# Patient Record
Sex: MALE | Race: WHITE | NOT HISPANIC OR LATINO | URBAN - METROPOLITAN AREA
[De-identification: names, ages, dates, MRNs, and addresses within clinical notes are randomized per-mention and may not be internally consistent; named-entity substitution may affect disease eponyms.]

---

## 2024-10-27 ENCOUNTER — INPATIENT (INPATIENT)
Facility: HOSPITAL | Age: 74
LOS: 11 days | Discharge: NURSING, REHAB OR LONG-TERM CARE | DRG: 235 | End: 2024-11-08
Attending: THORACIC SURGERY (CARDIOTHORACIC VASCULAR SURGERY) | Admitting: THORACIC SURGERY (CARDIOTHORACIC VASCULAR SURGERY)
Payer: MEDICARE

## 2024-10-27 VITALS
SYSTOLIC BLOOD PRESSURE: 106 MMHG | RESPIRATION RATE: 18 BRPM | HEART RATE: 72 BPM | DIASTOLIC BLOOD PRESSURE: 51 MMHG | TEMPERATURE: 98 F | OXYGEN SATURATION: 95 %

## 2024-10-27 DIAGNOSIS — Z95.828 PRESENCE OF OTHER VASCULAR IMPLANTS AND GRAFTS: Chronic | ICD-10-CM

## 2024-10-27 LAB
A1C WITH ESTIMATED AVERAGE GLUCOSE RESULT: 5.8 % — HIGH (ref 4–5.6)
ALBUMIN SERPL ELPH-MCNC: 4.2 G/DL — SIGNIFICANT CHANGE UP (ref 3.3–5)
ALP SERPL-CCNC: 48 U/L — SIGNIFICANT CHANGE UP (ref 40–120)
ALT FLD-CCNC: 34 U/L — SIGNIFICANT CHANGE UP (ref 10–45)
ANION GAP SERPL CALC-SCNC: 11 MMOL/L — SIGNIFICANT CHANGE UP (ref 5–17)
APTT BLD: 51.5 SEC — HIGH (ref 24.5–35.6)
APTT BLD: 59.3 SEC — HIGH (ref 24.5–35.6)
AST SERPL-CCNC: 53 U/L — HIGH (ref 10–40)
BASOPHILS # BLD AUTO: 0.02 K/UL — SIGNIFICANT CHANGE UP (ref 0–0.2)
BASOPHILS NFR BLD AUTO: 0.2 % — SIGNIFICANT CHANGE UP (ref 0–2)
BILIRUB SERPL-MCNC: 0.3 MG/DL — SIGNIFICANT CHANGE UP (ref 0.2–1.2)
BLD GP AB SCN SERPL QL: NEGATIVE — SIGNIFICANT CHANGE UP
BLD GP AB SCN SERPL QL: NEGATIVE — SIGNIFICANT CHANGE UP
BUN SERPL-MCNC: 24 MG/DL — HIGH (ref 7–23)
CALCIUM SERPL-MCNC: 9 MG/DL — SIGNIFICANT CHANGE UP (ref 8.4–10.5)
CHLORIDE SERPL-SCNC: 105 MMOL/L — SIGNIFICANT CHANGE UP (ref 96–108)
CHOLEST SERPL-MCNC: 123 MG/DL — SIGNIFICANT CHANGE UP
CK MB CFR SERPL CALC: 6.8 NG/ML — HIGH (ref 0–6.7)
CK SERPL-CCNC: 313 U/L — HIGH (ref 30–200)
CO2 SERPL-SCNC: 22 MMOL/L — SIGNIFICANT CHANGE UP (ref 22–31)
CREAT SERPL-MCNC: 1.5 MG/DL — HIGH (ref 0.5–1.3)
EGFR: 48 ML/MIN/1.73M2 — LOW
EOSINOPHIL # BLD AUTO: 0.19 K/UL — SIGNIFICANT CHANGE UP (ref 0–0.5)
EOSINOPHIL NFR BLD AUTO: 2.3 % — SIGNIFICANT CHANGE UP (ref 0–6)
ESTIMATED AVERAGE GLUCOSE: 120 MG/DL — HIGH (ref 68–114)
GLUCOSE SERPL-MCNC: 112 MG/DL — HIGH (ref 70–99)
HCT VFR BLD CALC: 39.3 % — SIGNIFICANT CHANGE UP (ref 39–50)
HDLC SERPL-MCNC: 46 MG/DL — SIGNIFICANT CHANGE UP
HGB BLD-MCNC: 13.3 G/DL — SIGNIFICANT CHANGE UP (ref 13–17)
IMM GRANULOCYTES NFR BLD AUTO: 0.2 % — SIGNIFICANT CHANGE UP (ref 0–0.9)
INR BLD: 1.14 — SIGNIFICANT CHANGE UP (ref 0.85–1.16)
LIPID PNL WITH DIRECT LDL SERPL: 56 MG/DL — SIGNIFICANT CHANGE UP
LYMPHOCYTES # BLD AUTO: 1.08 K/UL — SIGNIFICANT CHANGE UP (ref 1–3.3)
LYMPHOCYTES # BLD AUTO: 13 % — SIGNIFICANT CHANGE UP (ref 13–44)
MAGNESIUM SERPL-MCNC: 1.9 MG/DL — SIGNIFICANT CHANGE UP (ref 1.6–2.6)
MCHC RBC-ENTMCNC: 31.5 PG — SIGNIFICANT CHANGE UP (ref 27–34)
MCHC RBC-ENTMCNC: 33.8 GM/DL — SIGNIFICANT CHANGE UP (ref 32–36)
MCV RBC AUTO: 93.1 FL — SIGNIFICANT CHANGE UP (ref 80–100)
MONOCYTES # BLD AUTO: 0.92 K/UL — HIGH (ref 0–0.9)
MONOCYTES NFR BLD AUTO: 11.1 % — SIGNIFICANT CHANGE UP (ref 2–14)
NEUTROPHILS # BLD AUTO: 6.09 K/UL — SIGNIFICANT CHANGE UP (ref 1.8–7.4)
NEUTROPHILS NFR BLD AUTO: 73.2 % — SIGNIFICANT CHANGE UP (ref 43–77)
NON HDL CHOLESTEROL: 77 MG/DL — SIGNIFICANT CHANGE UP
NRBC # BLD: 0 /100 WBCS — SIGNIFICANT CHANGE UP (ref 0–0)
NT-PROBNP SERPL-SCNC: 1457 PG/ML — HIGH (ref 0–300)
PA ADP PRP-ACNC: 162 PRU — LOW (ref 182–335)
PHOSPHATE SERPL-MCNC: 2.9 MG/DL — SIGNIFICANT CHANGE UP (ref 2.5–4.5)
PLATELET # BLD AUTO: 201 K/UL — SIGNIFICANT CHANGE UP (ref 150–400)
POTASSIUM SERPL-MCNC: 4.2 MMOL/L — SIGNIFICANT CHANGE UP (ref 3.5–5.3)
POTASSIUM SERPL-SCNC: 4.2 MMOL/L — SIGNIFICANT CHANGE UP (ref 3.5–5.3)
PROT SERPL-MCNC: 7.8 G/DL — SIGNIFICANT CHANGE UP (ref 6–8.3)
PROTHROM AB SERPL-ACNC: 13.3 SEC — SIGNIFICANT CHANGE UP (ref 9.9–13.4)
RBC # BLD: 4.22 M/UL — SIGNIFICANT CHANGE UP (ref 4.2–5.8)
RBC # FLD: 12.9 % — SIGNIFICANT CHANGE UP (ref 10.3–14.5)
RH IG SCN BLD-IMP: POSITIVE — SIGNIFICANT CHANGE UP
RH IG SCN BLD-IMP: POSITIVE — SIGNIFICANT CHANGE UP
SODIUM SERPL-SCNC: 138 MMOL/L — SIGNIFICANT CHANGE UP (ref 135–145)
TRIGL SERPL-MCNC: 113 MG/DL — SIGNIFICANT CHANGE UP
TROPONIN T, HIGH SENSITIVITY RESULT: 304 NG/L — CRITICAL HIGH (ref 0–51)
TSH SERPL-MCNC: 1.73 UIU/ML — SIGNIFICANT CHANGE UP (ref 0.27–4.2)
WBC # BLD: 8.32 K/UL — SIGNIFICANT CHANGE UP (ref 3.8–10.5)
WBC # FLD AUTO: 8.32 K/UL — SIGNIFICANT CHANGE UP (ref 3.8–10.5)

## 2024-10-27 PROCEDURE — 71046 X-RAY EXAM CHEST 2 VIEWS: CPT | Mod: 26

## 2024-10-27 PROCEDURE — 93010 ELECTROCARDIOGRAM REPORT: CPT

## 2024-10-27 PROCEDURE — 93970 EXTREMITY STUDY: CPT | Mod: 26

## 2024-10-27 PROCEDURE — 93880 EXTRACRANIAL BILAT STUDY: CPT | Mod: 26

## 2024-10-27 RX ORDER — NITROGLYCERIN 0.6MG/HR
0.4 PATCH, TRANSDERMAL 24 HOURS TRANSDERMAL
Refills: 0 | Status: COMPLETED | OUTPATIENT
Start: 2024-10-27 | End: 2024-10-29

## 2024-10-27 RX ORDER — SODIUM CHLORIDE 9 MG/ML
3 INJECTION, SOLUTION INTRAMUSCULAR; INTRAVENOUS; SUBCUTANEOUS EVERY 8 HOURS
Refills: 0 | Status: DISCONTINUED | OUTPATIENT
Start: 2024-10-27 | End: 2024-10-31

## 2024-10-27 RX ORDER — HEPARIN SODIUM 10000 [USP'U]/ML
1300 INJECTION INTRAVENOUS; SUBCUTANEOUS
Qty: 25000 | Refills: 0 | Status: DISCONTINUED | OUTPATIENT
Start: 2024-10-27 | End: 2024-10-28

## 2024-10-27 RX ORDER — ISOSORBIDE MONONITRATE 60 MG/1
30 TABLET, EXTENDED RELEASE ORAL DAILY
Refills: 0 | Status: DISCONTINUED | OUTPATIENT
Start: 2024-10-27 | End: 2024-10-31

## 2024-10-27 RX ORDER — PANTOPRAZOLE SODIUM 40 MG/1
40 TABLET, DELAYED RELEASE ORAL
Refills: 0 | Status: DISCONTINUED | OUTPATIENT
Start: 2024-10-27 | End: 2024-10-31

## 2024-10-27 RX ORDER — ACETAMINOPHEN 500 MG
650 TABLET ORAL EVERY 6 HOURS
Refills: 0 | Status: DISCONTINUED | OUTPATIENT
Start: 2024-10-27 | End: 2024-10-31

## 2024-10-27 RX ORDER — MORPHINE SULFATE 30 MG/1
15 TABLET, EXTENDED RELEASE ORAL ONCE
Refills: 0 | Status: DISCONTINUED | OUTPATIENT
Start: 2024-10-27 | End: 2024-10-27

## 2024-10-27 RX ORDER — INFLUENZ VIR VAC TV P-SURF2003 15MCG/.5ML
0.5 SYRINGE (ML) INTRAMUSCULAR ONCE
Refills: 0 | Status: DISCONTINUED | OUTPATIENT
Start: 2024-10-27 | End: 2024-10-31

## 2024-10-27 RX ORDER — MORPHINE SULFATE 30 MG/1
2 TABLET, EXTENDED RELEASE ORAL EVERY 4 HOURS
Refills: 0 | Status: DISCONTINUED | OUTPATIENT
Start: 2024-10-27 | End: 2024-10-28

## 2024-10-27 RX ORDER — TAMSULOSIN HCL 0.4 MG
0.4 CAPSULE ORAL AT BEDTIME
Refills: 0 | Status: DISCONTINUED | OUTPATIENT
Start: 2024-10-27 | End: 2024-10-31

## 2024-10-27 RX ORDER — POLYETHYLENE GLYCOL 3350 17 G/17G
17 POWDER, FOR SOLUTION ORAL DAILY
Refills: 0 | Status: DISCONTINUED | OUTPATIENT
Start: 2024-10-27 | End: 2024-10-31

## 2024-10-27 RX ORDER — METOPROLOL TARTRATE 50 MG
12.5 TABLET ORAL EVERY 12 HOURS
Refills: 0 | Status: DISCONTINUED | OUTPATIENT
Start: 2024-10-27 | End: 2024-10-27

## 2024-10-27 RX ORDER — ASPIRIN/MAG CARB/ALUMINUM AMIN 325 MG
81 TABLET ORAL DAILY
Refills: 0 | Status: DISCONTINUED | OUTPATIENT
Start: 2024-10-27 | End: 2024-10-31

## 2024-10-27 RX ORDER — SENNA 187 MG
2 TABLET ORAL AT BEDTIME
Refills: 0 | Status: DISCONTINUED | OUTPATIENT
Start: 2024-10-27 | End: 2024-10-31

## 2024-10-27 RX ORDER — HEPARIN SODIUM 10000 [USP'U]/ML
1300 INJECTION INTRAVENOUS; SUBCUTANEOUS
Qty: 25000 | Refills: 0 | Status: DISCONTINUED | OUTPATIENT
Start: 2024-10-27 | End: 2024-10-27

## 2024-10-27 RX ORDER — METOPROLOL TARTRATE 50 MG
25 TABLET ORAL
Refills: 0 | Status: DISCONTINUED | OUTPATIENT
Start: 2024-10-27 | End: 2024-10-31

## 2024-10-27 RX ADMIN — Medication 650 MILLIGRAM(S): at 16:17

## 2024-10-27 RX ADMIN — SODIUM CHLORIDE 3 MILLILITER(S): 9 INJECTION, SOLUTION INTRAMUSCULAR; INTRAVENOUS; SUBCUTANEOUS at 21:36

## 2024-10-27 RX ADMIN — Medication 25 MILLIGRAM(S): at 21:32

## 2024-10-27 RX ADMIN — Medication 650 MILLIGRAM(S): at 16:00

## 2024-10-27 RX ADMIN — Medication 2 TABLET(S): at 21:32

## 2024-10-27 RX ADMIN — MORPHINE SULFATE 15 MILLIGRAM(S): 30 TABLET, EXTENDED RELEASE ORAL at 23:30

## 2024-10-27 RX ADMIN — MORPHINE SULFATE 2 MILLIGRAM(S): 30 TABLET, EXTENDED RELEASE ORAL at 22:18

## 2024-10-27 RX ADMIN — MORPHINE SULFATE 15 MILLIGRAM(S): 30 TABLET, EXTENDED RELEASE ORAL at 22:39

## 2024-10-27 RX ADMIN — HEPARIN SODIUM 13 UNIT(S)/HR: 10000 INJECTION INTRAVENOUS; SUBCUTANEOUS at 22:08

## 2024-10-27 RX ADMIN — Medication 0.4 MILLIGRAM(S): at 21:25

## 2024-10-27 RX ADMIN — MORPHINE SULFATE 2 MILLIGRAM(S): 30 TABLET, EXTENDED RELEASE ORAL at 21:06

## 2024-10-27 NOTE — PROVIDER CONTACT NOTE (OTHER) - REASON
pt on heparin gtt at 12.7units/hour from . Unable to obtain weight on pt because pt has been off the floor.

## 2024-10-27 NOTE — PATIENT PROFILE ADULT - NSFALLSECTIONLABEL_GEN_A_CORE
Patent needs his referral for the sleep study sent right to [facility], per his insurance company.  Please call patient to discuss. He said [employer] told him we do this electronically.        .

## 2024-10-27 NOTE — H&P ADULT - HISTORY OF PRESENT ILLNESS
This is a 75 y/o male with PMHx of   Transferred here from  for surgical evaluation.    Denies CP/SOB/N/V/D/dizziness/cough/fever/chills.   This is a 73 y/o male with PMHx of HTN, HLD, BPH, CAD (?s/p coronary stent per patient), severe PAD s/p fem-fem bypass per patient (?year) and an "abdominal stent for circulation problems".  Has not seen a doctor for his PAD in "a few years".  States he was having severe left leg pain and chest pain, prompting him to call an ambulance.  He was taken to [facility] for evaluation and cardiac cath revealed CAD.  Transferred here from  for surgical evaluation.    Denies CP/SOB/N/V/D/dizziness/cough/fever/chills.    Denies h/o CVA, surgery other than the bypass/stents.  Former smoker (quit 20 years ago) and former ETOH abuse (quit 20 years ago).  Denies drug use.  Poor support system, has no family or friends here.  Some family lives in [place].  He lives alone, on disability.

## 2024-10-27 NOTE — H&P ADULT - NSHPPHYSICALEXAM_GEN_ALL_CORE
GEN: NAD, looks comfortable  Psych: Mood appropriate  Neuro: A&Ox3.  No focal deficits.  Moving all extremities.   HEENT: No obvious abnormalities  CV: S1S2, regular, no murmurs appreciated.  No carotid bruits.  No JVD  Lungs: Clear B/L.  No wheezing, rales or rhonchi  ABD: Soft, non-tender, non-distended.  +Bowel sounds  EXT: Warm and well perfused.  No peripheral edema noted  Musculoskeletal: Moving all extremities with normal ROM, no joint swelling  PV: Pedal pulses palpable GEN: Seems to be in a lot of pain from his leg.   He's holding his leg and moaning a lot.    Psych: Mood appropriate  Neuro: A&Ox3.  No focal deficits.  Moving all extremities.   HEENT: No obvious abnormalities  CV: S1S2, regular, no murmurs appreciated.  No carotid bruits.  No JVD  Lungs: Clear B/L.  No wheezing, rales or rhonchi  ABD: Soft, non-tender, non-distended.  +Bowel sounds  EXT: Multiple ulcers on left leg shin and heel, with excoriations/scabs.  Legs are warm;   Pulse exam:  Musculoskeletal: Moving all extremities with normal ROM, no joint swelling GEN: Seems to be in a lot of pain from his leg.   He's holding his leg and moaning a lot.    Psych: Mood appropriate  Neuro: A&Ox3.  No focal deficits.  Moving all extremities.   HEENT: No obvious abnormalities  CV: S1S2, regular, no murmurs appreciated.  No carotid bruits.  No JVD  Lungs: Clear B/L.  No wheezing, rales or rhonchi  ABD: Soft, non-tender, non-distended.  +Bowel sounds  EXT: Multiple ulcers on left leg shin and heel, with excoriations/scabs.  Legs are warm;   Pulse exam: Right DP 2+ doppler, right PT 1+ doppler, left DP 2+ doppler, left PT NOT DOPPLERABLE  Musculoskeletal: Moving all extremities with normal ROM, no joint swelling

## 2024-10-27 NOTE — PATIENT PROFILE ADULT - FALL HARM RISK - PATIENT NEEDS ASSISTANCE
Normal rate, regular rhythm.  Heart sounds S1, S2.  No murmurs, rubs or gallops. Standing/Walking/Toileting

## 2024-10-27 NOTE — H&P ADULT - NSHPSOCIALHISTORY_GEN_ALL_CORE
Denies  Lives with   Occupation  Assist device: No Smoker x 20 years, quit 20 years ago.  Former ETOH use, quit 20 years ago  Lives alone.  No family or friends nearby.   Occupation - on disability.   Assist device: Uses a cane, but as of recent hospital admission can not walk due to his severe leg pain.

## 2024-10-27 NOTE — PROVIDER CONTACT NOTE (OTHER) - SITUATION
Pt arrived from OSH on Heparin gtt at 12.7cc/hour.  Unable to get weight on pt due to pt transported via bed for U/S carotids.  To be endorsed to night RN to draw PTT and get a weight on pt

## 2024-10-27 NOTE — PROVIDER CONTACT NOTE (OTHER) - ACTION/TREATMENT ORDERED:
To be endorsed to night RN to draw PTT as soon as pt comes back to room and obtain heparin gtt order.  Heparin gtt has been infusing since patient arrived to unit at approx 2pm.

## 2024-10-27 NOTE — H&P ADULT - ASSESSMENT
Plan:    Problem 1.  CAD.  Admit to Dr. Devine, for surgical evaluation.  Surgical plan pending further work-up.  F/U echo, carotid dopplers.  CXR done, no acute pathology, F/U official reading.  DASH diet.      Problem 2.  PAD. H/o stents/bypass.  Unclear exactly what surgery he's had, poor historian.  Heparin gtt; F/U ptt.  Continue w/ ASA.  Plavix on hold.      Problem 3.  ?Lung nodule.  PET scan ordered to better assess and r/o metastatic disease.  NO DEXTROSE in Heparin gtt.  Pulm consult tomorrow.      Problem 4.      Dispo:  9LA  FULL CODE       Plan:    Problem 1.  CAD.  Admit to Dr. Devine, for surgical evaluation.  Surgical plan pending further work-up.  F/U echo, carotid dopplers.  CXR done, no acute pathology, F/U official reading.  DASH diet.  Heparin gtt, statin, ASA, BB.  Norvasc (home medication) as tolerated.     Problem 2.  PAD. H/o stents/bypass.  Unclear exactly what surgery he's had, poor historian.  Heparin gtt; F/U ptt.  Continue w/ ASA.  Plavix on hold.      Problem 3.  ?Lung nodule.  PET scan ordered to better assess and r/o metastatic disease.  NO DEXTROSE in Heparin gtt.  Pulm consult tomorrow.      Problem 4. BPH.  Continue Flomax.     Dispo:  9LA  FULL CODE   This is a 73 y/o male with PMHx of HTN, HLD, BPH, CAD (?s/p coronary stent per patient), severe PAD s/p fem-fem bypass per patient (?year) and an "abdominal stent for circulation problems".  Has not seen a doctor for his PAD in "a few years".  States he was having severe left leg pain and chest pain, prompting him to call an ambulance.  He was taken to [facility] for evaluation and cardiac cath revealed CAD.  Transferred here from  for surgical evaluation.    Denies CP/SOB/N/V/D/dizziness/cough/fever/chills.    Denies h/o CVA, surgery other than the bypass/stents.  Former smoker (quit 20 years ago) and former ETOH abuse (quit 20 years ago).  Denies drug use.  Poor support system, has no family or friends here.  Some family lives in [place].  He lives alone, on disability.      Plan:    Problem 1.  CAD.  Admit to Dr. Devine, for surgical evaluation.  Surgical plan pending further work-up.  F/U echo, carotid dopplers.  CXR done, no acute pathology, F/U official reading.  DASH diet.  Heparin gtt, statin, ASA, BB.  Norvasc (home medication) as tolerated.     Problem 2.  PAD. H/o stents/bypass.  Unclear exactly what surgery he's had, poor historian.  Heparin gtt; F/U ptt.  Continue w/ ASA.  Plavix on hold.  May need in-house vascular consult for PAD/ulcers    Problem 3.  ?Lung nodule.  PET scan ordered to better assess and r/o metastatic disease.  NO DEXTROSE in Heparin gtt.  Pulm consult tomorrow.      Problem 4. BPH.  Continue Flomax.     Dispo:  9LA  FULL CODE

## 2024-10-27 NOTE — H&P ADULT - NSHPREVIEWOFSYSTEMS_GEN_ALL_CORE
Review of Systems  CONSTITUTIONAL:  Denies Fevers / chills, sweats, fatigue, weight loss, weight gain                                      NEURO:  Denies parethesias, seizures, syncope, confusion                                                                                EYES:  Denies Blurry vision, discharge, pain, loss of vision                                                                                    ENMT:  Denies Difficulty hearing, vertigo, dysphagia, epistaxis, recent dental work                                       CV:  Denies Chest pain, palpitations, HICKMAN, orthopnea                                                                                          RESPIRATORY:  Denies Wheezing, SOB, cough / sputum, hemoptysis                                                                GI:  Denies Nausea, vomiting, diarrhea, constipation, melena, difficulty swallowing                                               : Denies Hematuria, dysuria, urgency, incontinence                                                                                         MUSCULOSKELETAL:  Denies arthritis, joint swelling, muscle weakness                                                             SKIN/BREAST:  Denies rash, itching, hair loss, masses                                                                                            PSYCH:  Denies depression, anxiety, suicidal ideation                                                                                               HEME/LYMPH:  Denies bruises easily, enlarged lymph nodes, tender lymph nodes                                        ENDOCRINE:  Denies cold intolerance, heat intolerance, polydipsia

## 2024-10-27 NOTE — H&P ADULT - NSICDXPASTMEDICALHX_GEN_ALL_CORE_FT
PAST MEDICAL HISTORY:  CAD (coronary artery disease)      PAST MEDICAL HISTORY:  BPH (benign prostatic hyperplasia)     CAD (coronary artery disease)     HLD (hyperlipidemia)     HTN (hypertension)     PAD (peripheral artery disease)

## 2024-10-28 ENCOUNTER — RESULT REVIEW (OUTPATIENT)
Age: 74
End: 2024-10-28

## 2024-10-28 LAB
ANION GAP SERPL CALC-SCNC: 12 MMOL/L — SIGNIFICANT CHANGE UP (ref 5–17)
APTT BLD: 78.4 SEC — HIGH (ref 24.5–35.6)
APTT BLD: 78.6 SEC — HIGH (ref 24.5–35.6)
BUN SERPL-MCNC: 25 MG/DL — HIGH (ref 7–23)
CALCIUM SERPL-MCNC: 9 MG/DL — SIGNIFICANT CHANGE UP (ref 8.4–10.5)
CHLORIDE SERPL-SCNC: 107 MMOL/L — SIGNIFICANT CHANGE UP (ref 96–108)
CK MB CFR SERPL CALC: 6.6 NG/ML — SIGNIFICANT CHANGE UP (ref 0–6.7)
CK SERPL-CCNC: 236 U/L — HIGH (ref 30–200)
CO2 SERPL-SCNC: 18 MMOL/L — LOW (ref 22–31)
CREAT SERPL-MCNC: 1.47 MG/DL — HIGH (ref 0.5–1.3)
EGFR: 49 ML/MIN/1.73M2 — LOW
GLUCOSE BLDC GLUCOMTR-MCNC: 89 MG/DL — SIGNIFICANT CHANGE UP (ref 70–99)
GLUCOSE BLDC GLUCOMTR-MCNC: 90 MG/DL — SIGNIFICANT CHANGE UP (ref 70–99)
GLUCOSE SERPL-MCNC: 87 MG/DL — SIGNIFICANT CHANGE UP (ref 70–99)
HCT VFR BLD CALC: 37.9 % — LOW (ref 39–50)
HGB BLD-MCNC: 12.7 G/DL — LOW (ref 13–17)
INR BLD: 1.18 — HIGH (ref 0.85–1.16)
MAGNESIUM SERPL-MCNC: 2 MG/DL — SIGNIFICANT CHANGE UP (ref 1.6–2.6)
MCHC RBC-ENTMCNC: 31.1 PG — SIGNIFICANT CHANGE UP (ref 27–34)
MCHC RBC-ENTMCNC: 33.5 GM/DL — SIGNIFICANT CHANGE UP (ref 32–36)
MCV RBC AUTO: 92.9 FL — SIGNIFICANT CHANGE UP (ref 80–100)
NRBC # BLD: 0 /100 WBCS — SIGNIFICANT CHANGE UP (ref 0–0)
PHOSPHATE SERPL-MCNC: 2.9 MG/DL — SIGNIFICANT CHANGE UP (ref 2.5–4.5)
PLATELET # BLD AUTO: 188 K/UL — SIGNIFICANT CHANGE UP (ref 150–400)
POTASSIUM SERPL-MCNC: 4 MMOL/L — SIGNIFICANT CHANGE UP (ref 3.5–5.3)
POTASSIUM SERPL-SCNC: 4 MMOL/L — SIGNIFICANT CHANGE UP (ref 3.5–5.3)
PROTHROM AB SERPL-ACNC: 13.6 SEC — HIGH (ref 9.9–13.4)
RBC # BLD: 4.08 M/UL — LOW (ref 4.2–5.8)
RBC # FLD: 12.8 % — SIGNIFICANT CHANGE UP (ref 10.3–14.5)
SODIUM SERPL-SCNC: 137 MMOL/L — SIGNIFICANT CHANGE UP (ref 135–145)
TROPONIN T, HIGH SENSITIVITY RESULT: 198 NG/L — CRITICAL HIGH (ref 0–51)
WBC # BLD: 6.43 K/UL — SIGNIFICANT CHANGE UP (ref 3.8–10.5)
WBC # FLD AUTO: 6.43 K/UL — SIGNIFICANT CHANGE UP (ref 3.8–10.5)

## 2024-10-28 PROCEDURE — 99232 SBSQ HOSP IP/OBS MODERATE 35: CPT

## 2024-10-28 PROCEDURE — 99222 1ST HOSP IP/OBS MODERATE 55: CPT

## 2024-10-28 PROCEDURE — 93925 LOWER EXTREMITY STUDY: CPT | Mod: 26

## 2024-10-28 PROCEDURE — 93306 TTE W/DOPPLER COMPLETE: CPT | Mod: 26

## 2024-10-28 PROCEDURE — 93010 ELECTROCARDIOGRAM REPORT: CPT

## 2024-10-28 PROCEDURE — 78815 PET IMAGE W/CT SKULL-THIGH: CPT | Mod: 26,PI

## 2024-10-28 RX ORDER — OXYCODONE HYDROCHLORIDE 30 MG/1
5 TABLET ORAL EVERY 4 HOURS
Refills: 0 | Status: DISCONTINUED | OUTPATIENT
Start: 2024-10-28 | End: 2024-10-31

## 2024-10-28 RX ORDER — ACETAMINOPHEN 500 MG
1000 TABLET ORAL ONCE
Refills: 0 | Status: COMPLETED | OUTPATIENT
Start: 2024-10-28 | End: 2024-10-29

## 2024-10-28 RX ORDER — MORPHINE SULFATE 30 MG/1
15 TABLET, EXTENDED RELEASE ORAL DAILY
Refills: 0 | Status: DISCONTINUED | OUTPATIENT
Start: 2024-10-28 | End: 2024-10-31

## 2024-10-28 RX ORDER — HEPARIN SODIUM 10000 [USP'U]/ML
1300 INJECTION INTRAVENOUS; SUBCUTANEOUS
Qty: 25000 | Refills: 0 | Status: DISCONTINUED | OUTPATIENT
Start: 2024-10-28 | End: 2024-10-31

## 2024-10-28 RX ORDER — ACETAMINOPHEN 500 MG
1000 TABLET ORAL ONCE
Refills: 0 | Status: COMPLETED | OUTPATIENT
Start: 2024-10-28 | End: 2024-10-28

## 2024-10-28 RX ORDER — HYDROMORPHONE HCL/0.9% NACL/PF 6 MG/30 ML
0.2 PATIENT CONTROLLED ANALGESIA SYRINGE INTRAVENOUS ONCE
Refills: 0 | Status: DISCONTINUED | OUTPATIENT
Start: 2024-10-28 | End: 2024-10-28

## 2024-10-28 RX ADMIN — SODIUM CHLORIDE 3 MILLILITER(S): 9 INJECTION, SOLUTION INTRAMUSCULAR; INTRAVENOUS; SUBCUTANEOUS at 05:17

## 2024-10-28 RX ADMIN — Medication 25 MILLIGRAM(S): at 05:26

## 2024-10-28 RX ADMIN — Medication 81 MILLIGRAM(S): at 11:26

## 2024-10-28 RX ADMIN — SODIUM CHLORIDE 3 MILLILITER(S): 9 INJECTION, SOLUTION INTRAMUSCULAR; INTRAVENOUS; SUBCUTANEOUS at 13:12

## 2024-10-28 RX ADMIN — ISOSORBIDE MONONITRATE 30 MILLIGRAM(S): 60 TABLET, EXTENDED RELEASE ORAL at 11:26

## 2024-10-28 RX ADMIN — Medication 0.4 MILLIGRAM(S): at 11:26

## 2024-10-28 RX ADMIN — PANTOPRAZOLE SODIUM 40 MILLIGRAM(S): 40 TABLET, DELAYED RELEASE ORAL at 08:05

## 2024-10-28 RX ADMIN — OXYCODONE HYDROCHLORIDE 5 MILLIGRAM(S): 30 TABLET ORAL at 17:57

## 2024-10-28 RX ADMIN — MORPHINE SULFATE 15 MILLIGRAM(S): 30 TABLET, EXTENDED RELEASE ORAL at 22:00

## 2024-10-28 RX ADMIN — Medication 0.2 MILLIGRAM(S): at 14:00

## 2024-10-28 RX ADMIN — Medication 80 MILLIGRAM(S): at 21:30

## 2024-10-28 RX ADMIN — Medication 25 MILLIGRAM(S): at 17:55

## 2024-10-28 RX ADMIN — MORPHINE SULFATE 2 MILLIGRAM(S): 30 TABLET, EXTENDED RELEASE ORAL at 01:30

## 2024-10-28 RX ADMIN — MORPHINE SULFATE 2 MILLIGRAM(S): 30 TABLET, EXTENDED RELEASE ORAL at 01:45

## 2024-10-28 RX ADMIN — Medication 400 MILLIGRAM(S): at 05:25

## 2024-10-28 RX ADMIN — Medication 2 TABLET(S): at 21:04

## 2024-10-28 RX ADMIN — Medication 0.2 MILLIGRAM(S): at 17:57

## 2024-10-28 RX ADMIN — OXYCODONE HYDROCHLORIDE 5 MILLIGRAM(S): 30 TABLET ORAL at 09:16

## 2024-10-28 RX ADMIN — MORPHINE SULFATE 15 MILLIGRAM(S): 30 TABLET, EXTENDED RELEASE ORAL at 21:11

## 2024-10-28 RX ADMIN — Medication 0.4 MILLIGRAM(S): at 21:05

## 2024-10-28 RX ADMIN — SODIUM CHLORIDE 3 MILLILITER(S): 9 INJECTION, SOLUTION INTRAMUSCULAR; INTRAVENOUS; SUBCUTANEOUS at 21:30

## 2024-10-28 RX ADMIN — OXYCODONE HYDROCHLORIDE 5 MILLIGRAM(S): 30 TABLET ORAL at 17:55

## 2024-10-28 RX ADMIN — OXYCODONE HYDROCHLORIDE 5 MILLIGRAM(S): 30 TABLET ORAL at 11:24

## 2024-10-28 RX ADMIN — Medication 1000 MILLIGRAM(S): at 05:45

## 2024-10-28 NOTE — PROGRESS NOTE ADULT - SUBJECTIVE AND OBJECTIVE BOX
Patient discussed on morning rounds with Dr. Devine    Preop CABG, undergoing PST     SUBJECTIVE ASSESSMENT:  74y Male with complaints of ongoing L leg pain. Chest pain intermittent and improving from when at home. Denies fever, chills, sob, abd pain n/v/d.     Vital Signs Last 24 Hrs  T(C): 36.7 (28 Oct 2024 09:00), Max: 36.8 (27 Oct 2024 20:44)  T(F): 98 (28 Oct 2024 09:00), Max: 98.3 (27 Oct 2024 20:44)  HR: 74 (28 Oct 2024 09:00) (73 - 89)  BP: 118/57 (28 Oct 2024 12:00) (102/58 - 149/69)  BP(mean): 82 (28 Oct 2024 12:00) (74 - 99)  RR: 17 (28 Oct 2024 12:00) (12 - 22)  SpO2: 96% (28 Oct 2024 12:00) (95% - 99%)    Parameters below as of 28 Oct 2024 12:00  Patient On (Oxygen Delivery Method): room air      I&O's Detail    27 Oct 2024 07:01  -  28 Oct 2024 07:00  --------------------------------------------------------  IN:    Heparin: 130 mL    IV PiggyBack: 100 mL  Total IN: 230 mL    OUT:    Voided (mL): 350 mL  Total OUT: 350 mL    Total NET: -120 mL      28 Oct 2024 07:01  -  28 Oct 2024 14:36  --------------------------------------------------------  IN:    Heparin: 13 mL  Total IN: 13 mL    OUT:    Voided (mL): 175 mL  Total OUT: 175 mL    Total NET: -162 mL      CHEST TUBE:  No.  SUKUMAR DRAIN:  No.  EPICARDIAL WIRES: No.  TIE DOWNS: No.  GRULLON: No.    PHYSICAL EXAM:  *  Incision Sites:    LABS:                        12.7   6.43  )-----------( 188      ( 28 Oct 2024 05:30 )             37.9       COUMADIN:  Yes/No. REASON: .    PT/INR - ( 28 Oct 2024 08:22 )   PT: 13.6 sec;   INR: 1.18          PTT - ( 28 Oct 2024 08:22 )  PTT:78.6 sec    10-28    137  |  107  |  25[H]  ----------------------------<  87  4.0   |  18[L]  |  1.47[H]    Ca    9.0      28 Oct 2024 05:30  Phos  2.9     10-28  Mg     2.0     10-28    TPro  7.8  /  Alb  4.2  /  TBili  0.3  /  DBili  x   /  AST  53[H]  /  ALT  34  /  AlkPhos  48  10-27      Urinalysis Basic - ( 28 Oct 2024 05:30 )    Color: x / Appearance: x / SG: x / pH: x  Gluc: 87 mg/dL / Ketone: x  / Bili: x / Urobili: x   Blood: x / Protein: x / Nitrite: x   Leuk Esterase: x / RBC: x / WBC x   Sq Epi: x / Non Sq Epi: x / Bacteria: x        MEDICATIONS  (STANDING):  aspirin enteric coated 81 milliGRAM(s) Oral daily  heparin  Infusion 1300 Unit(s)/Hr (13 mL/Hr) IV Continuous <Continuous>  HYDROmorphone  Injectable 0.2 milliGRAM(s) IV Push once  influenza  Vaccine (HIGH DOSE) 0.5 milliLiter(s) IntraMuscular once  isosorbide   mononitrate ER Tablet (IMDUR) 30 milliGRAM(s) Oral daily  metoprolol tartrate 25 milliGRAM(s) Oral <User Schedule>  pantoprazole    Tablet 40 milliGRAM(s) Oral before breakfast  polyethylene glycol 3350 17 Gram(s) Oral daily  senna 2 Tablet(s) Oral at bedtime  sodium chloride 0.9% lock flush 3 milliLiter(s) IV Push every 8 hours  tamsulosin 0.4 milliGRAM(s) Oral at bedtime    MEDICATIONS  (PRN):  acetaminophen     Tablet .. 650 milliGRAM(s) Oral every 6 hours PRN Temp greater or equal to 38C (100.4F), Mild Pain (1 - 3)  nitroglycerin     SubLingual 0.4 milliGRAM(s) SubLingual every 5 minutes PRN Chest Pain  oxyCODONE    IR 5 milliGRAM(s) Oral every 4 hours PRN Moderate Pain (4 - 6)        RADIOLOGY & ADDITIONAL TESTS:     Patient discussed on morning rounds with Dr. Devine    Preop CABG, undergoing PST     SUBJECTIVE ASSESSMENT:  74y Male with complaints of ongoing L leg pain. Chest pain intermittent and improving from when at home. Denies fever, chills, sob, abd pain n/v/d.     Vital Signs Last 24 Hrs  T(C): 36.7 (28 Oct 2024 09:00), Max: 36.8 (27 Oct 2024 20:44)  T(F): 98 (28 Oct 2024 09:00), Max: 98.3 (27 Oct 2024 20:44)  HR: 74 (28 Oct 2024 09:00) (73 - 89)  BP: 118/57 (28 Oct 2024 12:00) (102/58 - 149/69)  BP(mean): 82 (28 Oct 2024 12:00) (74 - 99)  RR: 17 (28 Oct 2024 12:00) (12 - 22)  SpO2: 96% (28 Oct 2024 12:00) (95% - 99%)    Parameters below as of 28 Oct 2024 12:00  Patient On (Oxygen Delivery Method): room air      I&O's Detail    27 Oct 2024 07:01  -  28 Oct 2024 07:00  --------------------------------------------------------  IN:    Heparin: 130 mL    IV PiggyBack: 100 mL  Total IN: 230 mL    OUT:    Voided (mL): 350 mL  Total OUT: 350 mL    Total NET: -120 mL      28 Oct 2024 07:01  -  28 Oct 2024 14:36  --------------------------------------------------------  IN:    Heparin: 13 mL  Total IN: 13 mL    OUT:    Voided (mL): 175 mL  Total OUT: 175 mL    Total NET: -162 mL      CHEST TUBE:  No.  USKUMAR DRAIN:  No.  EPICARDIAL WIRES: No.  TIE DOWNS: No.  GRULLON: No.    PHYSICAL EXAM:  Appearance: No acute distress.  Neurologic: AAOx3, no AMS or focal deficits.  Responds appropriately to verbal and physical stimuli; exhibits purposeful movement in all extremities.  Cardiovascular: RRR, S1 S2. No m/r/g.  Respiratory: No acute respiratory distress. CTA b/l, no w/r/r.   Gastrointestinal:  Soft, non-tender, non-distended, + BS.	  Skin: No rashes. No ecchymoses. No cyanosis.  Extremities: +scabs to LLE. Exhibits normal range of motion, no clubbing, cyanosis or edema.  Vascular: +dopplerable b/l fem pulses (non palpable), dopplerable b/l LE DP/PT signals non palpable.   Incision Sites: welll healed abd incision, well healed femoral incisions     LABS:                        12.7   6.43  )-----------( 188      ( 28 Oct 2024 05:30 )             37.9     PT/INR - ( 28 Oct 2024 08:22 )   PT: 13.6 sec;   INR: 1.18          PTT - ( 28 Oct 2024 08:22 )  PTT:78.6 sec    10-28    137  |  107  |  25[H]  ----------------------------<  87  4.0   |  18[L]  |  1.47[H]    Ca    9.0      28 Oct 2024 05:30  Phos  2.9     10-28  Mg     2.0     10-28    TPro  7.8  /  Alb  4.2  /  TBili  0.3  /  DBili  x   /  AST  53[H]  /  ALT  34  /  AlkPhos  48  10-27      Urinalysis Basic - ( 28 Oct 2024 05:30 )    Color: x / Appearance: x / SG: x / pH: x  Gluc: 87 mg/dL / Ketone: x  / Bili: x / Urobili: x   Blood: x / Protein: x / Nitrite: x   Leuk Esterase: x / RBC: x / WBC x   Sq Epi: x / Non Sq Epi: x / Bacteria: x        MEDICATIONS  (STANDING):  aspirin enteric coated 81 milliGRAM(s) Oral daily  heparin  Infusion 1300 Unit(s)/Hr (13 mL/Hr) IV Continuous <Continuous>  HYDROmorphone  Injectable 0.2 milliGRAM(s) IV Push once  influenza  Vaccine (HIGH DOSE) 0.5 milliLiter(s) IntraMuscular once  isosorbide   mononitrate ER Tablet (IMDUR) 30 milliGRAM(s) Oral daily  metoprolol tartrate 25 milliGRAM(s) Oral <User Schedule>  pantoprazole    Tablet 40 milliGRAM(s) Oral before breakfast  polyethylene glycol 3350 17 Gram(s) Oral daily  senna 2 Tablet(s) Oral at bedtime  sodium chloride 0.9% lock flush 3 milliLiter(s) IV Push every 8 hours  tamsulosin 0.4 milliGRAM(s) Oral at bedtime    MEDICATIONS  (PRN):  acetaminophen     Tablet .. 650 milliGRAM(s) Oral every 6 hours PRN Temp greater or equal to 38C (100.4F), Mild Pain (1 - 3)  nitroglycerin     SubLingual 0.4 milliGRAM(s) SubLingual every 5 minutes PRN Chest Pain  oxyCODONE    IR 5 milliGRAM(s) Oral every 4 hours PRN Moderate Pain (4 - 6)        RADIOLOGY & ADDITIONAL TESTS:  < from: Xray Chest 2 Views PA/Lat (10.27.24 @ 20:44) >  Hypoinflation limits evaluation of the cardiomediastinal silhouette. The   lungs are clear. No pleural effusions. No pneumothorax. No acute   abnormalities of the soft tissues and osseous structures.    < end of copied text >

## 2024-10-28 NOTE — DIETITIAN INITIAL EVALUATION ADULT - ADD RECOMMEND
1. As medically feasible, recommend DASH/TLC diet with textures/consistencies per team/SLP   2. Encourage and monitor PO intake, honor preferences as able   3. Monitor wt trends, GI function, skin integrity  4. Monitor lytes, renal indices, blood glucose, LFTs    5. Pain and bowel regimen per team

## 2024-10-28 NOTE — DIETITIAN INITIAL EVALUATION ADULT - PERTINENT MEDS FT
MEDICATIONS  (STANDING):  aspirin enteric coated 81 milliGRAM(s) Oral daily  heparin  Infusion 1300 Unit(s)/Hr (13 mL/Hr) IV Continuous <Continuous>  influenza  Vaccine (HIGH DOSE) 0.5 milliLiter(s) IntraMuscular once  isosorbide   mononitrate ER Tablet (IMDUR) 30 milliGRAM(s) Oral daily  metoprolol tartrate 25 milliGRAM(s) Oral <User Schedule>  pantoprazole    Tablet 40 milliGRAM(s) Oral before breakfast  polyethylene glycol 3350 17 Gram(s) Oral daily  senna 2 Tablet(s) Oral at bedtime  sodium chloride 0.9% lock flush 3 milliLiter(s) IV Push every 8 hours  tamsulosin 0.4 milliGRAM(s) Oral at bedtime    MEDICATIONS  (PRN):  acetaminophen     Tablet .. 650 milliGRAM(s) Oral every 6 hours PRN Temp greater or equal to 38C (100.4F), Mild Pain (1 - 3)  nitroglycerin     SubLingual 0.4 milliGRAM(s) SubLingual every 5 minutes PRN Chest Pain  oxyCODONE    IR 5 milliGRAM(s) Oral every 4 hours PRN Moderate Pain (4 - 6)

## 2024-10-28 NOTE — PROGRESS NOTE ADULT - ASSESSMENT
73 y/o male, poor historian w/ PMHx of HTN, HLD, BPH, CAD (?s/p coronary stent per patient), severe PAD s/p fem-fem bypass per patient (?year) and an "abdominal stent for circulation problems".  Has not seen a doctor for his PAD in "a few years".  States he was having severe left leg pain and chest pain, prompting him to call an ambulance.  He was taken to [facility] for evaluation and cardiac cath revealed CAD. Transferred here from  for surgical evaluation. Given patient w/ pulmonary nodules noted on OSH CTA Chest, patient is pending PET scan today to rule out metastatic disease and pulmonology was consulted. Given h/o aortic surgery in the past w/ ongoing LLE pain and non palpable distal pulses, vascular consulted, recommending CTA A/P with runoff to assess for patent bypass/ type of intervention in the past. Patient completed TTE w/ EF 35% and hypokinesis. Continues on hep gtt for CAD.     A/P:  Neurovascular: No delirium. Pain well controlled with current regimen.  -Pain: tylenol prn, oxy 5mg prn  -LLE pain: vascular consulted, dopplerable signals, consider starting gabapentin     Cardiovascular: Hemodynamically stable. HR controlled.  -CAD, undergoing PST ahead of possible intervention:   C/w ASA daily, c/w Imdur 30mg daily, c/w Lopressor 25mg BID   Nitro prn for ongoing chest pain     -PAD w/ unknown past vascular hx:   Vascular consulted, appreciate recs  Pending CTA A/P w/ runoff   C/w ASA daily   -HLD: Lipitor 80mg nightly     Respiratory: 02 Sat = 98% on RA.  -Encourage C+DB and Use of IS 10x / hr while awake.  -CXR: no ptx, no pleural effusions   -Pulmonary Nodules:   Pulm consulted to rule out metastatic disease as this will .   Pending PET scan today   CTA Chest from OSH in chart    GI: Stable.  -PPX: pantoprazole   -PO Diet.  -Bowel Regimen: c/w Miralax, senna    Renal / :  -Monitor renal function: BUN 25, Cr 1.47  -Monitor I/O's.  -FELIPA: no reported hx of kidney disease, likely 2/2 contrast at OSH.   Continue to trend Cr/BUN  -BPH: c/w tamsulosin .4mg daily     Endocrine:    -A1c: 5.8%  -TSH: 1.730    Hematologic:  -CBC: RBC 4.08, Hgb 12.7, Plt 188  -Coagulation Panel: PTT 78.6, PT 13.6, INR 1.18  -Continues on Hep gtt for CAD: follow up 2PM PTT     ID:  -Tempature: afebrile   -CBC: WBC 6.43  -Observe for SIRS/Sepsis Syndrome.    Prophylaxis:  -DVT prophylaxis with therapeutic hep gtt   -SCD's    Disposition:  -Telemetry, undergoing PST ahead of possible intervention with . Vascular and Pulmonology following. Patient continues on hep gtt for CAD.

## 2024-10-28 NOTE — DIETITIAN INITIAL EVALUATION ADULT - NS FNS DIET ORDER
Diet, NPO after Midnight:      NPO Start Date: 27-Oct-2024,   NPO Start Time: 23:59 (10-27-24 @ 20:32)

## 2024-10-28 NOTE — CONSULT NOTE ADULT - ATTENDING COMMENTS
Patient seen and examined at bedside with the fellow on 10/28/2024  Agree with the plan outlined as above

## 2024-10-28 NOTE — DIETITIAN INITIAL EVALUATION ADULT - PERTINENT LABORATORY DATA
10-28    137  |  107  |  25[H]  ----------------------------<  87  4.0   |  18[L]  |  1.47[H]    Ca    9.0      28 Oct 2024 05:30  Phos  2.9     10-28  Mg     2.0     10-28    TPro  7.8  /  Alb  4.2  /  TBili  0.3  /  DBili  x   /  AST  53[H]  /  ALT  34  /  AlkPhos  48  10-27  POCT Blood Glucose.: 90 mg/dL (10-28-24 @ 07:02)  A1C with Estimated Average Glucose Result: 5.8 % (10-27-24 @ 14:07)

## 2024-10-28 NOTE — OCCUPATIONAL THERAPY INITIAL EVALUATION ADULT - ADDITIONAL COMMENTS
Prior to admission, pt able to perform all mobility/ADLs independently, however pt reporting that he was having increasing difficulty 2/2 LLE/chest pain, requiring extended duration to complete all tasks, and limiting activity tolerance. Pt reporting use of SC for ambulation, and no additional AD/AE.

## 2024-10-28 NOTE — DIETITIAN INITIAL EVALUATION ADULT - OTHER CALCULATIONS
pounds, %%  Pt within % IBW thus actual body weight used for all calculations. Needs adjusted for advanced age, nutrition optimization prior to OR, wound healing.

## 2024-10-28 NOTE — CONSULT NOTE ADULT - ASSESSMENT
75 y/o male with PMHx of HTN, HLD, BPH, CAD (?s/p coronary stent per patient), severe PAD s/p fem-fem bypass per patient (20 years ago) and an "abdominal stent for circulation problems".  He states he was having severe left leg pain and chest pain, prompting him to call an ambulance.  He was taken to [facility] for evaluation and cardiac cath revealed CAD.  Transferred here from  for surgical evaluation.    Pulm consulted for lung nodule and to rule out metastasis. Was informed by vascular provider that lung nodule was 2.8cm x 1.7cm located in the RUL by outside radiologist read.    #Pulmonary nodule    Recs:  -PET/CT pending  -Would like to see outside hospital ([place]) official CT chest images  -Based on PET/CT, may need interventional pulm involvement for invasive staging.      Patient discussed with pulmonary attending, Dr. Duarte  Please feel free to contact me if questions or concerns arise.  Thank you for your consultation.    German Daniel MD  Pulmonary Critical Care Fellow, PGY-6  [phone]

## 2024-10-28 NOTE — OCCUPATIONAL THERAPY INITIAL EVALUATION ADULT - MODALITIES TREATMENT COMMENTS
Pt limited by severe LLE pain throughout session, with c/o pain to touch and all mobility. Pt able to tolerate bed mobility, requiring CGAx1 2/2 impaired ability to weight shift. Pt performed UB dressing while sitting at EOB, requiring CGAx1 2/2 impaired dynamic sitting balance. Pt performed sit<->stand, and able to ambulate 1 L side step, requiring Min Ax2 (b/l hand held assist), demo impaired balance with retropulsion and impaired ability to weight shift 2/2 LLE pain and weakness. Pt returned to bed and left as found, +all lines, +call bell, +bed alarm, c/o severe LLE pain and R sided chest pain. JOURDAN Hussein made aware of pt status and outcome of session.

## 2024-10-28 NOTE — DIETITIAN INITIAL EVALUATION ADULT - NSICDXPASTMEDICALHX_GEN_ALL_CORE_FT
PAST MEDICAL HISTORY:  BPH (benign prostatic hyperplasia)     CAD (coronary artery disease)     HLD (hyperlipidemia)     HTN (hypertension)     PAD (peripheral artery disease)

## 2024-10-28 NOTE — DIETITIAN INITIAL EVALUATION ADULT - PERSON TAUGHT/METHOD
RD discussed importance of adequate macro/micronutrient intake./verbal instruction/patient instructed

## 2024-10-28 NOTE — CONSULT NOTE ADULT - SUBJECTIVE AND OBJECTIVE BOX
Vascular Attending:        HPI:  This is a 73 y/o male with PMHx of HTN, HLD, BPH, CAD (?s/p coronary stent per patient), severe PAD s/p fem-fem bypass per patient (?year) and an "abdominal stent for circulation problems".  Has not seen a doctor for his PAD in "a few years".  States he was having severe left leg pain and chest pain, prompting him to call an ambulance.  He was taken to [facility] for evaluation and cardiac cath revealed CAD.  Transferred here from  for surgical evaluation.    Denies CP/SOB/N/V/D/dizziness/cough/fever/chills.    Denies h/o CVA, surgery other than the bypass/stents.  Former smoker (quit 20 years ago) and former ETOH abuse (quit 20 years ago).  Denies drug use.  Poor support system, has no family or friends here.  Some family lives in [place].  He lives alone, on disability.   (27 Oct 2024 19:55)    Vascular surgery was consulted for evaluation of LLE for possible peripheral arterial disease. Pt. states he can only walk about a block or so before he feels pain at left leg and chest. Denies any history of IVDU. Says ulcers at left leg have been there for a long time. He denies any numbness or tingling at either extremity.    PAST MEDICAL & SURGICAL HISTORY:  CAD (coronary artery disease)      BPH (benign prostatic hyperplasia)      HTN (hypertension)      HLD (hyperlipidemia)      PAD (peripheral artery disease)      S/P femoral-femoral bypass surgery          REVIEW OF SYSTEMS  As per HPI    MEDICATIONS  (STANDING):  aspirin enteric coated 81 milliGRAM(s) Oral daily  heparin  Infusion 1300 Unit(s)/Hr (13 mL/Hr) IV Continuous <Continuous>  influenza  Vaccine (HIGH DOSE) 0.5 milliLiter(s) IntraMuscular once  isosorbide   mononitrate ER Tablet (IMDUR) 30 milliGRAM(s) Oral daily  metoprolol tartrate 25 milliGRAM(s) Oral <User Schedule>  pantoprazole    Tablet 40 milliGRAM(s) Oral before breakfast  polyethylene glycol 3350 17 Gram(s) Oral daily  senna 2 Tablet(s) Oral at bedtime  sodium chloride 0.9% lock flush 3 milliLiter(s) IV Push every 8 hours  tamsulosin 0.4 milliGRAM(s) Oral at bedtime    MEDICATIONS  (PRN):  acetaminophen     Tablet .. 650 milliGRAM(s) Oral every 6 hours PRN Temp greater or equal to 38C (100.4F), Mild Pain (1 - 3)  nitroglycerin     SubLingual 0.4 milliGRAM(s) SubLingual every 5 minutes PRN Chest Pain  oxyCODONE    IR 5 milliGRAM(s) Oral every 4 hours PRN Moderate Pain (4 - 6)      Allergies    Allergy Status Unknown    Intolerances        SOCIAL HISTORY:    FAMILY HISTORY:      Vital Signs Last 24 Hrs  T(C): 36.7 (28 Oct 2024 09:00), Max: 36.8 (27 Oct 2024 20:44)  T(F): 98 (28 Oct 2024 09:00), Max: 98.3 (27 Oct 2024 20:44)  HR: 74 (28 Oct 2024 09:00) (72 - 89)  BP: 118/57 (28 Oct 2024 12:00) (102/58 - 149/69)  BP(mean): 82 (28 Oct 2024 12:00) (74 - 99)  RR: 17 (28 Oct 2024 12:00) (12 - 22)  SpO2: 96% (28 Oct 2024 12:00) (95% - 99%)    Parameters below as of 28 Oct 2024 12:00  Patient On (Oxygen Delivery Method): room air        PHYSICAL EXAM:    General: NAD  Neck: Supple  Cardiovascular: Regular rate   Chest: Appropriate chest expansion. Sating well on room air  Abdomen: Non-tender, non-distended  Extremities: Left lower extremity ulcers indicative of PAD. Tender to palpation at ulcer sites and dorsal aspect of left foot  Vascular: DP biphasic signals bilaterally. No PT signals        LABS:                        12.7   6.43  )-----------( 188      ( 28 Oct 2024 05:30 )             37.9     10-28    137  |  107  |  25[H]  ----------------------------<  87  4.0   |  18[L]  |  1.47[H]    Ca    9.0      28 Oct 2024 05:30  Phos  2.9     10-28  Mg     2.0     10-28    TPro  7.8  /  Alb  4.2  /  TBili  0.3  /  DBili  x   /  AST  53[H]  /  ALT  34  /  AlkPhos  48  10-27    PT/INR - ( 28 Oct 2024 08:22 )   PT: 13.6 sec;   INR: 1.18          PTT - ( 28 Oct 2024 08:22 )  PTT:78.6 sec  Urinalysis Basic - ( 28 Oct 2024 05:30 )    Color: x / Appearance: x / SG: x / pH: x  Gluc: 87 mg/dL / Ketone: x  / Bili: x / Urobili: x   Blood: x / Protein: x / Nitrite: x   Leuk Esterase: x / RBC: x / WBC x   Sq Epi: x / Non Sq Epi: x / Bacteria: x        RADIOLOGY & ADDITIONAL STUDIES

## 2024-10-28 NOTE — DIETITIAN INITIAL EVALUATION ADULT - OTHER INFO
75 y/o male with PMHx of HTN, HLD, BPH, CAD (?s/p coronary stent per patient), severe PAD s/p fem-fem bypass per patient (?year) and an "abdominal stent for circulation problems".  Has not seen a doctor for his PAD in "a few years".  States he was having severe left leg pain and chest pain, prompting him to call an ambulance. He was taken to [facility] for evaluation and cardiac cath revealed CAD. Transferred here from  for surgical evaluation.    Patient seen for nutrition assessment, Polish  used ID# [id number]. Pt NPO at time of assessment pending PET scan. Pt endorses good appetite and intake PTA, typically consumes 3 meals per day. Pt does endorse some difficulty swallowing, states he often feels like food gets stuck in his throat. Eats mostly pureed foods at home. Recommend to consider SLP evaluation prior to initiation of diet- team informed. Confirms no known food allergies. Pt reports usual body weight of 60kg and denies weight changes PTA. Current dosing weight: 69kg, BMI 25.3. Observed with mild fat wasting to orbitals, however based on ASPEN guidelines, pt does not meet criteria for malnutrition at this time. Labs and medications reviewed. Labs: Electrolytes WNL, BUN/Cr 25/1.47<H>, GFR 49<L>, glucose  x 24 hours, HgbA1c 5.9% (10/27/24). Ordered for protonix, miralax, senna. Skin: Noted with L leg venous ulcer and +1 BL ankle edema. GI: No report of nausea, vomiting, diarrhea, constipation. See nutrition recommendations below.

## 2024-10-28 NOTE — OCCUPATIONAL THERAPY INITIAL EVALUATION ADULT - DIAGNOSIS, OT EVAL
Gissel Weiss is a 76 year old female presenting with TCM for diplopia, d/c 5/15/2020.    Medication verified and med list updated  Denies known Latex allergy or symptoms of Latex sensitivity  Patient would like communication of their results via:   Home Phone: [phone] (home)  Okay to leave a message containing results? Yes    Health Maintenance Due   Topic Date Due   • Medicare Wellness 65+  06/06/2020     Patient is due for topics as listed above but is not proceeding with MWV (Medicare Wellness Visit) at this time. Reminder to schedule MWV next follow up.       Pt p/w impaired strength, balance, LLE pain, and impaired functional activity tolerance, impacting ability to perform functional mobility/ADLs.

## 2024-10-28 NOTE — CONSULT NOTE ADULT - ASSESSMENT
This is a 73 y/o male with PMHx of HTN, HLD, BPH, CAD (?s/p coronary stent per patient), severe PAD s/p fem-fem bypass per patient (?year) and an "abdominal stent for circulation problems".  Has not seen a doctor for his PAD in "a few years".  States he was having severe left leg pain and chest pain, prompting him to call an ambulance.  He was taken to [facility] for evaluation and cardiac cath revealed CAD.  Transferred here from  for surgical evaluation.      Vascular surgery was consulted for evaluation of LLE for possible peripheral arterial disease. Pt. states he can only walk about a block or so before he feels pain at left leg and chest. Denies any history of IVDU. Says ulcers at left leg have been there for a long time. He denies any numbness or tingling at either extremity.    Plan & recommendations   - No acute vascular intervention needed at the moment   - Please obtain a CTA of abdomen and pelvis to assess for LE circulation and patency of fem-fem   - Vascular will continue to follow

## 2024-10-28 NOTE — CONSULT NOTE ADULT - SUBJECTIVE AND OBJECTIVE BOX
PULMONARY SERVICE INITIAL CONSULT NOTE    HPI:  75 y/o male with PMHx of HTN, HLD, BPH, CAD (?s/p coronary stent per patient), severe PAD s/p fem-fem bypass per patient (?year) and an "abdominal stent for circulation problems".  Has not seen a doctor for his PAD in "a few years".  States he was having severe left leg pain and chest pain, prompting him to call an ambulance.  He was taken to [facility] for evaluation and cardiac cath revealed CAD.  Transferred here from  for surgical evaluation.    Denies CP/SOB/N/V/D/dizziness/cough/fever/chills.    Denies h/o CVA, surgery other than the bypass/stents.  Former smoker (quit 20 years ago) and former ETOH abuse (quit 20 years ago).  Denies drug use.  Poor support system, has no family or friends here.  Some family lives in [place].  He lives alone, on disability.      REVIEW OF SYSTEMS:  A 12 point ROS was negative other than noted in HPI above.    PAST MEDICAL & SURGICAL HISTORY:  CAD (coronary artery disease)      BPH (benign prostatic hyperplasia)      HTN (hypertension)      HLD (hyperlipidemia)      PAD (peripheral artery disease)      S/P femoral-femoral bypass surgery          FAMILY HISTORY:      SOCIAL HISTORY:  Smoking Status: [ ] Current, [x ] Former, [ ] Never  Pack Years: quit 20 years ago    MEDICATIONS:  Pulmonary:    Antimicrobials:    Anticoagulants:  aspirin enteric coated 81 milliGRAM(s) Oral daily  heparin  Infusion 1300 Unit(s)/Hr IV Continuous <Continuous>    Onc:    GI/:  pantoprazole    Tablet 40 milliGRAM(s) Oral before breakfast  polyethylene glycol 3350 17 Gram(s) Oral daily  senna 2 Tablet(s) Oral at bedtime  tamsulosin 0.4 milliGRAM(s) Oral at bedtime    Endocrine:    Cardiac:  isosorbide   mononitrate ER Tablet (IMDUR) 30 milliGRAM(s) Oral daily  metoprolol tartrate 25 milliGRAM(s) Oral <User Schedule>  nitroglycerin     SubLingual 0.4 milliGRAM(s) SubLingual every 5 minutes PRN    Other Medications:  acetaminophen     Tablet .. 650 milliGRAM(s) Oral every 6 hours PRN  influenza  Vaccine (HIGH DOSE) 0.5 milliLiter(s) IntraMuscular once  oxyCODONE    IR 5 milliGRAM(s) Oral every 4 hours PRN  sodium chloride 0.9% lock flush 3 milliLiter(s) IV Push every 8 hours      Allergies    Allergy Status Unknown    Intolerances        Vital Signs Last 24 Hrs  T(C): 36.7 (28 Oct 2024 09:00), Max: 36.8 (27 Oct 2024 20:44)  T(F): 98 (28 Oct 2024 09:00), Max: 98.3 (27 Oct 2024 20:44)  HR: 74 (28 Oct 2024 09:00) (72 - 89)  BP: 118/57 (28 Oct 2024 12:00) (102/58 - 149/69)  BP(mean): 82 (28 Oct 2024 12:00) (74 - 99)  RR: 17 (28 Oct 2024 12:00) (12 - 22)  SpO2: 96% (28 Oct 2024 12:00) (95% - 99%)    Parameters below as of 28 Oct 2024 12:00  Patient On (Oxygen Delivery Method): room air        10-27 @ 07:01  -  10-28 @ 07:00  --------------------------------------------------------  IN: 230 mL / OUT: 350 mL / NET: -120 mL    10-28 @ 07:01  -  10-28 @ 13:18  --------------------------------------------------------  IN: 13 mL / OUT: 175 mL / NET: -162 mL          PHYSICAL EXAM:  GENERAL: Pleasant, alert and oriented, not in any acute distress, appears comfortable.  HEENT: Nonicteric sclerae, PERRLA, EOMI. Oropharynx clear. Moist mucous membranes.  CHEST: Chest wall is nontender.  HEART: Regular rate and rhythm without any appreciable m/r/g.  LUNGS: Breathing comfortably. Clear to auscultation bilaterally. No rhonchi, rales, or wheezing appreciated.  ABDOMEN: Soft, normoactive bowel sounds, nontender in all quadrants.  : No suprapubic tenderness to palpation. No CVA tenderness bilaterally.  SKIN: No rash, no excessive bruising, petechiae, or purpura.  NEUROLOGIC: Moves all extremities spontaneously.  EXTREMITIES: WWP; No BLE pitting edema, clubbing or cyanosis      LABS:      CBC Full  -  ( 28 Oct 2024 05:30 )  WBC Count : 6.43 K/uL  RBC Count : 4.08 M/uL  Hemoglobin : 12.7 g/dL  Hematocrit : 37.9 %  Platelet Count - Automated : 188 K/uL  Mean Cell Volume : 92.9 fl  Mean Cell Hemoglobin : 31.1 pg  Mean Cell Hemoglobin Concentration : 33.5 gm/dL  Auto Neutrophil # : x  Auto Lymphocyte # : x  Auto Monocyte # : x  Auto Eosinophil # : x  Auto Basophil # : x  Auto Neutrophil % : x  Auto Lymphocyte % : x  Auto Monocyte % : x  Auto Eosinophil % : x  Auto Basophil % : x    10-28    137  |  107  |  25[H]  ----------------------------<  87  4.0   |  18[L]  |  1.47[H]    Ca    9.0      28 Oct 2024 05:30  Phos  2.9     10-28  Mg     2.0     10-28    TPro  7.8  /  Alb  4.2  /  TBili  0.3  /  DBili  x   /  AST  53[H]  /  ALT  34  /  AlkPhos  48  10-27    PT/INR - ( 28 Oct 2024 08:22 )   PT: 13.6 sec;   INR: 1.18          PTT - ( 28 Oct 2024 08:22 )  PTT:78.6 sec      Urinalysis Basic - ( 28 Oct 2024 05:30 )    Color: x / Appearance: x / SG: x / pH: x  Gluc: 87 mg/dL / Ketone: x  / Bili: x / Urobili: x   Blood: x / Protein: x / Nitrite: x   Leuk Esterase: x / RBC: x / WBC x   Sq Epi: x / Non Sq Epi: x / Bacteria: x                RADIOLOGY & ADDITIONAL STUDIES: PULMONARY SERVICE INITIAL CONSULT NOTE    HPI:  75 y/o male with PMHx of HTN, HLD, BPH, CAD (?s/p coronary stent per patient), severe PAD s/p fem-fem bypass per patient (?year) and an "abdominal stent for circulation problems".  Has not seen a doctor for his PAD in "a few years".  States he was having severe left leg pain and chest pain, prompting him to call an ambulance.  He was taken to [facility] for evaluation and cardiac cath revealed CAD.  Transferred here from  for surgical evaluation.    Denies CP/SOB/N/V/D/dizziness/cough/fever/chills. Denies h/o CVA, surgery other than the bypass/stents.  Former smoker of 20 pack years and quit 20 years ago during the time of his fem-pop bypass and former ETOH abuse (quit 20 years ago).  Denies drug use.  Poor support system, has no family or friends here.  Some family lives in [place].  He lives alone, on disability. Pulm consulted for lung nodule and to rule out metastasis. Was informed by vascular provider that lung nodule was 2.8cm x 1.7cm located in the RUL by outside radiologist read.      REVIEW OF SYSTEMS:  A 12 point ROS was negative other than noted in HPI above.    PAST MEDICAL & SURGICAL HISTORY:  CAD (coronary artery disease)      BPH (benign prostatic hyperplasia)      HTN (hypertension)      HLD (hyperlipidemia)      PAD (peripheral artery disease)      S/P femoral-femoral bypass surgery          FAMILY HISTORY:      SOCIAL HISTORY:  Smoking Status: [ ] Current, [x ] Former, [ ] Never  Pack Years: 20 pack years; quit 20 years ago    MEDICATIONS:  Pulmonary:    Antimicrobials:    Anticoagulants:  aspirin enteric coated 81 milliGRAM(s) Oral daily  heparin  Infusion 1300 Unit(s)/Hr IV Continuous <Continuous>    Onc:    GI/:  pantoprazole    Tablet 40 milliGRAM(s) Oral before breakfast  polyethylene glycol 3350 17 Gram(s) Oral daily  senna 2 Tablet(s) Oral at bedtime  tamsulosin 0.4 milliGRAM(s) Oral at bedtime    Endocrine:    Cardiac:  isosorbide   mononitrate ER Tablet (IMDUR) 30 milliGRAM(s) Oral daily  metoprolol tartrate 25 milliGRAM(s) Oral <User Schedule>  nitroglycerin     SubLingual 0.4 milliGRAM(s) SubLingual every 5 minutes PRN    Other Medications:  acetaminophen     Tablet .. 650 milliGRAM(s) Oral every 6 hours PRN  influenza  Vaccine (HIGH DOSE) 0.5 milliLiter(s) IntraMuscular once  oxyCODONE    IR 5 milliGRAM(s) Oral every 4 hours PRN  sodium chloride 0.9% lock flush 3 milliLiter(s) IV Push every 8 hours      Allergies    Allergy Status Unknown    Intolerances        Vital Signs Last 24 Hrs  T(C): 36.7 (28 Oct 2024 09:00), Max: 36.8 (27 Oct 2024 20:44)  T(F): 98 (28 Oct 2024 09:00), Max: 98.3 (27 Oct 2024 20:44)  HR: 74 (28 Oct 2024 09:00) (72 - 89)  BP: 118/57 (28 Oct 2024 12:00) (102/58 - 149/69)  BP(mean): 82 (28 Oct 2024 12:00) (74 - 99)  RR: 17 (28 Oct 2024 12:00) (12 - 22)  SpO2: 96% (28 Oct 2024 12:00) (95% - 99%)    Parameters below as of 28 Oct 2024 12:00  Patient On (Oxygen Delivery Method): room air        10-27 @ 07:01  -  10-28 @ 07:00  --------------------------------------------------------  IN: 230 mL / OUT: 350 mL / NET: -120 mL    10-28 @ 07:01  -  10-28 @ 13:18  --------------------------------------------------------  IN: 13 mL / OUT: 175 mL / NET: -162 mL          PHYSICAL EXAM:  GENERAL: Pleasant, alert and oriented, not in any acute distress, appears comfortable.  HEENT: Nonicteric sclerae, PERRLA, EOMI. Oropharynx clear. Moist mucous membranes.  CHEST: Chest wall is nontender.  HEART: Regular rate and rhythm without any appreciable m/r/g.  LUNGS: Breathing comfortably. Clear to auscultation bilaterally. No rhonchi, rales, or wheezing appreciated.  ABDOMEN: Soft, normoactive bowel sounds, nontender in all quadrants.  : No suprapubic tenderness to palpation. No CVA tenderness bilaterally.  SKIN: No rash, no excessive bruising, petechiae, or purpura.  NEUROLOGIC: Moves all extremities spontaneously.  EXTREMITIES: WWP; No BLE pitting edema, clubbing or cyanosis      LABS:      CBC Full  -  ( 28 Oct 2024 05:30 )  WBC Count : 6.43 K/uL  RBC Count : 4.08 M/uL  Hemoglobin : 12.7 g/dL  Hematocrit : 37.9 %  Platelet Count - Automated : 188 K/uL  Mean Cell Volume : 92.9 fl  Mean Cell Hemoglobin : 31.1 pg  Mean Cell Hemoglobin Concentration : 33.5 gm/dL  Auto Neutrophil # : x  Auto Lymphocyte # : x  Auto Monocyte # : x  Auto Eosinophil # : x  Auto Basophil # : x  Auto Neutrophil % : x  Auto Lymphocyte % : x  Auto Monocyte % : x  Auto Eosinophil % : x  Auto Basophil % : x    10-28    137  |  107  |  25[H]  ----------------------------<  87  4.0   |  18[L]  |  1.47[H]    Ca    9.0      28 Oct 2024 05:30  Phos  2.9     10-28  Mg     2.0     10-28    TPro  7.8  /  Alb  4.2  /  TBili  0.3  /  DBili  x   /  AST  53[H]  /  ALT  34  /  AlkPhos  48  10-27    PT/INR - ( 28 Oct 2024 08:22 )   PT: 13.6 sec;   INR: 1.18          PTT - ( 28 Oct 2024 08:22 )  PTT:78.6 sec      Urinalysis Basic - ( 28 Oct 2024 05:30 )    Color: x / Appearance: x / SG: x / pH: x  Gluc: 87 mg/dL / Ketone: x  / Bili: x / Urobili: x   Blood: x / Protein: x / Nitrite: x   Leuk Esterase: x / RBC: x / WBC x   Sq Epi: x / Non Sq Epi: x / Bacteria: x                RADIOLOGY & ADDITIONAL STUDIES:

## 2024-10-29 LAB
ALBUMIN SERPL ELPH-MCNC: 3.8 G/DL — SIGNIFICANT CHANGE UP (ref 3.3–5)
ALP SERPL-CCNC: 46 U/L — SIGNIFICANT CHANGE UP (ref 40–120)
ALT FLD-CCNC: 41 U/L — SIGNIFICANT CHANGE UP (ref 10–45)
ANION GAP SERPL CALC-SCNC: 11 MMOL/L — SIGNIFICANT CHANGE UP (ref 5–17)
APTT BLD: 60 SEC — HIGH (ref 24.5–35.6)
APTT BLD: 74.9 SEC — HIGH (ref 24.5–35.6)
APTT BLD: 91.9 SEC — HIGH (ref 24.5–35.6)
AST SERPL-CCNC: 46 U/L — HIGH (ref 10–40)
BILIRUB SERPL-MCNC: 0.3 MG/DL — SIGNIFICANT CHANGE UP (ref 0.2–1.2)
BUN SERPL-MCNC: 32 MG/DL — HIGH (ref 7–23)
CALCIUM SERPL-MCNC: 9.2 MG/DL — SIGNIFICANT CHANGE UP (ref 8.4–10.5)
CHLORIDE SERPL-SCNC: 105 MMOL/L — SIGNIFICANT CHANGE UP (ref 96–108)
CO2 SERPL-SCNC: 22 MMOL/L — SIGNIFICANT CHANGE UP (ref 22–31)
CREAT SERPL-MCNC: 1.69 MG/DL — HIGH (ref 0.5–1.3)
EGFR: 42 ML/MIN/1.73M2 — LOW
GLUCOSE SERPL-MCNC: 93 MG/DL — SIGNIFICANT CHANGE UP (ref 70–99)
HCT VFR BLD CALC: 39.6 % — SIGNIFICANT CHANGE UP (ref 39–50)
HGB BLD-MCNC: 12.8 G/DL — LOW (ref 13–17)
INR BLD: 1.11 — SIGNIFICANT CHANGE UP (ref 0.85–1.16)
INR BLD: 1.14 — SIGNIFICANT CHANGE UP (ref 0.85–1.16)
MAGNESIUM SERPL-MCNC: 2 MG/DL — SIGNIFICANT CHANGE UP (ref 1.6–2.6)
MCHC RBC-ENTMCNC: 30 PG — SIGNIFICANT CHANGE UP (ref 27–34)
MCHC RBC-ENTMCNC: 32.3 GM/DL — SIGNIFICANT CHANGE UP (ref 32–36)
MCV RBC AUTO: 93 FL — SIGNIFICANT CHANGE UP (ref 80–100)
NRBC # BLD: 0 /100 WBCS — SIGNIFICANT CHANGE UP (ref 0–0)
PLATELET # BLD AUTO: 226 K/UL — SIGNIFICANT CHANGE UP (ref 150–400)
POTASSIUM SERPL-MCNC: 4.4 MMOL/L — SIGNIFICANT CHANGE UP (ref 3.5–5.3)
POTASSIUM SERPL-SCNC: 4.4 MMOL/L — SIGNIFICANT CHANGE UP (ref 3.5–5.3)
PROT SERPL-MCNC: 7.2 G/DL — SIGNIFICANT CHANGE UP (ref 6–8.3)
PROTHROM AB SERPL-ACNC: 13 SEC — SIGNIFICANT CHANGE UP (ref 9.9–13.4)
PROTHROM AB SERPL-ACNC: 13.1 SEC — SIGNIFICANT CHANGE UP (ref 9.9–13.4)
RBC # BLD: 4.26 M/UL — SIGNIFICANT CHANGE UP (ref 4.2–5.8)
RBC # FLD: 12.5 % — SIGNIFICANT CHANGE UP (ref 10.3–14.5)
SODIUM SERPL-SCNC: 138 MMOL/L — SIGNIFICANT CHANGE UP (ref 135–145)
WBC # BLD: 5.87 K/UL — SIGNIFICANT CHANGE UP (ref 3.8–10.5)
WBC # FLD AUTO: 5.87 K/UL — SIGNIFICANT CHANGE UP (ref 3.8–10.5)

## 2024-10-29 PROCEDURE — 71045 X-RAY EXAM CHEST 1 VIEW: CPT | Mod: 26

## 2024-10-29 PROCEDURE — 99233 SBSQ HOSP IP/OBS HIGH 50: CPT

## 2024-10-29 PROCEDURE — 75635 CT ANGIO ABDOMINAL ARTERIES: CPT | Mod: 26

## 2024-10-29 RX ORDER — NITROGLYCERIN 0.6MG/HR
0.4 PATCH, TRANSDERMAL 24 HOURS TRANSDERMAL
Refills: 0 | Status: COMPLETED | OUTPATIENT
Start: 2024-10-29 | End: 2024-10-30

## 2024-10-29 RX ADMIN — ISOSORBIDE MONONITRATE 30 MILLIGRAM(S): 60 TABLET, EXTENDED RELEASE ORAL at 11:53

## 2024-10-29 RX ADMIN — Medication 0.4 MILLIGRAM(S): at 10:54

## 2024-10-29 RX ADMIN — MORPHINE SULFATE 15 MILLIGRAM(S): 30 TABLET, EXTENDED RELEASE ORAL at 11:53

## 2024-10-29 RX ADMIN — SODIUM CHLORIDE 3 MILLILITER(S): 9 INJECTION, SOLUTION INTRAMUSCULAR; INTRAVENOUS; SUBCUTANEOUS at 13:44

## 2024-10-29 RX ADMIN — Medication 1000 MILLIGRAM(S): at 04:00

## 2024-10-29 RX ADMIN — POLYETHYLENE GLYCOL 3350 17 GRAM(S): 17 POWDER, FOR SOLUTION ORAL at 11:53

## 2024-10-29 RX ADMIN — Medication 400 MILLIGRAM(S): at 03:40

## 2024-10-29 RX ADMIN — OXYCODONE HYDROCHLORIDE 5 MILLIGRAM(S): 30 TABLET ORAL at 16:00

## 2024-10-29 RX ADMIN — Medication 80 MILLIGRAM(S): at 21:28

## 2024-10-29 RX ADMIN — Medication 2 TABLET(S): at 21:28

## 2024-10-29 RX ADMIN — Medication 25 MILLIGRAM(S): at 18:32

## 2024-10-29 RX ADMIN — SODIUM CHLORIDE 3 MILLILITER(S): 9 INJECTION, SOLUTION INTRAMUSCULAR; INTRAVENOUS; SUBCUTANEOUS at 06:23

## 2024-10-29 RX ADMIN — PANTOPRAZOLE SODIUM 40 MILLIGRAM(S): 40 TABLET, DELAYED RELEASE ORAL at 06:25

## 2024-10-29 RX ADMIN — Medication 25 MILLIGRAM(S): at 06:11

## 2024-10-29 RX ADMIN — Medication 50 MILLILITER(S): at 11:01

## 2024-10-29 RX ADMIN — Medication 0.4 MILLIGRAM(S): at 21:30

## 2024-10-29 RX ADMIN — OXYCODONE HYDROCHLORIDE 5 MILLIGRAM(S): 30 TABLET ORAL at 15:33

## 2024-10-29 RX ADMIN — Medication 81 MILLIGRAM(S): at 11:53

## 2024-10-29 RX ADMIN — SODIUM CHLORIDE 3 MILLILITER(S): 9 INJECTION, SOLUTION INTRAMUSCULAR; INTRAVENOUS; SUBCUTANEOUS at 22:36

## 2024-10-29 RX ADMIN — HEPARIN SODIUM 11 UNIT(S)/HR: 10000 INJECTION INTRAVENOUS; SUBCUTANEOUS at 10:04

## 2024-10-29 RX ADMIN — MORPHINE SULFATE 15 MILLIGRAM(S): 30 TABLET, EXTENDED RELEASE ORAL at 12:00

## 2024-10-29 RX ADMIN — Medication 0.4 MILLIGRAM(S): at 06:21

## 2024-10-29 NOTE — PROGRESS NOTE ADULT - SUBJECTIVE AND OBJECTIVE BOX
Patient discussed on morning rounds with Dr. Devine    SUBJECTIVE ASSESSMENT:  74y Male assessed at bedside. Endorses left leg and midsternal chest pain. Pain alleviated with SL nitro and oxycontin.         Vital Signs Last 24 Hrs  T(C): 36.7 (29 Oct 2024 17:22), Max: 36.8 (29 Oct 2024 05:01)  T(F): 98.1 (29 Oct 2024 17:22), Max: 98.3 (29 Oct 2024 05:01)  HR: 76 (29 Oct 2024 13:40) (67 - 85)  BP: 113/55 (29 Oct 2024 13:40) (108/54 - 177/74)  BP(mean): 79 (29 Oct 2024 13:40) (78 - 106)  RR: 18 (29 Oct 2024 13:40) (16 - 18)  SpO2: 93% (29 Oct 2024 13:40) (93% - 98%)    Parameters below as of 29 Oct 2024 13:40  Patient On (Oxygen Delivery Method): room air      I&O's Detail    28 Oct 2024 07:01  -  29 Oct 2024 07:00  --------------------------------------------------------  IN:    Heparin: 130 mL    Heparin: 143 mL  Total IN: 273 mL    OUT:    Voided (mL): 825 mL  Total OUT: 825 mL    Total NET: -552 mL      29 Oct 2024 07:01  -  29 Oct 2024 18:08  --------------------------------------------------------  IN:    Heparin: 125 mL    Lactated Ringers Bolus: 400 mL    Oral Fluid: 800 mL  Total IN: 1325 mL    OUT:    Voided (mL): 750 mL  Total OUT: 750 mL    Total NET: 575 mL        PHYSICAL EXAM:  Appearance: No acute distress.  Neurologic: AAOx3, no AMS or focal deficits.  Responds appropriately to verbal and physical stimuli; exhibits purposeful movement in all extremities.  HEENT:   MMM, PERRLA, EOMI	b/l  Neck: Supple  Cardiovascular: RRR, S1 S2. No m/r/g.  Respiratory: No acute respiratory distress. CTA b/l, no w/r/r.   Gastrointestinal:  Soft, non-tender, non-distended, + BS.	  Skin: No rashes. No ecchymoses. No cyanosis.  Extremities: LLE with scabbing. Exhibits normal range of motion, no clubbing, cyanosis or edema.  Vascular: Peripheral pulses present on doppler.       LABS:                        12.8   5.87  )-----------( 226      ( 29 Oct 2024 05:30 )             39.6       COUMADIN:  No    PT/INR - ( 29 Oct 2024 05:30 )   PT: 13.1 sec;   INR: 1.14          PTT - ( 29 Oct 2024 05:30 )  PTT:91.9 sec    10-29    138  |  105  |  32[H]  ----------------------------<  93  4.4   |  22  |  1.69[H]    Ca    9.2      29 Oct 2024 05:30  Phos  2.9     10-28  Mg     2.0     10-29    TPro  7.2  /  Alb  3.8  /  TBili  0.3  /  DBili  x   /  AST  46[H]  /  ALT  41  /  AlkPhos  46  10-29      Urinalysis Basic - ( 29 Oct 2024 05:30 )    Color: x / Appearance: x / SG: x / pH: x  Gluc: 93 mg/dL / Ketone: x  / Bili: x / Urobili: x   Blood: x / Protein: x / Nitrite: x   Leuk Esterase: x / RBC: x / WBC x   Sq Epi: x / Non Sq Epi: x / Bacteria: x        MEDICATIONS  (STANDING):  aspirin enteric coated 81 milliGRAM(s) Oral daily  atorvastatin 80 milliGRAM(s) Oral at bedtime  heparin  Infusion 1300 Unit(s)/Hr (11 mL/Hr) IV Continuous <Continuous>  influenza  Vaccine (HIGH DOSE) 0.5 milliLiter(s) IntraMuscular once  isosorbide   mononitrate ER Tablet (IMDUR) 30 milliGRAM(s) Oral daily  metoprolol tartrate 25 milliGRAM(s) Oral <User Schedule>  morphine ER Tablet 15 milliGRAM(s) Oral daily  pantoprazole    Tablet 40 milliGRAM(s) Oral before breakfast  polyethylene glycol 3350 17 Gram(s) Oral daily  senna 2 Tablet(s) Oral at bedtime  sodium chloride 0.9% lock flush 3 milliLiter(s) IV Push every 8 hours  tamsulosin 0.4 milliGRAM(s) Oral at bedtime    MEDICATIONS  (PRN):  acetaminophen     Tablet .. 650 milliGRAM(s) Oral every 6 hours PRN Temp greater or equal to 38C (100.4F), Mild Pain (1 - 3)  nitroglycerin     SubLingual 0.4 milliGRAM(s) SubLingual every 5 minutes PRN Chest Pain  oxyCODONE    IR 5 milliGRAM(s) Oral every 4 hours PRN Moderate Pain (4 - 6)        RADIOLOGY & ADDITIONAL TESTS:  l< from: CT Angio Abd Aorta w/run-off w/ IV Cont (10.29.24 @ 16:34) >    IMPRESSION:  1.  Extensive bilateral hemodynamically lower extremity significant   stenoses and occlusive disease as described above.  2.  Layering cholelithiasis.  3.  Large intravesicular bladder calcification  4.  Infrarenal abdominal aortic stenosis  5.  Patent femoral-femoral artery bypass graft    < end of copied text >  < from: TTE Echo Complete w/ Contrast w/ Doppler (10.28.24 @ 11:01) >  CONCLUSIONS:     1. Moderately reduced left ventricular systolic function.   2. Basal and mid anterolateral wall, basal and mid inferior wall, and   basal and mid inferolateral wall are akinetic.   3. Normal right ventricular size and systolic function.   4. Dilated left atrium.   5. Aortic sclerosis without significant stenosis.   6. No evidence of pulmonary hypertension.   7. No pericardial effusion.    < end of copied text >    < from: NM PET/CT Onc FDG Skull to Thigh, Inital (10.28.24 @ 17:07) >  IMPRESSION:    1.  FDG avid 2.7 cm right upper lobe nodules suspicious for malignancy.    2.  Mild FDG avid 1.2 cm left upper lobe nodule which may represent   infectious/inflammatory changes versus neoplasm.    3.  Mild FDG avid right hilar lymph node which is nonspecific. FDG avid   right lower lobe linear opacity likely inflammatory.    4.  No additional sites of pathologic FDG uptake to suggest biologic   tumor activity.    < end of copied text >

## 2024-10-29 NOTE — PROGRESS NOTE ADULT - SUBJECTIVE AND OBJECTIVE BOX
PULMONARY CONSULT SERVICE FOLLOW-UP NOTE    Bedside interview conducted in: Polish   ID: [id number]    INTERVAL HPI:  Reviewed chart and overnight events; patient seen and examined at bedside.  Patient complains of R chest pain and L LE pain similar to prior.  Denied fever, chills, weight loss or sick contacts.     MEDICATIONS:  Pulmonary:    Antimicrobials:    Anticoagulants:  aspirin enteric coated 81 milliGRAM(s) Oral daily  heparin  Infusion 1300 Unit(s)/Hr IV Continuous <Continuous>    Cardiac:  isosorbide   mononitrate ER Tablet (IMDUR) 30 milliGRAM(s) Oral daily  metoprolol tartrate 25 milliGRAM(s) Oral <User Schedule>  nitroglycerin     SubLingual 0.4 milliGRAM(s) SubLingual every 5 minutes PRN      Allergies    Allergy Status Unknown    Intolerances        Vital Signs Last 24 Hrs  T(C): 36.7 (29 Oct 2024 17:22), Max: 36.8 (29 Oct 2024 05:01)  T(F): 98.1 (29 Oct 2024 17:22), Max: 98.3 (29 Oct 2024 05:01)  HR: 76 (29 Oct 2024 13:40) (67 - 79)  BP: 113/55 (29 Oct 2024 13:40) (108/54 - 138/63)  BP(mean): 79 (29 Oct 2024 13:40) (78 - 91)  RR: 18 (29 Oct 2024 13:40) (16 - 18)  SpO2: 93% (29 Oct 2024 13:40) (93% - 97%)    Parameters below as of 29 Oct 2024 13:40  Patient On (Oxygen Delivery Method): room air        10-28 @ 07:01  -  10-29 @ 07:00  --------------------------------------------------------  IN: 273 mL / OUT: 825 mL / NET: -552 mL    10-29 @ 07:01  -  10-29 @ 18:50  --------------------------------------------------------  IN: 1325 mL / OUT: 750 mL / NET: 575 mL          PHYSICAL EXAM:  Constitutional: NAD  HEENT: NC/AT; PERRL, anicteric sclera; MMM  Neck: supple  Cardiovascular: +S1/S2, RRR  Respiratory: CTA B/L; no W/R/R  Gastrointestinal: soft, NT/ND  Extremities: WWP; no edema, clubbing or cyanosis  Vascular: 2+ radial pulses B/L  Neurological: awake and alert; CATES    LABS:      CBC Full  -  ( 29 Oct 2024 05:30 )  WBC Count : 5.87 K/uL  RBC Count : 4.26 M/uL  Hemoglobin : 12.8 g/dL  Hematocrit : 39.6 %  Platelet Count - Automated : 226 K/uL  Mean Cell Volume : 93.0 fl  Mean Cell Hemoglobin : 30.0 pg  Mean Cell Hemoglobin Concentration : 32.3 gm/dL  Auto Neutrophil # : x  Auto Lymphocyte # : x  Auto Monocyte # : x  Auto Eosinophil # : x  Auto Basophil # : x  Auto Neutrophil % : x  Auto Lymphocyte % : x  Auto Monocyte % : x  Auto Eosinophil % : x  Auto Basophil % : x    10-29    138  |  105  |  32[H]  ----------------------------<  93  4.4   |  22  |  1.69[H]    Ca    9.2      29 Oct 2024 05:30  Phos  2.9     10-28  Mg     2.0     10-29    TPro  7.2  /  Alb  3.8  /  TBili  0.3  /  DBili  x   /  AST  46[H]  /  ALT  41  /  AlkPhos  46  10-29    PT/INR - ( 29 Oct 2024 05:30 )   PT: 13.1 sec;   INR: 1.14          PTT - ( 29 Oct 2024 05:30 )  PTT:91.9 sec      Urinalysis Basic - ( 29 Oct 2024 05:30 )    Color: x / Appearance: x / SG: x / pH: x  Gluc: 93 mg/dL / Ketone: x  / Bili: x / Urobili: x   Blood: x / Protein: x / Nitrite: x   Leuk Esterase: x / RBC: x / WBC x   Sq Epi: x / Non Sq Epi: x / Bacteria: x                RADIOLOGY & ADDITIONAL STUDIES:  < from: NM PET/CT Onc FDG Skull to Thigh, Inital (10.28.24 @ 17:07) >  1.  FDG avid 2.7 cm right upper lobe nodules suspicious for malignancy.    2.  Mild FDG avid 1.2 cm left upper lobe nodule which may represent   infectious/inflammatory changes versus neoplasm.    3.  Mild FDG avid right hilar lymph node which is nonspecific. FDG avid   right lower lobe linear opacity likely inflammatory.    4.  No additional sites of pathologic FDG uptake to suggest biologic   tumor activity.    < end of copied text >

## 2024-10-29 NOTE — PHYSICAL THERAPY INITIAL EVALUATION ADULT - TRANSFER SAFETY CONCERNS NOTED: SIT/STAND, REHAB EVAL
Prefers to demo LLE NWB 2/2 pain with weight bearing. Fair eccentric control during descend/losing balance/decreased weight-shifting ability

## 2024-10-29 NOTE — PHYSICAL THERAPY INITIAL EVALUATION ADULT - NSPTDISCHREC_GEN_A_CORE
to be determined, pending hospital course and medical work up as patient is for possible procedure this admission. If patient is not for OR, patient would benefit from Subacute Rehab.

## 2024-10-29 NOTE — PHYSICAL THERAPY INITIAL EVALUATION ADULT - ADDITIONAL COMMENTS
Community ambulator who lives alone in elevator access apartment, no EHSAN. Independent with all ADLs prior and uses SC when ambulating. Of note, patient reporting that he was having increasing difficulty 2/2 LLE/chest pain, requiring extended duration to complete all tasks, and limiting activity tolerance.

## 2024-10-29 NOTE — PHYSICAL THERAPY INITIAL EVALUATION ADULT - PERTINENT HX OF CURRENT PROBLEM, REHAB EVAL
This is a 73 y/o male with PMHx of HTN, HLD, BPH, CAD (?s/p coronary stent per patient), severe PAD s/p fem-fem bypass per patient (?year) and an "abdominal stent for circulation problems".  Has not seen a doctor for his PAD in "a few years".  States he was having severe left leg pain and chest pain, prompting him to call an ambulance.  He was taken to [facility] for evaluation and cardiac cath revealed CAD.  Transferred here from  for surgical evaluation.

## 2024-10-29 NOTE — PHYSICAL THERAPY INITIAL EVALUATION ADULT - STANDING BALANCE: DYNAMIC, REHAB EVAL
What Type Of Note Output Would You Prefer (Optional)?: Bullet Format What Is The Reason For Today's Visit?: Full Body Skin Examination with No Concerns poor plus

## 2024-10-29 NOTE — PROGRESS NOTE ADULT - ASSESSMENT
This is a 73 y/o male with PMHx of HTN, HLD, BPH, CAD (?s/p coronary stent per patient), severe PAD s/p fem-fem bypass per patient (?year) and an "abdominal stent for circulation problems".  Has not seen a doctor for his PAD in "a few years".  States he was having severe left leg pain and chest pain, prompting him to call an ambulance.  He was taken to [facility] for evaluation and cardiac cath revealed CAD.  Transferred here from  for surgical evaluation.      Vascular surgery was consulted for evaluation of LLE for possible peripheral arterial disease. Pt. states he can only walk about a block or so before he feels pain at left leg and chest. Denies any history of IVDU. Says ulcers at left leg have been there for a long time. He denies any numbness or tingling at either extremity.    Plan & recommendations   - No acute vascular intervention needed at the moment   - Please obtain a CTA of abdomen and pelvis to assess for LE circulation and patency of fem-fem   - Vascular will continue to follow

## 2024-10-29 NOTE — PHYSICAL THERAPY INITIAL EVALUATION ADULT - LEVEL OF INDEPENDENCE: GAIT, REHAB EVAL
Attempted small ambulation trial at bedside; Patient with difficulty weight shifting onto LLE 2/2 pain. Unable to initiate steps this session./unable to perform

## 2024-10-29 NOTE — PROGRESS NOTE ADULT - ASSESSMENT
75 y/o male, poor historian w/ PMHx of HTN, HLD, BPH, CAD (?s/p coronary stent per patient), severe PAD s/p fem-fem bypass per patient (?year) and an "abdominal stent for circulation problems".  Has not seen a doctor for his PAD in "a few years".  States he was having severe left leg pain and chest pain, prompting him to call an ambulance.  He was taken to [facility] for evaluation and cardiac cath revealed CAD. Transferred here from  for surgical evaluation. Given patient w/ pulmonary nodules noted on OSH CTA Chest patient underwent a PET scan which is significant for uptake to nodules. Given h/o aortic surgery in the past w/ ongoing LLE pain and non palpable distal pulses, vascular consulted & patient underwent a CTA A/P with runoff.     A/P:  Neurovascular: No delirium. Pain well controlled with current regimen.  -Pain: Morphine ER 15mg daily. PRN: tylenol prn, oxy 5mg prn    Cardiovascular: Hemodynamically stable. HR controlled.  -CAD, undergoing PST ahead of possible intervention:   C/w ASA daily, c/w Imdur 30mg daily, c/w Lopressor 25mg BID   Nitro prn for ongoing chest pain     -PAD w/ unknown past vascular hx:   Vascular consulted, appreciate recs  - s/p CTA A/P w/ runoff   C/w ASA daily   -HLD: Lipitor 80mg nightly     Respiratory: 02 Sat = 98% on RA.  -Encourage C+DB and Use of IS 10x / hr while awake.  -CXR: no ptx, no pleural effusions   -Pulmonary Nodules: with uptake on PET scan - f/u with pulmonary     GI: Stable.  -PPX: pantoprazole   -PO Diet.  -Bowel Regimen: c/w Miralax, senna    Renal / :  -Monitor renal function: BUN 32, Cr 1.69  - 500cc LR ahead of contrast study today   -Monitor I/O's.  -FELIPA: no reported hx of kidney disease, likely 2/2 contrast at OSH.   Continue to trend Cr/BUN  -BPH: c/w tamsulosin .4mg daily     Endocrine:    -A1c: 5.8%  -TSH: 1.730    Hematologic: stable  - h&h 12.8 & 39.6  -Coagulation Panel: PTT 91.9, PT 13.1 INR 1.14  -Continues on Hep gtt for CAD: follow up 8pm aptt    ID:  -Tempature: afebrile   -CBC: WBC 5.87    Prophylaxis:  -DVT prophylaxis with therapeutic hep gtt   -SCD's    Disposition:  - surgery vs PCI plan pending

## 2024-10-29 NOTE — PROGRESS NOTE ADULT - SUBJECTIVE AND OBJECTIVE BOX
Subjective:   Pt. seen and examined at bedside this morning  BRYAN from a vascular perspective  Continues to endorse pain at LLE though he says its being controlled with medications  Has not yet gotten CTA of abdomen and pelvis    ROS:   Denies Headache, blurred vision, Chest Pain, SOB, Abdominal pain, nausea or vomiting     Social   aspirin enteric coated 81  heparin  Infusion 1300  isosorbide   mononitrate ER Tablet (IMDUR) 30  metoprolol tartrate 25      Allergies    Allergy Status Unknown    Intolerances        Vital Signs Last 24 Hrs  T(C): 36.8 (29 Oct 2024 05:01), Max: 36.8 (28 Oct 2024 14:00)  T(F): 98.3 (29 Oct 2024 05:01), Max: 98.3 (29 Oct 2024 05:01)  HR: 67 (29 Oct 2024 05:30) (67 - 87)  BP: 114/57 (29 Oct 2024 05:30) (102/58 - 177/74)  BP(mean): 79 (29 Oct 2024 05:30) (74 - 106)  RR: 18 (29 Oct 2024 05:30) (17 - 18)  SpO2: 95% (29 Oct 2024 05:30) (94% - 98%)    Parameters below as of 29 Oct 2024 05:30  Patient On (Oxygen Delivery Method): room air      I&O's Summary    28 Oct 2024 07:01  -  29 Oct 2024 07:00  --------------------------------------------------------  IN: 273 mL / OUT: 825 mL / NET: -552 mL        Physical Exam:  General: NAD  Neck: Supple  Cardiovascular: Regular rate   Chest: Appropriate chest expansion. Sating well on room air  Abdomen: Non-tender, non-distended  Extremities: Left lower extremity ulcers indicative of PAD. Tender to palpation at ulcer sites and dorsal aspect of left foot  Vascular: DP biphasic signals bilaterally. No PT signals    LABS:                        12.8   5.87  )-----------( 226      ( 29 Oct 2024 05:30 )             39.6     10-29    138  |  105  |  32[H]  ----------------------------<  93  4.4   |  22  |  1.69[H]    Ca    9.2      29 Oct 2024 05:30  Phos  2.9     10-28  Mg     2.0     10-29    TPro  7.2  /  Alb  3.8  /  TBili  0.3  /  DBili  x   /  AST  46[H]  /  ALT  41  /  AlkPhos  46  10-29    PT/INR - ( 29 Oct 2024 05:30 )   PT: 13.1 sec;   INR: 1.14          PTT - ( 29 Oct 2024 05:30 )  PTT:91.9 sec    Radiology and Additional Studies:     no

## 2024-10-30 PROBLEM — I10 ESSENTIAL (PRIMARY) HYPERTENSION: Chronic | Status: ACTIVE | Noted: 2024-10-27

## 2024-10-30 PROBLEM — E78.5 HYPERLIPIDEMIA, UNSPECIFIED: Chronic | Status: ACTIVE | Noted: 2024-10-27

## 2024-10-30 PROBLEM — I25.10 ATHEROSCLEROTIC HEART DISEASE OF NATIVE CORONARY ARTERY WITHOUT ANGINA PECTORIS: Chronic | Status: ACTIVE | Noted: 2024-10-27

## 2024-10-30 PROBLEM — I73.9 PERIPHERAL VASCULAR DISEASE, UNSPECIFIED: Chronic | Status: ACTIVE | Noted: 2024-10-27

## 2024-10-30 PROBLEM — N40.0 BENIGN PROSTATIC HYPERPLASIA WITHOUT LOWER URINARY TRACT SYMPTOMS: Chronic | Status: ACTIVE | Noted: 2024-10-27

## 2024-10-30 LAB
ANION GAP SERPL CALC-SCNC: 9 MMOL/L — SIGNIFICANT CHANGE UP (ref 5–17)
APPEARANCE UR: CLEAR — SIGNIFICANT CHANGE UP
APTT BLD: 55 SEC — HIGH (ref 24.5–35.6)
APTT BLD: 79.4 SEC — HIGH (ref 24.5–35.6)
APTT BLD: 97.6 SEC — HIGH (ref 24.5–35.6)
BACTERIA # UR AUTO: NEGATIVE /HPF — SIGNIFICANT CHANGE UP
BILIRUB UR-MCNC: NEGATIVE — SIGNIFICANT CHANGE UP
BLD GP AB SCN SERPL QL: NEGATIVE — SIGNIFICANT CHANGE UP
BUN SERPL-MCNC: 29 MG/DL — HIGH (ref 7–23)
CALCIUM SERPL-MCNC: 9.3 MG/DL — SIGNIFICANT CHANGE UP (ref 8.4–10.5)
CAST: 2 /LPF — SIGNIFICANT CHANGE UP (ref 0–4)
CHLORIDE SERPL-SCNC: 106 MMOL/L — SIGNIFICANT CHANGE UP (ref 96–108)
CO2 SERPL-SCNC: 23 MMOL/L — SIGNIFICANT CHANGE UP (ref 22–31)
COLOR SPEC: YELLOW — SIGNIFICANT CHANGE UP
CREAT SERPL-MCNC: 1.74 MG/DL — HIGH (ref 0.5–1.3)
DIFF PNL FLD: ABNORMAL
EGFR: 40 ML/MIN/1.73M2 — LOW
GLUCOSE SERPL-MCNC: 103 MG/DL — HIGH (ref 70–99)
GLUCOSE UR QL: NEGATIVE MG/DL — SIGNIFICANT CHANGE UP
HCT VFR BLD CALC: 40.5 % — SIGNIFICANT CHANGE UP (ref 39–50)
HGB BLD-MCNC: 13.3 G/DL — SIGNIFICANT CHANGE UP (ref 13–17)
INR BLD: 1.1 — SIGNIFICANT CHANGE UP (ref 0.85–1.16)
INR BLD: 1.11 — SIGNIFICANT CHANGE UP (ref 0.85–1.16)
KETONES UR-MCNC: NEGATIVE MG/DL — SIGNIFICANT CHANGE UP
LACTATE SERPL-SCNC: 1.5 MMOL/L — SIGNIFICANT CHANGE UP (ref 0.5–2)
LEUKOCYTE ESTERASE UR-ACNC: ABNORMAL
MAGNESIUM SERPL-MCNC: 2 MG/DL — SIGNIFICANT CHANGE UP (ref 1.6–2.6)
MCHC RBC-ENTMCNC: 29.8 PG — SIGNIFICANT CHANGE UP (ref 27–34)
MCHC RBC-ENTMCNC: 32.8 G/DL — SIGNIFICANT CHANGE UP (ref 32–36)
MCV RBC AUTO: 90.8 FL — SIGNIFICANT CHANGE UP (ref 80–100)
NITRITE UR-MCNC: NEGATIVE — SIGNIFICANT CHANGE UP
NRBC # BLD: 0 /100 WBCS — SIGNIFICANT CHANGE UP (ref 0–0)
PH UR: 5.5 — SIGNIFICANT CHANGE UP (ref 5–8)
PLATELET # BLD AUTO: 222 K/UL — SIGNIFICANT CHANGE UP (ref 150–400)
POTASSIUM SERPL-MCNC: 4.8 MMOL/L — SIGNIFICANT CHANGE UP (ref 3.5–5.3)
POTASSIUM SERPL-SCNC: 4.8 MMOL/L — SIGNIFICANT CHANGE UP (ref 3.5–5.3)
PROT UR-MCNC: 30 MG/DL
PROTHROM AB SERPL-ACNC: 12.6 SEC — SIGNIFICANT CHANGE UP (ref 9.9–13.4)
PROTHROM AB SERPL-ACNC: 13 SEC — SIGNIFICANT CHANGE UP (ref 9.9–13.4)
RBC # BLD: 4.46 M/UL — SIGNIFICANT CHANGE UP (ref 4.2–5.8)
RBC # FLD: 12.6 % — SIGNIFICANT CHANGE UP (ref 10.3–14.5)
RBC CASTS # UR COMP ASSIST: 85 /HPF — HIGH (ref 0–4)
RH IG SCN BLD-IMP: POSITIVE — SIGNIFICANT CHANGE UP
SODIUM SERPL-SCNC: 138 MMOL/L — SIGNIFICANT CHANGE UP (ref 135–145)
SP GR SPEC: 1.02 — SIGNIFICANT CHANGE UP (ref 1–1.03)
SQUAMOUS # UR AUTO: 3 /HPF — SIGNIFICANT CHANGE UP (ref 0–5)
TROPONIN T, HIGH SENSITIVITY RESULT: 84 NG/L — CRITICAL HIGH (ref 0–51)
UROBILINOGEN FLD QL: 0.2 MG/DL — SIGNIFICANT CHANGE UP (ref 0.2–1)
WBC # BLD: 7.35 K/UL — SIGNIFICANT CHANGE UP (ref 3.8–10.5)
WBC # FLD AUTO: 7.35 K/UL — SIGNIFICANT CHANGE UP (ref 3.8–10.5)
WBC UR QL: 20 /HPF — HIGH (ref 0–5)

## 2024-10-30 PROCEDURE — 99232 SBSQ HOSP IP/OBS MODERATE 35: CPT | Mod: GC

## 2024-10-30 PROCEDURE — 99233 SBSQ HOSP IP/OBS HIGH 50: CPT

## 2024-10-30 PROCEDURE — 93010 ELECTROCARDIOGRAM REPORT: CPT

## 2024-10-30 PROCEDURE — 99222 1ST HOSP IP/OBS MODERATE 55: CPT | Mod: 25

## 2024-10-30 PROCEDURE — 36620 INSERTION CATHETER ARTERY: CPT

## 2024-10-30 PROCEDURE — 76604 US EXAM CHEST: CPT | Mod: 26

## 2024-10-30 RX ORDER — CHLORHEXIDINE GLUCONATE 40 MG/ML
1 SOLUTION TOPICAL ONCE
Refills: 0 | Status: COMPLETED | OUTPATIENT
Start: 2024-10-30 | End: 2024-10-30

## 2024-10-30 RX ORDER — MORPHINE SULFATE 30 MG/1
2 TABLET, EXTENDED RELEASE ORAL ONCE
Refills: 0 | Status: DISCONTINUED | OUTPATIENT
Start: 2024-10-30 | End: 2024-10-31

## 2024-10-30 RX ORDER — CHLORHEXIDINE GLUCONATE 40 MG/ML
1 SOLUTION TOPICAL ONCE
Refills: 0 | Status: COMPLETED | OUTPATIENT
Start: 2024-10-31 | End: 2024-10-31

## 2024-10-30 RX ORDER — ACETAMINOPHEN 500 MG
1000 TABLET ORAL ONCE
Refills: 0 | Status: COMPLETED | OUTPATIENT
Start: 2024-10-30 | End: 2024-10-31

## 2024-10-30 RX ORDER — NITROGLYCERIN 0.6MG/HR
0.4 PATCH, TRANSDERMAL 24 HOURS TRANSDERMAL
Refills: 0 | Status: DISCONTINUED | OUTPATIENT
Start: 2024-10-30 | End: 2024-10-30

## 2024-10-30 RX ORDER — NITROGLYCERIN 0.6MG/HR
5 PATCH, TRANSDERMAL 24 HOURS TRANSDERMAL
Qty: 50 | Refills: 0 | Status: DISCONTINUED | OUTPATIENT
Start: 2024-10-30 | End: 2024-10-30

## 2024-10-30 RX ORDER — CHLORHEXIDINE GLUCONATE 40 MG/ML
12.5 SOLUTION TOPICAL ONCE
Refills: 0 | Status: COMPLETED | OUTPATIENT
Start: 2024-10-30 | End: 2024-10-31

## 2024-10-30 RX ORDER — SODIUM CHLORIDE 9 MG/ML
500 INJECTION, SOLUTION INTRAMUSCULAR; INTRAVENOUS; SUBCUTANEOUS
Refills: 0 | Status: COMPLETED | OUTPATIENT
Start: 2024-10-30 | End: 2024-10-30

## 2024-10-30 RX ORDER — ASCORBIC ACID 500 MG
2000 TABLET ORAL ONCE
Refills: 0 | Status: COMPLETED | OUTPATIENT
Start: 2024-10-30 | End: 2024-10-30

## 2024-10-30 RX ORDER — MUPIROCIN 20 MG/G
1 OINTMENT TOPICAL
Refills: 0 | Status: DISCONTINUED | OUTPATIENT
Start: 2024-10-30 | End: 2024-10-31

## 2024-10-30 RX ORDER — NITROGLYCERIN 0.6MG/HR
5 PATCH, TRANSDERMAL 24 HOURS TRANSDERMAL
Qty: 50 | Refills: 0 | Status: DISCONTINUED | OUTPATIENT
Start: 2024-10-30 | End: 2024-10-31

## 2024-10-30 RX ADMIN — Medication 0.4 MILLIGRAM(S): at 09:13

## 2024-10-30 RX ADMIN — HEPARIN SODIUM 10 UNIT(S)/HR: 10000 INJECTION INTRAVENOUS; SUBCUTANEOUS at 12:49

## 2024-10-30 RX ADMIN — OXYCODONE HYDROCHLORIDE 5 MILLIGRAM(S): 30 TABLET ORAL at 07:15

## 2024-10-30 RX ADMIN — OXYCODONE HYDROCHLORIDE 5 MILLIGRAM(S): 30 TABLET ORAL at 23:00

## 2024-10-30 RX ADMIN — Medication 650 MILLIGRAM(S): at 21:14

## 2024-10-30 RX ADMIN — MORPHINE SULFATE 15 MILLIGRAM(S): 30 TABLET, EXTENDED RELEASE ORAL at 12:16

## 2024-10-30 RX ADMIN — SODIUM CHLORIDE 3 MILLILITER(S): 9 INJECTION, SOLUTION INTRAMUSCULAR; INTRAVENOUS; SUBCUTANEOUS at 06:36

## 2024-10-30 RX ADMIN — OXYCODONE HYDROCHLORIDE 5 MILLIGRAM(S): 30 TABLET ORAL at 01:09

## 2024-10-30 RX ADMIN — Medication 0.4 MILLIGRAM(S): at 06:28

## 2024-10-30 RX ADMIN — Medication 2000 MILLIGRAM(S): at 22:23

## 2024-10-30 RX ADMIN — MORPHINE SULFATE 15 MILLIGRAM(S): 30 TABLET, EXTENDED RELEASE ORAL at 13:16

## 2024-10-30 RX ADMIN — Medication 80 MILLIGRAM(S): at 21:14

## 2024-10-30 RX ADMIN — Medication 0.4 MILLIGRAM(S): at 21:14

## 2024-10-30 RX ADMIN — Medication 1.5 MICROGRAM(S)/MIN: at 12:30

## 2024-10-30 RX ADMIN — Medication 25 MILLIGRAM(S): at 06:29

## 2024-10-30 RX ADMIN — ISOSORBIDE MONONITRATE 30 MILLIGRAM(S): 60 TABLET, EXTENDED RELEASE ORAL at 12:16

## 2024-10-30 RX ADMIN — Medication 2 TABLET(S): at 21:14

## 2024-10-30 RX ADMIN — SODIUM CHLORIDE 30 MILLILITER(S): 9 INJECTION, SOLUTION INTRAMUSCULAR; INTRAVENOUS; SUBCUTANEOUS at 09:56

## 2024-10-30 RX ADMIN — OXYCODONE HYDROCHLORIDE 5 MILLIGRAM(S): 30 TABLET ORAL at 19:47

## 2024-10-30 RX ADMIN — OXYCODONE HYDROCHLORIDE 5 MILLIGRAM(S): 30 TABLET ORAL at 02:00

## 2024-10-30 RX ADMIN — SODIUM CHLORIDE 3 MILLILITER(S): 9 INJECTION, SOLUTION INTRAMUSCULAR; INTRAVENOUS; SUBCUTANEOUS at 22:21

## 2024-10-30 RX ADMIN — CHLORHEXIDINE GLUCONATE 1 APPLICATION(S): 40 SOLUTION TOPICAL at 21:00

## 2024-10-30 RX ADMIN — PANTOPRAZOLE SODIUM 40 MILLIGRAM(S): 40 TABLET, DELAYED RELEASE ORAL at 06:28

## 2024-10-30 RX ADMIN — Medication 81 MILLIGRAM(S): at 12:16

## 2024-10-30 RX ADMIN — Medication 0.4 MILLIGRAM(S): at 08:56

## 2024-10-30 RX ADMIN — Medication 25 MILLIGRAM(S): at 17:09

## 2024-10-30 RX ADMIN — HEPARIN SODIUM 11 UNIT(S)/HR: 10000 INJECTION INTRAVENOUS; SUBCUTANEOUS at 10:02

## 2024-10-30 RX ADMIN — OXYCODONE HYDROCHLORIDE 5 MILLIGRAM(S): 30 TABLET ORAL at 18:47

## 2024-10-30 RX ADMIN — Medication 650 MILLIGRAM(S): at 22:00

## 2024-10-30 RX ADMIN — SODIUM CHLORIDE 3 MILLILITER(S): 9 INJECTION, SOLUTION INTRAMUSCULAR; INTRAVENOUS; SUBCUTANEOUS at 13:20

## 2024-10-30 RX ADMIN — OXYCODONE HYDROCHLORIDE 5 MILLIGRAM(S): 30 TABLET ORAL at 08:30

## 2024-10-30 NOTE — PROGRESS NOTE ADULT - SUBJECTIVE AND OBJECTIVE BOX
Patient discussed on morning rounds with Dr. Devine    Pre-op [facility]    SUBJECTIVE ASSESSMENT:  74y Male assessed at bedside this AM. C/o worsening chest pressure and associated SOB, alleviated by SL nitro and persistent left leg pain. Nitro gtt initiated and chest pain improved. Patient ICU care on 9La. Denies n/v/fever/chills.         Vital Signs Last 24 Hrs  T(C): 37.2 (30 Oct 2024 14:00), Max: 37.2 (30 Oct 2024 14:00)  T(F): 99 (30 Oct 2024 14:00), Max: 99 (30 Oct 2024 14:00)  HR: 76 (30 Oct 2024 16:00) (64 - 79)  BP: 124/58 (30 Oct 2024 13:00) (107/55 - 139/65)  BP(mean): 84 (30 Oct 2024 13:00) (78 - 93)  RR: 16 (30 Oct 2024 16:00) (16 - 18)  SpO2: 98% (30 Oct 2024 16:00) (94% - 100%)    Parameters below as of 30 Oct 2024 16:00  Patient On (Oxygen Delivery Method): nasal cannula w/ humidification  O2 Flow (L/min): 2    I&O's Detail    29 Oct 2024 07:01  -  30 Oct 2024 07:00  --------------------------------------------------------  IN:    Heparin: 268 mL    Lactated Ringers Bolus: 450 mL    Oral Fluid: 900 mL  Total IN: 1618 mL    OUT:    Voided (mL): 1575 mL  Total OUT: 1575 mL    Total NET: 43 mL      30 Oct 2024 07:01  -  30 Oct 2024 16:15  --------------------------------------------------------  IN:    Heparin: 51 mL    Nitroglycerin: 6 mL    Oral Fluid: 240 mL  Total IN: 297 mL    OUT:    Voided (mL): 600 mL  Total OUT: 600 mL    Total NET: -303 mL      PHYSICAL EXAM:  Appearance: No acute distress.  Neurologic: AAOx3, no AMS or focal deficits.  Responds appropriately to verbal and physical stimuli; exhibits purposeful movement in all extremities.  HEENT:   MMM, PERRLA, EOMI	b/l  Neck: Supple  Cardiovascular: RRR, S1 S2. No m/r/g.  Respiratory: No acute respiratory distress. CTA b/l, no w/r/r.   Gastrointestinal:  Soft, non-tender, non-distended, + BS.	  Skin: No rashes. No ecchymoses. No cyanosis.  Extremities: LLE with scabbing. Exhibits normal range of motion, no clubbing, cyanosis or edema.  Vascular: Peripheral pulses present on doppler.     LABS:                        13.3   7.35  )-----------( 222      ( 30 Oct 2024 07:01 )             40.5       COUMADIN:  No    PT/INR - ( 30 Oct 2024 12:11 )   PT: 12.6 sec;   INR: 1.10          PTT - ( 30 Oct 2024 12:11 )  PTT:97.6 sec    10-30    138  |  106  |  29[H]  ----------------------------<  103[H]  4.8   |  23  |  1.74[H]    Ca    9.3      30 Oct 2024 07:01  Mg     2.0     10-30    TPro  7.2  /  Alb  3.8  /  TBili  0.3  /  DBili  x   /  AST  46[H]  /  ALT  41  /  AlkPhos  46  10-29      Urinalysis Basic - ( 30 Oct 2024 07:01 )    Color: x / Appearance: x / SG: x / pH: x  Gluc: 103 mg/dL / Ketone: x  / Bili: x / Urobili: x   Blood: x / Protein: x / Nitrite: x   Leuk Esterase: x / RBC: x / WBC x   Sq Epi: x / Non Sq Epi: x / Bacteria: x        MEDICATIONS  (STANDING):  aspirin enteric coated 81 milliGRAM(s) Oral daily  atorvastatin 80 milliGRAM(s) Oral at bedtime  heparin  Infusion 1300 Unit(s)/Hr (10 mL/Hr) IV Continuous <Continuous>  influenza  Vaccine (HIGH DOSE) 0.5 milliLiter(s) IntraMuscular once  isosorbide   mononitrate ER Tablet (IMDUR) 30 milliGRAM(s) Oral daily  metoprolol tartrate 25 milliGRAM(s) Oral <User Schedule>  morphine ER Tablet 15 milliGRAM(s) Oral daily  nitroglycerin  Infusion 5 MICROgram(s)/Min (1.5 mL/Hr) IV Continuous <Continuous>  pantoprazole    Tablet 40 milliGRAM(s) Oral before breakfast  polyethylene glycol 3350 17 Gram(s) Oral daily  senna 2 Tablet(s) Oral at bedtime  sodium chloride 0.9% lock flush 3 milliLiter(s) IV Push every 8 hours  tamsulosin 0.4 milliGRAM(s) Oral at bedtime    MEDICATIONS  (PRN):  acetaminophen     Tablet .. 650 milliGRAM(s) Oral every 6 hours PRN Temp greater or equal to 38C (100.4F), Mild Pain (1 - 3)  nitroglycerin     SubLingual 0.4 milliGRAM(s) SubLingual every 5 minutes PRN Chest Pain  oxyCODONE    IR 5 milliGRAM(s) Oral every 4 hours PRN Moderate Pain (4 - 6)        RADIOLOGY & ADDITIONAL TESTS:  < from: US Duplex Carotid Arteries Complete, Bilateral (10.27.24 @ 19:56) >    IMPRESSION: No significant hemodynamic stenosis of either carotid artery.    < end of copied text >  < from: Xray Chest 2 Views PA/Lat (10.27.24 @ 20:44) >  IMPRESSION:  No acutepathology.    < end of copied text >  < from: TTE Echo Complete w/ Contrast w/ Doppler (10.28.24 @ 11:01) >  CONCLUSIONS:     1. Moderately reduced left ventricular systolic function.   2. Basal and mid anterolateral wall, basal and mid inferior wall, and   basal and mid inferolateral wall are akinetic.   3. Normal right ventricular size and systolic function.   4. Dilated left atrium.   5. Aortic sclerosis without significant stenosis.   6. No evidence of pulmonary hypertension.   7. No pericardial effusion.    < end of copied text >

## 2024-10-30 NOTE — PROGRESS NOTE ADULT - SUBJECTIVE AND OBJECTIVE BOX
Subjective:   Pt. seen and examined at bedside this morning  NAOE from a vascular perspective  Clinical exam showing no appreciable improvements  CTA shows extensive vascular disease consistent with symptoms and clinical picture    ROS:   Denies Headache, blurred vision, Chest Pain, SOB, Abdominal pain, nausea or vomiting     Social   aspirin enteric coated 81  heparin  Infusion 1300  isosorbide   mononitrate ER Tablet (IMDUR) 30  metoprolol tartrate 25      Allergies    Allergy Status Unknown    Intolerances        Vital Signs Last 24 Hrs  T(C): 36.7 (30 Oct 2024 09:00), Max: 36.7 (29 Oct 2024 17:22)  T(F): 98.1 (30 Oct 2024 09:00), Max: 98.1 (29 Oct 2024 17:22)  HR: 79 (30 Oct 2024 09:13) (64 - 79)  BP: 139/65 (30 Oct 2024 09:13) (107/55 - 139/65)  BP(mean): 93 (30 Oct 2024 09:13) (78 - 93)  RR: 16 (30 Oct 2024 09:13) (16 - 18)  SpO2: 96% (30 Oct 2024 09:13) (93% - 98%)    Parameters below as of 30 Oct 2024 09:13  Patient On (Oxygen Delivery Method): nasal cannula w/ humidification  O2 Flow (L/min): 6    I&O's Summary    29 Oct 2024 07:01  -  30 Oct 2024 07:00  --------------------------------------------------------  IN: 1618 mL / OUT: 1575 mL / NET: 43 mL    30 Oct 2024 07:01  -  30 Oct 2024 10:29  --------------------------------------------------------  IN: 11 mL / OUT: 0 mL / NET: 11 mL        Physical Exam:  General: NAD  Neck: Supple  Cardiovascular: Regular rate   Chest: Appropriate chest expansion. Sating well on room air  Abdomen: Non-tender, non-distended  Extremities: Left lower extremity ulcers indicative of PAD. Tender to palpation at ulcer sites and dorsal aspect of left foot  Vascular: DP biphasic signals bilaterally. No PT signals      LABS:                        13.3   7.35  )-----------( 222      ( 30 Oct 2024 07:01 )             40.5     10-30    138  |  106  |  29[H]  ----------------------------<  103[H]  4.8   |  23  |  1.74[H]    Ca    9.3      30 Oct 2024 07:01  Mg     2.0     10-30    TPro  7.2  /  Alb  3.8  /  TBili  0.3  /  DBili  x   /  AST  46[H]  /  ALT  41  /  AlkPhos  46  10-29    PT/INR - ( 30 Oct 2024 07:01 )   PT: 13.0 sec;   INR: 1.11          PTT - ( 30 Oct 2024 07:01 )  PTT:79.4 sec    Radiology and Additional Studies:    CTA of abdomen and pelvis with bilateral LE run-off    IMPRESSION:  1. Extensive bilateral hemodynamically lower extremity significant stenoses and occlusive disease as described above.  2. Layering cholelithiasis.  3. Large intravesicular bladder calcification  4. Infrarenal abdominal aortic stenosis  5. Patent femoral-femoral artery bypass graft

## 2024-10-30 NOTE — PROGRESS NOTE ADULT - ASSESSMENT
This is a 75 y/o male with PMHx of HTN, HLD, BPH, CAD (?s/p coronary stent per patient), severe PAD s/p fem-fem bypass per patient (?year) and an "abdominal stent for circulation problems".  Has not seen a doctor for his PAD in "a few years".  States he was having severe left leg pain and chest pain, prompting him to call an ambulance.  He was taken to [facility] for evaluation and cardiac cath revealed CAD.  Transferred here from  for surgical evaluation.      Vascular surgery was consulted for evaluation of LLE for possible peripheral arterial disease. Pt. states he can only walk about a block or so before he feels pain at left leg and chest. Denies any history of IVDU. Says ulcers at left leg have been there for a long time. He denies any numbness or tingling at either extremity.    Plan & recommendations   - Please pre-op for LLE angiogram tomorrow   - Please obtain pre-op labs inlcuding 2 type and screens   - Will need cardiology clearance   - Vascular will continue to follow

## 2024-10-30 NOTE — PROGRESS NOTE ADULT - SUBJECTIVE AND OBJECTIVE BOX
PULMONARY CONSULT SERVICE FOLLOW-UP NOTE    INTERVAL HPI:  Seen using Polish  over tablet.     Reviewed chart and overnight events; patient seen and examined at bedside.  Feels well, no new complaints.      MEDICATIONS:  Pulmonary:    Antimicrobials:    Anticoagulants:  aspirin enteric coated 81 milliGRAM(s) Oral daily  heparin  Infusion 1300 Unit(s)/Hr IV Continuous <Continuous>    Cardiac:  isosorbide   mononitrate ER Tablet (IMDUR) 30 milliGRAM(s) Oral daily  metoprolol tartrate 25 milliGRAM(s) Oral <User Schedule>  nitroglycerin     SubLingual 0.4 milliGRAM(s) SubLingual every 5 minutes PRN  nitroglycerin  Infusion 5 MICROgram(s)/Min IV Continuous <Continuous>      Allergies    No Known Allergies    Intolerances        Vital Signs Last 24 Hrs  T(C): 37 (30 Oct 2024 17:08), Max: 37.2 (30 Oct 2024 14:00)  T(F): 98.6 (30 Oct 2024 17:08), Max: 99 (30 Oct 2024 14:00)  HR: 72 (30 Oct 2024 19:00) (64 - 79)  BP: 124/58 (30 Oct 2024 13:00) (107/55 - 139/65)  BP(mean): 84 (30 Oct 2024 13:00) (78 - 93)  RR: 18 (30 Oct 2024 19:00) (16 - 18)  SpO2: 94% (30 Oct 2024 19:00) (94% - 100%)    Parameters below as of 30 Oct 2024 20:00  Patient On (Oxygen Delivery Method): room air        10-29 @ 07:01  -  10-30 @ 07:00  --------------------------------------------------------  IN: 1618 mL / OUT: 1575 mL / NET: 43 mL    10-30 @ 07:01  -  10-30 @ 19:27  --------------------------------------------------------  IN: 348 mL / OUT: 600 mL / NET: -252 mL          PHYSICAL EXAM:  Constitutional: NAD  HEENT: NC/AT; PERRL, anicteric sclera; MMM  Neck: supple  Cardiovascular: +S1/S2, RRR  Respiratory: CTA B/L; no W/R/R  Gastrointestinal: soft, NT/ND  Extremities: WWP; no edema, clubbing or cyanosis  Vascular: 2+ radial pulses B/L  Neurological: awake and alert; CATES    LABS:      CBC Full  -  ( 30 Oct 2024 07:01 )  WBC Count : 7.35 K/uL  RBC Count : 4.46 M/uL  Hemoglobin : 13.3 g/dL  Hematocrit : 40.5 %  Platelet Count - Automated : 222 K/uL  Mean Cell Volume : 90.8 fl  Mean Cell Hemoglobin : 29.8 pg  Mean Cell Hemoglobin Concentration : 32.8 g/dL  Auto Neutrophil # : x  Auto Lymphocyte # : x  Auto Monocyte # : x  Auto Eosinophil # : x  Auto Basophil # : x  Auto Neutrophil % : x  Auto Lymphocyte % : x  Auto Monocyte % : x  Auto Eosinophil % : x  Auto Basophil % : x    10-30    138  |  106  |  29[H]  ----------------------------<  103[H]  4.8   |  23  |  1.74[H]    Ca    9.3      30 Oct 2024 07:01  Mg     2.0     10-30    TPro  7.2  /  Alb  3.8  /  TBili  0.3  /  DBili  x   /  AST  46[H]  /  ALT  41  /  AlkPhos  46  10-29    PT/INR - ( 30 Oct 2024 12:11 )   PT: 12.6 sec;   INR: 1.10          PTT - ( 30 Oct 2024 17:47 )  PTT:55.0 sec      Urinalysis Basic - ( 30 Oct 2024 07:01 )    Color: x / Appearance: x / SG: x / pH: x  Gluc: 103 mg/dL / Ketone: x  / Bili: x / Urobili: x   Blood: x / Protein: x / Nitrite: x   Leuk Esterase: x / RBC: x / WBC x   Sq Epi: x / Non Sq Epi: x / Bacteria: x                RADIOLOGY & ADDITIONAL STUDIES:

## 2024-10-30 NOTE — PROGRESS NOTE ADULT - ASSESSMENT
73 y/o male with PMHx of HTN, HLD, BPH, CAD (?s/p coronary stent per patient), severe PAD s/p fem-fem bypass per patient (20 years ago) and an "abdominal stent for circulation problems".  He states he was having severe left leg pain and chest pain, prompting him to call an ambulance.  He was taken to [facility] for evaluation and cardiac cath revealed CAD.  Transferred here from  for surgical evaluation.    Pulm consulted for lung nodule and to rule out metastasis. Was informed by vascular provider that lung nodule was 2.8cm x 1.7cm located in the RUL by outside radiologist read.    #Pulmonary nodule    PET CT showed highly avid 2.7cm RUL nodule suspicious for malignancy, and a 1.2cm mildly FDG avid KULDEEP nodule which has a broad differential, more likely infectious/inflammatory but cannot rule out malignancy  There was no evidence of distant/extensive metastatic disease.  High suspicion for lung cancer in RUL, however would need further workup for staging/diagnosis which does not require inpatient stay.     Recs:  - ongoing discussions with patient regarding next steps, does not require inpatient workup  - will need pulmonary follow up for possible biopsy and staging, vs CT surg for wedge0  - there is no pulmonary contraindication for vascular surgery   - please arrange for close pulmonary outpatient follow up with Dr. Duarte following discharge    Thank you for this consult.  Patient seen, evaluated and disucssed with PCCM attending.    Pulmonary team will sign off.   Please call, page or place new consult with any new questions.     Tom Kimble MD  PCCM Fellow

## 2024-10-30 NOTE — CONSULT NOTE ADULT - ASSESSMENT
75 y/o male, poor historian w/ PMHx of HTN, HLD, BPH, CAD (?s/p coronary stent per patient), severe PAD s/p fem-fem bypass per patient (?year) and an "abdominal stent for circulation problems".  Has not seen a doctor for his PAD in "a few years".  States he was having severe left leg pain and chest pain, prompting him to call an ambulance.  He was taken to [facility] for evaluation and cardiac cath revealed CAD. Transferred here from  for surgical evaluation. Given patient w/ pulmonary nodules noted on OSH CTA Chest patient underwent a PET scan which is significant for uptake to nodules & thoracic surgery was consulted.     Plan:  Problem 1: Lung nodule   - PET CT showed highly avid 2.7cm RUL nodule suspicious for malignancy, and a 1.2cm mildly FDG avid KULDEEP nodule  - Plan for outpatient work up for possible biopsy and staging   - No contraindication for cardiac surgery      Problem 2: CAD  - OR tomorrow for MIDCAB  - Plan per primary team: continue hep/nitro gtt, asa, lopressor, statin, imdur       Problem 3: PAD  - Vascular following  - Plan for angiogram of LE this admission       Problem 4: BPH  - continue flomax  - care per primary team    I have reviewed clinical labs tests and reports, radiology tests and reports, as well as old patient medical records, and discussed with the referring physician.

## 2024-10-30 NOTE — PROGRESS NOTE ADULT - ATTENDING COMMENTS
Agree with the plan outlined as above.  Plan of care discussed with the patient with the [place]  services.  Pulmonary medicine will sign off.  Please reach out to us with questions.  Thank you for allowing us to participate in the care of your patient.
Patient is at moderate risk for periprocedural pulmonary complications  13.3% risk of in-hospital post-op pulmonary complications (composite including respiratory failure, respiratory infection, pleural effusion, atelectasis, pneumothorax, bronchospasm, aspiration pneumonitis)    -In regards to cancer workup, will need transbronchial biopsy and EBUS TBNA for cancer workup and management.  At this point patient is willing to undergo biopsy which will happen as an outpatient.  -From pulmonary standpoint, patient is on room air, not having any respiratory issues at present.  Can start DuoNebs as needed periprocedurally.  If patient is being intubated for the procedure, extubated while completely awake.  Cautious use of opiates.

## 2024-10-30 NOTE — PROVIDER CONTACT NOTE (CHANGE IN STATUS NOTIFICATION) - BACKGROUND
admitted to [facility] for leg and chest pain, transferred to [facility]. Cardiac cath revealed CAD

## 2024-10-30 NOTE — PROGRESS NOTE ADULT - ASSESSMENT
73 y/o male, poor historian w/ PMHx of HTN, HLD, BPH, CAD (?s/p coronary stent per patient), severe PAD s/p fem-fem bypass per patient (?year) and an "abdominal stent for circulation problems".  Has not seen a doctor for his PAD in "a few years".  States he was having severe left leg pain and chest pain, prompting him to call an ambulance.  He was taken to [facility] for evaluation and cardiac cath revealed CAD. Transferred here from  for surgical evaluation. Given patient w/ pulmonary nodules noted on OSH CTA Chest patient underwent a PET scan which is significant for uptake to nodules. Given h/o aortic surgery in the past w/ ongoing LLE pain and non palpable distal pulses, vascular consulted & patient underwent a CTA A/P with runoff which revealed  a patent graft but significant disease. Patient had worsening CP today & was started on a nitro gtt with relief, mary placed and ICU care on 9La. Thoracic surgery consulted for lung nodule - no contraindication to cardiac surgery. Vascular is recommending an angiogram to further evaluate his PAD which will be postponed until after his MIDCAB tomorrow.     A/P:  Neurovascular: No delirium. Pain well controlled with current regimen.  -Pain: Morphine ER 15mg daily. PRN: tylenol prn, oxy 5mg prn    Cardiovascular: Hemodynamically stable. HR controlled.  -CAD  - C/w ASA daily, c/w Imdur 30mg daily, c/w Lopressor 25mg BID   - Nitro gtt for chest pain; monitor BP continuously with mary  - continue heparin gtt   - no ECG changes, troponin continues to downtrend   -PAD w/ unknown past vascular hx:   Vascular consulted, appreciate recs  - s/p CTA A/P w/ runoff with disease - plan for angiogram after MIDCAB  C/w ASA daily   -HLD: Lipitor 80mg nightly     Respiratory: 02 Sat = 98% on RA.  -Encourage C+DB and Use of IS 10x / hr while awake.  -CXR: no ptx, no pleural effusions   -Pulmonary Nodules: with uptake on PET scan  - Thoracic surgery consulted - plan for outpatient f/u, no contraindication to cardiac surgery     GI: Stable.  -PPX: pantoprazole   -PO Diet.  -Bowel Regimen: c/w Miralax, senna    Renal / :  -Monitor renal function: BUN 29, Cr 1.74  -Monitor I/O's.  -FELIPA: no reported hx of kidney disease, likely 2/2 contrast at OSH.   Continue to trend Cr/BUN  -BPH: c/w tamsulosin .4mg daily     Endocrine:    -A1c: 5.8%  -TSH: 1.730    Hematologic: stable  - h&h 13.3 & 40.5  - aptt 97.6 - hep gtt decreased, f/u 6pm aptt    ID:  -Tempature: afebrile   -CBC: WBC 7.35    Prophylaxis:  -DVT prophylaxis with therapeutic hep gtt   -SCD's    Disposition:  OR tomorrow for MIDCAB

## 2024-10-30 NOTE — CONSULT NOTE ADULT - SUBJECTIVE AND OBJECTIVE BOX
Surgeon: Dr. Sussy Adkins    Requesting Physician: Dr. Heron Devine    HISTORY OF PRESENT ILLNESS:  73 y/o male, poor historian w/ PMHx of HTN, HLD, BPH, CAD (?s/p coronary stent per patient), severe PAD s/p fem-fem bypass per patient (?year) and an "abdominal stent for circulation problems".  Has not seen a doctor for his PAD in "a few years".  States he was having severe left leg pain and chest pain, prompting him to call an ambulance.  He was taken to [facility] for evaluation and cardiac cath revealed CAD. Transferred here from  for surgical evaluation. Given patient w/ pulmonary nodules noted on OSH CTA Chest patient underwent a PET scan which is significant for uptake to nodules & thoracic surgery was consulted.     PAST MEDICAL & SURGICAL HISTORY:  CAD (coronary artery disease)  BPH (benign prostatic hyperplasia)  HTN (hypertension)  HLD (hyperlipidemia)  PAD (peripheral artery disease)  S/P femoral-femoral bypass surgery          MEDICATIONS  (STANDING):  aspirin enteric coated 81 milliGRAM(s) Oral daily  atorvastatin 80 milliGRAM(s) Oral at bedtime  heparin  Infusion 1300 Unit(s)/Hr (10 mL/Hr) IV Continuous <Continuous>  influenza  Vaccine (HIGH DOSE) 0.5 milliLiter(s) IntraMuscular once  isosorbide   mononitrate ER Tablet (IMDUR) 30 milliGRAM(s) Oral daily  metoprolol tartrate 25 milliGRAM(s) Oral <User Schedule>  morphine ER Tablet 15 milliGRAM(s) Oral daily  nitroglycerin  Infusion 5 MICROgram(s)/Min (1.5 mL/Hr) IV Continuous <Continuous>  pantoprazole    Tablet 40 milliGRAM(s) Oral before breakfast  polyethylene glycol 3350 17 Gram(s) Oral daily  senna 2 Tablet(s) Oral at bedtime  sodium chloride 0.9% lock flush 3 milliLiter(s) IV Push every 8 hours  tamsulosin 0.4 milliGRAM(s) Oral at bedtime    MEDICATIONS  (PRN):  acetaminophen     Tablet .. 650 milliGRAM(s) Oral every 6 hours PRN Temp greater or equal to 38C (100.4F), Mild Pain (1 - 3)  nitroglycerin     SubLingual 0.4 milliGRAM(s) SubLingual every 5 minutes PRN Chest Pain  oxyCODONE    IR 5 milliGRAM(s) Oral every 4 hours PRN Moderate Pain (4 - 6)      Allergies: No known allergies     Social History   Smoker x 20 years, quit 20 years ago.  Former ETOH use, quit 20 years ago  Lives alone.  No family or friends nearby.   Occupation - on disability.   Assist device: Uses a cane, but as of recent hospital admission can not walk due to his severe leg pain.    FAMILY HISTORY:      Review of Systems:  CONSTITUTIONAL: Denies fevers / chills, sweats, fatigue, weight loss, weight gain                                       NEURO:  Denies parathesias, seizures, syncope, confusion                                                                                  EYES:  Denies blurry vision, discharge, pain, loss of vision                                                                                    ENMT:  Denies difficulty hearing, vertigo, dysphagia, epistaxis, recent dental work                                       CV:  + CP; + HICKMAN. Denies, palpitations, orthopnea                                                                                           RESPIRATORY: + SOB, Denies Wheezing, cough / sputum, hemoptysis                                                               GI:  Denies nausea, vomiting, diarrhea, constipation, melena                                                                          : Denies hematuria, dysuria, urgency, incontinence                                                                                          MUSCULOSKELETAL  + LLE pain 2/2 PAD. Denies arthritis, joint swelling, muscle weakness                                                             SKIN/BREAST:  + LLE scabbing. Denies rash, itching, hair loss, masses                                                                                              PSYCH:  Denies depression, anxiety, suicidal ideation                                                                                                HEME/LYMPH:  Denies bruises easily, enlarged lymph nodes, tender lymph nodes                                          ENDOCRINE:  Denies cold intolerance, heat intolerance, polydipsia                                                                      Vital Signs Last 24 Hrs  T(C): 37.2 (30 Oct 2024 14:00), Max: 37.2 (30 Oct 2024 14:00)  T(F): 99 (30 Oct 2024 14:00), Max: 99 (30 Oct 2024 14:00)  HR: 76 (30 Oct 2024 16:00) (64 - 79)  BP: 124/58 (30 Oct 2024 13:00) (107/55 - 139/65)  BP(mean): 84 (30 Oct 2024 13:00) (78 - 93)  RR: 16 (30 Oct 2024 16:00) (16 - 18)  SpO2: 98% (30 Oct 2024 16:00) (94% - 100%)    Parameters below as of 30 Oct 2024 16:00  Patient On (Oxygen Delivery Method): nasal cannula w/ humidification  O2 Flow (L/min): 2      Physical Exam:  CONSTITUTIONAL: + pain to LLE and chest   NEURO: AAOx3, no AMS or focal deficits.                       EYES: EOMI b/l, PEERLA b/l. Exhibits appropriate visual acuity.   ENMT: Hearing in tact.  MMM.  No palpable neck masses or hoarseness.   CV: RRR, S1/S2, no m/r/g.   RESPIRATORY:  No acute distress.  CTA b/l, no w/r/r.   GI: ND, NBS, non-TTP.  : No ramirez.    MUSKULOSKELETAL: No obvious malformations.  Non-TTP.  Exhibits normal ROM in all extremities.   SKIN / BREAST:  + LLE scabs.  Normal skin color and turgor.                                                           LABS:                        13.3   7.35  )-----------( 222      ( 30 Oct 2024 07:01 )             40.5     10-30    138  |  106  |  29[H]  ----------------------------<  103[H]  4.8   |  23  |  1.74[H]    Ca    9.3      30 Oct 2024 07:01  Mg     2.0     10-30    TPro  7.2  /  Alb  3.8  /  TBili  0.3  /  DBili  x   /  AST  46[H]  /  ALT  41  /  AlkPhos  46  10-29    PT/INR - ( 30 Oct 2024 12:11 )   PT: 12.6 sec;   INR: 1.10          PTT - ( 30 Oct 2024 12:11 )  PTT:97.6 sec  Urinalysis Basic - ( 30 Oct 2024 07:01 )    Color: x / Appearance: x / SG: x / pH: x  Gluc: 103 mg/dL / Ketone: x  / Bili: x / Urobili: x   Blood: x / Protein: x / Nitrite: x   Leuk Esterase: x / RBC: x / WBC x   Sq Epi: x / Non Sq Epi: x / Bacteria: x      CARDIAC MARKERS ( 28 Oct 2024 18:47 )  x     / x     / x     / x     / 6.6 ng/mL          RADIOLOGY & ADDITIONAL STUDIES:  < from: NM PET/CT Onc FDG Skull to Thigh, Inital (10.28.24 @ 17:07) >  IMPRESSION:    1.  FDG avid 2.7 cm right upper lobe nodules suspicious for malignancy.    2.  Mild FDG avid 1.2 cm left upper lobe nodule which may represent   infectious/inflammatory changes versus neoplasm.    3.  Mild FDG avid right hilar lymph node which is nonspecific. FDG avid   right lower lobe linear opacity likely inflammatory.    4.  No additional sites of pathologic FDG uptake to suggest biologic   tumor activity.    < end of copied text >

## 2024-10-31 ENCOUNTER — TRANSCRIPTION ENCOUNTER (OUTPATIENT)
Age: 74
End: 2024-10-31

## 2024-10-31 ENCOUNTER — APPOINTMENT (OUTPATIENT)
Dept: CARDIOTHORACIC SURGERY | Facility: HOSPITAL | Age: 74
End: 2024-10-31

## 2024-10-31 DIAGNOSIS — Z87.438 PERSONAL HISTORY OF OTHER DISEASES OF MALE GENITAL ORGANS: ICD-10-CM

## 2024-10-31 DIAGNOSIS — Z95.1 PRESENCE OF AORTOCORONARY BYPASS GRAFT: ICD-10-CM

## 2024-10-31 DIAGNOSIS — Z86.39 PERSONAL HISTORY OF OTHER ENDOCRINE, NUTRITIONAL AND METABOLIC DISEASE: ICD-10-CM

## 2024-10-31 DIAGNOSIS — Z86.79 PERSONAL HISTORY OF OTHER DISEASES OF THE CIRCULATORY SYSTEM: ICD-10-CM

## 2024-10-31 DIAGNOSIS — I50.22 CHRONIC SYSTOLIC (CONGESTIVE) HEART FAILURE: ICD-10-CM

## 2024-10-31 DIAGNOSIS — Z09 ENCOUNTER FOR FOLLOW-UP EXAMINATION AFTER COMPLETED TREATMENT FOR CONDITIONS OTHER THAN MALIGNANT NEOPLASM: ICD-10-CM

## 2024-10-31 DIAGNOSIS — I25.10 ATHEROSCLEROTIC HEART DISEASE OF NATIVE CORONARY ARTERY W/OUT ANGINA PECTORIS: ICD-10-CM

## 2024-10-31 DIAGNOSIS — Z87.891 PERSONAL HISTORY OF NICOTINE DEPENDENCE: ICD-10-CM

## 2024-10-31 DIAGNOSIS — Z87.898 PERSONAL HISTORY OF OTHER SPECIFIED CONDITIONS: ICD-10-CM

## 2024-10-31 PROBLEM — Z00.00 ENCOUNTER FOR PREVENTIVE HEALTH EXAMINATION: Status: ACTIVE | Noted: 2024-10-31

## 2024-10-31 LAB
ALBUMIN SERPL ELPH-MCNC: 3.5 G/DL — SIGNIFICANT CHANGE UP (ref 3.3–5)
ALBUMIN SERPL ELPH-MCNC: 3.8 G/DL — SIGNIFICANT CHANGE UP (ref 3.3–5)
ALBUMIN SERPL ELPH-MCNC: 3.8 G/DL — SIGNIFICANT CHANGE UP (ref 3.3–5)
ALP SERPL-CCNC: 46 U/L — SIGNIFICANT CHANGE UP (ref 40–120)
ALP SERPL-CCNC: 48 U/L — SIGNIFICANT CHANGE UP (ref 40–120)
ALP SERPL-CCNC: 49 U/L — SIGNIFICANT CHANGE UP (ref 40–120)
ALT FLD-CCNC: 30 U/L — SIGNIFICANT CHANGE UP (ref 10–45)
ALT FLD-CCNC: 33 U/L — SIGNIFICANT CHANGE UP (ref 10–45)
ALT FLD-CCNC: 37 U/L — SIGNIFICANT CHANGE UP (ref 10–45)
ANION GAP SERPL CALC-SCNC: 11 MMOL/L — SIGNIFICANT CHANGE UP (ref 5–17)
ANION GAP SERPL CALC-SCNC: 11 MMOL/L — SIGNIFICANT CHANGE UP (ref 5–17)
ANION GAP SERPL CALC-SCNC: 9 MMOL/L — SIGNIFICANT CHANGE UP (ref 5–17)
APTT BLD: 29.1 SEC — SIGNIFICANT CHANGE UP (ref 24.5–35.6)
APTT BLD: 33 SEC — SIGNIFICANT CHANGE UP (ref 24.5–35.6)
APTT BLD: 80.6 SEC — HIGH (ref 24.5–35.6)
APTT BLD: 83.5 SEC — HIGH (ref 24.5–35.6)
AST SERPL-CCNC: 32 U/L — SIGNIFICANT CHANGE UP (ref 10–40)
AST SERPL-CCNC: 36 U/L — SIGNIFICANT CHANGE UP (ref 10–40)
AST SERPL-CCNC: 37 U/L — SIGNIFICANT CHANGE UP (ref 10–40)
BASE EXCESS BLDA CALC-SCNC: -2.9 MMOL/L — LOW (ref -2–3)
BASE EXCESS BLDA CALC-SCNC: -4.5 MMOL/L — LOW (ref -2–3)
BASE EXCESS BLDA CALC-SCNC: -4.7 MMOL/L — LOW (ref -2–3)
BILIRUB SERPL-MCNC: 0.2 MG/DL — SIGNIFICANT CHANGE UP (ref 0.2–1.2)
BILIRUB SERPL-MCNC: 0.3 MG/DL — SIGNIFICANT CHANGE UP (ref 0.2–1.2)
BILIRUB SERPL-MCNC: 0.3 MG/DL — SIGNIFICANT CHANGE UP (ref 0.2–1.2)
BUN SERPL-MCNC: 20 MG/DL — SIGNIFICANT CHANGE UP (ref 7–23)
BUN SERPL-MCNC: 20 MG/DL — SIGNIFICANT CHANGE UP (ref 7–23)
BUN SERPL-MCNC: 25 MG/DL — HIGH (ref 7–23)
CA-I BLDA-SCNC: 1.22 MMOL/L — SIGNIFICANT CHANGE UP (ref 1.15–1.33)
CA-I BLDA-SCNC: 1.22 MMOL/L — SIGNIFICANT CHANGE UP (ref 1.15–1.33)
CA-I BLDA-SCNC: 1.25 MMOL/L — SIGNIFICANT CHANGE UP (ref 1.15–1.33)
CALCIUM SERPL-MCNC: 8.5 MG/DL — SIGNIFICANT CHANGE UP (ref 8.4–10.5)
CALCIUM SERPL-MCNC: 8.8 MG/DL — SIGNIFICANT CHANGE UP (ref 8.4–10.5)
CALCIUM SERPL-MCNC: 9.2 MG/DL — SIGNIFICANT CHANGE UP (ref 8.4–10.5)
CHLORIDE SERPL-SCNC: 104 MMOL/L — SIGNIFICANT CHANGE UP (ref 96–108)
CHLORIDE SERPL-SCNC: 105 MMOL/L — SIGNIFICANT CHANGE UP (ref 96–108)
CHLORIDE SERPL-SCNC: 106 MMOL/L — SIGNIFICANT CHANGE UP (ref 96–108)
CO2 BLDA-SCNC: 22 MMOL/L — SIGNIFICANT CHANGE UP (ref 19–24)
CO2 BLDA-SCNC: 22 MMOL/L — SIGNIFICANT CHANGE UP (ref 19–24)
CO2 BLDA-SCNC: 24 MMOL/L — SIGNIFICANT CHANGE UP (ref 19–24)
CO2 SERPL-SCNC: 19 MMOL/L — LOW (ref 22–31)
CO2 SERPL-SCNC: 20 MMOL/L — LOW (ref 22–31)
CO2 SERPL-SCNC: 21 MMOL/L — LOW (ref 22–31)
COHGB MFR BLDA: 0.7 % — SIGNIFICANT CHANGE UP
COHGB MFR BLDA: 0.8 % — SIGNIFICANT CHANGE UP
COHGB MFR BLDA: 1.1 % — SIGNIFICANT CHANGE UP
CREAT SERPL-MCNC: 1.34 MG/DL — HIGH (ref 0.5–1.3)
CREAT SERPL-MCNC: 1.39 MG/DL — HIGH (ref 0.5–1.3)
CREAT SERPL-MCNC: 1.47 MG/DL — HIGH (ref 0.5–1.3)
EGFR: 49 ML/MIN/1.73M2 — LOW
EGFR: 53 ML/MIN/1.73M2 — LOW
EGFR: 55 ML/MIN/1.73M2 — LOW
GAS PNL BLDA: SIGNIFICANT CHANGE UP
GLUCOSE BLDA-MCNC: 105 MG/DL — HIGH (ref 70–99)
GLUCOSE BLDA-MCNC: 133 MG/DL — HIGH (ref 70–99)
GLUCOSE BLDA-MCNC: 144 MG/DL — HIGH (ref 70–99)
GLUCOSE BLDC GLUCOMTR-MCNC: 108 MG/DL — HIGH (ref 70–99)
GLUCOSE SERPL-MCNC: 123 MG/DL — HIGH (ref 70–99)
GLUCOSE SERPL-MCNC: 147 MG/DL — HIGH (ref 70–99)
GLUCOSE SERPL-MCNC: 168 MG/DL — HIGH (ref 70–99)
HCO3 BLDA-SCNC: 20 MMOL/L — LOW (ref 21–28)
HCO3 BLDA-SCNC: 21 MMOL/L — SIGNIFICANT CHANGE UP (ref 21–28)
HCO3 BLDA-SCNC: 23 MMOL/L — SIGNIFICANT CHANGE UP (ref 21–28)
HCT VFR BLD CALC: 34.5 % — LOW (ref 39–50)
HCT VFR BLD CALC: 37.8 % — LOW (ref 39–50)
HCT VFR BLD CALC: 38.3 % — LOW (ref 39–50)
HGB BLD-MCNC: 11.5 G/DL — LOW (ref 13–17)
HGB BLD-MCNC: 12.6 G/DL — LOW (ref 13–17)
HGB BLD-MCNC: 13.2 G/DL — SIGNIFICANT CHANGE UP (ref 13–17)
HGB BLDA-MCNC: 11.8 G/DL — LOW (ref 12.6–17.4)
HGB BLDA-MCNC: 11.9 G/DL — LOW (ref 12.6–17.4)
HGB BLDA-MCNC: 13 G/DL — SIGNIFICANT CHANGE UP (ref 12.6–17.4)
INR BLD: 1.13 — SIGNIFICANT CHANGE UP (ref 0.85–1.16)
INR BLD: 1.16 — SIGNIFICANT CHANGE UP (ref 0.85–1.16)
INR BLD: 1.19 — HIGH (ref 0.85–1.16)
ISTAT ARTERIAL BE: -3 MMOL/L — LOW (ref -2–3)
ISTAT ARTERIAL GLUCOSE: 138 MG/DL — HIGH (ref 70–99)
ISTAT ARTERIAL HCO3: 22 MMOL/L — SIGNIFICANT CHANGE UP (ref 22–26)
ISTAT ARTERIAL HEMATOCRIT: 33 % — LOW (ref 39–50)
ISTAT ARTERIAL HEMOGLOBIN: 11.2 G/DL — LOW (ref 13–17)
ISTAT ARTERIAL IONIZED CALCIUM: 1.23 MMOL/L — SIGNIFICANT CHANGE UP (ref 1.12–1.3)
ISTAT ARTERIAL PCO2: 40 MMHG — SIGNIFICANT CHANGE UP (ref 35–45)
ISTAT ARTERIAL PH: 7.35 — SIGNIFICANT CHANGE UP (ref 7.35–7.45)
ISTAT ARTERIAL PO2: 96 MMHG — SIGNIFICANT CHANGE UP (ref 80–105)
ISTAT ARTERIAL POTASSIUM: 4.6 MMOL/L — SIGNIFICANT CHANGE UP (ref 3.5–5.3)
ISTAT ARTERIAL SO2: 97 % — SIGNIFICANT CHANGE UP (ref 95–98)
ISTAT ARTERIAL SODIUM: 135 MMOL/L — SIGNIFICANT CHANGE UP (ref 135–145)
ISTAT ARTERIAL TCO2: 24 MMOL/L — SIGNIFICANT CHANGE UP (ref 22–31)
MAGNESIUM SERPL-MCNC: 1.9 MG/DL — SIGNIFICANT CHANGE UP (ref 1.6–2.6)
MAGNESIUM SERPL-MCNC: 1.9 MG/DL — SIGNIFICANT CHANGE UP (ref 1.6–2.6)
MAGNESIUM SERPL-MCNC: 2 MG/DL — SIGNIFICANT CHANGE UP (ref 1.6–2.6)
MCHC RBC-ENTMCNC: 29.8 PG — SIGNIFICANT CHANGE UP (ref 27–34)
MCHC RBC-ENTMCNC: 29.9 PG — SIGNIFICANT CHANGE UP (ref 27–34)
MCHC RBC-ENTMCNC: 30.8 PG — SIGNIFICANT CHANGE UP (ref 27–34)
MCHC RBC-ENTMCNC: 33.3 G/DL — SIGNIFICANT CHANGE UP (ref 32–36)
MCHC RBC-ENTMCNC: 33.3 G/DL — SIGNIFICANT CHANGE UP (ref 32–36)
MCHC RBC-ENTMCNC: 34.5 G/DL — SIGNIFICANT CHANGE UP (ref 32–36)
MCV RBC AUTO: 89.4 FL — SIGNIFICANT CHANGE UP (ref 80–100)
MCV RBC AUTO: 89.5 FL — SIGNIFICANT CHANGE UP (ref 80–100)
MCV RBC AUTO: 89.8 FL — SIGNIFICANT CHANGE UP (ref 80–100)
METHGB MFR BLDA: 0.7 % — SIGNIFICANT CHANGE UP
METHGB MFR BLDA: 0.8 % — SIGNIFICANT CHANGE UP
METHGB MFR BLDA: 1.2 % — SIGNIFICANT CHANGE UP
NRBC # BLD: 0 /100 WBCS — SIGNIFICANT CHANGE UP (ref 0–0)
OXYHGB MFR BLDA: 96.7 % — HIGH (ref 90–95)
OXYHGB MFR BLDA: 97.5 % — HIGH (ref 90–95)
OXYHGB MFR BLDA: 97.9 % — HIGH (ref 90–95)
PCO2 BLDA: 37 MMHG — SIGNIFICANT CHANGE UP (ref 35–48)
PCO2 BLDA: 40 MMHG — SIGNIFICANT CHANGE UP (ref 35–48)
PCO2 BLDA: 41 MMHG — SIGNIFICANT CHANGE UP (ref 35–48)
PH BLDA: 7.33 — LOW (ref 7.35–7.45)
PH BLDA: 7.35 — SIGNIFICANT CHANGE UP (ref 7.35–7.45)
PH BLDA: 7.35 — SIGNIFICANT CHANGE UP (ref 7.35–7.45)
PHOSPHATE SERPL-MCNC: 2.4 MG/DL — LOW (ref 2.5–4.5)
PHOSPHATE SERPL-MCNC: 3.7 MG/DL — SIGNIFICANT CHANGE UP (ref 2.5–4.5)
PHOSPHATE SERPL-MCNC: 3.8 MG/DL — SIGNIFICANT CHANGE UP (ref 2.5–4.5)
PLATELET # BLD AUTO: 200 K/UL — SIGNIFICANT CHANGE UP (ref 150–400)
PLATELET # BLD AUTO: 223 K/UL — SIGNIFICANT CHANGE UP (ref 150–400)
PLATELET # BLD AUTO: 240 K/UL — SIGNIFICANT CHANGE UP (ref 150–400)
PO2 BLDA: 389 MMHG — HIGH (ref 83–108)
PO2 BLDA: 456 MMHG — HIGH (ref 83–108)
PO2 BLDA: 96 MMHG — SIGNIFICANT CHANGE UP (ref 83–108)
POTASSIUM BLDA-SCNC: 4.2 MMOL/L — SIGNIFICANT CHANGE UP (ref 3.5–5.1)
POTASSIUM BLDA-SCNC: 4.3 MMOL/L — SIGNIFICANT CHANGE UP (ref 3.5–5.1)
POTASSIUM BLDA-SCNC: 4.6 MMOL/L — SIGNIFICANT CHANGE UP (ref 3.5–5.1)
POTASSIUM SERPL-MCNC: 4.2 MMOL/L — SIGNIFICANT CHANGE UP (ref 3.5–5.3)
POTASSIUM SERPL-MCNC: 4.6 MMOL/L — SIGNIFICANT CHANGE UP (ref 3.5–5.3)
POTASSIUM SERPL-MCNC: 4.8 MMOL/L — SIGNIFICANT CHANGE UP (ref 3.5–5.3)
POTASSIUM SERPL-SCNC: 4.2 MMOL/L — SIGNIFICANT CHANGE UP (ref 3.5–5.3)
POTASSIUM SERPL-SCNC: 4.6 MMOL/L — SIGNIFICANT CHANGE UP (ref 3.5–5.3)
POTASSIUM SERPL-SCNC: 4.8 MMOL/L — SIGNIFICANT CHANGE UP (ref 3.5–5.3)
PROT SERPL-MCNC: 6.3 G/DL — SIGNIFICANT CHANGE UP (ref 6–8.3)
PROT SERPL-MCNC: 7.2 G/DL — SIGNIFICANT CHANGE UP (ref 6–8.3)
PROT SERPL-MCNC: 7.3 G/DL — SIGNIFICANT CHANGE UP (ref 6–8.3)
PROTHROM AB SERPL-ACNC: 13.2 SEC — SIGNIFICANT CHANGE UP (ref 9.9–13.4)
PROTHROM AB SERPL-ACNC: 13.3 SEC — SIGNIFICANT CHANGE UP (ref 9.9–13.4)
PROTHROM AB SERPL-ACNC: 13.9 SEC — HIGH (ref 9.9–13.4)
RBC # BLD: 3.84 M/UL — LOW (ref 4.2–5.8)
RBC # BLD: 4.23 M/UL — SIGNIFICANT CHANGE UP (ref 4.2–5.8)
RBC # BLD: 4.28 M/UL — SIGNIFICANT CHANGE UP (ref 4.2–5.8)
RBC # FLD: 12.5 % — SIGNIFICANT CHANGE UP (ref 10.3–14.5)
RBC # FLD: 12.5 % — SIGNIFICANT CHANGE UP (ref 10.3–14.5)
RBC # FLD: 12.7 % — SIGNIFICANT CHANGE UP (ref 10.3–14.5)
SAO2 % BLDA: 98.6 % — HIGH (ref 94–98)
SAO2 % BLDA: 99.4 % — HIGH (ref 94–98)
SAO2 % BLDA: 99.5 % — HIGH (ref 94–98)
SODIUM BLDA-SCNC: 132 MMOL/L — LOW (ref 136–145)
SODIUM BLDA-SCNC: 132 MMOL/L — LOW (ref 136–145)
SODIUM BLDA-SCNC: 133 MMOL/L — LOW (ref 136–145)
SODIUM SERPL-SCNC: 134 MMOL/L — LOW (ref 135–145)
SODIUM SERPL-SCNC: 136 MMOL/L — SIGNIFICANT CHANGE UP (ref 135–145)
SODIUM SERPL-SCNC: 136 MMOL/L — SIGNIFICANT CHANGE UP (ref 135–145)
TROPONIN T, HIGH SENSITIVITY RESULT: 69 NG/L — CRITICAL HIGH (ref 0–51)
WBC # BLD: 10.58 K/UL — HIGH (ref 3.8–10.5)
WBC # BLD: 13.06 K/UL — HIGH (ref 3.8–10.5)
WBC # BLD: 6.91 K/UL — SIGNIFICANT CHANGE UP (ref 3.8–10.5)
WBC # FLD AUTO: 10.58 K/UL — HIGH (ref 3.8–10.5)
WBC # FLD AUTO: 13.06 K/UL — HIGH (ref 3.8–10.5)
WBC # FLD AUTO: 6.91 K/UL — SIGNIFICANT CHANGE UP (ref 3.8–10.5)

## 2024-10-31 PROCEDURE — 71045 X-RAY EXAM CHEST 1 VIEW: CPT | Mod: 26

## 2024-10-31 PROCEDURE — 76998 US GUIDE INTRAOP: CPT | Mod: 26,59

## 2024-10-31 PROCEDURE — 93010 ELECTROCARDIOGRAM REPORT: CPT

## 2024-10-31 PROCEDURE — 33533 CABG ARTERIAL SINGLE: CPT

## 2024-10-31 DEVICE — SHUNT VESSEL TAPR TIP 1.75MM 12MM SHAFT BX/10: Type: IMPLANTABLE DEVICE | Status: FUNCTIONAL

## 2024-10-31 DEVICE — SHUNT CORONARY VESSEL 1.5 MM TAPER TIP 12MM SHAFT: Type: IMPLANTABLE DEVICE | Status: FUNCTIONAL

## 2024-10-31 DEVICE — CATH SILICONE THORACIC 28FR ANGLED: Type: IMPLANTABLE DEVICE | Status: FUNCTIONAL

## 2024-10-31 DEVICE — KIT CVC 3LUM SPECTRUM 9FR: Type: IMPLANTABLE DEVICE | Status: FUNCTIONAL

## 2024-10-31 RX ORDER — ASPIRIN/MAG CARB/ALUMINUM AMIN 325 MG
81 TABLET ORAL DAILY
Refills: 0 | Status: DISCONTINUED | OUTPATIENT
Start: 2024-10-31 | End: 2024-11-08

## 2024-10-31 RX ORDER — DEXMEDETOMIDINE HYDROCHLORIDE 400 UG/100ML
0.1 INJECTION, SOLUTION INTRAVENOUS
Qty: 400 | Refills: 0 | Status: DISCONTINUED | OUTPATIENT
Start: 2024-10-31 | End: 2024-11-01

## 2024-10-31 RX ORDER — FINASTERIDE 5 MG/1
5 TABLET, FILM COATED ORAL DAILY
Refills: 0 | Status: DISCONTINUED | OUTPATIENT
Start: 2024-10-31 | End: 2024-11-08

## 2024-10-31 RX ORDER — POLYETHYLENE GLYCOL 3350 17 G/17G
17 POWDER, FOR SOLUTION ORAL DAILY
Refills: 0 | Status: DISCONTINUED | OUTPATIENT
Start: 2024-11-01 | End: 2024-11-08

## 2024-10-31 RX ORDER — MUPIROCIN 20 MG/G
1 OINTMENT TOPICAL
Refills: 0 | Status: DISCONTINUED | OUTPATIENT
Start: 2024-10-31 | End: 2024-11-08

## 2024-10-31 RX ORDER — OXYCODONE HYDROCHLORIDE 30 MG/1
5 TABLET ORAL EVERY 6 HOURS
Refills: 0 | Status: DISCONTINUED | OUTPATIENT
Start: 2024-10-31 | End: 2024-11-06

## 2024-10-31 RX ORDER — LIDOCAINE HCL 60 MG/3 ML
5 SYRINGE (ML) INJECTION ONCE
Refills: 0 | Status: COMPLETED | OUTPATIENT
Start: 2024-10-31 | End: 2024-10-31

## 2024-10-31 RX ORDER — TAMSULOSIN HCL 0.4 MG
0.4 CAPSULE ORAL AT BEDTIME
Refills: 0 | Status: DISCONTINUED | OUTPATIENT
Start: 2024-10-31 | End: 2024-11-08

## 2024-10-31 RX ORDER — POLYETHYLENE GLYCOL 3350 17 G/17G
17 POWDER, FOR SOLUTION ORAL DAILY
Refills: 0 | Status: DISCONTINUED | OUTPATIENT
Start: 2024-10-31 | End: 2024-11-08

## 2024-10-31 RX ORDER — OXYCODONE HYDROCHLORIDE 30 MG/1
10 TABLET ORAL EVERY 6 HOURS
Refills: 0 | Status: DISCONTINUED | OUTPATIENT
Start: 2024-10-31 | End: 2024-11-07

## 2024-10-31 RX ORDER — CLOPIDOGREL 75 MG/1
75 TABLET ORAL DAILY
Refills: 0 | Status: DISCONTINUED | OUTPATIENT
Start: 2024-10-31 | End: 2024-11-08

## 2024-10-31 RX ORDER — HYDROMORPHONE HCL/0.9% NACL/PF 6 MG/30 ML
0.5 PATIENT CONTROLLED ANALGESIA SYRINGE INTRAVENOUS ONCE
Refills: 0 | Status: DISCONTINUED | OUTPATIENT
Start: 2024-10-31 | End: 2024-10-31

## 2024-10-31 RX ORDER — PANTOPRAZOLE SODIUM 40 MG/1
40 TABLET, DELAYED RELEASE ORAL DAILY
Refills: 0 | Status: DISCONTINUED | OUTPATIENT
Start: 2024-10-31 | End: 2024-11-08

## 2024-10-31 RX ORDER — SODIUM PHOSPHATE, MONOBASIC, MONOHYDRATE AND SODIUM PHOSPHATE, DIBASIC ANHYDROUS 276; 142 MG/ML; MG/ML
15 INJECTION, SOLUTION INTRAVENOUS ONCE
Refills: 0 | Status: COMPLETED | OUTPATIENT
Start: 2024-10-31 | End: 2024-10-31

## 2024-10-31 RX ORDER — SENNA 187 MG
2 TABLET ORAL AT BEDTIME
Refills: 0 | Status: DISCONTINUED | OUTPATIENT
Start: 2024-11-01 | End: 2024-11-08

## 2024-10-31 RX ORDER — CEFAZOLIN SODIUM 1 G
2000 VIAL (EA) INJECTION EVERY 8 HOURS
Refills: 0 | Status: COMPLETED | OUTPATIENT
Start: 2024-10-31 | End: 2024-11-02

## 2024-10-31 RX ORDER — SODIUM CHLORIDE 9 MG/ML
1000 INJECTION, SOLUTION INTRAMUSCULAR; INTRAVENOUS; SUBCUTANEOUS
Refills: 0 | Status: DISCONTINUED | OUTPATIENT
Start: 2024-10-31 | End: 2024-11-08

## 2024-10-31 RX ORDER — GABAPENTIN 300 MG/1
100 CAPSULE ORAL EVERY 8 HOURS
Refills: 0 | Status: COMPLETED | OUTPATIENT
Start: 2024-10-31 | End: 2024-11-05

## 2024-10-31 RX ORDER — MAGNESIUM SULFATE IN 0.9% NACL 2 G/50 ML
2 INTRAVENOUS SOLUTION, PIGGYBACK (ML) INTRAVENOUS ONCE
Refills: 0 | Status: COMPLETED | OUTPATIENT
Start: 2024-10-31 | End: 2024-10-31

## 2024-10-31 RX ORDER — MORPHINE SULFATE 30 MG/1
2 TABLET, EXTENDED RELEASE ORAL EVERY 4 HOURS
Refills: 0 | Status: DISCONTINUED | OUTPATIENT
Start: 2024-10-31 | End: 2024-11-03

## 2024-10-31 RX ORDER — NICARDIPINE HCL 30 MG
5 CAPSULE, EXTENDED RELEASE ORAL
Qty: 40 | Refills: 0 | Status: DISCONTINUED | OUTPATIENT
Start: 2024-10-31 | End: 2024-11-01

## 2024-10-31 RX ORDER — HEPARIN SODIUM 10000 [USP'U]/ML
5000 INJECTION INTRAVENOUS; SUBCUTANEOUS EVERY 8 HOURS
Refills: 0 | Status: DISCONTINUED | OUTPATIENT
Start: 2024-10-31 | End: 2024-11-08

## 2024-10-31 RX ORDER — ASCORBIC ACID 500 MG
500 TABLET ORAL
Refills: 0 | Status: COMPLETED | OUTPATIENT
Start: 2024-10-31 | End: 2024-11-05

## 2024-10-31 RX ORDER — FENTANYL CITRAT/DEXTROSE 5%/PF 1250MCG/50
12.5 PATIENT CONTROLLED ANALGESIA SYRINGE INTRAVENOUS ONCE
Refills: 0 | Status: DISCONTINUED | OUTPATIENT
Start: 2024-10-31 | End: 2024-10-31

## 2024-10-31 RX ORDER — ACETAMINOPHEN 500 MG
1000 TABLET ORAL EVERY 6 HOURS
Refills: 0 | Status: COMPLETED | OUTPATIENT
Start: 2024-10-31 | End: 2024-11-01

## 2024-10-31 RX ORDER — INSULIN REG, HUM S-S BUFF 100/ML
1 VIAL (ML) INJECTION
Qty: 100 | Refills: 0 | Status: DISCONTINUED | OUTPATIENT
Start: 2024-10-31 | End: 2024-11-01

## 2024-10-31 RX ORDER — CHLORHEXIDINE GLUCONATE 40 MG/ML
15 SOLUTION TOPICAL EVERY 12 HOURS
Refills: 0 | Status: DISCONTINUED | OUTPATIENT
Start: 2024-10-31 | End: 2024-10-31

## 2024-10-31 RX ORDER — CHLORHEXIDINE GLUCONATE 40 MG/ML
1 SOLUTION TOPICAL DAILY
Refills: 0 | Status: DISCONTINUED | OUTPATIENT
Start: 2024-10-31 | End: 2024-11-08

## 2024-10-31 RX ADMIN — Medication 12.5 MICROGRAM(S): at 20:05

## 2024-10-31 RX ADMIN — CHLORHEXIDINE GLUCONATE 1 APPLICATION(S): 40 SOLUTION TOPICAL at 05:20

## 2024-10-31 RX ADMIN — Medication 1000 MILLIGRAM(S): at 13:36

## 2024-10-31 RX ADMIN — MORPHINE SULFATE 2 MILLIGRAM(S): 30 TABLET, EXTENDED RELEASE ORAL at 20:39

## 2024-10-31 RX ADMIN — SODIUM PHOSPHATE, MONOBASIC, MONOHYDRATE AND SODIUM PHOSPHATE, DIBASIC ANHYDROUS 62.5 MILLIMOLE(S): 276; 142 INJECTION, SOLUTION INTRAVENOUS at 07:00

## 2024-10-31 RX ADMIN — MORPHINE SULFATE 15 MILLIGRAM(S): 30 TABLET, EXTENDED RELEASE ORAL at 12:23

## 2024-10-31 RX ADMIN — Medication 5 MILLILITER(S): at 22:13

## 2024-10-31 RX ADMIN — OXYCODONE HYDROCHLORIDE 5 MILLIGRAM(S): 30 TABLET ORAL at 00:00

## 2024-10-31 RX ADMIN — HEPARIN SODIUM 10 UNIT(S)/HR: 10000 INJECTION INTRAVENOUS; SUBCUTANEOUS at 09:30

## 2024-10-31 RX ADMIN — Medication 100 MILLIGRAM(S): at 22:29

## 2024-10-31 RX ADMIN — HEPARIN SODIUM 5000 UNIT(S): 10000 INJECTION INTRAVENOUS; SUBCUTANEOUS at 22:29

## 2024-10-31 RX ADMIN — OXYCODONE HYDROCHLORIDE 5 MILLIGRAM(S): 30 TABLET ORAL at 03:27

## 2024-10-31 RX ADMIN — CHLORHEXIDINE GLUCONATE 12.5 MILLILITER(S): 40 SOLUTION TOPICAL at 07:00

## 2024-10-31 RX ADMIN — Medication 25 GRAM(S): at 05:20

## 2024-10-31 RX ADMIN — Medication 0.5 MILLIGRAM(S): at 22:00

## 2024-10-31 RX ADMIN — OXYCODONE HYDROCHLORIDE 5 MILLIGRAM(S): 30 TABLET ORAL at 09:27

## 2024-10-31 RX ADMIN — DEXMEDETOMIDINE HYDROCHLORIDE 1.73 MICROGRAM(S)/KG/HR: 400 INJECTION, SOLUTION INTRAVENOUS at 23:11

## 2024-10-31 RX ADMIN — CHLORHEXIDINE GLUCONATE 1 APPLICATION(S): 40 SOLUTION TOPICAL at 00:00

## 2024-10-31 RX ADMIN — MORPHINE SULFATE 2 MILLIGRAM(S): 30 TABLET, EXTENDED RELEASE ORAL at 00:19

## 2024-10-31 RX ADMIN — MUPIROCIN 1 APPLICATION(S): 20 OINTMENT TOPICAL at 05:21

## 2024-10-31 RX ADMIN — ISOSORBIDE MONONITRATE 30 MILLIGRAM(S): 60 TABLET, EXTENDED RELEASE ORAL at 11:23

## 2024-10-31 RX ADMIN — MORPHINE SULFATE 2 MILLIGRAM(S): 30 TABLET, EXTENDED RELEASE ORAL at 21:09

## 2024-10-31 RX ADMIN — OXYCODONE HYDROCHLORIDE 5 MILLIGRAM(S): 30 TABLET ORAL at 04:27

## 2024-10-31 RX ADMIN — OXYCODONE HYDROCHLORIDE 5 MILLIGRAM(S): 30 TABLET ORAL at 10:20

## 2024-10-31 RX ADMIN — MORPHINE SULFATE 15 MILLIGRAM(S): 30 TABLET, EXTENDED RELEASE ORAL at 11:23

## 2024-10-31 RX ADMIN — Medication 25 MILLIGRAM(S): at 05:20

## 2024-10-31 RX ADMIN — PANTOPRAZOLE SODIUM 40 MILLIGRAM(S): 40 TABLET, DELAYED RELEASE ORAL at 07:01

## 2024-10-31 RX ADMIN — Medication 1.5 MICROGRAM(S)/MIN: at 08:29

## 2024-10-31 RX ADMIN — Medication 81 MILLIGRAM(S): at 11:23

## 2024-10-31 RX ADMIN — Medication 0.5 MILLIGRAM(S): at 21:45

## 2024-10-31 RX ADMIN — SODIUM CHLORIDE 3 MILLILITER(S): 9 INJECTION, SOLUTION INTRAMUSCULAR; INTRAVENOUS; SUBCUTANEOUS at 05:40

## 2024-10-31 RX ADMIN — Medication 12.5 MICROGRAM(S): at 19:50

## 2024-10-31 RX ADMIN — MORPHINE SULFATE 2 MILLIGRAM(S): 30 TABLET, EXTENDED RELEASE ORAL at 00:04

## 2024-10-31 NOTE — CHART NOTE - NSCHARTNOTEFT_GEN_A_CORE
Admitting Diagnosis:   Patient is a 74y old  Male who presents with a chief complaint of CAD (30 Oct 2024 19:26)      PAST MEDICAL & SURGICAL HISTORY:  CAD (coronary artery disease)      BPH (benign prostatic hyperplasia)      HTN (hypertension)      HLD (hyperlipidemia)      PAD (peripheral artery disease)      S/P femoral-femoral bypass surgery          Current Nutrition Order:   Diet, NPO after Midnight:      NPO Start Date: 30-Oct-2024,   NPO Start Time: 23:59  Except Medications     Special Instructions for Nursing:  Except Medications (10-30-24 @ 18:36)      PO Intake: Good (%) [   ]  Fair (50-75%) [ x ] Poor (<25%) [   ]    GI Issues: no report of nausea, vomiting, diarrhea, constipation; last BM 10/30 per flowsheets     Pain: pt endorses chest and leg pain (10/10 pain per flowsheets)    Skin Integrity:  no pressure injuries or edema documented  noted with L leg venous ulcers  Geo score 20         10-30-24 @ 07:01  -  10-31-24 @ 07:00  --------------------------------------------------------  IN: 1135 mL / OUT: 920 mL / NET: 215 mL    10-31-24 @ 07:01  -  10-31-24 @ 14:38  --------------------------------------------------------  IN: 175 mL / OUT: 275 mL / NET: -100 mL        Labs:   10-31    136  |  106  |  25[H]  ----------------------------<  123[H]  4.2   |  19[L]  |  1.47[H]    Ca    9.2      31 Oct 2024 00:38  Phos  2.4     10-31  Mg     1.9     10-31    TPro  7.3  /  Alb  3.8  /  TBili  0.3  /  DBili  x   /  AST  36  /  ALT  37  /  AlkPhos  49  10-31    CAPILLARY BLOOD GLUCOSE      POCT Blood Glucose.: 108 mg/dL (31 Oct 2024 13:46)      Medications:  MEDICATIONS  (STANDING):  aspirin enteric coated 81 milliGRAM(s) Oral daily  atorvastatin 80 milliGRAM(s) Oral at bedtime  heparin  Infusion 1300 Unit(s)/Hr (10 mL/Hr) IV Continuous <Continuous>  influenza  Vaccine (HIGH DOSE) 0.5 milliLiter(s) IntraMuscular once  isosorbide   mononitrate ER Tablet (IMDUR) 30 milliGRAM(s) Oral daily  metoprolol tartrate 25 milliGRAM(s) Oral <User Schedule>  morphine ER Tablet 15 milliGRAM(s) Oral daily  mupirocin 2% Ointment 1 Application(s) Both Nostrils two times a day  nitroglycerin  Infusion 5 MICROgram(s)/Min (1.5 mL/Hr) IV Continuous <Continuous>  pantoprazole    Tablet 40 milliGRAM(s) Oral before breakfast  polyethylene glycol 3350 17 Gram(s) Oral daily  senna 2 Tablet(s) Oral at bedtime  sodium chloride 0.9% lock flush 3 milliLiter(s) IV Push every 8 hours  tamsulosin 0.4 milliGRAM(s) Oral at bedtime    MEDICATIONS  (PRN):  acetaminophen     Tablet .. 650 milliGRAM(s) Oral every 6 hours PRN Temp greater or equal to 38C (100.4F), Mild Pain (1 - 3)  oxyCODONE    IR 5 milliGRAM(s) Oral every 4 hours PRN Moderate Pain (4 - 6)      Anthropometrics:  Dosing height: 65in  Dosing weight: 69kg/152.1 pounds   BMI 25.3   pounds, %%    Weight Change: no new weights since admit, recommend nursing to obtain weekly weights; RD to monitor trends as able.     Estimated energy needs:   Weight used for calculations	current weight  Estimated Energy Needs Weight (lbs)	152 lb  Estimated Energy Needs Weight (kg)	68.9 kg  Estimated Energy Needs From (chana/kg)	25  Estimated Energy Needs To (chana/kg)	30  Estimated Energy Needs Calculated From (chana/kg)	1722  Estimated Energy Needs Calculated To (chana/kg)	2067  Weight used for calculations	current weight  Estimated Protein Needs Weight (lbs)	152 lb  Estimated Protein Needs Weight (kg)	68.9 kg  Estimated Protein Needs From (g/kg)	1.25  Estimated Protein Needs To (g/kg)	1.5  Estimated Protein Needs Calculated From (g/kg)	86.12  Estimated Protein Needs Calculated To (g/kg)	103.35  Estimated Fluid Needs Weight (lbs)	152 lb  Estimated Fluid Needs Weight (kg)	68.9 kg  Estimated Fluid Needs From (ml/kg)	30  Estimated Fluid Needs To (ml/kg)	35  Estimated Fluid Needs Calculated From (ml/kg)	2067  Estimated Fluid Needs Calculated To (ml/kg)	2411  Other Calculations	 pounds, %%  Pt within % IBW thus actual body weight used for all calculations. Needs adjusted for advanced age, nutrition optimization prior to OR, wound healing.    Subjective:   73 y/o male with PMHx of HTN, HLD, BPH, CAD (?s/p coronary stent per patient), severe PAD s/p fem-fem bypass per patient (?year) and an "abdominal stent for circulation problems".  Has not seen a doctor for his PAD in "a few years".  States he was having severe left leg pain and chest pain, prompting him to call an ambulance. He was taken to [facility] for evaluation and cardiac cath revealed CAD. Transferred here from  for surgical evaluation.    Pt seen for follow up. NPO this morning pending OR for MIDCAB today, previously ordered for DASH/TLC diet. Pt endorses "ok" appetite and intake, however endorses severe chest and leg pain- team aware, ordered for pain regimen. RD encouraged continued PO intake s/p OR as tolerated and discussed importance of adequate kcal/protein intake for wound healing. Pt verbalized understanding. Labs and medications reviewed. BUN/Cr 25/1.47<H>, Phos 2.4<L>- recommend to replete, glucose 103-123 x 24 hours. Ordered for protonix, miralax, senna. RD to remain available for additional nutrition interventions as needed.     Previous Nutrition Diagnosis: Increased Nutrient Needs... energy, protein related to increased physiological demand for nutrients as evidenced by nutrition optimization prior to OR, wound healing    Active [ x ]  Resolved [   ]    Goal: Pt to meet at least 75% of nutritional needs consistently     Recommendations:  1. As medically feasible, continue DASH/TLC diet with appropriate textures/consistencies   2. Encourage and monitor PO intake, honor preferences as able   3. Monitor wt trends, GI function, skin integrity  4. Monitor lytes, renal indices, blood glucose, LFTs    5. Pain and bowel regimen per team     Education: RD encouraged continued PO intake s/p OR as tolerated and discussed importance of adequate kcal/protein intake for wound healing. Pt verbalized understanding.     Risk Level: High [ x ] Moderate [   ] Low [   ]

## 2024-10-31 NOTE — PROCEDURE NOTE - ADDITIONAL PROCEDURE DETAILS
Urology Recommendations:    Maintain Catheter  Observe Urine Color and Volume  Continue patient's home BPH regimen per CT surgery team  Flush catheer PRN  Urology monitor urine color into tomorrow.     If any issues-page urology--thank you Urology Recommendations:    Maintain Catheter  Observe Urine Color and Volume  Continue patient's home BPH regimen per CT surgery team  Flush catheter PRN  Urology monitor urine color into tomorrow.     If any issues-page urology--thank you

## 2024-10-31 NOTE — PRE-OP CHECKLIST - SELECT TESTS ORDERED
CBC/CMP/PT/PTT/INR/Type and Screen/Urinalysis/EKG 108/CBC/CMP/PT/PTT/INR/Type and Screen/Urinalysis/EKG

## 2024-10-31 NOTE — BRIEF OPERATIVE NOTE - NSICDXBRIEFPROCEDURE_GEN_ALL_CORE_FT
PROCEDURES:  Minimally invasive direct coronary artery bypass (MIDCAB) with transesophageal echocardiography (RICARDO) 31-Oct-2024 18:29:15 midcab peres-lad EF 35% Luz Marina Roberto

## 2024-10-31 NOTE — PROCEDURE NOTE - NSPROCDETAILS_GEN_ALL_CORE
location identified, draped/prepped, sterile technique used, needle inserted/introduced/positive blood return obtained via catheter/connected to a pressurized flush line/sutured in place/hemostasis with direct pressure, dressing applied/Seldinger technique/all materials/supplies accounted for at end of procedure
sterile technique, indwelling urinary device inserted/a urinary catheter insertion kit was used for all insertion materials

## 2024-10-31 NOTE — PRE-ANESTHESIA EVALUATION ADULT - LAST ECHOCARDIOGRAM
10/28/24. report reviewed.  moderate LV dysfunction with RWMA, EF 35%.  NL RV.  dilated LA.  trace MR/TR

## 2024-10-31 NOTE — PRE-ANESTHESIA EVALUATION ADULT - NSANTHPMHFT_GEN_ALL_CORE
74-year-old male presenting for MIDCAB. History of HTN HLD BPH CAD (possible stents in the past) with abdominal stents and fem-fem bypass. LVEF 35% with normal RV function and no significant valvular lesions. Severe PVD, s/p fem-fem bypass approximately 20yrs ago

## 2024-10-31 NOTE — PRE-ANESTHESIA EVALUATION ADULT - NSANTHPROCED_GEN_ALL_CORE
Arterial Catheter/Central Venous Catheter/Transesophageal Echocardiogram/Regional Block left PAUL block/Arterial Catheter/Central Venous Catheter/Transesophageal Echocardiogram/Regional Block

## 2024-10-31 NOTE — PRE-ANESTHESIA EVALUATION ADULT - NSANTHADDINFOFT_GEN_ALL_CORE
Progress Note Adult-CT Surgery NP [Charted Location: [place] [id number]] [Authored: 30-Oct-2024 16:13]- for Visit: [id number], Complete, Entered, Signed in Full, Available to Patient    Progress Note:   · Provider Specialty	CT Surgery    Reason for Admission:    Reason for Admission:  · Reason for Admission	CAD      · Subjective and Objective:   Patient discussed on morning rounds with Dr. Devine    Pre-op MIDCAB    SUBJECTIVE ASSESSMENT:  74y Male assessed at bedside this AM. C/o worsening chest pressure and associated SOB, alleviated by SL nitro and persistent left leg pain. Nitro gtt initiated and chest pain improved. Patient ICU care on 9La. Denies n/v/fever/chills.         Vital Signs Last 24 Hrs  T(C): 37.2 (30 Oct 2024 14:00), Max: 37.2 (30 Oct 2024 14:00)  T(F): 99 (30 Oct 2024 14:00), Max: 99 (30 Oct 2024 14:00)  HR: 76 (30 Oct 2024 16:00) (64 - 79)  BP: 124/58 (30 Oct 2024 13:00) (107/55 - 139/65)  BP(mean): 84 (30 Oct 2024 13:00) (78 - 93)  RR: 16 (30 Oct 2024 16:00) (16 - 18)  SpO2: 98% (30 Oct 2024 16:00) (94% - 100%)    Parameters below as of 30 Oct 2024 16:00  Patient On (Oxygen Delivery Method): nasal cannula w/ humidification  O2 Flow (L/min): 2    I&O's Detail    29 Oct 2024 07:01  -  30 Oct 2024 07:00  --------------------------------------------------------  IN:    Heparin: 268 mL    Lactated Ringers Bolus: 450 mL    Oral Fluid: 900 mL  Total IN: 1618 mL    OUT:    Voided (mL): 1575 mL  Total OUT: 1575 mL    Total NET: 43 mL      30 Oct 2024 07:01  -  30 Oct 2024 16:15  --------------------------------------------------------  IN:    Heparin: 51 mL    Nitroglycerin: 6 mL    Oral Fluid: 240 mL  Total IN: 297 mL    OUT:    Voided (mL): 600 mL  Total OUT: 600 mL    Total NET: -303 mL      PHYSICAL EXAM:  Appearance: No acute distress.  Neurologic: AAOx3, no AMS or focal deficits.  Responds appropriately to verbal and physical stimuli; exhibits purposeful movement in all extremities.  HEENT:   MMM, PERRLA, EOMI	b/l  Neck: Supple  Cardiovascular: RRR, S1 S2. No m/r/g.  Respiratory: No acute respiratory distress. CTA b/l, no w/r/r.   Gastrointestinal:  Soft, non-tender, non-distended, + BS.	  Skin: No rashes. No ecchymoses. No cyanosis.  Extremities: LLE with scabbing. Exhibits normal range of motion, no clubbing, cyanosis or edema.  Vascular: Peripheral pulses present on doppler.     LABS:                        13.3   7.35  )-----------( 222      ( 30 Oct 2024 07:01 )             40.5       COUMADIN:  No    PT/INR - ( 30 Oct 2024 12:11 )   PT: 12.6 sec;   INR: 1.10          PTT - ( 30 Oct 2024 12:11 )  PTT:97.6 sec    10-30    138  |  106  |  29[H]  ----------------------------<  103[H]  4.8   |  23  |  1.74[H]    Ca    9.3      30 Oct 2024 07:01  Mg     2.0     10-30    TPro  7.2  /  Alb  3.8  /  TBili  0.3  /  DBili  x   /  AST  46[H]  /  ALT  41  /  AlkPhos  46  10-29      Urinalysis Basic - ( 30 Oct 2024 07:01 )    Color: x / Appearance: x / SG: x / pH: x  Gluc: 103 mg/dL / Ketone: x  / Bili: x / Urobili: x   Blood: x / Protein: x / Nitrite: x   Leuk Esterase: x / RBC: x / WBC x   Sq Epi: x / Non Sq Epi: x / Bacteria: x        MEDICATIONS  (STANDING):  aspirin enteric coated 81 milliGRAM(s) Oral daily  atorvastatin 80 milliGRAM(s) Oral at bedtime  heparin  Infusion 1300 Unit(s)/Hr (10 mL/Hr) IV Continuous <Continuous>  influenza  Vaccine (HIGH DOSE) 0.5 milliLiter(s) IntraMuscular once  isosorbide   mononitrate ER Tablet (IMDUR) 30 milliGRAM(s) Oral daily  metoprolol tartrate 25 milliGRAM(s) Oral <User Schedule>  morphine ER Tablet 15 milliGRAM(s) Oral daily  nitroglycerin  Infusion 5 MICROgram(s)/Min (1.5 mL/Hr) IV Continuous <Continuous>  pantoprazole    Tablet 40 milliGRAM(s) Oral before breakfast  polyethylene glycol 3350 17 Gram(s) Oral daily  senna 2 Tablet(s) Oral at bedtime  sodium chloride 0.9% lock flush 3 milliLiter(s) IV Push every 8 hours  tamsulosin 0.4 milliGRAM(s) Oral at bedtime    MEDICATIONS  (PRN):  acetaminophen     Tablet .. 650 milliGRAM(s) Oral every 6 hours PRN Temp greater or equal to 38C (100.4F), Mild Pain (1 - 3)  nitroglycerin     SubLingual 0.4 milliGRAM(s) SubLingual every 5 minutes PRN Chest Pain  oxyCODONE    IR 5 milliGRAM(s) Oral every 4 hours PRN Moderate Pain (4 - 6)        RADIOLOGY & ADDITIONAL TESTS:  < from: US Duplex Carotid Arteries Complete, Bilateral (10.27.24 @ 19:56) >    IMPRESSION: No significant hemodynamic stenosis of either carotid artery.    < end of copied text >  < from: Xray Chest 2 Views PA/Lat (10.27.24 @ 20:44) >  IMPRESSION:  No acutepathology.    < end of copied text >  < from: TTE Echo Complete w/ Contrast w/ Doppler (10.28.24 @ 11:01) >  CONCLUSIONS:     1. Moderately reduced left ventricular systolic function.   2. Basal and mid anterolateral wall, basal and mid inferior wall, and   basal and mid inferolateral wall are akinetic.   3. Normal right ventricular size and systolic function.   4. Dilated left atrium.   5. Aortic sclerosis without significant stenosis.   6. No evidence of pulmonary hypertension.   7. No pericardial effusion.    < end of copied text >          Assessment and Plan:   · Assessment	  73 y/o male, poor historian w/ PMHx of HTN, HLD, BPH, CAD (?s/p coronary stent per patient), severe PAD s/p fem-fem bypass per patient (?year) and an "abdominal stent for circulation problems".  Has not seen a doctor for his PAD in "a few years".  States he was having severe left leg pain and chest pain, prompting him to call an ambulance.  He was taken to [facility] for evaluation and cardiac cath revealed CAD. Transferred here from  for surgical evaluation. Given patient w/ pulmonary nodules noted on OSH CTA Chest patient underwent a PET scan which is significant for uptake to nodules. Given h/o aortic surgery in the past w/ ongoing LLE pain and non palpable distal pulses, vascular consulted & patient underwent a CTA A/P with runoff which revealed  a patent graft but significant disease. Patient had worsening CP today & was started on a nitro gtt with relief, mary placed and ICU care on 9La. Thoracic surgery consulted for lung nodule - no contraindication to cardiac surgery. Vascular is recommending an angiogram to further evaluate his PAD which will be postponed until after his MIDCAB tomorrow.     A/P:  Neurovascular: No delirium. Pain well controlled with current regimen.  -Pain: Morphine ER 15mg daily. PRN: tylenol prn, oxy 5mg prn    Cardiovascular: Hemodynamically stable. HR controlled.  -CAD  - C/w ASA daily, c/w Imdur 30mg daily, c/w Lopressor 25mg BID   - Nitro gtt for chest pain; monitor BP continuously with mary  - continue heparin gtt   - no ECG changes, troponin continues to downtrend   -PAD w/ unknown past vascular hx:   Vascular consulted, appreciate recs  - s/p CTA A/P w/ runoff with disease - plan for angiogram after MIDCAB  C/w ASA daily   -HLD: Lipitor 80mg nightly     Respiratory: 02 Sat = 98% on RA.  -Encourage C+DB and Use of IS 10x / hr while awake.  -CXR: no ptx, no pleural effusions   -Pulmonary Nodules: with uptake on PET scan  - Thoracic surgery consulted - plan for outpatient f/u, no contraindication to cardiac surgery     GI: Stable.  -PPX: pantoprazole   -PO Diet.  -Bowel Regimen: c/w Miralax, senna    Renal / :  -Monitor renal function: BUN 29, Cr 1.74  -Monitor I/O's.  -FELIPA: no reported hx of kidney disease, likely 2/2 contrast at OSH.   Continue to trend Cr/BUN  -BPH: c/w tamsulosin .4mg daily     Endocrine:    -A1c: 5.8%  -TSH: 1.730    Hematologic: stable  - h&h 13.3 & 40.5  - aptt 97.6 - hep gtt decreased, f/u 6pm aptt    ID:  -Tempature: afebrile   -CBC: WBC 7.35    Prophylaxis:  -DVT prophylaxis with therapeutic hep gtt   -SCD's    Disposition:  OR tomorrow for MIDCAB          Electronic Signatures:  Bell Crandall (ARIANNA)  (Signed 30-Oct-2024 16:24)  	Authored: Progress Note, Reason for Admission, Subjective and Objective, Assessment and Plan      Last Updated: 30-Oct-2024 16:24 by Bell Crandall (ARIANNA)

## 2024-10-31 NOTE — BRIEF OPERATIVE NOTE - COMMENTS
I first assisted for the entirety of the case, including but not limited to robotic instrumentation,  distal anastamoss, and closure.

## 2024-10-31 NOTE — PRE-ANESTHESIA EVALUATION ADULT - NSANTHPEFT_GEN_ALL_CORE
General: AAOx3, NAD  Eyes: The sclera and conjunctiva normal, pupils equal in size.  ENT: The ears and nose were normal in appearance; oropharynx clear, moist mucus membranes.  Neck: The appearance of the neck was normal, with no gross masses or nodules.  Respiratory: Unlabored, no retractions.  CV: RRR  Neurological: No focal deficit, moves all extremities.  Exercise Tolerance:  METS>4. alert, oriented x3.  bilateral breath sounds. regular heart rate

## 2024-11-01 VITALS — HEIGHT: 64.96 IN | WEIGHT: 152.12 LBS | BODY MASS INDEX: 25.34 KG/M2

## 2024-11-01 PROBLEM — Z86.79 H/O: HTN (HYPERTENSION): Status: RESOLVED | Noted: 2024-11-01 | Resolved: 2024-11-01

## 2024-11-01 PROBLEM — Z87.438 HISTORY OF BPH: Status: RESOLVED | Noted: 2024-11-01 | Resolved: 2024-11-01

## 2024-11-01 PROBLEM — Z87.898 FORMER CONSUMPTION OF ALCOHOL: Status: ACTIVE | Noted: 2024-11-01

## 2024-11-01 PROBLEM — Z09 POSTOP CHECK: Status: ACTIVE | Noted: 2024-11-01

## 2024-11-01 PROBLEM — I50.22 CHRONIC SYSTOLIC HEART FAILURE: Status: ACTIVE | Noted: 2024-11-01

## 2024-11-01 PROBLEM — Z95.1 S/P CABG X 1: Status: ACTIVE | Noted: 2024-11-01

## 2024-11-01 PROBLEM — Z86.79 HISTORY OF PERIPHERAL ARTERIAL DISEASE: Status: RESOLVED | Noted: 2024-11-01 | Resolved: 2024-11-01

## 2024-11-01 PROBLEM — Z87.891 FORMER SMOKER: Status: ACTIVE | Noted: 2024-11-01

## 2024-11-01 PROBLEM — I25.10 CAD IN NATIVE ARTERY: Status: ACTIVE | Noted: 2024-11-01

## 2024-11-01 PROBLEM — Z86.39 HISTORY OF HYPERLIPIDEMIA: Status: RESOLVED | Noted: 2024-11-01 | Resolved: 2024-11-01

## 2024-11-01 LAB
ALBUMIN SERPL ELPH-MCNC: 3.5 G/DL — SIGNIFICANT CHANGE UP (ref 3.3–5)
ALBUMIN SERPL ELPH-MCNC: 3.8 G/DL — SIGNIFICANT CHANGE UP (ref 3.3–5)
ALP SERPL-CCNC: 43 U/L — SIGNIFICANT CHANGE UP (ref 40–120)
ALP SERPL-CCNC: 43 U/L — SIGNIFICANT CHANGE UP (ref 40–120)
ALT FLD-CCNC: 24 U/L — SIGNIFICANT CHANGE UP (ref 10–45)
ALT FLD-CCNC: 31 U/L — SIGNIFICANT CHANGE UP (ref 10–45)
ANION GAP SERPL CALC-SCNC: 11 MMOL/L — SIGNIFICANT CHANGE UP (ref 5–17)
ANION GAP SERPL CALC-SCNC: 9 MMOL/L — SIGNIFICANT CHANGE UP (ref 5–17)
APTT BLD: 27.2 SEC — SIGNIFICANT CHANGE UP (ref 24.5–35.6)
AST SERPL-CCNC: 33 U/L — SIGNIFICANT CHANGE UP (ref 10–40)
AST SERPL-CCNC: 36 U/L — SIGNIFICANT CHANGE UP (ref 10–40)
BILIRUB SERPL-MCNC: 0.3 MG/DL — SIGNIFICANT CHANGE UP (ref 0.2–1.2)
BILIRUB SERPL-MCNC: 0.4 MG/DL — SIGNIFICANT CHANGE UP (ref 0.2–1.2)
BUN SERPL-MCNC: 20 MG/DL — SIGNIFICANT CHANGE UP (ref 7–23)
BUN SERPL-MCNC: 23 MG/DL — SIGNIFICANT CHANGE UP (ref 7–23)
CALCIUM SERPL-MCNC: 8.8 MG/DL — SIGNIFICANT CHANGE UP (ref 8.4–10.5)
CALCIUM SERPL-MCNC: 9 MG/DL — SIGNIFICANT CHANGE UP (ref 8.4–10.5)
CHLORIDE SERPL-SCNC: 103 MMOL/L — SIGNIFICANT CHANGE UP (ref 96–108)
CHLORIDE SERPL-SCNC: 105 MMOL/L — SIGNIFICANT CHANGE UP (ref 96–108)
CO2 SERPL-SCNC: 21 MMOL/L — LOW (ref 22–31)
CO2 SERPL-SCNC: 23 MMOL/L — SIGNIFICANT CHANGE UP (ref 22–31)
CREAT SERPL-MCNC: 1.34 MG/DL — HIGH (ref 0.5–1.3)
CREAT SERPL-MCNC: 1.45 MG/DL — HIGH (ref 0.5–1.3)
EGFR: 50 ML/MIN/1.73M2 — LOW
EGFR: 55 ML/MIN/1.73M2 — LOW
GAS PNL BLDA: SIGNIFICANT CHANGE UP
GLUCOSE BLDC GLUCOMTR-MCNC: 167 MG/DL — HIGH (ref 70–99)
GLUCOSE SERPL-MCNC: 166 MG/DL — HIGH (ref 70–99)
GLUCOSE SERPL-MCNC: 178 MG/DL — HIGH (ref 70–99)
HCT VFR BLD CALC: 36.9 % — LOW (ref 39–50)
HGB BLD-MCNC: 12.4 G/DL — LOW (ref 13–17)
INR BLD: 1.11 — SIGNIFICANT CHANGE UP (ref 0.85–1.16)
MAGNESIUM SERPL-MCNC: 2.6 MG/DL — SIGNIFICANT CHANGE UP (ref 1.6–2.6)
MCHC RBC-ENTMCNC: 30 PG — SIGNIFICANT CHANGE UP (ref 27–34)
MCHC RBC-ENTMCNC: 33.6 G/DL — SIGNIFICANT CHANGE UP (ref 32–36)
MCV RBC AUTO: 89.3 FL — SIGNIFICANT CHANGE UP (ref 80–100)
NRBC # BLD: 0 /100 WBCS — SIGNIFICANT CHANGE UP (ref 0–0)
PHOSPHATE SERPL-MCNC: 3.8 MG/DL — SIGNIFICANT CHANGE UP (ref 2.5–4.5)
PLATELET # BLD AUTO: 234 K/UL — SIGNIFICANT CHANGE UP (ref 150–400)
POTASSIUM SERPL-MCNC: 4.9 MMOL/L — SIGNIFICANT CHANGE UP (ref 3.5–5.3)
POTASSIUM SERPL-MCNC: 5.3 MMOL/L — SIGNIFICANT CHANGE UP (ref 3.5–5.3)
POTASSIUM SERPL-SCNC: 4.9 MMOL/L — SIGNIFICANT CHANGE UP (ref 3.5–5.3)
POTASSIUM SERPL-SCNC: 5.3 MMOL/L — SIGNIFICANT CHANGE UP (ref 3.5–5.3)
PROT SERPL-MCNC: 6.8 G/DL — SIGNIFICANT CHANGE UP (ref 6–8.3)
PROT SERPL-MCNC: 7.2 G/DL — SIGNIFICANT CHANGE UP (ref 6–8.3)
PROTHROM AB SERPL-ACNC: 13 SEC — SIGNIFICANT CHANGE UP (ref 9.9–13.4)
RBC # BLD: 4.13 M/UL — LOW (ref 4.2–5.8)
RBC # FLD: 12.6 % — SIGNIFICANT CHANGE UP (ref 10.3–14.5)
SODIUM SERPL-SCNC: 135 MMOL/L — SIGNIFICANT CHANGE UP (ref 135–145)
SODIUM SERPL-SCNC: 137 MMOL/L — SIGNIFICANT CHANGE UP (ref 135–145)
WBC # BLD: 16.32 K/UL — HIGH (ref 3.8–10.5)
WBC # FLD AUTO: 16.32 K/UL — HIGH (ref 3.8–10.5)

## 2024-11-01 PROCEDURE — 71045 X-RAY EXAM CHEST 1 VIEW: CPT | Mod: 26

## 2024-11-01 PROCEDURE — 99232 SBSQ HOSP IP/OBS MODERATE 35: CPT

## 2024-11-01 PROCEDURE — 71045 X-RAY EXAM CHEST 1 VIEW: CPT | Mod: 26,77

## 2024-11-01 RX ORDER — INSULIN LISPRO 100/ML
VIAL (ML) SUBCUTANEOUS
Refills: 0 | Status: DISCONTINUED | OUTPATIENT
Start: 2024-11-01 | End: 2024-11-08

## 2024-11-01 RX ORDER — METHYLPREDNISOLONE ACETATE 80 MG/ML
24 INJECTION, SUSPENSION INTRALESIONAL; INTRAMUSCULAR; INTRASYNOVIAL; SOFT TISSUE ONCE
Refills: 0 | Status: COMPLETED | OUTPATIENT
Start: 2024-11-01 | End: 2024-11-02

## 2024-11-01 RX ORDER — METHYLPREDNISOLONE ACETATE 80 MG/ML
4 INJECTION, SUSPENSION INTRALESIONAL; INTRAMUSCULAR; INTRASYNOVIAL; SOFT TISSUE
Refills: 0 | Status: DISCONTINUED | OUTPATIENT
Start: 2024-11-02 | End: 2024-11-02

## 2024-11-01 RX ORDER — INSULIN LISPRO 100/ML
VIAL (ML) SUBCUTANEOUS AT BEDTIME
Refills: 0 | Status: DISCONTINUED | OUTPATIENT
Start: 2024-11-01 | End: 2024-11-08

## 2024-11-01 RX ORDER — METHYLPREDNISOLONE ACETATE 80 MG/ML
INJECTION, SUSPENSION INTRALESIONAL; INTRAMUSCULAR; INTRASYNOVIAL; SOFT TISSUE
Refills: 0 | Status: DISCONTINUED | OUTPATIENT
Start: 2024-11-01 | End: 2024-11-02

## 2024-11-01 RX ORDER — MIDAZOLAM IN 5 % DEXTROSE/PF 15 MG/30ML
2 SYRINGE (ML) INTRAVENOUS ONCE
Refills: 0 | Status: DISCONTINUED | OUTPATIENT
Start: 2024-11-01 | End: 2024-11-01

## 2024-11-01 RX ORDER — METOPROLOL TARTRATE 50 MG
12.5 TABLET ORAL
Refills: 0 | Status: DISCONTINUED | OUTPATIENT
Start: 2024-11-01 | End: 2024-11-03

## 2024-11-01 RX ORDER — GLUCAGON INJECTION, SOLUTION 1 MG/.2ML
1 INJECTION, SOLUTION SUBCUTANEOUS ONCE
Refills: 0 | Status: DISCONTINUED | OUTPATIENT
Start: 2024-11-01 | End: 2024-11-08

## 2024-11-01 RX ORDER — METHYLPREDNISOLONE ACETATE 80 MG/ML
8 INJECTION, SUSPENSION INTRALESIONAL; INTRAMUSCULAR; INTRASYNOVIAL; SOFT TISSUE AT BEDTIME
Refills: 0 | Status: DISCONTINUED | OUTPATIENT
Start: 2024-11-02 | End: 2024-11-02

## 2024-11-01 RX ORDER — DEXMEDETOMIDINE HYDROCHLORIDE 400 UG/100ML
0.2 INJECTION, SOLUTION INTRAVENOUS
Qty: 400 | Refills: 0 | Status: DISCONTINUED | OUTPATIENT
Start: 2024-11-01 | End: 2024-11-03

## 2024-11-01 RX ORDER — NOREPINEPHRINE BITARTRATE 1 MG/ML
0.05 INJECTION, SOLUTION, CONCENTRATE INTRAVENOUS
Qty: 8 | Refills: 0 | Status: DISCONTINUED | OUTPATIENT
Start: 2024-11-01 | End: 2024-11-01

## 2024-11-01 RX ORDER — MAGNESIUM SULFATE IN 0.9% NACL 2 G/50 ML
2 INTRAVENOUS SOLUTION, PIGGYBACK (ML) INTRAVENOUS ONCE
Refills: 0 | Status: COMPLETED | OUTPATIENT
Start: 2024-11-01 | End: 2024-11-01

## 2024-11-01 RX ADMIN — CHLORHEXIDINE GLUCONATE 1 APPLICATION(S): 40 SOLUTION TOPICAL at 15:33

## 2024-11-01 RX ADMIN — Medication 1000 MILLIGRAM(S): at 12:26

## 2024-11-01 RX ADMIN — Medication 1000 MILLIGRAM(S): at 19:00

## 2024-11-01 RX ADMIN — Medication 1000 MILLIGRAM(S): at 06:20

## 2024-11-01 RX ADMIN — POLYETHYLENE GLYCOL 3350 17 GRAM(S): 17 POWDER, FOR SOLUTION ORAL at 11:14

## 2024-11-01 RX ADMIN — Medication 400 MILLIGRAM(S): at 05:50

## 2024-11-01 RX ADMIN — Medication 400 MILLIGRAM(S): at 18:30

## 2024-11-01 RX ADMIN — OXYCODONE HYDROCHLORIDE 10 MILLIGRAM(S): 30 TABLET ORAL at 23:00

## 2024-11-01 RX ADMIN — Medication 81 MILLIGRAM(S): at 11:14

## 2024-11-01 RX ADMIN — Medication 25 GRAM(S): at 01:06

## 2024-11-01 RX ADMIN — OXYCODONE HYDROCHLORIDE 10 MILLIGRAM(S): 30 TABLET ORAL at 07:47

## 2024-11-01 RX ADMIN — Medication 2 TABLET(S): at 21:13

## 2024-11-01 RX ADMIN — MUPIROCIN 1 APPLICATION(S): 20 OINTMENT TOPICAL at 05:51

## 2024-11-01 RX ADMIN — Medication 1000 MILLIGRAM(S): at 00:30

## 2024-11-01 RX ADMIN — HEPARIN SODIUM 5000 UNIT(S): 10000 INJECTION INTRAVENOUS; SUBCUTANEOUS at 21:14

## 2024-11-01 RX ADMIN — OXYCODONE HYDROCHLORIDE 10 MILLIGRAM(S): 30 TABLET ORAL at 21:13

## 2024-11-01 RX ADMIN — Medication 400 MILLIGRAM(S): at 00:12

## 2024-11-01 RX ADMIN — MORPHINE SULFATE 2 MILLIGRAM(S): 30 TABLET, EXTENDED RELEASE ORAL at 10:49

## 2024-11-01 RX ADMIN — GABAPENTIN 100 MILLIGRAM(S): 300 CAPSULE ORAL at 21:14

## 2024-11-01 RX ADMIN — Medication 500 MILLIGRAM(S): at 05:50

## 2024-11-01 RX ADMIN — Medication 100 MILLIGRAM(S): at 05:50

## 2024-11-01 RX ADMIN — Medication 100 MILLIGRAM(S): at 21:13

## 2024-11-01 RX ADMIN — Medication 0.4 MILLIGRAM(S): at 21:14

## 2024-11-01 RX ADMIN — Medication 400 MILLIGRAM(S): at 11:15

## 2024-11-01 RX ADMIN — PANTOPRAZOLE SODIUM 40 MILLIGRAM(S): 40 TABLET, DELAYED RELEASE ORAL at 11:14

## 2024-11-01 RX ADMIN — Medication 100 MILLIGRAM(S): at 15:37

## 2024-11-01 RX ADMIN — GABAPENTIN 100 MILLIGRAM(S): 300 CAPSULE ORAL at 15:36

## 2024-11-01 RX ADMIN — CLOPIDOGREL 75 MILLIGRAM(S): 75 TABLET ORAL at 11:14

## 2024-11-01 RX ADMIN — Medication 12.5 MILLIGRAM(S): at 18:30

## 2024-11-01 RX ADMIN — Medication 500 MILLIGRAM(S): at 18:30

## 2024-11-01 RX ADMIN — NOREPINEPHRINE BITARTRATE 6.47 MICROGRAM(S)/KG/MIN: 1 INJECTION, SOLUTION, CONCENTRATE INTRAVENOUS at 02:29

## 2024-11-01 RX ADMIN — Medication 80 MILLIGRAM(S): at 21:14

## 2024-11-01 RX ADMIN — FINASTERIDE 5 MILLIGRAM(S): 5 TABLET, FILM COATED ORAL at 11:15

## 2024-11-01 RX ADMIN — MORPHINE SULFATE 2 MILLIGRAM(S): 30 TABLET, EXTENDED RELEASE ORAL at 09:10

## 2024-11-01 RX ADMIN — HEPARIN SODIUM 5000 UNIT(S): 10000 INJECTION INTRAVENOUS; SUBCUTANEOUS at 05:51

## 2024-11-01 RX ADMIN — HEPARIN SODIUM 5000 UNIT(S): 10000 INJECTION INTRAVENOUS; SUBCUTANEOUS at 15:35

## 2024-11-01 RX ADMIN — GABAPENTIN 100 MILLIGRAM(S): 300 CAPSULE ORAL at 05:50

## 2024-11-01 NOTE — PROGRESS NOTE ADULT - ASSESSMENT
74M patient with PMHx HTN, HLD, BPH, CAD here for MIDCAB. Now POD 1--urology consulted ON last night for hematuria. 22Fr 3 way hematuria catheter placed in anticipation of significant bleeding given patient on ASA/Plavix but urine output with one clot in the prostate, likely from traumatic ramirez, then urine return was clear pink/strawberry. This AM 11/1 patient with slightly improving color of urine. still strawberry clear. No clots.     REcs:  Maintain ramirez catheter--TOV only after urology clearance--patient will need to be at ambulatory baseline, passing BMs, taking flomax  Observe UOP  Observe Urine quality  Flomax per CT surgery when ok from BP standpoint  Finasteride

## 2024-11-01 NOTE — PROGRESS NOTE ADULT - SUBJECTIVE AND OBJECTIVE BOX
INTERVAL HPI/OVERNIGHT EVENTS:    10/31: RobMidCAB  EF 35%    75yo Male Hx HTN, HLD, BPH, CAD, severe PAD - fem-fem bypass, "abd stent with claudication sxs prompting presentation to [place]    Cath: CAD.  Transferred to [facility]    to OR 10/31: no intraop bloodp; 1.6L IVF given  reported traumatic ramirez insertion - urology consulted - 3 way placed but no CBI ever started    no acute events reported overnight         ICU Vital Signs Last 24 Hrs  T(C): 37 (01 Nov 2024 16:58), Max: 37 (01 Nov 2024 01:01)  T(F): 98.6 (01 Nov 2024 16:58), Max: 98.6 (01 Nov 2024 01:01)  HR: 92 (01 Nov 2024 16:00) (66 - 92) sinus   BP: 151/67 (01 Nov 2024 16:00) (97/67 - 151/67)  BP(mean): 97 (01 Nov 2024 16:00) (75 - 97)  ABP: 118/41 (01 Nov 2024 07:00) (105/86 - 159/62)  ABP(mean): 62 (01 Nov 2024 07:00) (62 - 95)  RR: 17 (01 Nov 2024 16:00) (12 - 18)  SpO2: 96% (01 Nov 2024 16:00) (93% - 100%) 3L NC    Qtts: none    I&O's Summary    31 Oct 2024 07:01  -  01 Nov 2024 07:00  --------------------------------------------------------  IN: 500.3 mL / OUT: 2055 mL / NET: -1554.7 mL    01 Nov 2024 07:01  -  01 Nov 2024 17:14  --------------------------------------------------------  IN: 10 mL / OUT: 360 mL / NET: -350 mL    Physical Exam    Heart - regular (-)rub/gallop  Lungs - CTA anterior - no rhonchi/wheeze  Abd - (+)BS soft NTND (-)r/r/g  Ext -warm to touch no cyanosis/clubbing  Chest - incision site clean and dry   Neuro - alert/interactive; nonfocal   Skin - no rash    LABS:                        12.4   16.32 )-----------( 234      ( 01 Nov 2024 02:31 )             36.9     11-01    137  |  103  |  23  ----------------------------<  178[H]  4.9   |  23  |  1.45[H]    Ca    9.0      01 Nov 2024 13:33  Phos  3.8     11-01  Mg     2.6     11-01    TPro  7.2  /  Alb  3.8  /  TBili  0.3  /  DBili  x   /  AST  33  /  ALT  24  /  AlkPhos  43  11-01    PT/INR - ( 01 Nov 2024 02:31 )   PT: 13.0 sec;   INR: 1.11     PTT - ( 01 Nov 2024 02:31 )  PTT:27.2 sec    ABG - ( 01 Nov 2024 02:50 )  pH, Arterial: 7.36  pH, Blood: x     /  pCO2: 35    /  pO2: 187   / HCO3: 20    / Base Excess: -4.9  /  SaO2: 99.9      RADIOLOGY & ADDITIONAL STUDIES: reviewed     Patient with multiple medical problems/known vasculopath with claudication sxs now POD#1 RobMidCAB - doing well    1. CV  stable hemodynamics  remove mary  ASA/Plavix/statin   Medrol dose pack started   complete periop Abx prophylaxis     2. Pulm   titrate supplemental oxygen down to off as clinical scenario allows  incentive spirometry   ambulation with staff assist   mobilize as able     maintain glycemic control - 5.8  DVT and GI prophylaxis     d/w patient/staff and CTS

## 2024-11-01 NOTE — PROGRESS NOTE ADULT - SUBJECTIVE AND OBJECTIVE BOX
UROLOGY PROGRESS NOTE    SUBJECTIVE: Patient seen and examined bedside, ramirez draining clear strawberry red.  No clots.     aspirin enteric coated 81 milliGRAM(s) Oral daily  ceFAZolin   IVPB 2000 milliGRAM(s) IV Intermittent every 8 hours  clopidogrel Tablet 75 milliGRAM(s) Oral daily  heparin   Injectable 5000 Unit(s) SubCutaneous every 8 hours  niCARdipine Infusion 5 mG/Hr IV Continuous <Continuous>  norepinephrine Infusion 0.05 MICROgram(s)/kG/Min IV Continuous <Continuous>      Vital Signs Last 24 Hrs  T(C): 36.6 (01 Nov 2024 05:31), Max: 37 (01 Nov 2024 01:01)  T(F): 97.9 (01 Nov 2024 05:31), Max: 98.6 (01 Nov 2024 01:01)  HR: 75 (01 Nov 2024 06:00) (66 - 92)  BP: 111/54 (01 Nov 2024 06:00) (98/56 - 124/58)  BP(mean): 78 (01 Nov 2024 06:00) (68 - 84)  RR: 16 (01 Nov 2024 06:00) (15 - 18)  SpO2: 98% (01 Nov 2024 06:00) (94% - 100%)    Parameters below as of 01 Nov 2024 06:00  Patient On (Oxygen Delivery Method): nasal cannula w/ humidification  O2 Flow (L/min): 6    I&O's Detail    31 Oct 2024 07:01  -  01 Nov 2024 07:00  --------------------------------------------------------  IN:    Dexmedetomidine: 24.5 mL    Heparin: 55 mL    NiCARdipine: 150 mL    Nitroglycerin: 120 mL    Norepinephrine: 20.8 mL    sodium chloride 0.9%: 120 mL  Total IN: 490.3 mL    OUT:    Bulb (mL): 100 mL    Chest Tube (mL): 5 mL    Indwelling Catheter - Urethral (mL): 1360 mL    Voided (mL): 475 mL  Total OUT: 1940 mL    Total NET: -1449.7 mL          PHYSICAL EXAM    General: Uncomfortable, pain from cardiac surgery   : 22Fr 3 way ramirez draining clear strawberry urine-no clots          LABS:                          12.4   16.32 )-----------( 234      ( 01 Nov 2024 02:31 )             36.9     11-01    135  |  105  |  20  ----------------------------<  166[H]  5.3   |  21[L]  |  1.34[H]    Ca    8.8      01 Nov 2024 02:31  Phos  3.8     11-01  Mg     2.6     11-01    TPro  6.8  /  Alb  3.5  /  TBili  0.4  /  DBili  x   /  AST  36  /  ALT  31  /  AlkPhos  43  11-01    PT/INR - ( 01 Nov 2024 02:31 )   PT: 13.0 sec;   INR: 1.11          PTT - ( 01 Nov 2024 02:31 )  PTT:27.2 sec  Urinalysis Basic - ( 01 Nov 2024 02:31 )    Color: x / Appearance: x / SG: x / pH: x  Gluc: 166 mg/dL / Ketone: x  / Bili: x / Urobili: x   Blood: x / Protein: x / Nitrite: x   Leuk Esterase: x / RBC: x / WBC x   Sq Epi: x / Non Sq Epi: x / Bacteria: x            CULTURES:          RADIOLOGY & ADDITIONAL STUDIES:

## 2024-11-02 LAB
ALBUMIN SERPL ELPH-MCNC: 3.2 G/DL — LOW (ref 3.3–5)
ALBUMIN SERPL ELPH-MCNC: 3.3 G/DL — SIGNIFICANT CHANGE UP (ref 3.3–5)
ALBUMIN SERPL ELPH-MCNC: 3.6 G/DL — SIGNIFICANT CHANGE UP (ref 3.3–5)
ALP SERPL-CCNC: 39 U/L — LOW (ref 40–120)
ALP SERPL-CCNC: 43 U/L — SIGNIFICANT CHANGE UP (ref 40–120)
ALP SERPL-CCNC: 44 U/L — SIGNIFICANT CHANGE UP (ref 40–120)
ALT FLD-CCNC: 13 U/L — SIGNIFICANT CHANGE UP (ref 10–45)
ALT FLD-CCNC: 6 U/L — LOW (ref 10–45)
ALT FLD-CCNC: 9 U/L — LOW (ref 10–45)
ANION GAP SERPL CALC-SCNC: 11 MMOL/L — SIGNIFICANT CHANGE UP (ref 5–17)
ANION GAP SERPL CALC-SCNC: 8 MMOL/L — SIGNIFICANT CHANGE UP (ref 5–17)
ANION GAP SERPL CALC-SCNC: 9 MMOL/L — SIGNIFICANT CHANGE UP (ref 5–17)
APTT BLD: 26 SEC — SIGNIFICANT CHANGE UP (ref 24.5–35.6)
APTT BLD: 26.1 SEC — SIGNIFICANT CHANGE UP (ref 24.5–35.6)
APTT BLD: 28.4 SEC — SIGNIFICANT CHANGE UP (ref 24.5–35.6)
AST SERPL-CCNC: 21 U/L — SIGNIFICANT CHANGE UP (ref 10–40)
AST SERPL-CCNC: 23 U/L — SIGNIFICANT CHANGE UP (ref 10–40)
AST SERPL-CCNC: 25 U/L — SIGNIFICANT CHANGE UP (ref 10–40)
BASE EXCESS BLDV CALC-SCNC: -1.8 MMOL/L — SIGNIFICANT CHANGE UP (ref -2–3)
BILIRUB SERPL-MCNC: 0.2 MG/DL — SIGNIFICANT CHANGE UP (ref 0.2–1.2)
BUN SERPL-MCNC: 24 MG/DL — HIGH (ref 7–23)
CA-I SERPL-SCNC: 1.23 MMOL/L — SIGNIFICANT CHANGE UP (ref 1.15–1.33)
CALCIUM SERPL-MCNC: 8.6 MG/DL — SIGNIFICANT CHANGE UP (ref 8.4–10.5)
CALCIUM SERPL-MCNC: 8.6 MG/DL — SIGNIFICANT CHANGE UP (ref 8.4–10.5)
CALCIUM SERPL-MCNC: 8.9 MG/DL — SIGNIFICANT CHANGE UP (ref 8.4–10.5)
CHLORIDE SERPL-SCNC: 103 MMOL/L — SIGNIFICANT CHANGE UP (ref 96–108)
CHLORIDE SERPL-SCNC: 104 MMOL/L — SIGNIFICANT CHANGE UP (ref 96–108)
CHLORIDE SERPL-SCNC: 104 MMOL/L — SIGNIFICANT CHANGE UP (ref 96–108)
CO2 BLDV-SCNC: 25 MMOL/L — SIGNIFICANT CHANGE UP (ref 22–26)
CO2 SERPL-SCNC: 22 MMOL/L — SIGNIFICANT CHANGE UP (ref 22–31)
CREAT SERPL-MCNC: 1.48 MG/DL — HIGH (ref 0.5–1.3)
CREAT SERPL-MCNC: 1.55 MG/DL — HIGH (ref 0.5–1.3)
CREAT SERPL-MCNC: 1.64 MG/DL — HIGH (ref 0.5–1.3)
EGFR: 43 ML/MIN/1.73M2 — LOW
EGFR: 46 ML/MIN/1.73M2 — LOW
EGFR: 49 ML/MIN/1.73M2 — LOW
GAS PNL BLDV: 133 MMOL/L — LOW (ref 136–145)
GAS PNL BLDV: SIGNIFICANT CHANGE UP
GLUCOSE BLDC GLUCOMTR-MCNC: 112 MG/DL — HIGH (ref 70–99)
GLUCOSE BLDC GLUCOMTR-MCNC: 121 MG/DL — HIGH (ref 70–99)
GLUCOSE BLDC GLUCOMTR-MCNC: 199 MG/DL — HIGH (ref 70–99)
GLUCOSE SERPL-MCNC: 125 MG/DL — HIGH (ref 70–99)
GLUCOSE SERPL-MCNC: 128 MG/DL — HIGH (ref 70–99)
GLUCOSE SERPL-MCNC: 176 MG/DL — HIGH (ref 70–99)
HCO3 BLDV-SCNC: 24 MMOL/L — SIGNIFICANT CHANGE UP (ref 22–29)
HCT VFR BLD CALC: 32.6 % — LOW (ref 39–50)
HCT VFR BLD CALC: 35.1 % — LOW (ref 39–50)
HCT VFR BLD CALC: 36.4 % — LOW (ref 39–50)
HGB BLD-MCNC: 10.6 G/DL — LOW (ref 13–17)
HGB BLD-MCNC: 11.8 G/DL — LOW (ref 13–17)
HGB BLD-MCNC: 11.9 G/DL — LOW (ref 13–17)
INR BLD: 1.18 — HIGH (ref 0.85–1.16)
INR BLD: 1.21 — HIGH (ref 0.85–1.16)
INR BLD: 1.24 — HIGH (ref 0.85–1.16)
LACTATE SERPL-SCNC: 1 MMOL/L — SIGNIFICANT CHANGE UP (ref 0.5–2)
MAGNESIUM SERPL-MCNC: 1.9 MG/DL — SIGNIFICANT CHANGE UP (ref 1.6–2.6)
MAGNESIUM SERPL-MCNC: 2 MG/DL — SIGNIFICANT CHANGE UP (ref 1.6–2.6)
MAGNESIUM SERPL-MCNC: 2.1 MG/DL — SIGNIFICANT CHANGE UP (ref 1.6–2.6)
MCHC RBC-ENTMCNC: 30.3 PG — SIGNIFICANT CHANGE UP (ref 27–34)
MCHC RBC-ENTMCNC: 30.3 PG — SIGNIFICANT CHANGE UP (ref 27–34)
MCHC RBC-ENTMCNC: 31.4 PG — SIGNIFICANT CHANGE UP (ref 27–34)
MCHC RBC-ENTMCNC: 32.5 G/DL — SIGNIFICANT CHANGE UP (ref 32–36)
MCHC RBC-ENTMCNC: 32.7 G/DL — SIGNIFICANT CHANGE UP (ref 32–36)
MCHC RBC-ENTMCNC: 33.6 G/DL — SIGNIFICANT CHANGE UP (ref 32–36)
MCV RBC AUTO: 92.6 FL — SIGNIFICANT CHANGE UP (ref 80–100)
MCV RBC AUTO: 93.1 FL — SIGNIFICANT CHANGE UP (ref 80–100)
MCV RBC AUTO: 93.4 FL — SIGNIFICANT CHANGE UP (ref 80–100)
NRBC # BLD: 0 /100 WBCS — SIGNIFICANT CHANGE UP (ref 0–0)
PCO2 BLDV: 42 MMHG — SIGNIFICANT CHANGE UP (ref 42–55)
PH BLDV: 7.36 — SIGNIFICANT CHANGE UP (ref 7.32–7.43)
PHOSPHATE SERPL-MCNC: 2 MG/DL — LOW (ref 2.5–4.5)
PHOSPHATE SERPL-MCNC: 2.2 MG/DL — LOW (ref 2.5–4.5)
PHOSPHATE SERPL-MCNC: 2.8 MG/DL — SIGNIFICANT CHANGE UP (ref 2.5–4.5)
PLATELET # BLD AUTO: 174 K/UL — SIGNIFICANT CHANGE UP (ref 150–400)
PLATELET # BLD AUTO: 190 K/UL — SIGNIFICANT CHANGE UP (ref 150–400)
PLATELET # BLD AUTO: 214 K/UL — SIGNIFICANT CHANGE UP (ref 150–400)
PO2 BLDV: 34 MMHG — SIGNIFICANT CHANGE UP (ref 25–45)
POTASSIUM BLDV-SCNC: 4.4 MMOL/L — SIGNIFICANT CHANGE UP (ref 3.5–5.1)
POTASSIUM SERPL-MCNC: 4.3 MMOL/L — SIGNIFICANT CHANGE UP (ref 3.5–5.3)
POTASSIUM SERPL-MCNC: 4.4 MMOL/L — SIGNIFICANT CHANGE UP (ref 3.5–5.3)
POTASSIUM SERPL-MCNC: 4.6 MMOL/L — SIGNIFICANT CHANGE UP (ref 3.5–5.3)
POTASSIUM SERPL-SCNC: 4.3 MMOL/L — SIGNIFICANT CHANGE UP (ref 3.5–5.3)
POTASSIUM SERPL-SCNC: 4.4 MMOL/L — SIGNIFICANT CHANGE UP (ref 3.5–5.3)
POTASSIUM SERPL-SCNC: 4.6 MMOL/L — SIGNIFICANT CHANGE UP (ref 3.5–5.3)
PROT SERPL-MCNC: 6.3 G/DL — SIGNIFICANT CHANGE UP (ref 6–8.3)
PROT SERPL-MCNC: 6.7 G/DL — SIGNIFICANT CHANGE UP (ref 6–8.3)
PROT SERPL-MCNC: 6.9 G/DL — SIGNIFICANT CHANGE UP (ref 6–8.3)
PROTHROM AB SERPL-ACNC: 13.8 SEC — HIGH (ref 9.9–13.4)
PROTHROM AB SERPL-ACNC: 14.1 SEC — HIGH (ref 9.9–13.4)
PROTHROM AB SERPL-ACNC: 14.2 SEC — HIGH (ref 9.9–13.4)
RBC # BLD: 3.5 M/UL — LOW (ref 4.2–5.8)
RBC # BLD: 3.76 M/UL — LOW (ref 4.2–5.8)
RBC # BLD: 3.93 M/UL — LOW (ref 4.2–5.8)
RBC # FLD: 13 % — SIGNIFICANT CHANGE UP (ref 10.3–14.5)
RBC # FLD: 13 % — SIGNIFICANT CHANGE UP (ref 10.3–14.5)
RBC # FLD: 13.1 % — SIGNIFICANT CHANGE UP (ref 10.3–14.5)
SAO2 % BLDV: 62 % — LOW (ref 67–88)
SODIUM SERPL-SCNC: 134 MMOL/L — LOW (ref 135–145)
SODIUM SERPL-SCNC: 135 MMOL/L — SIGNIFICANT CHANGE UP (ref 135–145)
SODIUM SERPL-SCNC: 136 MMOL/L — SIGNIFICANT CHANGE UP (ref 135–145)
WBC # BLD: 11.15 K/UL — HIGH (ref 3.8–10.5)
WBC # BLD: 11.54 K/UL — HIGH (ref 3.8–10.5)
WBC # BLD: 12.47 K/UL — HIGH (ref 3.8–10.5)
WBC # FLD AUTO: 11.15 K/UL — HIGH (ref 3.8–10.5)
WBC # FLD AUTO: 11.54 K/UL — HIGH (ref 3.8–10.5)
WBC # FLD AUTO: 12.47 K/UL — HIGH (ref 3.8–10.5)

## 2024-11-02 PROCEDURE — 71045 X-RAY EXAM CHEST 1 VIEW: CPT | Mod: 26,76

## 2024-11-02 PROCEDURE — 99291 CRITICAL CARE FIRST HOUR: CPT

## 2024-11-02 RX ORDER — METHYLPREDNISOLONE ACETATE 80 MG/ML
4 INJECTION, SUSPENSION INTRALESIONAL; INTRAMUSCULAR; INTRASYNOVIAL; SOFT TISSUE
Refills: 0 | Status: COMPLETED | OUTPATIENT
Start: 2024-11-02 | End: 2024-11-04

## 2024-11-02 RX ORDER — ALBUMIN HUMAN 50 G/1000ML
250 SOLUTION INTRAVENOUS ONCE
Refills: 0 | Status: COMPLETED | OUTPATIENT
Start: 2024-11-02 | End: 2024-11-02

## 2024-11-02 RX ORDER — FUROSEMIDE 40 MG
20 TABLET ORAL ONCE
Refills: 0 | Status: COMPLETED | OUTPATIENT
Start: 2024-11-02 | End: 2024-11-02

## 2024-11-02 RX ORDER — METHYLPREDNISOLONE ACETATE 80 MG/ML
8 INJECTION, SUSPENSION INTRALESIONAL; INTRAMUSCULAR; INTRASYNOVIAL; SOFT TISSUE ONCE
Refills: 0 | Status: COMPLETED | OUTPATIENT
Start: 2024-11-02 | End: 2024-11-02

## 2024-11-02 RX ORDER — METHYLPREDNISOLONE ACETATE 80 MG/ML
4 INJECTION, SUSPENSION INTRALESIONAL; INTRAMUSCULAR; INTRASYNOVIAL; SOFT TISSUE AT BEDTIME
Refills: 0 | Status: COMPLETED | OUTPATIENT
Start: 2024-11-03 | End: 2024-11-05

## 2024-11-02 RX ORDER — METHYLPREDNISOLONE ACETATE 80 MG/ML
4 INJECTION, SUSPENSION INTRALESIONAL; INTRAMUSCULAR; INTRASYNOVIAL; SOFT TISSUE
Refills: 0 | Status: DISCONTINUED | OUTPATIENT
Start: 2024-11-02 | End: 2024-11-08

## 2024-11-02 RX ORDER — METHYLPREDNISOLONE ACETATE 80 MG/ML
4 INJECTION, SUSPENSION INTRALESIONAL; INTRAMUSCULAR; INTRASYNOVIAL; SOFT TISSUE
Refills: 0 | Status: COMPLETED | OUTPATIENT
Start: 2024-11-03 | End: 2024-11-03

## 2024-11-02 RX ORDER — ALBUMIN HUMAN 50 G/1000ML
50 SOLUTION INTRAVENOUS ONCE
Refills: 0 | Status: COMPLETED | OUTPATIENT
Start: 2024-11-02 | End: 2024-11-02

## 2024-11-02 RX ADMIN — FINASTERIDE 5 MILLIGRAM(S): 5 TABLET, FILM COATED ORAL at 12:28

## 2024-11-02 RX ADMIN — MUPIROCIN 1 APPLICATION(S): 20 OINTMENT TOPICAL at 07:05

## 2024-11-02 RX ADMIN — Medication 500 MILLIGRAM(S): at 06:27

## 2024-11-02 RX ADMIN — Medication 81 MILLIGRAM(S): at 12:26

## 2024-11-02 RX ADMIN — Medication 12.5 MILLIGRAM(S): at 18:56

## 2024-11-02 RX ADMIN — HEPARIN SODIUM 5000 UNIT(S): 10000 INJECTION INTRAVENOUS; SUBCUTANEOUS at 06:26

## 2024-11-02 RX ADMIN — HEPARIN SODIUM 5000 UNIT(S): 10000 INJECTION INTRAVENOUS; SUBCUTANEOUS at 22:38

## 2024-11-02 RX ADMIN — METHYLPREDNISOLONE ACETATE 8 MILLIGRAM(S): 80 INJECTION, SUSPENSION INTRALESIONAL; INTRAMUSCULAR; INTRASYNOVIAL; SOFT TISSUE at 22:53

## 2024-11-02 RX ADMIN — Medication 0.4 MILLIGRAM(S): at 22:38

## 2024-11-02 RX ADMIN — ALBUMIN HUMAN 50 MILLILITER(S): 50 SOLUTION INTRAVENOUS at 18:54

## 2024-11-02 RX ADMIN — Medication 500 MILLIGRAM(S): at 18:56

## 2024-11-02 RX ADMIN — OXYCODONE HYDROCHLORIDE 10 MILLIGRAM(S): 30 TABLET ORAL at 06:26

## 2024-11-02 RX ADMIN — OXYCODONE HYDROCHLORIDE 10 MILLIGRAM(S): 30 TABLET ORAL at 07:24

## 2024-11-02 RX ADMIN — MUPIROCIN 1 APPLICATION(S): 20 OINTMENT TOPICAL at 19:39

## 2024-11-02 RX ADMIN — GABAPENTIN 100 MILLIGRAM(S): 300 CAPSULE ORAL at 22:38

## 2024-11-02 RX ADMIN — ALBUMIN HUMAN 125 MILLILITER(S): 50 SOLUTION INTRAVENOUS at 16:27

## 2024-11-02 RX ADMIN — Medication 100 MILLIGRAM(S): at 06:27

## 2024-11-02 RX ADMIN — METHYLPREDNISOLONE ACETATE 24 MILLIGRAM(S): 80 INJECTION, SUSPENSION INTRALESIONAL; INTRAMUSCULAR; INTRASYNOVIAL; SOFT TISSUE at 14:53

## 2024-11-02 RX ADMIN — GABAPENTIN 100 MILLIGRAM(S): 300 CAPSULE ORAL at 14:54

## 2024-11-02 RX ADMIN — DEXMEDETOMIDINE HYDROCHLORIDE 3.45 MICROGRAM(S)/KG/HR: 400 INJECTION, SOLUTION INTRAVENOUS at 00:38

## 2024-11-02 RX ADMIN — Medication 80 MILLIGRAM(S): at 22:38

## 2024-11-02 RX ADMIN — GABAPENTIN 100 MILLIGRAM(S): 300 CAPSULE ORAL at 06:27

## 2024-11-02 RX ADMIN — HEPARIN SODIUM 5000 UNIT(S): 10000 INJECTION INTRAVENOUS; SUBCUTANEOUS at 14:53

## 2024-11-02 RX ADMIN — ALBUMIN HUMAN 125 MILLILITER(S): 50 SOLUTION INTRAVENOUS at 18:55

## 2024-11-02 RX ADMIN — OXYCODONE HYDROCHLORIDE 10 MILLIGRAM(S): 30 TABLET ORAL at 08:20

## 2024-11-02 RX ADMIN — METHYLPREDNISOLONE ACETATE 4 MILLIGRAM(S): 80 INJECTION, SUSPENSION INTRALESIONAL; INTRAMUSCULAR; INTRASYNOVIAL; SOFT TISSUE at 08:56

## 2024-11-02 RX ADMIN — MORPHINE SULFATE 2 MILLIGRAM(S): 30 TABLET, EXTENDED RELEASE ORAL at 00:35

## 2024-11-02 RX ADMIN — ALBUMIN HUMAN 125 MILLILITER(S): 50 SOLUTION INTRAVENOUS at 14:52

## 2024-11-02 RX ADMIN — PANTOPRAZOLE SODIUM 40 MILLIGRAM(S): 40 TABLET, DELAYED RELEASE ORAL at 12:26

## 2024-11-02 RX ADMIN — OXYCODONE HYDROCHLORIDE 10 MILLIGRAM(S): 30 TABLET ORAL at 15:41

## 2024-11-02 RX ADMIN — MORPHINE SULFATE 2 MILLIGRAM(S): 30 TABLET, EXTENDED RELEASE ORAL at 01:00

## 2024-11-02 RX ADMIN — OXYCODONE HYDROCHLORIDE 10 MILLIGRAM(S): 30 TABLET ORAL at 12:29

## 2024-11-02 RX ADMIN — DEXMEDETOMIDINE HYDROCHLORIDE 3.45 MICROGRAM(S)/KG/HR: 400 INJECTION, SOLUTION INTRAVENOUS at 19:00

## 2024-11-02 RX ADMIN — CLOPIDOGREL 75 MILLIGRAM(S): 75 TABLET ORAL at 12:26

## 2024-11-02 RX ADMIN — Medication 20 MILLIGRAM(S): at 18:55

## 2024-11-02 NOTE — CHART NOTE - NSCHARTNOTEFT_GEN_A_CORE
Pt seen and examined at bedside.   Minimal output from CTs.  No air leak appreciated with valsalva. CXR stable without pleural effusion. Per Dr. Pankaj borja drain was removed. A tie down was secured in place and an occlusive dressing applied. Pt tolerated the procedure well and remained HD stable.  Follow up CXR showed no obvious PTX, awaiting official read.

## 2024-11-02 NOTE — PROGRESS NOTE ADULT - SUBJECTIVE AND OBJECTIVE BOX
DAILY PROGRESS NOTE    S:  Patient examined this AM by vascular team. Patient Lower extremity wounds noted to be stable. He denied severe limb pain. Recovering well from MIDCAB.    O:     T(C): 37.1 (11-02-24 @ 05:25), Max: 37.3 (11-01-24 @ 20:59)  HR: 83 (11-02-24 @ 08:00) (70 - 93)  BP: 107/56 (11-02-24 @ 08:00) (94/53 - 151/67)  RR: 16 (11-02-24 @ 08:00) (12 - 18)  SpO2: 98% (11-02-24 @ 08:00) (93% - 98%)    Physical Exam:  General: NAD  Neck: Supple  Cardiovascular: Regular rate   Chest: Appropriate chest expansion. Sating well on room air  Abdomen: Non-tender, non-distended  Extremities: Left lower extremity necrotic eschars indicative of PAD.   Vascular: DP biphasic signals bilaterally. No PT signals    I/O (24h)    01 Nov 2024 07:01  -  02 Nov 2024 07:00  --------------------------------------------------------  IN:    IV PiggyBack: 250 mL    sodium chloride 0.9%: 10 mL  Total IN: 260 mL    OUT:    Bulb (mL): 50 mL    Chest Tube (mL): 10 mL    Indwelling Catheter - Urethral (mL): 1075 mL  Total OUT: 1135 mL    Total NET: -875 mL      02 Nov 2024 08:01  -  02 Nov 2024 09:19  --------------------------------------------------------  IN:  Total IN: 0 mL    OUT:    Indwelling Catheter - Urethral (mL): 50 mL  Total OUT: 50 mL    Total NET: -50 mL          Labs:     LABS:  cret                        11.8   11.54 )-----------( 190      ( 02 Nov 2024 03:08 )             35.1     11-02    134[L]  |  104  |  24[H]  ----------------------------<  128[H]  4.6   |  22  |  1.55[H]    Ca    8.6      02 Nov 2024 03:08  Phos  2.8     11-02  Mg     2.1     11-02    TPro  6.3  /  Alb  3.2[L]  /  TBili  0.2  /  DBili  x   /  AST  25  /  ALT  13  /  AlkPhos  44  11-02    PT/INR - ( 02 Nov 2024 03:08 )   PT: 14.2 sec;   INR: 1.24          PTT - ( 02 Nov 2024 03:08 )  PTT:26.0 sec          A/P:     This is a 75 y/o male with PMHx of HTN, HLD, BPH, CAD (?s/p coronary stent per patient), severe PAD s/p fem-fem bypass per patient (?year) and an "abdominal stent for circulation problems".  Has not seen a doctor for his PAD in "a few years".  States he was having severe left leg pain and chest pain, prompting him to call an ambulance.  He was taken to [facility] for evaluation and cardiac cath revealed CAD.  Transferred here from  for surgical evaluation.  Vascular surgery was consulted for evaluation of LLE for possible peripheral arterial disease but in the interim patient developed ACS requiring MIDCAB.    Plan & recommendations   - Will Defer LLE angiogram to outpatient planning while patient recovers from MIDCAB   - Follow-up with Dr. Valerio on discharge    - Vascular will sign-off at this time

## 2024-11-02 NOTE — PROGRESS NOTE ADULT - SUBJECTIVE AND OBJECTIVE BOX
CTICU  CRITICAL  CARE  attending     Hand off received 					   Pertinent clinical, laboratory, radiographic, hemodynamic, echocardiographic, respiratory data, microbiologic data and chart were reviewed and analyzed frequently throughout the course of the day and night  Patient seen and examined with CTS/ SH attending at bedside  Pt is a 74y , Male, HEALTH ISSUES - PROBLEM Dx:      , FAMILY HISTORY:  PAST MEDICAL & SURGICAL HISTORY:  CAD (coronary artery disease)      BPH (benign prostatic hyperplasia)      HTN (hypertension)      HLD (hyperlipidemia)      PAD (peripheral artery disease)      S/P femoral-femoral bypass surgery        Patient is a 74y old  Male who presents with a chief complaint of CAD (02 Nov 2024 09:18)      14 system review was unremarkable    Vital signs, hemodynamic and respiratory parameters were reviewed from the bedside nursing flowsheet.  ICU Vital Signs Last 24 Hrs  T(C): 36.4 (02 Nov 2024 21:30), Max: 37.1 (02 Nov 2024 05:25)  T(F): 97.5 (02 Nov 2024 21:30), Max: 98.8 (02 Nov 2024 05:25)  HR: 71 (02 Nov 2024 22:00) (71 - 94)  BP: 128/59 (02 Nov 2024 22:00) (91/52 - 138/70)  BP(mean): 85 (02 Nov 2024 22:00) (68 - 96)  ABP: --  ABP(mean): --  RR: 17 (02 Nov 2024 22:00) (13 - 18)  SpO2: 94% (02 Nov 2024 22:00) (92% - 98%)    O2 Parameters below as of 02 Nov 2024 22:00  Patient On (Oxygen Delivery Method): room air          Adult Advanced Hemodynamics Last 24 Hrs  CVP(mm Hg): 9 (02 Nov 2024 22:00) (9 - 12)  CVP(cm H2O): --  CO: --  CI: --  PA: --  PA(mean): --  PCWP: --  SVR: --  SVRI: --  PVR: --  PVRI: --, ABG - ( 01 Nov 2024 02:50 )  pH, Arterial: 7.36  pH, Blood: x     /  pCO2: 35    /  pO2: 187   / HCO3: 20    / Base Excess: -4.9  /  SaO2: 99.9                Intake and output was reviewed and the fluid balance was calculated  Daily     Daily   I&O's Summary    01 Nov 2024 07:01  -  02 Nov 2024 07:00  --------------------------------------------------------  IN: 260 mL / OUT: 1135 mL / NET: -875 mL    02 Nov 2024 08:01  -  02 Nov 2024 22:51  --------------------------------------------------------  IN: 889.1 mL / OUT: 1525 mL / NET: -635.9 mL        All lines and drain sites were assessed  Glycemic trend was reviewedCAPILLARY BLOOD GLUCOSE      POCT Blood Glucose.: 199 mg/dL (02 Nov 2024 21:28)    No acute change in mental status  Auscultation of the chest reveals equal bs  Abdomen is soft  Extremities are warm and well perfused  Wounds appear clean and unremarkable  Antibiotics are periop    labs  CBC Full  -  ( 02 Nov 2024 18:41 )  WBC Count : 11.15 K/uL  RBC Count : 3.50 M/uL  Hemoglobin : 10.6 g/dL  Hematocrit : 32.6 %  Platelet Count - Automated : 174 K/uL  Mean Cell Volume : 93.1 fl  Mean Cell Hemoglobin : 30.3 pg  Mean Cell Hemoglobin Concentration : 32.5 g/dL  Auto Neutrophil # : x  Auto Lymphocyte # : x  Auto Monocyte # : x  Auto Eosinophil # : x  Auto Basophil # : x  Auto Neutrophil % : x  Auto Lymphocyte % : x  Auto Monocyte % : x  Auto Eosinophil % : x  Auto Basophil % : x    11-02    136  |  103  |  24[H]  ----------------------------<  176[H]  4.3   |  22  |  1.48[H]    Ca    8.6      02 Nov 2024 18:41  Phos  2.0     11-02  Mg     1.9     11-02    TPro  6.7  /  Alb  3.6  /  TBili  0.2  /  DBili  x   /  AST  21  /  ALT  6[L]  /  AlkPhos  39[L]  11-02    PT/INR - ( 02 Nov 2024 18:41 )   PT: 14.1 sec;   INR: 1.21          PTT - ( 02 Nov 2024 18:41 )  PTT:26.1 sec  The current medications were reviewed   MEDICATIONS  (STANDING):  ascorbic acid 500 milliGRAM(s) Oral two times a day  aspirin enteric coated 81 milliGRAM(s) Oral daily  atorvastatin 80 milliGRAM(s) Oral at bedtime  chlorhexidine 2% Cloths 1 Application(s) Topical daily  clopidogrel Tablet 75 milliGRAM(s) Oral daily  dexMEDEtomidine Infusion 0.2 MICROgram(s)/kG/Hr (3.45 mL/Hr) IV Continuous <Continuous>  dextrose 5%. 1000 milliLiter(s) (100 mL/Hr) IV Continuous <Continuous>  dextrose 5%. 1000 milliLiter(s) (50 mL/Hr) IV Continuous <Continuous>  dextrose 50% Injectable 50 milliLiter(s) IV Push every 15 minutes  dextrose 50% Injectable 25 milliLiter(s) IV Push every 15 minutes  finasteride 5 milliGRAM(s) Oral daily  gabapentin 100 milliGRAM(s) Oral every 8 hours  glucagon  Injectable 1 milliGRAM(s) IntraMuscular once  heparin   Injectable 5000 Unit(s) SubCutaneous every 8 hours  insulin lispro (ADMELOG) corrective regimen sliding scale   SubCutaneous three times a day before meals  insulin lispro (ADMELOG) corrective regimen sliding scale   SubCutaneous at bedtime  methylPREDNISolone 4 milliGRAM(s) Oral after lunch  methylPREDNISolone 4 milliGRAM(s) Oral before breakfast  methylPREDNISolone 8 milliGRAM(s) Oral once  metoprolol tartrate 12.5 milliGRAM(s) Oral two times a day  mupirocin 2% Ointment 1 Application(s) Both Nostrils two times a day  pantoprazole    Tablet 40 milliGRAM(s) Oral daily  polyethylene glycol 3350 17 Gram(s) Oral daily  polyethylene glycol 3350 17 Gram(s) Oral daily  senna 2 Tablet(s) Oral at bedtime  sodium chloride 0.9%. 1000 milliLiter(s) (10 mL/Hr) IV Continuous <Continuous>  tamsulosin 0.4 milliGRAM(s) Oral at bedtime    MEDICATIONS  (PRN):  dextrose Oral Gel 15 Gram(s) Oral once PRN Blood Glucose LESS THAN 70 milliGRAM(s)/deciliter  morphine  - Injectable 2 milliGRAM(s) IV Push every 4 hours PRN For breakthrough pain  oxyCODONE    IR 10 milliGRAM(s) Oral every 6 hours PRN Severe Pain (7 - 10)  oxyCODONE    IR 5 milliGRAM(s) Oral every 6 hours PRN Moderate Pain (4 - 6)       PROBLEM LIST/ ASSESSMENT:  HEALTH ISSUES - PROBLEM Dx:      ,   Patient is a 74y old  Male who presents with a chief complaint of CAD (02 Nov 2024 09:18)     s/p cardiac surgery      expected post - op Hypovolemic shock - > 20% intravascular depletion will replete volume    Acute post operative pulmonary insufficiency ruled in due to hypoxemia, O2 sats < 91% on RA treated with HFNC      Acute kidney injury - creatinine > 0.3 due to combined prerenal and intrarenal factors can presume ATN      My plan includes :    volumize  close hemodynamic, ventilatory and drain monitoring and management per post op routine    Monitor for arrhythmias and monitor parameters for organ perfusion  beta blockade not administered due to hemodynamic instability and bradycardia  monitor neurologic status  Head of the bed should remain elevated to 45 deg .   chest PT and IS will be encouraged  monitor adequacy of oxygenation and ventilation and attempt to wean oxygen  antibiotic regimen will be tailored to the clinical, laboratory and microbiologic data  Nutritional goals will be met using po eventually , ensure adequate caloric intake and montior the same  Stress ulcer and VTE prophylaxis will be achieved    Glycemic control is satisfactory  Electrolytes have been repleted as necessary and wound care has been carried out. Pain control has been achieved.   agressive physical therapy and early mobility and ambulation goals will be met   The family was updated about the course and plan  CRITICAL CARE TIME Upon my evaluation, this patient had a high probability of imminent or life-threatening deterioration due to the above problems which required my direct attention, intervention, and personal management.  I have personally provided 150 minutes of critical care time exclusive of time spent on separately billable procedures. Time included review of laboratory data, radiology results, discussion with consultants, and monitoring for potential decompensation. Interventions were performed as documented abovepersonally provided by me  in evaluation and management, reassessments, review and interpretation of labs and x-rays, ventilator and hemodynamic management, formulating a plan and coordinating care: ___150___ MIN.  Time does not include procedural time.    CTICU ATTENDING     					    Ned Solares MD

## 2024-11-02 NOTE — PROVIDER CONTACT NOTE (MEDICATION) - SITUATION
pt s/p MIDCAB, receiving steroid taper, pt never received loading dose of 24 mg yesterday 11/1, per FRANCOISE Roberto, give loading dose today 11/2 (aware pt already received 4 mg post breakfast)

## 2024-11-03 LAB
ALBUMIN SERPL ELPH-MCNC: 3.8 G/DL — SIGNIFICANT CHANGE UP (ref 3.3–5)
ALBUMIN SERPL ELPH-MCNC: 4.2 G/DL — SIGNIFICANT CHANGE UP (ref 3.3–5)
ALBUMIN SERPL ELPH-MCNC: 4.5 G/DL — SIGNIFICANT CHANGE UP (ref 3.3–5)
ALP SERPL-CCNC: 38 U/L — LOW (ref 40–120)
ALP SERPL-CCNC: 41 U/L — SIGNIFICANT CHANGE UP (ref 40–120)
ALP SERPL-CCNC: 51 U/L — SIGNIFICANT CHANGE UP (ref 40–120)
ALT FLD-CCNC: 5 U/L — LOW (ref 10–45)
ALT FLD-CCNC: 6 U/L — LOW (ref 10–45)
ALT FLD-CCNC: 8 U/L — LOW (ref 10–45)
ANION GAP SERPL CALC-SCNC: 10 MMOL/L — SIGNIFICANT CHANGE UP (ref 5–17)
ANION GAP SERPL CALC-SCNC: 10 MMOL/L — SIGNIFICANT CHANGE UP (ref 5–17)
ANION GAP SERPL CALC-SCNC: 13 MMOL/L — SIGNIFICANT CHANGE UP (ref 5–17)
APTT BLD: 25 SEC — SIGNIFICANT CHANGE UP (ref 24.5–35.6)
APTT BLD: 25.8 SEC — SIGNIFICANT CHANGE UP (ref 24.5–35.6)
APTT BLD: 26.7 SEC — SIGNIFICANT CHANGE UP (ref 24.5–35.6)
AST SERPL-CCNC: 17 U/L — SIGNIFICANT CHANGE UP (ref 10–40)
AST SERPL-CCNC: 20 U/L — SIGNIFICANT CHANGE UP (ref 10–40)
AST SERPL-CCNC: 23 U/L — SIGNIFICANT CHANGE UP (ref 10–40)
BILIRUB SERPL-MCNC: 0.3 MG/DL — SIGNIFICANT CHANGE UP (ref 0.2–1.2)
BILIRUB SERPL-MCNC: 0.3 MG/DL — SIGNIFICANT CHANGE UP (ref 0.2–1.2)
BILIRUB SERPL-MCNC: 0.4 MG/DL — SIGNIFICANT CHANGE UP (ref 0.2–1.2)
BUN SERPL-MCNC: 26 MG/DL — HIGH (ref 7–23)
BUN SERPL-MCNC: 29 MG/DL — HIGH (ref 7–23)
BUN SERPL-MCNC: 31 MG/DL — HIGH (ref 7–23)
CALCIUM SERPL-MCNC: 9.3 MG/DL — SIGNIFICANT CHANGE UP (ref 8.4–10.5)
CALCIUM SERPL-MCNC: 9.3 MG/DL — SIGNIFICANT CHANGE UP (ref 8.4–10.5)
CALCIUM SERPL-MCNC: 9.8 MG/DL — SIGNIFICANT CHANGE UP (ref 8.4–10.5)
CHLORIDE SERPL-SCNC: 101 MMOL/L — SIGNIFICANT CHANGE UP (ref 96–108)
CHLORIDE SERPL-SCNC: 103 MMOL/L — SIGNIFICANT CHANGE UP (ref 96–108)
CHLORIDE SERPL-SCNC: 104 MMOL/L — SIGNIFICANT CHANGE UP (ref 96–108)
CO2 SERPL-SCNC: 24 MMOL/L — SIGNIFICANT CHANGE UP (ref 22–31)
CO2 SERPL-SCNC: 24 MMOL/L — SIGNIFICANT CHANGE UP (ref 22–31)
CO2 SERPL-SCNC: 25 MMOL/L — SIGNIFICANT CHANGE UP (ref 22–31)
CREAT SERPL-MCNC: 1.47 MG/DL — HIGH (ref 0.5–1.3)
CREAT SERPL-MCNC: 1.48 MG/DL — HIGH (ref 0.5–1.3)
CREAT SERPL-MCNC: 1.63 MG/DL — HIGH (ref 0.5–1.3)
EGFR: 43 ML/MIN/1.73M2 — LOW
EGFR: 49 ML/MIN/1.73M2 — LOW
EGFR: 49 ML/MIN/1.73M2 — LOW
GLUCOSE BLDC GLUCOMTR-MCNC: 135 MG/DL — HIGH (ref 70–99)
GLUCOSE BLDC GLUCOMTR-MCNC: 155 MG/DL — HIGH (ref 70–99)
GLUCOSE BLDC GLUCOMTR-MCNC: 162 MG/DL — HIGH (ref 70–99)
GLUCOSE BLDC GLUCOMTR-MCNC: 169 MG/DL — HIGH (ref 70–99)
GLUCOSE SERPL-MCNC: 114 MG/DL — HIGH (ref 70–99)
GLUCOSE SERPL-MCNC: 193 MG/DL — HIGH (ref 70–99)
GLUCOSE SERPL-MCNC: 200 MG/DL — HIGH (ref 70–99)
HCT VFR BLD CALC: 33.3 % — LOW (ref 39–50)
HCT VFR BLD CALC: 33.5 % — LOW (ref 39–50)
HCT VFR BLD CALC: 37.9 % — LOW (ref 39–50)
HGB BLD-MCNC: 10.8 G/DL — LOW (ref 13–17)
HGB BLD-MCNC: 11 G/DL — LOW (ref 13–17)
HGB BLD-MCNC: 12.3 G/DL — LOW (ref 13–17)
INR BLD: 1.21 — HIGH (ref 0.85–1.16)
INR BLD: 1.21 — HIGH (ref 0.85–1.16)
INR BLD: 1.25 — HIGH (ref 0.85–1.16)
LACTATE SERPL-SCNC: 1.6 MMOL/L — SIGNIFICANT CHANGE UP (ref 0.5–2)
MAGNESIUM SERPL-MCNC: 2.1 MG/DL — SIGNIFICANT CHANGE UP (ref 1.6–2.6)
MCHC RBC-ENTMCNC: 29.9 PG — SIGNIFICANT CHANGE UP (ref 27–34)
MCHC RBC-ENTMCNC: 30.2 PG — SIGNIFICANT CHANGE UP (ref 27–34)
MCHC RBC-ENTMCNC: 31.2 PG — SIGNIFICANT CHANGE UP (ref 27–34)
MCHC RBC-ENTMCNC: 32.2 G/DL — SIGNIFICANT CHANGE UP (ref 32–36)
MCHC RBC-ENTMCNC: 32.5 G/DL — SIGNIFICANT CHANGE UP (ref 32–36)
MCHC RBC-ENTMCNC: 33 G/DL — SIGNIFICANT CHANGE UP (ref 32–36)
MCV RBC AUTO: 92.2 FL — SIGNIFICANT CHANGE UP (ref 80–100)
MCV RBC AUTO: 93.6 FL — SIGNIFICANT CHANGE UP (ref 80–100)
MCV RBC AUTO: 94.3 FL — SIGNIFICANT CHANGE UP (ref 80–100)
NRBC # BLD: 0 /100 WBCS — SIGNIFICANT CHANGE UP (ref 0–0)
PHOSPHATE SERPL-MCNC: 1.6 MG/DL — LOW (ref 2.5–4.5)
PHOSPHATE SERPL-MCNC: 2.6 MG/DL — SIGNIFICANT CHANGE UP (ref 2.5–4.5)
PLATELET # BLD AUTO: 171 K/UL — SIGNIFICANT CHANGE UP (ref 150–400)
PLATELET # BLD AUTO: 189 K/UL — SIGNIFICANT CHANGE UP (ref 150–400)
PLATELET # BLD AUTO: 238 K/UL — SIGNIFICANT CHANGE UP (ref 150–400)
POTASSIUM SERPL-MCNC: 4.4 MMOL/L — SIGNIFICANT CHANGE UP (ref 3.5–5.3)
POTASSIUM SERPL-MCNC: 4.5 MMOL/L — SIGNIFICANT CHANGE UP (ref 3.5–5.3)
POTASSIUM SERPL-MCNC: 4.5 MMOL/L — SIGNIFICANT CHANGE UP (ref 3.5–5.3)
POTASSIUM SERPL-SCNC: 4.4 MMOL/L — SIGNIFICANT CHANGE UP (ref 3.5–5.3)
POTASSIUM SERPL-SCNC: 4.5 MMOL/L — SIGNIFICANT CHANGE UP (ref 3.5–5.3)
POTASSIUM SERPL-SCNC: 4.5 MMOL/L — SIGNIFICANT CHANGE UP (ref 3.5–5.3)
PROT SERPL-MCNC: 7 G/DL — SIGNIFICANT CHANGE UP (ref 6–8.3)
PROT SERPL-MCNC: 7.2 G/DL — SIGNIFICANT CHANGE UP (ref 6–8.3)
PROT SERPL-MCNC: 8.1 G/DL — SIGNIFICANT CHANGE UP (ref 6–8.3)
PROTHROM AB SERPL-ACNC: 13.9 SEC — HIGH (ref 9.9–13.4)
PROTHROM AB SERPL-ACNC: 14.1 SEC — HIGH (ref 9.9–13.4)
PROTHROM AB SERPL-ACNC: 14.3 SEC — HIGH (ref 9.9–13.4)
RBC # BLD: 3.53 M/UL — LOW (ref 4.2–5.8)
RBC # BLD: 3.58 M/UL — LOW (ref 4.2–5.8)
RBC # BLD: 4.11 M/UL — LOW (ref 4.2–5.8)
RBC # FLD: 13 % — SIGNIFICANT CHANGE UP (ref 10.3–14.5)
RBC # FLD: 13.1 % — SIGNIFICANT CHANGE UP (ref 10.3–14.5)
RBC # FLD: 13.2 % — SIGNIFICANT CHANGE UP (ref 10.3–14.5)
SODIUM SERPL-SCNC: 135 MMOL/L — SIGNIFICANT CHANGE UP (ref 135–145)
SODIUM SERPL-SCNC: 138 MMOL/L — SIGNIFICANT CHANGE UP (ref 135–145)
SODIUM SERPL-SCNC: 141 MMOL/L — SIGNIFICANT CHANGE UP (ref 135–145)
WBC # BLD: 10.9 K/UL — HIGH (ref 3.8–10.5)
WBC # BLD: 13.21 K/UL — HIGH (ref 3.8–10.5)
WBC # BLD: 17.49 K/UL — HIGH (ref 3.8–10.5)
WBC # FLD AUTO: 10.9 K/UL — HIGH (ref 3.8–10.5)
WBC # FLD AUTO: 13.21 K/UL — HIGH (ref 3.8–10.5)
WBC # FLD AUTO: 17.49 K/UL — HIGH (ref 3.8–10.5)

## 2024-11-03 PROCEDURE — 71045 X-RAY EXAM CHEST 1 VIEW: CPT | Mod: 26

## 2024-11-03 PROCEDURE — 71045 X-RAY EXAM CHEST 1 VIEW: CPT | Mod: 26,77

## 2024-11-03 RX ORDER — ACETAMINOPHEN 500 MG
650 TABLET ORAL EVERY 6 HOURS
Refills: 0 | Status: DISCONTINUED | OUTPATIENT
Start: 2024-11-03 | End: 2024-11-07

## 2024-11-03 RX ORDER — METOPROLOL TARTRATE 50 MG
5 TABLET ORAL ONCE
Refills: 0 | Status: COMPLETED | OUTPATIENT
Start: 2024-11-03 | End: 2024-11-03

## 2024-11-03 RX ORDER — ALBUMIN HUMAN 50 G/1000ML
250 SOLUTION INTRAVENOUS ONCE
Refills: 0 | Status: COMPLETED | OUTPATIENT
Start: 2024-11-03 | End: 2024-11-03

## 2024-11-03 RX ORDER — ISOSORBIDE MONONITRATE 60 MG/1
30 TABLET, EXTENDED RELEASE ORAL DAILY
Refills: 0 | Status: DISCONTINUED | OUTPATIENT
Start: 2024-11-03 | End: 2024-11-08

## 2024-11-03 RX ORDER — METOPROLOL TARTRATE 50 MG
12.5 TABLET ORAL EVERY 6 HOURS
Refills: 0 | Status: DISCONTINUED | OUTPATIENT
Start: 2024-11-03 | End: 2024-11-08

## 2024-11-03 RX ORDER — SODIUM CHLORIDE 9 MG/ML
3 INJECTION, SOLUTION INTRAMUSCULAR; INTRAVENOUS; SUBCUTANEOUS EVERY 8 HOURS
Refills: 0 | Status: DISCONTINUED | OUTPATIENT
Start: 2024-11-03 | End: 2024-11-08

## 2024-11-03 RX ORDER — NITROGLYCERIN 0.6MG/HR
0.4 PATCH, TRANSDERMAL 24 HOURS TRANSDERMAL
Refills: 0 | Status: DISCONTINUED | OUTPATIENT
Start: 2024-11-03 | End: 2024-11-08

## 2024-11-03 RX ADMIN — Medication 5 MILLIGRAM(S): at 17:21

## 2024-11-03 RX ADMIN — HEPARIN SODIUM 5000 UNIT(S): 10000 INJECTION INTRAVENOUS; SUBCUTANEOUS at 13:58

## 2024-11-03 RX ADMIN — CHLORHEXIDINE GLUCONATE 1 APPLICATION(S): 40 SOLUTION TOPICAL at 06:57

## 2024-11-03 RX ADMIN — METHYLPREDNISOLONE ACETATE 4 MILLIGRAM(S): 80 INJECTION, SUSPENSION INTRALESIONAL; INTRAMUSCULAR; INTRASYNOVIAL; SOFT TISSUE at 17:30

## 2024-11-03 RX ADMIN — Medication 500 MILLIGRAM(S): at 07:21

## 2024-11-03 RX ADMIN — ISOSORBIDE MONONITRATE 30 MILLIGRAM(S): 60 TABLET, EXTENDED RELEASE ORAL at 17:11

## 2024-11-03 RX ADMIN — MUPIROCIN 1 APPLICATION(S): 20 OINTMENT TOPICAL at 06:58

## 2024-11-03 RX ADMIN — HEPARIN SODIUM 5000 UNIT(S): 10000 INJECTION INTRAVENOUS; SUBCUTANEOUS at 21:13

## 2024-11-03 RX ADMIN — HEPARIN SODIUM 5000 UNIT(S): 10000 INJECTION INTRAVENOUS; SUBCUTANEOUS at 07:16

## 2024-11-03 RX ADMIN — FINASTERIDE 5 MILLIGRAM(S): 5 TABLET, FILM COATED ORAL at 11:05

## 2024-11-03 RX ADMIN — METHYLPREDNISOLONE ACETATE 4 MILLIGRAM(S): 80 INJECTION, SUSPENSION INTRALESIONAL; INTRAMUSCULAR; INTRASYNOVIAL; SOFT TISSUE at 07:22

## 2024-11-03 RX ADMIN — OXYCODONE HYDROCHLORIDE 10 MILLIGRAM(S): 30 TABLET ORAL at 09:30

## 2024-11-03 RX ADMIN — OXYCODONE HYDROCHLORIDE 10 MILLIGRAM(S): 30 TABLET ORAL at 08:22

## 2024-11-03 RX ADMIN — OXYCODONE HYDROCHLORIDE 10 MILLIGRAM(S): 30 TABLET ORAL at 15:12

## 2024-11-03 RX ADMIN — SODIUM CHLORIDE 3 MILLILITER(S): 9 INJECTION, SOLUTION INTRAMUSCULAR; INTRAVENOUS; SUBCUTANEOUS at 21:24

## 2024-11-03 RX ADMIN — OXYCODONE HYDROCHLORIDE 10 MILLIGRAM(S): 30 TABLET ORAL at 17:21

## 2024-11-03 RX ADMIN — GABAPENTIN 100 MILLIGRAM(S): 300 CAPSULE ORAL at 07:16

## 2024-11-03 RX ADMIN — Medication 500 MILLIGRAM(S): at 17:30

## 2024-11-03 RX ADMIN — ALBUMIN HUMAN 125 MILLILITER(S): 50 SOLUTION INTRAVENOUS at 19:09

## 2024-11-03 RX ADMIN — Medication 2: at 11:16

## 2024-11-03 RX ADMIN — MORPHINE SULFATE 2 MILLIGRAM(S): 30 TABLET, EXTENDED RELEASE ORAL at 07:00

## 2024-11-03 RX ADMIN — METHYLPREDNISOLONE ACETATE 4 MILLIGRAM(S): 80 INJECTION, SUSPENSION INTRALESIONAL; INTRAMUSCULAR; INTRASYNOVIAL; SOFT TISSUE at 13:58

## 2024-11-03 RX ADMIN — MUPIROCIN 1 APPLICATION(S): 20 OINTMENT TOPICAL at 17:34

## 2024-11-03 RX ADMIN — GABAPENTIN 100 MILLIGRAM(S): 300 CAPSULE ORAL at 13:58

## 2024-11-03 RX ADMIN — Medication 80 MILLIGRAM(S): at 21:13

## 2024-11-03 RX ADMIN — Medication 12.5 MILLIGRAM(S): at 23:42

## 2024-11-03 RX ADMIN — Medication 81 MILLIGRAM(S): at 11:05

## 2024-11-03 RX ADMIN — Medication 0.4 MILLIGRAM(S): at 21:13

## 2024-11-03 RX ADMIN — METHYLPREDNISOLONE ACETATE 4 MILLIGRAM(S): 80 INJECTION, SUSPENSION INTRALESIONAL; INTRAMUSCULAR; INTRASYNOVIAL; SOFT TISSUE at 21:13

## 2024-11-03 RX ADMIN — SODIUM CHLORIDE 3 MILLILITER(S): 9 INJECTION, SOLUTION INTRAMUSCULAR; INTRAVENOUS; SUBCUTANEOUS at 13:49

## 2024-11-03 RX ADMIN — Medication 12.5 MILLIGRAM(S): at 07:22

## 2024-11-03 RX ADMIN — PANTOPRAZOLE SODIUM 40 MILLIGRAM(S): 40 TABLET, DELAYED RELEASE ORAL at 11:06

## 2024-11-03 RX ADMIN — Medication 2: at 07:31

## 2024-11-03 RX ADMIN — Medication 0.4 MILLIGRAM(S): at 17:25

## 2024-11-03 RX ADMIN — OXYCODONE HYDROCHLORIDE 10 MILLIGRAM(S): 30 TABLET ORAL at 21:13

## 2024-11-03 RX ADMIN — MORPHINE SULFATE 2 MILLIGRAM(S): 30 TABLET, EXTENDED RELEASE ORAL at 08:10

## 2024-11-03 RX ADMIN — GABAPENTIN 100 MILLIGRAM(S): 300 CAPSULE ORAL at 21:13

## 2024-11-03 RX ADMIN — OXYCODONE HYDROCHLORIDE 10 MILLIGRAM(S): 30 TABLET ORAL at 21:30

## 2024-11-03 RX ADMIN — CLOPIDOGREL 75 MILLIGRAM(S): 75 TABLET ORAL at 11:05

## 2024-11-04 LAB
ANION GAP SERPL CALC-SCNC: 13 MMOL/L — SIGNIFICANT CHANGE UP (ref 5–17)
BUN SERPL-MCNC: 32 MG/DL — HIGH (ref 7–23)
CALCIUM SERPL-MCNC: 9.4 MG/DL — SIGNIFICANT CHANGE UP (ref 8.4–10.5)
CHLORIDE SERPL-SCNC: 104 MMOL/L — SIGNIFICANT CHANGE UP (ref 96–108)
CO2 SERPL-SCNC: 23 MMOL/L — SIGNIFICANT CHANGE UP (ref 22–31)
CREAT SERPL-MCNC: 1.42 MG/DL — HIGH (ref 0.5–1.3)
EGFR: 51 ML/MIN/1.73M2 — LOW
GLUCOSE BLDC GLUCOMTR-MCNC: 125 MG/DL — HIGH (ref 70–99)
GLUCOSE BLDC GLUCOMTR-MCNC: 134 MG/DL — HIGH (ref 70–99)
GLUCOSE SERPL-MCNC: 143 MG/DL — HIGH (ref 70–99)
HCT VFR BLD CALC: 36.4 % — LOW (ref 39–50)
HGB BLD-MCNC: 11.7 G/DL — LOW (ref 13–17)
MAGNESIUM SERPL-MCNC: 2 MG/DL — SIGNIFICANT CHANGE UP (ref 1.6–2.6)
MCHC RBC-ENTMCNC: 30.3 PG — SIGNIFICANT CHANGE UP (ref 27–34)
MCHC RBC-ENTMCNC: 32.1 G/DL — SIGNIFICANT CHANGE UP (ref 32–36)
MCV RBC AUTO: 94.3 FL — SIGNIFICANT CHANGE UP (ref 80–100)
NRBC # BLD: 0 /100 WBCS — SIGNIFICANT CHANGE UP (ref 0–0)
PLATELET # BLD AUTO: 257 K/UL — SIGNIFICANT CHANGE UP (ref 150–400)
POTASSIUM SERPL-MCNC: 4.8 MMOL/L — SIGNIFICANT CHANGE UP (ref 3.5–5.3)
POTASSIUM SERPL-SCNC: 4.8 MMOL/L — SIGNIFICANT CHANGE UP (ref 3.5–5.3)
RBC # BLD: 3.86 M/UL — LOW (ref 4.2–5.8)
RBC # FLD: 13 % — SIGNIFICANT CHANGE UP (ref 10.3–14.5)
SODIUM SERPL-SCNC: 140 MMOL/L — SIGNIFICANT CHANGE UP (ref 135–145)
WBC # BLD: 15.65 K/UL — HIGH (ref 3.8–10.5)
WBC # FLD AUTO: 15.65 K/UL — HIGH (ref 3.8–10.5)

## 2024-11-04 PROCEDURE — 71046 X-RAY EXAM CHEST 2 VIEWS: CPT | Mod: 26

## 2024-11-04 RX ADMIN — Medication 500 MILLIGRAM(S): at 06:08

## 2024-11-04 RX ADMIN — MUPIROCIN 1 APPLICATION(S): 20 OINTMENT TOPICAL at 06:30

## 2024-11-04 RX ADMIN — Medication 12.5 MILLIGRAM(S): at 06:07

## 2024-11-04 RX ADMIN — METHYLPREDNISOLONE ACETATE 4 MILLIGRAM(S): 80 INJECTION, SUSPENSION INTRALESIONAL; INTRAMUSCULAR; INTRASYNOVIAL; SOFT TISSUE at 22:35

## 2024-11-04 RX ADMIN — CHLORHEXIDINE GLUCONATE 1 APPLICATION(S): 40 SOLUTION TOPICAL at 06:30

## 2024-11-04 RX ADMIN — Medication 0.4 MILLIGRAM(S): at 22:35

## 2024-11-04 RX ADMIN — OXYCODONE HYDROCHLORIDE 10 MILLIGRAM(S): 30 TABLET ORAL at 04:21

## 2024-11-04 RX ADMIN — Medication 650 MILLIGRAM(S): at 13:07

## 2024-11-04 RX ADMIN — SODIUM CHLORIDE 3 MILLILITER(S): 9 INJECTION, SOLUTION INTRAMUSCULAR; INTRAVENOUS; SUBCUTANEOUS at 06:30

## 2024-11-04 RX ADMIN — ISOSORBIDE MONONITRATE 30 MILLIGRAM(S): 60 TABLET, EXTENDED RELEASE ORAL at 11:24

## 2024-11-04 RX ADMIN — FINASTERIDE 5 MILLIGRAM(S): 5 TABLET, FILM COATED ORAL at 11:22

## 2024-11-04 RX ADMIN — Medication 12.5 MILLIGRAM(S): at 17:46

## 2024-11-04 RX ADMIN — Medication 650 MILLIGRAM(S): at 14:07

## 2024-11-04 RX ADMIN — OXYCODONE HYDROCHLORIDE 10 MILLIGRAM(S): 30 TABLET ORAL at 12:24

## 2024-11-04 RX ADMIN — MUPIROCIN 1 APPLICATION(S): 20 OINTMENT TOPICAL at 18:23

## 2024-11-04 RX ADMIN — OXYCODONE HYDROCHLORIDE 10 MILLIGRAM(S): 30 TABLET ORAL at 11:24

## 2024-11-04 RX ADMIN — GABAPENTIN 100 MILLIGRAM(S): 300 CAPSULE ORAL at 06:08

## 2024-11-04 RX ADMIN — HEPARIN SODIUM 5000 UNIT(S): 10000 INJECTION INTRAVENOUS; SUBCUTANEOUS at 22:35

## 2024-11-04 RX ADMIN — GABAPENTIN 100 MILLIGRAM(S): 300 CAPSULE ORAL at 22:35

## 2024-11-04 RX ADMIN — Medication 2: at 17:27

## 2024-11-04 RX ADMIN — Medication 80 MILLIGRAM(S): at 22:36

## 2024-11-04 RX ADMIN — OXYCODONE HYDROCHLORIDE 10 MILLIGRAM(S): 30 TABLET ORAL at 17:47

## 2024-11-04 RX ADMIN — CLOPIDOGREL 75 MILLIGRAM(S): 75 TABLET ORAL at 11:24

## 2024-11-04 RX ADMIN — GABAPENTIN 100 MILLIGRAM(S): 300 CAPSULE ORAL at 13:07

## 2024-11-04 RX ADMIN — SODIUM CHLORIDE 3 MILLILITER(S): 9 INJECTION, SOLUTION INTRAMUSCULAR; INTRAVENOUS; SUBCUTANEOUS at 14:21

## 2024-11-04 RX ADMIN — Medication 12.5 MILLIGRAM(S): at 11:22

## 2024-11-04 RX ADMIN — METHYLPREDNISOLONE ACETATE 4 MILLIGRAM(S): 80 INJECTION, SUSPENSION INTRALESIONAL; INTRAMUSCULAR; INTRASYNOVIAL; SOFT TISSUE at 12:30

## 2024-11-04 RX ADMIN — OXYCODONE HYDROCHLORIDE 10 MILLIGRAM(S): 30 TABLET ORAL at 18:47

## 2024-11-04 RX ADMIN — OXYCODONE HYDROCHLORIDE 10 MILLIGRAM(S): 30 TABLET ORAL at 03:21

## 2024-11-04 RX ADMIN — SODIUM CHLORIDE 3 MILLILITER(S): 9 INJECTION, SOLUTION INTRAMUSCULAR; INTRAVENOUS; SUBCUTANEOUS at 21:37

## 2024-11-04 RX ADMIN — Medication 500 MILLIGRAM(S): at 17:48

## 2024-11-04 RX ADMIN — PANTOPRAZOLE SODIUM 40 MILLIGRAM(S): 40 TABLET, DELAYED RELEASE ORAL at 11:23

## 2024-11-04 RX ADMIN — POLYETHYLENE GLYCOL 3350 17 GRAM(S): 17 POWDER, FOR SOLUTION ORAL at 11:22

## 2024-11-04 RX ADMIN — HEPARIN SODIUM 5000 UNIT(S): 10000 INJECTION INTRAVENOUS; SUBCUTANEOUS at 13:06

## 2024-11-04 RX ADMIN — HEPARIN SODIUM 5000 UNIT(S): 10000 INJECTION INTRAVENOUS; SUBCUTANEOUS at 06:07

## 2024-11-04 RX ADMIN — Medication 81 MILLIGRAM(S): at 11:25

## 2024-11-04 NOTE — PROGRESS NOTE ADULT - SUBJECTIVE AND OBJECTIVE BOX
Patient discussed on morning rounds with Dr. Hartman     Operation / Date:   S/p MIDCAB (LIMA-LAD), EF 35% (10/31/24)    SUBJECTIVE ASSESSMENT:  74y Male noting continued LLE pain, unchanged from baseline and also noting some incisional L Chest pain. Patient notes he has not had BM since surgery but denies abd pain/v/d, notes he is tolerating foods well. denies fever, chills, sob.     Vital Signs Last 24 Hrs  T(C): 36.8 (04 Nov 2024 16:55), Max: 36.9 (04 Nov 2024 01:01)  T(F): 98.3 (04 Nov 2024 16:55), Max: 98.5 (04 Nov 2024 01:01)  HR: 84 (04 Nov 2024 17:33) (74 - 98)  BP: 121/56 (04 Nov 2024 17:33) (107/52 - 162/77)  BP(mean): 81 (04 Nov 2024 17:33) (75 - 110)  RR: 18 (04 Nov 2024 17:33) (12 - 20)  SpO2: 95% (04 Nov 2024 17:33) (92% - 100%)    Parameters below as of 04 Nov 2024 17:33  Patient On (Oxygen Delivery Method): nasal cannula w/ humidification  O2 Flow (L/min): 2    I&O's Detail    03 Nov 2024 07:01  -  04 Nov 2024 07:00  --------------------------------------------------------  IN:    Dexmedetomidine: 7 mL    Oral Fluid: 380 mL  Total IN: 387 mL    OUT:    Indwelling Catheter - Urethral (mL): 130 mL    Voided (mL): 500 mL  Total OUT: 630 mL    Total NET: -243 mL      04 Nov 2024 07:01  -  04 Nov 2024 17:57  --------------------------------------------------------  IN:    Oral Fluid: 500 mL  Total IN: 500 mL    OUT:    Voided (mL): 500 mL  Total OUT: 500 mL    Total NET: 0 mL    CHEST TUBE:  No.   THIEN DRAIN:  No.  EPICARDIAL WIRES: No.  TIE DOWNS: Yes  GRULLON:No.    PHYSICAL EXAM:  Appearance: No acute distress.  Neurologic: AAOx3, no AMS or focal deficits.  Responds appropriately to verbal and physical stimuli; exhibits purposeful movement in all extremities.  Cardiovascular: RRR, S1 S2. No m/r/g.  Respiratory: No acute respiratory distress. CTA b/l, no w/r/r.   Gastrointestinal:  Soft, non-tender, non-distended, + BS.	  Skin: No rashes. No ecchymoses. No cyanosis.  Extremities: +LLE with scabbed wounds. Exhibits normal range of motion, no clubbing, cyanosis or edema.  Vascular: +b/l DP signals, no PT signals  Incision Sites: +L chest incision well approximated, +L CT and L Thien removal sites with dressings c/d/i.     LABS:                        11.7   15.65 )-----------( 257      ( 04 Nov 2024 05:30 )             36.4     PT/INR - ( 03 Nov 2024 16:57 )   PT: 13.9 sec;   INR: 1.21          PTT - ( 03 Nov 2024 16:57 )  PTT:25.0 sec    11-04    140  |  104  |  32[H]  ----------------------------<  143[H]  4.8   |  23  |  1.42[H]    Ca    9.4      04 Nov 2024 05:30  Phos  1.6     11-03  Mg     2.0     11-04    TPro  8.1  /  Alb  4.5  /  TBili  0.3  /  DBili  x   /  AST  23  /  ALT  8[L]  /  AlkPhos  51  11-03      Urinalysis Basic - ( 04 Nov 2024 05:30 )    Color: x / Appearance: x / SG: x / pH: x  Gluc: 143 mg/dL / Ketone: x  / Bili: x / Urobili: x   Blood: x / Protein: x / Nitrite: x   Leuk Esterase: x / RBC: x / WBC x   Sq Epi: x / Non Sq Epi: x / Bacteria: x        MEDICATIONS  (STANDING):  ascorbic acid 500 milliGRAM(s) Oral two times a day  aspirin enteric coated 81 milliGRAM(s) Oral daily  atorvastatin 80 milliGRAM(s) Oral at bedtime  bisacodyl 5 milliGRAM(s) Oral at bedtime  chlorhexidine 2% Cloths 1 Application(s) Topical daily  clopidogrel Tablet 75 milliGRAM(s) Oral daily  dextrose 5%. 1000 milliLiter(s) (100 mL/Hr) IV Continuous <Continuous>  dextrose 5%. 1000 milliLiter(s) (50 mL/Hr) IV Continuous <Continuous>  dextrose 50% Injectable 50 milliLiter(s) IV Push every 15 minutes  dextrose 50% Injectable 25 milliLiter(s) IV Push every 15 minutes  finasteride 5 milliGRAM(s) Oral daily  gabapentin 100 milliGRAM(s) Oral every 8 hours  glucagon  Injectable 1 milliGRAM(s) IntraMuscular once  heparin   Injectable 5000 Unit(s) SubCutaneous every 8 hours  insulin lispro (ADMELOG) corrective regimen sliding scale   SubCutaneous three times a day before meals  insulin lispro (ADMELOG) corrective regimen sliding scale   SubCutaneous at bedtime  isosorbide   mononitrate ER Tablet (IMDUR) 30 milliGRAM(s) Oral daily  methylPREDNISolone 4 milliGRAM(s) Oral before breakfast  methylPREDNISolone 4 milliGRAM(s) Oral at bedtime  metoprolol tartrate 12.5 milliGRAM(s) Oral every 6 hours  mupirocin 2% Ointment 1 Application(s) Both Nostrils two times a day  pantoprazole    Tablet 40 milliGRAM(s) Oral daily  polyethylene glycol 3350 17 Gram(s) Oral daily  polyethylene glycol 3350 17 Gram(s) Oral daily  senna 2 Tablet(s) Oral at bedtime  sodium chloride 0.9% lock flush 3 milliLiter(s) IV Push every 8 hours  sodium chloride 0.9%. 1000 milliLiter(s) (10 mL/Hr) IV Continuous <Continuous>  tamsulosin 0.4 milliGRAM(s) Oral at bedtime    MEDICATIONS  (PRN):  acetaminophen     Tablet .. 650 milliGRAM(s) Oral every 6 hours PRN Mild Pain (1 - 3)  bisacodyl Suppository 10 milliGRAM(s) Rectal once PRN Constipation  dextrose Oral Gel 15 Gram(s) Oral once PRN Blood Glucose LESS THAN 70 milliGRAM(s)/deciliter  nitroglycerin     SubLingual 0.4 milliGRAM(s) SubLingual every 5 minutes PRN Chest Pain  oxyCODONE    IR 5 milliGRAM(s) Oral every 6 hours PRN Moderate Pain (4 - 6)  oxyCODONE    IR 10 milliGRAM(s) Oral every 6 hours PRN Severe Pain (7 - 10)        RADIOLOGY & ADDITIONAL TESTS:  < from: Xray Chest 2 Views PA/Lat (11.04.24 @ 09:29) >  Right lower lobe consolidation/atelectasis seen in the PA and lateral   view with some mild ipsilateral left hemidiaphragm elevation. In noted   are upper lung zone opacities more pronounced on the right side which may   be increased slightly in extent from examination dated 11/3/2024.   Follow-up CT chest and/or whole-body PET/CT may be of value. No pleural   effusion. No pneumothorax. Heart size stable. Mediastinal and hilar   contours are unchanged. Soft tissues normal. No acute osseous   abnormality. Increased gas is noted subjacent to the left hemidiaphragm   with a large nondifferential air-fluid level in the lateral view which   may suggest a paralytic ileus pattern. Formal upright and supine views of   the abdomen are suggested for more complete evaluation.    < end of copied text >

## 2024-11-04 NOTE — PROGRESS NOTE ADULT - ASSESSMENT
75yo M, with PMHx of HTN, HLD, BPH, CAD (?s/p coronary stent per patient), severe PAD (s/p vascular interventions in the past) who presented to [facility] with severe LLE pain and chest pain. Patient found to have CAD, so patient was transferred to [facility] under  for further management. Vascular was consulted pre-op for PAD, recommending outpatient follow up with outpatient LLE angiogram. Pulm consulted preop for pulmonary nodules, completed PET scan suspicious for malignancy, but negative for metastasis recommended further outpatient workup for staging. Patient now s/p MIDCAB (LIMA-LAD), EF 55% (10/31/24). Patient did not receive intraop blood products, 1.6L IVF given, w/ traumatic ramirez insertion, urology consulted and 3 way ramirez placed, CBI not started. POD1 CT removed, BB and Medrol dose pack started. POD2, jonh removed. POD3, central line removed, ramirez removed and passed TOV. POD4, patient w/ constipation and bowel distention on CXR, increased bowel regimen pending KUB. Patient to be evaluated for PMR acute rehab.       A/P:  Neurovascular: No delirium. Pain well controlled with current regimen.  -Pain: tylenol, oxy prn     Cardiovascular: Hemodynamically stable. HR controlled.  -CAD, s/p MIDCAB (LIMA-LAD), EF 55%   C/w ASA daily, c/w Plavix 75mg daily, c/w ERP, c/w Imdur 30mg daily, c/w Medrol dose pack, c/w Lopressor 12.5mg BID    -H/o PAD (s/p vascular intervention in past)   Vascular has been following, recommending outpatient follow up for outpatient LLE   Ensure patient follows up with  after discharge.   -HLD: c/w Lipitor 80mg daily     Respiratory: 02 Sat = 95% on RA.  -Encourage C+DB and Use of IS 10x / hr while awake.  -CXR: pulmonary nodules, bowel distention   -Pulmonary Nodules: pulmonary was following pre-op  PET CT showed highly avid 2.7cm RUL nodule suspicious for malignancy, and a 1.2cm mildly FDG avid KULDEEP nodule which has a broad differential, more likely infectious/inflammatory but cannot rule out malignancy  There was no evidence of distant/extensive metastatic disease.  High suspicion for lung cancer in RUL, however would need further workup for staging/diagnosis which does not require inpatient stay. Ensure follow up with  on discharge     GI: Stable.  -PPX: pantoprazole 40mg daily   -PO Diet.  -Bowel regimen: c/w dulcolax, miralax, senna, given suppository   Patient without bowel regimen since OR, noted bowel distention on CXR   Increased bowel regimen, monitor symptoms, follow up KUB   -Monitor bowel distention and constipation     Renal / : voiding freely   -Monitor renal function: BUN 32, Cr 1,42   -Monitor I/O's.  -BPH: c/w finasteride 5mg daily, c/w tamsulosin daily   -S/p traumatic ramirez insertion and now s/p removal   Urology noting to get post void bladder scans to ensure not retaining. If <200cc, then cleared to leave. Will need Urology follow up    -FELIPA: no reported hx of kidney disease, unclear baseline, ensure not retaining.    Continue to trend Cr/BUN    Endocrine:    -A1c: 5.8%, no hx  -TSH: 1.730    Hematologic:  -CBC: RBC 3.86, Hgb 11.7, Plt 257  -Coagulation Panel: PTT 25, PT 13.9, INR 1.21    ID:  -Tempature: afebrile  -CBC: WBC 15.65  -Observe for SIRS/Sepsis Syndrome.    Prophylaxis:  -DVT prophylaxis with 5000 SubQ Heparin q8h.  -SCD's    Disposition:  -telemetry, pending PMR evaluation. Monitor abd distention and ensure having BMs. Regimen increased today

## 2024-11-05 LAB
ALBUMIN SERPL ELPH-MCNC: 4 G/DL — SIGNIFICANT CHANGE UP (ref 3.3–5)
ALP SERPL-CCNC: 52 U/L — SIGNIFICANT CHANGE UP (ref 40–120)
ALT FLD-CCNC: 20 U/L — SIGNIFICANT CHANGE UP (ref 10–45)
ANION GAP SERPL CALC-SCNC: 11 MMOL/L — SIGNIFICANT CHANGE UP (ref 5–17)
APTT BLD: 23.3 SEC — LOW (ref 24.5–35.6)
AST SERPL-CCNC: 31 U/L — SIGNIFICANT CHANGE UP (ref 10–40)
BILIRUB SERPL-MCNC: 0.4 MG/DL — SIGNIFICANT CHANGE UP (ref 0.2–1.2)
BUN SERPL-MCNC: 30 MG/DL — HIGH (ref 7–23)
CALCIUM SERPL-MCNC: 9.6 MG/DL — SIGNIFICANT CHANGE UP (ref 8.4–10.5)
CHLORIDE SERPL-SCNC: 104 MMOL/L — SIGNIFICANT CHANGE UP (ref 96–108)
CO2 SERPL-SCNC: 25 MMOL/L — SIGNIFICANT CHANGE UP (ref 22–31)
CREAT SERPL-MCNC: 1.4 MG/DL — HIGH (ref 0.5–1.3)
EGFR: 52 ML/MIN/1.73M2 — LOW
GLUCOSE SERPL-MCNC: 125 MG/DL — HIGH (ref 70–99)
HCT VFR BLD CALC: 36.9 % — LOW (ref 39–50)
HGB BLD-MCNC: 11.9 G/DL — LOW (ref 13–17)
INR BLD: 1.15 — SIGNIFICANT CHANGE UP (ref 0.85–1.16)
MAGNESIUM SERPL-MCNC: 1.9 MG/DL — SIGNIFICANT CHANGE UP (ref 1.6–2.6)
MCHC RBC-ENTMCNC: 31 PG — SIGNIFICANT CHANGE UP (ref 27–34)
MCHC RBC-ENTMCNC: 32.2 G/DL — SIGNIFICANT CHANGE UP (ref 32–36)
MCV RBC AUTO: 96.1 FL — SIGNIFICANT CHANGE UP (ref 80–100)
NRBC # BLD: 0 /100 WBCS — SIGNIFICANT CHANGE UP (ref 0–0)
PLATELET # BLD AUTO: 269 K/UL — SIGNIFICANT CHANGE UP (ref 150–400)
POTASSIUM SERPL-MCNC: 4.8 MMOL/L — SIGNIFICANT CHANGE UP (ref 3.5–5.3)
POTASSIUM SERPL-SCNC: 4.8 MMOL/L — SIGNIFICANT CHANGE UP (ref 3.5–5.3)
PROT SERPL-MCNC: 7.6 G/DL — SIGNIFICANT CHANGE UP (ref 6–8.3)
PROTHROM AB SERPL-ACNC: 13.4 SEC — SIGNIFICANT CHANGE UP (ref 9.9–13.4)
RBC # BLD: 3.84 M/UL — LOW (ref 4.2–5.8)
RBC # FLD: 12.9 % — SIGNIFICANT CHANGE UP (ref 10.3–14.5)
SODIUM SERPL-SCNC: 140 MMOL/L — SIGNIFICANT CHANGE UP (ref 135–145)
WBC # BLD: 9.54 K/UL — SIGNIFICANT CHANGE UP (ref 3.8–10.5)
WBC # FLD AUTO: 9.54 K/UL — SIGNIFICANT CHANGE UP (ref 3.8–10.5)

## 2024-11-05 PROCEDURE — 99232 SBSQ HOSP IP/OBS MODERATE 35: CPT

## 2024-11-05 PROCEDURE — 74018 RADEX ABDOMEN 1 VIEW: CPT | Mod: 26

## 2024-11-05 PROCEDURE — 71045 X-RAY EXAM CHEST 1 VIEW: CPT | Mod: 26

## 2024-11-05 RX ORDER — MAGNESIUM OXIDE 400 MG/1
800 TABLET ORAL ONCE
Refills: 0 | Status: COMPLETED | OUTPATIENT
Start: 2024-11-05 | End: 2024-11-05

## 2024-11-05 RX ADMIN — MUPIROCIN 1 APPLICATION(S): 20 OINTMENT TOPICAL at 18:47

## 2024-11-05 RX ADMIN — FINASTERIDE 5 MILLIGRAM(S): 5 TABLET, FILM COATED ORAL at 11:05

## 2024-11-05 RX ADMIN — Medication 500 MILLIGRAM(S): at 06:18

## 2024-11-05 RX ADMIN — Medication 12.5 MILLIGRAM(S): at 00:41

## 2024-11-05 RX ADMIN — HEPARIN SODIUM 5000 UNIT(S): 10000 INJECTION INTRAVENOUS; SUBCUTANEOUS at 14:36

## 2024-11-05 RX ADMIN — HEPARIN SODIUM 5000 UNIT(S): 10000 INJECTION INTRAVENOUS; SUBCUTANEOUS at 06:19

## 2024-11-05 RX ADMIN — HEPARIN SODIUM 5000 UNIT(S): 10000 INJECTION INTRAVENOUS; SUBCUTANEOUS at 22:14

## 2024-11-05 RX ADMIN — OXYCODONE HYDROCHLORIDE 10 MILLIGRAM(S): 30 TABLET ORAL at 14:36

## 2024-11-05 RX ADMIN — Medication 80 MILLIGRAM(S): at 22:15

## 2024-11-05 RX ADMIN — Medication 12.5 MILLIGRAM(S): at 18:37

## 2024-11-05 RX ADMIN — OXYCODONE HYDROCHLORIDE 10 MILLIGRAM(S): 30 TABLET ORAL at 15:20

## 2024-11-05 RX ADMIN — ISOSORBIDE MONONITRATE 30 MILLIGRAM(S): 60 TABLET, EXTENDED RELEASE ORAL at 11:05

## 2024-11-05 RX ADMIN — PANTOPRAZOLE SODIUM 40 MILLIGRAM(S): 40 TABLET, DELAYED RELEASE ORAL at 11:05

## 2024-11-05 RX ADMIN — METHYLPREDNISOLONE ACETATE 4 MILLIGRAM(S): 80 INJECTION, SUSPENSION INTRALESIONAL; INTRAMUSCULAR; INTRASYNOVIAL; SOFT TISSUE at 06:18

## 2024-11-05 RX ADMIN — Medication 0.4 MILLIGRAM(S): at 22:15

## 2024-11-05 RX ADMIN — Medication 0.4 MILLIGRAM(S): at 02:32

## 2024-11-05 RX ADMIN — SODIUM CHLORIDE 3 MILLILITER(S): 9 INJECTION, SOLUTION INTRAMUSCULAR; INTRAVENOUS; SUBCUTANEOUS at 06:25

## 2024-11-05 RX ADMIN — CLOPIDOGREL 75 MILLIGRAM(S): 75 TABLET ORAL at 11:05

## 2024-11-05 RX ADMIN — Medication 500 MILLIGRAM(S): at 18:37

## 2024-11-05 RX ADMIN — METHYLPREDNISOLONE ACETATE 4 MILLIGRAM(S): 80 INJECTION, SUSPENSION INTRALESIONAL; INTRAMUSCULAR; INTRASYNOVIAL; SOFT TISSUE at 22:14

## 2024-11-05 RX ADMIN — Medication 650 MILLIGRAM(S): at 12:00

## 2024-11-05 RX ADMIN — OXYCODONE HYDROCHLORIDE 10 MILLIGRAM(S): 30 TABLET ORAL at 06:19

## 2024-11-05 RX ADMIN — Medication 12.5 MILLIGRAM(S): at 06:19

## 2024-11-05 RX ADMIN — MAGNESIUM OXIDE 800 MILLIGRAM(S): 400 TABLET ORAL at 08:00

## 2024-11-05 RX ADMIN — MUPIROCIN 1 APPLICATION(S): 20 OINTMENT TOPICAL at 06:25

## 2024-11-05 RX ADMIN — OXYCODONE HYDROCHLORIDE 10 MILLIGRAM(S): 30 TABLET ORAL at 23:00

## 2024-11-05 RX ADMIN — GABAPENTIN 100 MILLIGRAM(S): 300 CAPSULE ORAL at 14:36

## 2024-11-05 RX ADMIN — CHLORHEXIDINE GLUCONATE 1 APPLICATION(S): 40 SOLUTION TOPICAL at 06:18

## 2024-11-05 RX ADMIN — OXYCODONE HYDROCHLORIDE 10 MILLIGRAM(S): 30 TABLET ORAL at 22:14

## 2024-11-05 RX ADMIN — OXYCODONE HYDROCHLORIDE 10 MILLIGRAM(S): 30 TABLET ORAL at 07:19

## 2024-11-05 RX ADMIN — GABAPENTIN 100 MILLIGRAM(S): 300 CAPSULE ORAL at 06:19

## 2024-11-05 RX ADMIN — Medication 12.5 MILLIGRAM(S): at 11:05

## 2024-11-05 RX ADMIN — Medication 650 MILLIGRAM(S): at 11:05

## 2024-11-05 RX ADMIN — SODIUM CHLORIDE 3 MILLILITER(S): 9 INJECTION, SOLUTION INTRAMUSCULAR; INTRAVENOUS; SUBCUTANEOUS at 22:22

## 2024-11-05 RX ADMIN — SODIUM CHLORIDE 3 MILLILITER(S): 9 INJECTION, SOLUTION INTRAMUSCULAR; INTRAVENOUS; SUBCUTANEOUS at 14:20

## 2024-11-05 RX ADMIN — Medication 81 MILLIGRAM(S): at 11:05

## 2024-11-05 NOTE — PROGRESS NOTE ADULT - SUBJECTIVE AND OBJECTIVE BOX
Patient discussed on morning rounds with Dr. Hartman    Operation / Date: S/p MIDCAB (LIMA-LAD), EF 35% (10/31/24)    SUBJECTIVE ASSESSMENT:  74y Male seen and assessed at bedside this morning with phone  ([phone]). Patient is complaining of L leg pain where he has ulcers that have been evaluated by the vascular team. Patient states he had a large bowel movement yesterday and denies n/v/d.         Vital Signs Last 24 Hrs  T(C): 37 (05 Nov 2024 09:08), Max: 37 (05 Nov 2024 09:08)  T(F): 98.6 (05 Nov 2024 09:08), Max: 98.6 (05 Nov 2024 09:08)  HR: 78 (05 Nov 2024 05:38) (72 - 92)  BP: 148/71 (05 Nov 2024 05:38) (111/55 - 163/75)  BP(mean): 102 (05 Nov 2024 05:38) (78 - 108)  RR: 17 (05 Nov 2024 05:38) (16 - 19)  SpO2: 97% (05 Nov 2024 05:38) (93% - 98%)    Parameters below as of 05 Nov 2024 05:38  Patient On (Oxygen Delivery Method): nasal cannula w/ humidification  O2 Flow (L/min): 2    I&O's Detail    04 Nov 2024 07:01  -  05 Nov 2024 07:00  --------------------------------------------------------  IN:    Oral Fluid: 910 mL  Total IN: 910 mL    OUT:    Voided (mL): 1400 mL  Total OUT: 1400 mL    Total NET: -490 mL      05 Nov 2024 07:01  -  05 Nov 2024 09:43  --------------------------------------------------------  IN:    Oral Fluid: 180 mL  Total IN: 180 mL    OUT:    Voided (mL): 300 mL  Total OUT: 300 mL    Total NET: -120 mL        CHEST TUBE:  No.   THIEN DRAIN:  No.  EPICARDIAL WIRES: No.  TIE DOWNS: Yes  GRULLON:No.    PHYSICAL EXAM:  Appearance: No acute distress.  Neurologic: AAOx3, no AMS or focal deficits.  Responds appropriately to verbal and physical stimuli; exhibits purposeful movement in all extremities.  Cardiovascular: RRR, S1 S2. No m/r/g.  Respiratory: No acute respiratory distress. CTA b/l, no w/r/r.   Gastrointestinal:  Soft, non-tender, non-distended, + BS.	  Skin: No rashes. No ecchymoses. No cyanosis.  Extremities: +LLE with scabbed wounds. Exhibits normal range of motion, no clubbing, cyanosis or edema.  Vascular: +b/l DP signals, no PT signals  Incision Sites: +L mini thoracotomy incision well approximated and covered with dermabond, +L CT and L Thien removal sites with dressings c/d/i.     LABS:                        11.9   9.54  )-----------( 269      ( 05 Nov 2024 05:30 )             36.9       PT/INR - ( 05 Nov 2024 05:30 )   PT: 13.4 sec;   INR: 1.15          PTT - ( 05 Nov 2024 05:30 )  PTT:23.3 sec    11-05    140  |  104  |  30[H]  ----------------------------<  125[H]  4.8   |  25  |  1.40[H]    Ca    9.6      05 Nov 2024 05:30  Phos  1.6     11-03  Mg     1.9     11-05    TPro  7.6  /  Alb  4.0  /  TBili  0.4  /  DBili  x   /  AST  31  /  ALT  20  /  AlkPhos  52  11-05      Urinalysis Basic - ( 05 Nov 2024 05:30 )    Color: x / Appearance: x / SG: x / pH: x  Gluc: 125 mg/dL / Ketone: x  / Bili: x / Urobili: x   Blood: x / Protein: x / Nitrite: x   Leuk Esterase: x / RBC: x / WBC x   Sq Epi: x / Non Sq Epi: x / Bacteria: x        MEDICATIONS  (STANDING):  ascorbic acid 500 milliGRAM(s) Oral two times a day  aspirin enteric coated 81 milliGRAM(s) Oral daily  atorvastatin 80 milliGRAM(s) Oral at bedtime  bisacodyl 5 milliGRAM(s) Oral at bedtime  chlorhexidine 2% Cloths 1 Application(s) Topical daily  clopidogrel Tablet 75 milliGRAM(s) Oral daily  dextrose 5%. 1000 milliLiter(s) (50 mL/Hr) IV Continuous <Continuous>  dextrose 5%. 1000 milliLiter(s) (100 mL/Hr) IV Continuous <Continuous>  dextrose 50% Injectable 50 milliLiter(s) IV Push every 15 minutes  dextrose 50% Injectable 25 milliLiter(s) IV Push every 15 minutes  finasteride 5 milliGRAM(s) Oral daily  gabapentin 100 milliGRAM(s) Oral every 8 hours  glucagon  Injectable 1 milliGRAM(s) IntraMuscular once  heparin   Injectable 5000 Unit(s) SubCutaneous every 8 hours  insulin lispro (ADMELOG) corrective regimen sliding scale   SubCutaneous three times a day before meals  insulin lispro (ADMELOG) corrective regimen sliding scale   SubCutaneous at bedtime  isosorbide   mononitrate ER Tablet (IMDUR) 30 milliGRAM(s) Oral daily  methylPREDNISolone 4 milliGRAM(s) Oral before breakfast  methylPREDNISolone 4 milliGRAM(s) Oral at bedtime  metoprolol tartrate 12.5 milliGRAM(s) Oral every 6 hours  mupirocin 2% Ointment 1 Application(s) Both Nostrils two times a day  pantoprazole    Tablet 40 milliGRAM(s) Oral daily  polyethylene glycol 3350 17 Gram(s) Oral daily  polyethylene glycol 3350 17 Gram(s) Oral daily  senna 2 Tablet(s) Oral at bedtime  sodium chloride 0.9% lock flush 3 milliLiter(s) IV Push every 8 hours  sodium chloride 0.9%. 1000 milliLiter(s) (10 mL/Hr) IV Continuous <Continuous>  tamsulosin 0.4 milliGRAM(s) Oral at bedtime    MEDICATIONS  (PRN):  acetaminophen     Tablet .. 650 milliGRAM(s) Oral every 6 hours PRN Mild Pain (1 - 3)  bisacodyl Suppository 10 milliGRAM(s) Rectal once PRN Constipation  dextrose Oral Gel 15 Gram(s) Oral once PRN Blood Glucose LESS THAN 70 milliGRAM(s)/deciliter  nitroglycerin     SubLingual 0.4 milliGRAM(s) SubLingual every 5 minutes PRN Chest Pain  oxyCODONE    IR 5 milliGRAM(s) Oral every 6 hours PRN Moderate Pain (4 - 6)  oxyCODONE    IR 10 milliGRAM(s) Oral every 6 hours PRN Severe Pain (7 - 10)        RADIOLOGY & ADDITIONAL TESTS:

## 2024-11-05 NOTE — CHART NOTE - NSCHARTNOTEFT_GEN_A_CORE
Admitting Diagnosis:   Patient is a 74y old  Male who presents with a chief complaint of CAD (05 Nov 2024 09:41)      PAST MEDICAL & SURGICAL HISTORY:  CAD (coronary artery disease)      BPH (benign prostatic hyperplasia)      HTN (hypertension)      HLD (hyperlipidemia)      PAD (peripheral artery disease)      S/P femoral-femoral bypass surgery          Current Nutrition Order: DASH/TLC       PO Intake: Good (%) [ x ]  Fair (50-75%) [   ] Poor (<25%) [   ]    GI Issues: pt noted with constipation since surgery, however per RN, pt had BM yesterday (11/4)- ordered for miralax and senna     Pain: noted with L leg pain (3/10 pain per flowsheets)     Skin Integrity:  surgical incision to L chest  L leg venous ulcers, +1 generalized edema       11-04-24 @ 07:01  -  11-05-24 @ 07:00  --------------------------------------------------------  IN: 910 mL / OUT: 1400 mL / NET: -490 mL    11-05-24 @ 07:01  -  11-05-24 @ 14:25  --------------------------------------------------------  IN: 300 mL / OUT: 450 mL / NET: -150 mL        Labs:   11-05    140  |  104  |  30[H]  ----------------------------<  125[H]  4.8   |  25  |  1.40[H]    Ca    9.6      05 Nov 2024 05:30  Phos  1.6     11-03  Mg     1.9     11-05    TPro  7.6  /  Alb  4.0  /  TBili  0.4  /  DBili  x   /  AST  31  /  ALT  20  /  AlkPhos  52  11-05    CAPILLARY BLOOD GLUCOSE      POCT Blood Glucose.: 137 mg/dL (05 Nov 2024 11:57)  POCT Blood Glucose.: 124 mg/dL (05 Nov 2024 07:04)  POCT Blood Glucose.: 153 mg/dL (04 Nov 2024 22:20)  POCT Blood Glucose.: 158 mg/dL (04 Nov 2024 16:45)      Medications:  MEDICATIONS  (STANDING):  ascorbic acid 500 milliGRAM(s) Oral two times a day  aspirin enteric coated 81 milliGRAM(s) Oral daily  atorvastatin 80 milliGRAM(s) Oral at bedtime  bisacodyl 5 milliGRAM(s) Oral at bedtime  chlorhexidine 2% Cloths 1 Application(s) Topical daily  clopidogrel Tablet 75 milliGRAM(s) Oral daily  dextrose 5%. 1000 milliLiter(s) (50 mL/Hr) IV Continuous <Continuous>  dextrose 5%. 1000 milliLiter(s) (100 mL/Hr) IV Continuous <Continuous>  dextrose 50% Injectable 50 milliLiter(s) IV Push every 15 minutes  dextrose 50% Injectable 25 milliLiter(s) IV Push every 15 minutes  finasteride 5 milliGRAM(s) Oral daily  gabapentin 100 milliGRAM(s) Oral every 8 hours  glucagon  Injectable 1 milliGRAM(s) IntraMuscular once  heparin   Injectable 5000 Unit(s) SubCutaneous every 8 hours  insulin lispro (ADMELOG) corrective regimen sliding scale   SubCutaneous three times a day before meals  insulin lispro (ADMELOG) corrective regimen sliding scale   SubCutaneous at bedtime  isosorbide   mononitrate ER Tablet (IMDUR) 30 milliGRAM(s) Oral daily  methylPREDNISolone 4 milliGRAM(s) Oral at bedtime  methylPREDNISolone 4 milliGRAM(s) Oral before breakfast  metoprolol tartrate 12.5 milliGRAM(s) Oral every 6 hours  mupirocin 2% Ointment 1 Application(s) Both Nostrils two times a day  pantoprazole    Tablet 40 milliGRAM(s) Oral daily  polyethylene glycol 3350 17 Gram(s) Oral daily  polyethylene glycol 3350 17 Gram(s) Oral daily  senna 2 Tablet(s) Oral at bedtime  sodium chloride 0.9% lock flush 3 milliLiter(s) IV Push every 8 hours  sodium chloride 0.9%. 1000 milliLiter(s) (10 mL/Hr) IV Continuous <Continuous>  tamsulosin 0.4 milliGRAM(s) Oral at bedtime    MEDICATIONS  (PRN):  acetaminophen     Tablet .. 650 milliGRAM(s) Oral every 6 hours PRN Mild Pain (1 - 3)  bisacodyl Suppository 10 milliGRAM(s) Rectal once PRN Constipation  dextrose Oral Gel 15 Gram(s) Oral once PRN Blood Glucose LESS THAN 70 milliGRAM(s)/deciliter  nitroglycerin     SubLingual 0.4 milliGRAM(s) SubLingual every 5 minutes PRN Chest Pain  oxyCODONE    IR 5 milliGRAM(s) Oral every 6 hours PRN Moderate Pain (4 - 6)  oxyCODONE    IR 10 milliGRAM(s) Oral every 6 hours PRN Severe Pain (7 - 10)      Anthropometrics:  Dosing height: 65in  Dosing weight: 69kg/152.1 pounds   BMI 25.3   pounds, %%    Weight Change: no new weights since admit, recommend nursing to obtain weekly weights; RD to monitor trends as able.     Estimated energy needs:   Weight used for calculations	current weight  Estimated Energy Needs Weight (lbs)	152 lb  Estimated Energy Needs Weight (kg)	68.9 kg  Estimated Energy Needs From (chana/kg)	25  Estimated Energy Needs To (chana/kg)	30  Estimated Energy Needs Calculated From (chana/kg)	1722  Estimated Energy Needs Calculated To (chana/kg)	2067  Weight used for calculations	current weight  Estimated Protein Needs Weight (lbs)	152 lb  Estimated Protein Needs Weight (kg)	68.9 kg  Estimated Protein Needs From (g/kg)	1.25  Estimated Protein Needs To (g/kg)	1.5  Estimated Protein Needs Calculated From (g/kg)	86.12  Estimated Protein Needs Calculated To (g/kg)	103.35  Estimated Fluid Needs Weight (lbs)	152 lb  Estimated Fluid Needs Weight (kg)	68.9 kg  Estimated Fluid Needs From (ml/kg)	30  Estimated Fluid Needs To (ml/kg)	35  Estimated Fluid Needs Calculated From (ml/kg)	2067  Estimated Fluid Needs Calculated To (ml/kg)	2411  Other Calculations	 pounds, %%  Pt within % IBW thus actual body weight used for all calculations. Needs adjusted for advanced age, post-op healing, wound healing.    Subjective:   75 y/o male with PMHx of HTN, HLD, BPH, CAD (?s/p coronary stent per patient), severe PAD s/p fem-fem bypass per patient (?year) and an "abdominal stent for circulation problems".  Has not seen a doctor for his PAD in "a few years".  States he was having severe left leg pain and chest pain, prompting him to call an ambulance. He was taken to [facility] for evaluation and cardiac cath revealed CAD. Transferred here from  for surgical evaluation. S/p MIDCAB 10/31     Pt seen for follow up. Endorses fair appetite and intake at present, reports consuming 50-75% of eggs, home fries, cereal, and cookie for breakfast this AM. Pt requesting NO FISH- preference documented in CBORD. Labs and medications reviewed. BUN/Cr 30/1.40<H>, glucose 125-143 x 24 hours. Ordered for insulin sliding scale, miralax, senna, vitamin C. RD to remain available for additional nutrition interventions as needed.     Previous Nutrition Diagnosis: Increased Nutrient Needs... energy, protein related to increased physiological demand for nutrients as evidenced by post-op, wound healing     Active [ x ]  Resolved [   ]    Goal: Pt to meet at least 75% of nutritional needs consistently     Recommendations:  1. Continue DASH/TLC diet   2. Encourage and monitor PO intake, honor preferences as able   3. Monitor wt trends, GI function, skin integrity  4. Monitor lytes, renal indices, blood glucose, LFTs    5. Pain and bowel regimen per team     Education: RD encouraged continued PO intake as tolerated; reinforced diet education on importance of adequate kcal/protein intake for post-op healing. Pt verbalized understanding.    Risk Level: High [   ] Moderate [ x ] Low [   ] Admitting Diagnosis:   Patient is a 74y old  Male who presents with a chief complaint of CAD (05 Nov 2024 09:41)      PAST MEDICAL & SURGICAL HISTORY:  CAD (coronary artery disease)      BPH (benign prostatic hyperplasia)      HTN (hypertension)      HLD (hyperlipidemia)      PAD (peripheral artery disease)      S/P femoral-femoral bypass surgery          Current Nutrition Order: DASH/TLC       PO Intake: Good (%) [   ]  Fair (50-75%) [ x ] Poor (<25%) [   ]    GI Issues: pt noted with constipation since surgery, however per RN, pt had BM yesterday (11/4)- ordered for miralax and senna     Pain: noted with L leg pain (3/10 pain per flowsheets)     Skin Integrity:  surgical incision to L chest  L leg venous ulcers, +1 generalized edema       11-04-24 @ 07:01  -  11-05-24 @ 07:00  --------------------------------------------------------  IN: 910 mL / OUT: 1400 mL / NET: -490 mL    11-05-24 @ 07:01  -  11-05-24 @ 14:25  --------------------------------------------------------  IN: 300 mL / OUT: 450 mL / NET: -150 mL        Labs:   11-05    140  |  104  |  30[H]  ----------------------------<  125[H]  4.8   |  25  |  1.40[H]    Ca    9.6      05 Nov 2024 05:30  Phos  1.6     11-03  Mg     1.9     11-05    TPro  7.6  /  Alb  4.0  /  TBili  0.4  /  DBili  x   /  AST  31  /  ALT  20  /  AlkPhos  52  11-05    CAPILLARY BLOOD GLUCOSE      POCT Blood Glucose.: 137 mg/dL (05 Nov 2024 11:57)  POCT Blood Glucose.: 124 mg/dL (05 Nov 2024 07:04)  POCT Blood Glucose.: 153 mg/dL (04 Nov 2024 22:20)  POCT Blood Glucose.: 158 mg/dL (04 Nov 2024 16:45)      Medications:  MEDICATIONS  (STANDING):  ascorbic acid 500 milliGRAM(s) Oral two times a day  aspirin enteric coated 81 milliGRAM(s) Oral daily  atorvastatin 80 milliGRAM(s) Oral at bedtime  bisacodyl 5 milliGRAM(s) Oral at bedtime  chlorhexidine 2% Cloths 1 Application(s) Topical daily  clopidogrel Tablet 75 milliGRAM(s) Oral daily  dextrose 5%. 1000 milliLiter(s) (50 mL/Hr) IV Continuous <Continuous>  dextrose 5%. 1000 milliLiter(s) (100 mL/Hr) IV Continuous <Continuous>  dextrose 50% Injectable 50 milliLiter(s) IV Push every 15 minutes  dextrose 50% Injectable 25 milliLiter(s) IV Push every 15 minutes  finasteride 5 milliGRAM(s) Oral daily  gabapentin 100 milliGRAM(s) Oral every 8 hours  glucagon  Injectable 1 milliGRAM(s) IntraMuscular once  heparin   Injectable 5000 Unit(s) SubCutaneous every 8 hours  insulin lispro (ADMELOG) corrective regimen sliding scale   SubCutaneous three times a day before meals  insulin lispro (ADMELOG) corrective regimen sliding scale   SubCutaneous at bedtime  isosorbide   mononitrate ER Tablet (IMDUR) 30 milliGRAM(s) Oral daily  methylPREDNISolone 4 milliGRAM(s) Oral at bedtime  methylPREDNISolone 4 milliGRAM(s) Oral before breakfast  metoprolol tartrate 12.5 milliGRAM(s) Oral every 6 hours  mupirocin 2% Ointment 1 Application(s) Both Nostrils two times a day  pantoprazole    Tablet 40 milliGRAM(s) Oral daily  polyethylene glycol 3350 17 Gram(s) Oral daily  polyethylene glycol 3350 17 Gram(s) Oral daily  senna 2 Tablet(s) Oral at bedtime  sodium chloride 0.9% lock flush 3 milliLiter(s) IV Push every 8 hours  sodium chloride 0.9%. 1000 milliLiter(s) (10 mL/Hr) IV Continuous <Continuous>  tamsulosin 0.4 milliGRAM(s) Oral at bedtime    MEDICATIONS  (PRN):  acetaminophen     Tablet .. 650 milliGRAM(s) Oral every 6 hours PRN Mild Pain (1 - 3)  bisacodyl Suppository 10 milliGRAM(s) Rectal once PRN Constipation  dextrose Oral Gel 15 Gram(s) Oral once PRN Blood Glucose LESS THAN 70 milliGRAM(s)/deciliter  nitroglycerin     SubLingual 0.4 milliGRAM(s) SubLingual every 5 minutes PRN Chest Pain  oxyCODONE    IR 5 milliGRAM(s) Oral every 6 hours PRN Moderate Pain (4 - 6)  oxyCODONE    IR 10 milliGRAM(s) Oral every 6 hours PRN Severe Pain (7 - 10)      Anthropometrics:  Dosing height: 65in  Dosing weight: 69kg/152.1 pounds   BMI 25.3   pounds, %%    Weight Change: no new weights since admit, recommend nursing to obtain weekly weights; RD to monitor trends as able.     Estimated energy needs:   Weight used for calculations	current weight  Estimated Energy Needs Weight (lbs)	152 lb  Estimated Energy Needs Weight (kg)	68.9 kg  Estimated Energy Needs From (chana/kg)	25  Estimated Energy Needs To (chana/kg)	30  Estimated Energy Needs Calculated From (chana/kg)	1722  Estimated Energy Needs Calculated To (chaan/kg)	2067  Weight used for calculations	current weight  Estimated Protein Needs Weight (lbs)	152 lb  Estimated Protein Needs Weight (kg)	68.9 kg  Estimated Protein Needs From (g/kg)	1.25  Estimated Protein Needs To (g/kg)	1.5  Estimated Protein Needs Calculated From (g/kg)	86.12  Estimated Protein Needs Calculated To (g/kg)	103.35  Estimated Fluid Needs Weight (lbs)	152 lb  Estimated Fluid Needs Weight (kg)	68.9 kg  Estimated Fluid Needs From (ml/kg)	30  Estimated Fluid Needs To (ml/kg)	35  Estimated Fluid Needs Calculated From (ml/kg)	2067  Estimated Fluid Needs Calculated To (ml/kg)	2411  Other Calculations	 pounds, %%  Pt within % IBW thus actual body weight used for all calculations. Needs adjusted for advanced age, post-op healing, wound healing.    Subjective:   73 y/o male with PMHx of HTN, HLD, BPH, CAD (?s/p coronary stent per patient), severe PAD s/p fem-fem bypass per patient (?year) and an "abdominal stent for circulation problems".  Has not seen a doctor for his PAD in "a few years".  States he was having severe left leg pain and chest pain, prompting him to call an ambulance. He was taken to [facility] for evaluation and cardiac cath revealed CAD. Transferred here from  for surgical evaluation. S/p MIDCAB 10/31     Pt seen for follow up. Endorses fair appetite and intake at present, reports consuming 50-75% of eggs, home fries, cereal, and cookie for breakfast this AM. Pt requesting NO FISH- preference documented in CBORD. Labs and medications reviewed. BUN/Cr 30/1.40<H>, glucose 125-143 x 24 hours. Ordered for insulin sliding scale, miralax, senna, vitamin C. RD to remain available for additional nutrition interventions as needed.     Previous Nutrition Diagnosis: Increased Nutrient Needs... energy, protein related to increased physiological demand for nutrients as evidenced by post-op, wound healing     Active [ x ]  Resolved [   ]    Goal: Pt to meet at least 75% of nutritional needs consistently     Recommendations:  1. Continue DASH/TLC diet   2. Encourage and monitor PO intake, honor preferences as able   3. Monitor wt trends, GI function, skin integrity  4. Monitor lytes, renal indices, blood glucose, LFTs    5. Pain and bowel regimen per team     Education: RD encouraged continued PO intake as tolerated; reinforced diet education on importance of adequate kcal/protein intake for post-op healing. Pt verbalized understanding.    Risk Level: High [   ] Moderate [ x ] Low [   ]

## 2024-11-05 NOTE — PROGRESS NOTE ADULT - ASSESSMENT
75yo M, with PMHx of HTN, HLD, BPH, CAD (?s/p coronary stent per patient), severe PAD (s/p vascular interventions in the past) who presented to [facility] with severe LLE pain and chest pain. Patient found to have CAD, so patient was transferred to [facility] under  for further management. Vascular was consulted pre-op for PAD, recommending outpatient follow up with outpatient LLE angiogram. Pulm consulted preop for pulmonary nodules, completed PET scan suspicious for malignancy, but negative for metastasis recommended further outpatient workup for staging. Patient now s/p MIDCAB (LIMA-LAD), EF 55% (10/31/24). Patient did not receive intraop blood products, 1.6L IVF given, w/ traumatic ramirez insertion, urology consulted and 3 way ramirez placed, CBI not started. POD1 CT removed, BB and Medrol dose pack started. POD2, jonh removed. POD3, central line removed, ramirez removed and passed TOV. POD4, patient w/ constipation and bowel distention on CXR, increased bowel regimen pending KUB. Patient to be evaluated for PMR acute rehab. POD5 patient had a large bowel movement overnight, pending eval by PMR and bed at rehab.      A/P:  Neurovascular: No delirium. Pain well controlled with current regimen.  -Pain: tylenol, oxy prn     Cardiovascular: Hemodynamically stable. HR controlled.  -CAD, POD5 s/p MIDCAB (LIMA-LAD), EF 55%   C/w ASA daily, c/w Plavix 75mg daily, c/w Imdur 30mg daily, c/w Medrol dose pack, c/w Lopressor 12.5mg Q6  -H/o PAD (s/p vascular intervention in past)   Vascular has been following, recommending outpatient follow up for outpatient LLE   -HLD: c/w Lipitor 80mg daily     Respiratory: 02 Sat = 95% on RA.  -Encourage C+DB and Use of IS 10x / hr while awake.  -CXR: no PTX or effusion   -Pulmonary Nodules: pulmonary was following pre-op       -High suspicion for lung cancer in RUL, however would need further workup for staging/diagnosis which does not require inpatient stay. Ensure follow up with  on discharge     GI: Stable.  -PPX: pantoprazole 40mg daily   -PO Diet.  -Bowel regimen: c/w dulcolax, miralax, senna, given suppository      -patient had large BM overnight, no longer distended     Renal / : voiding freely   -Monitor renal function: 30/1.40  -Monitor I/O's.  -BPH: c/w finasteride 5mg daily, c/w tamsulosin daily   -S/p traumatic ramirez insertion and now s/p removal   Urology noting to get post void bladder scans to ensure not retaining. If <200cc, then cleared to leave. Will need Urology follow up    -FELIPA: no reported hx of kidney disease, unclear baseline, ensure not retaining.    Continue to trend Cr/BUN    Endocrine:    -A1c: 5.8%, no hx  -TSH: 1.730    Hematologic:  -CBC: 11.9/36.9  -Coagulation Panel: PT/INR - ( 05 Nov 2024 05:30 )   PT: 13.4 sec;   INR: 1.15     PTT - ( 05 Nov 2024 05:30 )  PTT:23.3 sec    ID:  -Tempature: afebrile  -CBC: WBC 9.54  -Observe for SIRS/Sepsis Syndrome.    Prophylaxis:  -DVT prophylaxis with 5000 SubQ Heparin q8h.  -SCD's    Disposition:  -telemetry, pending PMR evaluation for rehab

## 2024-11-06 LAB
ANION GAP SERPL CALC-SCNC: 11 MMOL/L — SIGNIFICANT CHANGE UP (ref 5–17)
BUN SERPL-MCNC: 27 MG/DL — HIGH (ref 7–23)
CALCIUM SERPL-MCNC: 9.2 MG/DL — SIGNIFICANT CHANGE UP (ref 8.4–10.5)
CHLORIDE SERPL-SCNC: 103 MMOL/L — SIGNIFICANT CHANGE UP (ref 96–108)
CO2 SERPL-SCNC: 23 MMOL/L — SIGNIFICANT CHANGE UP (ref 22–31)
CREAT SERPL-MCNC: 1.37 MG/DL — HIGH (ref 0.5–1.3)
EGFR: 53 ML/MIN/1.73M2 — LOW
GLUCOSE SERPL-MCNC: 128 MG/DL — HIGH (ref 70–99)
HCT VFR BLD CALC: 34.8 % — LOW (ref 39–50)
HGB BLD-MCNC: 11.3 G/DL — LOW (ref 13–17)
MAGNESIUM SERPL-MCNC: 2 MG/DL — SIGNIFICANT CHANGE UP (ref 1.6–2.6)
MCHC RBC-ENTMCNC: 30.1 PG — SIGNIFICANT CHANGE UP (ref 27–34)
MCHC RBC-ENTMCNC: 32.5 G/DL — SIGNIFICANT CHANGE UP (ref 32–36)
MCV RBC AUTO: 92.8 FL — SIGNIFICANT CHANGE UP (ref 80–100)
NRBC # BLD: 0 /100 WBCS — SIGNIFICANT CHANGE UP (ref 0–0)
PLATELET # BLD AUTO: 295 K/UL — SIGNIFICANT CHANGE UP (ref 150–400)
POTASSIUM SERPL-MCNC: 5.2 MMOL/L — SIGNIFICANT CHANGE UP (ref 3.5–5.3)
POTASSIUM SERPL-SCNC: 5.2 MMOL/L — SIGNIFICANT CHANGE UP (ref 3.5–5.3)
RBC # BLD: 3.75 M/UL — LOW (ref 4.2–5.8)
RBC # FLD: 12.6 % — SIGNIFICANT CHANGE UP (ref 10.3–14.5)
SODIUM SERPL-SCNC: 137 MMOL/L — SIGNIFICANT CHANGE UP (ref 135–145)
WBC # BLD: 9.67 K/UL — SIGNIFICANT CHANGE UP (ref 3.8–10.5)
WBC # FLD AUTO: 9.67 K/UL — SIGNIFICANT CHANGE UP (ref 3.8–10.5)

## 2024-11-06 PROCEDURE — 99232 SBSQ HOSP IP/OBS MODERATE 35: CPT

## 2024-11-06 PROCEDURE — 99222 1ST HOSP IP/OBS MODERATE 55: CPT | Mod: GC

## 2024-11-06 RX ORDER — FENTANYL CITRAT/DEXTROSE 5%/PF 1250MCG/50
25 PATIENT CONTROLLED ANALGESIA SYRINGE INTRAVENOUS ONCE
Refills: 0 | Status: DISCONTINUED | OUTPATIENT
Start: 2024-11-06 | End: 2024-11-06

## 2024-11-06 RX ADMIN — Medication 650 MILLIGRAM(S): at 20:30

## 2024-11-06 RX ADMIN — HEPARIN SODIUM 5000 UNIT(S): 10000 INJECTION INTRAVENOUS; SUBCUTANEOUS at 14:28

## 2024-11-06 RX ADMIN — OXYCODONE HYDROCHLORIDE 10 MILLIGRAM(S): 30 TABLET ORAL at 20:34

## 2024-11-06 RX ADMIN — HEPARIN SODIUM 5000 UNIT(S): 10000 INJECTION INTRAVENOUS; SUBCUTANEOUS at 06:29

## 2024-11-06 RX ADMIN — Medication 12.5 MILLIGRAM(S): at 17:27

## 2024-11-06 RX ADMIN — Medication 80 MILLIGRAM(S): at 21:38

## 2024-11-06 RX ADMIN — METHYLPREDNISOLONE ACETATE 4 MILLIGRAM(S): 80 INJECTION, SUSPENSION INTRALESIONAL; INTRAMUSCULAR; INTRASYNOVIAL; SOFT TISSUE at 06:29

## 2024-11-06 RX ADMIN — Medication 25 MICROGRAM(S): at 22:07

## 2024-11-06 RX ADMIN — OXYCODONE HYDROCHLORIDE 10 MILLIGRAM(S): 30 TABLET ORAL at 06:29

## 2024-11-06 RX ADMIN — OXYCODONE HYDROCHLORIDE 10 MILLIGRAM(S): 30 TABLET ORAL at 17:29

## 2024-11-06 RX ADMIN — Medication 0.4 MILLIGRAM(S): at 21:38

## 2024-11-06 RX ADMIN — Medication 25 MICROGRAM(S): at 21:37

## 2024-11-06 RX ADMIN — Medication 5 MILLIGRAM(S): at 21:37

## 2024-11-06 RX ADMIN — Medication 12.5 MILLIGRAM(S): at 00:31

## 2024-11-06 RX ADMIN — Medication 650 MILLIGRAM(S): at 21:30

## 2024-11-06 RX ADMIN — CLOPIDOGREL 75 MILLIGRAM(S): 75 TABLET ORAL at 11:56

## 2024-11-06 RX ADMIN — Medication 12.5 MILLIGRAM(S): at 06:29

## 2024-11-06 RX ADMIN — FINASTERIDE 5 MILLIGRAM(S): 5 TABLET, FILM COATED ORAL at 11:55

## 2024-11-06 RX ADMIN — Medication 12.5 MILLIGRAM(S): at 11:55

## 2024-11-06 RX ADMIN — CHLORHEXIDINE GLUCONATE 1 APPLICATION(S): 40 SOLUTION TOPICAL at 06:56

## 2024-11-06 RX ADMIN — PANTOPRAZOLE SODIUM 40 MILLIGRAM(S): 40 TABLET, DELAYED RELEASE ORAL at 11:56

## 2024-11-06 RX ADMIN — OXYCODONE HYDROCHLORIDE 5 MILLIGRAM(S): 30 TABLET ORAL at 12:35

## 2024-11-06 RX ADMIN — Medication 2 TABLET(S): at 21:37

## 2024-11-06 RX ADMIN — HEPARIN SODIUM 5000 UNIT(S): 10000 INJECTION INTRAVENOUS; SUBCUTANEOUS at 21:38

## 2024-11-06 RX ADMIN — SODIUM CHLORIDE 3 MILLILITER(S): 9 INJECTION, SOLUTION INTRAMUSCULAR; INTRAVENOUS; SUBCUTANEOUS at 05:30

## 2024-11-06 RX ADMIN — ISOSORBIDE MONONITRATE 30 MILLIGRAM(S): 60 TABLET, EXTENDED RELEASE ORAL at 11:56

## 2024-11-06 RX ADMIN — SODIUM CHLORIDE 10 MILLILITER(S): 9 INJECTION, SOLUTION INTRAMUSCULAR; INTRAVENOUS; SUBCUTANEOUS at 14:29

## 2024-11-06 RX ADMIN — SODIUM CHLORIDE 3 MILLILITER(S): 9 INJECTION, SOLUTION INTRAMUSCULAR; INTRAVENOUS; SUBCUTANEOUS at 21:51

## 2024-11-06 RX ADMIN — Medication 81 MILLIGRAM(S): at 11:56

## 2024-11-06 NOTE — CONSULT NOTE ADULT - SUBJECTIVE AND OBJECTIVE BOX
HPI:  This is a 75 y/o male with PMHx of HTN, HLD, BPH, CAD (?s/p coronary stent per patient), severe PAD s/p fem-fem bypass per patient (?year) and an "abdominal stent for circulation problems".  Has not seen a doctor for his PAD in "a few years".  States he was having severe left leg pain and chest pain, prompting him to call an ambulance.  He was taken to [facility] for evaluation and cardiac cath revealed CAD.  Transferred here from  for surgical evaluation.    Denies CP/SOB/N/V/D/dizziness/cough/fever/chills.    Denies h/o CVA, surgery other than the bypass/stents.  Former smoker (quit 20 years ago) and former ETOH abuse (quit 20 years ago).  Denies drug use.  Poor support system, has no family or friends here.  Some family lives in [place].  He lives alone, on disability.   (27 Oct 2024 19:55)    Patient now s/p MIDCAB (LIMA-LAD), EF 55% (10/31/24)      -----------------------------------------------------------------------  SUBJECTIVE:      -----------------------------------------------------------------------  REVIEW OF SYSTEMS  Constitutional - No fever,  +fatigue  Neurological -   Pain -   Bowel - 11/5  Bladder - voiding with low PVRs  -----------------------------------------------------------------------  FUNCTIONAL HISTORY:  Lives alone in an elevator accessible apartment.  PTA - independent with ADLs and mobility. Uses AD (SC). Recently with poor activity tolerance due to chest pain.     CURRENT FUNCTIONAL STATUS:    Physical Therapy: 11/5 Progress    Bed Mobility  Bed Mobility Training Treatment not Performed: Patient received ambulating from bathroom with PCA and returned seated in bedside chair    Sit-Stand Transfer Training  Transfer Training Stand-to-Sit Transfer: minimum assist (75% patient effort);  contact guard;  1 person assist;  verbal cues;  no device  Sit-to-Stand Transfer Training Transfer Safety Analysis: decreased balance;  decreased weight-shifting ability;  Demo fair eccentric control during descend;  decreased flexibility;  decreased ROM;  decreased strength;  impaired balance;  pain    Gait Training  Gait Training: contact guard;  verbal cues;  portable tele monitor;  40 feet  Gait Analysis: decreased wily;  increased time in double stance;  decreased hip/knee flexion;  decreased velocity of limb motion;  decreased step length;  decreased stride length;  decreased toe clearance;  decreased weight-shifting ability;  decreased flexibility;  decreased ROM;  decreased strength;  impaired balance;  40 feet;  portable monitor    Occupational Therapy: 11/4 Progress      Bed Mobility  Bed Mobility Training Rolling/Turning: moderate assist (50% patient effort);  2 person assist;  nonverbal cues (demo/gestures);  verbal cues  Bed Mobility Training Scooting: moderate assist (50% patient effort);  2 person assist;  verbal cues;  nonverbal cues (demo/gestures)  Bed Mobility Training Supine-to-Sit: moderate assist (50% patient effort);  2 person assist;  verbal cues;  nonverbal cues (demo/gestures)  Bed Mobility Training Limitations: decreased ability to use arms for pushing/pulling;  decreased ability to use legs for bridging/pushing;  decreased strength;  impaired balance    Sit-Stand Transfer Training  Transfer Training Sit-to-Stand Transfer: moderate assist (50% patient effort);  2 person assist;  nonverbal cues (demo/gestures);  verbal cues;  BUE support on portable tele monitor  Transfer Training Stand-to-Sit Transfer: moderate assist (50% patient effort);  2 person assist;  nonverbal cues (demo/gestures);  verbal cues;  BUE support on portable tele monitor  Sit-to-Stand Transfer Training Transfer Safety Analysis: decreased balance;  impaired balance;  decreased strength    Therapeutic Exercise  Therapeutic Exercise Detail: Pt performed BLE knee flexion/extension AROM x 5 reps each side. Pt sat EOB ~8 mins w CGA. Pt performed functional mobility with RW and mod-max Ax2 approx 5 ft. Pt noted with increased retropulsion with fatigue. Distance limited due to c/o LLE and L chest pain.     Lower Body Dressing Training  Lower Body Dressing Training Assistance: maximum assist (25% patient effort);  1 person assist;  nonverbal cues (demo/gestures);  verbal cues;  impaired balance;  decreased strength      -----------------------------------------------------------------------  PAST MEDICAL & SURGICAL HISTORY  CAD (coronary artery disease)    BPH (benign prostatic hyperplasia)    HTN (hypertension)    HLD (hyperlipidemia)    PAD (peripheral artery disease)    S/P femoral-femoral bypass surgery      FAMILY HISTORY     SOCIAL HISTORY  As above  -----------------------------------------------------------------------  VITALS  T(C): 36.6 (11-06-24 @ 08:49), Max: 37 (11-05-24 @ 17:48)  HR: 78 (11-06-24 @ 07:18) (74 - 82)  BP: 120/58 (11-06-24 @ 07:18) (105/57 - 159/68)  RR: 16 (11-06-24 @ 07:18) (16 - 17)  SpO2: 94% (11-06-24 @ 07:18) (93% - 100%)  Wt(kg): --    PHYSICAL EXAM    -----------------------------------------------------------------------  RECENT LABS  CBC Full  -  ( 06 Nov 2024 05:30 )  WBC Count : 9.67 K/uL  RBC Count : 3.75 M/uL  Hemoglobin : 11.3 g/dL  Hematocrit : 34.8 %  Platelet Count - Automated : 295 K/uL  Mean Cell Volume : 92.8 fl  Mean Cell Hemoglobin : 30.1 pg  Mean Cell Hemoglobin Concentration : 32.5 g/dL  Auto Neutrophil # : x  Auto Lymphocyte # : x  Auto Monocyte # : x  Auto Eosinophil # : x  Auto Basophil # : x  Auto Neutrophil % : x  Auto Lymphocyte % : x  Auto Monocyte % : x  Auto Eosinophil % : x  Auto Basophil % : x    11-06    137  |  103  |  27[H]  ----------------------------<  128[H]  5.2   |  23  |  1.37[H]    Ca    9.2      06 Nov 2024 05:30  Mg     2.0     11-06    TPro  7.6  /  Alb  4.0  /  TBili  0.4  /  DBili  x   /  AST  31  /  ALT  20  /  AlkPhos  52  11-05    Urinalysis Basic - ( 06 Nov 2024 05:30 )    Color: x / Appearance: x / SG: x / pH: x  Gluc: 128 mg/dL / Ketone: x  / Bili: x / Urobili: x   Blood: x / Protein: x / Nitrite: x   Leuk Esterase: x / RBC: x / WBC x   Sq Epi: x / Non Sq Epi: x / Bacteria: x      -----------------------------------------------------------------------  IMAGING:    -----------------------------------------------------------------------  ALLERGIES  No Known Allergies      MEDICATIONS   acetaminophen     Tablet .. 650 milliGRAM(s) Oral every 6 hours PRN  aspirin enteric coated 81 milliGRAM(s) Oral daily  atorvastatin 80 milliGRAM(s) Oral at bedtime  bisacodyl 5 milliGRAM(s) Oral at bedtime  bisacodyl Suppository 10 milliGRAM(s) Rectal once PRN  chlorhexidine 2% Cloths 1 Application(s) Topical daily  clopidogrel Tablet 75 milliGRAM(s) Oral daily  dextrose 5%. 1000 milliLiter(s) IV Continuous <Continuous>  dextrose 5%. 1000 milliLiter(s) IV Continuous <Continuous>  dextrose 50% Injectable 50 milliLiter(s) IV Push every 15 minutes  dextrose 50% Injectable 25 milliLiter(s) IV Push every 15 minutes  dextrose Oral Gel 15 Gram(s) Oral once PRN  finasteride 5 milliGRAM(s) Oral daily  glucagon  Injectable 1 milliGRAM(s) IntraMuscular once  heparin   Injectable 5000 Unit(s) SubCutaneous every 8 hours  insulin lispro (ADMELOG) corrective regimen sliding scale   SubCutaneous three times a day before meals  insulin lispro (ADMELOG) corrective regimen sliding scale   SubCutaneous at bedtime  isosorbide   mononitrate ER Tablet (IMDUR) 30 milliGRAM(s) Oral daily  methylPREDNISolone 4 milliGRAM(s) Oral before breakfast  metoprolol tartrate 12.5 milliGRAM(s) Oral every 6 hours  mupirocin 2% Ointment 1 Application(s) Both Nostrils two times a day  nitroglycerin     SubLingual 0.4 milliGRAM(s) SubLingual every 5 minutes PRN  oxyCODONE    IR 5 milliGRAM(s) Oral every 6 hours PRN  oxyCODONE    IR 10 milliGRAM(s) Oral every 6 hours PRN  pantoprazole    Tablet 40 milliGRAM(s) Oral daily  polyethylene glycol 3350 17 Gram(s) Oral daily  polyethylene glycol 3350 17 Gram(s) Oral daily  senna 2 Tablet(s) Oral at bedtime  sodium chloride 0.9% lock flush 3 milliLiter(s) IV Push every 8 hours  sodium chloride 0.9%. 1000 milliLiter(s) IV Continuous <Continuous>  tamsulosin 0.4 milliGRAM(s) Oral at bedtime    -----------------------------------------------------------------------   HPI:  This is a 73 y/o male with PMHx of HTN, HLD, BPH, CAD (?s/p coronary stent per patient), severe PAD s/p fem-fem bypass per patient (?year) and an "abdominal stent for circulation problems".  Has not seen a doctor for his PAD in "a few years".  States he was having severe left leg pain and chest pain, prompting him to call an ambulance.  He was taken to [facility] for evaluation and cardiac cath revealed CAD.  Transferred here from  for surgical evaluation.    Denies CP/SOB/N/V/D/dizziness/cough/fever/chills.    Denies h/o CVA, surgery other than the bypass/stents.  Former smoker (quit 20 years ago) and former ETOH abuse (quit 20 years ago).  Denies drug use.  Poor support system, has no family or friends here.  Some family lives in [place].  He lives alone, on disability.   (27 Oct 2024 19:55)    Patient now s/p MIDCAB (LIMA-LAD), EF 55% (10/31/24)      -----------------------------------------------------------------------  SUBJECTIVE:  Language Line : Polish; #[id number]; Darian  Patient seen and evaluated this AM. Sitting in chair and appears comfortable. Working with PT/OT without issues. Reports left chest and LLE discomfort with movement.     -----------------------------------------------------------------------  REVIEW OF SYSTEMS  Constitutional - No fever,  +fatigue  Neurological -   Pain -   Bowel - 11/5  Bladder - voiding with low PVRs  -----------------------------------------------------------------------  FUNCTIONAL HISTORY:  Lives alone in an elevator accessible apartment.  PTA - independent with ADLs and mobility. Uses AD (SC). Recently with poor activity tolerance due to chest pain.     CURRENT FUNCTIONAL STATUS:    Physical Therapy: 11/5 Progress    Bed Mobility  Bed Mobility Training Treatment not Performed: Patient received ambulating from bathroom with PCA and returned seated in bedside chair    Sit-Stand Transfer Training  Transfer Training Stand-to-Sit Transfer: minimum assist (75% patient effort);  contact guard;  1 person assist;  verbal cues;  no device  Sit-to-Stand Transfer Training Transfer Safety Analysis: decreased balance;  decreased weight-shifting ability;  Demo fair eccentric control during descend;  decreased flexibility;  decreased ROM;  decreased strength;  impaired balance;  pain    Gait Training  Gait Training: contact guard;  verbal cues;  portable tele monitor;  40 feet  Gait Analysis: decreased wily;  increased time in double stance;  decreased hip/knee flexion;  decreased velocity of limb motion;  decreased step length;  decreased stride length;  decreased toe clearance;  decreased weight-shifting ability;  decreased flexibility;  decreased ROM;  decreased strength;  impaired balance;  40 feet;  portable monitor    Occupational Therapy: 11/4 Progress      Bed Mobility  Bed Mobility Training Rolling/Turning: moderate assist (50% patient effort);  2 person assist;  nonverbal cues (demo/gestures);  verbal cues  Bed Mobility Training Scooting: moderate assist (50% patient effort);  2 person assist;  verbal cues;  nonverbal cues (demo/gestures)  Bed Mobility Training Supine-to-Sit: moderate assist (50% patient effort);  2 person assist;  verbal cues;  nonverbal cues (demo/gestures)  Bed Mobility Training Limitations: decreased ability to use arms for pushing/pulling;  decreased ability to use legs for bridging/pushing;  decreased strength;  impaired balance    Sit-Stand Transfer Training  Transfer Training Sit-to-Stand Transfer: moderate assist (50% patient effort);  2 person assist;  nonverbal cues (demo/gestures);  verbal cues;  BUE support on portable tele monitor  Transfer Training Stand-to-Sit Transfer: moderate assist (50% patient effort);  2 person assist;  nonverbal cues (demo/gestures);  verbal cues;  BUE support on portable tele monitor  Sit-to-Stand Transfer Training Transfer Safety Analysis: decreased balance;  impaired balance;  decreased strength    Therapeutic Exercise  Therapeutic Exercise Detail: Pt performed BLE knee flexion/extension AROM x 5 reps each side. Pt sat EOB ~8 mins w CGA. Pt performed functional mobility with RW and mod-max Ax2 approx 5 ft. Pt noted with increased retropulsion with fatigue. Distance limited due to c/o LLE and L chest pain.     Lower Body Dressing Training  Lower Body Dressing Training Assistance: maximum assist (25% patient effort);  1 person assist;  nonverbal cues (demo/gestures);  verbal cues;  impaired balance;  decreased strength      -----------------------------------------------------------------------  PAST MEDICAL & SURGICAL HISTORY  CAD (coronary artery disease)    BPH (benign prostatic hyperplasia)    HTN (hypertension)    HLD (hyperlipidemia)    PAD (peripheral artery disease)    S/P femoral-femoral bypass surgery      FAMILY HISTORY     SOCIAL HISTORY  As above  -----------------------------------------------------------------------  VITALS  T(C): 36.6 (11-06-24 @ 08:49), Max: 37 (11-05-24 @ 17:48)  HR: 78 (11-06-24 @ 07:18) (74 - 82)  BP: 120/58 (11-06-24 @ 07:18) (105/57 - 159/68)  RR: 16 (11-06-24 @ 07:18) (16 - 17)  SpO2: 94% (11-06-24 @ 07:18) (93% - 100%)  Wt(kg): --    PHYSICAL EXAM    -----------------------------------------------------------------------  RECENT LABS  CBC Full  -  ( 06 Nov 2024 05:30 )  WBC Count : 9.67 K/uL  RBC Count : 3.75 M/uL  Hemoglobin : 11.3 g/dL  Hematocrit : 34.8 %  Platelet Count - Automated : 295 K/uL  Mean Cell Volume : 92.8 fl  Mean Cell Hemoglobin : 30.1 pg  Mean Cell Hemoglobin Concentration : 32.5 g/dL  Auto Neutrophil # : x  Auto Lymphocyte # : x  Auto Monocyte # : x  Auto Eosinophil # : x  Auto Basophil # : x  Auto Neutrophil % : x  Auto Lymphocyte % : x  Auto Monocyte % : x  Auto Eosinophil % : x  Auto Basophil % : x    11-06    137  |  103  |  27[H]  ----------------------------<  128[H]  5.2   |  23  |  1.37[H]    Ca    9.2      06 Nov 2024 05:30  Mg     2.0     11-06    TPro  7.6  /  Alb  4.0  /  TBili  0.4  /  DBili  x   /  AST  31  /  ALT  20  /  AlkPhos  52  11-05    Urinalysis Basic - ( 06 Nov 2024 05:30 )    Color: x / Appearance: x / SG: x / pH: x  Gluc: 128 mg/dL / Ketone: x  / Bili: x / Urobili: x   Blood: x / Protein: x / Nitrite: x   Leuk Esterase: x / RBC: x / WBC x   Sq Epi: x / Non Sq Epi: x / Bacteria: x      -----------------------------------------------------------------------  IMAGING:    -----------------------------------------------------------------------  ALLERGIES  No Known Allergies      MEDICATIONS   acetaminophen     Tablet .. 650 milliGRAM(s) Oral every 6 hours PRN  aspirin enteric coated 81 milliGRAM(s) Oral daily  atorvastatin 80 milliGRAM(s) Oral at bedtime  bisacodyl 5 milliGRAM(s) Oral at bedtime  bisacodyl Suppository 10 milliGRAM(s) Rectal once PRN  chlorhexidine 2% Cloths 1 Application(s) Topical daily  clopidogrel Tablet 75 milliGRAM(s) Oral daily  dextrose 5%. 1000 milliLiter(s) IV Continuous <Continuous>  dextrose 5%. 1000 milliLiter(s) IV Continuous <Continuous>  dextrose 50% Injectable 50 milliLiter(s) IV Push every 15 minutes  dextrose 50% Injectable 25 milliLiter(s) IV Push every 15 minutes  dextrose Oral Gel 15 Gram(s) Oral once PRN  finasteride 5 milliGRAM(s) Oral daily  glucagon  Injectable 1 milliGRAM(s) IntraMuscular once  heparin   Injectable 5000 Unit(s) SubCutaneous every 8 hours  insulin lispro (ADMELOG) corrective regimen sliding scale   SubCutaneous three times a day before meals  insulin lispro (ADMELOG) corrective regimen sliding scale   SubCutaneous at bedtime  isosorbide   mononitrate ER Tablet (IMDUR) 30 milliGRAM(s) Oral daily  methylPREDNISolone 4 milliGRAM(s) Oral before breakfast  metoprolol tartrate 12.5 milliGRAM(s) Oral every 6 hours  mupirocin 2% Ointment 1 Application(s) Both Nostrils two times a day  nitroglycerin     SubLingual 0.4 milliGRAM(s) SubLingual every 5 minutes PRN  oxyCODONE    IR 5 milliGRAM(s) Oral every 6 hours PRN  oxyCODONE    IR 10 milliGRAM(s) Oral every 6 hours PRN  pantoprazole    Tablet 40 milliGRAM(s) Oral daily  polyethylene glycol 3350 17 Gram(s) Oral daily  polyethylene glycol 3350 17 Gram(s) Oral daily  senna 2 Tablet(s) Oral at bedtime  sodium chloride 0.9% lock flush 3 milliLiter(s) IV Push every 8 hours  sodium chloride 0.9%. 1000 milliLiter(s) IV Continuous <Continuous>  tamsulosin 0.4 milliGRAM(s) Oral at bedtime    -----------------------------------------------------------------------   HPI:  This is a 73 y/o male with PMHx of HTN, HLD, BPH, CAD (?s/p coronary stent per patient), severe PAD s/p fem-fem bypass per patient (?year) and an "abdominal stent for circulation problems".  Has not seen a doctor for his PAD in "a few years".  States he was having severe left leg pain and chest pain, prompting him to call an ambulance.  He was taken to [facility] for evaluation and cardiac cath revealed CAD.  Transferred here from  for surgical evaluation.    Denies CP/SOB/N/V/D/dizziness/cough/fever/chills.    Denies h/o CVA, surgery other than the bypass/stents.  Former smoker (quit 20 years ago) and former ETOH abuse (quit 20 years ago).  Denies drug use.  Poor support system, has no family or friends here.  Some family lives in [place].  He lives alone, on disability.   (27 Oct 2024 19:55)    Patient now s/p MIDCAB (LIMA-LAD), EF 55% (10/31/24)  -----------------------------------------------------------------------  SUBJECTIVE:  Language Line : Polish; #[id number]; Darian  Patient seen and evaluated this AM. Sitting in chair and appears comfortable. Working with PT/OT without issues. Reports left chest and LLE discomfort with movement.   Chest tubes removed.  -----------------------------------------------------------------------  REVIEW OF SYSTEMS  Constitutional - No fever,  +fatigue  Neurological - general weakness  Pain - LLE and left chest wall  Bowel - 11/5  Bladder - voiding with low PVRs  -----------------------------------------------------------------------  FUNCTIONAL HISTORY:  Lives alone in an elevator accessible apartment.  PTA - independent with ADLs and mobility. Uses AD (SC). Recently with poor activity tolerance due to chest pain.     CURRENT FUNCTIONAL STATUS:    Physical Therapy: 11/5 Progress    Bed Mobility  Bed Mobility Training Treatment not Performed: Patient received ambulating from bathroom with PCA and returned seated in bedside chair    Sit-Stand Transfer Training  Transfer Training Stand-to-Sit Transfer: minimum assist (75% patient effort);  contact guard;  1 person assist;  verbal cues;  no device  Sit-to-Stand Transfer Training Transfer Safety Analysis: decreased balance;  decreased weight-shifting ability;  Demo fair eccentric control during descend;  decreased flexibility;  decreased ROM;  decreased strength;  impaired balance;  pain    Gait Training  Gait Training: contact guard;  verbal cues;  portable tele monitor;  40 feet  Gait Analysis: decreased wily;  increased time in double stance;  decreased hip/knee flexion;  decreased velocity of limb motion;  decreased step length;  decreased stride length;  decreased toe clearance;  decreased weight-shifting ability;  decreased flexibility;  decreased ROM;  decreased strength;  impaired balance;  40 feet;  portable monitor    Occupational Therapy: 11/4 Progress      Bed Mobility  Bed Mobility Training Rolling/Turning: moderate assist (50% patient effort);  2 person assist;  nonverbal cues (demo/gestures);  verbal cues  Bed Mobility Training Scooting: moderate assist (50% patient effort);  2 person assist;  verbal cues;  nonverbal cues (demo/gestures)  Bed Mobility Training Supine-to-Sit: moderate assist (50% patient effort);  2 person assist;  verbal cues;  nonverbal cues (demo/gestures)  Bed Mobility Training Limitations: decreased ability to use arms for pushing/pulling;  decreased ability to use legs for bridging/pushing;  decreased strength;  impaired balance    Sit-Stand Transfer Training  Transfer Training Sit-to-Stand Transfer: moderate assist (50% patient effort);  2 person assist;  nonverbal cues (demo/gestures);  verbal cues;  BUE support on portable tele monitor  Transfer Training Stand-to-Sit Transfer: moderate assist (50% patient effort);  2 person assist;  nonverbal cues (demo/gestures);  verbal cues;  BUE support on portable tele monitor  Sit-to-Stand Transfer Training Transfer Safety Analysis: decreased balance;  impaired balance;  decreased strength    Therapeutic Exercise  Therapeutic Exercise Detail: Pt performed BLE knee flexion/extension AROM x 5 reps each side. Pt sat EOB ~8 mins w CGA. Pt performed functional mobility with RW and mod-max Ax2 approx 5 ft. Pt noted with increased retropulsion with fatigue. Distance limited due to c/o LLE and L chest pain.     Lower Body Dressing Training  Lower Body Dressing Training Assistance: maximum assist (25% patient effort);  1 person assist;  nonverbal cues (demo/gestures);  verbal cues;  impaired balance;  decreased strength      -----------------------------------------------------------------------  PAST MEDICAL & SURGICAL HISTORY  CAD (coronary artery disease)    BPH (benign prostatic hyperplasia)    HTN (hypertension)    HLD (hyperlipidemia)    PAD (peripheral artery disease)    S/P femoral-femoral bypass surgery      FAMILY HISTORY     SOCIAL HISTORY  As above  -----------------------------------------------------------------------  VITALS  T(C): 36.6 (11-06-24 @ 08:49), Max: 37 (11-05-24 @ 17:48)  HR: 78 (11-06-24 @ 07:18) (74 - 82)  BP: 120/58 (11-06-24 @ 07:18) (105/57 - 159/68)  RR: 16 (11-06-24 @ 07:18) (16 - 17)  SpO2: 94% (11-06-24 @ 07:18) (93% - 100%)  Wt(kg): --    PHYSICAL EXAM  Constitutional - NAD, Comfortable  HEENT - NCAT  Neck - Supple, No limited ROM  Chest - Breathing comfortably, No Respiratory distress  Cardiovascular - Regular pulse  Abdomen - No visible abdominal distension  Extremities - No deformities of upper or lower limbs. No calf tenderness   MSK - ROM within functional limits  Neurologic Exam -                    Cognitive - Awake, Alert, AAO to self, place, date, year, situation; follows commands     Cranial Nerves -  EOMI, No facial asymmetry     Motor - 5/5 upper extremities. 5/5 RLE, 4/5 left ankle DF (limited by pain)  Skin: LLE chronic scabbed ulcers with surrounding venous stasis changes  Psychiatric - Mood stable, Affect WNL   -----------------------------------------------------------------------  RECENT LABS  CBC Full  -  ( 06 Nov 2024 05:30 )  WBC Count : 9.67 K/uL  RBC Count : 3.75 M/uL  Hemoglobin : 11.3 g/dL  Hematocrit : 34.8 %  Platelet Count - Automated : 295 K/uL  Mean Cell Volume : 92.8 fl  Mean Cell Hemoglobin : 30.1 pg  Mean Cell Hemoglobin Concentration : 32.5 g/dL  Auto Neutrophil # : x  Auto Lymphocyte # : x  Auto Monocyte # : x  Auto Eosinophil # : x  Auto Basophil # : x  Auto Neutrophil % : x  Auto Lymphocyte % : x  Auto Monocyte % : x  Auto Eosinophil % : x  Auto Basophil % : x    11-06    137  |  103  |  27[H]  ----------------------------<  128[H]  5.2   |  23  |  1.37[H]    Ca    9.2      06 Nov 2024 05:30  Mg     2.0     11-06    TPro  7.6  /  Alb  4.0  /  TBili  0.4  /  DBili  x   /  AST  31  /  ALT  20  /  AlkPhos  52  11-05    Urinalysis Basic - ( 06 Nov 2024 05:30 )    Color: x / Appearance: x / SG: x / pH: x  Gluc: 128 mg/dL / Ketone: x  / Bili: x / Urobili: x   Blood: x / Protein: x / Nitrite: x   Leuk Esterase: x / RBC: x / WBC x   Sq Epi: x / Non Sq Epi: x / Bacteria: x      -----------------------------------------------------------------------  IMAGING:  < from: Xray Kidney Ureter Bladder (11.05.24 @ 13:35) >  IMPRESSION:    Moderate fecal material noted in colon. No obstruction. Lung bases   grossly clear. No acute bone abnormality.    < end of copied text >    < from: US Duplex Venous Lower Ext Complete, Bilateral (10.27.24 @ 19:59) >  LEFT:  The patient declined examination.    IMPRESSION:  No evidence of deep venous thrombosis in the right lower extremity.      < end of copied text >    -----------------------------------------------------------------------  ALLERGIES  No Known Allergies      MEDICATIONS   acetaminophen     Tablet .. 650 milliGRAM(s) Oral every 6 hours PRN  aspirin enteric coated 81 milliGRAM(s) Oral daily  atorvastatin 80 milliGRAM(s) Oral at bedtime  bisacodyl 5 milliGRAM(s) Oral at bedtime  bisacodyl Suppository 10 milliGRAM(s) Rectal once PRN  chlorhexidine 2% Cloths 1 Application(s) Topical daily  clopidogrel Tablet 75 milliGRAM(s) Oral daily  dextrose 5%. 1000 milliLiter(s) IV Continuous <Continuous>  dextrose 5%. 1000 milliLiter(s) IV Continuous <Continuous>  dextrose 50% Injectable 50 milliLiter(s) IV Push every 15 minutes  dextrose 50% Injectable 25 milliLiter(s) IV Push every 15 minutes  dextrose Oral Gel 15 Gram(s) Oral once PRN  finasteride 5 milliGRAM(s) Oral daily  glucagon  Injectable 1 milliGRAM(s) IntraMuscular once  heparin   Injectable 5000 Unit(s) SubCutaneous every 8 hours  insulin lispro (ADMELOG) corrective regimen sliding scale   SubCutaneous three times a day before meals  insulin lispro (ADMELOG) corrective regimen sliding scale   SubCutaneous at bedtime  isosorbide   mononitrate ER Tablet (IMDUR) 30 milliGRAM(s) Oral daily  methylPREDNISolone 4 milliGRAM(s) Oral before breakfast  metoprolol tartrate 12.5 milliGRAM(s) Oral every 6 hours  mupirocin 2% Ointment 1 Application(s) Both Nostrils two times a day  nitroglycerin     SubLingual 0.4 milliGRAM(s) SubLingual every 5 minutes PRN  oxyCODONE    IR 5 milliGRAM(s) Oral every 6 hours PRN  oxyCODONE    IR 10 milliGRAM(s) Oral every 6 hours PRN  pantoprazole    Tablet 40 milliGRAM(s) Oral daily  polyethylene glycol 3350 17 Gram(s) Oral daily  polyethylene glycol 3350 17 Gram(s) Oral daily  senna 2 Tablet(s) Oral at bedtime  sodium chloride 0.9% lock flush 3 milliLiter(s) IV Push every 8 hours  sodium chloride 0.9%. 1000 milliLiter(s) IV Continuous <Continuous>  tamsulosin 0.4 milliGRAM(s) Oral at bedtime    -----------------------------------------------------------------------

## 2024-11-06 NOTE — CONSULT NOTE ADULT - ASSESSMENT
**NOTE INCOMPLETE**  74 year old male with functional impairments following CABG    Debility s/p MIDCABG - ASA, Plavix, Imdur  Post operative state  LLE chronic ulcers, PAD, HLD - vascular rec outpatient follow up, statin, plavix, ASA  Constipation  Urinary retention - improved   74 year old male with functional impairments following CABG    Debility s/p MIDCABG, Post operative state - ASA, Plavix, Imdur  LLE chronic ulcers, PAD, HLD - vascular rec outpatient follow up, statin, plavix, ASA  Constipation  Urinary retention - improved  Pulmonary nodules - Follow up with Dr. Duarte for outpatient work up      PLAN / RECOMMENDATIONS:     # Rehab / Mobilization:   - Initial therapy assessment: [X] PT  [X] OT - rec'd AR  - Continue skilled therapy while admitted to prevent secondary complications of immobility focus on transfer training, bed mobility, progressive ambulation, and equipment evaluation.   - Educated patient and family members on post-acute rehabilitation. Discussed anticipated rehab course.  - OOB throughout the day with staff or OOB in chair with meals   - Encouraged HOB elevation to at least 30-40 deg to prevent orthostasis   - Therapy precautions: cardiac, falls    # Bracing/Splinting:   - None indicated at this time      # Pain Management: LLE pain due to chronic ulcers  - Current pain regimen reviewed: cont oxy 5/10 as needed    # Speech/ Swallow:   - Dysphagia screen [X]  - SLP consult for swallow function evaluation and treatment   - Diet:      # GI/ Bowel:  - Continue to monitor bowel patterns.   - Bowel Regimen: dulcolax, miralax, senna    # / Bladder: voiding  - TOV passed - Bladder scans for PVR or q6hrs (if no void); Straight cath for residual urine >400cc  - Continue to monitor bladder patterns, avoid constipation.       # DVT Prophylaxis:   - SCDs, chemoprophylaxis - heparin    # Disposition optimization:   - Disposition recommendation -  Acute Inpatient Rehabilitation    Patient has significant functional impairments and would benefit from continued rehabilitation in the ACUTE inpatient rehab setting. He has both medical and functional needs appropriate for acute inpatient rehabilitation and would benefit from comprehensive interdisciplinary care, including a physiatrist, rehabilitation nursing, PT, OT, and case management/social work. We anticipate that he would be able to tolerate 3 hours of PT/OT per day for 5 to 6 days per week to focus on mobility, transfers, gait training with assistive devices, ADL training, and patient education/safety.

## 2024-11-06 NOTE — PROGRESS NOTE ADULT - ASSESSMENT
73yo M, with PMHx of HTN, HLD, BPH, CAD (?s/p coronary stent per patient), severe PAD (s/p vascular interventions in the past) who presented to [facility] with severe LLE pain and chest pain. Patient found to have CAD, so patient was transferred to [facility] under  for further management. Vascular was consulted pre-op for PAD, recommending outpatient follow up with outpatient LLE angiogram. Pulm consulted preop for pulmonary nodules, completed PET scan suspicious for malignancy, but negative for metastasis recommended further outpatient workup for staging. Patient now s/p MIDCAB (LIMA-LAD), EF 55% (10/31/24). Patient did not receive intraop blood products, 1.6L IVF given, w/ traumatic ramirez insertion, urology consulted and 3 way ramirez placed, CBI not started. POD1 CT removed, BB and Medrol dose pack started. POD2, jonh removed. POD3, central line removed, ramirez removed and passed TOV. POD4, patient w/ constipation and bowel distention on CXR, increased bowel regimen pending KUB. Patient to be evaluated for PMR acute rehab. POD5 patient had a large bowel movement overnight, pending eval by PMR and bed at rehab. POD6: pending auth and acceptance to rehab     A/P:  Neurovascular: No delirium. Pain well controlled with current regimen.  -Pain: tylenol, oxy prn     Cardiovascular: Hemodynamically stable. HR controlled.  -CAD, POD6 s/p MIDCAB (LIMA-LAD), EF 55%   C/w ASA daily, c/w Plavix 75mg daily, c/w Imdur 30mg daily, c/w Medrol dose pack, c/w Lopressor 12.5mg Q6  -H/o PAD (s/p vascular intervention in past)   Vascular has been following, recommending outpatient follow up for outpatient LLE   -HLD: c/w Lipitor 80mg daily     Respiratory: 02 Sat = 95% on RA.  -Encourage C+DB and Use of IS 10x / hr while awake.  -CXR: no PTX or effusion   -Pulmonary Nodules: pulmonary was following pre-op       -High suspicion for lung cancer in RUL, however would need further workup for staging/diagnosis which does not require inpatient stay. Ensure follow up with  on discharge     GI: Stable.  -PPX: pantoprazole 40mg daily   -PO Diet.  -Bowel regimen: c/w dulcolax, miralax, senna      Renal / : voiding freely   -Monitor renal function: 27/1.37  -Monitor I/O's.  -BPH: c/w finasteride 5mg daily, c/w tamsulosin daily   -S/p traumatic ramirez insertion and now s/p removal, voiding freely   -FELIPA: no reported hx of kidney disease, unclear baseline, ensure not retaining.    Continue to trend Cr/BUN    Endocrine:    -A1c: 5.8%, no hx  -TSH: 1.730    Hematologic:  -CBC: 11.3/34.8  -Coagulation Panel  - no signs of active bleeding     ID:  -Tempature: afebrile  -CBC: WBC 9.67  -Observe for SIRS/Sepsis Syndrome.    Prophylaxis:  -DVT prophylaxis with 5000 SubQ Heparin q8h.  -SCD's    Disposition:  -pending rehab

## 2024-11-06 NOTE — PROGRESS NOTE ADULT - SUBJECTIVE AND OBJECTIVE BOX
Patient discussed on morning rounds with Dr. Hartman    Operation / Date: S/p MIDCAB (LIMA-LAD), EF 35% (10/31/24)    SUBJECTIVE ASSESSMENT:  74y Male seen and assessed at bedside this morning with  ([id number]), patient continues to complain about L leg pain 2/2 chronic ulcers. Patient also complains of some pain at incision site, otherwise denies SOB, n/v/d         Vital Signs Last 24 Hrs  T(C): 36.6 (06 Nov 2024 08:49), Max: 37 (05 Nov 2024 17:48)  T(F): 97.9 (06 Nov 2024 08:49), Max: 98.6 (05 Nov 2024 17:48)  HR: 78 (06 Nov 2024 07:18) (74 - 82)  BP: 120/58 (06 Nov 2024 07:18) (105/57 - 159/68)  BP(mean): 84 (06 Nov 2024 07:18) (77 - 98)  RR: 16 (06 Nov 2024 07:18) (16 - 17)  SpO2: 94% (06 Nov 2024 07:18) (93% - 100%)    Parameters below as of 06 Nov 2024 07:18  Patient On (Oxygen Delivery Method): room air      I&O's Detail    05 Nov 2024 07:01  -  06 Nov 2024 07:00  --------------------------------------------------------  IN:    Oral Fluid: 300 mL  Total IN: 300 mL    OUT:    Voided (mL): 960 mL  Total OUT: 960 mL    Total NET: -660 mL          CHEST TUBE:  No.   THIEN DRAIN:  No.  EPICARDIAL WIRES: No.  TIE DOWNS: Yes  GRULLON:No.    PHYSICAL EXAM:  Appearance: No acute distress.  Neurologic: AAOx3, no AMS or focal deficits.  Responds appropriately to verbal and physical stimuli; exhibits purposeful movement in all extremities.  Cardiovascular: RRR, S1 S2. No m/r/g.  Respiratory: No acute respiratory distress. CTA b/l, no w/r/r.   Gastrointestinal:  Soft, non-tender, non-distended, + BS.	  Skin: No rashes. No ecchymoses. No cyanosis.  Extremities: +LLE with scabbed wounds. Exhibits normal range of motion, no clubbing, cyanosis or edema.  Vascular: +b/l DP signals, no PT signals  Incision Sites: +L mini thoracotomy incision well approximated and covered with dermabond, +L CT and L Thien removal sites with dressings c/d/i.     LABS:                        11.3   9.67  )-----------( 295      ( 06 Nov 2024 05:30 )             34.8       PT/INR - ( 05 Nov 2024 05:30 )   PT: 13.4 sec;   INR: 1.15          PTT - ( 05 Nov 2024 05:30 )  PTT:23.3 sec    11-06    137  |  103  |  27[H]  ----------------------------<  128[H]  5.2   |  23  |  1.37[H]    Ca    9.2      06 Nov 2024 05:30  Mg     2.0     11-06    TPro  7.6  /  Alb  4.0  /  TBili  0.4  /  DBili  x   /  AST  31  /  ALT  20  /  AlkPhos  52  11-05      Urinalysis Basic - ( 06 Nov 2024 05:30 )    Color: x / Appearance: x / SG: x / pH: x  Gluc: 128 mg/dL / Ketone: x  / Bili: x / Urobili: x   Blood: x / Protein: x / Nitrite: x   Leuk Esterase: x / RBC: x / WBC x   Sq Epi: x / Non Sq Epi: x / Bacteria: x        MEDICATIONS  (STANDING):  aspirin enteric coated 81 milliGRAM(s) Oral daily  atorvastatin 80 milliGRAM(s) Oral at bedtime  bisacodyl 5 milliGRAM(s) Oral at bedtime  chlorhexidine 2% Cloths 1 Application(s) Topical daily  clopidogrel Tablet 75 milliGRAM(s) Oral daily  dextrose 5%. 1000 milliLiter(s) (50 mL/Hr) IV Continuous <Continuous>  dextrose 5%. 1000 milliLiter(s) (100 mL/Hr) IV Continuous <Continuous>  dextrose 50% Injectable 50 milliLiter(s) IV Push every 15 minutes  dextrose 50% Injectable 25 milliLiter(s) IV Push every 15 minutes  finasteride 5 milliGRAM(s) Oral daily  glucagon  Injectable 1 milliGRAM(s) IntraMuscular once  heparin   Injectable 5000 Unit(s) SubCutaneous every 8 hours  insulin lispro (ADMELOG) corrective regimen sliding scale   SubCutaneous three times a day before meals  insulin lispro (ADMELOG) corrective regimen sliding scale   SubCutaneous at bedtime  isosorbide   mononitrate ER Tablet (IMDUR) 30 milliGRAM(s) Oral daily  methylPREDNISolone 4 milliGRAM(s) Oral before breakfast  metoprolol tartrate 12.5 milliGRAM(s) Oral every 6 hours  mupirocin 2% Ointment 1 Application(s) Both Nostrils two times a day  pantoprazole    Tablet 40 milliGRAM(s) Oral daily  polyethylene glycol 3350 17 Gram(s) Oral daily  polyethylene glycol 3350 17 Gram(s) Oral daily  senna 2 Tablet(s) Oral at bedtime  sodium chloride 0.9% lock flush 3 milliLiter(s) IV Push every 8 hours  sodium chloride 0.9%. 1000 milliLiter(s) (10 mL/Hr) IV Continuous <Continuous>  tamsulosin 0.4 milliGRAM(s) Oral at bedtime    MEDICATIONS  (PRN):  acetaminophen     Tablet .. 650 milliGRAM(s) Oral every 6 hours PRN Mild Pain (1 - 3)  bisacodyl Suppository 10 milliGRAM(s) Rectal once PRN Constipation  dextrose Oral Gel 15 Gram(s) Oral once PRN Blood Glucose LESS THAN 70 milliGRAM(s)/deciliter  nitroglycerin     SubLingual 0.4 milliGRAM(s) SubLingual every 5 minutes PRN Chest Pain  oxyCODONE    IR 10 milliGRAM(s) Oral every 6 hours PRN Severe Pain (7 - 10)  oxyCODONE    IR 5 milliGRAM(s) Oral every 6 hours PRN Moderate Pain (4 - 6)        RADIOLOGY & ADDITIONAL TESTS:

## 2024-11-07 ENCOUNTER — RESULT REVIEW (OUTPATIENT)
Age: 74
End: 2024-11-07

## 2024-11-07 ENCOUNTER — TRANSCRIPTION ENCOUNTER (OUTPATIENT)
Age: 74
End: 2024-11-07

## 2024-11-07 LAB
ADD ON TEST-SPECIMEN IN LAB: SIGNIFICANT CHANGE UP
ANION GAP SERPL CALC-SCNC: 12 MMOL/L — SIGNIFICANT CHANGE UP (ref 5–17)
ANION GAP SERPL CALC-SCNC: 13 MMOL/L — SIGNIFICANT CHANGE UP (ref 5–17)
BUN SERPL-MCNC: 31 MG/DL — HIGH (ref 7–23)
BUN SERPL-MCNC: 33 MG/DL — HIGH (ref 7–23)
CALCIUM SERPL-MCNC: 9.2 MG/DL — SIGNIFICANT CHANGE UP (ref 8.4–10.5)
CALCIUM SERPL-MCNC: 9.3 MG/DL — SIGNIFICANT CHANGE UP (ref 8.4–10.5)
CHLORIDE SERPL-SCNC: 100 MMOL/L — SIGNIFICANT CHANGE UP (ref 96–108)
CHLORIDE SERPL-SCNC: 102 MMOL/L — SIGNIFICANT CHANGE UP (ref 96–108)
CO2 SERPL-SCNC: 23 MMOL/L — SIGNIFICANT CHANGE UP (ref 22–31)
CO2 SERPL-SCNC: 24 MMOL/L — SIGNIFICANT CHANGE UP (ref 22–31)
CREAT SERPL-MCNC: 1.47 MG/DL — HIGH (ref 0.5–1.3)
CREAT SERPL-MCNC: 1.61 MG/DL — HIGH (ref 0.5–1.3)
EGFR: 44 ML/MIN/1.73M2 — LOW
EGFR: 49 ML/MIN/1.73M2 — LOW
GLUCOSE SERPL-MCNC: 110 MG/DL — HIGH (ref 70–99)
GLUCOSE SERPL-MCNC: 143 MG/DL — HIGH (ref 70–99)
HCT VFR BLD CALC: 38.2 % — LOW (ref 39–50)
HGB BLD-MCNC: 12.5 G/DL — LOW (ref 13–17)
MAGNESIUM SERPL-MCNC: 1.9 MG/DL — SIGNIFICANT CHANGE UP (ref 1.6–2.6)
MCHC RBC-ENTMCNC: 31 PG — SIGNIFICANT CHANGE UP (ref 27–34)
MCHC RBC-ENTMCNC: 32.7 G/DL — SIGNIFICANT CHANGE UP (ref 32–36)
MCV RBC AUTO: 94.8 FL — SIGNIFICANT CHANGE UP (ref 80–100)
NRBC # BLD: 0 /100 WBCS — SIGNIFICANT CHANGE UP (ref 0–0)
PLATELET # BLD AUTO: 297 K/UL — SIGNIFICANT CHANGE UP (ref 150–400)
POTASSIUM SERPL-MCNC: 4.3 MMOL/L — SIGNIFICANT CHANGE UP (ref 3.5–5.3)
POTASSIUM SERPL-MCNC: 4.5 MMOL/L — SIGNIFICANT CHANGE UP (ref 3.5–5.3)
POTASSIUM SERPL-SCNC: 4.3 MMOL/L — SIGNIFICANT CHANGE UP (ref 3.5–5.3)
POTASSIUM SERPL-SCNC: 4.5 MMOL/L — SIGNIFICANT CHANGE UP (ref 3.5–5.3)
RBC # BLD: 4.03 M/UL — LOW (ref 4.2–5.8)
RBC # FLD: 12.5 % — SIGNIFICANT CHANGE UP (ref 10.3–14.5)
SARS-COV-2 RNA SPEC QL NAA+PROBE: SIGNIFICANT CHANGE UP
SODIUM SERPL-SCNC: 136 MMOL/L — SIGNIFICANT CHANGE UP (ref 135–145)
SODIUM SERPL-SCNC: 138 MMOL/L — SIGNIFICANT CHANGE UP (ref 135–145)
WBC # BLD: 9.03 K/UL — SIGNIFICANT CHANGE UP (ref 3.8–10.5)
WBC # FLD AUTO: 9.03 K/UL — SIGNIFICANT CHANGE UP (ref 3.8–10.5)

## 2024-11-07 PROCEDURE — 93308 TTE F-UP OR LMTD: CPT | Mod: 26

## 2024-11-07 PROCEDURE — 71045 X-RAY EXAM CHEST 1 VIEW: CPT | Mod: 26

## 2024-11-07 RX ORDER — LIDOCAINE HYDROCHLORIDE 40 MG/ML
1 SOLUTION TOPICAL EVERY 24 HOURS
Refills: 0 | Status: DISCONTINUED | OUTPATIENT
Start: 2024-11-07 | End: 2024-11-08

## 2024-11-07 RX ORDER — ACETAMINOPHEN 500 MG
975 TABLET ORAL EVERY 6 HOURS
Refills: 0 | Status: DISCONTINUED | OUTPATIENT
Start: 2024-11-07 | End: 2024-11-08

## 2024-11-07 RX ORDER — MAGNESIUM OXIDE 400 MG/1
800 TABLET ORAL ONCE
Refills: 0 | Status: COMPLETED | OUTPATIENT
Start: 2024-11-07 | End: 2024-11-07

## 2024-11-07 RX ADMIN — Medication 5 MILLIGRAM(S): at 21:17

## 2024-11-07 RX ADMIN — LIDOCAINE HYDROCHLORIDE 1 PATCH: 40 SOLUTION TOPICAL at 13:48

## 2024-11-07 RX ADMIN — Medication 12.5 MILLIGRAM(S): at 23:48

## 2024-11-07 RX ADMIN — OXYCODONE HYDROCHLORIDE 10 MILLIGRAM(S): 30 TABLET ORAL at 22:35

## 2024-11-07 RX ADMIN — CLOPIDOGREL 75 MILLIGRAM(S): 75 TABLET ORAL at 11:43

## 2024-11-07 RX ADMIN — OXYCODONE HYDROCHLORIDE 10 MILLIGRAM(S): 30 TABLET ORAL at 01:24

## 2024-11-07 RX ADMIN — CHLORHEXIDINE GLUCONATE 1 APPLICATION(S): 40 SOLUTION TOPICAL at 05:24

## 2024-11-07 RX ADMIN — Medication 12.5 MILLIGRAM(S): at 05:24

## 2024-11-07 RX ADMIN — SODIUM CHLORIDE 3 MILLILITER(S): 9 INJECTION, SOLUTION INTRAMUSCULAR; INTRAVENOUS; SUBCUTANEOUS at 17:12

## 2024-11-07 RX ADMIN — OXYCODONE HYDROCHLORIDE 10 MILLIGRAM(S): 30 TABLET ORAL at 13:48

## 2024-11-07 RX ADMIN — OXYCODONE HYDROCHLORIDE 10 MILLIGRAM(S): 30 TABLET ORAL at 07:50

## 2024-11-07 RX ADMIN — SODIUM CHLORIDE 3 MILLILITER(S): 9 INJECTION, SOLUTION INTRAMUSCULAR; INTRAVENOUS; SUBCUTANEOUS at 05:51

## 2024-11-07 RX ADMIN — Medication 12.5 MILLIGRAM(S): at 00:19

## 2024-11-07 RX ADMIN — MAGNESIUM OXIDE 800 MILLIGRAM(S): 400 TABLET ORAL at 10:33

## 2024-11-07 RX ADMIN — OXYCODONE HYDROCHLORIDE 10 MILLIGRAM(S): 30 TABLET ORAL at 00:24

## 2024-11-07 RX ADMIN — ISOSORBIDE MONONITRATE 30 MILLIGRAM(S): 60 TABLET, EXTENDED RELEASE ORAL at 11:43

## 2024-11-07 RX ADMIN — SODIUM CHLORIDE 3 MILLILITER(S): 9 INJECTION, SOLUTION INTRAMUSCULAR; INTRAVENOUS; SUBCUTANEOUS at 21:35

## 2024-11-07 RX ADMIN — OXYCODONE HYDROCHLORIDE 10 MILLIGRAM(S): 30 TABLET ORAL at 21:18

## 2024-11-07 RX ADMIN — FINASTERIDE 5 MILLIGRAM(S): 5 TABLET, FILM COATED ORAL at 11:43

## 2024-11-07 RX ADMIN — Medication 975 MILLIGRAM(S): at 17:18

## 2024-11-07 RX ADMIN — Medication 12.5 MILLIGRAM(S): at 17:08

## 2024-11-07 RX ADMIN — Medication 80 MILLIGRAM(S): at 21:17

## 2024-11-07 RX ADMIN — HEPARIN SODIUM 5000 UNIT(S): 10000 INJECTION INTRAVENOUS; SUBCUTANEOUS at 21:18

## 2024-11-07 RX ADMIN — Medication 975 MILLIGRAM(S): at 23:47

## 2024-11-07 RX ADMIN — LIDOCAINE HYDROCHLORIDE 1 PATCH: 40 SOLUTION TOPICAL at 20:48

## 2024-11-07 RX ADMIN — PANTOPRAZOLE SODIUM 40 MILLIGRAM(S): 40 TABLET, DELAYED RELEASE ORAL at 11:43

## 2024-11-07 RX ADMIN — HEPARIN SODIUM 5000 UNIT(S): 10000 INJECTION INTRAVENOUS; SUBCUTANEOUS at 06:03

## 2024-11-07 RX ADMIN — Medication 81 MILLIGRAM(S): at 11:44

## 2024-11-07 RX ADMIN — Medication 0.4 MILLIGRAM(S): at 21:17

## 2024-11-07 RX ADMIN — Medication 12.5 MILLIGRAM(S): at 11:43

## 2024-11-07 RX ADMIN — Medication 2 TABLET(S): at 21:17

## 2024-11-07 RX ADMIN — POLYETHYLENE GLYCOL 3350 17 GRAM(S): 17 POWDER, FOR SOLUTION ORAL at 11:42

## 2024-11-07 NOTE — DISCHARGE NOTE PROVIDER - NSDCCPCAREPLAN_GEN_ALL_CORE_FT
PRINCIPAL DISCHARGE DIAGNOSIS  Diagnosis: ACS (acute coronary syndrome)  Assessment and Plan of Treatment:

## 2024-11-07 NOTE — DISCHARGE NOTE PROVIDER - NSDCMRMEDTOKEN_GEN_ALL_CORE_FT
amLODIPine 10 mg oral tablet: 1 tab(s) orally once a day  aspirin 81 mg oral tablet: 1 tab(s) orally once a day  finasteride 5 mg oral tablet: 1 tab(s) orally once a day  isosorbide mononitrate 30 mg oral tablet, extended release: 1 tab(s) orally once a day  Lipitor 80 mg oral tablet: 1 tab(s) orally once a day (at bedtime)  metoprolol tartrate 25 mg oral tablet: 1 tab(s) orally 2 times a day  Plavix 75 mg oral tablet: 1 tab(s) orally once a day  tamsulosin 0.4 mg oral capsule: 1 cap(s) orally once a day (at bedtime)   acetaminophen 325 mg oral tablet: 3 tab(s) orally every 6 hours  aspirin 81 mg oral tablet: 1 tab(s) orally once a day  finasteride 5 mg oral tablet: 1 tab(s) orally once a day  isosorbide mononitrate 30 mg oral tablet, extended release: 1 tab(s) orally once a day  lidocaine 4% topical film: Apply topically to affected area once a day as needed for  mild pain  Lipitor 80 mg oral tablet: 1 tab(s) orally once a day (at bedtime)  metoprolol tartrate 25 mg oral tablet: 1 tab(s) orally 2 times a day  oxyCODONE 5 mg oral tablet: 1 tab(s) orally every 6 hours As needed Moderate Pain (4 - 6)  pantoprazole 40 mg oral delayed release tablet: 1 tab(s) orally once a day  Plavix 75 mg oral tablet: 1 tab(s) orally once a day  polyethylene glycol 3350 oral powder for reconstitution: 17 gram(s) orally once a day  senna leaf extract oral tablet: 2 tab(s) orally once a day (at bedtime)  tamsulosin 0.4 mg oral capsule: 1 cap(s) orally once a day (at bedtime)

## 2024-11-07 NOTE — DISCHARGE NOTE PROVIDER - CARE PROVIDER_API CALL
Heron Devine  Thoracic and Cardiac Surgery  [address], [address]  [place] [address]  Phone: [phone]  Fax: [phone]  Follow Up Time:     Partha Valerio  Vascular Surgery  [address], [address]  [place] [address]  Phone: [phone]  Fax: [phone]  Follow Up Time: 2 weeks    Melinda Duarte  Pulmonary Disease  [address] [place] [address], [address]  [place] [address]  Phone: [phone]  Fax: [phone]  Follow Up Time: 2 weeks    Lj Howard  Urology  Phone: ([phone]  Fax: ()-  Follow Up Time:

## 2024-11-07 NOTE — PROGRESS NOTE ADULT - ASSESSMENT
75yo M, with PMHx of HTN, HLD, BPH, CAD (?s/p coronary stent per patient), severe PAD (s/p vascular interventions in the past) who presented to [facility] with severe LLE pain and chest pain. Patient found to have CAD, so patient was transferred to [facility] under Dr. Devine for further management. Vascular was consulted pre-op for PAD, recommending outpatient follow up with outpatient LLE angiogram. Pulm consulted preop for pulmonary nodules, completed PET scan suspicious for malignancy, but negative for metastasis recommended further outpatient workup for staging. Patient now s/p MIDCAB (LIMA-LAD), EF 55% (10/31/24). Patient did not receive intraop blood products, 1.6L IVF given, w/ traumatic ramirez insertion, urology consulted and 3 way ramirez placed, CBI not started. POD1 CT removed, BB and Medrol dose pack started. POD2, jonh removed. POD3, central line removed, ramirez removed and passed TOV. POD4, patient w/ constipation and bowel distention on CXR. Patient to be evaluated for PMR acute rehab. POD5 patient had a large bowel movement overnight, pending eval by PMR and bed at rehab. POD6, stable. POD 7 pending auth and acceptance to rehab. Ongoing issues with pain controlled, increased pain regimen.     Neuro:   No delirium and focal deficits.   Pain: Persisting pain over incision site, provided with Iv fentanyl last night. Maximize tylenol and added lidocaine patch. Continue with prn oxy.       Cardio:  POD 7 s/p MIDCAB LIMA-LAD EF  35%   - Continue with ASA, plavix, 12.5 mg lopressor q6, 30 mg imdur    - Continue medrol dose pack  HTN: Continue with medications listed above   HLD: continue 80 mg lipitor   Possible LLE PAD:     - Vascular consulted and aware. Deferring LLE angiogram for now.   Vitals over the last 24 hrs:    HR: 76-90    BP: 113//68     Pulm:   IS encouraged. Sating at 97  -Pulmonary Nodules: pulmonary was following pre-op       -High suspicion for lung cancer in RUL, however would need further workup for staging/diagnosis which does not require inpatient stay. Ensure follow up with Dr. Duarte on discharge   CXR: Unremarkable, no effusion or pneumothorax     GI:   Tolerating diet well   Prophylaxis: 40 mg pantoprazole.   Bowel: Senna, ducolax, and PEG.      ID:   WBC 9.0. Afebrile.  Monitor fever curve     Renal/:  Traumatic ramirez insertion during operation:    - Passed TOV, ramirez removed. Urine color improving.    -  cc today.   BUN/Creat @ 33/1.61. Cr at baseline (?) but increased from 1.3, continue to monitor.   BPH:    - continue flomax and proscar.   I/O net: -1380  Electrolytes: Replete electrolytes prn.     Hem:   H&H @ 12/38  DVT prophylaxis: SubQ Heparin     Endo:   Pre-diabetic:   -ISS   -A1C: 5.8  TSH: 1.7    MSK:   Ambulating well. Continue with PT/OT.     Disposition:   Continue with inpatient care.    73yo M, with PMHx of HTN, HLD, BPH, CAD (?s/p coronary stent per patient), severe PAD (s/p vascular interventions in the past) who presented to [facility] with severe LLE pain and chest pain. Patient was transferred to [facility] for vascular evaluation of LLE, but developed ACS in the interim, so patient underwent a MIDCAB with Dr. Devine. Pulm consulted preop for pulmonary nodules, completed PET scan suspicious for malignancy, but negative for metastasis recommended further outpatient workup for staging. 10/31 patient underwent a MIDCAB (LIMA-LAD), EF 55%. Intraoperatively no blood products gave, patient had traumatic ramirez insertion, urology was consulted and a 3 way ramirez was placed (no cbi), arrived to ICU stabilized POD1 CT removed, BB and Medrol dose pack started. POD2, jonh removed. POD3, central line removed, ramirez removed and passed TOV. POD4-POD 5 bowel regimen maximized in setting of constipation. POD6, stable. POD 7 Accepted to acute rehab, plan DC tomorrow. Ongoing issues with pain controlled, increased pain regimen.     Neuro:   No delirium and focal deficits.   Pain: Persisting pain over incision site, provided with Iv fentanyl last night. Maximize tylenol and added lidocaine patch. Continue with prn oxy.       Cardio:  POD 7 s/p MIDCAB LIMA-LAD EF  35%   - Continue with ASA, plavix, 12.5 mg lopressor q6, 30 mg imdur     - Continue nitrate products 2/2 long hx of use and partial revascularization   - Continue medrol dose pack  HTN: Continue with medications listed above   HLD: continue 80 mg lipitor   Possible LLE PAD:     - Vascular consulted and aware. Deferring LLE angiogram for now.   Vitals over the last 24 hrs:    HR: 76-90    BP: 113//68     Pulm:   IS encouraged. Sating at 97  -Pulmonary Nodules: pulmonary was following pre-op       -High suspicion for lung cancer in RUL, however would need further workup for staging/diagnosis which does not require inpatient stay. Ensure follow up with Dr. Duarte on discharge   CXR: Unremarkable, no effusion or pneumothorax     GI:   Tolerating diet well   Prophylaxis: 40 mg pantoprazole.   Bowel: Senna, ducolax, and PEG.      ID:   WBC 9.0. Afebrile.  Monitor fever curve     Renal/:  Traumatic ramirez insertion during operation:    - Passed TOV, ramirez removed. Urine color improving.    -  cc today.   BUN/Creat @ 33/1.61. Cr at baseline (?) but increased from 1.3, continue to monitor.   BPH:    - continue flomax and proscar.   I/O net: -1380  Electrolytes: Replete electrolytes prn.     Hem:   H&H @ 12/38  DVT prophylaxis: SubQ Heparin     Endo:   Pre-diabetic:   -ISS   -A1C: 5.8  TSH: 1.7    MSK:   Ambulating well. Continue with PT/OT.     Disposition:   Continue with inpatient care.    75yo M, with PMHx of HTN, HLD, BPH, CAD (?s/p coronary stent per patient), severe PAD (s/p vascular interventions in the past) who presented to [facility] with severe LLE pain and chest pain. Patient was transferred to [facility] for vascular evaluation of LLE, but developed ACS in the interim, so patient underwent a MIDCAB with Dr. Devine. Pulm consulted preop for pulmonary nodules, completed PET scan suspicious for malignancy, but negative for metastasis recommended further outpatient workup for staging. 10/31 patient underwent a MIDCAB (LIMA-LAD), EF 55%. Intraoperatively no blood products gave, patient had traumatic ramirez insertion, urology was consulted and a 3 way ramirez was placed (no cbi), arrived to ICU stabilized POD1 CT removed, BB and Medrol dose pack started. POD2, jonh removed. POD3, central line removed, ramirez removed and passed TOV. POD4-POD 5 bowel regimen maximized in setting of constipation. POD6, stable. POD 7 Accepted to acute rehab, plan DC tomorrow. Ongoing issues with pain controlled, increased pain regimen.     Neuro:   No delirium and focal deficits.   Pain: Persisting pain over incision site, provided with Iv fentanyl last night. Maximize tylenol and added lidocaine patch. Continue with prn oxy.       Cardio:  POD 7 s/p MIDCAB LIMA-LAD EF  35%   - Continue with ASA, plavix, 12.5 mg lopressor q6, 30 mg imdur     - Continue nitrate products 2/2 long hx of use and partial revascularization   - Continue medrol dose pack  HTN: Continue with medications listed above   HLD: continue 80 mg lipitor   Possible LLE PAD:     - Vascular consulted and aware. Deferring LLE angiogram for now.   Vitals over the last 24 hrs:    HR: 76-90    BP: 113//68     Pulm:   IS encouraged. Sating at 97  -Pulmonary Nodules: pulmonary was following pre-op       -High suspicion for lung cancer in RUL, however would need further workup for staging/diagnosis which does not require inpatient stay. Ensure follow up with Dr. Duarte on discharge   CXR: Unremarkable, no effusion or pneumothorax     GI:   Tolerating diet well   Prophylaxis: 40 mg pantoprazole.   Bowel: Senna, ducolax, and PEG.      ID:   WBC 9.0. Afebrile.  Monitor fever curve     Renal/:  Traumatic ramirez insertion during operation:    - Passed TOV, ramirez removed. Urine color improving.    -  cc today.   BUN/Creat @ 33/1.61. Cr at baseline (?) but increased from 1.3, continue to monitor.     -TTE w/o effusion, PVR <200. Repeat BMP Cr 1.4  BPH:    - continue flomax and proscar.   I/O net: -1380  Electrolytes: Replete electrolytes prn.     Hem:   H&H @ 12/38  DVT prophylaxis: SubQ Heparin     Endo:   Pre-diabetic:   -ISS   -A1C: 5.8  TSH: 1.7    MSK:   Ambulating well. Continue with PT/OT.     Disposition:   Continue with inpatient care.

## 2024-11-07 NOTE — PROGRESS NOTE ADULT - SUBJECTIVE AND OBJECTIVE BOX
Patient discussed on morning rounds with Dr. Devine    OPERATION & DATE: 10/31 s/p MIDCAB LIMA-LAD EF  35%    SUBJECTIVE ASSESSMENT:    73 yo male with c/o persisting left sided surgical site pain, worse with palpation of the area. Also endorses ongoing, chronic left lower leg pain with associated chronic wounds, worse with palpation. Last BM this morning, urinating well. Denies weakness numbness, or urinary retention.     VITAL SIGNS:  Vital Signs Last 24 Hrs  T(C): 36.6 (07 Nov 2024 08:55), Max: 36.9 (06 Nov 2024 17:38)  T(F): 97.9 (07 Nov 2024 08:55), Max: 98.5 (06 Nov 2024 17:38)  HR: 80 (07 Nov 2024 11:42) (76 - 90)  BP: 109/59 (07 Nov 2024 11:42) (109/59 - 154/63)  BP(mean): 80 (07 Nov 2024 11:42) (80 - 91)  RR: 18 (07 Nov 2024 11:42) (17 - 19)  SpO2: 96% (07 Nov 2024 11:42) (92% - 99%)    Parameters below as of 07 Nov 2024 11:42  Patient On (Oxygen Delivery Method): nasal cannula  O2 Flow (L/min): 2    I&O's Detail    06 Nov 2024 07:01  -  07 Nov 2024 07:00  --------------------------------------------------------  IN:    Oral Fluid: 120 mL  Total IN: 120 mL    OUT:    Voided (mL): 1500 mL  Total OUT: 1500 mL    Total NET: -1380 mL        CHEST TUBE: no    SUKUMAR DRAIN: no  EPICARDIAL WIRES: no  STITCHES: yes, x2 tie down    PHYSICAL EXAM:  General: Sitting in bed comfortably in NAD  Neuro: A&Ox3, no focal deficits   HEENT: NCAT, EOMI   Cardiac: Regular rate and rhythm, normal S1 and S2. No m/r/g   Pulm: Breathing comfortably on room air. No signs of respiratory distress. Mild crackles over left lower lung base, other lung fields CTA bilaterally.   : Slight hematuria, pink in coloration  Abdomen: Soft, non-distended, non-tender. + bowel sounds   Extremities: Warm and well perfused, no peripheral edema, distal pulses 2+. No calf tenderness. SCDs and TEDs in place  MSK: Full AROM   Wound: Left thoracotomy site well healing dry and without drainage. CT removal sites with tie-down in place, no erythema.     LABS:                        12.5   9.03  )-----------( 297      ( 07 Nov 2024 05:30 )             38.2       11-07    138  |  102  |  33[H]  ----------------------------<  110[H]  4.5   |  23  |  1.61[H]    Ca    9.3      07 Nov 2024 05:30  Mg     1.9     11-07      Urinalysis Basic - ( 07 Nov 2024 05:30 )    Color: x / Appearance: x / SG: x / pH: x  Gluc: 110 mg/dL / Ketone: x  / Bili: x / Urobili: x   Blood: x / Protein: x / Nitrite: x   Leuk Esterase: x / RBC: x / WBC x   Sq Epi: x / Non Sq Epi: x / Bacteria: x      MEDICATIONS  (STANDING):  aspirin enteric coated 81 milliGRAM(s) Oral daily  atorvastatin 80 milliGRAM(s) Oral at bedtime  bisacodyl 5 milliGRAM(s) Oral at bedtime  chlorhexidine 2% Cloths 1 Application(s) Topical daily  clopidogrel Tablet 75 milliGRAM(s) Oral daily  dextrose 5%. 1000 milliLiter(s) (100 mL/Hr) IV Continuous <Continuous>  dextrose 5%. 1000 milliLiter(s) (50 mL/Hr) IV Continuous <Continuous>  dextrose 50% Injectable 50 milliLiter(s) IV Push every 15 minutes  dextrose 50% Injectable 25 milliLiter(s) IV Push every 15 minutes  finasteride 5 milliGRAM(s) Oral daily  glucagon  Injectable 1 milliGRAM(s) IntraMuscular once  heparin   Injectable 5000 Unit(s) SubCutaneous every 8 hours  insulin lispro (ADMELOG) corrective regimen sliding scale   SubCutaneous three times a day before meals  insulin lispro (ADMELOG) corrective regimen sliding scale   SubCutaneous at bedtime  isosorbide   mononitrate ER Tablet (IMDUR) 30 milliGRAM(s) Oral daily  methylPREDNISolone 4 milliGRAM(s) Oral before breakfast  metoprolol tartrate 12.5 milliGRAM(s) Oral every 6 hours  mupirocin 2% Ointment 1 Application(s) Both Nostrils two times a day  pantoprazole    Tablet 40 milliGRAM(s) Oral daily  polyethylene glycol 3350 17 Gram(s) Oral daily  polyethylene glycol 3350 17 Gram(s) Oral daily  senna 2 Tablet(s) Oral at bedtime  sodium chloride 0.9% lock flush 3 milliLiter(s) IV Push every 8 hours  sodium chloride 0.9%. 1000 milliLiter(s) (10 mL/Hr) IV Continuous <Continuous>  tamsulosin 0.4 milliGRAM(s) Oral at bedtime    MEDICATIONS  (PRN):  acetaminophen     Tablet .. 650 milliGRAM(s) Oral every 6 hours PRN Mild Pain (1 - 3)  bisacodyl Suppository 10 milliGRAM(s) Rectal once PRN Constipation  dextrose Oral Gel 15 Gram(s) Oral once PRN Blood Glucose LESS THAN 70 milliGRAM(s)/deciliter  nitroglycerin     SubLingual 0.4 milliGRAM(s) SubLingual every 5 minutes PRN Chest Pain  oxyCODONE    IR 10 milliGRAM(s) Oral every 6 hours PRN Severe Pain (7 - 10)  oxyCODONE    IR 5 milliGRAM(s) Oral every 6 hours PRN Moderate Pain (4 - 6)    RADIOLOGY & ADDITIONAL TESTS:

## 2024-11-07 NOTE — DISCHARGE NOTE PROVIDER - PROVIDER TOKENS
PROVIDER:[TOKEN:[[id number]]],PROVIDER:[TOKEN:[[id number]],FOLLOWUP:[2 weeks]],PROVIDER:[TOKEN:[[id number]],FOLLOWUP:[2 weeks]],PROVIDER:[TOKEN:[[id number]]]

## 2024-11-07 NOTE — DISCHARGE NOTE PROVIDER - NSDCFUADDAPPT_GEN_ALL_CORE_FT
Our office will call you with the times and dates of your follow up appointments, please call [phone] if you have any questions or concerns.  Our office will call you with the times and dates of your follow up appointments, please call [phone] if you have any questions or concerns.     Discharged to [facility] rehab [place] (phone number: [phone])

## 2024-11-07 NOTE — DISCHARGE NOTE PROVIDER - HOSPITAL COURSE
Patient discussed on morning rounds with Dr. Devine  Operation Date: 10/31 s/p MIDCAB LIMA-LAD EF  35%  Primary Surgeon/Attending MD: Nilson  Referring Physician: N/a  _ _ _ _ _ _ _ _ _ _ _ _   HOSPITAL COURSE:     73yo M, with PMHx of HTN, HLD, BPH, CAD (?s/p coronary stent per patient), severe PAD (s/p vascular interventions in the past) who presented to [facility] with severe LLE pain and chest pain. Patient was transferred to [facility] for vascular evaluation of LLE, but developed ACS in the interim, so patient underwent a MIDCAB with Dr. Devine. Pulm consulted preop for pulmonary nodules, completed PET scan suspicious for malignancy, but negative for metastasis recommended further outpatient workup for staging. 10/31 patient underwent a MIDCAB (LIMA-LAD), EF 55%. Intraoperatively no blood products gave, patient had traumatic ramirez insertion, urology was consulted and a 3 way ramirez was placed (no cbi), arrived to ICU stabilized POD1 CT removed, BB and Medrol dose pack started. POD2, jonh removed. POD3, central line removed, ramirez removed and passed TOV. POD4-POD 5 bowel regimen maximized in setting of constipation. POD6, stable. POD 7 Accepted to acute rehab. Ongoing issues with pain controlled, increased pain regimen. Slight increase in Cr, PVR <200 and TTE without effusion. POD 8 ***    _ _ _ _ _ _ _ _ _ _ _ _     DISCHARGE PHYSICAL EXAM:     _ _ _ _ _ _ _ _ _ _ _ _   REMOVAL CHECKLIST:         [ x] Epicardial wires         [x ] Stitches/tie downs,   If no, why?          [x ] PICC/Midline,   If no, why?    _ _ _ _ _ _ _ _ _ _ _ _   MEDICATION DISCHARGE CHECKLIST     MIDCAB        [ x] Aspirin, [  ] Contraindicated, Reason:         [x ] Plavix, [  ] Contraindicated, Reason:         [x ] Statin, [  ] Contraindicated, Reason:         [ ] Lasix , [  ] Contraindicated, Reason:              Duration:          [ x] Beta-Blocker, [  ] Contraindicated, Reason:         Cardiac Rehab contraindicated due to recent cardiac surgery.   _ _ _ _ _ _ _ _ _ _ _   RELEVANT LABS/IMAGING:   _  _ _ _ _ _ _ _ _ _ _   CLINICAL FOLLOW UP NEEDS:      [ x] Lab work needed: BMP at acute rehab to monitor Cr and K on lasix      [x ] Imaging needed:      [x ] Home equipment            Type: (i.e. wound vac, pneumostat, prevena, wet/dry dressings, picc/midlines, MCOT, ramirez etc)   _ _ _ _ _ _ _ _ _ _ _ _   Over 35 minutes was spent with the patient reviewing the discharge material including medications, follow up appointments, recovery, concerning symptoms, and how to contact their health care providers if they have questions. Patient discussed on morning rounds with Dr. Devine  Operation Date: 10/31 s/p MIDCAB LIMA-LAD EF  35%  Primary Surgeon/Attending MD: Nilson  Referring Physician: N/a  _ _ _ _ _ _ _ _ _ _ _ _   HOSPITAL COURSE:     73yo M, with PMHx of HTN, HLD, BPH, CAD (?s/p coronary stent per patient), severe PAD (s/p vascular interventions in the past) who presented to [facility] with severe LLE pain and chest pain. Patient was transferred to [facility] for vascular evaluation of LLE, but developed ACS in the interim, so patient underwent a MIDCAB with Dr. Devine. Pulm consulted preop for pulmonary nodules, completed PET scan suspicious for malignancy, but negative for metastasis recommended further outpatient workup for staging. 10/31 patient underwent a MIDCAB (LIMA-LAD), EF 55%. Intraoperatively no blood products gave, patient had traumatic ramirez insertion, urology was consulted and a 3 way ramirez was placed (no cbi), arrived to ICU stabilized POD1 CT removed, BB and Medrol dose pack started. POD2, jonh removed. POD3, central line removed, ramirez removed and passed TOV. POD4-POD 5 bowel regimen maximized in setting of constipation. POD6, stable. POD 7 Accepted to acute rehab. Ongoing issues with pain controlled, increased pain regimen. Slight increase in Cr, PVR <200 and TTE without effusion. POD 8 Tie down removed. Pain regimen maximized. Patient to be discharged to [facility] rehab center (phone number: [phone]). Cleared for discharge per Dr. Devine. At time of discharge he is hemodynamically stable, voiding well, passing gas, ambulating in the hallway, and pain controlled under oral regimen.    _ _ _ _ _ _ _ _ _ _ _ _     DISCHARGE PHYSICAL EXAM:   General: Sitting in bed comfortably in NAD  Neuro: A&Ox3, no focal deficits   HEENT: NCAT, EOMI   Cardiac: Regular rate and rhythm, normal S1 and S2. No m/r/g   Pulm: Breathing comfortably on room air. No signs of respiratory distress. Lungs are CTA b/l without wheezes, rales, or rhonchi   Abdomen: Soft, non-distended, non-tender. + bowel sounds   Extremities: Warm and well perfused, no peripheral edema, distal pulses 2+. No calf tenderness.   MSK: Full AROM   Wound: Left thoracotomy site clean and well approximated. Chest tube sites without drainage, tie down removed.     _ _ _ _ _ _ _ _ _ _ _ _   REMOVAL CHECKLIST:         [ x] Epicardial wires         [x ] Stitches/tie downs,   If no, why?          [x ] PICC/Midline,   If no, why?    _ _ _ _ _ _ _ _ _ _ _ _   MEDICATION DISCHARGE CHECKLIST    Pain regimen      MIDCAB        [ x] Aspirin, [  ] Contraindicated, Reason:         [x ] Plavix, [  ] Contraindicated, Reason:         [x ] Statin, [  ] Contraindicated, Reason:         [ ] Lasix , [ x ] Contraindicated, Reason: Not indicated at this time         [ x] Beta-Blocker, [  ] Contraindicated, Reason:         Cardiac Rehab contraindicated due to recent cardiac surgery.   _ _ _ _ _ _ _ _ _ _ _   RELEVANT LABS/IMAGING:     discharge PA/LAT pending       _  _ _ _  _ _ _ _ _ _ _   CLINICAL FOLLOW UP NEEDS:      [ x] Lab work needed: BMP at acute rehab to monitor Cr and K on lasix      [x ] Imaging needed:      [x ] Home equipment            Type: (i.e. wound vac, pneumostat, prevena, wet/dry dressings, picc/midlines, MCOT, ramirez etc)   _ _ _ _ _ _ _ _ _ _ _ _   Over 35 minutes was spent with the patient reviewing the discharge material including medications, follow up appointments, recovery, concerning symptoms, and how to contact their health care providers if they have questions. Patient discussed on morning rounds with Dr. Devine  Operation Date: 10/31 s/p MIDCAB LIMA-LAD EF  35%  Primary Surgeon/Attending MD: Nilson  Referring Physician: N/a  _ _ _ _ _ _ _ _ _ _ _ _   HOSPITAL COURSE:     73yo M, with PMHx of HTN, HLD, BPH, CAD (?s/p coronary stent per patient), severe PAD (s/p vascular interventions in the past) who presented to [facility] with severe LLE pain and chest pain. Patient was transferred to [facility] for vascular evaluation of LLE, but was found to have CAD, so patient underwent a MIDCAB with Dr. Devine. Vascular was consulted pre-op for PAD, recommending outpatient follow up with outpatient LLE angiogram. Pulm consulted preop for pulmonary nodules, completed PET scan suspicious for malignancy, but negative for metastasis recommended further outpatient workup for staging. 10/31 patient underwent a MIDCAB (LIMA-LAD), EF 55%. Intraoperatively no blood products gave, patient had traumatic ramirez insertion, urology was consulted and a 3 way ramirez was placed (no cbi), arrived to ICU stabilized POD1 CT removed, BB and Medrol dose pack started. POD2, jonh removed. POD3, central line removed, ramirez removed and passed TOV. POD4-POD 5 bowel regimen maximized in setting of constipation. POD6, stable. POD 7 Accepted to acute rehab. Ongoing issues with pain controlled, increased pain regimen. Slight increase in Cr, PVR <200 and TTE without effusion. POD 8 Tie down removed. Pain regimen maximized. Patient to be discharged to [facility] acute rehab center (phone number: [phone]). Cleared for discharge per Dr. Devine. At time of discharge he is hemodynamically stable, voiding well, passing gas, ambulating in the hallway, and pain controlled under oral regimen.    _ _ _ _ _ _ _ _ _ _ _ _     DISCHARGE PHYSICAL EXAM:   General: Sitting in bed comfortably in NAD  Neuro: A&Ox3, no focal deficits   HEENT: NCAT, EOMI   Cardiac: Regular rate and rhythm, normal S1 and S2. No m/r/g   Pulm: Breathing comfortably on room air. No signs of respiratory distress. Lungs are CTA b/l without wheezes, rales, or rhonchi   Abdomen: Soft, non-distended, non-tender. + bowel sounds   Extremities: Warm and well perfused, no peripheral edema. Multiple circular ulcers with surrounding Eschar of lower left extremity.   MSK: Full AROM   Wound: Left thoracotomy site clean and well approximated. Chest tube sites without drainage, tie down removed.     _ _ _ _ _ _ _ _ _ _ _ _   REMOVAL CHECKLIST:         [ x] Epicardial wires         [x ] Stitches/tie downs,   If no, why?          [x ] PICC/Midline,   If no, why?    _ _ _ _ _ _ _ _ _ _ _ _   MEDICATION DISCHARGE CHECKLIST    Pain regimen      MIDCAB        [ x] Aspirin, [  ] Contraindicated, Reason:         [x ] Plavix, [  ] Contraindicated, Reason:         [x ] Statin, [  ] Contraindicated, Reason:         [ ] Lasix , [ x ] Contraindicated, Reason: Not indicated at this time         [ x] Beta-Blocker, [  ] Contraindicated, Reason:         Cardiac Rehab contraindicated due to recent cardiac surgery.   _ _ _ _ _ _ _ _ _ _ _   RELEVANT LABS/IMAGING:     discharge PA/LAT pending       _  _ _ _  _ _ _ _ _ _ _   CLINICAL FOLLOW UP NEEDS:      [ x] Lab work needed: BMP at acute rehab to monitor Cr and K on lasix      [x ] Imaging needed:      [x ] Home equipment            Type: (i.e. wound vac, pneumostat, prevena, wet/dry dressings, picc/midlines, MCOT, ramirez etc)   _ _ _ _ _ _ _ _ _ _ _ _   Over 35 minutes was spent with the patient reviewing the discharge material including medications, follow up appointments, recovery, concerning symptoms, and how to contact their health care providers if they have questions.

## 2024-11-07 NOTE — PROGRESS NOTE ADULT - PROVIDER SPECIALTY LIST ADULT
CT Surgery
CT Surgery
Urology
Vascular Surgery
CT Surgery
Vascular Surgery
CT Surgery
CT Surgery
Critical Care
Critical Care
Pulmonology
Pulmonology
Vascular Surgery

## 2024-11-07 NOTE — DISCHARGE NOTE PROVIDER - CARE PROVIDERS DIRECT ADDRESSES
[email or web address],[email or web address]

## 2024-11-07 NOTE — DISCHARGE NOTE PROVIDER - NSDCCPTREATMENT_GEN_ALL_CORE_FT
PRINCIPAL PROCEDURE  Procedure: Minimally invasive direct coronary artery bypass (MIDCAB) with transesophageal echocardiography (RICARDO)  Findings and Treatment: midcab lima-lad EF 35%

## 2024-11-07 NOTE — DISCHARGE NOTE PROVIDER - NSDCFUADDINST_GEN_ALL_CORE_FT
- If you had a valve replacement/repair or any aortic surgery it is recommended that you take antibiotics before any dental procedures going forward. Please call our office with assistance if you have anything scheduled    -Walk daily as tolerated and use your incentive spirometer 10 times every hour while you are awake.     -Please weigh yourself daily. If you notice over a 3 pound weight gain in 3 days, this is a sign you are likely retaining too much fluid. It is imperative you call our right away with unexplained rapid weight gain.      -Please continue to wear the compression stockings given to you in the hospital at home. This is a way to prevent fluid from building up in your legs.     -No driving or strenuous activity/exercise until cleared by your surgeon.    -Gently clean your incisions with unscented/antibacterial soap and water, pat dry.  You may leave them open to air.    -Call your doctor if you have shortness of breath, chest pain not relieved by pain medication, dizziness, fever >101.5, or increased redness or drainage from incisions. no hematoma or bruising noted

## 2024-11-08 ENCOUNTER — TRANSCRIPTION ENCOUNTER (OUTPATIENT)
Age: 74
End: 2024-11-08

## 2024-11-08 ENCOUNTER — NON-APPOINTMENT (OUTPATIENT)
Age: 74
End: 2024-11-08

## 2024-11-08 VITALS
RESPIRATION RATE: 17 BRPM | SYSTOLIC BLOOD PRESSURE: 116 MMHG | DIASTOLIC BLOOD PRESSURE: 55 MMHG | OXYGEN SATURATION: 97 % | HEART RATE: 78 BPM

## 2024-11-08 LAB
ANION GAP SERPL CALC-SCNC: 10 MMOL/L — SIGNIFICANT CHANGE UP (ref 5–17)
BUN SERPL-MCNC: 30 MG/DL — HIGH (ref 7–23)
CALCIUM SERPL-MCNC: 9.6 MG/DL — SIGNIFICANT CHANGE UP (ref 8.4–10.5)
CHLORIDE SERPL-SCNC: 101 MMOL/L — SIGNIFICANT CHANGE UP (ref 96–108)
CO2 SERPL-SCNC: 26 MMOL/L — SIGNIFICANT CHANGE UP (ref 22–31)
CREAT SERPL-MCNC: 1.57 MG/DL — HIGH (ref 0.5–1.3)
EGFR: 45 ML/MIN/1.73M2 — LOW
GLUCOSE SERPL-MCNC: 122 MG/DL — HIGH (ref 70–99)
HCT VFR BLD CALC: 40.3 % — SIGNIFICANT CHANGE UP (ref 39–50)
HGB BLD-MCNC: 12.8 G/DL — LOW (ref 13–17)
MAGNESIUM SERPL-MCNC: 2 MG/DL — SIGNIFICANT CHANGE UP (ref 1.6–2.6)
MCHC RBC-ENTMCNC: 29.8 PG — SIGNIFICANT CHANGE UP (ref 27–34)
MCHC RBC-ENTMCNC: 31.8 G/DL — LOW (ref 32–36)
MCV RBC AUTO: 93.9 FL — SIGNIFICANT CHANGE UP (ref 80–100)
NRBC # BLD: 0 /100 WBCS — SIGNIFICANT CHANGE UP (ref 0–0)
PLATELET # BLD AUTO: 325 K/UL — SIGNIFICANT CHANGE UP (ref 150–400)
POTASSIUM SERPL-MCNC: 4.9 MMOL/L — SIGNIFICANT CHANGE UP (ref 3.5–5.3)
POTASSIUM SERPL-SCNC: 4.9 MMOL/L — SIGNIFICANT CHANGE UP (ref 3.5–5.3)
RBC # BLD: 4.29 M/UL — SIGNIFICANT CHANGE UP (ref 4.2–5.8)
RBC # FLD: 12.6 % — SIGNIFICANT CHANGE UP (ref 10.3–14.5)
SODIUM SERPL-SCNC: 137 MMOL/L — SIGNIFICANT CHANGE UP (ref 135–145)
WBC # BLD: 11.68 K/UL — HIGH (ref 3.8–10.5)
WBC # FLD AUTO: 11.68 K/UL — HIGH (ref 3.8–10.5)

## 2024-11-08 PROCEDURE — 71046 X-RAY EXAM CHEST 2 VIEWS: CPT | Mod: 26

## 2024-11-08 PROCEDURE — 71045 X-RAY EXAM CHEST 1 VIEW: CPT | Mod: 26,XE

## 2024-11-08 RX ORDER — METHYLPREDNISOLONE ACETATE 80 MG/ML
4 INJECTION, SUSPENSION INTRALESIONAL; INTRAMUSCULAR; INTRASYNOVIAL; SOFT TISSUE ONCE
Refills: 0 | Status: COMPLETED | OUTPATIENT
Start: 2024-11-08 | End: 2024-11-08

## 2024-11-08 RX ORDER — PANTOPRAZOLE SODIUM 40 MG/1
1 TABLET, DELAYED RELEASE ORAL
Qty: 0 | Refills: 0 | DISCHARGE
Start: 2024-11-08

## 2024-11-08 RX ORDER — OXYCODONE HYDROCHLORIDE 30 MG/1
10 TABLET ORAL EVERY 6 HOURS
Refills: 0 | Status: DISCONTINUED | OUTPATIENT
Start: 2024-11-08 | End: 2024-11-08

## 2024-11-08 RX ORDER — LIDOCAINE HYDROCHLORIDE 40 MG/ML
1 SOLUTION TOPICAL
Qty: 0 | Refills: 0 | DISCHARGE
Start: 2024-11-08

## 2024-11-08 RX ORDER — ACETAMINOPHEN 500 MG
3 TABLET ORAL
Qty: 0 | Refills: 0 | DISCHARGE
Start: 2024-11-08

## 2024-11-08 RX ORDER — OXYCODONE HYDROCHLORIDE 30 MG/1
5 TABLET ORAL EVERY 6 HOURS
Refills: 0 | Status: DISCONTINUED | OUTPATIENT
Start: 2024-11-08 | End: 2024-11-08

## 2024-11-08 RX ORDER — OXYCODONE HYDROCHLORIDE 30 MG/1
1 TABLET ORAL
Qty: 0 | Refills: 0 | DISCHARGE
Start: 2024-11-08

## 2024-11-08 RX ORDER — OXYCODONE HYDROCHLORIDE 30 MG/1
5 TABLET ORAL ONCE
Refills: 0 | Status: DISCONTINUED | OUTPATIENT
Start: 2024-11-08 | End: 2024-11-08

## 2024-11-08 RX ORDER — SENNA 187 MG
2 TABLET ORAL
Qty: 0 | Refills: 0 | DISCHARGE
Start: 2024-11-08

## 2024-11-08 RX ORDER — POLYETHYLENE GLYCOL 3350 17 G/17G
17 POWDER, FOR SOLUTION ORAL
Qty: 0 | Refills: 0 | DISCHARGE
Start: 2024-11-08

## 2024-11-08 RX ORDER — AMLODIPINE BESYLATE 10 MG
1 TABLET ORAL
Refills: 0 | DISCHARGE

## 2024-11-08 RX ADMIN — POLYETHYLENE GLYCOL 3350 17 GRAM(S): 17 POWDER, FOR SOLUTION ORAL at 11:55

## 2024-11-08 RX ADMIN — Medication 975 MILLIGRAM(S): at 11:56

## 2024-11-08 RX ADMIN — OXYCODONE HYDROCHLORIDE 5 MILLIGRAM(S): 30 TABLET ORAL at 06:06

## 2024-11-08 RX ADMIN — OXYCODONE HYDROCHLORIDE 5 MILLIGRAM(S): 30 TABLET ORAL at 07:05

## 2024-11-08 RX ADMIN — FINASTERIDE 5 MILLIGRAM(S): 5 TABLET, FILM COATED ORAL at 11:56

## 2024-11-08 RX ADMIN — Medication 12.5 MILLIGRAM(S): at 06:07

## 2024-11-08 RX ADMIN — SODIUM CHLORIDE 3 MILLILITER(S): 9 INJECTION, SOLUTION INTRAMUSCULAR; INTRAVENOUS; SUBCUTANEOUS at 05:17

## 2024-11-08 RX ADMIN — ISOSORBIDE MONONITRATE 30 MILLIGRAM(S): 60 TABLET, EXTENDED RELEASE ORAL at 11:56

## 2024-11-08 RX ADMIN — HEPARIN SODIUM 5000 UNIT(S): 10000 INJECTION INTRAVENOUS; SUBCUTANEOUS at 05:04

## 2024-11-08 RX ADMIN — Medication 81 MILLIGRAM(S): at 11:56

## 2024-11-08 RX ADMIN — METHYLPREDNISOLONE ACETATE 4 MILLIGRAM(S): 80 INJECTION, SUSPENSION INTRALESIONAL; INTRAMUSCULAR; INTRASYNOVIAL; SOFT TISSUE at 11:50

## 2024-11-08 RX ADMIN — LIDOCAINE HYDROCHLORIDE 1 PATCH: 40 SOLUTION TOPICAL at 02:37

## 2024-11-08 RX ADMIN — POLYETHYLENE GLYCOL 3350 17 GRAM(S): 17 POWDER, FOR SOLUTION ORAL at 11:57

## 2024-11-08 RX ADMIN — Medication 975 MILLIGRAM(S): at 05:03

## 2024-11-08 RX ADMIN — OXYCODONE HYDROCHLORIDE 10 MILLIGRAM(S): 30 TABLET ORAL at 10:12

## 2024-11-08 RX ADMIN — CLOPIDOGREL 75 MILLIGRAM(S): 75 TABLET ORAL at 11:55

## 2024-11-08 RX ADMIN — PANTOPRAZOLE SODIUM 40 MILLIGRAM(S): 40 TABLET, DELAYED RELEASE ORAL at 11:55

## 2024-11-08 RX ADMIN — Medication 975 MILLIGRAM(S): at 00:17

## 2024-11-08 RX ADMIN — Medication 975 MILLIGRAM(S): at 06:00

## 2024-11-08 RX ADMIN — Medication 12.5 MILLIGRAM(S): at 11:56

## 2024-11-08 NOTE — DISCHARGE NOTE NURSING/CASE MANAGEMENT/SOCIAL WORK - NSDCFUADDAPPT_GEN_ALL_CORE_FT
Our office will call you with the times and dates of your follow up appointments, please call [phone] if you have any questions or concerns.     Discharged to [facility] rehab [place] (phone number: [phone])

## 2024-11-08 NOTE — DISCHARGE NOTE NURSING/CASE MANAGEMENT/SOCIAL WORK - PATIENT PORTAL LINK FT
You can access the FollowMyHealth Patient Portal offered by [facility] by registering at the following website: [email or web address]. By joining [employer]’s FollowMyHealth portal, you will also be able to view your health information using other applications (apps) compatible with our system.

## 2024-11-08 NOTE — DISCHARGE NOTE NURSING/CASE MANAGEMENT/SOCIAL WORK - FINANCIAL ASSISTANCE
[facility] provides services at a reduced cost to those who are determined to be eligible through [facility]’s financial assistance program. Information regarding [facility]’s financial assistance program can be found by going to [email or web address] or by calling [phone].

## 2024-11-09 ENCOUNTER — INPATIENT (INPATIENT)
Facility: HOSPITAL | Age: 74
LOS: 17 days | Discharge: NURSING, REHAB OR LONG-TERM CARE | End: 2024-11-27
Attending: STUDENT IN AN ORGANIZED HEALTH CARE EDUCATION/TRAINING PROGRAM | Admitting: INTERNAL MEDICINE
Payer: MEDICARE

## 2024-11-09 DIAGNOSIS — Z95.828 PRESENCE OF OTHER VASCULAR IMPLANTS AND GRAFTS: Chronic | ICD-10-CM

## 2024-11-10 VITALS
RESPIRATION RATE: 18 BRPM | SYSTOLIC BLOOD PRESSURE: 151 MMHG | HEART RATE: 71 BPM | TEMPERATURE: 98 F | OXYGEN SATURATION: 97 % | DIASTOLIC BLOOD PRESSURE: 73 MMHG

## 2024-11-10 LAB
ANION GAP SERPL CALC-SCNC: 13 MMOL/L — SIGNIFICANT CHANGE UP (ref 5–17)
APTT BLD: 37 SEC — HIGH (ref 24.5–35.6)
BLD GP AB SCN SERPL QL: NEGATIVE — SIGNIFICANT CHANGE UP
BUN SERPL-MCNC: 23 MG/DL — SIGNIFICANT CHANGE UP (ref 7–23)
CALCIUM SERPL-MCNC: 9.3 MG/DL — SIGNIFICANT CHANGE UP (ref 8.4–10.5)
CHLORIDE SERPL-SCNC: 103 MMOL/L — SIGNIFICANT CHANGE UP (ref 96–108)
CO2 SERPL-SCNC: 24 MMOL/L — SIGNIFICANT CHANGE UP (ref 22–31)
CREAT SERPL-MCNC: 1.51 MG/DL — HIGH (ref 0.5–1.3)
EGFR: 48 ML/MIN/1.73M2 — LOW
GLUCOSE SERPL-MCNC: 107 MG/DL — HIGH (ref 70–99)
INR BLD: 1.18 — HIGH (ref 0.85–1.16)
LACTATE SERPL-SCNC: 1.7 MMOL/L — SIGNIFICANT CHANGE UP (ref 0.5–2)
MAGNESIUM SERPL-MCNC: 1.9 MG/DL — SIGNIFICANT CHANGE UP (ref 1.6–2.6)
PHOSPHATE SERPL-MCNC: 2.6 MG/DL — SIGNIFICANT CHANGE UP (ref 2.5–4.5)
POTASSIUM SERPL-MCNC: 4 MMOL/L — SIGNIFICANT CHANGE UP (ref 3.5–5.3)
POTASSIUM SERPL-SCNC: 4 MMOL/L — SIGNIFICANT CHANGE UP (ref 3.5–5.3)
PROTHROM AB SERPL-ACNC: 13.5 SEC — HIGH (ref 9.9–13.4)
RH IG SCN BLD-IMP: POSITIVE — SIGNIFICANT CHANGE UP
SODIUM SERPL-SCNC: 140 MMOL/L — SIGNIFICANT CHANGE UP (ref 135–145)

## 2024-11-10 PROCEDURE — 99223 1ST HOSP IP/OBS HIGH 75: CPT | Mod: GC,25,57

## 2024-11-10 PROCEDURE — 73630 X-RAY EXAM OF FOOT: CPT | Mod: 26,LT

## 2024-11-10 PROCEDURE — 93010 ELECTROCARDIOGRAM REPORT: CPT

## 2024-11-10 PROCEDURE — 99223 1ST HOSP IP/OBS HIGH 75: CPT

## 2024-11-10 PROCEDURE — 71045 X-RAY EXAM CHEST 1 VIEW: CPT | Mod: 26

## 2024-11-10 RX ORDER — SODIUM CHLORIDE 9 MG/ML
1000 INJECTION, SOLUTION INTRAMUSCULAR; INTRAVENOUS; SUBCUTANEOUS
Refills: 0 | Status: DISCONTINUED | OUTPATIENT
Start: 2024-11-10 | End: 2024-11-11

## 2024-11-10 RX ORDER — TAMSULOSIN HCL 0.4 MG
1 CAPSULE ORAL
Refills: 0 | DISCHARGE

## 2024-11-10 RX ORDER — ACETAMINOPHEN 500MG 500 MG/1
975 TABLET, COATED ORAL EVERY 8 HOURS
Refills: 0 | Status: DISCONTINUED | OUTPATIENT
Start: 2024-11-10 | End: 2024-11-14

## 2024-11-10 RX ORDER — FINASTERIDE 5 MG/1
1 TABLET, FILM COATED ORAL
Refills: 0 | DISCHARGE

## 2024-11-10 RX ORDER — ACETAMINOPHEN 500MG 500 MG/1
650 TABLET, COATED ORAL EVERY 6 HOURS
Refills: 0 | Status: DISCONTINUED | OUTPATIENT
Start: 2024-11-10 | End: 2024-11-10

## 2024-11-10 RX ORDER — OXYCODONE HYDROCHLORIDE 30 MG/1
5 TABLET ORAL EVERY 4 HOURS
Refills: 0 | Status: DISCONTINUED | OUTPATIENT
Start: 2024-11-10 | End: 2024-11-14

## 2024-11-10 RX ORDER — ISOSORBIDE MONONITRATE 10 MG
30 TABLET ORAL DAILY
Refills: 0 | Status: DISCONTINUED | OUTPATIENT
Start: 2024-11-10 | End: 2024-11-12

## 2024-11-10 RX ORDER — POTASSIUM PHOSPHATE, MONOBASIC POTASSIUM PHOSPHATE, DIBASIC INJECTION, 236; 224 MG/ML; MG/ML
30 SOLUTION, CONCENTRATE INTRAVENOUS ONCE
Refills: 0 | Status: COMPLETED | OUTPATIENT
Start: 2024-11-10 | End: 2024-11-10

## 2024-11-10 RX ORDER — POLYETHYLENE GLYCOL 3350 17 G/17G
17 POWDER, FOR SOLUTION ORAL DAILY
Refills: 0 | Status: DISCONTINUED | OUTPATIENT
Start: 2024-11-10 | End: 2024-11-14

## 2024-11-10 RX ORDER — PANTOPRAZOLE SODIUM 40 MG/1
40 TABLET, DELAYED RELEASE ORAL
Refills: 0 | Status: DISCONTINUED | OUTPATIENT
Start: 2024-11-10 | End: 2024-11-14

## 2024-11-10 RX ORDER — HEPARIN SODIUM,PORCINE 1000/ML
1000 VIAL (ML) INJECTION
Qty: 25000 | Refills: 0 | Status: DISCONTINUED | OUTPATIENT
Start: 2024-11-10 | End: 2024-11-10

## 2024-11-10 RX ORDER — OXYCODONE HYDROCHLORIDE 30 MG/1
5 TABLET ORAL EVERY 6 HOURS
Refills: 0 | Status: DISCONTINUED | OUTPATIENT
Start: 2024-11-10 | End: 2024-11-10

## 2024-11-10 RX ORDER — SODIUM CHLORIDE 9 MG/ML
1000 INJECTION, SOLUTION INTRAMUSCULAR; INTRAVENOUS; SUBCUTANEOUS
Refills: 0 | Status: DISCONTINUED | OUTPATIENT
Start: 2024-11-10 | End: 2024-11-10

## 2024-11-10 RX ORDER — HEPARIN SODIUM,PORCINE 1000/ML
5000 VIAL (ML) INJECTION EVERY 8 HOURS
Refills: 0 | Status: DISCONTINUED | OUTPATIENT
Start: 2024-11-10 | End: 2024-11-11

## 2024-11-10 RX ORDER — METOPROLOL TARTRATE 100 MG/1
25 TABLET, FILM COATED ORAL
Refills: 0 | Status: DISCONTINUED | OUTPATIENT
Start: 2024-11-10 | End: 2024-11-12

## 2024-11-10 RX ORDER — SENNOSIDES 8.6 MG
2 TABLET ORAL AT BEDTIME
Refills: 0 | Status: DISCONTINUED | OUTPATIENT
Start: 2024-11-10 | End: 2024-11-14

## 2024-11-10 RX ORDER — INFLUENZA VIRUS VACCINE 15; 15; 15; 15 UG/.5ML; UG/.5ML; UG/.5ML; UG/.5ML
0.5 SUSPENSION INTRAMUSCULAR ONCE
Refills: 0 | Status: DISCONTINUED | OUTPATIENT
Start: 2024-11-10 | End: 2024-11-27

## 2024-11-10 RX ORDER — TAMSULOSIN HYDROCHLORIDE 0.4 MG/1
0.4 CAPSULE ORAL AT BEDTIME
Refills: 0 | Status: DISCONTINUED | OUTPATIENT
Start: 2024-11-10 | End: 2024-11-14

## 2024-11-10 RX ORDER — CLOPIDOGREL 75 MG/1
1 TABLET ORAL
Refills: 0 | DISCHARGE

## 2024-11-10 RX ORDER — CLOPIDOGREL 75 MG/1
75 TABLET, FILM COATED ORAL DAILY
Refills: 0 | Status: DISCONTINUED | OUTPATIENT
Start: 2024-11-10 | End: 2024-11-14

## 2024-11-10 RX ORDER — OXYCODONE HYDROCHLORIDE 30 MG/1
2.5 TABLET ORAL EVERY 4 HOURS
Refills: 0 | Status: DISCONTINUED | OUTPATIENT
Start: 2024-11-10 | End: 2024-11-14

## 2024-11-10 RX ADMIN — METOPROLOL TARTRATE 25 MILLIGRAM(S): 100 TABLET, FILM COATED ORAL at 18:13

## 2024-11-10 RX ADMIN — Medication 30 MILLIGRAM(S): at 18:13

## 2024-11-10 RX ADMIN — TAMSULOSIN HYDROCHLORIDE 0.4 MILLIGRAM(S): 0.4 CAPSULE ORAL at 21:56

## 2024-11-10 RX ADMIN — OXYCODONE HYDROCHLORIDE 5 MILLIGRAM(S): 30 TABLET ORAL at 12:59

## 2024-11-10 RX ADMIN — OXYCODONE HYDROCHLORIDE 5 MILLIGRAM(S): 30 TABLET ORAL at 21:53

## 2024-11-10 RX ADMIN — OXYCODONE HYDROCHLORIDE 5 MILLIGRAM(S): 30 TABLET ORAL at 20:53

## 2024-11-10 RX ADMIN — OXYCODONE HYDROCHLORIDE 5 MILLIGRAM(S): 30 TABLET ORAL at 07:34

## 2024-11-10 RX ADMIN — ACETAMINOPHEN 500MG 975 MILLIGRAM(S): 500 TABLET, COATED ORAL at 21:56

## 2024-11-10 RX ADMIN — Medication 5000 UNIT(S): at 21:56

## 2024-11-10 RX ADMIN — CLOPIDOGREL 75 MILLIGRAM(S): 75 TABLET, FILM COATED ORAL at 12:49

## 2024-11-10 RX ADMIN — Medication 5 MILLIGRAM(S): at 12:49

## 2024-11-10 RX ADMIN — Medication 80 MILLIGRAM(S): at 21:56

## 2024-11-10 RX ADMIN — POTASSIUM PHOSPHATE, MONOBASIC POTASSIUM PHOSPHATE, DIBASIC INJECTION, 83.33 MILLIMOLE(S): 236; 224 SOLUTION, CONCENTRATE INTRAVENOUS at 12:49

## 2024-11-10 RX ADMIN — ACETAMINOPHEN 500MG 975 MILLIGRAM(S): 500 TABLET, COATED ORAL at 22:56

## 2024-11-10 RX ADMIN — ACETAMINOPHEN 500MG 650 MILLIGRAM(S): 500 TABLET, COATED ORAL at 18:12

## 2024-11-10 RX ADMIN — OXYCODONE HYDROCHLORIDE 5 MILLIGRAM(S): 30 TABLET ORAL at 06:53

## 2024-11-10 RX ADMIN — Medication 81 MILLIGRAM(S): at 12:49

## 2024-11-10 RX ADMIN — Medication 10 UNIT(S)/HR: at 10:40

## 2024-11-10 NOTE — PROGRESS NOTE ADULT - SUBJECTIVE AND OBJECTIVE BOX
Pre-op Diagnosis: chronic limb ischemia  Procedure: BLE angio  Surgeon: Dr. Valerio    Consent: Obtained 11/10      11-10    140  |  103  |  23  ----------------------------<  107[H]  4.0   |  24  |  1.51[H]    Ca    9.3      10 Nov 2024 06:19  Phos  2.6     11-10  Mg     1.9     11-10      PT/INR - ( 10 Nov 2024 06:19 )   PT: 13.5 sec;   INR: 1.18          PTT - ( 10 Nov 2024 06:19 )  PTT:37.0 sec  Urinalysis Basic - ( 10 Nov 2024 06:19 )    Color: x / Appearance: x / SG: x / pH: x  Gluc: 107 mg/dL / Ketone: x  / Bili: x / Urobili: x   Blood: x / Protein: x / Nitrite: x   Leuk Esterase: x / RBC: x / WBC x   Sq Epi: x / Non Sq Epi: x / Bacteria: x        Type & Screen: obtained, AM pending      (*With most recent within 72hrs of OR)  CXR: WNL  EKG: WNL   UA: last 10/30    Is patient on ACE/ARB? [ x]No [ ]Yes   *If yes, please hold any ACE/ARB the day of surgery    Is patient on Lantus at bedtime?  [x ]No [ ]Yes   *If yes, please discuss decrasing dose if needed    Does patient have a contrast allergy? [x ]No [ ]Yes  *If yes, please pre-medicate per protocol    Is patient on anticoagulation? [x ]No [ ] Yes  *If yes, please discuss with team when to hold it    Is the patient Female and <54yo [ x]No [ ] Yes  If yes, pregnancy test must be documented in the chart    Is patient on dialysis? [x ]No [ ]Yes  *If yes, please obtain all labs including K level, the evening before the surgery PM lab draw  *Also, will NOT require IVF past midnight    A/P: 74yMale pre-op for above procedure  1. NPO past midnight, except medications  2. IVF at midnight: NS @60  3. [ ] Blood on hold, Units: none

## 2024-11-10 NOTE — PATIENT PROFILE ADULT - FALL HARM RISK - DEVICES
Chief Complaint   Patient presents with   • Viral Syndrome     X couple weeks. Cough, congestion, HA, chills.        HISTORY OF PRESENT ILLNESS:  Mari is a 37 year old female who presents to the walk in clinic today with reports of cough. Symptoms began about 2 weeks ago. Associated sinus congestion with rhinorrhea postnasal drainage.  Also reports sweats and chills with intermittent tension headache. Denies any rash, dysphagia, nausea, vomiting, neck pain/stiffness, chest pain, shortness of breath, difficulty breathing, wheezing, hemoptysis, lightheadedness, dizziness, syncope, or ear pain. Patient denies known exposure or close contacts with similar symptoms, no known COVID-19 exposure.  She has been fully vaccinated for COVID-19 earlier this year.    ROS: Review of systems is complete and negative except that which is contained in the HPI and past medical history.     Past Medical History:   Diagnosis Date   • Anemia      Past Surgical History:   Procedure Laterality Date   • Vaginal delivery  2009     Family History   Problem Relation Age of Onset   • Other Father          in [place].    • Other Brother         AA     Social History     Tobacco Use   • Smoking status: Never Smoker   • Smokeless tobacco: Never Used   Substance Use Topics   • Alcohol use: No   • Drug use: No       MEDICATIONS: Personally reviewed in EMR (electronic medical record).   ALLERGIES:  No Known Allergies    PHYSICAL EXAMINATION:  Vitals:    21 1411   BP: 104/72   Pulse: 71   Resp: 16   Temp: 97.7 °F (36.5 °C)   TempSrc: Temporal   SpO2: 100%   Weight: 54.4 kg   Height: 5' 2\" (1.575 m)   LMP: 2021     General: Alert and oriented.  Appears stated age. Nontoxic appearance. Comfortably seated in the chair in no acute distress.  Eyes:  Conjunctivae and lids normal. Pupils equal round reactive to light.  HENT:  Atraumatic,normocephalic. Ears are personally viewed with otoscope, TM (tympanic membrane) gray and intact  bilaterally, no erythema. Bilateral external ear canals no erythema, edema. Nasal mucosa and turbinates are pale and boggy with clear rhinorrhea. Moist oral mucous membranes, unremarkable oropharynx.  Neck: ROM (range of motion) WNL (within normal limits), trachea midline, supple, nontender. No lymphadenopathy palpated.  Respiratory:  Clear to auscultation, no wheezes or crackles appreciated bilaterally. Normal respiratory effort, non labored.  Few nonproductive coughs elicited during exam however no barking characteristic appreciated.  Cardio/vascular:  Regular rate and rhythm, no obvious murmur. S1 S2.   Gastrointestinal/Genitourinary: Soft, nontender, nondistended.  Integumentary: Well hydrated, no rash visualized.  Psychiatric:  Speech is clear and appropriate. Appropriate mood and affect.     ASSESSMENT/PLAN/FOLLOW UP VISITS:  1. Viral URI with cough      Orders Placed This Encounter   • 2019 Novel Coronavirus (SARS-CoV-2)   • COVID DIAGNOSTIC TESTING   • benzonatate (TESSALON PERLES) 200 MG capsule   • albuterol 108 (90 Base) MCG/ACT inhaler   • loratadine (CLARITIN) 10 MG tablet        Symptoms are suspicious for viral etiology, discussed possibility for COVID-19 etiology.  Patient wishes to proceed with PCR testing today.  Recommend following CDC according recommendations while awaiting results.  Recommend symptomatic treatment with over-the-counter medicines.  Prescription sent for benzonatate cough suppressant, reviewed all inhaler, and loratadine histamine for symptomatic treatment, risks versus benefits reviewed of each.  Encourage adequate fluids and increased rest.  Recommend recheck with her primary care provider next week, sooner with worsening or changes. Reviewed worrisome signs and symptoms that would warrant more urgent reevaluation, instructing the patient to present to the emergency department.     Risks, benefits, alternatives, expected outcomes, limitations, and possible complications of  treatment were reviewed.    Martinez Ribera PA-C      [place]

## 2024-11-10 NOTE — CONSULT NOTE ADULT - ASSESSMENT
75yo M, with PMHx of HTN, HLD, BPH, CAD s/p CABG 10/31/24, severe PAD (s/p fem-fem) who presented to [facility] for left leg pain concerning for acute limb ischemia and transferred to [facility], now on vascular service and pending arteriography.     #PAD  - pending bilateral angiogram with vascular  - management per primary team  - PT/OT  - pain control and bowel regimen     #CAD s/p prior PCI and recent CABG   - Continue with ASA, plavix, 12.5 mg lopressor q6, 30 mg imdur   - can consider cardiology to follow along given recent cabg    # leukocytosis  mild, 11.68. afebrile. possilby reactive.  - monitor for s/sx of infection    #anemia  appears chronic  - trend CBC  - maintain active T&S, two large bore IVs  - transfuse for Hgb <7    #elevated creatinine  Cr 1.5. unknown baseline. possibly has undiagnosed ckd  - trend Cr  - avoid nephrotoxic drugs, renally dose meds     #RUL Pulmonary nodule - PET +  long smoking history.  - To follow up outpatient with  Dr. Duarte     #BPH  - Continue flomax and proscar    #HTN  - c/w lopressor and imdur    #HLD  - c/w high dose statin    #Prediabetes  - Consistent carb diet

## 2024-11-10 NOTE — CONSULT NOTE ADULT - TIME BILLING
Bedside exam and interview.  Reviewed of laboratory data, radiology results, consultants' recommendations.   Discussion with patient/caregivers/housestaff and interdisciplinary staff (such as , social workers, etc).  Documentation of encounter.  Interventions were performed as documented above.  Excludes teaching time and/or separately reported services.

## 2024-11-10 NOTE — H&P ADULT - ASSESSMENT
**INCOMPLETE**  This is a 75 y/o male with PMHx of HTN, HLD, BPH, CAD (?s/p coronary stent per  patient), severe PAD s/p fem-fem bypass per patient (?year) and an "abdominal  stent for circulation problems". Has not seen a doctor for his PAD in "a few  years". States he was having severe left leg pain and chest pain, prompting  him to call an ambulance. He was taken to [facility] for evaluation and  cardiac cath revealed CAD. Transferred here from  for surgical evaluation.    Vascular surgery was consulted for evaluation of LLE for possible peripheral  arterial disease. Pt. states he can only walk about a block or so before he  feels pain at left leg and chest. Denies any history of IVDU. Says ulcers at  left leg have been there for a long time. He denies any numbness or tingling at  either extremity.    Plan: 75yo M, with PMHx of HTN, HLD, BPH, CAD s/p CABG 10/31/24, severe PAD (s/p fem-fem) who presented to [facility] for left leg pain concerning for acute limb ischemia. On exam here, patient has an exam more consistent with his prior recent exam, consisting of chronic limb ischemia (motor and sensation intact, doppler signals present in left foot and fem-fem bypass). Given his rest pain, will proceed with arteriography.     #PAD  - Transfer to Dr. Valerio   - Add on for Cath Lab tomorrow 11/11 for bilateral angiogram  - NPO at midnight, preop, consent, low fluids overnight for PRECIOUS prophylaxis    #CAD s/p prior PCI and recent CABG   - Continue with ASA, plavix, 12.5 mg lopressor q6, 30 mg imdur     #RUL Pulmonary nodule - PET +  - Following [facility] Dr. Duarte outpatient.     # Bowel regimen  - Senna, dulcolax, and PEG    #BPH  - history of traumatic ramirez last admission.   - Continue flomax and proscar    #Prediabetes  - Consistent carb diet

## 2024-11-10 NOTE — CONSULT NOTE ADULT - SUBJECTIVE AND OBJECTIVE BOX
INTERNAL MEDICINE/HOSPITALIST CONSULT NOTE    HPI:   73 y/o male with PMHx of HTN, HLD, BPH, CAD s/p CABG 10/31, PAD s/p remote fem-fem bypass, and and an "abdominal stent for circulation problems". Patient was recently admitted here 10/27/24 - 11/8/24 after initially presenting with a chief complaint of leg pain, being worked up for CLTI with plan for arteriography, but this procedure was deferred as he developed ACS and ultimately went for CABG. His post-procedure course was noteworthy for identification of PET-positive lung malignancy with plan for outpatient staging. on 11/8 into 11/9 he was evaluated for leg pain and on exam not found to have pedal signals or pulses. With this, [facility] for heparin drip and CT-A. CT-A images are not available but show s/p fem-fem bypass. He was accepted at [facility] as a transfer for continuity and arrived here this morning on heparin. Today he reports bilateral leg pain, left worse than right. He denies weakness or sensory changes at this time.     Of note, while at [facility] the patient was also accessed by CCU for sternal chest pain. Troponins and EKG were negative. Pain as attributed to surgical sternotomy pain    Mr denise states that he does not take any medications at home for a long time, and has not seen a doctor for a long time. After his surgery years ago, he had physical therapy for a while which he says helped a lot. He also stopped drinking alcohol and smoking at the time and has not started since. He smoked for many years prior.   He says he has had wounds on his leg for a very long time, and they have been getting worse. While he does walk and takes stairs, the pain in his legs has been making it more difficult to stay active.     Subjective: reports leg pain and fatigue. Pain at sternum at site of surgery scars. no dyspnea/cough. no dysuria.   Rest of 12 point ROS negative, except where noted above.     T(C): 37.1 (11-10-24 @ 20:39), Max: 37.1 (11-10-24 @ 20:39)  HR: 82 (11-11-24 @ 00:06) (71 - 90)  BP: 121/59 (11-11-24 @ 00:06) (116/56 - 152/70)  RR: 18 (11-11-24 @ 00:06) (18 - 18)  SpO2: 92% (11-11-24 @ 00:06) (92% - 97%)  Wt(kg): --Vital Signs Last 24 Hrs  T(C): 37.1 (10 Nov 2024 20:39), Max: 37.1 (10 Nov 2024 20:39)  T(F): 98.7 (10 Nov 2024 20:39), Max: 98.7 (10 Nov 2024 20:39)  HR: 82 (11 Nov 2024 00:06) (71 - 90)  BP: 121/59 (11 Nov 2024 00:06) (116/56 - 152/70)  BP(mean): 83 (11 Nov 2024 00:06) (80 - 103)  RR: 18 (11 Nov 2024 00:06) (18 - 18)  SpO2: 92% (11 Nov 2024 00:06) (92% - 97%)    Parameters below as of 11 Nov 2024 00:06  Patient On (Oxygen Delivery Method): room air    PHYSICAL EXAM:  GENERAL: NAD, elderly male resting in bed.  HEAD:  Atraumatic, Normocephalic  EYES: EOMI, PERRLA, conjunctiva and sclera clear  NERVOUS SYSTEM:  Alert & Oriented X3,   CHEST/LUNG: Clear to percussion bilaterally; No rales, rhonchi, wheezing, or rubs  HEART: RRR. no murmurs. Scar at upper left chest post-op. Clean.  ABDOMEN: Soft, Nontender, Nondistended; Bowel sounds present  EXTREMITIES:  diminished pedal pulse on left leg. dark wounds on lower leg.       LABS:    11-10    140  |  103  |  23  ----------------------------<  107[H]  4.0   |  24  |  1.51[H]    Ca    9.3      10 Nov 2024 06:19  Phos  2.6     11-10  Mg     1.9     11-10      PT/INR - ( 10 Nov 2024 06:19 )   PT: 13.5 sec;   INR: 1.18          PTT - ( 10 Nov 2024 06:19 )  PTT:37.0 sec  Urinalysis Basic - ( 10 Nov 2024 06:19 )    Color: x / Appearance: x / SG: x / pH: x  Gluc: 107 mg/dL / Ketone: x  / Bili: x / Urobili: x   Blood: x / Protein: x / Nitrite: x   Leuk Esterase: x / RBC: x / WBC x   Sq Epi: x / Non Sq Epi: x / Bacteria: x      CAPILLARY BLOOD GLUCOSE            Urinalysis Basic - ( 10 Nov 2024 06:19 )    Color: x / Appearance: x / SG: x / pH: x  Gluc: 107 mg/dL / Ketone: x  / Bili: x / Urobili: x   Blood: x / Protein: x / Nitrite: x   Leuk Esterase: x / RBC: x / WBC x   Sq Epi: x / Non Sq Epi: x / Bacteria: x

## 2024-11-10 NOTE — H&P ADULT - NSHPLABSRESULTS_GEN_ALL_CORE
LABS:  cret    11-10    140  |  103  |  23  ----------------------------<  107[H]  4.0   |  24  |  1.51[H]    Ca    9.3      10 Nov 2024 06:19  Phos  2.6     11-10  Mg     1.9     11-10      PT/INR - ( 10 Nov 2024 06:19 )   PT: 13.5 sec;   INR: 1.18          PTT - ( 10 Nov 2024 06:19 )  PTT:37.0 sec

## 2024-11-10 NOTE — H&P ADULT - HISTORY OF PRESENT ILLNESS
**INCOMPLETE**    This is a 75 y/o male with PMHx of HTN, HLD, BPH, CAD (?s/p coronary stent per  patient), severe PAD s/p fem-fem bypass per patient (?year) and an "abdominal  stent for circulation problems". Has not seen a doctor for his PAD in "a few  years". States he was having severe left leg pain and chest pain, prompting  him to call an ambulance. He was taken to [facility] for evaluation and  cardiac cath revealed CAD. Transferred here from  for surgical evaluation.  Denies CP/SOB/N/V/D/dizziness/cough/fever/chills.  Denies h/o CVA, surgery other than the bypass/stents. Former smoker (quit 20  years ago) and former ETOH abuse (quit 20 years ago). Denies drug use. Poor  support system, has no family or friends here. Some family lives in [place].  He lives alone, on disability. (27 Oct 2024 19:55)    Vascular surgery was consulted for evaluation of LLE for possible peripheral  arterial disease. Pt. states he can only walk about a block or so before he  feels pain at left leg and chest. Denies any history of IVDU. Says ulcers at  left leg have been there for a long time. He denies any numbness or tingling at  either extremity.    This is a 74M referred to Vascular Surgery with concerns for LLE claudication  and ulcerations. He is known to have a L to R cross femoral bypass.  Examination demonstrated diminished pulses at both femorals, concerning for  possible inflow disease. We recommend a CT angiogram for further evaluation of  his arterial anatomy and for operative planning. This will be done in  consideration of his need for cardiac surgery. This is a 73 y/o male with PMHx of HTN, HLD, BPH, CAD s/p CABG 10/31, PAD s/p remote fem-fem bypass, and and an "abdominal stent for circulation problems". Patient was recently admitted here 10/27/24 - 11/8/24 after initially presenting with a chief complaint of leg pain, being worked up for CLTI with plan for arteriography, but this procedure was deferred as he developed ACS and ultimately went for CABG. His post-procedure course was noteworthy for identification of PET-positive lung malignancy with plan for outpatient staging. on 11/8 into 11/9 he was evaluated for leg pain and on exam not found to have pedal signals or pulses. With this, [facility] for heparin drip and CT-A. CT-A images are not available but show s/p fem-fem bypass. He was accepted at [facility] as a transfer for continuity and arrived here this morning on heparin. Today he reports bilateral leg pain, left worse than right. He denies weakness or sensory changes at this time.     Of note, while at [facility] the patient was also accessed by CCU for sternal chest pain. Troponins and EKG were negative. Pain as attributed to surgical sternotomy pain    Denies CP/SOB/N/V/D/dizziness/cough/fever/chills.  Denies h/o CVA, surgery other than the bypass/stents.

## 2024-11-10 NOTE — H&P ADULT - NSHPSOCIALHISTORY_GEN_ALL_CORE
Former smoker (quit 20 years ago) and former ETOH abuse (quit 20 years ago). Denies drug use. Poor support system, has no family or friends here. Some family lives in [place].  He lives alone, on disability.

## 2024-11-10 NOTE — H&P ADULT - NSHPPHYSICALEXAM_GEN_ALL_CORE
PHYSICAL EXAM:   General: In no acute distress, resting comfortably in bed . Thin.   HEENT: NCAT, PERRL, EOMI, no conjunctival pallor or scleral icterus, MMM   Neck: Supple, no JVD   Respiratory: Clear to auscultation bilaterally with no wheezes, rales, or rhonchi appreciated   Cardiovascular: RRR, normal S1 and S2, no murmurs, rubs, or gallops appreciated   Vascular: Biphasic DP/PT on R. Monophasic PT on L. Biphasic DP on R.   Abdomen: Soft, NT/ND. Bowel sounds present in all four quadrants with no guarding, rebound tenderness, or palpable masses   Extremities: Warm and well perfused. No clubbing, cyanosis, or edema noted   Skin: No gross skin abnormalities or rashes noted. Plaques of dry, lightly adherant scabs on anterior left calves. Quarter sized bullus (possible pressure ulcer) of left posterior heel, covered in mepilex.

## 2024-11-11 ENCOUNTER — TRANSCRIPTION ENCOUNTER (OUTPATIENT)
Age: 74
End: 2024-11-11

## 2024-11-11 DIAGNOSIS — I25.10 ATHEROSCLEROTIC HEART DISEASE OF NATIVE CORONARY ARTERY WITHOUT ANGINA PECTORIS: ICD-10-CM

## 2024-11-11 DIAGNOSIS — E78.5 HYPERLIPIDEMIA, UNSPECIFIED: ICD-10-CM

## 2024-11-11 DIAGNOSIS — I10 ESSENTIAL (PRIMARY) HYPERTENSION: ICD-10-CM

## 2024-11-11 DIAGNOSIS — N40.0 BENIGN PROSTATIC HYPERPLASIA WITHOUT LOWER URINARY TRACT SYMPTOMS: ICD-10-CM

## 2024-11-11 DIAGNOSIS — I73.9 PERIPHERAL VASCULAR DISEASE, UNSPECIFIED: ICD-10-CM

## 2024-11-11 LAB
ADD ON TEST-SPECIMEN IN LAB: SIGNIFICANT CHANGE UP
ANION GAP SERPL CALC-SCNC: 10 MMOL/L — SIGNIFICANT CHANGE UP (ref 5–17)
ANION GAP SERPL CALC-SCNC: 11 MMOL/L — SIGNIFICANT CHANGE UP (ref 5–17)
ANION GAP SERPL CALC-SCNC: 13 MMOL/L — SIGNIFICANT CHANGE UP (ref 5–17)
APTT BLD: 25.4 SEC — SIGNIFICANT CHANGE UP (ref 24.5–35.6)
APTT BLD: 26 SEC — SIGNIFICANT CHANGE UP (ref 24.5–35.6)
APTT BLD: 28.2 SEC — SIGNIFICANT CHANGE UP (ref 24.5–35.6)
BASOPHILS # BLD AUTO: 0.02 K/UL — SIGNIFICANT CHANGE UP (ref 0–0.2)
BASOPHILS NFR BLD AUTO: 0.3 % — SIGNIFICANT CHANGE UP (ref 0–2)
BUN SERPL-MCNC: 24 MG/DL — HIGH (ref 7–23)
BUN SERPL-MCNC: 24 MG/DL — HIGH (ref 7–23)
BUN SERPL-MCNC: 25 MG/DL — HIGH (ref 7–23)
CALCIUM SERPL-MCNC: 8.5 MG/DL — SIGNIFICANT CHANGE UP (ref 8.4–10.5)
CALCIUM SERPL-MCNC: 8.6 MG/DL — SIGNIFICANT CHANGE UP (ref 8.4–10.5)
CALCIUM SERPL-MCNC: 8.9 MG/DL — SIGNIFICANT CHANGE UP (ref 8.4–10.5)
CHLORIDE SERPL-SCNC: 105 MMOL/L — SIGNIFICANT CHANGE UP (ref 96–108)
CHLORIDE SERPL-SCNC: 105 MMOL/L — SIGNIFICANT CHANGE UP (ref 96–108)
CHLORIDE SERPL-SCNC: 106 MMOL/L — SIGNIFICANT CHANGE UP (ref 96–108)
CO2 SERPL-SCNC: 21 MMOL/L — LOW (ref 22–31)
CO2 SERPL-SCNC: 22 MMOL/L — SIGNIFICANT CHANGE UP (ref 22–31)
CO2 SERPL-SCNC: 23 MMOL/L — SIGNIFICANT CHANGE UP (ref 22–31)
CREAT SERPL-MCNC: 1.48 MG/DL — HIGH (ref 0.5–1.3)
CREAT SERPL-MCNC: 1.57 MG/DL — HIGH (ref 0.5–1.3)
CREAT SERPL-MCNC: 1.58 MG/DL — HIGH (ref 0.5–1.3)
CRP SERPL-MCNC: 5.3 MG/L — HIGH (ref 0–4)
EGFR: 45 ML/MIN/1.73M2 — LOW
EGFR: 45 ML/MIN/1.73M2 — LOW
EGFR: 49 ML/MIN/1.73M2 — LOW
EOSINOPHIL # BLD AUTO: 0.23 K/UL — SIGNIFICANT CHANGE UP (ref 0–0.5)
EOSINOPHIL NFR BLD AUTO: 3.2 % — SIGNIFICANT CHANGE UP (ref 0–6)
ERYTHROCYTE [SEDIMENTATION RATE] IN BLOOD: 19 MM/HR — SIGNIFICANT CHANGE UP
GLUCOSE SERPL-MCNC: 109 MG/DL — HIGH (ref 70–99)
GLUCOSE SERPL-MCNC: 129 MG/DL — HIGH (ref 70–99)
GLUCOSE SERPL-MCNC: 137 MG/DL — HIGH (ref 70–99)
HCT VFR BLD CALC: 32.6 % — LOW (ref 39–50)
HCT VFR BLD CALC: 36.7 % — LOW (ref 39–50)
HCT VFR BLD CALC: 37.6 % — LOW (ref 39–50)
HGB BLD-MCNC: 10.3 G/DL — LOW (ref 13–17)
HGB BLD-MCNC: 11.8 G/DL — LOW (ref 13–17)
HGB BLD-MCNC: 12 G/DL — LOW (ref 13–17)
IMM GRANULOCYTES NFR BLD AUTO: 0.6 % — SIGNIFICANT CHANGE UP (ref 0–0.9)
INR BLD: 1.1 — SIGNIFICANT CHANGE UP (ref 0.85–1.16)
INR BLD: 1.1 — SIGNIFICANT CHANGE UP (ref 0.85–1.16)
INR BLD: 1.2 — HIGH (ref 0.85–1.16)
LYMPHOCYTES # BLD AUTO: 1.28 K/UL — SIGNIFICANT CHANGE UP (ref 1–3.3)
LYMPHOCYTES # BLD AUTO: 17.7 % — SIGNIFICANT CHANGE UP (ref 13–44)
MAGNESIUM SERPL-MCNC: 1.9 MG/DL — SIGNIFICANT CHANGE UP (ref 1.6–2.6)
MAGNESIUM SERPL-MCNC: 1.9 MG/DL — SIGNIFICANT CHANGE UP (ref 1.6–2.6)
MAGNESIUM SERPL-MCNC: 2 MG/DL — SIGNIFICANT CHANGE UP (ref 1.6–2.6)
MCHC RBC-ENTMCNC: 29.6 PG — SIGNIFICANT CHANGE UP (ref 27–34)
MCHC RBC-ENTMCNC: 29.8 PG — SIGNIFICANT CHANGE UP (ref 27–34)
MCHC RBC-ENTMCNC: 29.9 PG — SIGNIFICANT CHANGE UP (ref 27–34)
MCHC RBC-ENTMCNC: 31.6 G/DL — LOW (ref 32–36)
MCHC RBC-ENTMCNC: 31.9 G/DL — LOW (ref 32–36)
MCHC RBC-ENTMCNC: 32.2 G/DL — SIGNIFICANT CHANGE UP (ref 32–36)
MCV RBC AUTO: 92.9 FL — SIGNIFICANT CHANGE UP (ref 80–100)
MCV RBC AUTO: 93.3 FL — SIGNIFICANT CHANGE UP (ref 80–100)
MCV RBC AUTO: 93.7 FL — SIGNIFICANT CHANGE UP (ref 80–100)
MONOCYTES # BLD AUTO: 0.81 K/UL — SIGNIFICANT CHANGE UP (ref 0–0.9)
MONOCYTES NFR BLD AUTO: 11.2 % — SIGNIFICANT CHANGE UP (ref 2–14)
NEUTROPHILS # BLD AUTO: 4.86 K/UL — SIGNIFICANT CHANGE UP (ref 1.8–7.4)
NEUTROPHILS NFR BLD AUTO: 67 % — SIGNIFICANT CHANGE UP (ref 43–77)
NRBC # BLD: 0 /100 WBCS — SIGNIFICANT CHANGE UP (ref 0–0)
PHOSPHATE SERPL-MCNC: 3.8 MG/DL — SIGNIFICANT CHANGE UP (ref 2.5–4.5)
PHOSPHATE SERPL-MCNC: 3.9 MG/DL — SIGNIFICANT CHANGE UP (ref 2.5–4.5)
PHOSPHATE SERPL-MCNC: 4 MG/DL — SIGNIFICANT CHANGE UP (ref 2.5–4.5)
PLATELET # BLD AUTO: 202 K/UL — SIGNIFICANT CHANGE UP (ref 150–400)
PLATELET # BLD AUTO: 246 K/UL — SIGNIFICANT CHANGE UP (ref 150–400)
PLATELET # BLD AUTO: 253 K/UL — SIGNIFICANT CHANGE UP (ref 150–400)
POTASSIUM SERPL-MCNC: 4.4 MMOL/L — SIGNIFICANT CHANGE UP (ref 3.5–5.3)
POTASSIUM SERPL-MCNC: 4.8 MMOL/L — SIGNIFICANT CHANGE UP (ref 3.5–5.3)
POTASSIUM SERPL-MCNC: 4.9 MMOL/L — SIGNIFICANT CHANGE UP (ref 3.5–5.3)
POTASSIUM SERPL-SCNC: 4.4 MMOL/L — SIGNIFICANT CHANGE UP (ref 3.5–5.3)
POTASSIUM SERPL-SCNC: 4.8 MMOL/L — SIGNIFICANT CHANGE UP (ref 3.5–5.3)
POTASSIUM SERPL-SCNC: 4.9 MMOL/L — SIGNIFICANT CHANGE UP (ref 3.5–5.3)
PROTHROM AB SERPL-ACNC: 12.8 SEC — SIGNIFICANT CHANGE UP (ref 9.9–13.4)
PROTHROM AB SERPL-ACNC: 12.9 SEC — SIGNIFICANT CHANGE UP (ref 9.9–13.4)
PROTHROM AB SERPL-ACNC: 13.8 SEC — HIGH (ref 9.9–13.4)
RBC # BLD: 3.48 M/UL — LOW (ref 4.2–5.8)
RBC # BLD: 3.95 M/UL — LOW (ref 4.2–5.8)
RBC # BLD: 4.03 M/UL — LOW (ref 4.2–5.8)
RBC # FLD: 12.4 % — SIGNIFICANT CHANGE UP (ref 10.3–14.5)
RBC # FLD: 12.5 % — SIGNIFICANT CHANGE UP (ref 10.3–14.5)
RBC # FLD: 12.5 % — SIGNIFICANT CHANGE UP (ref 10.3–14.5)
SODIUM SERPL-SCNC: 138 MMOL/L — SIGNIFICANT CHANGE UP (ref 135–145)
SODIUM SERPL-SCNC: 138 MMOL/L — SIGNIFICANT CHANGE UP (ref 135–145)
SODIUM SERPL-SCNC: 140 MMOL/L — SIGNIFICANT CHANGE UP (ref 135–145)
WBC # BLD: 6.49 K/UL — SIGNIFICANT CHANGE UP (ref 3.8–10.5)
WBC # BLD: 7.02 K/UL — SIGNIFICANT CHANGE UP (ref 3.8–10.5)
WBC # BLD: 7.24 K/UL — SIGNIFICANT CHANGE UP (ref 3.8–10.5)
WBC # FLD AUTO: 6.49 K/UL — SIGNIFICANT CHANGE UP (ref 3.8–10.5)
WBC # FLD AUTO: 7.02 K/UL — SIGNIFICANT CHANGE UP (ref 3.8–10.5)
WBC # FLD AUTO: 7.24 K/UL — SIGNIFICANT CHANGE UP (ref 3.8–10.5)

## 2024-11-11 PROCEDURE — 93010 ELECTROCARDIOGRAM REPORT: CPT

## 2024-11-11 PROCEDURE — 36140 INTRO NDL ICATH UPR/LXTR ART: CPT | Mod: GC,59

## 2024-11-11 PROCEDURE — 37221: CPT | Mod: GC,LT

## 2024-11-11 PROCEDURE — 75625 CONTRAST EXAM ABDOMINL AORTA: CPT | Mod: 26,GC

## 2024-11-11 PROCEDURE — 99233 SBSQ HOSP IP/OBS HIGH 50: CPT

## 2024-11-11 PROCEDURE — 75710 ARTERY X-RAYS ARM/LEG: CPT | Mod: 26,GC,59

## 2024-11-11 PROCEDURE — 99232 SBSQ HOSP IP/OBS MODERATE 35: CPT

## 2024-11-11 PROCEDURE — 71045 X-RAY EXAM CHEST 1 VIEW: CPT | Mod: 26

## 2024-11-11 PROCEDURE — 37223: CPT | Mod: GC,LT

## 2024-11-11 PROCEDURE — 76937 US GUIDE VASCULAR ACCESS: CPT | Mod: 26

## 2024-11-11 DEVICE — INTRO MICROPUNC 5FRX10CM SS: Type: IMPLANTABLE DEVICE | Status: FUNCTIONAL

## 2024-11-11 DEVICE — GWIRE BENT STRT 0.035INX150CM: Type: IMPLANTABLE DEVICE | Status: FUNCTIONAL

## 2024-11-11 DEVICE — IMPLANTABLE DEVICE: Type: IMPLANTABLE DEVICE | Status: FUNCTIONAL

## 2024-11-11 DEVICE — WIRE GUIDE COMMAND ES 300CM: Type: IMPLANTABLE DEVICE | Status: FUNCTIONAL

## 2024-11-11 DEVICE — CATH OMNI FLSH 0.035IN 5FRX65: Type: IMPLANTABLE DEVICE | Status: FUNCTIONAL

## 2024-11-11 DEVICE — GUIDEWIRE RADIFOCUS GLIDEWIRE STANDARD ANGLED TIP 0.035" X 260CM: Type: IMPLANTABLE DEVICE | Status: FUNCTIONAL

## 2024-11-11 DEVICE — SHEATH INTRODUCER TERUMO PINNACLE CORONARY 6FR X 10CM X 0.038" MINI WIRE: Type: IMPLANTABLE DEVICE | Status: FUNCTIONAL

## 2024-11-11 DEVICE — CATH QUICK CROSS .035X135CM: Type: IMPLANTABLE DEVICE | Status: FUNCTIONAL

## 2024-11-11 DEVICE — BLLN MUSTANG 7X40MMX135CM: Type: IMPLANTABLE DEVICE | Status: FUNCTIONAL

## 2024-11-11 DEVICE — SUT PERCLOSE PROGLIDE 6FR: Type: IMPLANTABLE DEVICE | Status: FUNCTIONAL

## 2024-11-11 DEVICE — CATH ANGIO GLIDECATH ANGLE TAPER 5FR X 65CM: Type: IMPLANTABLE DEVICE | Status: FUNCTIONAL

## 2024-11-11 DEVICE — CATH ANG BERENSTN 5FRX65CM: Type: IMPLANTABLE DEVICE | Status: FUNCTIONAL

## 2024-11-11 DEVICE — CATH QUICK CROSS .014X135CM: Type: IMPLANTABLE DEVICE | Status: FUNCTIONAL

## 2024-11-11 DEVICE — GWIRE ANGL .035X180 STD: Type: IMPLANTABLE DEVICE | Status: FUNCTIONAL

## 2024-11-11 DEVICE — SHEATH INTRODUCER TERUMO PINNACLE CORONARY 5FR X 10CM X 0.038" MINI WIRE: Type: IMPLANTABLE DEVICE | Status: FUNCTIONAL

## 2024-11-11 DEVICE — SHEATH INTRODUCER TERUMO PINNACLE CORONARY 8FR X 25CM: Type: IMPLANTABLE DEVICE | Status: FUNCTIONAL

## 2024-11-11 DEVICE — GUIDEWIRE AMPLATZ SUPER-STIFF STRAIGHT .035" X 260CM: Type: IMPLANTABLE DEVICE | Status: FUNCTIONAL

## 2024-11-11 RX ADMIN — TAMSULOSIN HYDROCHLORIDE 0.4 MILLIGRAM(S): 0.4 CAPSULE ORAL at 21:39

## 2024-11-11 RX ADMIN — OXYCODONE HYDROCHLORIDE 2.5 MILLIGRAM(S): 30 TABLET ORAL at 01:07

## 2024-11-11 RX ADMIN — Medication 30 MILLIGRAM(S): at 09:42

## 2024-11-11 RX ADMIN — PANTOPRAZOLE SODIUM 40 MILLIGRAM(S): 40 TABLET, DELAYED RELEASE ORAL at 05:02

## 2024-11-11 RX ADMIN — METOPROLOL TARTRATE 25 MILLIGRAM(S): 100 TABLET, FILM COATED ORAL at 05:02

## 2024-11-11 RX ADMIN — CLOPIDOGREL 75 MILLIGRAM(S): 75 TABLET, FILM COATED ORAL at 09:42

## 2024-11-11 RX ADMIN — OXYCODONE HYDROCHLORIDE 2.5 MILLIGRAM(S): 30 TABLET ORAL at 00:07

## 2024-11-11 RX ADMIN — OXYCODONE HYDROCHLORIDE 5 MILLIGRAM(S): 30 TABLET ORAL at 09:43

## 2024-11-11 RX ADMIN — ACETAMINOPHEN 500MG 975 MILLIGRAM(S): 500 TABLET, COATED ORAL at 21:38

## 2024-11-11 RX ADMIN — OXYCODONE HYDROCHLORIDE 5 MILLIGRAM(S): 30 TABLET ORAL at 05:24

## 2024-11-11 RX ADMIN — ACETAMINOPHEN 500MG 975 MILLIGRAM(S): 500 TABLET, COATED ORAL at 06:02

## 2024-11-11 RX ADMIN — Medication 81 MILLIGRAM(S): at 09:43

## 2024-11-11 RX ADMIN — OXYCODONE HYDROCHLORIDE 5 MILLIGRAM(S): 30 TABLET ORAL at 11:00

## 2024-11-11 RX ADMIN — SODIUM CHLORIDE 30 MILLILITER(S): 9 INJECTION, SOLUTION INTRAMUSCULAR; INTRAVENOUS; SUBCUTANEOUS at 10:18

## 2024-11-11 RX ADMIN — Medication 80 MILLIGRAM(S): at 21:39

## 2024-11-11 RX ADMIN — Medication 5 MILLIGRAM(S): at 09:43

## 2024-11-11 RX ADMIN — OXYCODONE HYDROCHLORIDE 5 MILLIGRAM(S): 30 TABLET ORAL at 04:24

## 2024-11-11 RX ADMIN — ACETAMINOPHEN 500MG 975 MILLIGRAM(S): 500 TABLET, COATED ORAL at 05:02

## 2024-11-11 RX ADMIN — METOPROLOL TARTRATE 25 MILLIGRAM(S): 100 TABLET, FILM COATED ORAL at 21:39

## 2024-11-11 RX ADMIN — ACETAMINOPHEN 500MG 975 MILLIGRAM(S): 500 TABLET, COATED ORAL at 22:38

## 2024-11-11 RX ADMIN — Medication 5000 UNIT(S): at 05:02

## 2024-11-11 RX ADMIN — SODIUM CHLORIDE 30 MILLILITER(S): 9 INJECTION, SOLUTION INTRAMUSCULAR; INTRAVENOUS; SUBCUTANEOUS at 00:10

## 2024-11-11 NOTE — PROGRESS NOTE ADULT - SUBJECTIVE AND OBJECTIVE BOX
O/N:      ---------------------------------------------------------------------------  PLEASE CHECK WHEN PRESENT:  [ x ]Heart Failure     [  ] Acute     [  ] Acute on Chronic     [ x ] Chronic       [  ]Diastolic [HFpEF]     [ x ]Systolic [HFrEF]     [  ]Combined [HFpEF & HFrEF]  .................................................................................  [x  ] Hypertensive Heart Disease  [ x ] CAD  [  ] Atrial Fibrillation     [  ] Paroxysmal A-fib     [  ] Chronic A-fib     [  ] Persistent A-fib     [  ] Longstanding Persistent A-fib     [  ] Permanent A-fib  [  ] Pulmonary Hypertension  [  ] Other:  -------------------------------------------------------------------  [  ] Respiratory failure  [  ] Acute PE   [  ] Acute cor pulmonale  [  ] Asthma/COPD Exacerbation  [  ] COPD on home O2 (Chronic renal Failure)   [  ] Atelectasis   [  ] Pleural effusion  [  ] Aspiration pneumonia  [  ] Obstructive Sleep Apnea  -------------------------------------------------------------------  [  ] Acute Kidney Injury      [  ] Acute Tubular Necrosis      [  ] Reneal Medullary Necrosis     [  ] Renal Cortical Necrosis     [  ] Other Pathological Lesions:  [ x ] Chronic Kidney Disease     [  ]CKD 1     [  ]CKD 2     [ x ]CKD 3     [  ]CKD 4     [  ]CKD 5 (ESRD)  [  ]Other:  -------------------------------------------------------------------  [  ] Diabetes  [  ] Diabetic PVD Ulcer  [  ] Neuropathic ulcer to DM  [  ] Diabetes with Nephropathy  [  ] Osteomyelitis due to diabetes  [  ] Hyperglycemia   [  ] Hypoglycemia   --------------------------------------------------------------------  [  ] Malnutrition: See Nutrition Note  [  ] Cachexia  [  ] Other:   [  ] Supplement Ordered:  [  ] Morbid Obesity (BMI >=40]  [  ] Ileus  ---------------------------------------------------------------------  [  ] Sepsis/severe sepsis/septic shock  [  ] Noninfectious SIRS  [  ] UTI  [  ] Pneumonia  [  ] Thrombophlebitis   -----------------------------------------------------------------------  [  ] Acidosis/alkalosis  [  ] Fluid overload  [  ] Hypokalemia  [  ] Hyperkalemia  [  ] Hypomagnesemia  [  ] Hypophosphatemia  [  ] Hyperphosphatemia  ------------------------------------------------------------------------  [  ] Acute blood loss anemia  [  ] Post op blood loss anemia  [  ] Iron deficiency anemia  [  ] Anemia due to chronic disease  [  ] Hypercoagulable state  [  ] Thrombocytopenia  ----------------------------------------------------------------------  [  ] Cerebral infarction  [  ] Transient ischemia attack  [  ] Encephalopathy - Toxic or Metabolic          73yo M, with PMHx of HTN, HLD, BPH, HFrEF, CAD s/p CABG 10/31/24, severe PAD (s/p fem-fem) who presented to [facility] for left leg pain concerning for acute limb ischemia. On exam here, patient has an exam more consistent with his prior recent exam, consisting of chronic limb ischemia (motor and sensation intact, doppler signals present in left foot and fem-fem bypass). Given his rest pain, will proceed with arteriography.     #PAD  - Plan for bilateral LE angiogram 11/11  - c/w DAPT and statin  - low mIVF overnight for PRECIOUS prophylaxis    #CAD s/p prior PCI and recent CABG   - c/w DAPT and statin  - c/w imdur  - c/w metoprolol tartrate    #HFrEF  - ECHO 10/24: LVEF 35% with regional wall abnormalities  - c/w metoprolol tartrate    #RUL Pulmonary nodule - PET +  - Following [facility] Dr. Duarte outpatient.     #CKD 3  - Monitor Cr and UOP  - low mIVF overnight for PRECIOUS prophylaxis    #BPH  - c/w flomax and proscar    #Prediabetes  - A1C 5.8%  - Consistent carb diet    Diet: NPO pMN  activity: as jud  DVTppx: SQH  Dispo: pending procedure O/N: ordered L foot xray per pods, will see in am.      ---------------------------------------------------------------------------  PLEASE CHECK WHEN PRESENT:  [ x ]Heart Failure     [  ] Acute     [  ] Acute on Chronic     [ x ] Chronic       [  ]Diastolic [HFpEF]     [ x ]Systolic [HFrEF]     [  ]Combined [HFpEF & HFrEF]  .................................................................................  [x  ] Hypertensive Heart Disease  [ x ] CAD  [  ] Atrial Fibrillation     [  ] Paroxysmal A-fib     [  ] Chronic A-fib     [  ] Persistent A-fib     [  ] Longstanding Persistent A-fib     [  ] Permanent A-fib  [  ] Pulmonary Hypertension  [  ] Other:  -------------------------------------------------------------------  [  ] Respiratory failure  [  ] Acute PE   [  ] Acute cor pulmonale  [  ] Asthma/COPD Exacerbation  [  ] COPD on home O2 (Chronic renal Failure)   [  ] Atelectasis   [  ] Pleural effusion  [  ] Aspiration pneumonia  [  ] Obstructive Sleep Apnea  -------------------------------------------------------------------  [  ] Acute Kidney Injury      [  ] Acute Tubular Necrosis      [  ] Reneal Medullary Necrosis     [  ] Renal Cortical Necrosis     [  ] Other Pathological Lesions:  [ x ] Chronic Kidney Disease     [  ]CKD 1     [  ]CKD 2     [ x ]CKD 3     [  ]CKD 4     [  ]CKD 5 (ESRD)  [  ]Other:  -------------------------------------------------------------------  [  ] Diabetes  [  ] Diabetic PVD Ulcer  [  ] Neuropathic ulcer to DM  [  ] Diabetes with Nephropathy  [  ] Osteomyelitis due to diabetes  [  ] Hyperglycemia   [  ] Hypoglycemia   --------------------------------------------------------------------  [  ] Malnutrition: See Nutrition Note  [  ] Cachexia  [  ] Other:   [  ] Supplement Ordered:  [  ] Morbid Obesity (BMI >=40]  [  ] Ileus  ---------------------------------------------------------------------  [  ] Sepsis/severe sepsis/septic shock  [  ] Noninfectious SIRS  [  ] UTI  [  ] Pneumonia  [  ] Thrombophlebitis   -----------------------------------------------------------------------  [  ] Acidosis/alkalosis  [  ] Fluid overload  [  ] Hypokalemia  [  ] Hyperkalemia  [  ] Hypomagnesemia  [  ] Hypophosphatemia  [  ] Hyperphosphatemia  ------------------------------------------------------------------------  [  ] Acute blood loss anemia  [  ] Post op blood loss anemia  [  ] Iron deficiency anemia  [  ] Anemia due to chronic disease  [  ] Hypercoagulable state  [  ] Thrombocytopenia  ----------------------------------------------------------------------  [  ] Cerebral infarction  [  ] Transient ischemia attack  [  ] Encephalopathy - Toxic or Metabolic          75yo M, with PMHx of HTN, HLD, BPH, HFrEF, CAD s/p CABG 10/31/24, severe PAD (s/p fem-fem) who presented to [facility] for left leg pain concerning for acute limb ischemia. On exam here, patient has an exam more consistent with his prior recent exam, consisting of chronic limb ischemia (motor and sensation intact, doppler signals present in left foot and fem-fem bypass). Given his rest pain, will proceed with arteriography.     #PAD w/ rest pain  - Plan for bilateral LE angiogram 11/11  - CTA b/l LE at OSH 11/8: post fem-fem bypass, severe atherosclerotic disease involving aorta, iliac, and superficial femoral arteries b/l more on the left  - c/w DAPT and statin  - low mIVF overnight for PRECIOUS prophylaxis    #CAD s/p prior PCI and recent CABG   - c/w DAPT and statin  - c/w imdur  - c/w metoprolol tartrate    #HFrEF  - ECHO 10/24: LVEF 35% with regional wall abnormalities  - c/w metoprolol tartrate    #RUL Pulmonary nodule - PET +  - Following with Dr. Duarte outpatient.    #CKD 3  - Monitor Cr and UOP  - low mIVF overnight for PRECIOUS prophylaxis    #BPH  - c/w flomax and proscar    #Prediabetes  - A1C 5.8%  - Consistent carb diet    Diet: NPO pMN  activity: as jud  DVTppx: SQH  Dispo: pending procedure O/N: ordered L foot xray per pods, will see in am. c/o worsening CP/SOB, EKG NSR, evidence of old inferior infarct, T wave inversions in lateral leads -all same as prior EKG. CXR wnl pending read, labs sent.      ---------------------------------------------------------------------------  PLEASE CHECK WHEN PRESENT:  [ x ]Heart Failure     [  ] Acute     [  ] Acute on Chronic     [ x ] Chronic       [  ]Diastolic [HFpEF]     [ x ]Systolic [HFrEF]     [  ]Combined [HFpEF & HFrEF]  .................................................................................  [x  ] Hypertensive Heart Disease  [ x ] CAD  [  ] Atrial Fibrillation     [  ] Paroxysmal A-fib     [  ] Chronic A-fib     [  ] Persistent A-fib     [  ] Longstanding Persistent A-fib     [  ] Permanent A-fib  [  ] Pulmonary Hypertension  [  ] Other:  -------------------------------------------------------------------  [  ] Respiratory failure  [  ] Acute PE   [  ] Acute cor pulmonale  [  ] Asthma/COPD Exacerbation  [  ] COPD on home O2 (Chronic renal Failure)   [  ] Atelectasis   [  ] Pleural effusion  [  ] Aspiration pneumonia  [  ] Obstructive Sleep Apnea  -------------------------------------------------------------------  [  ] Acute Kidney Injury      [  ] Acute Tubular Necrosis      [  ] Reneal Medullary Necrosis     [  ] Renal Cortical Necrosis     [  ] Other Pathological Lesions:  [ x ] Chronic Kidney Disease     [  ]CKD 1     [  ]CKD 2     [ x ]CKD 3     [  ]CKD 4     [  ]CKD 5 (ESRD)  [  ]Other:  -------------------------------------------------------------------  [  ] Diabetes  [  ] Diabetic PVD Ulcer  [  ] Neuropathic ulcer to DM  [  ] Diabetes with Nephropathy  [  ] Osteomyelitis due to diabetes  [  ] Hyperglycemia   [  ] Hypoglycemia   --------------------------------------------------------------------  [  ] Malnutrition: See Nutrition Note  [  ] Cachexia  [  ] Other:   [  ] Supplement Ordered:  [  ] Morbid Obesity (BMI >=40]  [  ] Ileus  ---------------------------------------------------------------------  [  ] Sepsis/severe sepsis/septic shock  [  ] Noninfectious SIRS  [  ] UTI  [  ] Pneumonia  [  ] Thrombophlebitis   -----------------------------------------------------------------------  [  ] Acidosis/alkalosis  [  ] Fluid overload  [  ] Hypokalemia  [  ] Hyperkalemia  [  ] Hypomagnesemia  [  ] Hypophosphatemia  [  ] Hyperphosphatemia  ------------------------------------------------------------------------  [  ] Acute blood loss anemia  [  ] Post op blood loss anemia  [  ] Iron deficiency anemia  [  ] Anemia due to chronic disease  [  ] Hypercoagulable state  [  ] Thrombocytopenia  ----------------------------------------------------------------------  [  ] Cerebral infarction  [  ] Transient ischemia attack  [  ] Encephalopathy - Toxic or Metabolic          75yo M, with PMHx of HTN, HLD, BPH, HFrEF, CAD s/p CABG 10/31/24, severe PAD (s/p fem-fem) who presented to [facility] for left leg pain concerning for acute limb ischemia. On exam here, patient has an exam more consistent with his prior recent exam, consisting of chronic limb ischemia (motor and sensation intact, doppler signals present in left foot and fem-fem bypass). Given his rest pain, will proceed with arteriography.     #PAD w/ rest pain  - Plan for bilateral LE angiogram 11/11  - CTA b/l LE at OSH 11/8: post fem-fem bypass, severe atherosclerotic disease involving aorta, iliac, and superficial femoral arteries b/l more on the left  - c/w DAPT and statin  - low mIVF overnight for PRECIOUS prophylaxis    #CAD s/p prior PCI and recent CABG   - c/w DAPT and statin  - c/w imdur  - c/w metoprolol tartrate    #HFrEF  - ECHO 10/24: LVEF 35% with regional wall abnormalities  - c/w metoprolol tartrate    #RUL Pulmonary nodule - PET +  - Following with Dr. Duarte outpatient.    #CKD 3  - Monitor Cr and UOP  - low mIVF overnight for PRECIOUS prophylaxis    #BPH  - c/w flomax and proscar    #Prediabetes  - A1C 5.8%  - Consistent carb diet    Diet: NPO pMN  activity: as jud  DVTppx: SQH  Dispo: pending procedure O/N: ordered L foot xray per pods, will see in am. c/o worsening CP/SOB, EKG NSR, evidence of old inferior infarct, T wave inversions in lateral leads -all same as prior EKG. CXR wnl pending read, labs sent.    S: Patient does not have any complaints    O: Examined in bed resting comfortably     aspirin  chewable 81  clopidogrel Tablet 75  heparin   Injectable 5000  isosorbide   mononitrate ER Tablet (IMDUR) 30  metoprolol tartrate 25      Allergies    No Known Allergies    Intolerances        Vital Signs Last 24 Hrs  T(C): 36.7 (11 Nov 2024 04:25), Max: 37.1 (10 Nov 2024 20:39)  T(F): 98 (11 Nov 2024 04:25), Max: 98.7 (10 Nov 2024 20:39)  HR: 68 (11 Nov 2024 04:50) (68 - 90)  BP: 115/55 (11 Nov 2024 04:50) (111/58 - 152/70)  BP(mean): 74 (11 Nov 2024 04:50) (74 - 100)  RR: 18 (11 Nov 2024 04:50) (18 - 18)  SpO2: 92% (11 Nov 2024 04:50) (92% - 96%)    Parameters below as of 11 Nov 2024 04:50  Patient On (Oxygen Delivery Method): room air      I&O's Summary    10 Nov 2024 07:01  -  11 Nov 2024 07:00  --------------------------------------------------------  IN: 359 mL / OUT: 700 mL / NET: -341 mL      Physical Exam:  General: alert and awake, NAD  Pulmonary: no respiratory distress  Cardiovascular: RRR  Abdominal: soft  Extremities:  Biphasic DP/PT on R. Monophasic PT on L. Biphasic DP on R.       LABS:                        11.8   7.02  )-----------( 253      ( 11 Nov 2024 05:30 )             36.7     11-11    140  |  106  |  24[H]  ----------------------------<  137[H]  4.8   |  21[L]  |  1.58[H]    Ca    8.9      11 Nov 2024 04:18  Phos  3.8     11-11  Mg     1.9     11-11      PT/INR - ( 11 Nov 2024 05:30 )   PT: 12.9 sec;   INR: 1.10          PTT - ( 11 Nov 2024 05:30 )  PTT:28.2 sec    Radiology and Additional Studies:    ---------------------------------------------------------------------------  PLEASE CHECK WHEN PRESENT:  [ x ]Heart Failure     [  ] Acute     [  ] Acute on Chronic     [ x ] Chronic     [  ]Diastolic [HFpEF]     [ x ]Systolic [HFrEF]     [  ]Combined [HFpEF & HFrEF]  .................................................................................  [x ] Hypertensive Heart Disease  [x ] CAD  [  ] Atrial Fibrillation     [  ] Paroxysmal A-fib     [  ] Chronic A-fib     [  ] Persistent A-fib     [  ] Longstanding Persistent A-fib     [  ] Permanent A-fib  [  ] Pulmonary Hypertension  [  ] Other:  -------------------------------------------------------------------  [  ] Respiratory failure  [  ] Acute PE   [  ] Acute cor pulmonale  [  ] Asthma/COPD Exacerbation  [  ] COPD on home O2 (Chronic renal Failure)   [  ] Atelectasis   [  ] Pleural effusion  [  ] Aspiration pneumonia  [  ] Obstructive Sleep Apnea  -------------------------------------------------------------------  [  ] Acute Kidney Injury      [  ] Acute Tubular Necrosis      [  ] Reneal Medullary Necrosis     [  ] Renal Cortical Necrosis     [  ] Other Pathological Lesions:  [ x ] Chronic Kidney Disease     [  ]CKD 1     [  ]CKD 2     [ x ]CKD 3     [  ]CKD 4     [  ]CKD 5 (ESRD)  [  ]Other:  -------------------------------------------------------------------  [  ] Diabetes  [  ] Diabetic PVD Ulcer  [  ] Neuropathic ulcer to DM  [  ] Diabetes with Nephropathy  [  ] Osteomyelitis due to diabetes  [  ] Hyperglycemia   [  ] Hypoglycemia   --------------------------------------------------------------------  [  ] Malnutrition: See Nutrition Note  [  ] Cachexia  [  ] Other:   [  ] Supplement Ordered:  [  ] Morbid Obesity (BMI >=40]  [  ] Ileus  ---------------------------------------------------------------------  [  ] Sepsis/severe sepsis/septic shock  [  ] Noninfectious SIRS  [  ] UTI  [  ] Pneumonia  [  ] Thrombophlebitis   -----------------------------------------------------------------------  [  ] Acidosis/alkalosis  [  ] Fluid overload  [  ] Hypokalemia  [  ] Hyperkalemia  [  ] Hypomagnesemia  [  ] Hypophosphatemia  [  ] Hyperphosphatemia  ------------------------------------------------------------------------  [  ] Acute blood loss anemia  [  ] Post op blood loss anemia  [  ] Iron deficiency anemia  [  ] Anemia due to chronic disease  [  ] Hypercoagulable state  [  ] Thrombocytopenia  ----------------------------------------------------------------------  [  ] Cerebral infarction  [  ] Transient ischemia attack  [  ] Encephalopathy - Toxic or Metabolic          73yo M, with PMHx of HTN, HLD, BPH, HFrEF, CAD s/p CABG 10/31/24, severe PAD (s/p fem-fem) who presented to [facility] for left leg pain concerning for acute limb ischemia. On exam here, patient has an exam more consistent with his prior recent exam, consisting of chronic limb ischemia (motor and sensation intact, doppler signals present in left foot and fem-fem bypass). Given his rest pain, will proceed with arteriography.     #PAD w/ rest pain  - Plan for bilateral LE angiogram 11/11  - CTA b/l LE at OSH 11/8: post fem-fem bypass, severe atherosclerotic disease involving aorta, iliac, and superficial femoral arteries b/l more on the left  - c/w DAPT and statin  - low mIVF overnight for PRECIOUS prophylaxis    #CAD s/p prior PCI and recent CABG   - c/w DAPT and statin  - c/w imdur  - c/w metoprolol tartrate    #HFrEF  - ECHO 10/24: LVEF 35% with regional wall abnormalities  - c/w metoprolol tartrate    #RUL Pulmonary nodule - PET +  - Following with Dr. Duarte outpatient.    #CKD 3  - Monitor Cr and UOP  - low mIVF overnight for PRECIOUS prophylaxis    #BPH  - c/w flomax and proscar    #Prediabetes  - A1C 5.8%  - Consistent carb diet    Diet: NPO pMN  activity: as jud  DVTppx: SQH  Dispo: pending procedure today

## 2024-11-11 NOTE — PROGRESS NOTE ADULT - SUBJECTIVE AND OBJECTIVE BOX
This is a patient of Dr. Partha Valerio, but I was asked to perform LLE angiogram w/ endovascular intervention today. Mr. Elton Sanabria is a 74M w/ HTN, HLD, CBPH, CHF, CAD s/p PCI and robotic MIDCAB (10/31/24), and PAD s/p L to R fem-fem bypass (done elsewhere in past), who developed severe LLE rest pain. He was found to have occluded L SFA on prior imaging and was planned for LLE angiogram by Dr. Valerio last admission, but he had to undergo MIDCAB and was eventually discharged to [facility] rehab. He was transferred back here for persistent worsening LLE pain. On exam, he has dopplerable DP/PT signals bilaterally. Dry superficial scabs/wounds on L shin and calf.     He is now s/p us guided access of fem-fem bypass, aortogram, LLE angiogram w/ L common and external iliac covered stenting and perclose (11/11/24). he was found to have severe inflow stenoses in his L iliac arteries which were successfully treated. Proximal anastomosis of fem-fem bypass is upwards going, so cannot treat infrainguinal occlusions endovascularly via fem-fem access. Has occluded L CFA, L SFA and AK popliteal artery w/ reconstitution of BK popliteal artery via collaterals. Has AT/PT runoff. L foot and toes warmer and pinker at end of the case.

## 2024-11-11 NOTE — PROGRESS NOTE ADULT - ASSESSMENT
73yo M, with PMHx of HTN, HLD, BPH, CAD s/p CABG 10/31/24, severe PAD (s/p fem-fem) who presented to [facility] for left leg pain concerning for acute limb ischemia and transferred to [facility], now on vascular service and pending arteriography.     #PAD  - pending angiogram with vascular planned for today 11/11   - PT/OT  - pain control and bowel regimen     #CAD s/p prior PCI and recent CABG   - Continue with ASA, plavix, 12.5 mg lopressor q6, 30 mg imdur     #chest pain   -per interview of patient, has been ongoing since CABG.   -CKMB and troponin checked 11/11 AM and within normal limits   -EKG checked in AM on 11/11 and showed NSR with TWI in I, II, aVL, V5-V6, similar when compared with prior on 11/10   -pain regimen as above     #mild normocytic anemia  -recommend checking iron studies, reticulocyte  -continue to trend, transfuse for hemoglobin of 8 or higher     #FELIPA   -11/11 Cr 1.57, stable from yesterday, on review of prior values appears this may be his baseline   - trend Cr  - avoid nephrotoxic drugs, renally dose meds     #RUL Pulmonary nodule - PET +  - To follow up outpatient with  Dr. Duarte     #BPH  - Continue flomax and proscar    #HTN  - c/w lopressor and imdur    #HLD  - c/w high dose statin    #Prediabetes  - Consistent carb diet    DVT ppx with SQH

## 2024-11-11 NOTE — PROGRESS NOTE ADULT - ASSESSMENT
ASSESSMENT  - CAD s/p PCI and robotic MIDCAB (10/31/24)  - PAD s/p L to R fem-fem bypass (done elsewhere in past), who developed severe LLE rest pain --> s/p us guided access of fem-fem bypass, aortogram, LLE angiogram w/ L common and external iliac covered stenting and perclose (11/11/24). he was found to have severe inflow stenoses in his L iliac arteries which were successfully treated. Proximal anastomosis of fem-fem bypass is upwards going, so cannot treat infrainguinal occlusions endovascularly via fem-fem access. Has occluded L CFA, L SFA and AK popliteal artery w/ reconstitution of BK popliteal artery via collaterals. Has AT/PT runoff. L foot and toes warmer and pinker at end of the case. Hopefully this is enough to improve/resolve his LLE pain. Otherwise may need future fem-pop bypass off of the fem-fem bypass.       PLAN & RECOMMENDATIONS  - Postop check  - Flat 4 hours  - Postop labs  - C/w DAPT  - Will need vascular follow up. Hopefully this is enough to improve/resolve his LLE pain. Otherwise may need future fem-pop bypass off of the fem-fem bypass.       Thank you,    Sb Beltran MD   of Vascular Surgery  [facility] at [address], [address] [place] [address]  Office: [phone]; Fax: [phone]  [email or web address]

## 2024-11-11 NOTE — PROGRESS NOTE ADULT - SUBJECTIVE AND OBJECTIVE BOX
Vascular Surgery Post-Op Note    Procedure: LLE angiogram    Diagnosis/Indication: PAD    Surgeon: Dr. Beltran    S: Pt has no complaints. Denies abdominal pain, back pain, CP, SOB, HICKMAN, calf tenderness. Pain controlled with medication.    O:  T(C): 36.1 (11-11-24 @ 17:25), Max: 36.1 (11-11-24 @ 17:25)  T(F): 97 (11-11-24 @ 17:25), Max: 97 (11-11-24 @ 17:25)  HR: 64 (11-11-24 @ 19:10) (63 - 69)  BP: 113/56 (11-11-24 @ 19:10) (98/52 - 113/56)  RR: 14 (11-11-24 @ 19:10) (12 - 17)  SpO2: 100% (11-11-24 @ 19:10) (97% - 100%)  Wt(kg): --                        10.3   6.49  )-----------( 202      ( 11 Nov 2024 18:16 )             32.6     11-11    138  |  105  |  24[H]  ----------------------------<  109[H]  4.9   |  23  |  1.48[H]    Ca    8.5      11 Nov 2024 18:16  Phos  3.9     11-11  Mg     1.9     11-11        Gen: NAD, resting comfortably in bed  C/V: NSR  Pulm: Nonlabored breathing, no respiratory distress, on room air  Abd: soft, NT/ND  Extrem: R groin soft, no palpable hematoma WWP, no calf edema, motor and sensory intact bilaterally  Pulses: triphasic DP bilaterally, biphasic PT bilaterally    A/P: 74yMale s/p above procedure  Diet: DASH  IVF: none  Pain/nausea control  DVT ppx: holding  Dispo plan: 5 uris

## 2024-11-11 NOTE — CONSULT NOTE ADULT - SUBJECTIVE AND OBJECTIVE BOX
Attending: Dr. Mcdaniel    Patient is a 74y old  Male who presents with a chief complaint of transfer for rule out limb ischemia (11 Nov 2024 01:08)      HPI:  This is a 73 y/o male with PMHx of HTN, HLD, BPH, CAD s/p CABG 10/31, PAD s/p remote fem-fem bypass, and and an "abdominal stent for circulation problems". Patient was recently admitted here 10/27/24 - 11/8/24 after initially presenting with a chief complaint of leg pain, being worked up for CLTI with plan for arteriography, but this procedure was deferred as he developed ACS and ultimately went for CABG. His post-procedure course was noteworthy for identification of PET-positive lung malignancy with plan for outpatient staging. on 11/8 into 11/9 he was evaluated for leg pain and on exam not found to have pedal signals or pulses. With this, [facility] for heparin drip and CT-A. CT-A images are not available but show s/p fem-fem bypass. He was accepted at [facility] as a transfer for continuity and arrived here this morning on heparin. Today he reports bilateral leg pain, left worse than right. He denies weakness or sensory changes at this time.     Of note, while at [facility] the patient was also accessed by CCU for sternal chest pain. Troponins and EKG were negative. Pain as attributed to surgical sternotomy pain    Denies CP/SOB/N/V/D/dizziness/cough/fever/chills.  Denies h/o CVA, surgery other than the bypass/stents.  (10 Nov 2024 07:05)    Podiatry addendum: Podiatry consulted for anterior L anterior leg wounds. Pt complains of pain at b/l LE.       Review of systems negative except per HPI and as stated below  General:	 no weakness; no fevers, no chills  Skin/Breast: no rash  Respiratory and Thorax: no SOB, no cough  Cardiovascular:	No chest pain  Gastrointestinal:	 no nausea, vomiting , diarrhea  Genitourinary:	no dysuria, no difficulty urinating, no hematuria  Musculoskeletal:	no weakness, no joint swelling/pain  Neurological:	no focal weakness/numbness  Endocrine:	no polyuria, no polydipsia    PAST MEDICAL & SURGICAL HISTORY:  CAD (coronary artery disease)      BPH (benign prostatic hyperplasia)      HTN (hypertension)      HLD (hyperlipidemia)      PAD (peripheral artery disease)      S/P femoral-femoral bypass surgery        Home Medications:  aspirin 81 mg oral tablet: 1 tab(s) orally once a day (10 Nov 2024 12:29)  finasteride 5 mg oral tablet: 1 tab(s) orally once a day (10 Nov 2024 12:29)  isosorbide mononitrate 30 mg oral tablet, extended release: 1 tab(s) orally once a day (10 Nov 2024 12:29)  Lipitor 80 mg oral tablet: 1 tab(s) orally once a day (at bedtime) (10 Nov 2024 12:29)  metoprolol tartrate 25 mg oral tablet: 1 tab(s) orally 2 times a day (10 Nov 2024 12:29)  pantoprazole 40 mg oral delayed release tablet: 1 tab(s) orally once a day (10 Nov 2024 12:29)  Plavix 75 mg oral tablet: 1 tab(s) orally once a day (10 Nov 2024 12:29)  polyethylene glycol 3350 oral powder for reconstitution: 17 gram(s) orally once a day (10 Nov 2024 12:29)  senna leaf extract oral tablet: 2 tab(s) orally once a day (at bedtime) (10 Nov 2024 12:29)  tamsulosin 0.4 mg oral capsule: 1 cap(s) orally once a day (at bedtime) (10 Nov 2024 12:29)    Allergies    No Known Allergies    Intolerances      FAMILY HISTORY:    Social History:       LABS                        11.8   7.02  )-----------( 253      ( 11 Nov 2024 05:30 )             36.7     11-11    138  |  105  |  25[H]  ----------------------------<  129[H]  4.4   |  22  |  1.57[H]    Ca    8.6      11 Nov 2024 05:30  Phos  4.0     11-11  Mg     2.0     11-11      PT/INR - ( 11 Nov 2024 05:30 )   PT: 12.9 sec;   INR: 1.10          PTT - ( 11 Nov 2024 05:30 )  PTT:28.2 sec  ESR: 19  CRP: --  11-11 @ 05:30    Vital Signs Last 24 Hrs  T(C): 36.7 (11 Nov 2024 04:25), Max: 37.1 (10 Nov 2024 20:39)  T(F): 98 (11 Nov 2024 04:25), Max: 98.7 (10 Nov 2024 20:39)  HR: 68 (11 Nov 2024 04:50) (68 - 90)  BP: 115/55 (11 Nov 2024 04:50) (111/58 - 152/70)  BP(mean): 74 (11 Nov 2024 04:50) (74 - 100)  RR: 18 (11 Nov 2024 04:50) (18 - 18)  SpO2: 92% (11 Nov 2024 04:50) (92% - 96%)    Parameters below as of 11 Nov 2024 04:50  Patient On (Oxygen Delivery Method): room air        PHYSICAL EXAM  General: NAD, AA0x3    Lower Extremity Focused:  Vasc:  Derm:  Neuro:  MSK:     Gait Examination:    RADIOLOGY                       Attending: Dr. Mcdaniel    Patient is a 74y old  Male who presents with a chief complaint of transfer for rule out limb ischemia (11 Nov 2024 01:08)      HPI:  This is a 73 y/o male with PMHx of HTN, HLD, BPH, CAD s/p CABG 10/31, PAD s/p remote fem-fem bypass, and and an "abdominal stent for circulation problems". Patient was recently admitted here 10/27/24 - 11/8/24 after initially presenting with a chief complaint of leg pain, being worked up for CLTI with plan for arteriography, but this procedure was deferred as he developed ACS and ultimately went for CABG. His post-procedure course was noteworthy for identification of PET-positive lung malignancy with plan for outpatient staging. on 11/8 into 11/9 he was evaluated for leg pain and on exam not found to have pedal signals or pulses. With this, [facility] for heparin drip and CT-A. CT-A images are not available but show s/p fem-fem bypass. He was accepted at [facility] as a transfer for continuity and arrived here this morning on heparin. Today he reports bilateral leg pain, left worse than right. He denies weakness or sensory changes at this time.     Of note, while at [facility] the patient was also accessed by CCU for sternal chest pain. Troponins and EKG were negative. Pain as attributed to surgical sternotomy pain    Denies CP/SOB/N/V/D/dizziness/cough/fever/chills.  Denies h/o CVA, surgery other than the bypass/stents.  (10 Nov 2024 07:05)    Podiatry addendum: Podiatry consulted for LE wounds. Pt complains of pain at b/l LE. Pt states he has had LLE anterior leg wounds for "quiet some time" and has noticed drainage in the past. He has also been spending more time in bed due to ischemic pain. Pt to undergo b/l LE angio today.       Review of systems negative except per HPI and as stated below  General:	 no weakness; no fevers, no chills  Skin/Breast: no rash  Respiratory and Thorax: no SOB, no cough  Cardiovascular:	No chest pain  Gastrointestinal:	 no nausea, vomiting , diarrhea  Genitourinary:	no dysuria, no difficulty urinating, no hematuria  Musculoskeletal:	no weakness, no joint swelling/pain  Neurological:	no focal weakness/numbness  Endocrine:	no polyuria, no polydipsia    PAST MEDICAL & SURGICAL HISTORY:  CAD (coronary artery disease)      BPH (benign prostatic hyperplasia)      HTN (hypertension)      HLD (hyperlipidemia)      PAD (peripheral artery disease)      S/P femoral-femoral bypass surgery        Home Medications:  aspirin 81 mg oral tablet: 1 tab(s) orally once a day (10 Nov 2024 12:29)  finasteride 5 mg oral tablet: 1 tab(s) orally once a day (10 Nov 2024 12:29)  isosorbide mononitrate 30 mg oral tablet, extended release: 1 tab(s) orally once a day (10 Nov 2024 12:29)  Lipitor 80 mg oral tablet: 1 tab(s) orally once a day (at bedtime) (10 Nov 2024 12:29)  metoprolol tartrate 25 mg oral tablet: 1 tab(s) orally 2 times a day (10 Nov 2024 12:29)  pantoprazole 40 mg oral delayed release tablet: 1 tab(s) orally once a day (10 Nov 2024 12:29)  Plavix 75 mg oral tablet: 1 tab(s) orally once a day (10 Nov 2024 12:29)  polyethylene glycol 3350 oral powder for reconstitution: 17 gram(s) orally once a day (10 Nov 2024 12:29)  senna leaf extract oral tablet: 2 tab(s) orally once a day (at bedtime) (10 Nov 2024 12:29)  tamsulosin 0.4 mg oral capsule: 1 cap(s) orally once a day (at bedtime) (10 Nov 2024 12:29)    Allergies    No Known Allergies    Intolerances      FAMILY HISTORY:    Social History:       LABS                        11.8   7.02  )-----------( 253      ( 11 Nov 2024 05:30 )             36.7     11-11    138  |  105  |  25[H]  ----------------------------<  129[H]  4.4   |  22  |  1.57[H]    Ca    8.6      11 Nov 2024 05:30  Phos  4.0     11-11  Mg     2.0     11-11      PT/INR - ( 11 Nov 2024 05:30 )   PT: 12.9 sec;   INR: 1.10          PTT - ( 11 Nov 2024 05:30 )  PTT:28.2 sec  ESR: 19  CRP: --  11-11 @ 05:30    Vital Signs Last 24 Hrs  T(C): 36.7 (11 Nov 2024 04:25), Max: 37.1 (10 Nov 2024 20:39)  T(F): 98 (11 Nov 2024 04:25), Max: 98.7 (10 Nov 2024 20:39)  HR: 68 (11 Nov 2024 04:50) (68 - 90)  BP: 115/55 (11 Nov 2024 04:50) (111/58 - 152/70)  BP(mean): 74 (11 Nov 2024 04:50) (74 - 100)  RR: 18 (11 Nov 2024 04:50) (18 - 18)  SpO2: 92% (11 Nov 2024 04:50) (92% - 96%)    Parameters below as of 11 Nov 2024 04:50  Patient On (Oxygen Delivery Method): room air        PHYSICAL EXAM  General: NAD, AA0x3    Lower Extremity Focused:  Vasc:  NP DP and PT B/L. R DP/PT Biphasic. L DP Biphasic, PT Monophasic. TG: Warm to Cold.   Derm:  LLE: Diffuse anterolateral and medial leg eschars. Firm and -fluctance, -drainage, -malodor, -crepitus, -PTB  L Heel: Pre-ulcerative lesion  RLE: No pre-ulcerative or ulcerative lesions  Neuro:  MSK:     Gait Examination:    RADIOLOGY        < from: Xray Foot AP + Lateral + Oblique, Left (11.10.24 @ 22:16) >  IMPRESSION:  No tracking soft tissue gas collections, radiopaque foreign bodies, or   gross radiographic evidence for osteomyelitis.    No fractures or dislocations.    Tarsometatarsal alignment maintained without evidence for a Lisfranc   injury. Preserved visualized joint spaces and no joint margin erosions.    Generalized mild osteopenia otherwise no discrete suspicious lytic or   blastic lesions.    Scant vascular calcifications.    --- End of Report ---    < end of copied text >                   Attending: Dr. Mcdaniel    Patient is a 74y old  Male who presents with a chief complaint of transfer for rule out limb ischemia (11 Nov 2024 01:08)      HPI:  This is a 75 y/o male with PMHx of HTN, HLD, BPH, CAD s/p CABG 10/31, PAD s/p remote fem-fem bypass, and and an "abdominal stent for circulation problems". Patient was recently admitted here 10/27/24 - 11/8/24 after initially presenting with a chief complaint of leg pain, being worked up for CLTI with plan for arteriography, but this procedure was deferred as he developed ACS and ultimately went for CABG. His post-procedure course was noteworthy for identification of PET-positive lung malignancy with plan for outpatient staging. on 11/8 into 11/9 he was evaluated for leg pain and on exam not found to have pedal signals or pulses. With this, [facility] [place] for heparin drip and CT-A. CT-A images are not available but show s/p fem-fem bypass. He was accepted at [facility] as a transfer for continuity and arrived here this morning on heparin. Today he reports bilateral leg pain, left worse than right. He denies weakness or sensory changes at this time.     Of note, while at [facility] the patient was also accessed by CCU for sternal chest pain. Troponins and EKG were negative. Pain as attributed to surgical sternotomy pain    Denies CP/SOB/N/V/D/dizziness/cough/fever/chills.  Denies h/o CVA, surgery other than the bypass/stents.  (10 Nov 2024 07:05)    Podiatry addendum: Podiatry consulted for LE wounds. Pt complains of pain at b/l LE. Pt states he has had LLE anterior leg wounds for "quiet some time" and has noticed drainage in the past. He has also been spending more time in bed due to ischemic pain. Pt to undergo b/l LE angio today.       Review of systems negative except per HPI and as stated below  General:	 no weakness; no fevers, no chills  Skin/Breast: no rash  Respiratory and Thorax: no SOB, no cough  Cardiovascular:	No chest pain  Gastrointestinal:	 no nausea, vomiting , diarrhea  Genitourinary:	no dysuria, no difficulty urinating, no hematuria  Musculoskeletal:	no weakness, no joint swelling/pain  Neurological:	no focal weakness/numbness  Endocrine:	no polyuria, no polydipsia    PAST MEDICAL & SURGICAL HISTORY:  CAD (coronary artery disease)      BPH (benign prostatic hyperplasia)      HTN (hypertension)      HLD (hyperlipidemia)      PAD (peripheral artery disease)      S/P femoral-femoral bypass surgery        Home Medications:  aspirin 81 mg oral tablet: 1 tab(s) orally once a day (10 Nov 2024 12:29)  finasteride 5 mg oral tablet: 1 tab(s) orally once a day (10 Nov 2024 12:29)  isosorbide mononitrate 30 mg oral tablet, extended release: 1 tab(s) orally once a day (10 Nov 2024 12:29)  Lipitor 80 mg oral tablet: 1 tab(s) orally once a day (at bedtime) (10 Nov 2024 12:29)  metoprolol tartrate 25 mg oral tablet: 1 tab(s) orally 2 times a day (10 Nov 2024 12:29)  pantoprazole 40 mg oral delayed release tablet: 1 tab(s) orally once a day (10 Nov 2024 12:29)  Plavix 75 mg oral tablet: 1 tab(s) orally once a day (10 Nov 2024 12:29)  polyethylene glycol 3350 oral powder for reconstitution: 17 gram(s) orally once a day (10 Nov 2024 12:29)  senna leaf extract oral tablet: 2 tab(s) orally once a day (at bedtime) (10 Nov 2024 12:29)  tamsulosin 0.4 mg oral capsule: 1 cap(s) orally once a day (at bedtime) (10 Nov 2024 12:29)    Allergies    No Known Allergies    Intolerances      FAMILY HISTORY:    Social History:       LABS                        11.8   7.02  )-----------( 253      ( 11 Nov 2024 05:30 )             36.7     11-11    138  |  105  |  25[H]  ----------------------------<  129[H]  4.4   |  22  |  1.57[H]    Ca    8.6      11 Nov 2024 05:30  Phos  4.0     11-11  Mg     2.0     11-11      PT/INR - ( 11 Nov 2024 05:30 )   PT: 12.9 sec;   INR: 1.10          PTT - ( 11 Nov 2024 05:30 )  PTT:28.2 sec  ESR: 19  CRP: --  11-11 @ 05:30    Vital Signs Last 24 Hrs  T(C): 36.7 (11 Nov 2024 04:25), Max: 37.1 (10 Nov 2024 20:39)  T(F): 98 (11 Nov 2024 04:25), Max: 98.7 (10 Nov 2024 20:39)  HR: 68 (11 Nov 2024 04:50) (68 - 90)  BP: 115/55 (11 Nov 2024 04:50) (111/58 - 152/70)  BP(mean): 74 (11 Nov 2024 04:50) (74 - 100)  RR: 18 (11 Nov 2024 04:50) (18 - 18)  SpO2: 92% (11 Nov 2024 04:50) (92% - 96%)    Parameters below as of 11 Nov 2024 04:50  Patient On (Oxygen Delivery Method): room air        PHYSICAL EXAM  General: NAD, AA0x3    Lower Extremity Focused:  Vasc:  NP DP and PT B/L. R DP/PT Biphasic. L DP Biphasic, PT Monophasic. TG: Warm to Cold.   Derm:  LLE: Diffuse anterolateral and medial leg eschars. Firm and -fluctance, -drainage, -malodor, -crepitus, -PTB  L Heel: Pre-ulcerative lesion  RLE: No pre-ulcerative or ulcerative lesions  Neuro: Protective sensation intact  MSK: +TTP diffuse B/L LE    Gait Examination:    RADIOLOGY        < from: Xray Foot AP + Lateral + Oblique, Left (11.10.24 @ 22:16) >  IMPRESSION:  No tracking soft tissue gas collections, radiopaque foreign bodies, or   gross radiographic evidence for osteomyelitis.    No fractures or dislocations.    Tarsometatarsal alignment maintained without evidence for a Lisfranc   injury. Preserved visualized joint spaces and no joint margin erosions.    Generalized mild osteopenia otherwise no discrete suspicious lytic or   blastic lesions.    Scant vascular calcifications.    --- End of Report ---    < end of copied text >

## 2024-11-11 NOTE — PROGRESS NOTE ADULT - SUBJECTIVE AND OBJECTIVE BOX
Medicine Progress Note    Patient is a 74y old  Male who presents with a chief complaint of transfer for rule out limb ischemia (11 Nov 2024 08:54)      SUBJECTIVE / OVERNIGHT EVENTS:  Uncomfortable this morning due to ongoing chest pain and pain in his left leg, making it difficult to rest. Interviewed with assistance from phone [place] , [place] [id number].    MEDICATIONS  (STANDING):  acetaminophen     Tablet .. 975 milliGRAM(s) Oral every 8 hours  aspirin  chewable 81 milliGRAM(s) Oral daily  atorvastatin 80 milliGRAM(s) Oral at bedtime  clopidogrel Tablet 75 milliGRAM(s) Oral daily  finasteride 5 milliGRAM(s) Oral daily  heparin   Injectable 5000 Unit(s) SubCutaneous every 8 hours  influenza  Vaccine (HIGH DOSE) 0.5 milliLiter(s) IntraMuscular once  isosorbide   mononitrate ER Tablet (IMDUR) 30 milliGRAM(s) Oral daily  metoprolol tartrate 25 milliGRAM(s) Oral two times a day  pantoprazole    Tablet 40 milliGRAM(s) Oral before breakfast  polyethylene glycol 3350 17 Gram(s) Oral daily  senna 2 Tablet(s) Oral at bedtime  sodium chloride 0.9%. 1000 milliLiter(s) (30 mL/Hr) IV Continuous <Continuous>  tamsulosin 0.4 milliGRAM(s) Oral at bedtime    MEDICATIONS  (PRN):  oxyCODONE    IR 2.5 milliGRAM(s) Oral every 4 hours PRN Moderate Pain (4 - 6)  oxyCODONE    IR 5 milliGRAM(s) Oral every 4 hours PRN Severe Pain (7 - 10)    CAPILLARY BLOOD GLUCOSE        I&O's Summary    10 Nov 2024 07:01  -  11 Nov 2024 07:00  --------------------------------------------------------  IN: 359 mL / OUT: 700 mL / NET: -341 mL    11 Nov 2024 07:01  -  11 Nov 2024 12:10  --------------------------------------------------------  IN: 180 mL / OUT: 120 mL / NET: 60 mL        PHYSICAL EXAM:  Vital Signs Last 24 Hrs  T(C): 36.7 (11 Nov 2024 10:03), Max: 37.1 (10 Nov 2024 20:39)  T(F): 98.1 (11 Nov 2024 08:58), Max: 98.7 (10 Nov 2024 20:39)  HR: 60 (11 Nov 2024 10:03) (60 - 90)  BP: 98/52 (11 Nov 2024 10:03) (98/52 - 152/70)  BP(mean): 74 (11 Nov 2024 10:03) (74 - 100)  RR: 18 (11 Nov 2024 10:03) (18 - 18)  SpO2: 94% (11 Nov 2024 10:03) (92% - 96%)    Parameters below as of 11 Nov 2024 08:58  Patient On (Oxygen Delivery Method): room air      Appears comfortable   MMM  Normal WOB on RA, CTAB   RRR, no mrg   Abdomen soft, nontender, nondistended  Extremities warm and without edema, some tenderness to palpation over the L foot   AOX3, no focal neuro deficits     LABS:                        11.8   7.02  )-----------( 253      ( 11 Nov 2024 05:30 )             36.7     11-11    138  |  105  |  25[H]  ----------------------------<  129[H]  4.4   |  22  |  1.57[H]    Ca    8.6      11 Nov 2024 05:30  Phos  4.0     11-11  Mg     2.0     11-11      PT/INR - ( 11 Nov 2024 05:30 )   PT: 12.9 sec;   INR: 1.10          PTT - ( 11 Nov 2024 05:30 )  PTT:28.2 sec  CARDIAC MARKERS ( 11 Nov 2024 04:18 )  x     / x     / x     / x     / 3.3 ng/mL    Troponin 11/11 within normal limits       Urinalysis Basic - ( 11 Nov 2024 05:30 )    Color: x / Appearance: x / SG: x / pH: x  Gluc: 129 mg/dL / Ketone: x  / Bili: x / Urobili: x   Blood: x / Protein: x / Nitrite: x   Leuk Esterase: x / RBC: x / WBC x   Sq Epi: x / Non Sq Epi: x / Bacteria: x        COVID-19 PCR: NotDetec (07 Nov 2024 14:11)      RADIOLOGY & ADDITIONAL TESTS:  Imaging from Last 24 Hours:   Medicine Progress Note    Patient is a 74y old  Male who presents with a chief complaint of transfer for rule out limb ischemia (11 Nov 2024 08:54)      SUBJECTIVE / OVERNIGHT EVENTS:  Uncomfortable this morning due to ongoing chest pain and pain in his left leg, making it difficult to rest. Interviewed with assistance from phone [place] , [place] [id number].    MEDICATIONS  (STANDING):  acetaminophen     Tablet .. 975 milliGRAM(s) Oral every 8 hours  aspirin  chewable 81 milliGRAM(s) Oral daily  atorvastatin 80 milliGRAM(s) Oral at bedtime  clopidogrel Tablet 75 milliGRAM(s) Oral daily  finasteride 5 milliGRAM(s) Oral daily  heparin   Injectable 5000 Unit(s) SubCutaneous every 8 hours  influenza  Vaccine (HIGH DOSE) 0.5 milliLiter(s) IntraMuscular once  isosorbide   mononitrate ER Tablet (IMDUR) 30 milliGRAM(s) Oral daily  metoprolol tartrate 25 milliGRAM(s) Oral two times a day  pantoprazole    Tablet 40 milliGRAM(s) Oral before breakfast  polyethylene glycol 3350 17 Gram(s) Oral daily  senna 2 Tablet(s) Oral at bedtime  sodium chloride 0.9%. 1000 milliLiter(s) (30 mL/Hr) IV Continuous <Continuous>  tamsulosin 0.4 milliGRAM(s) Oral at bedtime    MEDICATIONS  (PRN):  oxyCODONE    IR 2.5 milliGRAM(s) Oral every 4 hours PRN Moderate Pain (4 - 6)  oxyCODONE    IR 5 milliGRAM(s) Oral every 4 hours PRN Severe Pain (7 - 10)    CAPILLARY BLOOD GLUCOSE        I&O's Summary    10 Nov 2024 07:01  -  11 Nov 2024 07:00  --------------------------------------------------------  IN: 359 mL / OUT: 700 mL / NET: -341 mL    11 Nov 2024 07:01  -  11 Nov 2024 12:10  --------------------------------------------------------  IN: 180 mL / OUT: 120 mL / NET: 60 mL        PHYSICAL EXAM:  Vital Signs Last 24 Hrs  T(C): 36.7 (11 Nov 2024 10:03), Max: 37.1 (10 Nov 2024 20:39)  T(F): 98.1 (11 Nov 2024 08:58), Max: 98.7 (10 Nov 2024 20:39)  HR: 60 (11 Nov 2024 10:03) (60 - 90)  BP: 98/52 (11 Nov 2024 10:03) (98/52 - 152/70)  BP(mean): 74 (11 Nov 2024 10:03) (74 - 100)  RR: 18 (11 Nov 2024 10:03) (18 - 18)  SpO2: 94% (11 Nov 2024 10:03) (92% - 96%)    Parameters below as of 11 Nov 2024 08:58  Patient On (Oxygen Delivery Method): room air      Appears comfortable   MMM  Normal WOB on RA, CTAB   RRR, no mrg   Abdomen soft, nontender, nondistended  Extremities warm and without edema, some tenderness to palpation over the L foot   AOX3, no focal neuro deficits     LABS:                        11.8   7.02  )-----------( 253      ( 11 Nov 2024 05:30 )             36.7     11-11    138  |  105  |  25[H]  ----------------------------<  129[H]  4.4   |  22  |  1.57[H]    Ca    8.6      11 Nov 2024 05:30  Phos  4.0     11-11  Mg     2.0     11-11      PT/INR - ( 11 Nov 2024 05:30 )   PT: 12.9 sec;   INR: 1.10          PTT - ( 11 Nov 2024 05:30 )  PTT:28.2 sec  CARDIAC MARKERS ( 11 Nov 2024 04:18 )  x     / x     / x     / x     / 3.3 ng/mL    Troponin 11/11 within normal limits       Urinalysis Basic - ( 11 Nov 2024 05:30 )    Color: x / Appearance: x / SG: x / pH: x  Gluc: 129 mg/dL / Ketone: x  / Bili: x / Urobili: x   Blood: x / Protein: x / Nitrite: x   Leuk Esterase: x / RBC: x / WBC x   Sq Epi: x / Non Sq Epi: x / Bacteria: x        COVID-19 PCR: NotDetec (07 Nov 2024 14:11)      RADIOLOGY & ADDITIONAL TESTS:  Imaging from Last 24 Hours:  CXR with LLL atelectasis.

## 2024-11-11 NOTE — CONSULT NOTE ADULT - ASSESSMENT
73 y/o male with PMHx of HTN, HLD, BPH, CAD s/p CABG 10/31, PAD s/p remote fem-fem bypass, and and an "abdominal stent for circulation problems". ESR 19, WBC 7.02, CRP 5.3.  stage 1 pressure ulcer at L heel present. LLE proximal eschar lesions stable.     Plan:    -Please provide pts with offloading boots while in bed  -XRs reviewed    Patient should follow up with Dr. Mikey Mcdaniel within 1 week of discharge.    Office information:          [place] Address- [address] [address] Phone: [phone]         [place] Address- [address], [place] [address] Phone: [phone]      Podiatry signing off, please reconsult as needed. Plan d/w with attending.       73 y/o male with PMHx of HTN, HLD, BPH, CAD s/p CABG 10/31, PAD s/p remote fem-fem bypass, and and an "abdominal stent for circulation problems". ESR 19, WBC 7.02, CRP 5.3.  stage 1 pressure sore at L heel present. LLE proximal eschar lesions stable.     Plan:    -Please provide pts with offloading boots while in bed  -XRs reviewed    Patient should follow up with Dr. Mikey Mcdaniel within 1 week of discharge.    Office information:          [place] Address- [address] [address] Phone: [phone]         [place] Address- [address], [place] [address] Phone: [phone]      Podiatry signing off, please reconsult as needed. Plan d/w with attending.

## 2024-11-12 DIAGNOSIS — C34.11 MALIGNANT NEOPLASM OF UPPER LOBE, RIGHT BRONCHUS OR LUNG: ICD-10-CM

## 2024-11-12 DIAGNOSIS — I25.10 ATHEROSCLEROTIC HEART DISEASE OF NATIVE CORONARY ARTERY WITHOUT ANGINA PECTORIS: ICD-10-CM

## 2024-11-12 DIAGNOSIS — I73.9 PERIPHERAL VASCULAR DISEASE, UNSPECIFIED: ICD-10-CM

## 2024-11-12 DIAGNOSIS — I10 ESSENTIAL (PRIMARY) HYPERTENSION: ICD-10-CM

## 2024-11-12 DIAGNOSIS — N40.0 BENIGN PROSTATIC HYPERPLASIA WITHOUT LOWER URINARY TRACT SYMPTOMS: ICD-10-CM

## 2024-11-12 DIAGNOSIS — Z79.82 LONG TERM (CURRENT) USE OF ASPIRIN: ICD-10-CM

## 2024-11-12 DIAGNOSIS — R73.03 PREDIABETES: ICD-10-CM
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
## 2024-11-12 DIAGNOSIS — R31.9 HEMATURIA, UNSPECIFIED: ICD-10-CM

## 2024-11-12 DIAGNOSIS — Z79.02 LONG TERM (CURRENT) USE OF ANTITHROMBOTICS/ANTIPLATELETS: ICD-10-CM

## 2024-11-12 DIAGNOSIS — Z95.5 PRESENCE OF CORONARY ANGIOPLASTY IMPLANT AND GRAFT: ICD-10-CM
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
## 2024-11-12 DIAGNOSIS — E78.5 HYPERLIPIDEMIA, UNSPECIFIED: ICD-10-CM

## 2024-11-12 DIAGNOSIS — L97.929 NON-PRESSURE CHRONIC ULCER OF UNSPECIFIED PART OF LEFT LOWER LEG WITH UNSPECIFIED SEVERITY: ICD-10-CM

## 2024-11-12 DIAGNOSIS — N17.9 ACUTE KIDNEY FAILURE, UNSPECIFIED: ICD-10-CM

## 2024-11-12 DIAGNOSIS — Z87.891 PERSONAL HISTORY OF NICOTINE DEPENDENCE: ICD-10-CM

## 2024-11-12 DIAGNOSIS — J95.1 ACUTE PULMONARY INSUFFICIENCY FOLLOWING THORACIC SURGERY: ICD-10-CM
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
## 2024-11-12 LAB
ANION GAP SERPL CALC-SCNC: 15 MMOL/L — SIGNIFICANT CHANGE UP (ref 5–17)
APTT BLD: 27.2 SEC — SIGNIFICANT CHANGE UP (ref 24.5–35.6)
APTT BLD: 32.9 SEC — SIGNIFICANT CHANGE UP (ref 24.5–35.6)
BUN SERPL-MCNC: 23 MG/DL — SIGNIFICANT CHANGE UP (ref 7–23)
CALCIUM SERPL-MCNC: 9 MG/DL — SIGNIFICANT CHANGE UP (ref 8.4–10.5)
CHLORIDE SERPL-SCNC: 104 MMOL/L — SIGNIFICANT CHANGE UP (ref 96–108)
CK MB CFR SERPL CALC: 3.1 NG/ML — SIGNIFICANT CHANGE UP (ref 0–6.7)
CK SERPL-CCNC: 70 U/L — SIGNIFICANT CHANGE UP (ref 30–200)
CO2 SERPL-SCNC: 18 MMOL/L — LOW (ref 22–31)
CREAT SERPL-MCNC: 1.4 MG/DL — HIGH (ref 0.5–1.3)
EGFR: 52 ML/MIN/1.73M2 — LOW
GLUCOSE SERPL-MCNC: 95 MG/DL — SIGNIFICANT CHANGE UP (ref 70–99)
HCT VFR BLD CALC: 40.2 % — SIGNIFICANT CHANGE UP (ref 39–50)
HGB BLD-MCNC: 12.4 G/DL — LOW (ref 13–17)
MAGNESIUM SERPL-MCNC: 1.9 MG/DL — SIGNIFICANT CHANGE UP (ref 1.6–2.6)
MCHC RBC-ENTMCNC: 30.1 PG — SIGNIFICANT CHANGE UP (ref 27–34)
MCHC RBC-ENTMCNC: 30.8 G/DL — LOW (ref 32–36)
MCV RBC AUTO: 97.6 FL — SIGNIFICANT CHANGE UP (ref 80–100)
NRBC # BLD: 0 /100 WBCS — SIGNIFICANT CHANGE UP (ref 0–0)
PHOSPHATE SERPL-MCNC: 2.8 MG/DL — SIGNIFICANT CHANGE UP (ref 2.5–4.5)
PLATELET # BLD AUTO: 199 K/UL — SIGNIFICANT CHANGE UP (ref 150–400)
POTASSIUM SERPL-MCNC: 4.8 MMOL/L — SIGNIFICANT CHANGE UP (ref 3.5–5.3)
POTASSIUM SERPL-SCNC: 4.8 MMOL/L — SIGNIFICANT CHANGE UP (ref 3.5–5.3)
RBC # BLD: 4.12 M/UL — LOW (ref 4.2–5.8)
RBC # FLD: 12.5 % — SIGNIFICANT CHANGE UP (ref 10.3–14.5)
SODIUM SERPL-SCNC: 137 MMOL/L — SIGNIFICANT CHANGE UP (ref 135–145)
TROPONIN T, HIGH SENSITIVITY RESULT: 41 NG/L — SIGNIFICANT CHANGE UP (ref 0–51)
WBC # BLD: 7.49 K/UL — SIGNIFICANT CHANGE UP (ref 3.8–10.5)
WBC # FLD AUTO: 7.49 K/UL — SIGNIFICANT CHANGE UP (ref 3.8–10.5)

## 2024-11-12 PROCEDURE — 99223 1ST HOSP IP/OBS HIGH 75: CPT

## 2024-11-12 PROCEDURE — 93010 ELECTROCARDIOGRAM REPORT: CPT | Mod: 77

## 2024-11-12 PROCEDURE — 93010 ELECTROCARDIOGRAM REPORT: CPT

## 2024-11-12 PROCEDURE — 93970 EXTREMITY STUDY: CPT | Mod: 26

## 2024-11-12 PROCEDURE — 99233 SBSQ HOSP IP/OBS HIGH 50: CPT

## 2024-11-12 PROCEDURE — 99232 SBSQ HOSP IP/OBS MODERATE 35: CPT

## 2024-11-12 RX ORDER — METOPROLOL TARTRATE 100 MG/1
50 TABLET, FILM COATED ORAL DAILY
Refills: 0 | Status: DISCONTINUED | OUTPATIENT
Start: 2024-11-13 | End: 2024-11-14

## 2024-11-12 RX ORDER — ISOSORBIDE MONONITRATE 10 MG
60 TABLET ORAL DAILY
Refills: 0 | Status: DISCONTINUED | OUTPATIENT
Start: 2024-11-13 | End: 2024-11-14

## 2024-11-12 RX ORDER — ISOSORBIDE MONONITRATE 10 MG
60 TABLET ORAL DAILY
Refills: 0 | Status: DISCONTINUED | OUTPATIENT
Start: 2024-11-12 | End: 2024-11-12

## 2024-11-12 RX ORDER — ISOSORBIDE MONONITRATE 10 MG
30 TABLET ORAL ONCE
Refills: 0 | Status: COMPLETED | OUTPATIENT
Start: 2024-11-12 | End: 2024-11-12

## 2024-11-12 RX ORDER — METOPROLOL TARTRATE 100 MG/1
50 TABLET, FILM COATED ORAL DAILY
Refills: 0 | Status: DISCONTINUED | OUTPATIENT
Start: 2024-11-12 | End: 2024-11-12

## 2024-11-12 RX ORDER — HYDROMORPHONE HYDROCHLORIDE 2 MG/1
0.25 TABLET ORAL ONCE
Refills: 0 | Status: DISCONTINUED | OUTPATIENT
Start: 2024-11-12 | End: 2024-11-12

## 2024-11-12 RX ORDER — HEPARIN SODIUM,PORCINE 1000/ML
1000 VIAL (ML) INJECTION
Qty: 25000 | Refills: 0 | Status: DISCONTINUED | OUTPATIENT
Start: 2024-11-12 | End: 2024-11-14

## 2024-11-12 RX ADMIN — PANTOPRAZOLE SODIUM 40 MILLIGRAM(S): 40 TABLET, DELAYED RELEASE ORAL at 06:06

## 2024-11-12 RX ADMIN — OXYCODONE HYDROCHLORIDE 5 MILLIGRAM(S): 30 TABLET ORAL at 16:00

## 2024-11-12 RX ADMIN — CLOPIDOGREL 75 MILLIGRAM(S): 75 TABLET, FILM COATED ORAL at 15:08

## 2024-11-12 RX ADMIN — Medication 81 MILLIGRAM(S): at 15:08

## 2024-11-12 RX ADMIN — TAMSULOSIN HYDROCHLORIDE 0.4 MILLIGRAM(S): 0.4 CAPSULE ORAL at 21:16

## 2024-11-12 RX ADMIN — Medication 2 TABLET(S): at 21:16

## 2024-11-12 RX ADMIN — ACETAMINOPHEN 500MG 975 MILLIGRAM(S): 500 TABLET, COATED ORAL at 23:11

## 2024-11-12 RX ADMIN — Medication 5 MILLIGRAM(S): at 15:08

## 2024-11-12 RX ADMIN — Medication 30 MILLIGRAM(S): at 15:08

## 2024-11-12 RX ADMIN — OXYCODONE HYDROCHLORIDE 5 MILLIGRAM(S): 30 TABLET ORAL at 21:22

## 2024-11-12 RX ADMIN — OXYCODONE HYDROCHLORIDE 5 MILLIGRAM(S): 30 TABLET ORAL at 15:07

## 2024-11-12 RX ADMIN — HYDROMORPHONE HYDROCHLORIDE 0.25 MILLIGRAM(S): 2 TABLET ORAL at 05:48

## 2024-11-12 RX ADMIN — HYDROMORPHONE HYDROCHLORIDE 0.25 MILLIGRAM(S): 2 TABLET ORAL at 04:48

## 2024-11-12 RX ADMIN — ACETAMINOPHEN 500MG 975 MILLIGRAM(S): 500 TABLET, COATED ORAL at 16:00

## 2024-11-12 RX ADMIN — Medication 80 MILLIGRAM(S): at 21:16

## 2024-11-12 RX ADMIN — ACETAMINOPHEN 500MG 975 MILLIGRAM(S): 500 TABLET, COATED ORAL at 15:08

## 2024-11-12 RX ADMIN — OXYCODONE HYDROCHLORIDE 5 MILLIGRAM(S): 30 TABLET ORAL at 04:33

## 2024-11-12 RX ADMIN — ACETAMINOPHEN 500MG 975 MILLIGRAM(S): 500 TABLET, COATED ORAL at 06:07

## 2024-11-12 RX ADMIN — Medication 10 UNIT(S)/HR: at 19:45

## 2024-11-12 RX ADMIN — OXYCODONE HYDROCHLORIDE 5 MILLIGRAM(S): 30 TABLET ORAL at 09:58

## 2024-11-12 RX ADMIN — ACETAMINOPHEN 500MG 975 MILLIGRAM(S): 500 TABLET, COATED ORAL at 07:07

## 2024-11-12 RX ADMIN — Medication 10 UNIT(S)/HR: at 20:41

## 2024-11-12 RX ADMIN — OXYCODONE HYDROCHLORIDE 5 MILLIGRAM(S): 30 TABLET ORAL at 05:33

## 2024-11-12 RX ADMIN — Medication 30 MILLIGRAM(S): at 19:45

## 2024-11-12 RX ADMIN — Medication 400 MILLIGRAM(S): at 15:14

## 2024-11-12 RX ADMIN — OXYCODONE HYDROCHLORIDE 5 MILLIGRAM(S): 30 TABLET ORAL at 11:00

## 2024-11-12 RX ADMIN — METOPROLOL TARTRATE 25 MILLIGRAM(S): 100 TABLET, FILM COATED ORAL at 06:07

## 2024-11-12 RX ADMIN — OXYCODONE HYDROCHLORIDE 5 MILLIGRAM(S): 30 TABLET ORAL at 22:22

## 2024-11-12 NOTE — PROGRESS NOTE ADULT - SUBJECTIVE AND OBJECTIVE BOX
O/N: post op labs wnl, poc wnl, c/o CP/suprapubic pain, EKG NSR, lateral ischemia same as prior EKG, chest pain reproducible, R groin w/ TTP, no hematoma, troponin neg, CBC stable, remainder of labs pending      ---------------------------------------------------------------------------  PLEASE CHECK WHEN PRESENT:  [ x ]Heart Failure     [  ] Acute     [  ] Acute on Chronic     [ x ] Chronic     [  ]Diastolic [HFpEF]     [ x ]Systolic [HFrEF]     [  ]Combined [HFpEF & HFrEF]  .................................................................................  [x ] Hypertensive Heart Disease  [x ] CAD  [  ] Atrial Fibrillation     [  ] Paroxysmal A-fib     [  ] Chronic A-fib     [  ] Persistent A-fib     [  ] Longstanding Persistent A-fib     [  ] Permanent A-fib  [  ] Pulmonary Hypertension  [  ] Other:  -------------------------------------------------------------------  [  ] Respiratory failure  [  ] Acute PE   [  ] Acute cor pulmonale  [  ] Asthma/COPD Exacerbation  [  ] COPD on home O2 (Chronic renal Failure)   [  ] Atelectasis   [  ] Pleural effusion  [  ] Aspiration pneumonia  [  ] Obstructive Sleep Apnea  -------------------------------------------------------------------  [  ] Acute Kidney Injury      [  ] Acute Tubular Necrosis      [  ] Reneal Medullary Necrosis     [  ] Renal Cortical Necrosis     [  ] Other Pathological Lesions:  [ x ] Chronic Kidney Disease     [  ]CKD 1     [  ]CKD 2     [ x ]CKD 3     [  ]CKD 4     [  ]CKD 5 (ESRD)  [  ]Other:  -------------------------------------------------------------------  [  ] Diabetes  [  ] Diabetic PVD Ulcer  [  ] Neuropathic ulcer to DM  [  ] Diabetes with Nephropathy  [  ] Osteomyelitis due to diabetes  [  ] Hyperglycemia   [  ] Hypoglycemia   --------------------------------------------------------------------  [  ] Malnutrition: See Nutrition Note  [  ] Cachexia  [  ] Other:   [  ] Supplement Ordered:  [  ] Morbid Obesity (BMI >=40]  [  ] Ileus  ---------------------------------------------------------------------  [  ] Sepsis/severe sepsis/septic shock  [  ] Noninfectious SIRS  [  ] UTI  [  ] Pneumonia  [  ] Thrombophlebitis   -----------------------------------------------------------------------  [  ] Acidosis/alkalosis  [  ] Fluid overload  [  ] Hypokalemia  [  ] Hyperkalemia  [  ] Hypomagnesemia  [  ] Hypophosphatemia  [  ] Hyperphosphatemia  ------------------------------------------------------------------------  [  ] Acute blood loss anemia  [  ] Post op blood loss anemia  [  ] Iron deficiency anemia  [  ] Anemia due to chronic disease  [  ] Hypercoagulable state  [  ] Thrombocytopenia  ----------------------------------------------------------------------  [  ] Cerebral infarction  [  ] Transient ischemia attack  [  ] Encephalopathy - Toxic or Metabolic    75yo M, with PMHx of HTN, HLD, BPH, HFrEF, CAD s/p CABG 10/31/24, severe PAD (s/p fem-fem) who presented to [facility] for left leg pain concerning for acute limb ischemia. On exam here, patient has an exam more consistent with his prior recent exam, consisting of chronic limb ischemia (motor and sensation intact, doppler signals present in left foot and fem-fem bypass). Given his rest pain, will proceed with arteriography.     #PAD w/ rest pain  - s/p LLE angiogram with EIA and FELICITY stents  - CTA b/l LE at OSH 11/8: post fem-fem bypass, severe atherosclerotic disease involving aorta, iliac, and superficial femoral arteries b/l more on the left  - c/w DAPT and statin    #CAD s/p prior PCI and recent CABG   - c/w DAPT and statin  - c/w imdur  - c/w metoprolol tartrate    #HFrEF  - ECHO 10/24: LVEF 35% with regional wall abnormalities  - c/w metoprolol tartrate    #RUL Pulmonary nodule - PET +  - Following with Dr. Duarte outpatient.    #CKD 3  - Monitor Cr and UOP  - low mIVF overnight for PRECIOUS prophylaxis    #BPH  - c/w flomax and proscar    #Prediabetes  - A1C 5.8%  - Consistent carb diet    Diet: DASH  activity: as jud  DVTppx: SQH  Dispo: 5 uris     O/N: post op labs wnl, poc wnl, c/o CP/suprapubic pain, EKG NSR, lateral ischemia same as prior EKG, chest pain reproducible, R groin w/ TTP, no hematoma, troponin neg, CBC stable, remainder of labs pending    S: Patient does not have any complaints    O: Examined in bed resting comfortably     aspirin  chewable 81  clopidogrel Tablet 75  isosorbide   mononitrate ER Tablet (IMDUR) 30  metoprolol tartrate 25      Allergies    No Known Allergies    Intolerances        Vital Signs Last 24 Hrs  T(C): 36.9 (12 Nov 2024 04:16), Max: 36.9 (12 Nov 2024 04:16)  T(F): 98.5 (12 Nov 2024 04:16), Max: 98.5 (12 Nov 2024 04:16)  HR: 82 (12 Nov 2024 06:04) (60 - 82)  BP: 121/59 (12 Nov 2024 06:04) (98/52 - 146/67)  BP(mean): 83 (12 Nov 2024 06:04) (71 - 97)  RR: 18 (12 Nov 2024 06:04) (12 - 18)  SpO2: 97% (12 Nov 2024 06:04) (92% - 100%)    Parameters below as of 12 Nov 2024 06:04  Patient On (Oxygen Delivery Method): room air      I&O's Summary    11 Nov 2024 07:01  -  12 Nov 2024 07:00  --------------------------------------------------------  IN: 280 mL / OUT: 370 mL / NET: -90 mL        Physical Exam:  General: alert and awake, NAD  Pulmonary: no respiratory distress  Cardiovascular: RRR  Abdominal: soft  Extremities: LLE warm, well perfused, motor/sensory intact, biphasic DP/PT signals      LABS:                        12.4   7.49  )-----------( 199      ( 12 Nov 2024 04:29 )             40.2     11-12    137  |  104  |  23  ----------------------------<  95  4.8   |  18[L]  |  1.40[H]    Ca    9.0      12 Nov 2024 04:29  Phos  2.8     11-12  Mg     1.9     11-12      PT/INR - ( 11 Nov 2024 18:16 )   PT: 13.8 sec;   INR: 1.20          PTT - ( 11 Nov 2024 18:16 )  PTT:26.0 sec    Radiology and Additional Studies:  ---------------------------------------------------------------------------  PLEASE CHECK WHEN PRESENT:  [ x ]Heart Failure     [  ] Acute     [  ] Acute on Chronic     [ x ] Chronic     [  ]Diastolic [HFpEF]     [ x ]Systolic [HFrEF]     [  ]Combined [HFpEF & HFrEF]  .................................................................................  [x ] Hypertensive Heart Disease  [x ] CAD  [  ] Atrial Fibrillation     [  ] Paroxysmal A-fib     [  ] Chronic A-fib     [  ] Persistent A-fib     [  ] Longstanding Persistent A-fib     [  ] Permanent A-fib  [  ] Pulmonary Hypertension  [  ] Other:  -------------------------------------------------------------------  [  ] Respiratory failure  [  ] Acute PE   [  ] Acute cor pulmonale  [  ] Asthma/COPD Exacerbation  [  ] COPD on home O2 (Chronic renal Failure)   [  ] Atelectasis   [  ] Pleural effusion  [  ] Aspiration pneumonia  [  ] Obstructive Sleep Apnea  -------------------------------------------------------------------  [  ] Acute Kidney Injury      [  ] Acute Tubular Necrosis      [  ] Reneal Medullary Necrosis     [  ] Renal Cortical Necrosis     [  ] Other Pathological Lesions:  [ x ] Chronic Kidney Disease     [  ]CKD 1     [  ]CKD 2     [ x ]CKD 3     [  ]CKD 4     [  ]CKD 5 (ESRD)  [  ]Other:  -------------------------------------------------------------------  [  ] Diabetes  [  ] Diabetic PVD Ulcer  [  ] Neuropathic ulcer to DM  [  ] Diabetes with Nephropathy  [  ] Osteomyelitis due to diabetes  [  ] Hyperglycemia   [  ] Hypoglycemia   --------------------------------------------------------------------  [  ] Malnutrition: See Nutrition Note  [  ] Cachexia  [  ] Other:   [  ] Supplement Ordered:  [  ] Morbid Obesity (BMI >=40]  [  ] Ileus  ---------------------------------------------------------------------  [  ] Sepsis/severe sepsis/septic shock  [  ] Noninfectious SIRS  [  ] UTI  [  ] Pneumonia  [  ] Thrombophlebitis   -----------------------------------------------------------------------  [  ] Acidosis/alkalosis  [  ] Fluid overload  [  ] Hypokalemia  [  ] Hyperkalemia  [  ] Hypomagnesemia  [  ] Hypophosphatemia  [  ] Hyperphosphatemia  ------------------------------------------------------------------------  [  ] Acute blood loss anemia  [  ] Post op blood loss anemia  [  ] Iron deficiency anemia  [  ] Anemia due to chronic disease  [  ] Hypercoagulable state  [  ] Thrombocytopenia  ----------------------------------------------------------------------  [  ] Cerebral infarction  [  ] Transient ischemia attack  [  ] Encephalopathy - Toxic or Metabolic    73yo M, with PMHx of HTN, HLD, BPH, HFrEF, CAD s/p CABG 10/31/24, severe PAD (s/p fem-fem) who presented to [facility] for left leg pain concerning for acute limb ischemia. On exam here, patient has an exam more consistent with his prior recent exam, consisting of chronic limb ischemia (motor and sensation intact, doppler signals present in left foot and fem-fem bypass). Given his rest pain, will proceed with arteriography.     #PAD w/ rest pain  - s/p LLE angiogram with EIA and FELICITY stents  - CTA b/l LE at OSH 11/8: post fem-fem bypass, severe atherosclerotic disease involving aorta, iliac, and superficial femoral arteries b/l more on the left  - c/w DAPT and statin    #CAD s/p prior PCI and recent CABG   - c/w DAPT and statin  - c/w imdur  - c/w metoprolol tartrate    #HFrEF  - ECHO 10/24: LVEF 35% with regional wall abnormalities  - c/w metoprolol tartrate    #RUL Pulmonary nodule - PET +  - Following with Dr. Duarte outpatient.    #CKD 3  - Monitor Cr and UOP    #BPH  - c/w flomax and proscar    #Prediabetes  - A1C 5.8%  - Consistent carb diet    Diet: DASH  activity: as jud  DVTppx: SQH  Dispo: 5 uris

## 2024-11-12 NOTE — PROGRESS NOTE ADULT - ASSESSMENT
73yo M, with PMHx of HTN, HLD, BPH, CAD s/p CABG 10/31/24, severe PAD (s/p fem-fem) who presented to [facility] for left leg pain concerning for acute limb ischemia and transferred to [facility], now on vascular service s/p angiogram with fem-fem bypass, L common and external iliac covered stenting and perclose on 11/11. Medicine is consulted for comanagement.     #PAD  -s/p LLE angiogram, fem-fem bypass, L common and external iliac covered stenting and perclose on 11/11  -PT/OT  -pain control and bowel regimen per vascular team   -on ASA and plavix as below     #CAD s/p prior PCI and recent CABG   - Continue with ASA, plavix, 12.5 mg lopressor q6, 30 mg imdur     #chest pain   -per interview of patient, has been ongoing since CABG.   -CKMB and troponin checked 11/11 AM and within normal limits, repeated on 11/12 and still normal  -EKG checked in AM on 11/12 again, stable compared to prior on 11/11   -pain regimen as above     #mild normocytic anemia (stable)  -recommend checking iron studies, reticulocyte  -continue to trend, transfuse for hemoglobin of 8 or higher     #FELIPA   -Cr improving, 1.4 on morning labs   -trend Cr  -avoid nephrotoxic drugs, renally dose meds     #RUL Pulmonary nodule - PET +  - To follow up outpatient with  Dr. Duarte     #BPH  - Continue flomax and proscar    #HTN  - c/w lopressor and imdur    #HLD  - c/w high dose statin    #Prediabetes  - Consistent carb diet    DVT ppx with SQH

## 2024-11-12 NOTE — CONSULT NOTE ADULT - ASSESSMENT
75 yo man PMHx of CAD s/p PCI and recent minimally invasive direct CABG 10/31/24, ICM HFrEF, HTN, CKD, and PAD s/p prior L to R fem-fem bypass who was admitted for CLI of LLE with rest pain, s/p LE angiogram, s/p L common and external iliac stent but prox anastomosis of fem-fem bypass is upwards going (unable to treat endovascularly), w/ occluded L CFA, SFA, and AK popliteal artery w/ reconstitution of BK pop via collaterals w/ AT/PT runoff.    Assessment  1. CAD s/p PCI and minimally invasive direct CABG 10/31/24  Has chronic angina, he reports his current angina is similar to symptoms even pre CABG  ACS ruled out  EKG done 11/12 3:45 pm NSR, Q wave inferior leads, T wave flattening late precordial leads  2. PAD s/p L-R fem-fem bypass c/b CLI LLE  S/p L common and external iliac stent 11/11 but prox anastomosis of fem-fem bypass is upwards going (unable to treat endovascularly), w/ occluded L CFA, SFA, and AK popliteal artery w/ reconstitution of BK pop via collaterals w/ AT/PT runoff.  3. Pre operative cardiac risk stratification  L fem-pop bypass tentatively planned for Thursday  4. ICM HFrEF 35%    Plan  1. No concern for ACS, decompensated HF, severe AS, or ventricular tachyarrhythmia. Was at acute rehab so METS<4 but given CLI and urgency of L fem-pop bypass, nuclear stress test will not change overall plan for OR. TTE was done Oct/2024 so no need to repeat. Okay to undergo L fem-pop bypass w/o further cardiac workup or intervention. Intermediate brit-operative cardiac risk.  2. ASA 81mg PO QD, Plavix 75mg PO QD, and high intensity statin  3. GDMT: switch metoprolol tartrate to metop succinate 50mg PO QD (okay to continue on the day of the surgery); will start ARNI/MRA/SGLT2i when BP better and GFR improved  4. Increase Imdur to 60mg PO QD for better anti-anginal therapy, okay to continue on the day of the surgery  5. No need for diuretics  6. Will need pharmacological nuclear stress test as outpatient    Thank you for the consult and the opportunity to take care of this patient.     Marquis Pink M.D.  Cardiology | Vascular Cardiology Attending  Please call (c) [phone] for any questions    During non face-to-face time, I reviewed relevant portions of the patient's medical record. During face-to-face time, I took a relevant history and examined the patient. I also explained differential diagnoses, relevant cardiac diagnoses, workup, and management plan, which required a high level of medical decision making. I answered all questions related to the patient's medical conditions.

## 2024-11-12 NOTE — PROGRESS NOTE ADULT - SUBJECTIVE AND OBJECTIVE BOX
Medicine Progress Note    Patient is a 74y old  Male who presents with a chief complaint of transfer for rule out limb ischemia (12 Nov 2024 05:48)      SUBJECTIVE / OVERNIGHT EVENTS:  Seen with telephone Polish . Overall feels better at the time of interview. He woke up with chest pain, pelvic pain, and leg pain but all these pains subsided after he was given pain medication. Troponin and CKMB again were drawn this AM and both within normal limits. Denies having any shortness of breath or chest pain at the time of interview.     MEDICATIONS  (STANDING):  acetaminophen     Tablet .. 975 milliGRAM(s) Oral every 8 hours  aspirin  chewable 81 milliGRAM(s) Oral daily  atorvastatin 80 milliGRAM(s) Oral at bedtime  clopidogrel Tablet 75 milliGRAM(s) Oral daily  finasteride 5 milliGRAM(s) Oral daily  influenza  Vaccine (HIGH DOSE) 0.5 milliLiter(s) IntraMuscular once  isosorbide   mononitrate ER Tablet (IMDUR) 30 milliGRAM(s) Oral daily  magnesium oxide 400 milliGRAM(s) Oral once  metoprolol tartrate 25 milliGRAM(s) Oral two times a day  pantoprazole    Tablet 40 milliGRAM(s) Oral before breakfast  polyethylene glycol 3350 17 Gram(s) Oral daily  senna 2 Tablet(s) Oral at bedtime  tamsulosin 0.4 milliGRAM(s) Oral at bedtime    MEDICATIONS  (PRN):  oxyCODONE    IR 2.5 milliGRAM(s) Oral every 4 hours PRN Moderate Pain (4 - 6)  oxyCODONE    IR 5 milliGRAM(s) Oral every 4 hours PRN Severe Pain (7 - 10)    CAPILLARY BLOOD GLUCOSE        I&O's Summary    11 Nov 2024 07:01  -  12 Nov 2024 07:00  --------------------------------------------------------  IN: 280 mL / OUT: 370 mL / NET: -90 mL    12 Nov 2024 07:01  -  12 Nov 2024 11:56  --------------------------------------------------------  IN: 180 mL / OUT: 200 mL / NET: -20 mL        PHYSICAL EXAM:  Vital Signs Last 24 Hrs  T(C): 36.3 (12 Nov 2024 09:57), Max: 36.9 (12 Nov 2024 04:16)  T(F): 97.4 (12 Nov 2024 09:57), Max: 98.5 (12 Nov 2024 04:16)  HR: 79 (12 Nov 2024 09:57) (62 - 82)  BP: 115/66 (12 Nov 2024 09:57) (98/52 - 146/67)  BP(mean): 84 (12 Nov 2024 09:57) (71 - 97)  RR: 18 (12 Nov 2024 09:57) (12 - 18)  SpO2: 95% (12 Nov 2024 09:57) (92% - 100%)    Parameters below as of 12 Nov 2024 09:57  Patient On (Oxygen Delivery Method): room air    Appears comfortable   MMM  Normal WOB on RA, CTAB   RRR, no mrg   Abdomen soft, nontender, nondistended  Extremities warm and without edema, some tenderness to palpation over the L foot, extensive dried scabs over the L shin   AOX3, no focal neuro deficits       LABS:                        12.4   7.49  )-----------( 199      ( 12 Nov 2024 04:29 )             40.2     11-12    137  |  104  |  23  ----------------------------<  95  4.8   |  18[L]  |  1.40[H]    Ca    9.0      12 Nov 2024 04:29  Phos  2.8     11-12  Mg     1.9     11-12      PT/INR - ( 11 Nov 2024 18:16 )   PT: 13.8 sec;   INR: 1.20          PTT - ( 11 Nov 2024 18:16 )  PTT:26.0 sec  CARDIAC MARKERS ( 12 Nov 2024 04:29 )  x     / x     / x     / x     / 3.1 ng/mL  CARDIAC MARKERS ( 11 Nov 2024 04:18 )  x     / x     / x     / x     / 3.3 ng/mL      Urinalysis Basic - ( 12 Nov 2024 04:29 )    Color: x / Appearance: x / SG: x / pH: x  Gluc: 95 mg/dL / Ketone: x  / Bili: x / Urobili: x   Blood: x / Protein: x / Nitrite: x   Leuk Esterase: x / RBC: x / WBC x   Sq Epi: x / Non Sq Epi: x / Bacteria: x        COVID-19 PCR: NotDetec (07 Nov 2024 14:11)      RADIOLOGY & ADDITIONAL TESTS:  Imaging from Last 24 Hours:  None new

## 2024-11-12 NOTE — CONSULT NOTE ADULT - SUBJECTIVE AND OBJECTIVE BOX
VASCULAR CARDIOLOGY ATTENDING CONSULT NOTE    SERVICE LINE: [phone]    HPI    73 yo man PMHx of CAD s/p PCI and recent minimally invasive direct CABG 10/31/24, HTN, CKD, and PAD s/p prior L to R fem-femo bypass who was admitted for CLI of LLE with rest pain, s/p LE angiogram, s/p L common and external iliac stent but prox anastomosis of fem-fem bypass is upwards going (unable to treat endovascularly), w/ occluded L CFA, SFA, and AK popliteal artery w/ reconstitution of BK pop via collaterals w/ AT/PT runoff.    DVT ppx with SQH     Current Medications:   acetaminophen     Tablet .. 975 milliGRAM(s) Oral every 8 hours  aspirin  chewable 81 milliGRAM(s) Oral daily  atorvastatin 80 milliGRAM(s) Oral at bedtime  clopidogrel Tablet 75 milliGRAM(s) Oral daily  finasteride 5 milliGRAM(s) Oral daily  influenza  Vaccine (HIGH DOSE) 0.5 milliLiter(s) IntraMuscular once  isosorbide   mononitrate ER Tablet (IMDUR) 30 milliGRAM(s) Oral daily  metoprolol tartrate 25 milliGRAM(s) Oral two times a day  oxyCODONE    IR 5 milliGRAM(s) Oral every 4 hours PRN  oxyCODONE    IR 2.5 milliGRAM(s) Oral every 4 hours PRN  pantoprazole    Tablet 40 milliGRAM(s) Oral before breakfast  polyethylene glycol 3350 17 Gram(s) Oral daily  senna 2 Tablet(s) Oral at bedtime  tamsulosin 0.4 milliGRAM(s) Oral at bedtime      REVIEW OF SYSTEMS:  CONSTITUTIONAL: No weakness, fevers or chills  EYES/ENT: No visual changes;  No dysphagia  NECK: No pain or stiffness  RESPIRATORY: No cough, wheezing, hemoptysis; No shortness of breath  CARDIOVASCULAR: No chest pain or palpitations; No lower extremity edema  GASTROINTESTINAL: No abdominal or epigastric pain. No nausea, vomiting, or hematemesis; No diarrhea or constipation. No melena or hematochezia.  BACK: No back pain  GENITOURINARY: No dysuria, frequency or hematuria  NEUROLOGICAL: No numbness or weakness  SKIN: No itching, burning, rashes, or lesions   All other review of systems is negative unless indicated above.    Physical Exam:  T(F): 97.5 (11-12), Max: 98.5 (11-12)  HR: 79 (11-12) (62 - 82)  BP: 143/64 (11-12) (98/52 - 146/67)  BP(mean): 92 (11-12) (71 - 97)  ABP: --  ABP(mean): --  RR: 18 (11-12)  SpO2: 96% (11-12)  GENERAL: No acute distress, well-developed  HEAD:  Atraumatic, Normocephalic  ENT: EOMI, PERRLA, conjunctiva and sclera clear, Neck supple, No JVD, moist mucosa  CHEST/LUNG: Clear to auscultation bilaterally; No wheeze, equal breath sounds bilaterally   BACK: No spinal tenderness  HEART: Regular rate and rhythm; No murmurs, rubs, or gallops  ABDOMEN: Soft, Nontender, Nondistended; Bowel sounds present  EXTREMITIES:  No clubbing, cyanosis, or edema  PSYCH: Nl behavior, nl affect  NEUROLOGY: AAOx3, non-focal, cranial nerves intact  SKIN: Normal color, No rashes or lesions    Cardiovascular Diagnostic Testing: personally reviewed    CXR: Personally reviewed    Labs: Personally reviewed                        12.4   7.49  )-----------( 199      ( 12 Nov 2024 04:29 )             40.2     11-12    137  |  104  |  23  ----------------------------<  95  4.8   |  18[L]  |  1.40[H]    Ca    9.0      12 Nov 2024 04:29  Phos  2.8     11-12  Mg     1.9     11-12      PT/INR - ( 11 Nov 2024 18:16 )   PT: 13.8 sec;   INR: 1.20          PTT - ( 11 Nov 2024 18:16 )  PTT:26.0 sec    CARDIAC MARKERS ( 12 Nov 2024 04:29 )  41 ng/L / x     / x     / x     / x     / 3.1 ng/mL  CARDIAC MARKERS ( 11 Nov 2024 04:18 )  46 ng/L / x     / x     / x     / x     / 3.3 ng/mL

## 2024-11-13 LAB
ANION GAP SERPL CALC-SCNC: 12 MMOL/L — SIGNIFICANT CHANGE UP (ref 5–17)
APTT BLD: 63.4 SEC — HIGH (ref 24.5–35.6)
APTT BLD: 66.3 SEC — HIGH (ref 24.5–35.6)
APTT BLD: 74.4 SEC — HIGH (ref 24.5–35.6)
BLD GP AB SCN SERPL QL: NEGATIVE — SIGNIFICANT CHANGE UP
BUN SERPL-MCNC: 21 MG/DL — SIGNIFICANT CHANGE UP (ref 7–23)
CALCIUM SERPL-MCNC: 9 MG/DL — SIGNIFICANT CHANGE UP (ref 8.4–10.5)
CHLORIDE SERPL-SCNC: 103 MMOL/L — SIGNIFICANT CHANGE UP (ref 96–108)
CK MB CFR SERPL CALC: 3.1 NG/ML — SIGNIFICANT CHANGE UP (ref 0–6.7)
CK SERPL-CCNC: 87 U/L — SIGNIFICANT CHANGE UP (ref 30–200)
CO2 SERPL-SCNC: 21 MMOL/L — LOW (ref 22–31)
CREAT SERPL-MCNC: 1.51 MG/DL — HIGH (ref 0.5–1.3)
EGFR: 48 ML/MIN/1.73M2 — LOW
GLUCOSE SERPL-MCNC: 122 MG/DL — HIGH (ref 70–99)
HCT VFR BLD CALC: 32.8 % — LOW (ref 39–50)
HGB BLD-MCNC: 10.5 G/DL — LOW (ref 13–17)
MAGNESIUM SERPL-MCNC: 1.8 MG/DL — SIGNIFICANT CHANGE UP (ref 1.6–2.6)
MCHC RBC-ENTMCNC: 29.8 PG — SIGNIFICANT CHANGE UP (ref 27–34)
MCHC RBC-ENTMCNC: 32 G/DL — SIGNIFICANT CHANGE UP (ref 32–36)
MCV RBC AUTO: 93.2 FL — SIGNIFICANT CHANGE UP (ref 80–100)
NRBC # BLD: 0 /100 WBCS — SIGNIFICANT CHANGE UP (ref 0–0)
PHOSPHATE SERPL-MCNC: 3.2 MG/DL — SIGNIFICANT CHANGE UP (ref 2.5–4.5)
PLATELET # BLD AUTO: 185 K/UL — SIGNIFICANT CHANGE UP (ref 150–400)
POTASSIUM SERPL-MCNC: 4.2 MMOL/L — SIGNIFICANT CHANGE UP (ref 3.5–5.3)
POTASSIUM SERPL-SCNC: 4.2 MMOL/L — SIGNIFICANT CHANGE UP (ref 3.5–5.3)
RBC # BLD: 3.52 M/UL — LOW (ref 4.2–5.8)
RBC # FLD: 12.6 % — SIGNIFICANT CHANGE UP (ref 10.3–14.5)
RH IG SCN BLD-IMP: POSITIVE — SIGNIFICANT CHANGE UP
SODIUM SERPL-SCNC: 136 MMOL/L — SIGNIFICANT CHANGE UP (ref 135–145)
TROPONIN T, HIGH SENSITIVITY RESULT: 45 NG/L — SIGNIFICANT CHANGE UP (ref 0–51)
WBC # BLD: 6.99 K/UL — SIGNIFICANT CHANGE UP (ref 3.8–10.5)
WBC # FLD AUTO: 6.99 K/UL — SIGNIFICANT CHANGE UP (ref 3.8–10.5)

## 2024-11-13 PROCEDURE — 99232 SBSQ HOSP IP/OBS MODERATE 35: CPT

## 2024-11-13 PROCEDURE — 99233 SBSQ HOSP IP/OBS HIGH 50: CPT

## 2024-11-13 PROCEDURE — 99232 SBSQ HOSP IP/OBS MODERATE 35: CPT | Mod: GC,25,57

## 2024-11-13 PROCEDURE — 93010 ELECTROCARDIOGRAM REPORT: CPT

## 2024-11-13 PROCEDURE — 78582 LUNG VENTILAT&PERFUS IMAGING: CPT | Mod: 26

## 2024-11-13 RX ORDER — SODIUM CHLORIDE 9 MG/ML
1000 INJECTION, SOLUTION INTRAMUSCULAR; INTRAVENOUS; SUBCUTANEOUS
Refills: 0 | Status: DISCONTINUED | OUTPATIENT
Start: 2024-11-13 | End: 2024-11-14

## 2024-11-13 RX ORDER — OXYCODONE HYDROCHLORIDE 30 MG/1
5 TABLET ORAL ONCE
Refills: 0 | Status: DISCONTINUED | OUTPATIENT
Start: 2024-11-13 | End: 2024-11-13

## 2024-11-13 RX ADMIN — OXYCODONE HYDROCHLORIDE 5 MILLIGRAM(S): 30 TABLET ORAL at 12:07

## 2024-11-13 RX ADMIN — Medication 11 UNIT(S)/HR: at 20:48

## 2024-11-13 RX ADMIN — ACETAMINOPHEN 500MG 975 MILLIGRAM(S): 500 TABLET, COATED ORAL at 07:15

## 2024-11-13 RX ADMIN — POLYETHYLENE GLYCOL 3350 17 GRAM(S): 17 POWDER, FOR SOLUTION ORAL at 12:07

## 2024-11-13 RX ADMIN — Medication 80 MILLIGRAM(S): at 21:14

## 2024-11-13 RX ADMIN — CLOPIDOGREL 75 MILLIGRAM(S): 75 TABLET, FILM COATED ORAL at 12:07

## 2024-11-13 RX ADMIN — OXYCODONE HYDROCHLORIDE 5 MILLIGRAM(S): 30 TABLET ORAL at 18:08

## 2024-11-13 RX ADMIN — OXYCODONE HYDROCHLORIDE 5 MILLIGRAM(S): 30 TABLET ORAL at 01:50

## 2024-11-13 RX ADMIN — PANTOPRAZOLE SODIUM 40 MILLIGRAM(S): 40 TABLET, DELAYED RELEASE ORAL at 05:47

## 2024-11-13 RX ADMIN — OXYCODONE HYDROCHLORIDE 5 MILLIGRAM(S): 30 TABLET ORAL at 00:50

## 2024-11-13 RX ADMIN — Medication 5 MILLIGRAM(S): at 12:07

## 2024-11-13 RX ADMIN — Medication 11 UNIT(S)/HR: at 08:27

## 2024-11-13 RX ADMIN — Medication 2 TABLET(S): at 21:14

## 2024-11-13 RX ADMIN — Medication 11 UNIT(S)/HR: at 20:10

## 2024-11-13 RX ADMIN — Medication 60 MILLIGRAM(S): at 12:09

## 2024-11-13 RX ADMIN — METOPROLOL TARTRATE 50 MILLIGRAM(S): 100 TABLET, FILM COATED ORAL at 05:46

## 2024-11-13 RX ADMIN — TAMSULOSIN HYDROCHLORIDE 0.4 MILLIGRAM(S): 0.4 CAPSULE ORAL at 21:14

## 2024-11-13 RX ADMIN — ACETAMINOPHEN 500MG 975 MILLIGRAM(S): 500 TABLET, COATED ORAL at 21:14

## 2024-11-13 RX ADMIN — Medication 11 UNIT(S)/HR: at 01:40

## 2024-11-13 RX ADMIN — OXYCODONE HYDROCHLORIDE 5 MILLIGRAM(S): 30 TABLET ORAL at 05:47

## 2024-11-13 RX ADMIN — ACETAMINOPHEN 500MG 975 MILLIGRAM(S): 500 TABLET, COATED ORAL at 00:11

## 2024-11-13 NOTE — PHYSICAL THERAPY INITIAL EVALUATION ADULT - GENERAL OBSERVATIONS, REHAB EVAL
PT IE completed. Patient received semi supine in bed +tele, +IV, +R groin/pelvis incision C/D/I, NAD, willing to work with PT.

## 2024-11-13 NOTE — PHYSICAL THERAPY INITIAL EVALUATION ADULT - PERTINENT HX OF CURRENT PROBLEM, REHAB EVAL
75yo M, with PMHx of HTN, HLD, BPH, HFrEF, CAD s/p CABG 10/31/24, severe PAD (s/p fem-fem) who presented to [facility] for left leg pain concerning for acute limb ischemia. On exam here, patient has an exam more consistent with his prior recent exam, consisting of chronic limb ischemia (motor and sensation intact, doppler signals present in left foot and fem-fem bypass). Given his rest pain, will proceed with arteriography.

## 2024-11-13 NOTE — PHYSICAL THERAPY INITIAL EVALUATION ADULT - BED MOBILITY LIMITATIONS, REHAB EVAL
Tolerated sitting EOB for ~5 minutes. Patient reporting pain in LLE while in dependent position. Requesting to defer further OOB mobility and return to bed./decreased ability to use legs for bridging/pushing/impaired ability to control trunk for mobility

## 2024-11-13 NOTE — PROGRESS NOTE ADULT - SUBJECTIVE AND OBJECTIVE BOX
Medicine Progress Note    Patient is a 74y old  Male who presents with a chief complaint of transfer for rule out limb ischemia (13 Nov 2024 08:35)      SUBJECTIVE / OVERNIGHT EVENTS:  Not feeling well this morning due to generalized pain in the back from laying in bed as well as pain in his left lower extremity, helped by pain medications but returns quickly. Had sensation of shortness of breath though saturating well, briefly on 2L nasal cannula this morning but weaned off at the time of interview.     MEDICATIONS  (STANDING):  acetaminophen     Tablet .. 975 milliGRAM(s) Oral every 8 hours  atorvastatin 80 milliGRAM(s) Oral at bedtime  clopidogrel Tablet 75 milliGRAM(s) Oral daily  finasteride 5 milliGRAM(s) Oral daily  heparin  Infusion 1000 Unit(s)/Hr (11 mL/Hr) IV Continuous <Continuous>  influenza  Vaccine (HIGH DOSE) 0.5 milliLiter(s) IntraMuscular once  isosorbide   mononitrate ER Tablet (IMDUR) 60 milliGRAM(s) Oral daily  metoprolol succinate ER 50 milliGRAM(s) Oral daily  pantoprazole    Tablet 40 milliGRAM(s) Oral before breakfast  polyethylene glycol 3350 17 Gram(s) Oral daily  senna 2 Tablet(s) Oral at bedtime  sodium chloride 0.9%. 1000 milliLiter(s) (40 mL/Hr) IV Continuous <Continuous>  tamsulosin 0.4 milliGRAM(s) Oral at bedtime    MEDICATIONS  (PRN):  oxyCODONE    IR 2.5 milliGRAM(s) Oral every 4 hours PRN Moderate Pain (4 - 6)  oxyCODONE    IR 5 milliGRAM(s) Oral every 4 hours PRN Severe Pain (7 - 10)    CAPILLARY BLOOD GLUCOSE        I&O's Summary    12 Nov 2024 07:01  -  13 Nov 2024 07:00  --------------------------------------------------------  IN: 430 mL / OUT: 350 mL / NET: 80 mL        PHYSICAL EXAM:  Vital Signs Last 24 Hrs  T(C): 36.4 (13 Nov 2024 05:38), Max: 36.4 (12 Nov 2024 14:42)  T(F): 97.6 (13 Nov 2024 05:38), Max: 97.6 (12 Nov 2024 21:13)  HR: 73 (13 Nov 2024 07:15) (73 - 88)  BP: 118/56 (13 Nov 2024 07:15) (113/69 - 143/64)  BP(mean): 78 (13 Nov 2024 07:15) (78 - 92)  RR: 18 (13 Nov 2024 07:15) (18 - 18)  SpO2: 98% (13 Nov 2024 07:15) (93% - 98%)    Parameters below as of 13 Nov 2024 07:15  Patient On (Oxygen Delivery Method): nasal cannula  O2 Flow (L/min): 2    Appears comfortable   MMM  Normal WOB on RA, CTAB   RRR, no mrg   Abdomen soft, nontender, nondistended  Extremities warm and without edema, superficial scabbing and crusting on the L shin without drainage   AOX3, no focal neuro deficits     LABS:                        10.5   6.99  )-----------( 185      ( 13 Nov 2024 05:30 )             32.8     11-13    136  |  103  |  21  ----------------------------<  122[H]  4.2   |  21[L]  |  1.51[H]    Ca    9.0      13 Nov 2024 05:30  Phos  3.2     11-13  Mg     1.8     11-13      PT/INR - ( 11 Nov 2024 18:16 )   PT: 13.8 sec;   INR: 1.20          PTT - ( 13 Nov 2024 05:30 )  PTT:66.3 sec  CARDIAC MARKERS ( 13 Nov 2024 05:47 )  x     / x     / x     / x     / 3.1 ng/mL  CARDIAC MARKERS ( 12 Nov 2024 04:29 )  x     / x     / x     / x     / 3.1 ng/mL      Urinalysis Basic - ( 13 Nov 2024 05:30 )    Color: x / Appearance: x / SG: x / pH: x  Gluc: 122 mg/dL / Ketone: x  / Bili: x / Urobili: x   Blood: x / Protein: x / Nitrite: x   Leuk Esterase: x / RBC: x / WBC x   Sq Epi: x / Non Sq Epi: x / Bacteria: x        COVID-19 PCR: NotDete (07 Nov 2024 14:11)      RADIOLOGY & ADDITIONAL TESTS:  Imaging from Last 24 Hours:    11/12 doppler US:   Age-indeterminate deep vein thrombosis of the LEFT superficial femoral   vein, LEFT popliteal vein, and LEFT calf vein.    Lower extremity vein mapping with measurements reported in detail above.

## 2024-11-13 NOTE — PROGRESS NOTE ADULT - SUBJECTIVE AND OBJECTIVE BOX
VASCULAR CARDIOLOGY ATTENDING CONSULT NOTE    SERVICE LINE: [phone]    Subjective  NAEO  Intermittent chest pain    DVT ppx with SQH     Current Medications:   acetaminophen     Tablet .. 975 milliGRAM(s) Oral every 8 hours  aspirin  chewable 81 milliGRAM(s) Oral daily  atorvastatin 80 milliGRAM(s) Oral at bedtime  clopidogrel Tablet 75 milliGRAM(s) Oral daily  finasteride 5 milliGRAM(s) Oral daily  influenza  Vaccine (HIGH DOSE) 0.5 milliLiter(s) IntraMuscular once  isosorbide   mononitrate ER Tablet (IMDUR) 30 milliGRAM(s) Oral daily  metoprolol tartrate 25 milliGRAM(s) Oral two times a day  oxyCODONE    IR 5 milliGRAM(s) Oral every 4 hours PRN  oxyCODONE    IR 2.5 milliGRAM(s) Oral every 4 hours PRN  pantoprazole    Tablet 40 milliGRAM(s) Oral before breakfast  polyethylene glycol 3350 17 Gram(s) Oral daily  senna 2 Tablet(s) Oral at bedtime  tamsulosin 0.4 milliGRAM(s) Oral at bedtime      REVIEW OF SYSTEMS:  CONSTITUTIONAL: No weakness, fevers or chills  EYES/ENT: No visual changes;  No dysphagia  NECK: No pain or stiffness  RESPIRATORY: No cough, wheezing, hemoptysis; No shortness of breath  CARDIOVASCULAR: No chest pain or palpitations; No lower extremity edema  GASTROINTESTINAL: No abdominal or epigastric pain. No nausea, vomiting, or hematemesis; No diarrhea or constipation. No melena or hematochezia.  BACK: No back pain  GENITOURINARY: No dysuria, frequency or hematuria  NEUROLOGICAL: No numbness or weakness  SKIN: No itching, burning, rashes, or lesions   All other review of systems is negative unless indicated above.    Physical Exam:  T(F): 97.5 (11-12), Max: 98.5 (11-12)  HR: 79 (11-12) (62 - 82)  BP: 143/64 (11-12) (98/52 - 146/67)  BP(mean): 92 (11-12) (71 - 97)  ABP: --  ABP(mean): --  RR: 18 (11-12)  SpO2: 96% (11-12)  GENERAL: No acute distress, well-developed  HEAD:  Atraumatic, Normocephalic  ENT: EOMI, PERRLA, conjunctiva and sclera clear, Neck supple, No JVD, moist mucosa  CHEST/LUNG: Clear to auscultation bilaterally; No wheeze, equal breath sounds bilaterally   BACK: No spinal tenderness  HEART: Regular rate and rhythm; No murmurs, rubs, or gallops  ABDOMEN: Soft, Nontender, Nondistended; Bowel sounds present  EXTREMITIES:  No clubbing, cyanosis, or edema  PSYCH: Nl behavior, nl affect  NEUROLOGY: AAOx3, non-focal, cranial nerves intact  SKIN: Normal color, No rashes or lesions    Cardiovascular Diagnostic Testing: personally reviewed    CXR: Personally reviewed    Labs: Personally reviewed                        12.4   7.49  )-----------( 199      ( 12 Nov 2024 04:29 )             40.2     11-12    137  |  104  |  23  ----------------------------<  95  4.8   |  18[L]  |  1.40[H]    Ca    9.0      12 Nov 2024 04:29  Phos  2.8     11-12  Mg     1.9     11-12      PT/INR - ( 11 Nov 2024 18:16 )   PT: 13.8 sec;   INR: 1.20          PTT - ( 11 Nov 2024 18:16 )  PTT:26.0 sec    CARDIAC MARKERS ( 12 Nov 2024 04:29 )  41 ng/L / x     / x     / x     / x     / 3.1 ng/mL  CARDIAC MARKERS ( 11 Nov 2024 04:18 )  46 ng/L / x     / x     / x     / x     / 3.3 ng/mL

## 2024-11-13 NOTE — PROGRESS NOTE ADULT - SUBJECTIVE AND OBJECTIVE BOX
Pre-op Diagnosis: limb ischemia  Procedure: left fem-pop bypass  Surgeon: Teodoro    Consent: yes                          10.5   6.99  )-----------( 185      ( 13 Nov 2024 05:30 )             32.8     11-13    136  |  103  |  21  ----------------------------<  122[H]  4.2   |  21[L]  |  1.51[H]    Ca    9.0      13 Nov 2024 05:30  Phos  3.2     11-13  Mg     1.8     11-13      PT/INR - ( 11 Nov 2024 18:16 )   PT: 13.8 sec;   INR: 1.20          PTT - ( 13 Nov 2024 05:30 )  PTT:66.3 sec  Urinalysis Basic - ( 13 Nov 2024 05:30 )    Color: x / Appearance: x / SG: x / pH: x  Gluc: 122 mg/dL / Ketone: x  / Bili: x / Urobili: x   Blood: x / Protein: x / Nitrite: x   Leuk Esterase: x / RBC: x / WBC x   Sq Epi: x / Non Sq Epi: x / Bacteria: x        Type & Screen:  ABO Interpretation: O (11.13.24 @ 07:11)   Rh Interpretation: Positive (11.13.24 @ 07:11)   Antibody Screen: Negative (11.13.24 @ 07:11)           (*With most recent within 72hrs of OR)    CXR: < from: Xray Chest 1 View AP/PA (11.11.24 @ 05:02) >  A right upper lobe lung nodule is again suspicious for lung   cancer.    < end of copied text >    EKG: NSR, no STEMI on 11/13      Is patient on ACE/ARB? [x]No [ ]Yes   *If yes, please hold any ACE/ARB the day of surgery    Is patient on Lantus at bedtime?  [x]No [ ]Yes   *If yes, please discuss decreasing dose if needed    Does patient have a contrast allergy? [x]No [ ]Yes  *If yes, please pre-medicate per protocol    Is patient on anticoagulation? [ ]No [x] Yes  *If yes, please discuss with team when to hold it    Is the patient Female and <54yo [x]No [ ] Yes  If yes, pregnancy test must be documented in the chart    Is patient on dialysis? [x]No [ ]Yes  *If yes, please obtain all labs including K level, the evening before the surgery PM lab draw  *Also, will NOT require IVF past midnight    A/P: 74yMale pre-op for above procedure  1. NPO past midnight, except medications  2. IVF at midnight:   3. [x] Blood on hold, Units: 1

## 2024-11-13 NOTE — PHYSICAL THERAPY INITIAL EVALUATION ADULT - ADDITIONAL COMMENTS
Patient presents from [facility] after recent [facility] admission. Prior to past [facility] admission patient was a community ambulator who lives alone in elevator access apartment, no EHSAN. Independent with all ADLs prior and uses SC when ambulating. Of note, patient reporting that he was having increasing difficulty 2/2 LLE/chest pain, requiring extended duration to complete all tasks, and limiting activity tolerance.

## 2024-11-13 NOTE — PROGRESS NOTE ADULT - ASSESSMENT
75 yo man PMHx of CAD s/p PCI and recent minimally invasive direct CABG 10/31/24, ICM HFrEF, HTN, CKD, and PAD s/p prior L to R fem-fem bypass who was admitted for CLI of LLE with rest pain, s/p LE angiogram, s/p L common and external iliac stent but prox anastomosis of fem-fem bypass is upwards going (unable to treat endovascularly), w/ occluded L CFA, SFA, and AK popliteal artery w/ reconstitution of BK pop via collaterals w/ AT/PT runoff.    Assessment  1. CAD s/p PCI and minimally invasive direct CABG 10/31/24  Has chronic angina, he reports his current angina is similar to symptoms even pre CABG  ACS ruled out  EKG done 11/12 3:45 pm NSR, Q wave inferior leads, T wave flattening late precordial leads  2. PAD s/p L-R fem-fem bypass c/b CLI LLE  S/p L common and external iliac stent 11/11 but prox anastomosis of fem-fem bypass is upwards going (unable to treat endovascularly), w/ occluded L CFA, SFA, and AK popliteal artery w/ reconstitution of BK pop via collaterals w/ AT/PT runoff.  3. Pre operative cardiac risk stratification  L fem-pop bypass tentatively planned for Thursday  4. ICM HFrEF 35% - euvolemic  5. LLE DVT     Plan  1. No concern for ACS, decompensated HF, severe AS, or ventricular tachyarrhythmia. Was at acute rehab so METS<4 but given CLI and urgency of L fem-pop bypass, nuclear stress test will not change overall plan for OR. TTE was done Oct/2024 so no need to repeat. Okay to undergo L fem-pop bypass w/o further cardiac workup or intervention. Intermediate brit-operative cardiac risk.  2. Plavix 75mg PO QD, and high intensity statin. Off ASA 81mg PO QD given initiation of AC for DVT, to avoid triple therapy.   3. GDMT: metop succinate 50mg PO QD (okay to continue on the day of the surgery); will start ARNI/MRA/SGLT2i when BP better and GFR improved  4. Imdur 60mg PO QD for better anti-anginal therapy, okay to continue on the day of the surgery  5. No need for diuretics  6. Will need pharmacological nuclear stress test as outpatient post operatively  7. V/Q scan to r/o PE given DVT. Treatment plan won't change as patient is hemodynamically stable and SpO2 94% on room air but having PE diagnosis will be helpful brit-operatively in case he decompensates.     Thank you for the consult and the opportunity to take care of this patient.     Marquis Pink M.D.  Cardiology | Vascular Cardiology Attending  Please call (c) [phone] for any questions    During non face-to-face time, I reviewed relevant portions of the patient's medical record. During face-to-face time, I took a relevant history and examined the patient. I also explained differential diagnoses, relevant cardiac diagnoses, workup, and management plan, which required a high level of medical decision making. I answered all questions related to the patient's medical conditions.

## 2024-11-13 NOTE — PROGRESS NOTE ADULT - ASSESSMENT
75yo M, with PMHx of HTN, HLD, BPH, CAD s/p CABG 10/31/24, severe PAD (s/p fem-fem) who presented to [facility] for left leg pain concerning for acute limb ischemia and transferred to [facility], now on vascular service s/p angiogram with fem-fem bypass, L common and external iliac covered stenting and perclose on 11/11. Medicine is consulted for comanagement.     #PAD  -s/p LLE angiogram, fem-fem bypass, L common and external iliac covered stenting and perclose on 11/11  -PT/OT  -pain control and bowel regimen per vascular team   -on ASA and plavix as below     #LLE DVT   -Unclear chronicity  -Currently on heparin gtt for anticoagulation   -Patient currently on therapeutic AC for treatment of DVT, however with reported intermittent chest pain, feeling of shortness of breath, and intermediate Wells score, is at risk for PE. Has ongoing CKD/FELIPA with Cr up to 1.5 today, would like to avoid contrast load of CT PE if possible.     #CAD s/p prior PCI and recent CABG   - Continue with ASA, plavix, 12.5 mg lopressor q6, 30 mg imdur     #chest pain   -per interview of patient, has been ongoing since CABG.   -CKMB and troponin checked 11/11 AM and within normal limits, repeated on 11/12 and this morning and stable   -EKG trended, stable again this morning   -pain regimen as above     #mild normocytic anemia (stable)  -recommend checking iron studies, reticulocyte  -continue to trend, transfuse for hemoglobin of 8 or higher     #FELIPA   -Cr up slightly to 1.51 this morning  -trend Cr  -avoid nephrotoxic drugs, renally dose meds     #RUL Pulmonary nodule - PET +  - To follow up outpatient with  Dr. Duarte     #BPH  - Continue flomax and proscar    #HTN  - c/w lopressor and imdur    #HLD  - c/w high dose statin    #Prediabetes  - Consistent carb diet    DVT ppx with SQH

## 2024-11-13 NOTE — PROGRESS NOTE ADULT - SUBJECTIVE AND OBJECTIVE BOX
O/N: dc aspirin, hep gtt started at 10cc/hr, 1am PTT 47,5, hep gtt inc 10>>11cc/hr    ---------------------------------------------------------------------------  PLEASE CHECK WHEN PRESENT:  [ x ]Heart Failure     [  ] Acute     [  ] Acute on Chronic     [ x ] Chronic     [  ]Diastolic [HFpEF]     [ x ]Systolic [HFrEF]     [  ]Combined [HFpEF & HFrEF]  .................................................................................  [x ] Hypertensive Heart Disease  [x ] CAD  [  ] Atrial Fibrillation     [  ] Paroxysmal A-fib     [  ] Chronic A-fib     [  ] Persistent A-fib     [  ] Longstanding Persistent A-fib     [  ] Permanent A-fib  [  ] Pulmonary Hypertension  [  ] Other:  -------------------------------------------------------------------  [  ] Respiratory failure  [  ] Acute PE   [  ] Acute cor pulmonale  [  ] Asthma/COPD Exacerbation  [  ] COPD on home O2 (Chronic renal Failure)   [  ] Atelectasis   [  ] Pleural effusion  [  ] Aspiration pneumonia  [  ] Obstructive Sleep Apnea  -------------------------------------------------------------------  [  ] Acute Kidney Injury      [  ] Acute Tubular Necrosis      [  ] Reneal Medullary Necrosis     [  ] Renal Cortical Necrosis     [  ] Other Pathological Lesions:  [ x ] Chronic Kidney Disease     [  ]CKD 1     [  ]CKD 2     [ x ]CKD 3     [  ]CKD 4     [  ]CKD 5 (ESRD)  [  ]Other:  -------------------------------------------------------------------  [  ] Diabetes  [  ] Diabetic PVD Ulcer  [  ] Neuropathic ulcer to DM  [  ] Diabetes with Nephropathy  [  ] Osteomyelitis due to diabetes  [  ] Hyperglycemia   [  ] Hypoglycemia   --------------------------------------------------------------------  [  ] Malnutrition: See Nutrition Note  [  ] Cachexia  [  ] Other:   [  ] Supplement Ordered:  [  ] Morbid Obesity (BMI >=40]  [  ] Ileus  ---------------------------------------------------------------------  [  ] Sepsis/severe sepsis/septic shock  [  ] Noninfectious SIRS  [  ] UTI  [  ] Pneumonia  [  ] Thrombophlebitis   -----------------------------------------------------------------------  [  ] Acidosis/alkalosis  [  ] Fluid overload  [  ] Hypokalemia  [  ] Hyperkalemia  [  ] Hypomagnesemia  [  ] Hypophosphatemia  [  ] Hyperphosphatemia  ------------------------------------------------------------------------  [  ] Acute blood loss anemia  [  ] Post op blood loss anemia  [  ] Iron deficiency anemia  [  ] Anemia due to chronic disease  [  ] Hypercoagulable state  [  ] Thrombocytopenia  ----------------------------------------------------------------------  [  ] Cerebral infarction  [  ] Transient ischemia attack  [  ] Encephalopathy - Toxic or Metabolic    75yo M, with PMHx of HTN, HLD, BPH, HFrEF, CAD s/p CABG 10/31/24, severe PAD (s/p fem-fem) who presented to [facility] for left leg pain concerning for acute limb ischemia. On exam here, patient has an exam more consistent with his prior recent exam, consisting of chronic limb ischemia (motor and sensation intact, doppler signals present in left foot and fem-fem bypass). Given his rest pain, will proceed with arteriography.     #PAD w/ rest pain  - s/p LLE angiogram with EIA and FELICITY stents  - CTA b/l LE at OSH 11/8: post fem-fem bypass, severe atherosclerotic disease involving aorta, iliac, and superficial femoral arteries b/l more on the left  - c/w DAPT and statin    #CAD s/p prior PCI and recent CABG   - c/w DAPT and statin  - c/w imdur  - c/w metoprolol tartrate    #HFrEF  - ECHO 10/24: LVEF 35% with regional wall abnormalities  - c/w metoprolol tartrate    #RUL Pulmonary nodule - PET +  - Following with Dr. Duarte outpatient.    #CKD 3  - Monitor Cr and UOP    #BPH  - c/w flomax and proscar    #Prediabetes  - A1C 5.8%  - Consistent carb diet    Diet: DASH  activity: as jud  DVTppx: SQH  Dispo: 5 uris   O/N: dc aspirin, hep gtt started at 10cc/hr, 1am PTT 47,5, hep gtt inc 10>>11cc/hr    S: Patient does not have any complaints    O: Examined in bed resting comfortably     clopidogrel Tablet 75  heparin  Infusion 1000  isosorbide   mononitrate ER Tablet (IMDUR) 60  metoprolol succinate ER 50      Allergies    No Known Allergies    Intolerances        Vital Signs Last 24 Hrs  T(C): 36.4 (13 Nov 2024 05:38), Max: 36.4 (12 Nov 2024 14:42)  T(F): 97.6 (13 Nov 2024 05:38), Max: 97.6 (12 Nov 2024 21:13)  HR: 84 (13 Nov 2024 05:38) (79 - 88)  BP: 116/56 (13 Nov 2024 05:38) (113/69 - 143/64)  BP(mean): 81 (13 Nov 2024 05:38) (81 - 92)  RR: 18 (13 Nov 2024 05:38) (18 - 18)  SpO2: 96% (13 Nov 2024 05:38) (93% - 97%)    Parameters below as of 13 Nov 2024 05:38  Patient On (Oxygen Delivery Method): nasal cannula  O2 Flow (L/min): 2    I&O's Summary    12 Nov 2024 07:01  -  13 Nov 2024 07:00  --------------------------------------------------------  IN: 430 mL / OUT: 350 mL / NET: 80 mL        Physical Exam:  General: alert and awake, NAD  Pulmonary: no respiratory distress  Cardiovascular: RRR  Abdominal: soft  Extremities: LLE warm, well perfused, motor/sensory intact, biphasic DP/PT signals    LABS:                        10.5   6.99  )-----------( 185      ( 13 Nov 2024 05:30 )             32.8     11-13    136  |  103  |  21  ----------------------------<  122[H]  4.2   |  x   |  x     Ca    9.0      13 Nov 2024 05:30  Phos  3.2     11-13  Mg     1.8     11-13      PT/INR - ( 11 Nov 2024 18:16 )   PT: 13.8 sec;   INR: 1.20          PTT - ( 13 Nov 2024 05:30 )  PTT:66.3 sec    Radiology and Additional Studies:    ---------------------------------------------------------------------------  PLEASE CHECK WHEN PRESENT:  [ x ]Heart Failure     [  ] Acute     [  ] Acute on Chronic     [ x ] Chronic     [  ]Diastolic [HFpEF]     [ x ]Systolic [HFrEF]     [  ]Combined [HFpEF & HFrEF]  .................................................................................  [x ] Hypertensive Heart Disease  [x ] CAD  [  ] Atrial Fibrillation     [  ] Paroxysmal A-fib     [  ] Chronic A-fib     [  ] Persistent A-fib     [  ] Longstanding Persistent A-fib     [  ] Permanent A-fib  [  ] Pulmonary Hypertension  [  ] Other:  -------------------------------------------------------------------  [  ] Respiratory failure  [  ] Acute PE   [  ] Acute cor pulmonale  [  ] Asthma/COPD Exacerbation  [  ] COPD on home O2 (Chronic renal Failure)   [  ] Atelectasis   [  ] Pleural effusion  [  ] Aspiration pneumonia  [  ] Obstructive Sleep Apnea  -------------------------------------------------------------------  [  ] Acute Kidney Injury      [  ] Acute Tubular Necrosis      [  ] Reneal Medullary Necrosis     [  ] Renal Cortical Necrosis     [  ] Other Pathological Lesions:  [ x ] Chronic Kidney Disease     [  ]CKD 1     [  ]CKD 2     [ x ]CKD 3     [  ]CKD 4     [  ]CKD 5 (ESRD)  [  ]Other:  -------------------------------------------------------------------  [  ] Diabetes  [  ] Diabetic PVD Ulcer  [  ] Neuropathic ulcer to DM  [  ] Diabetes with Nephropathy  [  ] Osteomyelitis due to diabetes  [  ] Hyperglycemia   [  ] Hypoglycemia   --------------------------------------------------------------------  [  ] Malnutrition: See Nutrition Note  [  ] Cachexia  [  ] Other:   [  ] Supplement Ordered:  [  ] Morbid Obesity (BMI >=40]  [  ] Ileus  ---------------------------------------------------------------------  [  ] Sepsis/severe sepsis/septic shock  [  ] Noninfectious SIRS  [  ] UTI  [  ] Pneumonia  [  ] Thrombophlebitis   -----------------------------------------------------------------------  [  ] Acidosis/alkalosis  [  ] Fluid overload  [  ] Hypokalemia  [  ] Hyperkalemia  [  ] Hypomagnesemia  [  ] Hypophosphatemia  [  ] Hyperphosphatemia  ------------------------------------------------------------------------  [  ] Acute blood loss anemia  [  ] Post op blood loss anemia  [  ] Iron deficiency anemia  [  ] Anemia due to chronic disease  [  ] Hypercoagulable state  [  ] Thrombocytopenia  ----------------------------------------------------------------------  [  ] Cerebral infarction  [  ] Transient ischemia attack  [  ] Encephalopathy - Toxic or Metabolic    73yo M, with PMHx of HTN, HLD, BPH, HFrEF, CAD s/p CABG 10/31/24, severe PAD (s/p fem-fem) who presented to [facility] for left leg pain concerning for acute limb ischemia. On exam here, patient has an exam more consistent with his prior recent exam, consisting of chronic limb ischemia (motor and sensation intact, doppler signals present in left foot and fem-fem bypass). Given his rest pain, will proceed with arteriography.     #PAD w/ rest pain  - s/p LLE angiogram with EIA and FELICITY stents  - Plan for OR tomorrow for LLE bypass  - CTA b/l LE at OSH 11/8: post fem-fem bypass, severe atherosclerotic disease involving aorta, iliac, and superficial femoral arteries b/l more on the left  - c/w DAPT and statin    #CAD s/p prior PCI and recent CABG   - c/w DAPT and statin  - c/w imdur  - c/w metoprolol tartrate    #HFrEF  - ECHO 10/24: LVEF 35% with regional wall abnormalities  - c/w metoprolol tartrate    #RUL Pulmonary nodule - PET +  - Following with Dr. Duarte outpatient.    #CKD 3  - Monitor Cr and UOP    #BPH  - c/w flomax and proscar    #Prediabetes  - A1C 5.8%  - Consistent carb diet    Diet: DASH  activity: as jud  DVTppx: SQH  Dispo: 5 uris

## 2024-11-14 ENCOUNTER — TRANSCRIPTION ENCOUNTER (OUTPATIENT)
Age: 74
End: 2024-11-14

## 2024-11-14 LAB
ANION GAP SERPL CALC-SCNC: 11 MMOL/L — SIGNIFICANT CHANGE UP (ref 5–17)
ANION GAP SERPL CALC-SCNC: 12 MMOL/L — SIGNIFICANT CHANGE UP (ref 5–17)
APTT BLD: 25.7 SEC — SIGNIFICANT CHANGE UP (ref 24.5–35.6)
APTT BLD: 89.6 SEC — HIGH (ref 24.5–35.6)
BUN SERPL-MCNC: 14 MG/DL — SIGNIFICANT CHANGE UP (ref 7–23)
BUN SERPL-MCNC: 15 MG/DL — SIGNIFICANT CHANGE UP (ref 7–23)
CALCIUM SERPL-MCNC: 8.5 MG/DL — SIGNIFICANT CHANGE UP (ref 8.4–10.5)
CALCIUM SERPL-MCNC: 8.5 MG/DL — SIGNIFICANT CHANGE UP (ref 8.4–10.5)
CHLORIDE SERPL-SCNC: 104 MMOL/L — SIGNIFICANT CHANGE UP (ref 96–108)
CHLORIDE SERPL-SCNC: 105 MMOL/L — SIGNIFICANT CHANGE UP (ref 96–108)
CK MB CFR SERPL CALC: 6.2 NG/ML — SIGNIFICANT CHANGE UP (ref 0–6.7)
CK SERPL-CCNC: 344 U/L — HIGH (ref 30–200)
CO2 SERPL-SCNC: 20 MMOL/L — LOW (ref 22–31)
CO2 SERPL-SCNC: 22 MMOL/L — SIGNIFICANT CHANGE UP (ref 22–31)
CREAT SERPL-MCNC: 1.47 MG/DL — HIGH (ref 0.5–1.3)
CREAT SERPL-MCNC: 1.49 MG/DL — HIGH (ref 0.5–1.3)
EGFR: 48 ML/MIN/1.73M2 — LOW
EGFR: 49 ML/MIN/1.73M2 — LOW
GLUCOSE SERPL-MCNC: 119 MG/DL — HIGH (ref 70–99)
GLUCOSE SERPL-MCNC: 174 MG/DL — HIGH (ref 70–99)
HCT VFR BLD CALC: 31.4 % — LOW (ref 39–50)
HCT VFR BLD CALC: 33.6 % — LOW (ref 39–50)
HGB BLD-MCNC: 10.2 G/DL — LOW (ref 13–17)
HGB BLD-MCNC: 11.5 G/DL — LOW (ref 13–17)
INR BLD: 1.12 — SIGNIFICANT CHANGE UP (ref 0.85–1.16)
INR BLD: 1.18 — HIGH (ref 0.85–1.16)
MAGNESIUM SERPL-MCNC: 1.8 MG/DL — SIGNIFICANT CHANGE UP (ref 1.6–2.6)
MAGNESIUM SERPL-MCNC: 1.8 MG/DL — SIGNIFICANT CHANGE UP (ref 1.6–2.6)
MCHC RBC-ENTMCNC: 30.7 PG — SIGNIFICANT CHANGE UP (ref 27–34)
MCHC RBC-ENTMCNC: 31.3 PG — SIGNIFICANT CHANGE UP (ref 27–34)
MCHC RBC-ENTMCNC: 32.5 G/DL — SIGNIFICANT CHANGE UP (ref 32–36)
MCHC RBC-ENTMCNC: 34.2 G/DL — SIGNIFICANT CHANGE UP (ref 32–36)
MCV RBC AUTO: 91.6 FL — SIGNIFICANT CHANGE UP (ref 80–100)
MCV RBC AUTO: 94.6 FL — SIGNIFICANT CHANGE UP (ref 80–100)
NRBC # BLD: 0 /100 WBCS — SIGNIFICANT CHANGE UP (ref 0–0)
NRBC # BLD: 0 /100 WBCS — SIGNIFICANT CHANGE UP (ref 0–0)
PHOSPHATE SERPL-MCNC: 2.6 MG/DL — SIGNIFICANT CHANGE UP (ref 2.5–4.5)
PHOSPHATE SERPL-MCNC: 3.4 MG/DL — SIGNIFICANT CHANGE UP (ref 2.5–4.5)
PLATELET # BLD AUTO: 175 K/UL — SIGNIFICANT CHANGE UP (ref 150–400)
PLATELET # BLD AUTO: 189 K/UL — SIGNIFICANT CHANGE UP (ref 150–400)
POTASSIUM SERPL-MCNC: 4 MMOL/L — SIGNIFICANT CHANGE UP (ref 3.5–5.3)
POTASSIUM SERPL-MCNC: 4.9 MMOL/L — SIGNIFICANT CHANGE UP (ref 3.5–5.3)
POTASSIUM SERPL-SCNC: 4 MMOL/L — SIGNIFICANT CHANGE UP (ref 3.5–5.3)
POTASSIUM SERPL-SCNC: 4.9 MMOL/L — SIGNIFICANT CHANGE UP (ref 3.5–5.3)
PROTHROM AB SERPL-ACNC: 13.1 SEC — SIGNIFICANT CHANGE UP (ref 9.9–13.4)
PROTHROM AB SERPL-ACNC: 13.5 SEC — HIGH (ref 9.9–13.4)
RBC # BLD: 3.32 M/UL — LOW (ref 4.2–5.8)
RBC # BLD: 3.67 M/UL — LOW (ref 4.2–5.8)
RBC # FLD: 12.7 % — SIGNIFICANT CHANGE UP (ref 10.3–14.5)
RBC # FLD: 13.8 % — SIGNIFICANT CHANGE UP (ref 10.3–14.5)
SODIUM SERPL-SCNC: 137 MMOL/L — SIGNIFICANT CHANGE UP (ref 135–145)
SODIUM SERPL-SCNC: 137 MMOL/L — SIGNIFICANT CHANGE UP (ref 135–145)
TROPONIN T, HIGH SENSITIVITY RESULT: 33 NG/L — SIGNIFICANT CHANGE UP (ref 0–51)
WBC # BLD: 22.35 K/UL — HIGH (ref 3.8–10.5)
WBC # BLD: 7.19 K/UL — SIGNIFICANT CHANGE UP (ref 3.8–10.5)
WBC # FLD AUTO: 22.35 K/UL — HIGH (ref 3.8–10.5)
WBC # FLD AUTO: 7.19 K/UL — SIGNIFICANT CHANGE UP (ref 3.8–10.5)

## 2024-11-14 PROCEDURE — 35700 REOPERATION BYPASS GRAFT: CPT | Mod: GC

## 2024-11-14 PROCEDURE — 99233 SBSQ HOSP IP/OBS HIGH 50: CPT

## 2024-11-14 PROCEDURE — 35656 BPG FEMORAL-POPLITEAL: CPT | Mod: GC,LT

## 2024-11-14 PROCEDURE — 93010 ELECTROCARDIOGRAM REPORT: CPT | Mod: 77

## 2024-11-14 PROCEDURE — 93010 ELECTROCARDIOGRAM REPORT: CPT

## 2024-11-14 DEVICE — GRAFT VASC PROPATEN 8MMX70X80CM TW REMOV RING: Type: IMPLANTABLE DEVICE | Status: FUNCTIONAL

## 2024-11-14 DEVICE — SURGICEL SNOW 2 X 4": Type: IMPLANTABLE DEVICE | Status: FUNCTIONAL

## 2024-11-14 DEVICE — CLIP APPLIER ETHICON LIGACLIP 11.5" MEDIUM: Type: IMPLANTABLE DEVICE | Status: FUNCTIONAL

## 2024-11-14 DEVICE — SURGICEL FIBRILLAR 4 X 4": Type: IMPLANTABLE DEVICE | Status: FUNCTIONAL

## 2024-11-14 DEVICE — SURGIFOAM PAD 8CM X 12.5CM X 10MM (100): Type: IMPLANTABLE DEVICE | Status: FUNCTIONAL

## 2024-11-14 DEVICE — SURGCEL 4 X 8": Type: IMPLANTABLE DEVICE | Status: FUNCTIONAL

## 2024-11-14 DEVICE — SURGICEL NU-KNIT 3 X 4": Type: IMPLANTABLE DEVICE | Status: FUNCTIONAL

## 2024-11-14 DEVICE — CLIP APPLIER ETHICON LIGACLIP 9 3/8" SMALL: Type: IMPLANTABLE DEVICE | Status: FUNCTIONAL

## 2024-11-14 DEVICE — ARISTA 3GR: Type: IMPLANTABLE DEVICE | Status: FUNCTIONAL

## 2024-11-14 DEVICE — CATH FOGARTY 3FR X 80CM: Type: IMPLANTABLE DEVICE | Status: FUNCTIONAL

## 2024-11-14 DEVICE — SURGICEL POWDER 3 GRAMS: Type: IMPLANTABLE DEVICE | Status: FUNCTIONAL

## 2024-11-14 RX ORDER — TAMSULOSIN HYDROCHLORIDE 0.4 MG/1
0.4 CAPSULE ORAL AT BEDTIME
Refills: 0 | Status: DISCONTINUED | OUTPATIENT
Start: 2024-11-14 | End: 2024-11-27

## 2024-11-14 RX ORDER — METOPROLOL TARTRATE 100 MG/1
50 TABLET, FILM COATED ORAL DAILY
Refills: 0 | Status: DISCONTINUED | OUTPATIENT
Start: 2024-11-14 | End: 2024-11-16

## 2024-11-14 RX ORDER — SODIUM CHLORIDE 9 MG/ML
1000 INJECTION, SOLUTION INTRAMUSCULAR; INTRAVENOUS; SUBCUTANEOUS
Refills: 0 | Status: DISCONTINUED | OUTPATIENT
Start: 2024-11-14 | End: 2024-11-15

## 2024-11-14 RX ORDER — ISOSORBIDE MONONITRATE 10 MG
90 TABLET ORAL DAILY
Refills: 0 | Status: DISCONTINUED | OUTPATIENT
Start: 2024-11-14 | End: 2024-11-16

## 2024-11-14 RX ORDER — CEFAZOLIN SODIUM 10 G
2000 VIAL (EA) INJECTION EVERY 8 HOURS
Refills: 0 | Status: COMPLETED | OUTPATIENT
Start: 2024-11-14 | End: 2024-11-15

## 2024-11-14 RX ORDER — ISOSORBIDE MONONITRATE 10 MG
60 TABLET ORAL DAILY
Refills: 0 | Status: DISCONTINUED | OUTPATIENT
Start: 2024-11-14 | End: 2024-11-14

## 2024-11-14 RX ORDER — CLOPIDOGREL 75 MG/1
75 TABLET, FILM COATED ORAL DAILY
Refills: 0 | Status: DISCONTINUED | OUTPATIENT
Start: 2024-11-14 | End: 2024-11-14

## 2024-11-14 RX ORDER — ACETAMINOPHEN 500MG 500 MG/1
975 TABLET, COATED ORAL EVERY 8 HOURS
Refills: 0 | Status: DISCONTINUED | OUTPATIENT
Start: 2024-11-14 | End: 2024-11-26

## 2024-11-14 RX ORDER — CLOPIDOGREL 75 MG/1
75 TABLET, FILM COATED ORAL DAILY
Refills: 0 | Status: DISCONTINUED | OUTPATIENT
Start: 2024-11-14 | End: 2024-11-18

## 2024-11-14 RX ORDER — POLYETHYLENE GLYCOL 3350 17 G/17G
17 POWDER, FOR SOLUTION ORAL DAILY
Refills: 0 | Status: DISCONTINUED | OUTPATIENT
Start: 2024-11-14 | End: 2024-11-27

## 2024-11-14 RX ORDER — HYDROMORPHONE HYDROCHLORIDE 2 MG/1
0.5 TABLET ORAL EVERY 4 HOURS
Refills: 0 | Status: DISCONTINUED | OUTPATIENT
Start: 2024-11-14 | End: 2024-11-19

## 2024-11-14 RX ORDER — SENNOSIDES 8.6 MG
2 TABLET ORAL AT BEDTIME
Refills: 0 | Status: DISCONTINUED | OUTPATIENT
Start: 2024-11-14 | End: 2024-11-27

## 2024-11-14 RX ORDER — OXYCODONE HYDROCHLORIDE 30 MG/1
2.5 TABLET ORAL EVERY 4 HOURS
Refills: 0 | Status: DISCONTINUED | OUTPATIENT
Start: 2024-11-14 | End: 2024-11-14

## 2024-11-14 RX ORDER — OXYCODONE HYDROCHLORIDE 30 MG/1
5 TABLET ORAL EVERY 4 HOURS
Refills: 0 | Status: DISCONTINUED | OUTPATIENT
Start: 2024-11-14 | End: 2024-11-14

## 2024-11-14 RX ORDER — HYDROMORPHONE HYDROCHLORIDE 2 MG/1
0.2 TABLET ORAL ONCE
Refills: 0 | Status: DISCONTINUED | OUTPATIENT
Start: 2024-11-14 | End: 2024-11-14

## 2024-11-14 RX ORDER — OXYCODONE HYDROCHLORIDE 30 MG/1
10 TABLET ORAL EVERY 6 HOURS
Refills: 0 | Status: DISCONTINUED | OUTPATIENT
Start: 2024-11-14 | End: 2024-11-19

## 2024-11-14 RX ORDER — OXYCODONE HYDROCHLORIDE 30 MG/1
5 TABLET ORAL EVERY 6 HOURS
Refills: 0 | Status: DISCONTINUED | OUTPATIENT
Start: 2024-11-14 | End: 2024-11-21

## 2024-11-14 RX ADMIN — ACETAMINOPHEN 500MG 975 MILLIGRAM(S): 500 TABLET, COATED ORAL at 05:04

## 2024-11-14 RX ADMIN — Medication 100 GRAM(S): at 07:46

## 2024-11-14 RX ADMIN — Medication 11 UNIT(S)/HR: at 08:20

## 2024-11-14 RX ADMIN — Medication 100 MILLIGRAM(S): at 21:23

## 2024-11-14 RX ADMIN — OXYCODONE HYDROCHLORIDE 5 MILLIGRAM(S): 30 TABLET ORAL at 00:22

## 2024-11-14 RX ADMIN — OXYCODONE HYDROCHLORIDE 5 MILLIGRAM(S): 30 TABLET ORAL at 19:48

## 2024-11-14 RX ADMIN — HYDROMORPHONE HYDROCHLORIDE 0.2 MILLIGRAM(S): 2 TABLET ORAL at 17:45

## 2024-11-14 RX ADMIN — Medication 2 TABLET(S): at 21:22

## 2024-11-14 RX ADMIN — Medication 5 MILLIGRAM(S): at 21:23

## 2024-11-14 RX ADMIN — OXYCODONE HYDROCHLORIDE 5 MILLIGRAM(S): 30 TABLET ORAL at 07:46

## 2024-11-14 RX ADMIN — TAMSULOSIN HYDROCHLORIDE 0.4 MILLIGRAM(S): 0.4 CAPSULE ORAL at 21:23

## 2024-11-14 RX ADMIN — POLYETHYLENE GLYCOL 3350 17 GRAM(S): 17 POWDER, FOR SOLUTION ORAL at 21:22

## 2024-11-14 RX ADMIN — ACETAMINOPHEN 500MG 975 MILLIGRAM(S): 500 TABLET, COATED ORAL at 21:23

## 2024-11-14 RX ADMIN — OXYCODONE HYDROCHLORIDE 5 MILLIGRAM(S): 30 TABLET ORAL at 20:48

## 2024-11-14 RX ADMIN — Medication 100 GRAM(S): at 18:45

## 2024-11-14 RX ADMIN — CLOPIDOGREL 75 MILLIGRAM(S): 75 TABLET, FILM COATED ORAL at 18:57

## 2024-11-14 RX ADMIN — METOPROLOL TARTRATE 50 MILLIGRAM(S): 100 TABLET, FILM COATED ORAL at 05:12

## 2024-11-14 RX ADMIN — OXYCODONE HYDROCHLORIDE 5 MILLIGRAM(S): 30 TABLET ORAL at 03:58

## 2024-11-14 RX ADMIN — PANTOPRAZOLE SODIUM 40 MILLIGRAM(S): 40 TABLET, DELAYED RELEASE ORAL at 06:19

## 2024-11-14 RX ADMIN — Medication 90 MILLIGRAM(S): at 21:22

## 2024-11-14 RX ADMIN — OXYCODONE HYDROCHLORIDE 2.5 MILLIGRAM(S): 30 TABLET ORAL at 17:45

## 2024-11-14 RX ADMIN — HYDROMORPHONE HYDROCHLORIDE 0.5 MILLIGRAM(S): 2 TABLET ORAL at 18:14

## 2024-11-14 RX ADMIN — Medication 80 MILLIGRAM(S): at 21:23

## 2024-11-14 RX ADMIN — METOPROLOL TARTRATE 50 MILLIGRAM(S): 100 TABLET, FILM COATED ORAL at 21:23

## 2024-11-14 RX ADMIN — SODIUM CHLORIDE 40 MILLILITER(S): 9 INJECTION, SOLUTION INTRAMUSCULAR; INTRAVENOUS; SUBCUTANEOUS at 00:23

## 2024-11-14 NOTE — PRE-ANESTHESIA EVALUATION ADULT - LAST ECHOCARDIOGRAM
10/28/24. report reviewed.  moderate LV dysfunction with RWMA, EF 35%.  NL RV.  dilated LA.  trace MR/TR
10/28/24. report reviewed.  moderate LV dysfunction with RWMA, EF 35%.  NL RV.  dilated LA.  trace MR/TR

## 2024-11-14 NOTE — PRE-ANESTHESIA EVALUATION ADULT - NSANTHPMHFT_GEN_ALL_CORE
This is a 75 y/o male with PMHx of HTN, HLD, BPH, CAD s/p CABG 10/31, PAD s/p remote fem-fem bypass, and and an "abdominal stent for circulation problems". Patient was recently admitted here 10/27/24 - 11/8/24 after initially presenting with a chief complaint of leg pain, being worked up for CLTI with plan for arteriography, but this procedure was deferred as he developed ACS and ultimately went for CABG. His post-procedure course was noteworthy for identification of PET-positive lung malignancy with plan for outpatient staging. on 11/8 into 11/9 he was evaluated for leg pain and on exam not found to have pedal signals or pulses. With this, [facility] [place] for heparin drip and CT-A. CT-A images are not available but show s/p fem-fem bypass. He was accepted at [facility] as a transfer for continuity and arrived here this morning on heparin. Today he reports bilateral leg pain, left worse than right. He denies weakness or sensory changes at this time.     Of note, while at [facility] the patient was also accessed by CCU for sternal chest pain. Troponins and EKG were negative. Pain as attributed to surgical sternotomy pain    Denies CP/SOB/N/V/D/dizziness/cough/fever/chills.  Denies h/o CVA, surgery other than the bypass/stents.  (10 Nov 2024 07:05)    Podiatry addendum: Podiatry consulted for LE wounds. Pt complains of pain at b/l LE. Pt states he has had LLE anterior leg wounds for "quiet some time" and has noticed drainage in the past. He has also been spending more time in bed due to ischemic pain. Pt to undergo b/l LE angio today.
S/P CABG  ANEMIA

## 2024-11-14 NOTE — PRE-ANESTHESIA EVALUATION ADULT - NSATTENDATTESTRD_GEN_ALL_CORE
decreased leonardo/decreased weight-shifting ability
The patient has been re-examined and I agree with the above assessment or I updated with my findings.
The patient has been re-examined and I agree with the above assessment or I updated with my findings.

## 2024-11-14 NOTE — PRE-ANESTHESIA EVALUATION ADULT - NSANTHPROCED_GEN_ALL_CORE
Airway       Patient location during procedure: OR  Staff -        CRNA: Luis Navarro APRN CRNA       Performed By: CRNA  Consent for Airway        Urgency: emergent  Indications and Patient Condition       Indications for airway management: leila-procedural         Mask difficulty assessment: 0 - not attempted    Final Airway Details       Final airway type: supraglottic airway    Supraglottic Airway Details        Type: LMA       Brand: LMA Unique       LMA size: 2    Post intubation assessment        Placement verified by: capnometry, equal breath sounds and chest rise        Number of attempts at approach: 1       Secured with: silk tape       Ease of procedure: easy       Dentition: Intact and Unchanged          
Arterial Catheter

## 2024-11-14 NOTE — PROGRESS NOTE ADULT - SUBJECTIVE AND OBJECTIVE BOX
VASCULAR CARDIOLOGY ATTENDING PROGRESS NOTE    SERVICE LINE: [phone]    Subjective  NAEO  In PACU, with diffuse body pain    DVT ppx with SQH     Current Medications:   acetaminophen     Tablet .. 975 milliGRAM(s) Oral every 8 hours  aspirin  chewable 81 milliGRAM(s) Oral daily  atorvastatin 80 milliGRAM(s) Oral at bedtime  clopidogrel Tablet 75 milliGRAM(s) Oral daily  finasteride 5 milliGRAM(s) Oral daily  influenza  Vaccine (HIGH DOSE) 0.5 milliLiter(s) IntraMuscular once  isosorbide   mononitrate ER Tablet (IMDUR) 30 milliGRAM(s) Oral daily  metoprolol tartrate 25 milliGRAM(s) Oral two times a day  oxyCODONE    IR 5 milliGRAM(s) Oral every 4 hours PRN  oxyCODONE    IR 2.5 milliGRAM(s) Oral every 4 hours PRN  pantoprazole    Tablet 40 milliGRAM(s) Oral before breakfast  polyethylene glycol 3350 17 Gram(s) Oral daily  senna 2 Tablet(s) Oral at bedtime  tamsulosin 0.4 milliGRAM(s) Oral at bedtime      REVIEW OF SYSTEMS:  CONSTITUTIONAL: No weakness, fevers or chills  EYES/ENT: No visual changes;  No dysphagia  NECK: No pain or stiffness  RESPIRATORY: No cough, wheezing, hemoptysis; No shortness of breath  CARDIOVASCULAR: No chest pain or palpitations; No lower extremity edema  GASTROINTESTINAL: No abdominal or epigastric pain. No nausea, vomiting, or hematemesis; No diarrhea or constipation. No melena or hematochezia.  BACK: No back pain  GENITOURINARY: No dysuria, frequency or hematuria  NEUROLOGICAL: No numbness or weakness  SKIN: No itching, burning, rashes, or lesions   All other review of systems is negative unless indicated above.    Physical Exam:  T(F): 97.5 (11-12), Max: 98.5 (11-12)  HR: 79 (11-12) (62 - 82)  BP: 143/64 (11-12) (98/52 - 146/67)  BP(mean): 92 (11-12) (71 - 97)  ABP: --  ABP(mean): --  RR: 18 (11-12)  SpO2: 96% (11-12)  GENERAL: No acute distress, well-developed  HEAD:  Atraumatic, Normocephalic  ENT: EOMI, PERRLA, conjunctiva and sclera clear, Neck supple, No JVD, moist mucosa  CHEST/LUNG: Clear to auscultation bilaterally; No wheeze, equal breath sounds bilaterally   BACK: No spinal tenderness  HEART: Regular rate and rhythm; No murmurs, rubs, or gallops  ABDOMEN: Soft, Nontender, Nondistended; Bowel sounds present  EXTREMITIES:  No clubbing, cyanosis, or edema  PSYCH: Nl behavior, nl affect  NEUROLOGY: AAOx3, non-focal, cranial nerves intact  SKIN: Normal color, No rashes or lesions    Cardiovascular Diagnostic Testing: personally reviewed    CXR: Personally reviewed    Labs: Personally reviewed                        12.4   7.49  )-----------( 199      ( 12 Nov 2024 04:29 )             40.2     11-12    137  |  104  |  23  ----------------------------<  95  4.8   |  18[L]  |  1.40[H]    Ca    9.0      12 Nov 2024 04:29  Phos  2.8     11-12  Mg     1.9     11-12      PT/INR - ( 11 Nov 2024 18:16 )   PT: 13.8 sec;   INR: 1.20          PTT - ( 11 Nov 2024 18:16 )  PTT:26.0 sec    CARDIAC MARKERS ( 12 Nov 2024 04:29 )  41 ng/L / x     / x     / x     / x     / 3.1 ng/mL  CARDIAC MARKERS ( 11 Nov 2024 04:18 )  46 ng/L / x     / x     / x     / x     / 3.3 ng/mL                 Yes

## 2024-11-14 NOTE — BRIEF OPERATIVE NOTE - OPERATION/FINDINGS
Oblique groin incision made on L side of abdomen over fem-fem bypass which was marked under ultrasound. Bypass skeletonized and controlled with large vessel loops. Next medial groin incision made and subcutaneous tissue, fascia, and muscle dissected down to level of popliteal fossa. Popliteal artery skeletonized and controlled with large vessel loops. Patient heparinized and ACT confirmed >250. Arteriotomy made at proximal incision and anastomosis completed in running fashion with Goretex CV5 suture. Graft tunneled subcutaneously to popliteal fossa. Next arteriotomy made on popliteal artery. No backbleeding was observed so a talat embolectomy was done with retrieval of calcium nidas and good back-bleeding. Distal anastamosis completed with 6.0 prolene suture in running fashion. Intraoperative u/s confirmed good flow distal to bypass. Incisions closed in layers with vicryl suture and running monocryl in the proximal incision and vertical mattress nylon in the distal incision.    Post-Op Pulses:  L DP: Palpable  L PT: Triphasic  Oblique groin incision made on L side of abdomen over fem-fem bypass which was marked under ultrasound. Bypass skeletonized and controlled with large vessel loops. Next medial groin incision made and subcutaneous tissue, fascia, and muscle dissected down to level of popliteal fossa. Popliteal artery skeletonized and controlled with large vessel loops. Patient heparinized and ACT confirmed >250. Proximal Arteriotomy made on pre-existing bypass and anastomosis completed in running fashion with Goretex CV5 suture. Graft tunneled subcutaneously to popliteal fossa. Next arteriotomy made on popliteal artery. No backbleeding was observed so a talat embolectomy was done with retrieval of calcium nidas and good back-bleeding. Distal anastamosis completed with 6.0 prolene suture in running fashion. Intraoperative u/s confirmed good flow distal to bypass. Incisions closed in layers with vicryl suture and running monocryl in the proximal incision and vertical mattress nylon in the distal incision.    Post-Op Pulses:  L DP: Palpable  L PT: Triphasic  Oblique groin incision made on L side of abdomen over fem-fem bypass which was marked under ultrasound. Bypass skeletonized and controlled with large vessel loops. Next medial groin incision made and subcutaneous tissue, fascia, and muscle dissected down to level of popliteal fossa. Popliteal artery skeletonized and controlled with large vessel loops. Patient heparinized and ACT confirmed >250. Proximal Arteriotomy made on pre-existing bypass and anastomosis completed in running fashion with Goretex CV5 suture. Graft tunneled subcutaneously to popliteal fossa. Next arteriotomy made on popliteal artery. No backbleeding was observed so a talat embolectomy was done with retrieval of calcium nidas and good back-bleeding. Distal anastamosis completed with 6.0 prolene suture in running fashion. Intraoperative doppler confirmed good flow distal to bypass. Incisions closed in layers with vicryl suture and running monocryl in the proximal incision and vertical mattress nylon in the distal incision.    Post-Op Pulses:  L DP: Palpable  L PT: Triphasic

## 2024-11-14 NOTE — PROGRESS NOTE ADULT - ASSESSMENT
75 yo man PMHx of CAD s/p PCI and recent minimally invasive direct CABG 10/31/24, ICM HFrEF, HTN, CKD, and PAD s/p prior L to R fem-fem bypass who was admitted for CLI of LLE with rest pain, s/p LE angiogram, s/p L common and external iliac stent but prox anastomosis of fem-fem bypass is upwards going (unable to treat endovascularly), w/ occluded L CFA, SFA, and AK popliteal artery w/ reconstitution of BK pop via collaterals w/ AT/PT runoff.    Assessment  1. CAD s/p PCI and minimally invasive direct CABG 10/31/24  EKG done 11/12 3:45 pm NSR, Q wave inferior leads, T wave flattening late precordial leads  EKG post op 11/14 NSR w/ new TWI and STD in lateral and precordial leads  2. PAD s/p L-R fem-fem bypass c/b CLI LLE  S/p L common and external iliac stent 11/11 but prox anastomosis of fem-fem bypass is upwards going (unable to treat endovascularly), w/ occluded L CFA, SFA, and AK popliteal artery w/ reconstitution of BK pop via collaterals w/ AT/PT runoff.  Now s/p L fem-pop bypass 11/14  4. ICM HFrEF 35% - euvolemic  5. LLE DVT   V/Q scan negative for PE    Plan  1. Plavix 75mg PO QD and high intensity statin. Off ASA 81mg PO QD given initiation of AC for DVT, to avoid triple therapy  2. New EKG change post op, diffuse body pain including pain in the chest, trend hsTrop q6h overnight, call cardiology fellow if elevated  3. GDMT: metop succinate 50mg PO QD, will start ARNI/MRA/SGLT2i when BP better and GFR improved  4. Increase Imdur to 90mg PO QD for better anti-anginal therapy  5. No need for diuretics  6. Will need pharmacological nuclear stress test as outpatient    Thank you for the consult and the opportunity to take care of this patient.     Marquis Pink M.D.  Cardiology | Vascular Cardiology Attending  Please call (c) [phone] for any questions    During non face-to-face time, I reviewed relevant portions of the patient's medical record. During face-to-face time, I took a relevant history and examined the patient. I also explained differential diagnoses, relevant cardiac diagnoses, workup, and management plan, which required a high level of medical decision making. I answered all questions related to the patient's medical conditions.

## 2024-11-14 NOTE — PRE-OP CHECKLIST - BOWEL PREP
74554;  Therapeutic Exercise:    25     mins  03730;     Neuromuscular Rebeka:    0    mins  54125;    Therapeutic Activity:     15     mins  09954;     Gait Trainin     mins  71375;    Aquatic Therapy:     0     mins  29222;       Untimed:  Electrical Stimulation:    0     mins  11169 ( );  Mechanical Traction:    0     mins  63351;       Timed Treatment:   40   mins   Total Treatment:     40   mins      Electronically signed:   Dalia Bardales PTA  Physical Therapist Assistant  [facility] [place] License #: [id number]  
n/a
daily re-assessment

## 2024-11-14 NOTE — BRIEF OPERATIVE NOTE - NSICDXBRIEFPROCEDURE_GEN_ALL_CORE_FT
PROCEDURES:  Creation, bypass, arterial, femoral to popliteal, using PTFE graft 14-Nov-2024 17:03:42  Casey Finney

## 2024-11-14 NOTE — PRE-ANESTHESIA EVALUATION ADULT - NSANTHPEFT_GEN_ALL_CORE
ALEWN  NAD  BBS =  S1S2
well developed well nourished male in NAD  awake, alert, and oriented  +S1/S2  B/L air entry

## 2024-11-14 NOTE — PROGRESS NOTE ADULT - SUBJECTIVE AND OBJECTIVE BOX
Vascular Surgery Post-Op Note    Procedure: LLE fem-pop bypass    Diagnosis/Indication: PAD    Surgeon: Dr. Valerio    S: Pt has no complaints. Denies CP, SOB, HICKMAN, calf tenderness, numbness, tingling of b/l LE. Pain controlled with medication.    O:  T(C): 36.7 (11-14-24 @ 16:54), Max: 36.7 (11-14-24 @ 16:54)  T(F): 98 (11-14-24 @ 16:54), Max: 98 (11-14-24 @ 16:54)  HR: 75 (11-14-24 @ 18:08) (75 - 80)  BP: 129/62 (11-14-24 @ 18:08) (129/57 - 151/65)  RR: 19 (11-14-24 @ 18:08) (14 - 20)  SpO2: 97% (11-14-24 @ 18:08) (97% - 99%)  Wt(kg): --                        11.5   22.35 )-----------( 189      ( 14 Nov 2024 17:21 )             33.6     11-14    137  |  105  |  14  ----------------------------<  174[H]  4.9   |  20[L]  |  1.47[H]    Ca    8.5      14 Nov 2024 17:21  Phos  2.6     11-14  Mg     1.8     11-14        Gen: NAD, resting comfortably in bed  C/V: NSR  Pulm: Nonlabored breathing, no respiratory distress, on NC  Abd: soft, NT/ND  Extrem: WWP, no calf edema, L groin incision c/d/i, mild ecchymosis surrounding, soft, no palpable hematoma. LLE popliteal incision w/ serosanguinous drainage, surrounding area soft, no palpable hematoma. motor and sensory intact bilaterally  Pulses: LLE palp DP, triphasic PT    A/P: 74yMale s/p above procedure  dc [facility] at [place]    Diet: CLD  IVF: NS at 40cc/hr  Pain/nausea control  DVT ppx: holding  Dispo plan: 5 uris

## 2024-11-14 NOTE — PROGRESS NOTE ADULT - SUBJECTIVE AND OBJECTIVE BOX
O/N: 6pm PTT 63, VQ scan done, chest discomfort, EKG unchanged, improved w/ pain meds    ---------------------------------------------------------------------------  PLEASE CHECK WHEN PRESENT:  [ x ]Heart Failure     [  ] Acute     [  ] Acute on Chronic     [ x ] Chronic     [  ]Diastolic [HFpEF]     [ x ]Systolic [HFrEF]     [  ]Combined [HFpEF & HFrEF]  .................................................................................  [x ] Hypertensive Heart Disease  [x ] CAD  [  ] Atrial Fibrillation     [  ] Paroxysmal A-fib     [  ] Chronic A-fib     [  ] Persistent A-fib     [  ] Longstanding Persistent A-fib     [  ] Permanent A-fib  [  ] Pulmonary Hypertension  [  ] Other:  -------------------------------------------------------------------  [  ] Respiratory failure  [  ] Acute PE   [  ] Acute cor pulmonale  [  ] Asthma/COPD Exacerbation  [  ] COPD on home O2 (Chronic renal Failure)   [  ] Atelectasis   [  ] Pleural effusion  [  ] Aspiration pneumonia  [  ] Obstructive Sleep Apnea  -------------------------------------------------------------------  [  ] Acute Kidney Injury      [  ] Acute Tubular Necrosis      [  ] Reneal Medullary Necrosis     [  ] Renal Cortical Necrosis     [  ] Other Pathological Lesions:  [ x ] Chronic Kidney Disease     [  ]CKD 1     [  ]CKD 2     [ x ]CKD 3     [  ]CKD 4     [  ]CKD 5 (ESRD)  [  ]Other:  -------------------------------------------------------------------  [  ] Diabetes  [  ] Diabetic PVD Ulcer  [  ] Neuropathic ulcer to DM  [  ] Diabetes with Nephropathy  [  ] Osteomyelitis due to diabetes  [  ] Hyperglycemia   [  ] Hypoglycemia   --------------------------------------------------------------------  [  ] Malnutrition: See Nutrition Note  [  ] Cachexia  [  ] Other:   [  ] Supplement Ordered:  [  ] Morbid Obesity (BMI >=40]  [  ] Ileus  ---------------------------------------------------------------------  [  ] Sepsis/severe sepsis/septic shock  [  ] Noninfectious SIRS  [  ] UTI  [  ] Pneumonia  [  ] Thrombophlebitis   -----------------------------------------------------------------------  [  ] Acidosis/alkalosis  [  ] Fluid overload  [  ] Hypokalemia  [  ] Hyperkalemia  [  ] Hypomagnesemia  [  ] Hypophosphatemia  [  ] Hyperphosphatemia  ------------------------------------------------------------------------  [  ] Acute blood loss anemia  [  ] Post op blood loss anemia  [  ] Iron deficiency anemia  [  ] Anemia due to chronic disease  [  ] Hypercoagulable state  [  ] Thrombocytopenia  ----------------------------------------------------------------------  [  ] Cerebral infarction  [  ] Transient ischemia attack  [  ] Encephalopathy - Toxic or Metabolic    A/P: 75yo M, with PMHx of HTN, HLD, BPH, HFrEF, CAD s/p CABG 10/31/24, severe PAD (s/p fem-fem) who presented to [facility] for left leg pain concerning for acute limb ischemia. On exam here, patient has an exam more consistent with his prior recent exam, consisting of chronic limb ischemia (motor and sensation intact, doppler signals present in left foot and fem-fem bypass). Given his rest pain, will proceed with arteriography.     #PAD w/ rest pain  - s/p LLE angiogram with EIA and FELICITY stents  - Plan for OR today for LLE bypass, preop/consent done  - CTA b/l LE at OSH 11/8: post fem-fem bypass, severe atherosclerotic disease involving aorta, iliac, and superficial femoral arteries b/l more on the left  - c/w DAPT and statin    #CAD s/p prior PCI and recent CABG   - c/w DAPT and statin  - c/w imdur  - c/w metoprolol tartrate  - appreciate cards recs    #HFrEF  - ECHO 10/24: LVEF 35% with regional wall abnormalities  - c/w metoprolol tartrate    #RUL Pulmonary nodule - PET +  - Following with Dr. Duarte outpatient.    #CKD 3  - Monitor Cr and UOP    #BPH  - c/w flomax and proscar    #Prediabetes  - A1C 5.8%  - Consistent carb diet    Diet: DASH  activity: as jud  DVTppx: SQH  Dispo: 5 uris     O/N: 6pm PTT 63, VQ scan done, chest discomfort, EKG unchanged, improved w/ pain meds    S: c/o pain to LLE    O: Examined in bed resting comfortably     clopidogrel Tablet 75  heparin  Infusion 1000  isosorbide   mononitrate ER Tablet (IMDUR) 60  metoprolol succinate ER 50      Allergies    No Known Allergies    Intolerances        Vital Signs Last 24 Hrs  T(C): 36.7 (14 Nov 2024 02:20), Max: 36.7 (13 Nov 2024 12:00)  T(F): 98.1 (14 Nov 2024 02:20), Max: 98.1 (14 Nov 2024 00:15)  HR: 74 (14 Nov 2024 03:48) (74 - 93)  BP: 143/65 (14 Nov 2024 03:48) (114/58 - 143/65)  BP(mean): 93 (14 Nov 2024 03:48) (92 - 93)  RR: 18 (14 Nov 2024 03:48) (18 - 18)  SpO2: 94% (14 Nov 2024 03:48) (93% - 96%)    Parameters below as of 14 Nov 2024 03:48  Patient On (Oxygen Delivery Method): room air      I&O's Summary    13 Nov 2024 07:01  -  14 Nov 2024 07:00  --------------------------------------------------------  IN: 960 mL / OUT: 1200 mL / NET: -240 mL        Physical Exam:  General: alert and awake, NAD  Pulmonary: no respiratory distress  Cardiovascular: RRR  Abdominal: soft  Extremities: c/o pain to LLE. LLE warm, well perfused, motor/sensory intact, biphasic DP/PT signals      LABS:                        10.2   7.19  )-----------( 175      ( 14 Nov 2024 05:30 )             31.4     11-13    136  |  103  |  21  ----------------------------<  122[H]  4.2   |  21[L]  |  1.51[H]    Ca    9.0      13 Nov 2024 05:30  Phos  3.2     11-13  Mg     1.8     11-13      PT/INR - ( 14 Nov 2024 05:30 )   PT: 13.5 sec;   INR: 1.18          PTT - ( 14 Nov 2024 05:30 )  PTT:89.6 sec    Radiology and Additional Studies:    ---------------------------------------------------------------------------  PLEASE CHECK WHEN PRESENT:  [ x ]Heart Failure     [  ] Acute     [  ] Acute on Chronic     [ x ] Chronic     [  ]Diastolic [HFpEF]     [ x ]Systolic [HFrEF]     [  ]Combined [HFpEF & HFrEF]  .................................................................................  [x ] Hypertensive Heart Disease  [x ] CAD  [  ] Atrial Fibrillation     [  ] Paroxysmal A-fib     [  ] Chronic A-fib     [  ] Persistent A-fib     [  ] Longstanding Persistent A-fib     [  ] Permanent A-fib  [  ] Pulmonary Hypertension  [  ] Other:  -------------------------------------------------------------------  [  ] Respiratory failure  [  ] Acute PE   [  ] Acute cor pulmonale  [  ] Asthma/COPD Exacerbation  [  ] COPD on home O2 (Chronic renal Failure)   [  ] Atelectasis   [  ] Pleural effusion  [  ] Aspiration pneumonia  [  ] Obstructive Sleep Apnea  -------------------------------------------------------------------  [  ] Acute Kidney Injury      [  ] Acute Tubular Necrosis      [  ] Reneal Medullary Necrosis     [  ] Renal Cortical Necrosis     [  ] Other Pathological Lesions:  [ x ] Chronic Kidney Disease     [  ]CKD 1     [  ]CKD 2     [ x ]CKD 3     [  ]CKD 4     [  ]CKD 5 (ESRD)  [  ]Other:  -------------------------------------------------------------------  [  ] Diabetes  [  ] Diabetic PVD Ulcer  [  ] Neuropathic ulcer to DM  [  ] Diabetes with Nephropathy  [  ] Osteomyelitis due to diabetes  [  ] Hyperglycemia   [  ] Hypoglycemia   --------------------------------------------------------------------  [  ] Malnutrition: See Nutrition Note  [  ] Cachexia  [  ] Other:   [  ] Supplement Ordered:  [  ] Morbid Obesity (BMI >=40]  [  ] Ileus  ---------------------------------------------------------------------  [  ] Sepsis/severe sepsis/septic shock  [  ] Noninfectious SIRS  [  ] UTI  [  ] Pneumonia  [  ] Thrombophlebitis   -----------------------------------------------------------------------  [  ] Acidosis/alkalosis  [  ] Fluid overload  [  ] Hypokalemia  [  ] Hyperkalemia  [  ] Hypomagnesemia  [  ] Hypophosphatemia  [  ] Hyperphosphatemia  ------------------------------------------------------------------------  [  ] Acute blood loss anemia  [  ] Post op blood loss anemia  [  ] Iron deficiency anemia  [  ] Anemia due to chronic disease  [  ] Hypercoagulable state  [  ] Thrombocytopenia  ----------------------------------------------------------------------  [  ] Cerebral infarction  [  ] Transient ischemia attack  [  ] Encephalopathy - Toxic or Metabolic    A/P: 75yo M, with PMHx of HTN, HLD, BPH, HFrEF, CAD s/p CABG 10/31/24, severe PAD (s/p fem-fem) who presented to [facility] for left leg pain concerning for acute limb ischemia. On exam here, patient has an exam more consistent with his prior recent exam, consisting of chronic limb ischemia (motor and sensation intact, doppler signals present in left foot and fem-fem bypass). Given his rest pain, will proceed with arteriography.     #PAD w/ rest pain  - s/p LLE angiogram with EIA and FELICITY stents  - Plan for OR today for LLE bypass, preop/consent done  - CTA b/l LE at OSH 11/8: post fem-fem bypass, severe atherosclerotic disease involving aorta, iliac, and superficial femoral arteries b/l more on the left  - c/w plavix and statin  - pain control  - bowel regimen    #CAD s/p prior PCI and recent CABG   - c/w plavix and statin  - c/w imdur  - c/w metoprolol succinate  - appreciate cards recs    #HFrEF  - ECHO 10/24: LVEF 35% with regional wall abnormalities  - c/w metoprolol succinate    #RUL Pulmonary nodule - PET +  - Following with Dr. Duarte outpatient.    #CKD 3  - Monitor Cr and UOP    #BPH  - c/w flomax and proscar    #Prediabetes  - A1C 5.8%    #LLE DVT  -heparin gtt at 11mL/hr, titrate to goal of aptt 60-90  -DVT of the LEFT superficial femoral vein, LEFT popliteal vein, and LEFT calf vein.    #Chest pain  -pt with intermittent chest pain and shortness of breath  -likely chronic angina  -VQ scan ordered to r/o PE  -c/w protonix       Diet: DASH  activity: as tolerated  DVTppx: heparin gtt  Dispo: 5 uris     O/N: 6pm PTT 63, VQ scan done, chest discomfort, EKG unchanged, improved w/ pain meds    S: c/o pain to LLE    O: Examined in bed resting comfortably     clopidogrel Tablet 75  heparin  Infusion 1000  isosorbide   mononitrate ER Tablet (IMDUR) 60  metoprolol succinate ER 50      Allergies    No Known Allergies    Intolerances        Vital Signs Last 24 Hrs  T(C): 36.7 (14 Nov 2024 02:20), Max: 36.7 (13 Nov 2024 12:00)  T(F): 98.1 (14 Nov 2024 02:20), Max: 98.1 (14 Nov 2024 00:15)  HR: 74 (14 Nov 2024 03:48) (74 - 93)  BP: 143/65 (14 Nov 2024 03:48) (114/58 - 143/65)  BP(mean): 93 (14 Nov 2024 03:48) (92 - 93)  RR: 18 (14 Nov 2024 03:48) (18 - 18)  SpO2: 94% (14 Nov 2024 03:48) (93% - 96%)    Parameters below as of 14 Nov 2024 03:48  Patient On (Oxygen Delivery Method): room air      I&O's Summary    13 Nov 2024 07:01  -  14 Nov 2024 07:00  --------------------------------------------------------  IN: 960 mL / OUT: 1200 mL / NET: -240 mL        Physical Exam:  General: alert and awake, NAD  Pulmonary: no respiratory distress  Cardiovascular: RRR  Abdominal: soft  Extremities: c/o pain to LLE. LLE warm, well perfused, motor/sensory intact, biphasic DP/PT signals      LABS:                        10.2   7.19  )-----------( 175      ( 14 Nov 2024 05:30 )             31.4     11-13    136  |  103  |  21  ----------------------------<  122[H]  4.2   |  21[L]  |  1.51[H]    Ca    9.0      13 Nov 2024 05:30  Phos  3.2     11-13  Mg     1.8     11-13      PT/INR - ( 14 Nov 2024 05:30 )   PT: 13.5 sec;   INR: 1.18          PTT - ( 14 Nov 2024 05:30 )  PTT:89.6 sec    Radiology and Additional Studies:    ---------------------------------------------------------------------------  PLEASE CHECK WHEN PRESENT:  [ x ]Heart Failure     [  ] Acute     [  ] Acute on Chronic     [ x ] Chronic     [  ]Diastolic [HFpEF]     [ x ]Systolic [HFrEF]     [  ]Combined [HFpEF & HFrEF]  .................................................................................  [x ] Hypertensive Heart Disease  [x ] CAD  [  ] Atrial Fibrillation     [  ] Paroxysmal A-fib     [  ] Chronic A-fib     [  ] Persistent A-fib     [  ] Longstanding Persistent A-fib     [  ] Permanent A-fib  [  ] Pulmonary Hypertension  [  ] Other:  -------------------------------------------------------------------  [  ] Respiratory failure  [  ] Acute PE   [  ] Acute cor pulmonale  [  ] Asthma/COPD Exacerbation  [  ] COPD on home O2 (Chronic renal Failure)   [  ] Atelectasis   [  ] Pleural effusion  [  ] Aspiration pneumonia  [  ] Obstructive Sleep Apnea  -------------------------------------------------------------------  [  ] Acute Kidney Injury      [  ] Acute Tubular Necrosis      [  ] Reneal Medullary Necrosis     [  ] Renal Cortical Necrosis     [  ] Other Pathological Lesions:  [ x ] Chronic Kidney Disease     [  ]CKD 1     [  ]CKD 2     [ x ]CKD 3     [  ]CKD 4     [  ]CKD 5 (ESRD)  [  ]Other:  -------------------------------------------------------------------  [  ] Diabetes  [  ] Diabetic PVD Ulcer  [  ] Neuropathic ulcer to DM  [  ] Diabetes with Nephropathy  [  ] Osteomyelitis due to diabetes  [  ] Hyperglycemia   [  ] Hypoglycemia   --------------------------------------------------------------------  [  ] Malnutrition: See Nutrition Note  [  ] Cachexia  [  ] Other:   [  ] Supplement Ordered:  [  ] Morbid Obesity (BMI >=40]  [  ] Ileus  ---------------------------------------------------------------------  [  ] Sepsis/severe sepsis/septic shock  [  ] Noninfectious SIRS  [  ] UTI  [  ] Pneumonia  [  ] Thrombophlebitis   -----------------------------------------------------------------------  [  ] Acidosis/alkalosis  [  ] Fluid overload  [  ] Hypokalemia  [  ] Hyperkalemia  [  ] Hypomagnesemia  [  ] Hypophosphatemia  [  ] Hyperphosphatemia  ------------------------------------------------------------------------  [  ] Acute blood loss anemia  [  ] Post op blood loss anemia  [  ] Iron deficiency anemia  [  ] Anemia due to chronic disease  [  ] Hypercoagulable state  [  ] Thrombocytopenia  ----------------------------------------------------------------------  [  ] Cerebral infarction  [  ] Transient ischemia attack  [  ] Encephalopathy - Toxic or Metabolic    A/P: 75yo M, with PMHx of HTN, HLD, BPH, HFrEF, CAD s/p CABG 10/31/24, severe PAD (s/p fem-fem) who presented to [facility] for left leg pain concerning for acute limb ischemia. On exam here, patient has an exam more consistent with his prior recent exam, consisting of chronic limb ischemia (motor and sensation intact, doppler signals present in left foot and fem-fem bypass). Given his rest pain, will proceed with arteriography.     #PAD w/ rest pain  - s/p LLE angiogram with EIA and FELICITY stents  - Plan for OR today for LLE bypass, preop/consent done  - CTA b/l LE at OSH 11/8: post fem-fem bypass, severe atherosclerotic disease involving aorta, iliac, and superficial femoral arteries b/l more on the left  - c/w plavix and statin  - pain control  - bowel regimen    #CAD s/p prior PCI and recent CABG   - c/w plavix and statin  - c/w imdur  - c/w metoprolol succinate  - appreciate cards recs    #HFrEF  - ECHO 10/24: LVEF 35% with regional wall abnormalities  - c/w metoprolol succinate    #RUL Pulmonary nodule - PET +  - Following with Dr. Duarte outpatient.    #CKD 3  - Monitor Cr and UOP    #BPH  - c/w flomax and proscar    #Prediabetes  - A1C 5.8%    #LLE DVT  -heparin gtt at 11mL/hr, titrate to goal of aptt 60-90  -DVT of the LEFT superficial femoral vein, LEFT popliteal vein, and LEFT calf vein.    #Chest pain  -pt with intermittent chest pain and shortness of breath  -likely chronic angina  -VQ scan ordered to r/o PE      Diet: DASH  activity: as tolerated  DVTppx: heparin gtt  Dispo: 5 uris

## 2024-11-14 NOTE — PRE-ANESTHESIA EVALUATION ADULT - NSANTHOSAYNRD_GEN_A_CORE
No. VIVIENNE screening performed.  STOP BANG Legend: 0-2 = LOW Risk; 3-4 = INTERMEDIATE Risk; 5-8 = HIGH Risk
No. VIVIENNE screening performed.  STOP BANG Legend: 0-2 = LOW Risk; 3-4 = INTERMEDIATE Risk; 5-8 = HIGH Risk

## 2024-11-15 ENCOUNTER — RESULT REVIEW (OUTPATIENT)
Age: 74
End: 2024-11-15

## 2024-11-15 LAB
ANION GAP SERPL CALC-SCNC: 7 MMOL/L — SIGNIFICANT CHANGE UP (ref 5–17)
ANION GAP SERPL CALC-SCNC: 9 MMOL/L — SIGNIFICANT CHANGE UP (ref 5–17)
APTT BLD: 23.2 SEC — LOW (ref 24.5–35.6)
BUN SERPL-MCNC: 23 MG/DL — SIGNIFICANT CHANGE UP (ref 7–23)
BUN SERPL-MCNC: 29 MG/DL — HIGH (ref 7–23)
CALCIUM SERPL-MCNC: 8.1 MG/DL — LOW (ref 8.4–10.5)
CALCIUM SERPL-MCNC: 8.3 MG/DL — LOW (ref 8.4–10.5)
CHLORIDE SERPL-SCNC: 103 MMOL/L — SIGNIFICANT CHANGE UP (ref 96–108)
CHLORIDE SERPL-SCNC: 104 MMOL/L — SIGNIFICANT CHANGE UP (ref 96–108)
CK MB CFR SERPL CALC: 7.3 NG/ML — HIGH (ref 0–6.7)
CK MB CFR SERPL CALC: 8 NG/ML — HIGH (ref 0–6.7)
CK SERPL-CCNC: 498 U/L — HIGH (ref 30–200)
CO2 SERPL-SCNC: 22 MMOL/L — SIGNIFICANT CHANGE UP (ref 22–31)
CO2 SERPL-SCNC: 24 MMOL/L — SIGNIFICANT CHANGE UP (ref 22–31)
CREAT ?TM UR-MCNC: 225 MG/DL — SIGNIFICANT CHANGE UP
CREAT SERPL-MCNC: 1.72 MG/DL — HIGH (ref 0.5–1.3)
CREAT SERPL-MCNC: 1.75 MG/DL — HIGH (ref 0.5–1.3)
EGFR: 40 ML/MIN/1.73M2 — LOW
EGFR: 41 ML/MIN/1.73M2 — LOW
GLUCOSE SERPL-MCNC: 130 MG/DL — HIGH (ref 70–99)
GLUCOSE SERPL-MCNC: 156 MG/DL — HIGH (ref 70–99)
HCT VFR BLD CALC: 29.2 % — LOW (ref 39–50)
HGB BLD-MCNC: 9.7 G/DL — LOW (ref 13–17)
MAGNESIUM SERPL-MCNC: 2.2 MG/DL — SIGNIFICANT CHANGE UP (ref 1.6–2.6)
MCHC RBC-ENTMCNC: 31.1 PG — SIGNIFICANT CHANGE UP (ref 27–34)
MCHC RBC-ENTMCNC: 33.2 G/DL — SIGNIFICANT CHANGE UP (ref 32–36)
MCV RBC AUTO: 93.6 FL — SIGNIFICANT CHANGE UP (ref 80–100)
NRBC # BLD: 0 /100 WBCS — SIGNIFICANT CHANGE UP (ref 0–0)
PHOSPHATE SERPL-MCNC: 3.8 MG/DL — SIGNIFICANT CHANGE UP (ref 2.5–4.5)
PLATELET # BLD AUTO: 181 K/UL — SIGNIFICANT CHANGE UP (ref 150–400)
POTASSIUM SERPL-MCNC: 4.4 MMOL/L — SIGNIFICANT CHANGE UP (ref 3.5–5.3)
POTASSIUM SERPL-MCNC: 5.5 MMOL/L — HIGH (ref 3.5–5.3)
POTASSIUM SERPL-SCNC: 4.4 MMOL/L — SIGNIFICANT CHANGE UP (ref 3.5–5.3)
POTASSIUM SERPL-SCNC: 5.5 MMOL/L — HIGH (ref 3.5–5.3)
RBC # BLD: 3.12 M/UL — LOW (ref 4.2–5.8)
RBC # FLD: 14.2 % — SIGNIFICANT CHANGE UP (ref 10.3–14.5)
SODIUM SERPL-SCNC: 134 MMOL/L — LOW (ref 135–145)
SODIUM SERPL-SCNC: 135 MMOL/L — SIGNIFICANT CHANGE UP (ref 135–145)
TROPONIN T, HIGH SENSITIVITY RESULT: 42 NG/L — SIGNIFICANT CHANGE UP (ref 0–51)
TROPONIN T, HIGH SENSITIVITY RESULT: 46 NG/L — SIGNIFICANT CHANGE UP (ref 0–51)
TROPONIN T, HIGH SENSITIVITY RESULT: 48 NG/L — SIGNIFICANT CHANGE UP (ref 0–51)
UUN UR-MCNC: 864 MG/DL — SIGNIFICANT CHANGE UP
WBC # BLD: 15.83 K/UL — HIGH (ref 3.8–10.5)
WBC # FLD AUTO: 15.83 K/UL — HIGH (ref 3.8–10.5)

## 2024-11-15 PROCEDURE — 93010 ELECTROCARDIOGRAM REPORT: CPT

## 2024-11-15 PROCEDURE — 99233 SBSQ HOSP IP/OBS HIGH 50: CPT

## 2024-11-15 PROCEDURE — 93306 TTE W/DOPPLER COMPLETE: CPT | Mod: 26

## 2024-11-15 RX ORDER — SODIUM CHLORIDE 9 MG/ML
1000 INJECTION, SOLUTION INTRAMUSCULAR; INTRAVENOUS; SUBCUTANEOUS
Refills: 0 | Status: DISCONTINUED | OUTPATIENT
Start: 2024-11-15 | End: 2024-11-15

## 2024-11-15 RX ORDER — SODIUM ZIRCONIUM CYCLOSILICATE 5 G/5G
5 POWDER, FOR SUSPENSION ORAL ONCE
Refills: 0 | Status: COMPLETED | OUTPATIENT
Start: 2024-11-15 | End: 2024-11-15

## 2024-11-15 RX ORDER — SODIUM CHLORIDE 9 MG/ML
1000 INJECTION, SOLUTION INTRAMUSCULAR; INTRAVENOUS; SUBCUTANEOUS
Refills: 0 | Status: DISCONTINUED | OUTPATIENT
Start: 2024-11-15 | End: 2024-11-16

## 2024-11-15 RX ADMIN — Medication 100 MILLIGRAM(S): at 05:44

## 2024-11-15 RX ADMIN — HYDROMORPHONE HYDROCHLORIDE 0.5 MILLIGRAM(S): 2 TABLET ORAL at 11:16

## 2024-11-15 RX ADMIN — ACETAMINOPHEN 500MG 975 MILLIGRAM(S): 500 TABLET, COATED ORAL at 05:44

## 2024-11-15 RX ADMIN — ACETAMINOPHEN 500MG 975 MILLIGRAM(S): 500 TABLET, COATED ORAL at 22:13

## 2024-11-15 RX ADMIN — ACETAMINOPHEN 500MG 975 MILLIGRAM(S): 500 TABLET, COATED ORAL at 13:11

## 2024-11-15 RX ADMIN — Medication 80 MILLIGRAM(S): at 22:12

## 2024-11-15 RX ADMIN — Medication 90 MILLIGRAM(S): at 16:05

## 2024-11-15 RX ADMIN — OXYCODONE HYDROCHLORIDE 10 MILLIGRAM(S): 30 TABLET ORAL at 10:13

## 2024-11-15 RX ADMIN — CLOPIDOGREL 75 MILLIGRAM(S): 75 TABLET, FILM COATED ORAL at 12:04

## 2024-11-15 RX ADMIN — Medication 2 TABLET(S): at 22:12

## 2024-11-15 RX ADMIN — POLYETHYLENE GLYCOL 3350 17 GRAM(S): 17 POWDER, FOR SOLUTION ORAL at 11:58

## 2024-11-15 RX ADMIN — HYDROMORPHONE HYDROCHLORIDE 0.5 MILLIGRAM(S): 2 TABLET ORAL at 12:00

## 2024-11-15 RX ADMIN — ACETAMINOPHEN 500MG 975 MILLIGRAM(S): 500 TABLET, COATED ORAL at 23:12

## 2024-11-15 RX ADMIN — OXYCODONE HYDROCHLORIDE 10 MILLIGRAM(S): 30 TABLET ORAL at 16:05

## 2024-11-15 RX ADMIN — SODIUM CHLORIDE 50 MILLILITER(S): 9 INJECTION, SOLUTION INTRAMUSCULAR; INTRAVENOUS; SUBCUTANEOUS at 19:55

## 2024-11-15 RX ADMIN — Medication 5 MILLIGRAM(S): at 12:03

## 2024-11-15 RX ADMIN — METOPROLOL TARTRATE 50 MILLIGRAM(S): 100 TABLET, FILM COATED ORAL at 05:44

## 2024-11-15 RX ADMIN — SODIUM CHLORIDE 40 MILLILITER(S): 9 INJECTION, SOLUTION INTRAMUSCULAR; INTRAVENOUS; SUBCUTANEOUS at 07:48

## 2024-11-15 RX ADMIN — ACETAMINOPHEN 500MG 975 MILLIGRAM(S): 500 TABLET, COATED ORAL at 12:02

## 2024-11-15 RX ADMIN — TAMSULOSIN HYDROCHLORIDE 0.4 MILLIGRAM(S): 0.4 CAPSULE ORAL at 22:12

## 2024-11-15 RX ADMIN — OXYCODONE HYDROCHLORIDE 10 MILLIGRAM(S): 30 TABLET ORAL at 23:13

## 2024-11-15 RX ADMIN — OXYCODONE HYDROCHLORIDE 10 MILLIGRAM(S): 30 TABLET ORAL at 09:04

## 2024-11-15 RX ADMIN — ACETAMINOPHEN 500MG 975 MILLIGRAM(S): 500 TABLET, COATED ORAL at 06:11

## 2024-11-15 RX ADMIN — SODIUM ZIRCONIUM CYCLOSILICATE 5 GRAM(S): 5 POWDER, FOR SUSPENSION ORAL at 13:17

## 2024-11-15 NOTE — OCCUPATIONAL THERAPY INITIAL EVALUATION ADULT - PERTINENT HX OF CURRENT PROBLEM, REHAB EVAL
73 yo man PMHx of CAD s/p PCI and recent minimally invasive direct CABG 10/31/24, ICM HFrEF, HTN, CKD, and PAD s/p prior L to R fem-fem bypass who was admitted for CLI of LLE with rest pain, s/p LE angiogram, s/p L common and external iliac stent but prox anastomosis of fem-fem bypass is upwards going (unable to treat endovascularly), w/ occluded L CFA, SFA, and AK popliteal artery w/ reconstitution of BK pop via collaterals w/ AT/PT runoff.

## 2024-11-15 NOTE — PROGRESS NOTE ADULT - ASSESSMENT
73 yo man PMHx of CAD s/p PCI and recent minimally invasive direct CABG 10/31/24, ICM HFrEF, HTN, CKD, and PAD s/p prior L to R fem-fem bypass who was admitted for CLI of LLE with rest pain, s/p LE angiogram, s/p L common and external iliac stent but prox anastomosis of fem-fem bypass is upwards going (unable to treat endovascularly), w/ occluded L CFA, SFA, and AK popliteal artery w/ reconstitution of BK pop via collaterals w/ AT/PT runoff.    Assessment  1. CAD s/p PCI and minimally invasive direct CABG 10/31/24  EKG done 11/12 3:45 pm NSR, Q wave inferior leads, T wave flattening late precordial leads  EKG post op 11/14 NSR w/ new TWI and STD in lateral and precordial leads  Denies any chest pain at the bedside today  HsTrop remains negative  No concern for postop ACS  2. PAD s/p L-R fem-fem bypass c/b CLI LLE  S/p L common and external iliac stent 11/11 but prox anastomosis of fem-fem bypass is upwards going (unable to treat endovascularly), w/ occluded L CFA, SFA, and AK popliteal artery w/ reconstitution of BK pop via collaterals w/ AT/PT runoff.  Now s/p L fem-pop bypass 11/14  4. ICM HFrEF 35% - euvolemic  5. LLE DVT   V/Q scan negative for PE    Plan  1. Plavix 75mg PO QD and high intensity statin. Off ASA 81mg PO QD given initiation of AC for DVT, to avoid triple therapy  2. New EKG change post op but w/o chest pain currently. HsTrop negative x 3 so far postoperatively. No ischemic eval needed.   3. GDMT: metop succinate 50mg PO QD, will start ARNI/MRA/SGLT2i when BP better and GFR improved  4. Imdur 90mg PO QD for anti-anginal therapy, hold for SBP <90  5. No need for diuretics for HF  6. Will need pharmacological nuclear stress test as outpatient    Thank you for the consult and the opportunity to take care of this patient.     Marquis Pink M.D.  Cardiology | Vascular Cardiology Attending  Please call (c) [phone] for any questions    During non face-to-face time, I reviewed relevant portions of the patient's medical record. During face-to-face time, I took a relevant history and examined the patient. I also explained differential diagnoses, relevant cardiac diagnoses, workup, and management plan, which required a high level of medical decision making. I answered all questions related to the patient's medical conditions.

## 2024-11-15 NOTE — PROGRESS NOTE ADULT - SUBJECTIVE AND OBJECTIVE BOX
O/N: possible type 2 MI per cards, trend trops q6h x2 more times, repeat trops neg ( from 98 not concerned), imdur inc to 90mg qd for anti-anginal therapy, poc wnl, dressing reinforced, diet advanced, [facility], [facility] [place]      ---------------------------------------------------------------------------  PLEASE CHECK WHEN PRESENT:  [ x ]Heart Failure     [  ] Acute     [  ] Acute on Chronic     [ x ] Chronic     [  ]Diastolic [HFpEF]     [ x ]Systolic [HFrEF]     [  ]Combined [HFpEF & HFrEF]  .................................................................................  [x ] Hypertensive Heart Disease  [x ] CAD  [  ] Atrial Fibrillation     [  ] Paroxysmal A-fib     [  ] Chronic A-fib     [  ] Persistent A-fib     [  ] Longstanding Persistent A-fib     [  ] Permanent A-fib  [  ] Pulmonary Hypertension  [  ] Other:  -------------------------------------------------------------------  [  ] Respiratory failure  [  ] Acute PE   [  ] Acute cor pulmonale  [  ] Asthma/COPD Exacerbation  [  ] COPD on home O2 (Chronic renal Failure)   [  ] Atelectasis   [  ] Pleural effusion  [  ] Aspiration pneumonia  [  ] Obstructive Sleep Apnea  -------------------------------------------------------------------  [  ] Acute Kidney Injury      [  ] Acute Tubular Necrosis      [  ] Reneal Medullary Necrosis     [  ] Renal Cortical Necrosis     [  ] Other Pathological Lesions:  [ x ] Chronic Kidney Disease     [  ]CKD 1     [  ]CKD 2     [ x ]CKD 3     [  ]CKD 4     [  ]CKD 5 (ESRD)  [  ]Other:  -------------------------------------------------------------------  [  ] Diabetes  [  ] Diabetic PVD Ulcer  [  ] Neuropathic ulcer to DM  [  ] Diabetes with Nephropathy  [  ] Osteomyelitis due to diabetes  [  ] Hyperglycemia   [  ] Hypoglycemia   --------------------------------------------------------------------  [  ] Malnutrition: See Nutrition Note  [  ] Cachexia  [  ] Other:   [  ] Supplement Ordered:  [  ] Morbid Obesity (BMI >=40]  [  ] Ileus  ---------------------------------------------------------------------  [  ] Sepsis/severe sepsis/septic shock  [  ] Noninfectious SIRS  [  ] UTI  [  ] Pneumonia  [  ] Thrombophlebitis   -----------------------------------------------------------------------  [  ] Acidosis/alkalosis  [  ] Fluid overload  [  ] Hypokalemia  [  ] Hyperkalemia  [  ] Hypomagnesemia  [  ] Hypophosphatemia  [  ] Hyperphosphatemia  ------------------------------------------------------------------------  [  ] Acute blood loss anemia  [  ] Post op blood loss anemia  [  ] Iron deficiency anemia  [  ] Anemia due to chronic disease  [  ] Hypercoagulable state  [  ] Thrombocytopenia  ----------------------------------------------------------------------  [  ] Cerebral infarction  [  ] Transient ischemia attack  [  ] Encephalopathy - Toxic or Metabolic    A/P: 73yo M, with PMHx of HTN, HLD, BPH, HFrEF, CAD s/p CABG 10/31/24, severe PAD (s/p fem-fem) who presented to [facility] for left leg pain concerning for acute limb ischemia. On exam here, patient has an exam more consistent with his prior recent exam, consisting of chronic limb ischemia (motor and sensation intact, doppler signals present in left foot and fem-fem bypass). Given his rest pain, will proceed with arteriography.     #PAD w/ rest pain  - s/p LLE angiogram with EIA and FELICITY stents  - s/p LLE fem to AK-pop bypass 11/14/24  - CTA b/l LE at OSH 11/8: post fem-fem bypass, severe atherosclerotic disease involving aorta, iliac, and superficial femoral arteries b/l more on the left  - c/w plavix and statin  - pain control  - bowel regimen    #CAD s/p prior PCI and recent CABG   - c/w plavix and statin  - c/w imdur  - c/w metoprolol succinate  - appreciate cards recs    #HFrEF  - ECHO 10/24: LVEF 35% with regional wall abnormalities  - c/w metoprolol succinate    #RUL Pulmonary nodule - PET +  - Following with Dr. Duarte outpatient.    #CKD 3  - Monitor Cr and UOP    #BPH  - c/w flomax and proscar    #Prediabetes  - A1C 5.8%    #LLE DVT  -heparin gtt at 11mL/hr, titrate to goal of aptt 60-90  -DVT of the LEFT superficial femoral vein, LEFT popliteal vein, and LEFT calf vein.    #Chest pain  -pt with intermittent chest pain and shortness of breath  -likely chronic angina  -VQ scan ordered to r/o PE    Diet: DASH  activity: as tolerated  DVTppx: heparin gtt  Dispo: 5 uris     O/N: possible type 2 MI per cards, trend trops q6h x2 more times, repeat trops neg ( from 98 not concerned), imdur inc to 90mg qd for anti-anginal therapy, poc wnl, dressing reinforced, diet advanced, ashley dc [facility], [facility] at [facility]    Subjective: Pt seen and examined at beside this AM. He has no complaints at this time other than R groin pain upon palpation.     ROS:   Denies Headache, blurred vision, Chest Pain, SOB, Abdominal pain, nausea or vomiting     Social   clopidogrel Tablet 75  isosorbide   mononitrate ER Tablet (IMDUR) 90  metoprolol succinate ER 50      Allergies    No Known Allergies    Intolerances        Vital Signs Last 24 Hrs  T(C): 37.1 (15 Nov 2024 08:50), Max: 37.1 (15 Nov 2024 08:50)  T(F): 98.7 (15 Nov 2024 08:50), Max: 98.7 (15 Nov 2024 08:50)  HR: 86 (15 Nov 2024 08:50) (70 - 87)  BP: 116/55 (15 Nov 2024 08:50) (105/52 - 151/65)  BP(mean): 78 (15 Nov 2024 08:50) (73 - 95)  RR: 17 (15 Nov 2024 08:50) (14 - 20)  SpO2: 98% (15 Nov 2024 08:50) (95% - 99%)    Parameters below as of 15 Nov 2024 08:50  Patient On (Oxygen Delivery Method): room air      I&O's Summary    14 Nov 2024 07:01  -  15 Nov 2024 07:00  --------------------------------------------------------  IN: 580 mL / OUT: 950 mL / NET: -370 mL    15 Nov 2024 07:01  -  15 Nov 2024 10:03  --------------------------------------------------------  IN: 200 mL / OUT: 100 mL / NET: 100 mL        Gen: NAD, resting comfortably in bed  C/V: NSR  Pulm: Nonlabored breathing, no respiratory distress, on NC  Abd: soft, NT/ND  Extrem: WWP, no calf edema, L groin incision c/d/i, mild ecchymosis surrounding, soft, no palpable hematoma. LLE popliteal incision w/ serosanguinous drainage. dressing replaced, surrounding area soft, no palpable hematoma. motor and sensory intact bilaterally  Pulses: LLE palp DP, triphasic PT    LABS:                        9.7    15.83 )-----------( 181      ( 15 Nov 2024 05:30 )             29.2     11-15    135  |  104  |  23  ----------------------------<  156[H]  5.5[H]   |  22  |  1.72[H]    Ca    8.3[L]      15 Nov 2024 05:30  Phos  3.8     11-15  Mg     2.2     11-15      PT/INR - ( 14 Nov 2024 17:21 )   PT: 13.1 sec;   INR: 1.12          PTT - ( 15 Nov 2024 05:30 )  PTT:23.2 sec        ---------------------------------------------------------------------------  PLEASE CHECK WHEN PRESENT:  [ x ]Heart Failure     [  ] Acute     [  ] Acute on Chronic     [ x ] Chronic     [  ]Diastolic [HFpEF]     [ x ]Systolic [HFrEF]     [  ]Combined [HFpEF & HFrEF]  .................................................................................  [x ] Hypertensive Heart Disease  [x ] CAD  [  ] Atrial Fibrillation     [  ] Paroxysmal A-fib     [  ] Chronic A-fib     [  ] Persistent A-fib     [  ] Longstanding Persistent A-fib     [  ] Permanent A-fib  [  ] Pulmonary Hypertension  [  ] Other:  -------------------------------------------------------------------  [  ] Respiratory failure  [  ] Acute PE   [  ] Acute cor pulmonale  [  ] Asthma/COPD Exacerbation  [  ] COPD on home O2 (Chronic renal Failure)   [  ] Atelectasis   [  ] Pleural effusion  [  ] Aspiration pneumonia  [  ] Obstructive Sleep Apnea  -------------------------------------------------------------------  [  ] Acute Kidney Injury      [  ] Acute Tubular Necrosis      [  ] Reneal Medullary Necrosis     [  ] Renal Cortical Necrosis     [  ] Other Pathological Lesions:  [ x ] Chronic Kidney Disease     [  ]CKD 1     [  ]CKD 2     [ x ]CKD 3     [  ]CKD 4     [  ]CKD 5 (ESRD)  [  ]Other:  -------------------------------------------------------------------  [  ] Diabetes  [  ] Diabetic PVD Ulcer  [  ] Neuropathic ulcer to DM  [  ] Diabetes with Nephropathy  [  ] Osteomyelitis due to diabetes  [  ] Hyperglycemia   [  ] Hypoglycemia   --------------------------------------------------------------------  [  ] Malnutrition: See Nutrition Note  [  ] Cachexia  [  ] Other:   [  ] Supplement Ordered:  [  ] Morbid Obesity (BMI >=40]  [  ] Ileus  ---------------------------------------------------------------------  [  ] Sepsis/severe sepsis/septic shock  [  ] Noninfectious SIRS  [  ] UTI  [  ] Pneumonia  [  ] Thrombophlebitis   -----------------------------------------------------------------------  [  ] Acidosis/alkalosis  [  ] Fluid overload  [  ] Hypokalemia  [  ] Hyperkalemia  [  ] Hypomagnesemia  [  ] Hypophosphatemia  [  ] Hyperphosphatemia  ------------------------------------------------------------------------  [  ] Acute blood loss anemia  [  ] Post op blood loss anemia  [  ] Iron deficiency anemia  [  ] Anemia due to chronic disease  [  ] Hypercoagulable state  [  ] Thrombocytopenia  ----------------------------------------------------------------------  [  ] Cerebral infarction  [  ] Transient ischemia attack  [  ] Encephalopathy - Toxic or Metabolic    A/P: 73yo M, with PMHx of HTN, HLD, BPH, HFrEF, CAD s/p CABG 10/31/24, severe PAD (s/p fem-fem) who presented to [facility] [place] [facility] for left leg pain concerning for acute limb ischemia. On exam here, patient has an exam more consistent with his prior recent exam, consisting of chronic limb ischemia (motor and sensation intact, doppler signals present in left foot and fem-fem bypass). Given his rest pain, will proceed with arteriography.     #PAD w/ rest pain  - s/p LLE angiogram with EIA and FELICITY stents  - s/p LLE fem to AK-pop bypass 11/14/24  - CTA b/l LE at OSH 11/8: post fem-fem bypass, severe atherosclerotic disease involving aorta, iliac, and superficial femoral arteries b/l more on the left  - c/w plavix and statin  - pain control  - bowel regimen    #CAD s/p prior PCI and recent CABG   - c/w plavix and statin  - c/w imdur  - c/w metoprolol succinate  - trending CKBM and trop q6   - appreciate cards recs      #HFrEF  - ECHO 10/24: LVEF 35% with regional wall abnormalities  - c/w metoprolol succinate  - fu repea echo 11/15     #RUL Pulmonary nodule - PET +  - Following with Dr. Duarte outpatient.    #CKD 3  - Monitor Cr and UOP    #BPH  - c/w flomax and proscar    #Prediabetes  - A1C 5.8%    #LLE DVT  -holding heparin ggt in immediate post-op setting     #Chest pain  -pt with intermittent chest pain and shortness of breath  -likely chronic angina  -VQ scan with no evidence of PE     Diet: DASH  activity: as tolerated  DVTppx: holding heparin GGT  Dispo: 5 uris

## 2024-11-15 NOTE — OCCUPATIONAL THERAPY INITIAL EVALUATION ADULT - MODIFIED CLINICAL TEST OF SENSORY INTEGRATION IN BALANCE TEST
Completed 1 sit<>stand with mod x2 person assist via b/l Hand Held Assist, able to maintain static stand with min x2 person assist, able to take 1 side step towards (L) side with mod x2 person assist

## 2024-11-15 NOTE — OCCUPATIONAL THERAPY INITIAL EVALUATION ADULT - GENERAL OBSERVATIONS, REHAB EVAL
Pt received semi-supine in bed, +EKG +left LE dressing with sanguinous drainage (RN Fifi informed and present in room- stated vascular team is aware and still would like patient mobilized), in NAD and agreeable to OT with encouragement. PT Ailin present.

## 2024-11-15 NOTE — PROGRESS NOTE ADULT - SUBJECTIVE AND OBJECTIVE BOX
Medicine Progress Note    Patient is a 74y old  Male who presents with a chief complaint of transfer for rule out limb ischemia (15 Nov 2024 05:12)      SUBJECTIVE / OVERNIGHT EVENTS:  Having generalized pain this morning, pain medication helping but seems to wear off too quickly. Continues to have chest pain but does not think that it is worse compared to the last few days, not short of breath. Spoke to patient using telephone Polish  ID [id number].     MEDICATIONS  (STANDING):  acetaminophen     Tablet .. 975 milliGRAM(s) Oral every 8 hours  atorvastatin 80 milliGRAM(s) Oral at bedtime  clopidogrel Tablet 75 milliGRAM(s) Oral daily  finasteride 5 milliGRAM(s) Oral daily  influenza  Vaccine (HIGH DOSE) 0.5 milliLiter(s) IntraMuscular once  isosorbide   mononitrate ER Tablet (IMDUR) 90 milliGRAM(s) Oral daily  metoprolol succinate ER 50 milliGRAM(s) Oral daily  polyethylene glycol 3350 17 Gram(s) Oral daily  senna 2 Tablet(s) Oral at bedtime  sodium chloride 0.9%. 1000 milliLiter(s) (40 mL/Hr) IV Continuous <Continuous>  tamsulosin 0.4 milliGRAM(s) Oral at bedtime    MEDICATIONS  (PRN):  HYDROmorphone  Injectable 0.5 milliGRAM(s) IV Push every 4 hours PRN breakthrough pain  oxyCODONE    IR 10 milliGRAM(s) Oral every 6 hours PRN Severe Pain (7 - 10)  oxyCODONE    IR 5 milliGRAM(s) Oral every 6 hours PRN Moderate Pain (4 - 6)    CAPILLARY BLOOD GLUCOSE        I&O's Summary    14 Nov 2024 07:01  -  15 Nov 2024 07:00  --------------------------------------------------------  IN: 580 mL / OUT: 950 mL / NET: -370 mL    15 Nov 2024 07:01  -  15 Nov 2024 12:22  --------------------------------------------------------  IN: 200 mL / OUT: 100 mL / NET: 100 mL        PHYSICAL EXAM:  Vital Signs Last 24 Hrs  T(C): 36.6 (15 Nov 2024 12:00), Max: 37.1 (15 Nov 2024 08:50)  T(F): 97.9 (15 Nov 2024 12:00), Max: 98.7 (15 Nov 2024 08:50)  HR: 90 (15 Nov 2024 12:00) (70 - 90)  BP: 95/50 (15 Nov 2024 12:00) (95/50 - 151/65)  BP(mean): 68 (15 Nov 2024 12:00) (68 - 95)  RR: 18 (15 Nov 2024 12:00) (14 - 20)  SpO2: 99% (15 Nov 2024 12:00) (95% - 99%)    Parameters below as of 15 Nov 2024 12:00  Patient On (Oxygen Delivery Method): room air      Appears slightly uncomfortable due to pain   MMM  Normal WOB on RA, CTAB   RRR, no mrg, CABG incisions intact without bleeding   Abdomen soft, nontender, nondistended  Extremities warm, no edema, scattered dry scabs over the L shin   AOX3, no focal neuro deficits     LABS:                        9.7    15.83 )-----------( 181      ( 15 Nov 2024 05:30 )             29.2     11-15    135  |  104  |  23  ----------------------------<  156[H]  5.5[H]   |  22  |  1.72[H]    Ca    8.3[L]      15 Nov 2024 05:30  Phos  3.8     11-15  Mg     2.2     11-15      PT/INR - ( 14 Nov 2024 17:21 )   PT: 13.1 sec;   INR: 1.12          PTT - ( 15 Nov 2024 05:30 )  PTT:23.2 sec  CARDIAC MARKERS ( 15 Nov 2024 05:30 )  x     / x     / x     / x     / 8.0 ng/mL  CARDIAC MARKERS ( 14 Nov 2024 21:07 )  x     / x     / x     / x     / 6.2 ng/mL  CARDIAC MARKERS ( 14 Nov 2024 17:21 )  x     / x     / x     / x     / 3.1 ng/mL      Urinalysis Basic - ( 15 Nov 2024 05:30 )    Color: x / Appearance: x / SG: x / pH: x  Gluc: 156 mg/dL / Ketone: x  / Bili: x / Urobili: x   Blood: x / Protein: x / Nitrite: x   Leuk Esterase: x / RBC: x / WBC x   Sq Epi: x / Non Sq Epi: x / Bacteria: x        COVID-19 PCR: NotDetec (07 Nov 2024 14:11)      RADIOLOGY & ADDITIONAL TESTS:  Imaging from Last 24 Hours:  None new     EKG's obtained over past 24 hours reviewed, EKG obtained after procedure yesterday around 9 PM showing changes including 1mm ST elevation in leads I, aVL.   Following EKGs with resolution of ST elevation.     Troponins from overnight noted, stable and within normal range.

## 2024-11-15 NOTE — PROGRESS NOTE ADULT - SUBJECTIVE AND OBJECTIVE BOX
VASCULAR CARDIOLOGY ATTENDING PROGRESS NOTE    SERVICE LINE: [phone]    Subjective  NAEO  No chest pain at the bedside    DVT ppx with SQH     Current Medications:   acetaminophen     Tablet .. 975 milliGRAM(s) Oral every 8 hours  aspirin  chewable 81 milliGRAM(s) Oral daily  atorvastatin 80 milliGRAM(s) Oral at bedtime  clopidogrel Tablet 75 milliGRAM(s) Oral daily  finasteride 5 milliGRAM(s) Oral daily  influenza  Vaccine (HIGH DOSE) 0.5 milliLiter(s) IntraMuscular once  isosorbide   mononitrate ER Tablet (IMDUR) 30 milliGRAM(s) Oral daily  metoprolol tartrate 25 milliGRAM(s) Oral two times a day  oxyCODONE    IR 5 milliGRAM(s) Oral every 4 hours PRN  oxyCODONE    IR 2.5 milliGRAM(s) Oral every 4 hours PRN  pantoprazole    Tablet 40 milliGRAM(s) Oral before breakfast  polyethylene glycol 3350 17 Gram(s) Oral daily  senna 2 Tablet(s) Oral at bedtime  tamsulosin 0.4 milliGRAM(s) Oral at bedtime      REVIEW OF SYSTEMS:  CONSTITUTIONAL: No weakness, fevers or chills  EYES/ENT: No visual changes;  No dysphagia  NECK: No pain or stiffness  RESPIRATORY: No cough, wheezing, hemoptysis; No shortness of breath  CARDIOVASCULAR: No chest pain or palpitations; No lower extremity edema  GASTROINTESTINAL: No abdominal or epigastric pain. No nausea, vomiting, or hematemesis; No diarrhea or constipation. No melena or hematochezia.  BACK: No back pain  GENITOURINARY: No dysuria, frequency or hematuria  NEUROLOGICAL: No numbness or weakness  SKIN: No itching, burning, rashes, or lesions   All other review of systems is negative unless indicated above.    Physical Exam:  T(F): 97.5 (11-12), Max: 98.5 (11-12)  HR: 79 (11-12) (62 - 82)  BP: 143/64 (11-12) (98/52 - 146/67)  BP(mean): 92 (11-12) (71 - 97)  ABP: --  ABP(mean): --  RR: 18 (11-12)  SpO2: 96% (11-12)  GENERAL: No acute distress, well-developed  HEAD:  Atraumatic, Normocephalic  ENT: EOMI, PERRLA, conjunctiva and sclera clear, Neck supple, No JVD, moist mucosa  CHEST/LUNG: Clear to auscultation bilaterally; No wheeze, equal breath sounds bilaterally   BACK: No spinal tenderness  HEART: Regular rate and rhythm; No murmurs, rubs, or gallops  ABDOMEN: Soft, Nontender, Nondistended; Bowel sounds present  EXTREMITIES:  No clubbing, cyanosis, or edema  PSYCH: Nl behavior, nl affect  NEUROLOGY: AAOx3, non-focal, cranial nerves intact  SKIN: Normal color, No rashes or lesions    Cardiovascular Diagnostic Testing: personally reviewed    CXR: Personally reviewed    Labs: Personally reviewed                        12.4   7.49  )-----------( 199      ( 12 Nov 2024 04:29 )             40.2     11-12    137  |  104  |  23  ----------------------------<  95  4.8   |  18[L]  |  1.40[H]    Ca    9.0      12 Nov 2024 04:29  Phos  2.8     11-12  Mg     1.9     11-12      PT/INR - ( 11 Nov 2024 18:16 )   PT: 13.8 sec;   INR: 1.20          PTT - ( 11 Nov 2024 18:16 )  PTT:26.0 sec    CARDIAC MARKERS ( 12 Nov 2024 04:29 )  41 ng/L / x     / x     / x     / x     / 3.1 ng/mL  CARDIAC MARKERS ( 11 Nov 2024 04:18 )  46 ng/L / x     / x     / x     / x     / 3.3 ng/mL

## 2024-11-15 NOTE — OCCUPATIONAL THERAPY INITIAL EVALUATION ADULT - ADDITIONAL COMMENTS
Pt re-admitted from subacute rehab. Prior to hospital admissions, pt able to perform all mobility/ADLs independently. Pt lives alone in an elevator accessible apartment, no EHSAN. Pt reporting use of SC for ambulation, and no additional AD/AE.

## 2024-11-15 NOTE — OCCUPATIONAL THERAPY INITIAL EVALUATION ADULT - DIAGNOSIS, OT EVAL
Pt is s/p LLE angiogram, fem-fem bypass, L common and external iliac covered stenting and perclose on 11/11 presents with impaired balance, LLE pain, and decreased activity tolerance impacting overall ease of completing ADLs and functional mobility tasks at [facility].

## 2024-11-15 NOTE — PROGRESS NOTE ADULT - ASSESSMENT
73yo M, with PMHx of HTN, HLD, BPH, CAD s/p CABG 10/31/24, severe PAD (s/p fem-fem) who presented to [facility] for left leg pain concerning for acute limb ischemia and transferred to [facility], now on vascular service s/p angiogram with fem-fem bypass, L common and external iliac covered stenting and perclose on 11/11. Medicine is consulted for comanagement.     #PAD  -s/p LLE angiogram, fem-fem bypass, L common and external iliac covered stenting and perclose on 11/11  -PT/OT  -pain control and bowel regimen per vascular team   -on plavix as below     #EKG changes   -11/14 overnight noted to have 1 mm ST elevations appear in leads I and aVL, stable and wnl troponins, cardiology contacted and felt possibly representing type 2 NSTEMI.  -Per cardiology continue to trend troponins q6hr throughout day   -Obtain new EKG for any increase/changes in patient's chest pain   -Appreciate cardiology consult and recs     #LLE DVT   -Unclear chronicity  -Currently heparin gtt for AC held, would resume as soon as safe from a surgical standpoint   -s/p VQ scan with low probability of PE    #CAD s/p prior PCI and recent CABG   - Continue with ASA, plavix, 12.5 mg lopressor q6, imdur increased to 90 mg daily     #acute blood loss anemia   -likely iso of procedure, acute blood loss anemia   -continue to trend, transfuse for hemoglobin of 8 or higher     #hyperkalemia   -recommend giving lokelma x1, recheck BMP in PM to ensure improved    #FELIPA   -Cr up to 1.7 this morning  -Check urine urea and urine creat for feurea  -Avoid nephrotoxins as able, renally dose all medications     #RUL Pulmonary nodule - PET +  - To follow up outpatient with  Dr. Duarte     #BPH  - Continue flomax and proscar    #HTN  - c/w lopressor and imdur    #HLD  - c/w high dose statin    #Prediabetes  - Consistent carb diet    DVT ppx held currently, resume when safe

## 2024-11-15 NOTE — OCCUPATIONAL THERAPY INITIAL EVALUATION ADULT - BATHING, PREVIOUS LEVEL OF FUNCTION, OT EVAL
January 30, 2019    Cece Davison  [address] [place] [id number]    Dear ,  This letter is regarding your recent Pap smear (cervical cancer screening) and Human Papillomavirus (HPV) test.  We are happy to inform you that your Pap smear result is normal. Cervical cancer is closely linked with certain types of HPV. Your results showed no evidence of high-risk HPV.  Therefore we recommend you return in 5 years for your next pap smear and HPV test.  You will still need to return to the clinic every year for an annual exam and other preventive tests.  If you have additional questions regarding this result, please call our registered nurse, Deirdre at [phone].  Sincerely,    Kallie Rodriguez MD/shawna  
[place]

## 2024-11-16 LAB
ANION GAP SERPL CALC-SCNC: 9 MMOL/L — SIGNIFICANT CHANGE UP (ref 5–17)
APTT BLD: 22.9 SEC — LOW (ref 24.5–35.6)
BUN SERPL-MCNC: 27 MG/DL — HIGH (ref 7–23)
CALCIUM SERPL-MCNC: 8.3 MG/DL — LOW (ref 8.4–10.5)
CHLORIDE SERPL-SCNC: 104 MMOL/L — SIGNIFICANT CHANGE UP (ref 96–108)
CO2 SERPL-SCNC: 23 MMOL/L — SIGNIFICANT CHANGE UP (ref 22–31)
CREAT SERPL-MCNC: 1.73 MG/DL — HIGH (ref 0.5–1.3)
EGFR: 40 ML/MIN/1.73M2 — LOW
GLUCOSE SERPL-MCNC: 123 MG/DL — HIGH (ref 70–99)
HCT VFR BLD CALC: 23.7 % — LOW (ref 39–50)
HCT VFR BLD CALC: 25.5 % — LOW (ref 39–50)
HGB BLD-MCNC: 7.7 G/DL — LOW (ref 13–17)
HGB BLD-MCNC: 8.3 G/DL — LOW (ref 13–17)
MAGNESIUM SERPL-MCNC: 2 MG/DL — SIGNIFICANT CHANGE UP (ref 1.6–2.6)
MCHC RBC-ENTMCNC: 30 PG — SIGNIFICANT CHANGE UP (ref 27–34)
MCHC RBC-ENTMCNC: 31 PG — SIGNIFICANT CHANGE UP (ref 27–34)
MCHC RBC-ENTMCNC: 32.5 G/DL — SIGNIFICANT CHANGE UP (ref 32–36)
MCHC RBC-ENTMCNC: 32.5 G/DL — SIGNIFICANT CHANGE UP (ref 32–36)
MCV RBC AUTO: 92.2 FL — SIGNIFICANT CHANGE UP (ref 80–100)
MCV RBC AUTO: 95.1 FL — SIGNIFICANT CHANGE UP (ref 80–100)
NRBC # BLD: 0 /100 WBCS — SIGNIFICANT CHANGE UP (ref 0–0)
NRBC # BLD: 0 /100 WBCS — SIGNIFICANT CHANGE UP (ref 0–0)
PHOSPHATE SERPL-MCNC: 2.5 MG/DL — SIGNIFICANT CHANGE UP (ref 2.5–4.5)
PLATELET # BLD AUTO: 149 K/UL — LOW (ref 150–400)
PLATELET # BLD AUTO: 154 K/UL — SIGNIFICANT CHANGE UP (ref 150–400)
POTASSIUM SERPL-MCNC: 4.7 MMOL/L — SIGNIFICANT CHANGE UP (ref 3.5–5.3)
POTASSIUM SERPL-SCNC: 4.7 MMOL/L — SIGNIFICANT CHANGE UP (ref 3.5–5.3)
RBC # BLD: 2.57 M/UL — LOW (ref 4.2–5.8)
RBC # BLD: 2.68 M/UL — LOW (ref 4.2–5.8)
RBC # FLD: 13.9 % — SIGNIFICANT CHANGE UP (ref 10.3–14.5)
RBC # FLD: 14.2 % — SIGNIFICANT CHANGE UP (ref 10.3–14.5)
SODIUM SERPL-SCNC: 136 MMOL/L — SIGNIFICANT CHANGE UP (ref 135–145)
TROPONIN T, HIGH SENSITIVITY RESULT: 37 NG/L — SIGNIFICANT CHANGE UP (ref 0–51)
TROPONIN T, HIGH SENSITIVITY RESULT: 38 NG/L — SIGNIFICANT CHANGE UP (ref 0–51)
TROPONIN T, HIGH SENSITIVITY RESULT: 41 NG/L — SIGNIFICANT CHANGE UP (ref 0–51)
TROPONIN T, HIGH SENSITIVITY RESULT: 43 NG/L — SIGNIFICANT CHANGE UP (ref 0–51)
TROPONIN T, HIGH SENSITIVITY RESULT: 46 NG/L — SIGNIFICANT CHANGE UP (ref 0–51)
WBC # BLD: 13.46 K/UL — HIGH (ref 3.8–10.5)
WBC # BLD: 15.63 K/UL — HIGH (ref 3.8–10.5)
WBC # FLD AUTO: 13.46 K/UL — HIGH (ref 3.8–10.5)
WBC # FLD AUTO: 15.63 K/UL — HIGH (ref 3.8–10.5)

## 2024-11-16 PROCEDURE — 99233 SBSQ HOSP IP/OBS HIGH 50: CPT

## 2024-11-16 RX ORDER — ISOSORBIDE MONONITRATE 10 MG
90 TABLET ORAL DAILY
Refills: 0 | Status: DISCONTINUED | OUTPATIENT
Start: 2024-11-16 | End: 2024-11-19

## 2024-11-16 RX ORDER — SODIUM CHLORIDE 9 MG/ML
1000 INJECTION, SOLUTION INTRAMUSCULAR; INTRAVENOUS; SUBCUTANEOUS
Refills: 0 | Status: DISCONTINUED | OUTPATIENT
Start: 2024-11-16 | End: 2024-11-18

## 2024-11-16 RX ORDER — HEPARIN SODIUM 5000 [USP'U]/.5ML
15 INJECTION, SOLUTION INTRAVENOUS; SUBCUTANEOUS ONCE
Refills: 0 | Status: COMPLETED | OUTPATIENT
Start: 2024-11-16 | End: 2024-11-16

## 2024-11-16 RX ORDER — METOPROLOL TARTRATE 100 MG/1
50 TABLET, FILM COATED ORAL DAILY
Refills: 0 | Status: DISCONTINUED | OUTPATIENT
Start: 2024-11-16 | End: 2024-11-25

## 2024-11-16 RX ADMIN — Medication 81 MILLIGRAM(S): at 09:49

## 2024-11-16 RX ADMIN — HYDROMORPHONE HYDROCHLORIDE 0.5 MILLIGRAM(S): 2 TABLET ORAL at 05:50

## 2024-11-16 RX ADMIN — HYDROMORPHONE HYDROCHLORIDE 0.5 MILLIGRAM(S): 2 TABLET ORAL at 16:57

## 2024-11-16 RX ADMIN — ACETAMINOPHEN 500MG 975 MILLIGRAM(S): 500 TABLET, COATED ORAL at 05:19

## 2024-11-16 RX ADMIN — HEPARIN SODIUM 62.5 MILLIMOLE(S): 5000 INJECTION, SOLUTION INTRAVENOUS; SUBCUTANEOUS at 08:46

## 2024-11-16 RX ADMIN — ACETAMINOPHEN 500MG 975 MILLIGRAM(S): 500 TABLET, COATED ORAL at 13:46

## 2024-11-16 RX ADMIN — OXYCODONE HYDROCHLORIDE 10 MILLIGRAM(S): 30 TABLET ORAL at 20:18

## 2024-11-16 RX ADMIN — OXYCODONE HYDROCHLORIDE 10 MILLIGRAM(S): 30 TABLET ORAL at 13:46

## 2024-11-16 RX ADMIN — ACETAMINOPHEN 500MG 975 MILLIGRAM(S): 500 TABLET, COATED ORAL at 21:44

## 2024-11-16 RX ADMIN — ACETAMINOPHEN 500MG 975 MILLIGRAM(S): 500 TABLET, COATED ORAL at 14:33

## 2024-11-16 RX ADMIN — OXYCODONE HYDROCHLORIDE 10 MILLIGRAM(S): 30 TABLET ORAL at 08:41

## 2024-11-16 RX ADMIN — OXYCODONE HYDROCHLORIDE 10 MILLIGRAM(S): 30 TABLET ORAL at 14:33

## 2024-11-16 RX ADMIN — HYDROMORPHONE HYDROCHLORIDE 0.5 MILLIGRAM(S): 2 TABLET ORAL at 05:27

## 2024-11-16 RX ADMIN — Medication 90 MILLIGRAM(S): at 12:34

## 2024-11-16 RX ADMIN — TAMSULOSIN HYDROCHLORIDE 0.4 MILLIGRAM(S): 0.4 CAPSULE ORAL at 21:46

## 2024-11-16 RX ADMIN — OXYCODONE HYDROCHLORIDE 10 MILLIGRAM(S): 30 TABLET ORAL at 21:00

## 2024-11-16 RX ADMIN — HYDROMORPHONE HYDROCHLORIDE 0.5 MILLIGRAM(S): 2 TABLET ORAL at 11:00

## 2024-11-16 RX ADMIN — HYDROMORPHONE HYDROCHLORIDE 0.5 MILLIGRAM(S): 2 TABLET ORAL at 01:48

## 2024-11-16 RX ADMIN — CLOPIDOGREL 75 MILLIGRAM(S): 75 TABLET, FILM COATED ORAL at 12:20

## 2024-11-16 RX ADMIN — Medication 2 TABLET(S): at 21:46

## 2024-11-16 RX ADMIN — HYDROMORPHONE HYDROCHLORIDE 0.5 MILLIGRAM(S): 2 TABLET ORAL at 16:09

## 2024-11-16 RX ADMIN — OXYCODONE HYDROCHLORIDE 10 MILLIGRAM(S): 30 TABLET ORAL at 00:13

## 2024-11-16 RX ADMIN — ACETAMINOPHEN 500MG 975 MILLIGRAM(S): 500 TABLET, COATED ORAL at 05:50

## 2024-11-16 RX ADMIN — HYDROMORPHONE HYDROCHLORIDE 0.5 MILLIGRAM(S): 2 TABLET ORAL at 10:02

## 2024-11-16 RX ADMIN — Medication 5 MILLIGRAM(S): at 12:20

## 2024-11-16 RX ADMIN — Medication 80 MILLIGRAM(S): at 21:45

## 2024-11-16 RX ADMIN — ACETAMINOPHEN 500MG 975 MILLIGRAM(S): 500 TABLET, COATED ORAL at 22:00

## 2024-11-16 RX ADMIN — SODIUM CHLORIDE 50 MILLILITER(S): 9 INJECTION, SOLUTION INTRAMUSCULAR; INTRAVENOUS; SUBCUTANEOUS at 10:53

## 2024-11-16 RX ADMIN — OXYCODONE HYDROCHLORIDE 10 MILLIGRAM(S): 30 TABLET ORAL at 07:41

## 2024-11-16 RX ADMIN — HYDROMORPHONE HYDROCHLORIDE 0.5 MILLIGRAM(S): 2 TABLET ORAL at 00:48

## 2024-11-16 RX ADMIN — POLYETHYLENE GLYCOL 3350 17 GRAM(S): 17 POWDER, FOR SOLUTION ORAL at 12:20

## 2024-11-16 NOTE — PROGRESS NOTE ADULT - SUBJECTIVE AND OBJECTIVE BOX
O/N: BS 115cc, voided 395cc total, 6pm trop 46 (48), Cr 1.75 (1.72), MN trop 46 (46)      ---------------------------------------------------------------------------  PLEASE CHECK WHEN PRESENT:  [ x ]Heart Failure     [  ] Acute     [  ] Acute on Chronic     [ x ] Chronic     [  ]Diastolic [HFpEF]     [ x ]Systolic [HFrEF]     [  ]Combined [HFpEF & HFrEF]  .................................................................................  [x ] Hypertensive Heart Disease  [x ] CAD  [  ] Atrial Fibrillation     [  ] Paroxysmal A-fib     [  ] Chronic A-fib     [  ] Persistent A-fib     [  ] Longstanding Persistent A-fib     [  ] Permanent A-fib  [  ] Pulmonary Hypertension  [  ] Other:  -------------------------------------------------------------------  [  ] Respiratory failure  [  ] Acute PE   [  ] Acute cor pulmonale  [  ] Asthma/COPD Exacerbation  [  ] COPD on home O2 (Chronic renal Failure)   [  ] Atelectasis   [  ] Pleural effusion  [  ] Aspiration pneumonia  [  ] Obstructive Sleep Apnea  -------------------------------------------------------------------  [  ] Acute Kidney Injury      [  ] Acute Tubular Necrosis      [  ] Reneal Medullary Necrosis     [  ] Renal Cortical Necrosis     [  ] Other Pathological Lesions:  [ x ] Chronic Kidney Disease     [  ]CKD 1     [  ]CKD 2     [ x ]CKD 3     [  ]CKD 4     [  ]CKD 5 (ESRD)  [  ]Other:  -------------------------------------------------------------------  [  ] Diabetes  [  ] Diabetic PVD Ulcer  [  ] Neuropathic ulcer to DM  [  ] Diabetes with Nephropathy  [  ] Osteomyelitis due to diabetes  [  ] Hyperglycemia   [  ] Hypoglycemia   --------------------------------------------------------------------  [  ] Malnutrition: See Nutrition Note  [  ] Cachexia  [  ] Other:   [  ] Supplement Ordered:  [  ] Morbid Obesity (BMI >=40]  [  ] Ileus  ---------------------------------------------------------------------  [  ] Sepsis/severe sepsis/septic shock  [  ] Noninfectious SIRS  [  ] UTI  [  ] Pneumonia  [  ] Thrombophlebitis   -----------------------------------------------------------------------  [  ] Acidosis/alkalosis  [  ] Fluid overload  [  ] Hypokalemia  [  ] Hyperkalemia  [  ] Hypomagnesemia  [  ] Hypophosphatemia  [  ] Hyperphosphatemia  ------------------------------------------------------------------------  [  ] Acute blood loss anemia  [  ] Post op blood loss anemia  [  ] Iron deficiency anemia  [  ] Anemia due to chronic disease  [  ] Hypercoagulable state  [  ] Thrombocytopenia  ----------------------------------------------------------------------  [  ] Cerebral infarction  [  ] Transient ischemia attack  [  ] Encephalopathy - Toxic or Metabolic    A/P: 73yo M, with PMHx of HTN, HLD, BPH, HFrEF, CAD s/p CABG 10/31/24, severe PAD (s/p fem-fem) who presented to [facility] for left leg pain concerning for acute limb ischemia. On exam here, patient has an exam more consistent with his prior recent exam, consisting of chronic limb ischemia (motor and sensation intact, doppler signals present in left foot and fem-fem bypass). Given his rest pain, will proceed with arteriography.     #PAD w/ rest pain  - s/p LLE angiogram with EIA and FELICITY stents  - s/p LLE fem to AK-pop bypass 11/14/24  - CTA b/l LE at OSH 11/8: post fem-fem bypass, severe atherosclerotic disease involving aorta, iliac, and superficial femoral arteries b/l more on the left  - c/w plavix and statin  - pain control  - bowel regimen    #CAD s/p prior PCI and recent CABG   - c/w plavix and statin  - c/w imdur  - c/w metoprolol succinate  - trending CKBM and trop q6   - appreciate cards recs      #HFrEF  - ECHO 10/24: LVEF 35% with regional wall abnormalities  - c/w metoprolol succinate  - fu repea echo 11/15     #RUL Pulmonary nodule - PET +  - Following with Dr. Duarte outpatient.    #CKD 3  - Monitor Cr and UOP    #BPH  - c/w flomax and proscar    #Prediabetes  - A1C 5.8%    #LLE DVT  -holding heparin ggt in immediate post-op setting     #Chest pain  -pt with intermittent chest pain and shortness of breath  -likely chronic angina  -VQ scan with no evidence of PE     Diet: DASH  activity: as tolerated  DVTppx: holding heparin GGT  Dispo: 5 uris     O/N: BS 115cc, voided 395cc total, 6pm trop 46 (48), Cr 1.75 (1.72), MN trop 46 (46)    Subjective:     ROS:   Denies Headache, blurred vision, Chest Pain, SOB, Abdominal pain, nausea or vomiting     Social   clopidogrel Tablet 75  isosorbide   mononitrate ER Tablet (IMDUR) 90  metoprolol succinate ER 50      Allergies    No Known Allergies    Intolerances        Vital Signs Last 24 Hrs  T(C): 36.9 (16 Nov 2024 04:56), Max: 37.1 (15 Nov 2024 08:50)  T(F): 98.4 (16 Nov 2024 04:56), Max: 98.7 (15 Nov 2024 08:50)  HR: 85 (16 Nov 2024 05:22) (75 - 94)  BP: 146/62 (16 Nov 2024 05:22) (91/52 - 146/62)  BP(mean): 89 (16 Nov 2024 05:22) (67 - 89)  RR: 20 (16 Nov 2024 05:22) (16 - 20)  SpO2: 95% (16 Nov 2024 05:22) (91% - 99%)    Parameters below as of 16 Nov 2024 05:22  Patient On (Oxygen Delivery Method): room air      I&O's Summary    14 Nov 2024 07:01  -  15 Nov 2024 07:00  --------------------------------------------------------  IN: 580 mL / OUT: 950 mL / NET: -370 mL    15 Nov 2024 07:01  -  16 Nov 2024 06:49  --------------------------------------------------------  IN: 880 mL / OUT: 695 mL / NET: 185 mL        Gen: NAD, resting comfortably in bed  C/V: NSR  Pulm: Nonlabored breathing, no respiratory distress, on NC  Abd: soft, NT/ND  Extrem: WWP, no calf edema, L groin incision c/d/i, mild ecchymosis surrounding, soft, no palpable hematoma. LLE popliteal incision w/ serosanguinous drainage. dressing replaced, surrounding area soft, no palpable hematoma. motor and sensory intact bilaterally  Pulses: LLE palp DP, triphasic PT      LABS:                        8.3    13.46 )-----------( 154      ( 16 Nov 2024 05:30 )             25.5     11-16    136  |  104  |  27[H]  ----------------------------<  123[H]  4.7   |  23  |  1.73[H]    Ca    8.3[L]      16 Nov 2024 05:30  Phos  2.5     11-16  Mg     2.0     11-16      PT/INR - ( 14 Nov 2024 17:21 )   PT: 13.1 sec;   INR: 1.12          PTT - ( 16 Nov 2024 05:30 )  PTT:22.9 sec        ---------------------------------------------------------------------------  PLEASE CHECK WHEN PRESENT:  [ x ]Heart Failure     [  ] Acute     [  ] Acute on Chronic     [ x ] Chronic     [  ]Diastolic [HFpEF]     [ x ]Systolic [HFrEF]     [  ]Combined [HFpEF & HFrEF]  .................................................................................  [x ] Hypertensive Heart Disease  [x ] CAD  [  ] Atrial Fibrillation     [  ] Paroxysmal A-fib     [  ] Chronic A-fib     [  ] Persistent A-fib     [  ] Longstanding Persistent A-fib     [  ] Permanent A-fib  [  ] Pulmonary Hypertension  [  ] Other:  -------------------------------------------------------------------  [  ] Respiratory failure  [  ] Acute PE   [  ] Acute cor pulmonale  [  ] Asthma/COPD Exacerbation  [  ] COPD on home O2 (Chronic renal Failure)   [  ] Atelectasis   [  ] Pleural effusion  [  ] Aspiration pneumonia  [  ] Obstructive Sleep Apnea  -------------------------------------------------------------------  [  ] Acute Kidney Injury      [  ] Acute Tubular Necrosis      [  ] Reneal Medullary Necrosis     [  ] Renal Cortical Necrosis     [  ] Other Pathological Lesions:  [ x ] Chronic Kidney Disease     [  ]CKD 1     [  ]CKD 2     [ x ]CKD 3     [  ]CKD 4     [  ]CKD 5 (ESRD)  [  ]Other:  -------------------------------------------------------------------  [  ] Diabetes  [  ] Diabetic PVD Ulcer  [  ] Neuropathic ulcer to DM  [  ] Diabetes with Nephropathy  [  ] Osteomyelitis due to diabetes  [  ] Hyperglycemia   [  ] Hypoglycemia   --------------------------------------------------------------------  [  ] Malnutrition: See Nutrition Note  [  ] Cachexia  [  ] Other:   [  ] Supplement Ordered:  [  ] Morbid Obesity (BMI >=40]  [  ] Ileus  ---------------------------------------------------------------------  [  ] Sepsis/severe sepsis/septic shock  [  ] Noninfectious SIRS  [  ] UTI  [  ] Pneumonia  [  ] Thrombophlebitis   -----------------------------------------------------------------------  [  ] Acidosis/alkalosis  [  ] Fluid overload  [  ] Hypokalemia  [  ] Hyperkalemia  [  ] Hypomagnesemia  [  ] Hypophosphatemia  [  ] Hyperphosphatemia  ------------------------------------------------------------------------  [  ] Acute blood loss anemia  [  ] Post op blood loss anemia  [  ] Iron deficiency anemia  [  ] Anemia due to chronic disease  [  ] Hypercoagulable state  [  ] Thrombocytopenia  ----------------------------------------------------------------------  [  ] Cerebral infarction  [  ] Transient ischemia attack  [  ] Encephalopathy - Toxic or Metabolic    A/P: 73yo M, with PMHx of HTN, HLD, BPH, HFrEF, CAD s/p CABG 10/31/24, severe PAD (s/p fem-fem) who presented to [facility] for left leg pain concerning for acute limb ischemia. On exam here, patient has an exam more consistent with his prior recent exam, consisting of chronic limb ischemia (motor and sensation intact, doppler signals present in left foot and fem-fem bypass). Given his rest pain, will proceed with arteriography.     #PAD w/ rest pain  - s/p LLE angiogram with EIA and FELICITY stents  - s/p LLE fem to AK-pop bypass 11/14/24  - CTA b/l LE at OSH 11/8: post fem-fem bypass, severe atherosclerotic disease involving aorta, iliac, and superficial femoral arteries b/l more on the left  - c/w plavix and statin  - pain control  - bowel regimen    #CAD s/p prior PCI and recent CABG   - c/w plavix and statin  - c/w imdur  - c/w metoprolol succinate  - trending CKBM and trop q6   - appreciate cards recs      #HFrEF  - ECHO 10/24: LVEF 35% with regional wall abnormalities  - c/w metoprolol succinate  - fu repea echo 11/15     #RUL Pulmonary nodule - PET +  - Following with Dr. Duarte outpatient.    #CKD 3  - Monitor Cr and UOP    #BPH  - c/w flomax and proscar    #Prediabetes  - A1C 5.8%    #LLE DVT  -holding heparin ggt in immediate post-op setting     #Chest pain  -pt with intermittent chest pain and shortness of breath  -likely chronic angina  -VQ scan with no evidence of PE     Diet: DASH  activity: as tolerated  DVTppx: holding heparin GGT  Dispo: 5 uris     O/N: BS 115cc, voided 395cc total, 6pm trop 46 (48), Cr 1.75 (1.72), MN trop 46 (46)    Subjective: Pt seen and examined at bedside this AM. Pt has no complaints at this time other than mild LLE pain.     ROS:   Denies Headache, blurred vision, Chest Pain, SOB, Abdominal pain, nausea or vomiting     Social   clopidogrel Tablet 75  isosorbide   mononitrate ER Tablet (IMDUR) 90  metoprolol succinate ER 50      Allergies    No Known Allergies    Intolerances        Vital Signs Last 24 Hrs  T(C): 36.9 (16 Nov 2024 04:56), Max: 37.1 (15 Nov 2024 08:50)  T(F): 98.4 (16 Nov 2024 04:56), Max: 98.7 (15 Nov 2024 08:50)  HR: 85 (16 Nov 2024 05:22) (75 - 94)  BP: 146/62 (16 Nov 2024 05:22) (91/52 - 146/62)  BP(mean): 89 (16 Nov 2024 05:22) (67 - 89)  RR: 20 (16 Nov 2024 05:22) (16 - 20)  SpO2: 95% (16 Nov 2024 05:22) (91% - 99%)    Parameters below as of 16 Nov 2024 05:22  Patient On (Oxygen Delivery Method): room air      I&O's Summary    14 Nov 2024 07:01  -  15 Nov 2024 07:00  --------------------------------------------------------  IN: 580 mL / OUT: 950 mL / NET: -370 mL    15 Nov 2024 07:01  -  16 Nov 2024 06:49  --------------------------------------------------------  IN: 880 mL / OUT: 695 mL / NET: 185 mL        Gen: NAD, resting comfortably in bed  C/V: NSR  Pulm: Nonlabored breathing, no respiratory distress, on NC  Abd: soft, NT/ND  Extrem: WWP, no calf edema, L groin incision c/d/i, mild ecchymosis surrounding, soft, no palpable hematoma. LLE popliteal incision w/ large amount of SS drainage, dressing replaced, surrounding area soft, no palpable hematoma. motor and sensory intact bilaterally  Pulses: LLE palp DP, triphasic PT      LABS:                        8.3    13.46 )-----------( 154      ( 16 Nov 2024 05:30 )             25.5     11-16    136  |  104  |  27[H]  ----------------------------<  123[H]  4.7   |  23  |  1.73[H]    Ca    8.3[L]      16 Nov 2024 05:30  Phos  2.5     11-16  Mg     2.0     11-16      PT/INR - ( 14 Nov 2024 17:21 )   PT: 13.1 sec;   INR: 1.12          PTT - ( 16 Nov 2024 05:30 )  PTT:22.9 sec        ---------------------------------------------------------------------------  PLEASE CHECK WHEN PRESENT:  [ x ]Heart Failure     [  ] Acute     [  ] Acute on Chronic     [ x ] Chronic     [  ]Diastolic [HFpEF]     [ x ]Systolic [HFrEF]     [  ]Combined [HFpEF & HFrEF]  .................................................................................  [x ] Hypertensive Heart Disease  [x ] CAD  [  ] Atrial Fibrillation     [  ] Paroxysmal A-fib     [  ] Chronic A-fib     [  ] Persistent A-fib     [  ] Longstanding Persistent A-fib     [  ] Permanent A-fib  [  ] Pulmonary Hypertension  [  ] Other:  -------------------------------------------------------------------  [  ] Respiratory failure  [  ] Acute PE   [  ] Acute cor pulmonale  [  ] Asthma/COPD Exacerbation  [  ] COPD on home O2 (Chronic renal Failure)   [  ] Atelectasis   [  ] Pleural effusion  [  ] Aspiration pneumonia  [  ] Obstructive Sleep Apnea  -------------------------------------------------------------------  [  ] Acute Kidney Injury      [  ] Acute Tubular Necrosis      [  ] Reneal Medullary Necrosis     [  ] Renal Cortical Necrosis     [  ] Other Pathological Lesions:  [ x ] Chronic Kidney Disease     [  ]CKD 1     [  ]CKD 2     [ x ]CKD 3     [  ]CKD 4     [  ]CKD 5 (ESRD)  [  ]Other:  -------------------------------------------------------------------  [  ] Diabetes  [  ] Diabetic PVD Ulcer  [  ] Neuropathic ulcer to DM  [  ] Diabetes with Nephropathy  [  ] Osteomyelitis due to diabetes  [  ] Hyperglycemia   [  ] Hypoglycemia   --------------------------------------------------------------------  [  ] Malnutrition: See Nutrition Note  [  ] Cachexia  [  ] Other:   [  ] Supplement Ordered:  [  ] Morbid Obesity (BMI >=40]  [  ] Ileus  ---------------------------------------------------------------------  [  ] Sepsis/severe sepsis/septic shock  [  ] Noninfectious SIRS  [  ] UTI  [  ] Pneumonia  [  ] Thrombophlebitis   -----------------------------------------------------------------------  [  ] Acidosis/alkalosis  [  ] Fluid overload  [  ] Hypokalemia  [  ] Hyperkalemia  [  ] Hypomagnesemia  [  ] Hypophosphatemia  [  ] Hyperphosphatemia  ------------------------------------------------------------------------  [  ] Acute blood loss anemia  [  ] Post op blood loss anemia  [  ] Iron deficiency anemia  [  ] Anemia due to chronic disease  [  ] Hypercoagulable state  [  ] Thrombocytopenia  ----------------------------------------------------------------------  [  ] Cerebral infarction  [  ] Transient ischemia attack  [  ] Encephalopathy - Toxic or Metabolic    A/P: 75yo M, with PMHx of HTN, HLD, BPH, HFrEF, CAD s/p CABG 10/31/24, severe PAD (s/p fem-fem) who presented to [facility] for left leg pain concerning for acute limb ischemia. On exam here, patient has an exam more consistent with his prior recent exam, consisting of chronic limb ischemia (motor and sensation intact, doppler signals present in left foot and fem-fem bypass). Given his rest pain, will proceed with arteriography.     #PAD w/ rest pain  - s/p LLE angiogram with EIA and FELICITY stents  - s/p LLE fem to AK-pop bypass 11/14/24  - CTA b/l LE at OSH 11/8: post fem-fem bypass, severe atherosclerotic disease involving aorta, iliac, and superficial femoral arteries b/l more on the left  - c/w plavix and statin  - pain control  - bowel regimen    #CAD s/p prior PCI and recent CABG   - c/w plavix and statin  - c/w imdur  - c/w metoprolol succinate  - trending hsTrop until stable decline   - appreciate cards recs      #HFrEF  - ECHO 10/24: LVEF 35% with regional wall abnormalities  - c/w metoprolol succinate  - fu repea echo 11/15     #RUL Pulmonary nodule - PET +  - Following with Dr. Duarte outpatient.    #CKD 3  - Monitor Cr and UOP    #BPH  - c/w flomax and proscar    #Prediabetes  - A1C 5.8%    #LLE DVT  -holding heparin ggt in immediate post-op setting     #Chest pain  -pt with intermittent chest pain and shortness of breath  -likely chronic angina  -VQ scan with no evidence of PE     Diet: DASH  activity: as tolerated  DVTppx: holding heparin GGT  Dispo: 5 uris

## 2024-11-16 NOTE — PROGRESS NOTE ADULT - ASSESSMENT
75 yo man PMHx of CAD s/p PCI and recent minimally invasive direct CABG 10/31/24, ICM HFrEF, HTN, CKD, and PAD s/p prior L to R fem-fem bypass who was admitted for CLI of LLE with rest pain, s/p LE angiogram, s/p L common and external iliac stent but prox anastomosis of fem-fem bypass is upwards going (unable to treat endovascularly), w/ occluded L CFA, SFA, and AK popliteal artery w/ reconstitution of BK pop via collaterals w/ AT/PT runoff.    Assessment  1. CAD s/p PCI and minimally invasive direct CABG 10/31/24  EKG done 11/12 3:45 pm NSR, Q wave inferior leads, T wave flattening late precordial leads  EKG post op 11/14 NSR w/ new TWI and STD in lateral and precordial leads  Denies any chest pain at the bedside today  HsTrop remains negative  No concern for postop ACS  2. PAD s/p L-R fem-fem bypass c/b CLI LLE  S/p L common and external iliac stent 11/11 but prox anastomosis of fem-fem bypass is upwards going (unable to treat endovascularly), w/ occluded L CFA, SFA, and AK popliteal artery w/ reconstitution of BK pop via collaterals w/ AT/PT runoff.  Now s/p L fem-pop bypass 11/14  4. ICM HFrEF 35% - euvolemic  5. LLE DVT   V/Q scan negative for PE    Plan  1. Plavix 75mg PO QD and high intensity statin. Off ASA 81mg PO QD given initiation of AC for DVT, to avoid triple therapy  2. New EKG change post op but w/o chest pain currently. HsTrop negative so far. No need to futher trend hsTrop. No concern for postop ACS.   3. GDMT: metop succinate 50mg PO QD, will start ARNI/MRA/SGLT2i when BP better and GFR improved  4. Imdur 90mg PO QD for anti-anginal therapy, hold for SBP <90  5. No need for diuretics for HF  6. Will need pharmacological nuclear stress test as outpatient    Thank you for the consult and the opportunity to take care of this patient.     Marquis Pink M.D.  Cardiology | Vascular Cardiology Attending  Please call (c) [phone] for any questions    During non face-to-face time, I reviewed relevant portions of the patient's medical record. During face-to-face time, I took a relevant history and examined the patient. I also explained differential diagnoses, relevant cardiac diagnoses, workup, and management plan, which required a high level of medical decision making. I answered all questions related to the patient's medical conditions.

## 2024-11-16 NOTE — PROGRESS NOTE ADULT - SUBJECTIVE AND OBJECTIVE BOX
VASCULAR CARDIOLOGY ATTENDING PROGRESS NOTE    SERVICE LINE: [phone]    Subjective  NAEO  No chest pain at the bedside    DVT ppx with SQH     Current Medications:   acetaminophen     Tablet .. 975 milliGRAM(s) Oral every 8 hours  aspirin  chewable 81 milliGRAM(s) Oral daily  atorvastatin 80 milliGRAM(s) Oral at bedtime  clopidogrel Tablet 75 milliGRAM(s) Oral daily  finasteride 5 milliGRAM(s) Oral daily  influenza  Vaccine (HIGH DOSE) 0.5 milliLiter(s) IntraMuscular once  isosorbide   mononitrate ER Tablet (IMDUR) 30 milliGRAM(s) Oral daily  metoprolol tartrate 25 milliGRAM(s) Oral two times a day  oxyCODONE    IR 5 milliGRAM(s) Oral every 4 hours PRN  oxyCODONE    IR 2.5 milliGRAM(s) Oral every 4 hours PRN  pantoprazole    Tablet 40 milliGRAM(s) Oral before breakfast  polyethylene glycol 3350 17 Gram(s) Oral daily  senna 2 Tablet(s) Oral at bedtime  tamsulosin 0.4 milliGRAM(s) Oral at bedtime      REVIEW OF SYSTEMS:  CONSTITUTIONAL: No weakness, fevers or chills  EYES/ENT: No visual changes;  No dysphagia  NECK: No pain or stiffness  RESPIRATORY: No cough, wheezing, hemoptysis; No shortness of breath  CARDIOVASCULAR: No chest pain or palpitations; No lower extremity edema  GASTROINTESTINAL: No abdominal or epigastric pain. No nausea, vomiting, or hematemesis; No diarrhea or constipation. No melena or hematochezia.  BACK: No back pain  GENITOURINARY: No dysuria, frequency or hematuria  NEUROLOGICAL: No numbness or weakness  SKIN: No itching, burning, rashes, or lesions   All other review of systems is negative unless indicated above.    Physical Exam:  T(F): 97.5 (11-12), Max: 98.5 (11-12)  HR: 79 (11-12) (62 - 82)  BP: 143/64 (11-12) (98/52 - 146/67)  BP(mean): 92 (11-12) (71 - 97)  ABP: --  ABP(mean): --  RR: 18 (11-12)  SpO2: 96% (11-12)  GENERAL: No acute distress, well-developed  HEAD:  Atraumatic, Normocephalic  ENT: EOMI, PERRLA, conjunctiva and sclera clear, Neck supple, No JVD, moist mucosa  CHEST/LUNG: Clear to auscultation bilaterally; No wheeze, equal breath sounds bilaterally   BACK: No spinal tenderness  HEART: Regular rate and rhythm; No murmurs, rubs, or gallops  ABDOMEN: Soft, Nontender, Nondistended; Bowel sounds present  EXTREMITIES:  No clubbing, cyanosis, or edema  PSYCH: Nl behavior, nl affect  NEUROLOGY: AAOx3, non-focal, cranial nerves intact  SKIN: Normal color, No rashes or lesions    Cardiovascular Diagnostic Testing: personally reviewed    CXR: Personally reviewed    Labs: Personally reviewed                        12.4   7.49  )-----------( 199      ( 12 Nov 2024 04:29 )             40.2     11-12    137  |  104  |  23  ----------------------------<  95  4.8   |  18[L]  |  1.40[H]    Ca    9.0      12 Nov 2024 04:29  Phos  2.8     11-12  Mg     1.9     11-12      PT/INR - ( 11 Nov 2024 18:16 )   PT: 13.8 sec;   INR: 1.20          PTT - ( 11 Nov 2024 18:16 )  PTT:26.0 sec    CARDIAC MARKERS ( 12 Nov 2024 04:29 )  41 ng/L / x     / x     / x     / x     / 3.1 ng/mL  CARDIAC MARKERS ( 11 Nov 2024 04:18 )  46 ng/L / x     / x     / x     / x     / 3.3 ng/mL

## 2024-11-16 NOTE — PROGRESS NOTE ADULT - SUBJECTIVE AND OBJECTIVE BOX
Patient was seen and examined at bedside. Case discuss with the primary team. Pt with some chest discomfort about 5am this morning. Pt reports that the chest discomfort is now resolved. Pt also with leg pain.     OBJECTIVE:  Vital Signs Last 24 Hrs  T(C): 36.3 (16 Nov 2024 12:54), Max: 36.9 (16 Nov 2024 04:56)  T(F): 97.4 (16 Nov 2024 12:54), Max: 98.4 (16 Nov 2024 04:56)  HR: 90 (16 Nov 2024 17:14) (76 - 92)  BP: 100/55 (16 Nov 2024 17:14) (91/52 - 146/62)  BP(mean): 72 (16 Nov 2024 17:14) (66 - 89)  RR: 18 (16 Nov 2024 17:14) (17 - 20)  SpO2: 94% (16 Nov 2024 14:11) (92% - 96%)    Parameters below as of 16 Nov 2024 17:14  Patient On (Oxygen Delivery Method): room air      PHYSICAL EXAM:  Gen: NAD laying in bed  CV: RRR, +S1/S2, no mumur  Pulm: CTA b/l no wheezing or crackles   Abd: soft, NTND + BS no rebound or guarding   Extremities warm, no edema, scattered dry scabs over the L shin   Neuro: AAOX3; nonfocal                  7.7    15.63 )-----------( 149      ( 16 Nov 2024 16:00 )             23.7     136  |  104  |  27[H]  ----------------------------<  123[H]  4.7   |  23  |  1.73[H]    Ca    8.3[L]      16 Nov 2024 05:30  Phos  2.5     11-16  Mg     2.0     11-16      PT: 13.1 sec;   INR: 1.12   PTT:22.9 sec    CAPILLARY BLOOD GLUCOSE  POCT Blood Glucose.: 196 mg/dL (16 Nov 2024 12:00)  POCT Blood Glucose.: 140 mg/dL (16 Nov 2024 07:55)            acetaminophen     Tablet .. 975 milliGRAM(s) Oral every 8 hours  aspirin enteric coated 81 milliGRAM(s) Oral daily  atorvastatin 80 milliGRAM(s) Oral at bedtime  clopidogrel Tablet 75 milliGRAM(s) Oral daily  finasteride 5 milliGRAM(s) Oral daily  HYDROmorphone  Injectable 0.5 milliGRAM(s) IV Push every 4 hours PRN  influenza  Vaccine (HIGH DOSE) 0.5 milliLiter(s) IntraMuscular once  isosorbide   mononitrate ER Tablet (IMDUR) 90 milliGRAM(s) Oral daily  metoprolol succinate ER 50 milliGRAM(s) Oral daily  oxyCODONE    IR 5 milliGRAM(s) Oral every 6 hours PRN  oxyCODONE    IR 10 milliGRAM(s) Oral every 6 hours PRN  polyethylene glycol 3350 17 Gram(s) Oral daily  senna 2 Tablet(s) Oral at bedtime  sodium chloride 0.9%. 1000 milliLiter(s) IV Continuous <Continuous>  tamsulosin 0.4 milliGRAM(s) Oral at bedtime

## 2024-11-16 NOTE — PROGRESS NOTE ADULT - ASSESSMENT
73yo M, with PMHx of HTN, HLD, BPH, CAD s/p CABG 10/31/24, severe PAD (s/p fem-fem) who presented to [facility] for left leg pain concerning for acute limb ischemia and transferred to [facility], now on vascular service s/p angiogram with fem-fem bypass, L common and external iliac covered stenting and perclose on 11/11. Medicine is consulted for comanagement.     #PAD  #s/p LLE angiogram, fem-fem bypass, L common and external iliac covered stenting and perclose on 11/11  -Continue pain control and bowel regimen per vascular team   -Continue  plavix 75mg daily       #CAD s/p PCI and minimally invasive direct CABG 10/31/24  #Chest pain   #HTN/HLD   -Pt continues to have episodes of chest discomfort; cardiology following   -Per cardiology recommendations will continue Plavix 75mg PO QD and atorvastatin 80mg daily   -Per cardiology there is no need to continue to trend troponin   -Will continue GDMT: metop succinate 50mg PO QD, will start ARNI/MRA/SGLT2i when BP better and GFR improved  -Will continue . Imdur 90mg PO QD for anti-anginal therapy, hold for SBP <90  -Obtain check an EKG for any increase/changes in patient's chest pain   -Per cardiology the pt will need pharmacological nuclear stress test as outpatient    #LLE DVT   -Unclear chronicity  -Currently heparin gtt for AC held, would resume as soon as safe from a surgical standpoint   -Pt is s/p VQ scan with low probability of PE      #Acute blood loss anemia   -likely iso of procedure, acute blood loss anemia   -Will continue to trend, transfuse for hemoglobin of 8 or higher given pt's cardiac hx     #Leukocytosis   -Pt with increased WBC 13.46-> 15   -Pt is afebrile   -WBC elevated likely post op; Will continue to monitor WBC and pan cx if spikes     #FELIPA   -Pt's creatinine 1.73   -Will check urine lytes and urea   -Avoid nephrotoxins as able, renally dose all medications     #RUL Pulmonary nodule - PET +  -Pt to  follow up outpatient with  Dr. Duarte     #BPH  - Continue flomax and proscar    #Prediabetes  -HgbAc 5.8   -Continue  Consistent carb diet    #DVT ppx  - Held currently, resume when deemed safe by Vascular surgery team     #DISPO  -Pt was seen by PT and recommended for MIGUEL placement     55 minutes spent on total encounter. The necessity of the time spent during the encounter on this date of service was due to:    Review of hospital course, labs, vitals, medical records.  Bedside exam   Discussed plan of care with primary team   Documenting the encounter.   75yo M, with PMHx of HTN, HLD, BPH, CAD s/p CABG 10/31/24, severe PAD (s/p fem-fem) who presented to [facility] for left leg pain concerning for acute limb ischemia and transferred to [facility], now on vascular service s/p angiogram with fem-fem bypass, L common and external iliac covered stenting and perclose on 11/11. Medicine is consulted for comanagement.     #PAD  #s/p LLE angiogram, fem-fem bypass, L common and external iliac covered stenting and perclose on 11/11  -Continue pain control and bowel regimen per vascular team   -Continue  plavix 75mg daily       #CAD s/p PCI and minimally invasive direct CABG 10/31/24  #Chest pain   #HTN/HLD   -Pt continues to have episodes of chest discomfort; cardiology following   -Per cardiology recommendations will continue Plavix 75mg PO QD and atorvastatin 80mg daily   -Per cardiology there is no need to continue to trend troponin   -Will continue GDMT: metop succinate 50mg PO QD, will start ARNI/MRA/SGLT2i when BP better and GFR improved  -Will continue . Imdur 90mg PO QD for anti-anginal therapy, hold for SBP <90  -Obtain check an EKG for any increase/changes in patient's chest pain   -Per cardiology the pt will need pharmacological nuclear stress test as outpatient    #LLE DVT   -Unclear chronicity  -Currently heparin gtt for AC held, would resume as soon as safe from a surgical standpoint   -Pt is s/p VQ scan with low probability of PE      #Acute blood loss anemia   -likely iso of procedure, acute blood loss anemia   -Will continue to trend, transfuse for hemoglobin of 8 or higher given pt's cardiac hx     #Leukocytosis   -Pt with increased WBC 13.46-> 15   -Pt is afebrile   -WBC elevated likely post op; Will continue to monitor WBC and pan cx if spikes     #FELIPA   -Pt's creatinine 1.73   -Will check urine lytes and urea   -Avoid nephrotoxins as able, renally dose all medications     #RUL Pulmonary nodule - PET +  -Pt to  follow up outpatient with  Dr. Duarte     #BPH  - Continue flomax and proscar    #Prediabetes  -HgbAc 5.8   -Continue  Consistent carb diet    #DVT ppx  - Held currently, resume when deemed safe by Vascular surgery team     #DISPO  -Pt was seen by PT and recommended for MIGUEL placement     55 minutes spent on total encounter. The necessity of the time spent during the encounter on this date of service was due to:    Review of hospital course, labs, vitals, medical records.  Bedside exam and speaking to the pt with Polish .  Discussed plan of care with primary team   Documenting the encounter.

## 2024-11-17 LAB
ANION GAP SERPL CALC-SCNC: 9 MMOL/L — SIGNIFICANT CHANGE UP (ref 5–17)
BUN SERPL-MCNC: 18 MG/DL — SIGNIFICANT CHANGE UP (ref 7–23)
CALCIUM SERPL-MCNC: 8.1 MG/DL — LOW (ref 8.4–10.5)
CHLORIDE SERPL-SCNC: 104 MMOL/L — SIGNIFICANT CHANGE UP (ref 96–108)
CO2 SERPL-SCNC: 21 MMOL/L — LOW (ref 22–31)
CREAT SERPL-MCNC: 1.32 MG/DL — HIGH (ref 0.5–1.3)
EGFR: 56 ML/MIN/1.73M2 — LOW
GLUCOSE SERPL-MCNC: 117 MG/DL — HIGH (ref 70–99)
HCT VFR BLD CALC: 25.6 % — LOW (ref 39–50)
HGB BLD-MCNC: 8.6 G/DL — LOW (ref 13–17)
MAGNESIUM SERPL-MCNC: 1.7 MG/DL — SIGNIFICANT CHANGE UP (ref 1.6–2.6)
MCHC RBC-ENTMCNC: 31.3 PG — SIGNIFICANT CHANGE UP (ref 27–34)
MCHC RBC-ENTMCNC: 33.6 G/DL — SIGNIFICANT CHANGE UP (ref 32–36)
MCV RBC AUTO: 93.1 FL — SIGNIFICANT CHANGE UP (ref 80–100)
NRBC # BLD: 0 /100 WBCS — SIGNIFICANT CHANGE UP (ref 0–0)
PHOSPHATE SERPL-MCNC: 3 MG/DL — SIGNIFICANT CHANGE UP (ref 2.5–4.5)
PLATELET # BLD AUTO: 125 K/UL — LOW (ref 150–400)
POTASSIUM SERPL-MCNC: 4.1 MMOL/L — SIGNIFICANT CHANGE UP (ref 3.5–5.3)
POTASSIUM SERPL-SCNC: 4.1 MMOL/L — SIGNIFICANT CHANGE UP (ref 3.5–5.3)
RBC # BLD: 2.75 M/UL — LOW (ref 4.2–5.8)
RBC # FLD: 14.4 % — SIGNIFICANT CHANGE UP (ref 10.3–14.5)
SODIUM SERPL-SCNC: 134 MMOL/L — LOW (ref 135–145)
WBC # BLD: 8.96 K/UL — SIGNIFICANT CHANGE UP (ref 3.8–10.5)
WBC # FLD AUTO: 8.96 K/UL — SIGNIFICANT CHANGE UP (ref 3.8–10.5)

## 2024-11-17 PROCEDURE — 99233 SBSQ HOSP IP/OBS HIGH 50: CPT

## 2024-11-17 RX ORDER — KETOROLAC TROMETHAMINE 30 MG/ML
15 INJECTION INTRAMUSCULAR; INTRAVENOUS ONCE
Refills: 0 | Status: DISCONTINUED | OUTPATIENT
Start: 2024-11-17 | End: 2024-11-17

## 2024-11-17 RX ORDER — SIMETHICONE 125 MG
80 CAPSULE ORAL EVERY 4 HOURS
Refills: 0 | Status: DISCONTINUED | OUTPATIENT
Start: 2024-11-17 | End: 2024-11-18

## 2024-11-17 RX ADMIN — HYDROMORPHONE HYDROCHLORIDE 0.5 MILLIGRAM(S): 2 TABLET ORAL at 01:02

## 2024-11-17 RX ADMIN — TAMSULOSIN HYDROCHLORIDE 0.4 MILLIGRAM(S): 0.4 CAPSULE ORAL at 22:33

## 2024-11-17 RX ADMIN — OXYCODONE HYDROCHLORIDE 5 MILLIGRAM(S): 30 TABLET ORAL at 07:57

## 2024-11-17 RX ADMIN — ACETAMINOPHEN 500MG 975 MILLIGRAM(S): 500 TABLET, COATED ORAL at 23:52

## 2024-11-17 RX ADMIN — Medication 81 MILLIGRAM(S): at 12:58

## 2024-11-17 RX ADMIN — OXYCODONE HYDROCHLORIDE 10 MILLIGRAM(S): 30 TABLET ORAL at 02:02

## 2024-11-17 RX ADMIN — Medication 2 TABLET(S): at 23:32

## 2024-11-17 RX ADMIN — HYDROMORPHONE HYDROCHLORIDE 0.5 MILLIGRAM(S): 2 TABLET ORAL at 18:23

## 2024-11-17 RX ADMIN — HYDROMORPHONE HYDROCHLORIDE 0.5 MILLIGRAM(S): 2 TABLET ORAL at 10:40

## 2024-11-17 RX ADMIN — HYDROMORPHONE HYDROCHLORIDE 0.5 MILLIGRAM(S): 2 TABLET ORAL at 06:01

## 2024-11-17 RX ADMIN — HYDROMORPHONE HYDROCHLORIDE 0.5 MILLIGRAM(S): 2 TABLET ORAL at 10:10

## 2024-11-17 RX ADMIN — OXYCODONE HYDROCHLORIDE 5 MILLIGRAM(S): 30 TABLET ORAL at 08:00

## 2024-11-17 RX ADMIN — OXYCODONE HYDROCHLORIDE 10 MILLIGRAM(S): 30 TABLET ORAL at 03:03

## 2024-11-17 RX ADMIN — HYDROMORPHONE HYDROCHLORIDE 0.5 MILLIGRAM(S): 2 TABLET ORAL at 00:16

## 2024-11-17 RX ADMIN — ACETAMINOPHEN 500MG 975 MILLIGRAM(S): 500 TABLET, COATED ORAL at 15:08

## 2024-11-17 RX ADMIN — Medication 80 MILLIGRAM(S): at 22:35

## 2024-11-17 RX ADMIN — ACETAMINOPHEN 500MG 975 MILLIGRAM(S): 500 TABLET, COATED ORAL at 14:19

## 2024-11-17 RX ADMIN — POLYETHYLENE GLYCOL 3350 17 GRAM(S): 17 POWDER, FOR SOLUTION ORAL at 12:59

## 2024-11-17 RX ADMIN — KETOROLAC TROMETHAMINE 15 MILLIGRAM(S): 30 INJECTION INTRAMUSCULAR; INTRAVENOUS at 18:23

## 2024-11-17 RX ADMIN — ACETAMINOPHEN 500MG 975 MILLIGRAM(S): 500 TABLET, COATED ORAL at 05:55

## 2024-11-17 RX ADMIN — ACETAMINOPHEN 500MG 975 MILLIGRAM(S): 500 TABLET, COATED ORAL at 22:51

## 2024-11-17 RX ADMIN — Medication 90 MILLIGRAM(S): at 07:57

## 2024-11-17 RX ADMIN — OXYCODONE HYDROCHLORIDE 10 MILLIGRAM(S): 30 TABLET ORAL at 16:24

## 2024-11-17 RX ADMIN — OXYCODONE HYDROCHLORIDE 10 MILLIGRAM(S): 30 TABLET ORAL at 17:13

## 2024-11-17 RX ADMIN — Medication 5 MILLIGRAM(S): at 12:58

## 2024-11-17 RX ADMIN — ACETAMINOPHEN 500MG 975 MILLIGRAM(S): 500 TABLET, COATED ORAL at 05:45

## 2024-11-17 RX ADMIN — HYDROMORPHONE HYDROCHLORIDE 0.5 MILLIGRAM(S): 2 TABLET ORAL at 19:20

## 2024-11-17 RX ADMIN — KETOROLAC TROMETHAMINE 15 MILLIGRAM(S): 30 INJECTION INTRAMUSCULAR; INTRAVENOUS at 17:13

## 2024-11-17 RX ADMIN — Medication 400 MILLIGRAM(S): at 07:56

## 2024-11-17 RX ADMIN — CLOPIDOGREL 75 MILLIGRAM(S): 75 TABLET, FILM COATED ORAL at 12:58

## 2024-11-17 RX ADMIN — Medication 10 MILLIGRAM(S): at 18:40

## 2024-11-17 RX ADMIN — Medication 80 MILLIGRAM(S): at 22:34

## 2024-11-17 RX ADMIN — HYDROMORPHONE HYDROCHLORIDE 0.5 MILLIGRAM(S): 2 TABLET ORAL at 05:46

## 2024-11-17 NOTE — PROGRESS NOTE ADULT - SUBJECTIVE AND OBJECTIVE BOX
Subjective: Patient examined bedside. Continues to endorse anginal symptoms although states pain is improved. Denies LLE numbness,weakness. Endorses moderate extremity pain.    ROS:   Denies Headache, blurred vision, Chest Pain, SOB, Abdominal pain, nausea or vomiting     Social   aspirin enteric coated 81  clopidogrel Tablet 75  isosorbide   mononitrate ER Tablet (IMDUR) 90  metoprolol succinate ER 50      Allergies    No Known Allergies    Intolerances        Vital Signs Last 24 Hrs  T(C): 36.7 (17 Nov 2024 04:21), Max: 36.7 (16 Nov 2024 17:51)  T(F): 98.1 (17 Nov 2024 04:21), Max: 98.1 (16 Nov 2024 17:51)  HR: 92 (17 Nov 2024 05:00) (83 - 94)  BP: 107/54 (17 Nov 2024 04:21) (91/53 - 132/60)  BP(mean): 81 (17 Nov 2024 01:54) (66 - 87)  RR: 16 (17 Nov 2024 05:00) (14 - 18)  SpO2: 97% (17 Nov 2024 05:00) (94% - 99%)    Parameters below as of 17 Nov 2024 05:00  Patient On (Oxygen Delivery Method): room air      I&O's Summary    16 Nov 2024 07:01  -  17 Nov 2024 07:00  --------------------------------------------------------  IN: 1735 mL / OUT: 400 mL / NET: 1335 mL        Physical Exam:  Gen: NAD, resting comfortably in bed  C/V: NSR  Pulm: Nonlabored breathing, no respiratory distress, on NC  Abd: soft, NT/ND  Extrem: WWP, no calf edema, L groin incision c/d/i, mild improving ecchymosis surrounding, soft, no palpable hematoma. motor and sensory intact bilaterally  Pulses: LLE palp DP, triphasic PT       LABS:                        8.6    8.96  )-----------( 125      ( 17 Nov 2024 05:30 )             25.6     11-17    134[L]  |  104  |  18  ----------------------------<  117[H]  4.1   |  21[L]  |  1.32[H]    Ca    8.1[L]      17 Nov 2024 05:30  Phos  3.0     11-17  Mg     1.7     11-17      PTT - ( 16 Nov 2024 05:30 )  PTT:22.9 sec        ---------------------------------------------------------------------------  PLEASE CHECK WHEN PRESENT:  [ x ]Heart Failure     [  ] Acute     [  ] Acute on Chronic     [ x ] Chronic     [  ]Diastolic [HFpEF]     [ x ]Systolic [HFrEF]     [  ]Combined [HFpEF & HFrEF]  .................................................................................  [x ] Hypertensive Heart Disease  [x ] CAD  [  ] Atrial Fibrillation     [  ] Paroxysmal A-fib     [  ] Chronic A-fib     [  ] Persistent A-fib     [  ] Longstanding Persistent A-fib     [  ] Permanent A-fib  [  ] Pulmonary Hypertension  [  ] Other:  -------------------------------------------------------------------  [  ] Respiratory failure  [  ] Acute PE   [  ] Acute cor pulmonale  [  ] Asthma/COPD Exacerbation  [  ] COPD on home O2 (Chronic renal Failure)   [  ] Atelectasis   [  ] Pleural effusion  [  ] Aspiration pneumonia  [  ] Obstructive Sleep Apnea  -------------------------------------------------------------------  [  ] Acute Kidney Injury      [  ] Acute Tubular Necrosis      [  ] Reneal Medullary Necrosis     [  ] Renal Cortical Necrosis     [  ] Other Pathological Lesions:  [ x ] Chronic Kidney Disease     [  ]CKD 1     [  ]CKD 2     [ x ]CKD 3     [  ]CKD 4     [  ]CKD 5 (ESRD)  [  ]Other:  -------------------------------------------------------------------  [  ] Diabetes  [  ] Diabetic PVD Ulcer  [  ] Neuropathic ulcer to DM  [  ] Diabetes with Nephropathy  [  ] Osteomyelitis due to diabetes  [  ] Hyperglycemia   [  ] Hypoglycemia   --------------------------------------------------------------------  [  ] Malnutrition: See Nutrition Note  [  ] Cachexia  [  ] Other:   [  ] Supplement Ordered:  [  ] Morbid Obesity (BMI >=40]  [  ] Ileus  ---------------------------------------------------------------------  [  ] Sepsis/severe sepsis/septic shock  [  ] Noninfectious SIRS  [  ] UTI  [  ] Pneumonia  [  ] Thrombophlebitis   -----------------------------------------------------------------------  [  ] Acidosis/alkalosis  [  ] Fluid overload  [  ] Hypokalemia  [  ] Hyperkalemia  [  ] Hypomagnesemia  [  ] Hypophosphatemia  [  ] Hyperphosphatemia  ------------------------------------------------------------------------  [  ] Acute blood loss anemia  [  ] Post op blood loss anemia  [  ] Iron deficiency anemia  [  ] Anemia due to chronic disease  [  ] Hypercoagulable state  [  ] Thrombocytopenia  ----------------------------------------------------------------------  [  ] Cerebral infarction  [  ] Transient ischemia attack  [  ] Encephalopathy - Toxic or Metabolic    A/P: 75yo M, with PMHx of HTN, HLD, BPH, HFrEF, CAD s/p CABG 10/31/24, severe PAD (s/p fem-fem) who presented to [facility] for left leg pain concerning for acute limb ischemia. On exam here, patient has an exam more consistent with his prior recent exam, consisting of chronic limb ischemia (motor and sensation intact, doppler signals present in left foot and fem-fem bypass). Given his rest pain, will proceed with arteriography.     #PAD w/ rest pain  - s/p LLE angiogram with EIA and FELICITY stents  - s/p LLE fem to AK-pop bypass 11/14/24  - CTA b/l LE at OSH 11/8: post fem-fem bypass, severe atherosclerotic disease involving aorta, iliac, and superficial femoral arteries b/l more on the left  - c/w plavix and statin  - pain control  - bowel regimen    #CAD s/p prior PCI and recent CABG   - c/w plavix and statin  - c/w imdur  - c/w metoprolol succinate  - trending hsTrop until stable decline   - appreciate cards recs      #HFrEF  - ECHO 10/24: LVEF 35% with regional wall abnormalities  - c/w metoprolol succinate  - fu repea echo 11/15     #RUL Pulmonary nodule - PET +  - Following with Dr. Duarte outpatient.    #CKD 3  - Monitor Cr and UOP    #BPH  - c/w flomax and proscar    #Prediabetes  - A1C 5.8%    #LLE DVT  -holding heparin ggt in immediate post-op setting . F/U when to restart    #Chest pain  -pt with intermittent chest pain and shortness of breath  -likely chronic angina  -VQ scan with no evidence of PE     Diet: DASH  activity: as tolerated  DVTppx: holding heparin GGT  Dispo: MIGUEL

## 2024-11-17 NOTE — PROGRESS NOTE ADULT - ASSESSMENT
75 yo man PMHx of CAD s/p PCI and recent minimally invasive direct CABG 10/31/24, ICM HFrEF, HTN, CKD, and PAD s/p prior L to R fem-fem bypass who was admitted for CLI of LLE with rest pain, s/p LE angiogram, s/p L common and external iliac stent but prox anastomosis of fem-fem bypass is upwards going (unable to treat endovascularly), w/ occluded L CFA, SFA, and AK popliteal artery w/ reconstitution of BK pop via collaterals w/ AT/PT runoff.    Assessment  1. CAD s/p PCI and minimally invasive direct CABG 10/31/24  EKG done 11/12 3:45 pm NSR, Q wave inferior leads, T wave flattening late precordial leads  EKG post op 11/14 NSR w/ new TWI and STD in lateral and precordial leads  Denies any chest pain at the bedside today  HsTrop remains negative  No concern for postop ACS  2. PAD s/p L-R fem-fem bypass c/b CLI LLE  S/p L common and external iliac stent 11/11 but prox anastomosis of fem-fem bypass is upwards going (unable to treat endovascularly), w/ occluded L CFA, SFA, and AK popliteal artery w/ reconstitution of BK pop via collaterals w/ AT/PT runoff.  Now s/p L fem-pop bypass 11/14  4. ICM HFrEF 35% - euvolemic  5. LLE DVT   V/Q scan negative for PE    Plan  1. Plavix 75mg PO QD and high intensity statin. Off ASA 81mg PO QD given initiation of AC for DVT, to avoid triple therapy  2. New EKG change post op but w/o chest pain currently. HsTrop negative so far. No need to futher trend hsTrop. No concern for postop ACS.   3. GDMT: metop succinate 50mg PO QD, will start ARNI/MRA/SGLT2i when BP better and GFR improved  4. Imdur 90mg PO QD for anti-anginal therapy, hold for SBP <90  5. No need for diuretics for HF  6. Will need pharmacological nuclear stress test as outpatient    Thank you for the consult and the opportunity to take care of this patient.     Marquis Pink M.D.  Cardiology | Vascular Cardiology Attending  Please call (c) [phone] for any questions    During non face-to-face time, I reviewed relevant portions of the patient's medical record. During face-to-face time, I took a relevant history and examined the patient. I also explained differential diagnoses, relevant cardiac diagnoses, workup, and management plan, which required a high level of medical decision making. I answered all questions related to the patient's medical conditions.

## 2024-11-17 NOTE — PROGRESS NOTE ADULT - ASSESSMENT
75yo M, with PMHx of HTN, HLD, BPH, CAD s/p CABG 10/31/24, severe PAD (s/p fem-fem) who presented to [facility] for left leg pain concerning for acute limb ischemia and transferred to [facility], now on vascular service s/p angiogram with fem-fem bypass, L common and external iliac covered stenting and perclose on 11/11. Medicine is consulted for comanagement.     #PAD  #s/p LLE angiogram, fem-fem bypass, L common and external iliac covered stenting and perclose on 11/11  -Continue pain control and bowel regimen per vascular team   -Continue  plavix 75mg daily     #CAD s/p PCI and minimally invasive direct CABG 10/31/24  #Chest pain   #HTN/HLD   -Pt continues to have episodes of chest discomfort; cardiology following   -Per cardiology recommendations will continue Plavix 75mg PO QD and atorvastatin 80mg daily   -Will continue GDMT: metop succinate 50mg PO QD and per cardiology the pt to start ARNI/MRA/SGLT2i when BP better and GFR improved  -Will continue  Imdur 90mg PO QD for anti-anginal therapy, hold for SBP <90  -Obtain check an EKG for any increase/changes in patient's chest pain   -Per cardiology the pt will need pharmacological nuclear stress test as outpatient    #LLE DVT   -Unclear chronicity  -Currently heparin gtt for AC held, would resume as soon as safe from a surgical standpoint   -Pt is s/p VQ scan with low probability of PE    #Acute blood loss anemia   -Pt with Hgb of 8.6 this morning   -Pt s/p transfusion of 1 Unit of PRBCs on 11/16 with appropriate response   -Will continue to trend, transfuse for hemoglobin of 8 or higher given pt's cardiac hx     #Leukocytosis (resolved)  -Will continue to monitor WBC     #FELIPA (resolved)   -Pt's creatinine 1.73 ->1.32   -Will continue to monitor creatinine   -Avoid nephrotoxins as able, renally dose all medications     #RUL Pulmonary nodule - PET +  -Pt to  follow up outpatient with  Dr. Duarte     #BPH  - Continue flomax and proscar    #Prediabetes  -HgbAc 5.8   -Continue consistent carb diet    #DVT ppx  - Held currently, resume when deemed safe by Vascular surgery team     #DISPO  -Pt was seen by PT and recommended for MIGUEL placement     55 minutes spent on total encounter. The necessity of the time spent during the encounter on this date of service was due to:    Review of hospital course, labs, vitals, medical records.  Bedside exam and speaking to the pt with Polish .  Discussed plan of care with primary team   Documenting the encounter.

## 2024-11-17 NOTE — PROGRESS NOTE ADULT - SUBJECTIVE AND OBJECTIVE BOX
Patient was seen and examined at bedside. Case discuss with the primary team. Spoke to the patient with the assistance of the Polish  via the [email or web address] language line .  Pt still reports having some chest and left leg discomfort this morning which is only slightly improved with pain medication.     OBJECTIVE:  Vital Signs Last 24 Hrs  T(C): 36.4 (17 Nov 2024 08:25), Max: 36.7 (16 Nov 2024 17:51)  T(F): 97.6 (17 Nov 2024 08:25), Max: 98.1 (16 Nov 2024 17:51)  HR: 96 (17 Nov 2024 10:05) (83 - 96)  BP: 128/60 (17 Nov 2024 10:05) (91/53 - 128/60)  BP(mean): 86 (17 Nov 2024 10:05) (68 - 86)  RR: 18 (17 Nov 2024 10:05) (14 - 18)  SpO2: 97% (17 Nov 2024 10:05) (94% - 99%)    Parameters below as of 17 Nov 2024 10:05  Patient On (Oxygen Delivery Method): room air      PHYSICAL EXAM:  Gen: NAD laying in bed  CV: RRR, +S1/S2, no mumur; + andrea wall tenderness to palpation.   Pulm: CTA b/l no wheezing or crackles   Abd: soft, NTND + BS no rebound or guarding   Extremities warm, no edema, scattered dry scabs over the L shin   Neuro: AAOX3; nonfocal         LABS:                        8.6    8.96  )-----------( 125      ( 17 Nov 2024 05:30 )             25.6       134[L]  |  104  |  18  ----------------------------<  117[H]  4.1   |  21[L]  |  1.32[H]    Ca    8.1[L]      17 Nov 2024 05:30  Phos  3.0     11-17  Mg     1.7     11-17      PTT:22.9 sec    CAPILLARY BLOOD GLUCOSE  POCT Blood Glucose.: 196 mg/dL (16 Nov 2024 12:00)        acetaminophen     Tablet .. 975 milliGRAM(s) Oral every 8 hours  aspirin enteric coated 81 milliGRAM(s) Oral daily  atorvastatin 80 milliGRAM(s) Oral at bedtime  clopidogrel Tablet 75 milliGRAM(s) Oral daily  finasteride 5 milliGRAM(s) Oral daily  HYDROmorphone  Injectable 0.5 milliGRAM(s) IV Push every 4 hours PRN  influenza  Vaccine (HIGH DOSE) 0.5 milliLiter(s) IntraMuscular once  isosorbide   mononitrate ER Tablet (IMDUR) 90 milliGRAM(s) Oral daily  metoprolol succinate ER 50 milliGRAM(s) Oral daily  oxyCODONE    IR 5 milliGRAM(s) Oral every 6 hours PRN  oxyCODONE    IR 10 milliGRAM(s) Oral every 6 hours PRN  polyethylene glycol 3350 17 Gram(s) Oral daily  senna 2 Tablet(s) Oral at bedtime  sodium chloride 0.9%. 1000 milliLiter(s) IV Continuous <Continuous>  tamsulosin 0.4 milliGRAM(s) Oral at bedtime

## 2024-11-17 NOTE — PROGRESS NOTE ADULT - SUBJECTIVE AND OBJECTIVE BOX
VASCULAR CARDIOLOGY ATTENDING PROGRESS NOTE    SERVICE LINE: [phone]    Subjective  NAEO  No chest pain at the bedside    DVT ppx with SQH     Current Medications:   acetaminophen     Tablet .. 975 milliGRAM(s) Oral every 8 hours  aspirin  chewable 81 milliGRAM(s) Oral daily  atorvastatin 80 milliGRAM(s) Oral at bedtime  clopidogrel Tablet 75 milliGRAM(s) Oral daily  finasteride 5 milliGRAM(s) Oral daily  influenza  Vaccine (HIGH DOSE) 0.5 milliLiter(s) IntraMuscular once  isosorbide   mononitrate ER Tablet (IMDUR) 30 milliGRAM(s) Oral daily  metoprolol tartrate 25 milliGRAM(s) Oral two times a day  oxyCODONE    IR 5 milliGRAM(s) Oral every 4 hours PRN  oxyCODONE    IR 2.5 milliGRAM(s) Oral every 4 hours PRN  pantoprazole    Tablet 40 milliGRAM(s) Oral before breakfast  polyethylene glycol 3350 17 Gram(s) Oral daily  senna 2 Tablet(s) Oral at bedtime  tamsulosin 0.4 milliGRAM(s) Oral at bedtime      REVIEW OF SYSTEMS:  CONSTITUTIONAL: No weakness, fevers or chills  EYES/ENT: No visual changes;  No dysphagia  NECK: No pain or stiffness  RESPIRATORY: No cough, wheezing, hemoptysis; No shortness of breath  CARDIOVASCULAR: No chest pain or palpitations; No lower extremity edema  GASTROINTESTINAL: No abdominal or epigastric pain. No nausea, vomiting, or hematemesis; No diarrhea or constipation. No melena or hematochezia.  BACK: No back pain  GENITOURINARY: No dysuria, frequency or hematuria  NEUROLOGICAL: No numbness or weakness  SKIN: No itching, burning, rashes, or lesions   All other review of systems is negative unless indicated above.    Physical Exam:  T(F): 97.5 (11-12), Max: 98.5 (11-12)  HR: 79 (11-12) (62 - 82)  BP: 143/64 (11-12) (98/52 - 146/67)  BP(mean): 92 (11-12) (71 - 97)  ABP: --  ABP(mean): --  RR: 18 (11-12)  SpO2: 96% (11-12)  GENERAL: No acute distress, well-developed  HEAD:  Atraumatic, Normocephalic  ENT: EOMI, PERRLA, conjunctiva and sclera clear, Neck supple, No JVD, moist mucosa  CHEST/LUNG: Clear to auscultation bilaterally; No wheeze, equal breath sounds bilaterally   BACK: No spinal tenderness  HEART: Regular rate and rhythm; No murmurs, rubs, or gallops  ABDOMEN: Soft, Nontender, Nondistended; Bowel sounds present  EXTREMITIES:  No clubbing, cyanosis, or edema  PSYCH: Nl behavior, nl affect  NEUROLOGY: AAOx3, non-focal, cranial nerves intact  SKIN: Normal color, No rashes or lesions    Cardiovascular Diagnostic Testing: personally reviewed    CXR: Personally reviewed    Labs: Personally reviewed                        12.4   7.49  )-----------( 199      ( 12 Nov 2024 04:29 )             40.2     11-12    137  |  104  |  23  ----------------------------<  95  4.8   |  18[L]  |  1.40[H]    Ca    9.0      12 Nov 2024 04:29  Phos  2.8     11-12  Mg     1.9     11-12      PT/INR - ( 11 Nov 2024 18:16 )   PT: 13.8 sec;   INR: 1.20          PTT - ( 11 Nov 2024 18:16 )  PTT:26.0 sec    CARDIAC MARKERS ( 12 Nov 2024 04:29 )  41 ng/L / x     / x     / x     / x     / 3.1 ng/mL  CARDIAC MARKERS ( 11 Nov 2024 04:18 )  46 ng/L / x     / x     / x     / x     / 3.3 ng/mL

## 2024-11-18 ENCOUNTER — TRANSCRIPTION ENCOUNTER (OUTPATIENT)
Age: 74
End: 2024-11-18

## 2024-11-18 LAB
ANION GAP SERPL CALC-SCNC: 9 MMOL/L — SIGNIFICANT CHANGE UP (ref 5–17)
BLD GP AB SCN SERPL QL: NEGATIVE — SIGNIFICANT CHANGE UP
BUN SERPL-MCNC: 16 MG/DL — SIGNIFICANT CHANGE UP (ref 7–23)
CALCIUM SERPL-MCNC: 8.5 MG/DL — SIGNIFICANT CHANGE UP (ref 8.4–10.5)
CHLORIDE SERPL-SCNC: 104 MMOL/L — SIGNIFICANT CHANGE UP (ref 96–108)
CO2 SERPL-SCNC: 23 MMOL/L — SIGNIFICANT CHANGE UP (ref 22–31)
CREAT SERPL-MCNC: 1.24 MG/DL — SIGNIFICANT CHANGE UP (ref 0.5–1.3)
EGFR: 60 ML/MIN/1.73M2 — SIGNIFICANT CHANGE UP
GLUCOSE SERPL-MCNC: 117 MG/DL — HIGH (ref 70–99)
HCT VFR BLD CALC: 26 % — LOW (ref 39–50)
HGB BLD-MCNC: 8.5 G/DL — LOW (ref 13–17)
MAGNESIUM SERPL-MCNC: 1.9 MG/DL — SIGNIFICANT CHANGE UP (ref 1.6–2.6)
MCHC RBC-ENTMCNC: 31 PG — SIGNIFICANT CHANGE UP (ref 27–34)
MCHC RBC-ENTMCNC: 32.7 G/DL — SIGNIFICANT CHANGE UP (ref 32–36)
MCV RBC AUTO: 94.9 FL — SIGNIFICANT CHANGE UP (ref 80–100)
NRBC # BLD: 0 /100 WBCS — SIGNIFICANT CHANGE UP (ref 0–0)
PHOSPHATE SERPL-MCNC: 3 MG/DL — SIGNIFICANT CHANGE UP (ref 2.5–4.5)
PLATELET # BLD AUTO: 149 K/UL — LOW (ref 150–400)
POTASSIUM SERPL-MCNC: 4.2 MMOL/L — SIGNIFICANT CHANGE UP (ref 3.5–5.3)
POTASSIUM SERPL-SCNC: 4.2 MMOL/L — SIGNIFICANT CHANGE UP (ref 3.5–5.3)
RBC # BLD: 2.74 M/UL — LOW (ref 4.2–5.8)
RBC # FLD: 14.7 % — HIGH (ref 10.3–14.5)
RH IG SCN BLD-IMP: POSITIVE — SIGNIFICANT CHANGE UP
SODIUM SERPL-SCNC: 136 MMOL/L — SIGNIFICANT CHANGE UP (ref 135–145)
WBC # BLD: 6.79 K/UL — SIGNIFICANT CHANGE UP (ref 3.8–10.5)
WBC # FLD AUTO: 6.79 K/UL — SIGNIFICANT CHANGE UP (ref 3.8–10.5)

## 2024-11-18 PROCEDURE — 99233 SBSQ HOSP IP/OBS HIGH 50: CPT

## 2024-11-18 PROCEDURE — 93010 ELECTROCARDIOGRAM REPORT: CPT

## 2024-11-18 RX ORDER — CLOPIDOGREL 75 MG/1
75 TABLET, FILM COATED ORAL DAILY
Refills: 0 | Status: DISCONTINUED | OUTPATIENT
Start: 2024-11-18 | End: 2024-11-27

## 2024-11-18 RX ORDER — CALCIUM CARBONATE 500(1250)
1 TABLET ORAL THREE TIMES A DAY
Refills: 0 | Status: DISCONTINUED | OUTPATIENT
Start: 2024-11-18 | End: 2024-11-27

## 2024-11-18 RX ORDER — APIXABAN 2.5 MG/1
5 TABLET, FILM COATED ORAL EVERY 12 HOURS
Refills: 0 | Status: DISCONTINUED | OUTPATIENT
Start: 2024-11-18 | End: 2024-11-19

## 2024-11-18 RX ADMIN — OXYCODONE HYDROCHLORIDE 10 MILLIGRAM(S): 30 TABLET ORAL at 03:00

## 2024-11-18 RX ADMIN — OXYCODONE HYDROCHLORIDE 10 MILLIGRAM(S): 30 TABLET ORAL at 20:50

## 2024-11-18 RX ADMIN — ACETAMINOPHEN 500MG 975 MILLIGRAM(S): 500 TABLET, COATED ORAL at 21:56

## 2024-11-18 RX ADMIN — Medication 80 MILLIGRAM(S): at 21:56

## 2024-11-18 RX ADMIN — ACETAMINOPHEN 500MG 975 MILLIGRAM(S): 500 TABLET, COATED ORAL at 22:56

## 2024-11-18 RX ADMIN — Medication 81 MILLIGRAM(S): at 11:35

## 2024-11-18 RX ADMIN — Medication 1 TABLET(S): at 20:32

## 2024-11-18 RX ADMIN — OXYCODONE HYDROCHLORIDE 10 MILLIGRAM(S): 30 TABLET ORAL at 19:50

## 2024-11-18 RX ADMIN — Medication 10 MILLIGRAM(S): at 08:14

## 2024-11-18 RX ADMIN — OXYCODONE HYDROCHLORIDE 10 MILLIGRAM(S): 30 TABLET ORAL at 02:13

## 2024-11-18 RX ADMIN — ACETAMINOPHEN 500MG 975 MILLIGRAM(S): 500 TABLET, COATED ORAL at 07:00

## 2024-11-18 RX ADMIN — TAMSULOSIN HYDROCHLORIDE 0.4 MILLIGRAM(S): 0.4 CAPSULE ORAL at 21:56

## 2024-11-18 RX ADMIN — HYDROMORPHONE HYDROCHLORIDE 0.5 MILLIGRAM(S): 2 TABLET ORAL at 11:58

## 2024-11-18 RX ADMIN — HYDROMORPHONE HYDROCHLORIDE 0.5 MILLIGRAM(S): 2 TABLET ORAL at 06:59

## 2024-11-18 RX ADMIN — APIXABAN 5 MILLIGRAM(S): 2.5 TABLET, FILM COATED ORAL at 09:14

## 2024-11-18 RX ADMIN — HYDROMORPHONE HYDROCHLORIDE 0.5 MILLIGRAM(S): 2 TABLET ORAL at 07:03

## 2024-11-18 RX ADMIN — APIXABAN 5 MILLIGRAM(S): 2.5 TABLET, FILM COATED ORAL at 17:55

## 2024-11-18 RX ADMIN — Medication 5 MILLIGRAM(S): at 11:35

## 2024-11-18 RX ADMIN — OXYCODONE HYDROCHLORIDE 5 MILLIGRAM(S): 30 TABLET ORAL at 10:41

## 2024-11-18 RX ADMIN — Medication 90 MILLIGRAM(S): at 08:13

## 2024-11-18 RX ADMIN — OXYCODONE HYDROCHLORIDE 5 MILLIGRAM(S): 30 TABLET ORAL at 11:57

## 2024-11-18 RX ADMIN — METOPROLOL TARTRATE 50 MILLIGRAM(S): 100 TABLET, FILM COATED ORAL at 11:35

## 2024-11-18 RX ADMIN — ACETAMINOPHEN 500MG 975 MILLIGRAM(S): 500 TABLET, COATED ORAL at 08:00

## 2024-11-18 RX ADMIN — Medication 100 GRAM(S): at 09:14

## 2024-11-18 RX ADMIN — HYDROMORPHONE HYDROCHLORIDE 0.5 MILLIGRAM(S): 2 TABLET ORAL at 11:36

## 2024-11-18 NOTE — PROGRESS NOTE ADULT - SUBJECTIVE AND OBJECTIVE BOX
Medicine Progress Note    Patient is a 74y old  Male who presents with a chief complaint of transfer for rule out limb ischemia (18 Nov 2024 08:41)      SUBJECTIVE / OVERNIGHT EVENTS:  Feels about the same today, still having diffuse pain especially in his leg but also sometimes in his chest. Denies having any shortness of breath. Tolerating PO, having bowel movements.     MEDICATIONS  (STANDING):  acetaminophen     Tablet .. 975 milliGRAM(s) Oral every 8 hours  apixaban 5 milliGRAM(s) Oral every 12 hours  aspirin enteric coated 81 milliGRAM(s) Oral daily  atorvastatin 80 milliGRAM(s) Oral at bedtime  finasteride 5 milliGRAM(s) Oral daily  influenza  Vaccine (HIGH DOSE) 0.5 milliLiter(s) IntraMuscular once  isosorbide   mononitrate ER Tablet (IMDUR) 90 milliGRAM(s) Oral daily  metoprolol succinate ER 50 milliGRAM(s) Oral daily  polyethylene glycol 3350 17 Gram(s) Oral daily  senna 2 Tablet(s) Oral at bedtime  tamsulosin 0.4 milliGRAM(s) Oral at bedtime    MEDICATIONS  (PRN):  bisacodyl Suppository 10 milliGRAM(s) Rectal daily PRN Constipation  HYDROmorphone  Injectable 0.5 milliGRAM(s) IV Push every 4 hours PRN breakthrough pain  oxyCODONE    IR 10 milliGRAM(s) Oral every 6 hours PRN Severe Pain (7 - 10)  oxyCODONE    IR 5 milliGRAM(s) Oral every 6 hours PRN Moderate Pain (4 - 6)  simethicone 80 milliGRAM(s) Chew every 4 hours PRN Indigestion    CAPILLARY BLOOD GLUCOSE        I&O's Summary    17 Nov 2024 07:01  -  18 Nov 2024 07:00  --------------------------------------------------------  IN: 460 mL / OUT: 1175 mL / NET: -715 mL    18 Nov 2024 07:01  -  18 Nov 2024 12:46  --------------------------------------------------------  IN: 340 mL / OUT: 0 mL / NET: 340 mL        PHYSICAL EXAM:  Vital Signs Last 24 Hrs  T(C): 37 (18 Nov 2024 09:02), Max: 37.3 (18 Nov 2024 02:06)  T(F): 98.6 (18 Nov 2024 09:02), Max: 99.1 (18 Nov 2024 02:06)  HR: 91 (18 Nov 2024 11:29) (90 - 100)  BP: 138/63 (18 Nov 2024 11:29) (102/53 - 157/70)  BP(mean): 89 (17 Nov 2024 18:22) (81 - 100)  RR: 18 (18 Nov 2024 11:29) (15 - 22)  SpO2: 97% (18 Nov 2024 11:29) (96% - 100%)    Parameters below as of 18 Nov 2024 11:29  Patient On (Oxygen Delivery Method): room air      Appears comfortable sitting up in bed   MMM  Normal WOB on RA, CTAB   RRR, no mrg   Abdomen soft, nontender, nondistended  Extremities warm, L shin with some dried superficial scabs, no edema in extremities   AOX3, no focal neuro deficits     LABS:                        8.5    6.79  )-----------( 149      ( 18 Nov 2024 05:30 )             26.0     11-18    136  |  104  |  16  ----------------------------<  117[H]  4.2   |  23  |  1.24    Ca    8.5      18 Nov 2024 05:30  Phos  3.0     11-18  Mg     1.9     11-18            Urinalysis Basic - ( 18 Nov 2024 05:30 )    Color: x / Appearance: x / SG: x / pH: x  Gluc: 117 mg/dL / Ketone: x  / Bili: x / Urobili: x   Blood: x / Protein: x / Nitrite: x   Leuk Esterase: x / RBC: x / WBC x   Sq Epi: x / Non Sq Epi: x / Bacteria: x        COVID-19 PCR: NotDetec (07 Nov 2024 14:11)      RADIOLOGY & ADDITIONAL TESTS:  Imaging from Last 24 Hours:  None new     EKG from this AM stable from priors.

## 2024-11-18 NOTE — DISCHARGE NOTE PROVIDER - PROVIDER TOKENS
PROVIDER:[TOKEN:[[id number]],SCHEDULEDAPPT:[12/03/2024],SCHEDULEDAPPTTIME:[09:30 AM]] PROVIDER:[TOKEN:[[id number]],SCHEDULEDAPPT:[12/03/2024],SCHEDULEDAPPTTIME:[09:30 AM]],PROVIDER:[TOKEN:[[id number]],SCHEDULEDAPPT:[12/16/2024],SCHEDULEDAPPTTIME:[10:15 AM]]

## 2024-11-18 NOTE — PROGRESS NOTE ADULT - SUBJECTIVE AND OBJECTIVE BOX
O/N: large BM x1, simethicone added, chest discomfort improved      ---------------------------------------------------------------------------  PLEASE CHECK WHEN PRESENT:  [ x ]Heart Failure     [  ] Acute     [  ] Acute on Chronic     [ x ] Chronic     [  ]Diastolic [HFpEF]     [ x ]Systolic [HFrEF]     [  ]Combined [HFpEF & HFrEF]  .................................................................................  [x ] Hypertensive Heart Disease  [x ] CAD  [  ] Atrial Fibrillation     [  ] Paroxysmal A-fib     [  ] Chronic A-fib     [  ] Persistent A-fib     [  ] Longstanding Persistent A-fib     [  ] Permanent A-fib  [  ] Pulmonary Hypertension  [  ] Other:  -------------------------------------------------------------------  [  ] Respiratory failure  [  ] Acute PE   [  ] Acute cor pulmonale  [  ] Asthma/COPD Exacerbation  [  ] COPD on home O2 (Chronic renal Failure)   [  ] Atelectasis   [  ] Pleural effusion  [  ] Aspiration pneumonia  [  ] Obstructive Sleep Apnea  -------------------------------------------------------------------  [  ] Acute Kidney Injury      [  ] Acute Tubular Necrosis      [  ] Reneal Medullary Necrosis     [  ] Renal Cortical Necrosis     [  ] Other Pathological Lesions:  [ x ] Chronic Kidney Disease     [  ]CKD 1     [  ]CKD 2     [ x ]CKD 3     [  ]CKD 4     [  ]CKD 5 (ESRD)  [  ]Other:  -------------------------------------------------------------------  [  ] Diabetes  [  ] Diabetic PVD Ulcer  [  ] Neuropathic ulcer to DM  [  ] Diabetes with Nephropathy  [  ] Osteomyelitis due to diabetes  [  ] Hyperglycemia   [  ] Hypoglycemia   --------------------------------------------------------------------  [  ] Malnutrition: See Nutrition Note  [  ] Cachexia  [  ] Other:   [  ] Supplement Ordered:  [  ] Morbid Obesity (BMI >=40]  [  ] Ileus  ---------------------------------------------------------------------  [  ] Sepsis/severe sepsis/septic shock  [  ] Noninfectious SIRS  [  ] UTI  [  ] Pneumonia  [  ] Thrombophlebitis   -----------------------------------------------------------------------  [  ] Acidosis/alkalosis  [  ] Fluid overload  [  ] Hypokalemia  [  ] Hyperkalemia  [  ] Hypomagnesemia  [  ] Hypophosphatemia  [  ] Hyperphosphatemia  ------------------------------------------------------------------------  [  ] Acute blood loss anemia  [  ] Post op blood loss anemia  [  ] Iron deficiency anemia  [  ] Anemia due to chronic disease  [  ] Hypercoagulable state  [  ] Thrombocytopenia  ----------------------------------------------------------------------  [  ] Cerebral infarction  [  ] Transient ischemia attack  [  ] Encephalopathy - Toxic or Metabolic    A/P: 73yo M, with PMHx of HTN, HLD, BPH, HFrEF, CAD s/p CABG 10/31/24, severe PAD (s/p fem-fem) who presented to [facility] for left leg pain concerning for acute limb ischemia. On exam here, patient has an exam more consistent with his prior recent exam, consisting of chronic limb ischemia (motor and sensation intact, doppler signals present in left foot and fem-fem bypass). Given his rest pain, will proceed with arteriography.     #PAD w/ rest pain  - s/p LLE angiogram with EIA and FELICITY stents  - s/p LLE fem to AK-pop bypass 11/14/24  - CTA b/l LE at OSH 11/8: post fem-fem bypass, severe atherosclerotic disease involving aorta, iliac, and superficial femoral arteries b/l more on the left  - c/w plavix and statin  - pain control  - bowel regimen    #CAD s/p prior PCI and recent CABG   - c/w plavix and statin  - c/w imdur  - c/w metoprolol succinate  - trending hsTrop until stable decline   - appreciate cards recs    #HFrEF  - ECHO 10/24: LVEF 35% with regional wall abnormalities  - c/w metoprolol succinate  - repeat TTE 11/15: Not all myocardial wall segments are visualized. Basal and mid inferolateral walls are akinetic. Overall LV systolic function appears mild to moderately reduced.    #RUL Pulmonary nodule - PET +  - Following with Dr. Duarte outpatient.    #CKD 3  - Monitor Cr and UOP    #BPH  - c/w flomax and proscar    #Prediabetes  - A1C 5.8%    #LLE DVT  -holding heparin ggt in immediate post-op setting . F/U when to restart    #Chest pain  -pt with intermittent chest pain and shortness of breath  -likely chronic angina  -VQ scan with no evidence of PE   - c/w imdur    Diet: DASH  activity: as tolerated  DVTppx: holding heparin GGT  Dispo: MIGUEL   O/N: large BM x1, simethicone added, chest discomfort improved    S: Patient does not have any complaints    O: Examined in bed resting comfortably     aspirin enteric coated 81  clopidogrel Tablet 75  isosorbide   mononitrate ER Tablet (IMDUR) 90  metoprolol succinate ER 50      Allergies    No Known Allergies    Intolerances        Vital Signs Last 24 Hrs  T(C): 36.4 (18 Nov 2024 06:30), Max: 37.3 (18 Nov 2024 02:06)  T(F): 97.6 (18 Nov 2024 06:30), Max: 99.1 (18 Nov 2024 02:06)  HR: 90 (18 Nov 2024 06:30) (90 - 100)  BP: 123/56 (18 Nov 2024 06:30) (103/59 - 157/70)  BP(mean): 89 (17 Nov 2024 18:22) (81 - 100)  RR: 18 (18 Nov 2024 06:30) (15 - 22)  SpO2: 98% (18 Nov 2024 06:30) (96% - 100%)    Parameters below as of 18 Nov 2024 06:30  Patient On (Oxygen Delivery Method): room air      I&O's Summary    17 Nov 2024 07:01  -  18 Nov 2024 07:00  --------------------------------------------------------  IN: 460 mL / OUT: 975 mL / NET: -515 mL        Physical Exam:  General: alert and awake, NAD  Pulmonary: no respiratory distress  Cardiovascular: RRR  Abdominal: soft  Extrem: WWP, no calf edema, L groin incision c/d/i, mild improving ecchymosis surrounding, soft, no palpable hematoma. motor and sensory intact bilaterally  Pulses: LLE palp DP, triphasic PT       LABS:                        8.6    8.96  )-----------( 125      ( 17 Nov 2024 05:30 )             25.6     11-17    134[L]  |  104  |  18  ----------------------------<  117[H]  4.1   |  21[L]  |  1.32[H]    Ca    8.1[L]      17 Nov 2024 05:30  Phos  3.0     11-17  Mg     1.7     11-17          Radiology and Additional Studies:    ---------------------------------------------------------------------------  PLEASE CHECK WHEN PRESENT:  [ x ]Heart Failure     [  ] Acute     [  ] Acute on Chronic     [ x ] Chronic     [  ]Diastolic [HFpEF]     [ x ]Systolic [HFrEF]     [  ]Combined [HFpEF & HFrEF]  .................................................................................  [x ] Hypertensive Heart Disease  [x ] CAD  [  ] Atrial Fibrillation     [  ] Paroxysmal A-fib     [  ] Chronic A-fib     [  ] Persistent A-fib     [  ] Longstanding Persistent A-fib     [  ] Permanent A-fib  [  ] Pulmonary Hypertension  [  ] Other:  -------------------------------------------------------------------  [  ] Respiratory failure  [  ] Acute PE   [  ] Acute cor pulmonale  [  ] Asthma/COPD Exacerbation  [  ] COPD on home O2 (Chronic renal Failure)   [  ] Atelectasis   [  ] Pleural effusion  [  ] Aspiration pneumonia  [  ] Obstructive Sleep Apnea  -------------------------------------------------------------------  [  ] Acute Kidney Injury      [  ] Acute Tubular Necrosis      [  ] Reneal Medullary Necrosis     [  ] Renal Cortical Necrosis     [  ] Other Pathological Lesions:  [ x ] Chronic Kidney Disease     [  ]CKD 1     [  ]CKD 2     [ x ]CKD 3     [  ]CKD 4     [  ]CKD 5 (ESRD)  [  ]Other:  -------------------------------------------------------------------  [  ] Diabetes  [  ] Diabetic PVD Ulcer  [  ] Neuropathic ulcer to DM  [  ] Diabetes with Nephropathy  [  ] Osteomyelitis due to diabetes  [  ] Hyperglycemia   [  ] Hypoglycemia   --------------------------------------------------------------------  [  ] Malnutrition: See Nutrition Note  [  ] Cachexia  [  ] Other:   [  ] Supplement Ordered:  [  ] Morbid Obesity (BMI >=40]  [  ] Ileus  ---------------------------------------------------------------------  [  ] Sepsis/severe sepsis/septic shock  [  ] Noninfectious SIRS  [  ] UTI  [  ] Pneumonia  [  ] Thrombophlebitis   -----------------------------------------------------------------------  [  ] Acidosis/alkalosis  [  ] Fluid overload  [  ] Hypokalemia  [  ] Hyperkalemia  [  ] Hypomagnesemia  [  ] Hypophosphatemia  [  ] Hyperphosphatemia  ------------------------------------------------------------------------  [  ] Acute blood loss anemia  [  ] Post op blood loss anemia  [  ] Iron deficiency anemia  [  ] Anemia due to chronic disease  [  ] Hypercoagulable state  [  ] Thrombocytopenia  ----------------------------------------------------------------------  [  ] Cerebral infarction  [  ] Transient ischemia attack  [  ] Encephalopathy - Toxic or Metabolic    A/P: 75yo M, with PMHx of HTN, HLD, BPH, HFrEF, CAD s/p CABG 10/31/24, severe PAD (s/p fem-fem) who presented to [facility] for left leg pain concerning for acute limb ischemia. On exam here, patient has an exam more consistent with his prior recent exam, consisting of chronic limb ischemia (motor and sensation intact, doppler signals present in left foot and fem-fem bypass). Given his rest pain, will proceed with arteriography.     #PAD w/ rest pain  - s/p LLE angiogram with EIA and FELICITY stents  - s/p LLE fem to AK-pop bypass 11/14/24  - CTA b/l LE at OSH 11/8: post fem-fem bypass, severe atherosclerotic disease involving aorta, iliac, and superficial femoral arteries b/l more on the left  - c/w plavix and statin  - pain control  - bowel regimen    #CAD s/p prior PCI and recent CABG   - c/w plavix and statin  - c/w imdur  - c/w metoprolol succinate  - trending Trops until stable decline   - appreciate cards recs    #HFrEF  - ECHO 10/24: LVEF 35% with regional wall abnormalities  - c/w metoprolol succinate  - repeat TTE 11/15: Not all myocardial wall segments are visualized. Basal and mid inferolateral walls are akinetic. Overall LV systolic function appears mild to moderately reduced.    #RUL Pulmonary nodule - PET +  - Following with Dr. Duarte outpatient.    #CKD 3  - Monitor Cr and UOP    #BPH  - c/w flomax and proscar    #Prediabetes  - A1C 5.8%    #LLE DVT  -holding heparin ggt in immediate post-op setting  -Will restart ELiquis today if Hgb stable    #Chest pain  -pt with intermittent chest pain and shortness of breath  -likely chronic angina  -VQ scan with no evidence of PE   - c/w Imdur    Diet: DASH  activity: as tolerated  DVTppx: holding heparin GGT  Dispo: MIGUEL

## 2024-11-18 NOTE — DISCHARGE NOTE PROVIDER - ATTENDING DISCHARGE PHYSICAL EXAMINATION:
Mr. Sanabria is a 74M with history of CAD with PCI and MIDCAB, ICM, CKD and PAD with hx of L-R fem-fem bypass and presented with acute limb ischemia and admitted initially to vascular surgery underwent  LLE fem to AK pop bypass c/b hematoma and further complicated by angina and NSTEMI and transferred to cardiology, Coronary anatomy unchanged and deemed type 2 MI in setting of anemia/hypoperfusion and optimized from antianginal standpoint, stepped down from CCU to telemetry.     Review of studies:  [facility] 11/19/24:  LIMA-LAD patent, native dLM 50-60% stenosis, pLAD 70% diffuse disease, dLAD mild diffuse;  LCX ostial , known; RCA not engaged, known .    Exam:  NAD  JVP normal  Lungs CTA  WWP, no edema, healing wounds with bandage on L leg.     Type 2 MI- Optimize antianginals increase BB to 100mg daily, Imdur is at 240mg, ranexa 1000mg BID, Plavix/Eliquis- PPI.   ICM- LVEF 35%, RV function normal, no significant valve disease. on Farxiga, Salvador, Toprol as above. Add arb vs entresto.   Anemia- s/p PRBC, hgb stable  PAD- Eliquis/plavix as above  DVT- on eliquis.

## 2024-11-18 NOTE — DISCHARGE NOTE PROVIDER - DETAILS OF MALNUTRITION DIAGNOSIS/DIAGNOSES
This patient has been assessed with a concern for Malnutrition and was treated during this hospitalization for the following Nutrition diagnosis/diagnoses:     -  11/22/2024: Moderate protein-calorie malnutrition

## 2024-11-18 NOTE — PROGRESS NOTE ADULT - ASSESSMENT
75yo M, with PMHx of HTN, HLD, BPH, CAD s/p CABG 10/31/24, severe PAD (s/p fem-fem) who presented to [facility] for left leg pain concerning for acute limb ischemia and transferred to [facility], now on vascular service s/p angiogram with fem-fem bypass, L common and external iliac covered stenting and perclose on 11/11. Medicine is consulted for comanagement.     #PAD  #s/p LLE angiogram, fem-fem bypass, L common and external iliac covered stenting and perclose on 11/11  -Continue pain control and bowel regimen per vascular team   -Given DVT transitioned from plavix to ASA and eliquis 5 BID     #CAD s/p PCI and minimally invasive direct CABG 10/31/24  #Chest pain   #HTN/HLD   -Pt continues to have episodes of chest discomfort; cardiology following   -Continue AC with eliquis, ASA   -Will continue GDMT: metop succinate 50mg PO QD and per cardiology the pt to start ARNI/MRA/SGLT2i when BP better and GFR improved  -Will continue  Imdur 90mg PO QD for anti-anginal therapy, hold for SBP <90  -Obtain check an EKG for any increase/changes in patient's chest pain   -Per cardiology the pt will need pharmacological nuclear stress test as outpatient    #LLE DVT   -Unclear chronicity  -Started on eliquis 5 BID today     #Acute blood loss anemia   -Pt with Hgb of 8.5 this morning   -Pt s/p transfusion of 1 Unit of PRBCs on 11/16 with appropriate response   -Will continue to trend, transfuse for hemoglobin of 8 or higher given pt's cardiac hx     #Leukocytosis (resolved)  -Will continue to monitor WBC     #FELIPA (resolved)   -Pt's creatinine now in 1.2 range   -Will continue to monitor creatinine   -Avoid nephrotoxins as able, renally dose all medications     #RUL Pulmonary nodule - PET +  -Pt to  follow up outpatient with  Dr. Duarte     #BPH  - Continue flomax and proscar    #Prediabetes  -HgbAc 5.8   -Continue consistent carb diet    #DISPO  -Pt was seen by PT and recommended for MIGUEL placement

## 2024-11-18 NOTE — PROGRESS NOTE ADULT - SUBJECTIVE AND OBJECTIVE BOX
VASCULAR CARDIOLOGY ATTENDING PROGRESS NOTE    SERVICE LINE: [phone]    Subjective  NAEO  No chest pain at the bedside    DVT ppx with SQH     Current Medications:   acetaminophen     Tablet .. 975 milliGRAM(s) Oral every 8 hours  aspirin  chewable 81 milliGRAM(s) Oral daily  atorvastatin 80 milliGRAM(s) Oral at bedtime  clopidogrel Tablet 75 milliGRAM(s) Oral daily  finasteride 5 milliGRAM(s) Oral daily  influenza  Vaccine (HIGH DOSE) 0.5 milliLiter(s) IntraMuscular once  isosorbide   mononitrate ER Tablet (IMDUR) 30 milliGRAM(s) Oral daily  metoprolol tartrate 25 milliGRAM(s) Oral two times a day  oxyCODONE    IR 5 milliGRAM(s) Oral every 4 hours PRN  oxyCODONE    IR 2.5 milliGRAM(s) Oral every 4 hours PRN  pantoprazole    Tablet 40 milliGRAM(s) Oral before breakfast  polyethylene glycol 3350 17 Gram(s) Oral daily  senna 2 Tablet(s) Oral at bedtime  tamsulosin 0.4 milliGRAM(s) Oral at bedtime      REVIEW OF SYSTEMS:  CONSTITUTIONAL: No weakness, fevers or chills  EYES/ENT: No visual changes;  No dysphagia  NECK: No pain or stiffness  RESPIRATORY: No cough, wheezing, hemoptysis; No shortness of breath  CARDIOVASCULAR: No chest pain or palpitations; No lower extremity edema  GASTROINTESTINAL: No abdominal or epigastric pain. No nausea, vomiting, or hematemesis; No diarrhea or constipation. No melena or hematochezia.  BACK: No back pain  GENITOURINARY: No dysuria, frequency or hematuria  NEUROLOGICAL: No numbness or weakness  SKIN: No itching, burning, rashes, or lesions   All other review of systems is negative unless indicated above.    Physical Exam:  T(F): 97.5 (11-12), Max: 98.5 (11-12)  HR: 79 (11-12) (62 - 82)  BP: 143/64 (11-12) (98/52 - 146/67)  BP(mean): 92 (11-12) (71 - 97)  ABP: --  ABP(mean): --  RR: 18 (11-12)  SpO2: 96% (11-12)  GENERAL: No acute distress, well-developed  HEAD:  Atraumatic, Normocephalic  ENT: EOMI, PERRLA, conjunctiva and sclera clear, Neck supple, No JVD, moist mucosa  CHEST/LUNG: Clear to auscultation bilaterally; No wheeze, equal breath sounds bilaterally   BACK: No spinal tenderness  HEART: Regular rate and rhythm; No murmurs, rubs, or gallops  ABDOMEN: Soft, Nontender, Nondistended; Bowel sounds present  EXTREMITIES:  No clubbing, cyanosis, or edema  PSYCH: Nl behavior, nl affect  NEUROLOGY: AAOx3, non-focal, cranial nerves intact  SKIN: Normal color, No rashes or lesions    Cardiovascular Diagnostic Testing: personally reviewed    CXR: Personally reviewed    Labs: Personally reviewed                        12.4   7.49  )-----------( 199      ( 12 Nov 2024 04:29 )             40.2     11-12    137  |  104  |  23  ----------------------------<  95  4.8   |  18[L]  |  1.40[H]    Ca    9.0      12 Nov 2024 04:29  Phos  2.8     11-12  Mg     1.9     11-12      PT/INR - ( 11 Nov 2024 18:16 )   PT: 13.8 sec;   INR: 1.20          PTT - ( 11 Nov 2024 18:16 )  PTT:26.0 sec    CARDIAC MARKERS ( 12 Nov 2024 04:29 )  41 ng/L / x     / x     / x     / x     / 3.1 ng/mL  CARDIAC MARKERS ( 11 Nov 2024 04:18 )  46 ng/L / x     / x     / x     / x     / 3.3 ng/mL

## 2024-11-18 NOTE — DISCHARGE NOTE PROVIDER - CARE PROVIDER_API CALL
Marquis Pink  Cardiovascular Disease  [address] [address]  Phone: [phone]  Fax: [phone]  Scheduled Appointment: 12/03/2024 09:30 AM   Marquis Pink  Cardiovascular Disease  [address] [address]  Phone: [phone]  Fax: [phone]  Scheduled Appointment: 12/03/2024 09:30 AM    Partha Valerio  Vascular Surgery  [address]  [place] [address]  Phone: [phone]  Fax: [phone]  Scheduled Appointment: 12/16/2024 10:15 AM

## 2024-11-18 NOTE — DISCHARGE NOTE PROVIDER - NSDCFUADDAPPT_GEN_ALL_CORE_FT
Follow up with cardiologist Dr. Pink on 12/3/24 at 9:30 AM.     Follow up with Vascular _____.  Follow up with cardiologist Dr. Pink on 12/3/24 at 9:30 AM.     Follow up with Vascular Dr. Valerio on 12/16/24 at 10:15 AM.

## 2024-11-18 NOTE — DISCHARGE NOTE PROVIDER - NSDCCPCAREPLAN_GEN_ALL_CORE_FT
PRINCIPAL DISCHARGE DIAGNOSIS  Diagnosis: Acute lower limb ischemia  Assessment and Plan of Treatment:       SECONDARY DISCHARGE DIAGNOSES  Diagnosis: CAD (coronary artery disease)  Assessment and Plan of Treatment:     Diagnosis: Chronic kidney disease  Assessment and Plan of Treatment:     Diagnosis: Chronic HFrEF (heart failure with reduced ejection fraction)  Assessment and Plan of Treatment:      PRINCIPAL DISCHARGE DIAGNOSIS  Diagnosis: Acute lower limb ischemia  Assessment and Plan of Treatment: You have a history of peripheral artery disease (PAD). PAD is a conditoint where an accumulation of plaque (fats and cholesterol) build up in the arteries in your legs or arms. This makes it harder for your blood to carry oxygen and nutrients to the tissues in those areas. PAD can cause muscle pain that gets worse with activity and better with rest, called "claudication." PAD can also cause wounds to heal more slowly than usual. PAD is a long-term disease, but you can improve it by exercising, eating less fat and giving up tobacco products.   You underwent a left lower leg Peripheral Angiogram on 11/11/24 and received stents to the left external iliac artery and left common iliac artery, and also underwent a fem to AK-pop bypass.   PLEASE CONTINUE ASPIRIN 81MG DAILY AND PLAVIX 75MG DAILY. DO NOT STOP THESE MEDICATIONS FOR ANY REASON AS THEY ARE KEEPING YOUR ARTERY PATENT.   -Please follow up with your Vascular doctor _____.         SECONDARY DISCHARGE DIAGNOSES  Diagnosis: CAD (coronary artery disease)  Assessment and Plan of Treatment: You recently underwent a CABG (coronary artery bypass surgery) on 10/31/24. Due to continued chest pain, you underwent a repeat cardiac catheterization on 11/19/24 which didn't reveal any new blockages.   PLEASE CONTINUE ELIQUIS 5MG 2X/DAY AND PLAVIX 75MG DAILY. DO NOT STOP THESE MEDICATIONS FOR ANY REASON AS THEY ARE KEEPING YOUR STENT OPEN AND PREVENTING A HEART ATTACK.   - To help with your chest pain, please continue Ranexa 1000mg 2x/day and Imdur 240mg daily.   Aspirin can put you at increased risk of bleeding; please avoid NSAIDS (such as Motrin, Advil, Ibuprofen, Naproxen, or Aleve, as these medications may further your risk of bleeding.  Please follow up with Dr. Pink on 12/6/24 at 9:30 AM.  For recurrent chest pain, please call your doctor or go to the emergency room.      Diagnosis: Chronic kidney disease  Assessment and Plan of Treatment: Your day of discharge creatinine was  4.96. In order to prevent further disease progression, continue to follow recommendations made by your primary provider/nephrologist. Continue a diet that is low in sodium and avoid foods that are concentrated in potassium and phosphorus. Continue your medications/supplementations as directed and avoid over-the-counter drugs that are harmful to kidneys, such as, Non-Steroidal Anti-Inflammatory Drugs (NSAIDs). Follow-up as outpatient to monitor your kidney function, as well as, vitamin D, Calcium, potassium, and phosphorus levels.      Diagnosis: Chronic HFrEF (heart failure with reduced ejection fraction)  Assessment and Plan of Treatment: -Heart failure is a condition that occurs when the heart cannot pump blood as well as it should; this leads to inadequate blood flow to vital organs such as the kidneys and congestion (buildup of fluid) in other vital organs such as the lungs.  This can lead to symptoms such as swelling, trouble breathing, and feeling tired.   -You have a history of weakened heart muscle called systolic congestive heart failure. When the heart pumps, it doesn't squeeze normally.  Your Ejection Fraction (EF) is 35%, a normal EF is 55-60%.   -Please continue Toprol 100mg daily, Spironolactone 25mg daily, and Farxiga 10mg daily to help strengthen your heart.   -Avoid drinking more than 1.5L of fluid daily and maintain a low salt diet (max 2grams daily).  -Please weigh yourself daily, for any significant increases in daily weight of 2lbs/day or 5lbs/week with associated swelling in the legs or abdomen and/or shortness of breath, please call your doctor or go to the emergency room.  -Please make sure you follow up with Dr. Pink on 12/3/24 oat 9:30 AM.    Diagnosis: Open wound of left heel  Assessment and Plan of Treatment: Wound care to Left heel, left lateral lower leg: Aquacel Ag, foam dressing. Change every 3 days    Diagnosis: Sacral wound  Assessment and Plan of Treatment: Wound care to Sacrum: Allevyn foam. Change every 3 days     PRINCIPAL DISCHARGE DIAGNOSIS  Diagnosis: Acute lower limb ischemia  Assessment and Plan of Treatment: You have a history of peripheral artery disease (PAD). PAD is a conditoint where an accumulation of plaque (fats and cholesterol) build up in the arteries in your legs or arms. This makes it harder for your blood to carry oxygen and nutrients to the tissues in those areas. PAD can cause muscle pain that gets worse with activity and better with rest, called "claudication." PAD can also cause wounds to heal more slowly than usual. PAD is a long-term disease, but you can improve it by exercising, eating less fat and giving up tobacco products.  You underwent a left lower leg Peripheral Angiogram on 11/11/24 and received stents to the left external iliac artery and left common iliac artery, and also underwent a fem to AK-pop bypass.   PLEASE CONTINUE ASPIRIN 81MG DAILY AND PLAVIX 75MG DAILY. DO NOT STOP THESE MEDICATIONS FOR ANY REASON AS THEY ARE KEEPING YOUR ARTERY PATENT.   -Please follow up with your Vascular doctor Dr. Valerio on 12/16 at 10:15AM.         SECONDARY DISCHARGE DIAGNOSES  Diagnosis: CAD (coronary artery disease)  Assessment and Plan of Treatment: You recently underwent a CABG (coronary artery bypass surgery) on 10/31/24. Due to continued chest pain, you underwent a repeat cardiac catheterization on 11/19/24 which didn't reveal any new blockages.   PLEASE CONTINUE ELIQUIS 5MG 2X/DAY AND PLAVIX 75MG DAILY. DO NOT STOP THESE MEDICATIONS FOR ANY REASON AS THEY ARE KEEPING YOUR STENT OPEN AND PREVENTING A HEART ATTACK.   - To help with your chest pain, please continue Ranexa 1000mg 2x/day and Imdur 240mg daily.   Aspirin can put you at increased risk of bleeding; please avoid NSAIDS (such as Motrin, Advil, Ibuprofen, Naproxen, or Aleve, as these medications may further your risk of bleeding.  Please follow up with Dr. Pink on 12/6/24 at 9:30 AM.  For recurrent chest pain, please call your doctor or go to the emergency room.      Diagnosis: Chronic kidney disease  Assessment and Plan of Treatment: Your day of discharge creatinine was 1.52. In order to prevent further disease progression, continue to follow recommendations made by your primary provider/nephrologist. Continue a diet that is low in sodium and avoid foods that are concentrated in potassium and phosphorus. Continue your medications/supplementations as directed and avoid over-the-counter drugs that are harmful to kidneys, such as, Non-Steroidal Anti-Inflammatory Drugs (NSAIDs). Follow-up as outpatient to monitor your kidney function, as well as, vitamin D, Calcium, potassium, and phosphorus levels.      Diagnosis: Chronic HFrEF (heart failure with reduced ejection fraction)  Assessment and Plan of Treatment: -Heart failure is a condition that occurs when the heart cannot pump blood as well as it should; this leads to inadequate blood flow to vital organs such as the kidneys and congestion (buildup of fluid) in other vital organs such as the lungs.  This can lead to symptoms such as swelling, trouble breathing, and feeling tired.   -You have a history of weakened heart muscle called systolic congestive heart failure. When the heart pumps, it doesn't squeeze normally.  Your Ejection Fraction (EF) is 35%, a normal EF is 55-60%.   -Please continue Toprol 125mg daily, Spironolactone 25mg daily, and Farxiga 10mg daily to help strengthen your heart.   -Avoid drinking more than 1.5L of fluid daily and maintain a low salt diet (max 2grams daily).  -Please weigh yourself daily, for any significant increases in daily weight of 2lbs/day or 5lbs/week with associated swelling in the legs or abdomen and/or shortness of breath, please call your doctor or go to the emergency room.  -Please make sure you follow up with Dr. Pink on 12/3/24 oat 9:30 AM.    Diagnosis: Open wound of left heel  Assessment and Plan of Treatment: Wound care to Left heel, left lateral lower leg: Aquacel Ag, foam dressing. Change every 3 days    Diagnosis: Sacral wound  Assessment and Plan of Treatment: Wound care to Sacrum: Allevyn foam. Change every 3 days     PRINCIPAL DISCHARGE DIAGNOSIS  Diagnosis: Acute lower limb ischemia  Assessment and Plan of Treatment: You have a history of peripheral artery disease (PAD). PAD is a condition where an accumulation of plaque (fats and cholesterol) build up in the arteries in your legs or arms. This makes it harder for your blood to carry oxygen and nutrients to the tissues in those areas. PAD can cause muscle pain that gets worse with activity and better with rest, called "claudication." PAD can also cause wounds to heal more slowly than usual. PAD is a long-term disease, but you can improve it by exercising, eating less fat and giving up tobacco products.  You underwent a left lower leg Peripheral Angiogram on 11/11/24 and received stents to the left external iliac artery and left common iliac artery, and also underwent a fem to AK-pop bypass.   PLEASE CONTINUE ELIQUIS 5MG 2x DAILY AND PLAVIX 75MG DAILY. DO NOT STOP THESE MEDICATIONS FOR ANY REASON AS THEY ARE KEEPING YOUR ARTERY PATENT.   -Please follow up with your Vascular doctor Dr. Valerio on 12/16 at 10:15AM.         SECONDARY DISCHARGE DIAGNOSES  Diagnosis: CAD (coronary artery disease)  Assessment and Plan of Treatment: You recently underwent a CABG (coronary artery bypass surgery) on 10/31/24. Due to continued chest pain, you underwent a repeat cardiac catheterization on 11/19/24 which didn't reveal any new blockages.   PLEASE CONTINUE ELIQUIS 5MG 2X/DAY AND PLAVIX 75MG DAILY. DO NOT STOP THESE MEDICATIONS FOR ANY REASON AS THEY ARE KEEPING YOUR STENT OPEN AND PREVENTING A HEART ATTACK.   - To help with your chest pain, please continue Ranexa 1000mg 2x/day and Imdur 240mg daily.   Aspirin can put you at increased risk of bleeding; please avoid NSAIDS (such as Motrin, Advil, Ibuprofen, Naproxen, or Aleve, as these medications may further your risk of bleeding.  Please follow up with Dr. Pink on 12/6/24 at 9:30 AM.  For recurrent chest pain, please call your doctor or go to the emergency room.      Diagnosis: Chronic kidney disease  Assessment and Plan of Treatment: Your day of discharge creatinine was 1.52. In order to prevent further disease progression, continue to follow recommendations made by your primary provider/nephrologist. Continue a diet that is low in sodium and avoid foods that are concentrated in potassium and phosphorus. Continue your medications/supplementations as directed and avoid over-the-counter drugs that are harmful to kidneys, such as, Non-Steroidal Anti-Inflammatory Drugs (NSAIDs). Follow-up as outpatient to monitor your kidney function, as well as, vitamin D, Calcium, potassium, and phosphorus levels.      Diagnosis: Chronic HFrEF (heart failure with reduced ejection fraction)  Assessment and Plan of Treatment: -Heart failure is a condition that occurs when the heart cannot pump blood as well as it should; this leads to inadequate blood flow to vital organs such as the kidneys and congestion (buildup of fluid) in other vital organs such as the lungs.  This can lead to symptoms such as swelling, trouble breathing, and feeling tired.   -You have a history of weakened heart muscle called systolic congestive heart failure. When the heart pumps, it doesn't squeeze normally.  Your Ejection Fraction (EF) is 35%, a normal EF is 55-60%.   -Please continue Toprol 125mg daily, Spironolactone 25mg daily, and Farxiga 10mg daily to help strengthen your heart.   -Avoid drinking more than 1.5L of fluid daily and maintain a low salt diet (max 2grams daily).  -Please weigh yourself daily, for any significant increases in daily weight of 2lbs/day or 5lbs/week with associated swelling in the legs or abdomen and/or shortness of breath, please call your doctor or go to the emergency room.  -Please make sure you follow up with Dr. Pink on 12/3/24 oat 9:30 AM.    Diagnosis: Open wound of left heel  Assessment and Plan of Treatment: Wound care to Left heel, left lateral lower leg: Aquacel Ag, foam dressing. Change every 3 days. It is important to reposition your feet and keep pressure off of your left heel.    Diagnosis: Sacral wound  Assessment and Plan of Treatment: Wound care to Sacrum: Allevyn foam. Change every 3 days. It is important to reposition and keep pressure off of your tailbone as much as possible.

## 2024-11-18 NOTE — DISCHARGE NOTE PROVIDER - NSDCMRMEDTOKEN_GEN_ALL_CORE_FT
aspirin 81 mg oral tablet: 1 tab(s) orally once a day  finasteride 5 mg oral tablet: 1 tab(s) orally once a day  isosorbide mononitrate 30 mg oral tablet, extended release: 1 tab(s) orally once a day  Lipitor 80 mg oral tablet: 1 tab(s) orally once a day (at bedtime)  metoprolol tartrate 25 mg oral tablet: 1 tab(s) orally 2 times a day  pantoprazole 40 mg oral delayed release tablet: 1 tab(s) orally once a day  Plavix 75 mg oral tablet: 1 tab(s) orally once a day  polyethylene glycol 3350 oral powder for reconstitution: 17 gram(s) orally once a day  senna leaf extract oral tablet: 2 tab(s) orally once a day (at bedtime)  tamsulosin 0.4 mg oral capsule: 1 cap(s) orally once a day (at bedtime)   acetaminophen 325 mg oral tablet: 3 tab(s) orally every 8 hours As needed Moderate Pain (4 - 6), Severe Pain (7 - 10)  apixaban 5 mg oral tablet: 1 tab(s) orally every 12 hours  ascorbic acid 500 mg oral tablet: 1 tab(s) orally once a day  bisacodyl 10 mg rectal suppository: 1 suppository(ies) rectal once a day As needed Constipation  calcium carbonate 500 mg (200 mg elemental calcium) oral tablet, chewable: 1 tab(s) orally 3 times a day As needed Heartburn  dapagliflozin 10 mg oral tablet: 1 tab(s) orally every 24 hours  finasteride 5 mg oral tablet: 1 tab(s) orally once a day  isosorbide mononitrate 120 mg oral tablet, extended release: 2 tab(s) orally every 24 hours  Lipitor 80 mg oral tablet: 1 tab(s) orally once a day (at bedtime)  Multiple Vitamins oral tablet: 1 tab(s) orally once a day  pantoprazole 40 mg oral delayed release tablet: 1 tab(s) orally once a day  Plavix 75 mg oral tablet: 1 tab(s) orally once a day  polyethylene glycol 3350 oral powder for reconstitution: 17 gram(s) orally once a day  ranolazine 1000 mg oral tablet, extended release: 1 tab(s) orally 2 times a day  senna leaf extract oral tablet: 2 tab(s) orally once a day (at bedtime)  simethicone 80 mg oral tablet, chewable: 1 tab(s) orally 2 times a day As needed Heartburn  spironolactone 25 mg oral tablet: 1 tab(s) orally every 24 hours  tamsulosin 0.4 mg oral capsule: 1 cap(s) orally once a day (at bedtime)  Toprol- mg oral tablet, extended release: 1 tab(s) orally once a day  Toprol-XL 25 mg oral tablet, extended release: 1 tab(s) orally once a day   acetaminophen 325 mg oral tablet: 3 tab(s) orally every 8 hours As needed Moderate Pain (4 - 6), Severe Pain (7 - 10)  apixaban 5 mg oral tablet: 1 tab(s) orally every 12 hours  ascorbic acid 500 mg oral tablet: 1 tab(s) orally once a day  bisacodyl 10 mg rectal suppository: 1 suppository(ies) rectal once a day As needed Constipation  calcium carbonate 500 mg (200 mg elemental calcium) oral tablet, chewable: 1 tab(s) orally 3 times a day As needed Heartburn  dapagliflozin 10 mg oral tablet: 1 tab(s) orally every 24 hours  finasteride 5 mg oral tablet: 1 tab(s) orally once a day  isosorbide mononitrate 120 mg oral tablet, extended release: 2 tab(s) orally every 24 hours  Lipitor 80 mg oral tablet: 1 tab(s) orally once a day (at bedtime)  metoprolol succinate 50 mg oral tablet, extended release: 2.5 tab(s) orally once a day Total of 125mg daily  Multiple Vitamins oral tablet: 1 tab(s) orally once a day  pantoprazole 40 mg oral delayed release tablet: 1 tab(s) orally once a day  Plavix 75 mg oral tablet: 1 tab(s) orally once a day  polyethylene glycol 3350 oral powder for reconstitution: 17 gram(s) orally once a day  ranolazine 1000 mg oral tablet, extended release: 1 tab(s) orally 2 times a day  senna leaf extract oral tablet: 2 tab(s) orally once a day (at bedtime)  simethicone 80 mg oral tablet, chewable: 1 tab(s) orally 2 times a day As needed Heartburn  spironolactone 25 mg oral tablet: 1 tab(s) orally every 24 hours  tamsulosin 0.4 mg oral capsule: 1 cap(s) orally once a day (at bedtime)   acetaminophen 325 mg oral tablet: 3 tab(s) orally every 8 hours As needed Moderate Pain (4 - 6), Severe Pain (7 - 10)  apixaban 5 mg oral tablet: 1 tab(s) orally every 12 hours  ascorbic acid 500 mg oral tablet: 1 tab(s) orally once a day  bisacodyl 10 mg rectal suppository: 1 suppository(ies) rectal once a day As needed Constipation  calcium carbonate 500 mg (200 mg elemental calcium) oral tablet, chewable: 1 tab(s) orally 3 times a day As needed Heartburn  dapagliflozin 10 mg oral tablet: 1 tab(s) orally every 24 hours  finasteride 5 mg oral tablet: 1 tab(s) orally once a day  isosorbide mononitrate 120 mg oral tablet, extended release: 2 tab(s) orally every 24 hours  lidocaine 4% topical film: Apply topically to affected area once a day as needed for  pain Apply to left leg  Lipitor 80 mg oral tablet: 1 tab(s) orally once a day (at bedtime)  Multiple Vitamins oral tablet: 1 tab(s) orally once a day  pantoprazole 40 mg oral delayed release tablet: 1 tab(s) orally once a day  Plavix 75 mg oral tablet: 1 tab(s) orally once a day  polyethylene glycol 3350 oral powder for reconstitution: 17 gram(s) orally once a day  ranolazine 1000 mg oral tablet, extended release: 1 tab(s) orally 2 times a day  senna leaf extract oral tablet: 2 tab(s) orally once a day (at bedtime)  simethicone 80 mg oral tablet, chewable: 1 tab(s) orally 2 times a day As needed Heartburn  spironolactone 25 mg oral tablet: 1 tab(s) orally every 24 hours  tamsulosin 0.4 mg oral capsule: 1 cap(s) orally once a day (at bedtime)  Toprol-XL 50 mg oral tablet, extended release: 2.5 tab(s) orally once a day Take 2.5 tablets once daily for a total of 125mg

## 2024-11-18 NOTE — DISCHARGE NOTE PROVIDER - CARE PROVIDERS DIRECT ADDRESSES
[email or web address]

## 2024-11-18 NOTE — DISCHARGE NOTE PROVIDER - HOSPITAL COURSE
73 y/o male with PMHx of HTN, HLD, BPH, CAD s/p CABG 10/31, PAD s/p remote fem-fem bypass, and and an "abdominal stent for circulation problems". Patient was recently admitted here 10/27/24 - 11/8/24 after initially presenting with a chief complaint of leg pain, being worked up for CLTI with plan for arteriography, but this procedure was deferred as he developed ACS and ultimately went for CABG. His post-procedure course was noteworthy for identification of PET-positive lung malignancy with plan for outpatient staging. on 11/8 into 11/9 he was evaluated for leg pain and on exam not found to have pedal signals or pulses. With this, [facility] was [facility] to [facility] for heparin drip and CT-A. CT-A images are not available but show s/p fem-fem bypass. He was accepted at [facility] as a transfer for continuity. Pt had LLE angiogram with EIA and FELICITY stents on 11/11/24 and LLE fem to AK-pop bypass on 11/14/24. Pt with multiple episodes of chest pain, with negative cardiac workup. VQ scan negative for PE. Imdur increased while inpatient.    *Incomplete*     74M, Polish speaking, PMHx of CAD s/p CABG (LIMA-LAD 10/31/24), ICM HFrEF 35% 11/19/24, HTN, CKD, and PAD s/p prior L to R fem-fem bypass (>20yrs ago), Patient was recently admitted here 10/27/24 - 11/8/24 after initially presenting with a chief complaint of leg pain, being worked up for CLTI with plan for arteriography, but this procedure was deferred as he developed ACS and ultimately went for CABG. His post-procedure course was noteworthy for identification of PET-positive lung malignancy with plan for outpatient staging. on 11/8 into 11/9 he was evaluated for leg pain and on exam not found to have pedal signals or pulses; concerning for acute limb ischemia. With this, he was transferred from [facility] to [facility] for heparin drip and CT-A. CT-A images are not available but show s/p fem-fem bypass. He was accepted at [facility] as a transfer for continuity. Pt had LLE angiogram with EIA and FELICITY stents on 11/11/24 and LLE fem to AK-pop bypass on 11/14/24. who presented to [facility] for left leg pain concerning for acute limb ischemia.  Pt was initially admitted to Vascular Surgery, now s/p peripheral stent intervention as well as left fem-pop bypass 11/14 c/b by a left groin hematoma duplex US of left groin showed 5x1cm fluid collection however, pt not endorsing pain or discomfort in that area. Patient w/ consistent angina and NSTEMI and transferred to cardiology service. Underwent [facility] 11/19 with CAD unchanged with no further intervention, presumed NSTEMI 2/2 hypotension/hypoperfusion. Hence, patient was uptitrated to nitro 100mcg gtt with improvement in angina down to 2-3/10 at this juncture. He also needed Morphine 2mg IV x 1 for pain relief as well. Patient also received IVF hydration 500cc total to help with perfusion. Given the severity of angina with needing nitro gtt at 100mcg rate, patient is now being moved to CCU for further management and care.     In the CCU, nitro gtt weaned off w/ increase in po imdur. Pt reports CP of 6-7 w/ nitro gtt weaned off; reproducible. Walk test was performed and pt did not report increased intensity of CP and vss; thus, no CP worsening w/ exertion. Today, pt reports CP of only 2-3 at rest and 4 upon palpation. Metoprolol was also increased as a part of his antianginal regimen.  On 11/25/24, patient was stepped back down from CCU to cardiac telemetry for further GDMT management.       Cardiac Imaging:   Cardiac cath 11/19/24: LIMA to LAD - patent, LM - distal 50-60% stenosis, LAD - prox 70% diffuse disease, rest of vessel with mild diffuse disease, competitive flow from LIMA, LCx - ostial , RCA - not engaged, known  from outside cath   TTE 11/19/24: LVEF 35%   Basal and mid anterior wall, basal and mid anterolateral wall, and basal and mid inferolateral wall are akinetic.      Pt seen and examined at bedside this AM without any complaints or events overnight, VSS, labs and telemetry reviewed and pt stable for discharge as discussed with  ___.     Pt has received appropriate discharge instructions, including medication regimen, access site management and follow up with  __ in 1-2 weeks.     GLP-1 receptor antagonist/SGLT2 inhibitor meds discussed with patient and encouraged to discuss further with PMD or Endocrinologist at next visit.     Pt's discharge copies detailed cardiovascular history, medications, testing/treatments, OR pt has created a patient portal account and instructed to provide their records at their 1st appointment.    CV REGIMEN UPON DISCHARGE:    DIABETIC REGIMEN UPON DISCHARGE: -- HbA1c    OTHER MEDICATIONS UPON DISCHARGE: *Incomplete*       74M, Polish speaking, PMHx of CAD s/p PCI and recent minimally invasive direct CABG 10/31/24, ICM, HFrEF, HTN, CKD, PAD s/p prior L to R fem-fem bypass (>20yrs ago), + PET lung malignancy (planned for outpt staging) who initially presented to [facility] for left leg pain concerning for acute limb ischemia. Pt was initially admitted to Vascular Surgery 11/10, now s/p 11/11/24 LLE angiogram with EIA and FELICITY stents, and LLE fem to AK-pop bypass that was c/b hematoma. Doppler + for LLE DVT 11/12. While on vascular service patient w/ consistent angina R/I NSTEMI and transferred to cardiology service, now s/p [facility] 11/19 with CAD unchanged with no further intervention, presumed NSTEMI 2/2 hypotension/hypoperfusion. Pt was placed on Eliquis, ASA, Plavix (asa stopped 11/15). He was also started on nitro gtt and IVF to aid in hemodynamics, however given the severity of angina with needing increasing nitro gtt rate, pt was stepped up again to CCU for further management. Palliative is also consulted for further discussion regarding GOC. In the CCU, nitro gtt weaned off and optimized on antianginals with Imdur 240mg qd, Ranexa to 1000mg BID, and Metoprolol succinate to 100mg qd. Wound care following for L heel and sacral wound with recs: Sacrum: Allevyn foam. Change every 3 days. Left heel, left lateral lower leg: Aquacel Ag, foam dressing. Change every 3 days.       Cardiac Imaging:   Cardiac cath 11/19/24: LIMA to LAD - patent, LM - distal 50-60% stenosis, LAD - prox 70% diffuse disease, rest of vessel with mild diffuse disease, competitive flow from LIMA, LCx - ostial , RCA - not engaged, known  from outside cath   TTE 11/19/24: LVEF 35%   Basal and mid anterior wall, basal and mid anterolateral wall, and basal and mid inferolateral wall are akinetic.      Pt seen and examined at bedside this AM without any complaints or events overnight, VSS, labs and telemetry reviewed and pt stable for discharge as discussed with Dr. Mcfadden. Pt has received appropriate discharge instructions, including medication regimen, access site management and follow up with  __ in 1-2 weeks.     GLP-1 receptor antagonist/SGLT2 inhibitor meds discussed with patient and encouraged to discuss further with PMD or Endocrinologist at next visit. Pt's discharge copies detailed cardiovascular history, medications, testing/treatments, OR pt has created a patient portal account and instructed to provide their records at their 1st appointment.    CV REGIMEN UPON DISCHARGE: Eliquis 5mg BID, Plavix 75mg D, Toprol 100mg QD, Spironolactone 25mg D, Imdur 250mg D, Farxiga 10mg QD, Ranexa 1000mg BID, Atorvastatin 80mg QHS.    *Incomplete*       74M, Polish speaking, PMHx of CAD s/p PCI and recent minimally invasive direct CABG 10/31/24, ICM, HFrEF, HTN, CKD, PAD s/p prior L to R fem-fem bypass (>20yrs ago), + PET lung malignancy (planned for outpt staging) who initially presented to [facility] for left leg pain concerning for acute limb ischemia. Pt was initially admitted to Vascular Surgery 11/10, now s/p 11/11/24 LLE angiogram with EIA and FELICITY stents, and LLE fem to AK-pop bypass that was c/b hematoma. Doppler + for LLE DVT 11/12. While on vascular service patient w/ consistent angina R/I NSTEMI and transferred to cardiology service, now s/p [facility] 11/19 with CAD unchanged with no further intervention, presumed NSTEMI 2/2 hypotension/hypoperfusion. Pt was placed on Eliquis, ASA, Plavix (asa stopped 11/15). He was also started on nitro gtt and IVF to aid in hemodynamics, however given the severity of angina with needing increasing nitro gtt rate, pt was stepped up again to CCU for further management. In the CCU, nitro gtt weaned off and optimized on antianginals with Imdur 240mg qd, Ranexa to 1000mg BID, and Metoprolol succinate to 100mg qd. Vascular following during admission with final recs ___________. Wound care following for L heel and sacral wound with recs: Sacrum: Allevyn foam. Change every 3 days. Left heel, left lateral lower leg: Aquacel Ag, foam dressing. Change every 3 days. Palliative is also consulted for further discussion regarding GOC, confirmed full code status.       Cardiac Imaging:   Cardiac cath 11/19/24: LIMA to LAD - patent, LM - distal 50-60% stenosis, LAD - prox 70% diffuse disease, rest of vessel with mild diffuse disease, competitive flow from LIMA, LCx - ostial , RCA - not engaged, known  from outside cath   TTE 11/19/24: LVEF 35%   Basal and mid anterior wall, basal and mid anterolateral wall, and basal and mid inferolateral wall are akinetic.      Pt seen and examined at bedside this AM without any complaints or events overnight, VSS, labs and telemetry reviewed and pt stable for discharge as discussed with Dr. Mcfadden. Pt has received appropriate discharge instructions, including medication regimen, access site management and follow up with  __ in 1-2 weeks.     GLP-1 receptor antagonist/SGLT2 inhibitor meds discussed with patient and encouraged to discuss further with PMD or Endocrinologist at next visit. Pt's discharge copies detailed cardiovascular history, medications, testing/treatments, OR pt has created a patient portal account and instructed to provide their records at their 1st appointment.    CV REGIMEN UPON DISCHARGE: Eliquis 5mg BID, Plavix 75mg D, Toprol 100mg QD, Spironolactone 25mg D, Imdur 250mg D, Farxiga 10mg QD, Ranexa 1000mg BID, Atorvastatin 80mg QHS.    *Incomplete*       74M, Polish speaking, PMHx of CAD s/p PCI and recent minimally invasive direct CABG 10/31/24, ICM, HFrEF, HTN, CKD, PAD s/p prior L to R fem-fem bypass (>20yrs ago), + PET lung malignancy (planned for outpt staging) who initially presented to [facility] for left leg pain concerning for acute limb ischemia. Pt was initially admitted to Vascular Surgery 11/10, now s/p 11/11/24 LLE angiogram with EIA and FELICITY stents, and LLE fem to AK-pop bypass that was c/b hematoma. Doppler + for LLE DVT 11/12. While on vascular service patient w/ consistent angina R/I NSTEMI and transferred to cardiology service, now s/p [facility] 11/19 with CAD unchanged with no further intervention, presumed NSTEMI 2/2 hypotension/hypoperfusion. Pt was placed on Eliquis, ASA, Plavix (asa stopped 11/15). He was also started on nitro gtt and IVF to aid in hemodynamics, however given the severity of angina with needing increasing nitro gtt rate, pt was stepped up again to CCU for further management. In the CCU, nitro gtt weaned off and optimized on antianginals with Imdur 240mg qd, Ranexa to 1000mg BID, and Metoprolol succinate to 100mg qd. Vascular following during admission with final recs ___________. Wound care following for L heel and sacral wound with recs: Sacrum: Allevyn foam. Change every 3 days. Left heel, left lateral lower leg: Aquacel Ag, foam dressing. Change every 3 days. Palliative is also consulted for further discussion regarding GOC, confirmed full code status.       Cardiac Imaging:   Cardiac cath 11/19/24: LIMA to LAD - patent, LM - distal 50-60% stenosis, LAD - prox 70% diffuse disease, rest of vessel with mild diffuse disease, competitive flow from LIMA, LCx - ostial , RCA - not engaged, known  from outside cath   TTE 11/19/24: LVEF 35%   Basal and mid anterior wall, basal and mid anterolateral wall, and basal and mid inferolateral wall are akinetic.      Pt seen and examined at bedside this AM without any complaints or events overnight, VSS, labs and telemetry reviewed and pt stable for discharge as discussed with Dr. Mcfadden. Pt has received appropriate discharge instructions, including medication regimen, access site management and follow up with Dr. Pink on 12/3/24 at 9:30 AM and with Vascular on _____.      GLP-1 receptor antagonist/SGLT2 inhibitor meds discussed with patient and encouraged to discuss further with PMD or Endocrinologist at next visit. Pt's discharge copies detailed cardiovascular history, medications, testing/treatments, OR pt has created a patient portal account and instructed to provide their records at their 1st appointment.    CV REGIMEN UPON DISCHARGE: Eliquis 5mg BID, Plavix 75mg D, Toprol 100mg QD, Spironolactone 25mg D, Imdur 250mg D, Farxiga 10mg QD, Ranexa 1000mg BID, Atorvastatin 80mg QHS.    *Incomplete*       74M, Polish speaking, PMHx of CAD s/p PCI and recent minimally invasive direct CABG 10/31/24, ICM, HFrEF, HTN, CKD, PAD s/p prior L to R fem-fem bypass (>20yrs ago), + PET lung malignancy (planned for outpt staging) who initially presented to [facility] for left leg pain concerning for acute limb ischemia. Pt was initially admitted to Vascular Surgery 11/10, now s/p 11/11/24 LLE angiogram with EIA and FELICITY stents, and LLE fem to AK-pop bypass that was c/b hematoma that is now improving. Doppler + for LLE DVT 11/12. While on vascular service patient w/ consistent angina R/I NSTEMI and transferred to cardiology service, now s/p [facility] 11/19 with CAD unchanged with no further intervention, presumed NSTEMI 2/2 hypotension/hypoperfusion. Pt was placed on Eliquis, ASA, Plavix (asa stopped 11/15). He was also started on nitro gtt and IVF to aid in hemodynamics, however given the severity of angina with needing increasing nitro gtt rate, pt was stepped up again to CCU for further management. In the CCU, nitro gtt weaned off and optimized on antianginals with Imdur 240mg qd, Ranexa to 1000mg BID, and Metoprolol succinate to 100mg qd. Vascular following during admission with final recs as per cardiology team. Wound care following for L heel and sacral wound with recs: Sacrum: Allevyn foam. Change every 3 days. Left heel, left lateral lower leg: Aquacel Ag, foam dressing. Change every 3 days. Palliative is also consulted for further discussion regarding GOC, confirmed full code status.     Cardiac Imaging:   Cardiac cath 11/19/24: LIMA to LAD - patent, LM - distal 50-60% stenosis, LAD - prox 70% diffuse disease, rest of vessel with mild diffuse disease, competitive flow from LIMA, LCx - ostial , RCA - not engaged, known  from outside cath   TTE 11/19/24: LVEF 35%, Basal and mid anterior wall, basal and mid anterolateral wall, and basal and mid inferolateral wall are akinetic.    Pt seen and examined at bedside this AM without any complaints or events overnight, VSS, labs and telemetry reviewed and pt stable for discharge as discussed with Dr. Mcfadden. Pt has received appropriate discharge instructions, including medication regimen, access site management and follow up with Dr. Pink on 12/3/24 at 9:30 AM and with Vascular (Dr. Valerio) on 12/16 at 10:15 AM.      GLP-1 receptor antagonist/SGLT2 inhibitor meds discussed with patient and encouraged to discuss further with PMD or Endocrinologist at next visit. Pt's discharge copies detailed cardiovascular history, medications, testing/treatments, OR pt has created a patient portal account and instructed to provide their records at their 1st appointment.    CV REGIMEN UPON DISCHARGE: Eliquis 5mg BID, Plavix 75mg D, Toprol 125mg QD, Spironolactone 25mg QD, Imdur 250mg QD, Farxiga 10mg QD, Ranexa 1000mg BID, Atorvastatin 80mg QHS.    74M, Polish speaking, PMHx of CAD s/p PCI and recent minimally invasive direct CABG 10/31/24, ICM, HFrEF, HTN, CKD, PAD s/p prior L to R fem-fem bypass (>20yrs ago), + PET lung malignancy (planned for outpt staging) who initially presented to [facility] for left leg pain concerning for acute limb ischemia. Pt was initially admitted to Vascular Surgery 11/10, now s/p 11/11/24 LLE angiogram with EIA and FELICITY stents, and LLE fem to AK-pop bypass that was c/b hematoma that is now improving. Doppler + for LLE DVT 11/12. While on vascular service patient w/ consistent angina R/I NSTEMI and transferred to cardiology service, now s/p [facility] 11/19 with CAD unchanged with no further intervention, presumed NSTEMI 2/2 hypotension/hypoperfusion. Pt was placed on Eliquis, ASA, Plavix (asa stopped 11/15 as patient completed a 7 day course). He was also started on nitro gtt and IVF to aid in hemodynamics, however given the severity of angina with needing increasing nitro gtt rate, pt was stepped up again to CCU for further management. In the CCU, nitro gtt weaned off and optimized on antianginals with Imdur 240mg qd, Ranexa to 1000mg BID, and Metoprolol succinate to 100mg qd. Wound care following for L heel and sacral wound with recs: Sacrum: Allevyn foam. Change every 3 days. Left heel, left lateral lower leg: Aquacel Ag, foam dressing. Change every 3 days. Palliative is also consulted for further discussion regarding GOC, confirmed full code status.     Cardiac Imaging:   Cardiac cath 11/19/24: LIMA to LAD - patent, LM - distal 50-60% stenosis, LAD - prox 70% diffuse disease, rest of vessel with mild diffuse disease, competitive flow from LIMA, LCx - ostial , RCA - not engaged, known  from outside cath   TTE 11/19/24: LVEF 35%, Basal and mid anterior wall, basal and mid anterolateral wall, and basal and mid inferolateral wall are akinetic.    Pt seen and examined at bedside this AM without any complaints or events overnight, VSS, labs and telemetry reviewed and pt stable for discharge as discussed with Dr. Mcfadden. Pt has received appropriate discharge instructions, including medication regimen, access site management and follow up with Dr. Pink on 12/3/24 at 9:30 AM and with Vascular (Dr. Valerio) on 12/16 at 10:15 AM.      GLP-1 receptor antagonist/SGLT2 inhibitor meds discussed with patient and encouraged to discuss further with PMD or Endocrinologist at next visit. Pt's discharge copies detailed cardiovascular history, medications, testing/treatments, OR pt has created a patient portal account and instructed to provide their records at their 1st appointment.    Cardiac rehab not prescribed as patient being discharged to subacute rehab.    CV REGIMEN UPON DISCHARGE: Eliquis 5mg BID, Plavix 75mg D, Toprol 125mg QD, Spironolactone 25mg QD, Imdur 250mg QD, Farxiga 10mg QD, Ranexa 1000mg BID, Atorvastatin 80mg QHS.

## 2024-11-18 NOTE — PROGRESS NOTE ADULT - ASSESSMENT
73 yo man PMHx of CAD s/p PCI and recent minimally invasive direct CABG 10/31/24, ICM HFrEF, HTN, CKD, and PAD s/p prior L to R fem-fem bypass who was admitted for CLI of LLE with rest pain, s/p LE angiogram, s/p L common and external iliac stent but prox anastomosis of fem-fem bypass is upwards going (unable to treat endovascularly), w/ occluded L CFA, SFA, and AK popliteal artery w/ reconstitution of BK pop via collaterals w/ AT/PT runoff.    Assessment  1. CAD s/p PCI and minimally invasive direct CABG 10/31/24  EKG done 11/12 3:45 pm NSR, Q wave inferior leads, T wave flattening late precordial leads  EKG post op 11/14 NSR w/ new TWI and STD in lateral and precordial leads  Denies any chest pain at the bedside today  HsTrop remains negative  No concern for postop ACS  2. PAD s/p L-R fem-fem bypass c/b CLI LLE  S/p L common and external iliac stent 11/11 but prox anastomosis of fem-fem bypass is upwards going (unable to treat endovascularly), w/ occluded L CFA, SFA, and AK popliteal artery w/ reconstitution of BK pop via collaterals w/ AT/PT runoff.  Now s/p L fem-pop bypass 11/14  4. ICM HFrEF 35% - euvolemic  5. LLE DVT   V/Q scan negative for PE    Plan  1. Plavix 75mg PO QD and high intensity statin. Off ASA 81mg PO QD given initiation of AC for DVT, to avoid triple therapy  2. New EKG change post op but w/o chest pain currently. HsTrop negative so far. No need to futher trend hsTrop. No concern for postop ACS.   3. GDMT: metop succinate 50mg PO QD, will start ARNI/MRA/SGLT2i when BP better and GFR improved  4. Imdur 90mg PO QD for anti-anginal therapy, hold for SBP <90  5. No need for diuretics for HF  6. Will need pharmacological nuclear stress test as outpatient    Thank you for the consult and the opportunity to take care of this patient.     Marquis Pink M.D.  Cardiology | Vascular Cardiology Attending  Please call (c) [phone] for any questions    During non face-to-face time, I reviewed relevant portions of the patient's medical record. During face-to-face time, I took a relevant history and examined the patient. I also explained differential diagnoses, relevant cardiac diagnoses, workup, and management plan, which required a high level of medical decision making. I answered all questions related to the patient's medical conditions.

## 2024-11-18 NOTE — DISCHARGE NOTE PROVIDER - NSDCFUSCHEDAPPT_GEN_ALL_CORE_FT
Marquis Pink  [facility] Physician [facility]  HEARTVASC [address]  Scheduled Appointment: 12/03/2024     Marquis Pink  [facility] Physician [facility]  HEARTVASC [address]  Scheduled Appointment: 12/03/2024    Partha Valerio  [place] Physician [facility]  VASCULAR [address]  Scheduled Appointment: 12/16/2024

## 2024-11-19 ENCOUNTER — RESULT REVIEW (OUTPATIENT)
Age: 74
End: 2024-11-19

## 2024-11-19 DIAGNOSIS — I50.20 UNSPECIFIED SYSTOLIC (CONGESTIVE) HEART FAILURE: ICD-10-CM

## 2024-11-19 LAB
ANION GAP SERPL CALC-SCNC: 8 MMOL/L — SIGNIFICANT CHANGE UP (ref 5–17)
APTT BLD: 25.9 SEC — SIGNIFICANT CHANGE UP (ref 24.5–35.6)
APTT BLD: 42.4 SEC — HIGH (ref 24.5–35.6)
BUN SERPL-MCNC: 14 MG/DL — SIGNIFICANT CHANGE UP (ref 7–23)
CALCIUM SERPL-MCNC: 8.3 MG/DL — LOW (ref 8.4–10.5)
CHLORIDE SERPL-SCNC: 106 MMOL/L — SIGNIFICANT CHANGE UP (ref 96–108)
CK MB CFR SERPL CALC: 4.8 NG/ML — SIGNIFICANT CHANGE UP (ref 0–6.7)
CK MB CFR SERPL CALC: 6.5 NG/ML — SIGNIFICANT CHANGE UP (ref 0–6.7)
CK SERPL-CCNC: 100 U/L — SIGNIFICANT CHANGE UP (ref 30–200)
CK SERPL-CCNC: 125 U/L — SIGNIFICANT CHANGE UP (ref 30–200)
CO2 SERPL-SCNC: 22 MMOL/L — SIGNIFICANT CHANGE UP (ref 22–31)
CREAT SERPL-MCNC: 1.26 MG/DL — SIGNIFICANT CHANGE UP (ref 0.5–1.3)
EGFR: 59 ML/MIN/1.73M2 — LOW
GLUCOSE SERPL-MCNC: 122 MG/DL — HIGH (ref 70–99)
HCT VFR BLD CALC: 25.8 % — LOW (ref 39–50)
HCT VFR BLD CALC: 26.2 % — LOW (ref 39–50)
HGB BLD-MCNC: 8.2 G/DL — LOW (ref 13–17)
HGB BLD-MCNC: 8.7 G/DL — LOW (ref 13–17)
MAGNESIUM SERPL-MCNC: 1.9 MG/DL — SIGNIFICANT CHANGE UP (ref 1.6–2.6)
MCHC RBC-ENTMCNC: 29.8 PG — SIGNIFICANT CHANGE UP (ref 27–34)
MCHC RBC-ENTMCNC: 30.4 PG — SIGNIFICANT CHANGE UP (ref 27–34)
MCHC RBC-ENTMCNC: 31.8 G/DL — LOW (ref 32–36)
MCHC RBC-ENTMCNC: 33.2 G/DL — SIGNIFICANT CHANGE UP (ref 32–36)
MCV RBC AUTO: 91.6 FL — SIGNIFICANT CHANGE UP (ref 80–100)
MCV RBC AUTO: 93.8 FL — SIGNIFICANT CHANGE UP (ref 80–100)
NRBC # BLD: 0 /100 WBCS — SIGNIFICANT CHANGE UP (ref 0–0)
NRBC # BLD: 0 /100 WBCS — SIGNIFICANT CHANGE UP (ref 0–0)
PHOSPHATE SERPL-MCNC: 2.7 MG/DL — SIGNIFICANT CHANGE UP (ref 2.5–4.5)
PLATELET # BLD AUTO: 157 K/UL — SIGNIFICANT CHANGE UP (ref 150–400)
PLATELET # BLD AUTO: 172 K/UL — SIGNIFICANT CHANGE UP (ref 150–400)
POTASSIUM SERPL-MCNC: 4.5 MMOL/L — SIGNIFICANT CHANGE UP (ref 3.5–5.3)
POTASSIUM SERPL-SCNC: 4.5 MMOL/L — SIGNIFICANT CHANGE UP (ref 3.5–5.3)
RBC # BLD: 2.75 M/UL — LOW (ref 4.2–5.8)
RBC # BLD: 2.86 M/UL — LOW (ref 4.2–5.8)
RBC # FLD: 14.4 % — SIGNIFICANT CHANGE UP (ref 10.3–14.5)
RBC # FLD: 14.5 % — SIGNIFICANT CHANGE UP (ref 10.3–14.5)
SODIUM SERPL-SCNC: 136 MMOL/L — SIGNIFICANT CHANGE UP (ref 135–145)
TROPONIN T, HIGH SENSITIVITY RESULT: 368 NG/L — CRITICAL HIGH (ref 0–51)
TROPONIN T, HIGH SENSITIVITY RESULT: 447 NG/L — CRITICAL HIGH (ref 0–51)
WBC # BLD: 6.61 K/UL — SIGNIFICANT CHANGE UP (ref 3.8–10.5)
WBC # BLD: 6.92 K/UL — SIGNIFICANT CHANGE UP (ref 3.8–10.5)
WBC # FLD AUTO: 6.61 K/UL — SIGNIFICANT CHANGE UP (ref 3.8–10.5)
WBC # FLD AUTO: 6.92 K/UL — SIGNIFICANT CHANGE UP (ref 3.8–10.5)

## 2024-11-19 PROCEDURE — ZZZZZ: CPT

## 2024-11-19 PROCEDURE — 93306 TTE W/DOPPLER COMPLETE: CPT | Mod: 26

## 2024-11-19 PROCEDURE — 99233 SBSQ HOSP IP/OBS HIGH 50: CPT

## 2024-11-19 PROCEDURE — 93459 L HRT ART/GRFT ANGIO: CPT | Mod: 26

## 2024-11-19 PROCEDURE — 93010 ELECTROCARDIOGRAM REPORT: CPT

## 2024-11-19 RX ORDER — RANOLAZINE 1000 MG/1
500 TABLET, FILM COATED, EXTENDED RELEASE ORAL
Refills: 0 | Status: DISCONTINUED | OUTPATIENT
Start: 2024-11-19 | End: 2024-11-20

## 2024-11-19 RX ORDER — NITROGLYCERIN 0.4MG/HR
50 PATCH, TRANSDERMAL 24 HOURS TRANSDERMAL
Qty: 50 | Refills: 0 | Status: DISCONTINUED | OUTPATIENT
Start: 2024-11-19 | End: 2024-11-19

## 2024-11-19 RX ORDER — PANTOPRAZOLE SODIUM 40 MG/1
40 TABLET, DELAYED RELEASE ORAL DAILY
Refills: 0 | Status: DISCONTINUED | OUTPATIENT
Start: 2024-11-19 | End: 2024-11-27

## 2024-11-19 RX ORDER — SIMETHICONE 125 MG
80 CAPSULE ORAL
Refills: 0 | Status: DISCONTINUED | OUTPATIENT
Start: 2024-11-19 | End: 2024-11-27

## 2024-11-19 RX ORDER — RANOLAZINE 1000 MG/1
500 TABLET, FILM COATED, EXTENDED RELEASE ORAL ONCE
Refills: 0 | Status: COMPLETED | OUTPATIENT
Start: 2024-11-19 | End: 2024-11-19

## 2024-11-19 RX ORDER — SODIUM CHLORIDE 9 MG/ML
1000 INJECTION, SOLUTION INTRAMUSCULAR; INTRAVENOUS; SUBCUTANEOUS
Refills: 0 | Status: DISCONTINUED | OUTPATIENT
Start: 2024-11-19 | End: 2024-11-20

## 2024-11-19 RX ORDER — ISOSORBIDE MONONITRATE 10 MG
120 TABLET ORAL DAILY
Refills: 0 | Status: DISCONTINUED | OUTPATIENT
Start: 2024-11-20 | End: 2024-11-23

## 2024-11-19 RX ORDER — SODIUM,POTASSIUM PHOSPHATES 278-250MG
1 POWDER IN PACKET (EA) ORAL ONCE
Refills: 0 | Status: COMPLETED | OUTPATIENT
Start: 2024-11-19 | End: 2024-11-19

## 2024-11-19 RX ORDER — ISOSORBIDE MONONITRATE 10 MG
30 TABLET ORAL ONCE
Refills: 0 | Status: COMPLETED | OUTPATIENT
Start: 2024-11-19 | End: 2024-11-19

## 2024-11-19 RX ORDER — NITROGLYCERIN 0.4MG/HR
40 PATCH, TRANSDERMAL 24 HOURS TRANSDERMAL
Qty: 50 | Refills: 0 | Status: DISCONTINUED | OUTPATIENT
Start: 2024-11-19 | End: 2024-11-19

## 2024-11-19 RX ORDER — CLOPIDOGREL 75 MG/1
75 TABLET, FILM COATED ORAL ONCE
Refills: 0 | Status: COMPLETED | OUTPATIENT
Start: 2024-11-19 | End: 2024-11-19

## 2024-11-19 RX ORDER — SODIUM CHLORIDE 9 MG/ML
1000 INJECTION, SOLUTION INTRAMUSCULAR; INTRAVENOUS; SUBCUTANEOUS
Refills: 0 | Status: DISCONTINUED | OUTPATIENT
Start: 2024-11-19 | End: 2024-11-19

## 2024-11-19 RX ORDER — HEPARIN SODIUM,PORCINE 1000/ML
1100 VIAL (ML) INJECTION
Qty: 25000 | Refills: 0 | Status: DISCONTINUED | OUTPATIENT
Start: 2024-11-19 | End: 2024-11-19

## 2024-11-19 RX ORDER — NITROGLYCERIN 0.4MG/HR
50 PATCH, TRANSDERMAL 24 HOURS TRANSDERMAL
Qty: 50 | Refills: 0 | Status: DISCONTINUED | OUTPATIENT
Start: 2024-11-19 | End: 2024-11-21

## 2024-11-19 RX ADMIN — HYDROMORPHONE HYDROCHLORIDE 0.5 MILLIGRAM(S): 2 TABLET ORAL at 00:11

## 2024-11-19 RX ADMIN — OXYCODONE HYDROCHLORIDE 10 MILLIGRAM(S): 30 TABLET ORAL at 02:43

## 2024-11-19 RX ADMIN — Medication 10 UNIT(S)/HR: at 08:04

## 2024-11-19 RX ADMIN — OXYCODONE HYDROCHLORIDE 5 MILLIGRAM(S): 30 TABLET ORAL at 21:41

## 2024-11-19 RX ADMIN — METOPROLOL TARTRATE 50 MILLIGRAM(S): 100 TABLET, FILM COATED ORAL at 05:08

## 2024-11-19 RX ADMIN — Medication 81 MILLIGRAM(S): at 09:22

## 2024-11-19 RX ADMIN — CLOPIDOGREL 75 MILLIGRAM(S): 75 TABLET, FILM COATED ORAL at 03:48

## 2024-11-19 RX ADMIN — HYDROMORPHONE HYDROCHLORIDE 0.5 MILLIGRAM(S): 2 TABLET ORAL at 02:45

## 2024-11-19 RX ADMIN — HYDROMORPHONE HYDROCHLORIDE 0.5 MILLIGRAM(S): 2 TABLET ORAL at 00:30

## 2024-11-19 RX ADMIN — Medication 80 MILLIGRAM(S): at 22:14

## 2024-11-19 RX ADMIN — SODIUM CHLORIDE 50 MILLILITER(S): 9 INJECTION, SOLUTION INTRAMUSCULAR; INTRAVENOUS; SUBCUTANEOUS at 20:46

## 2024-11-19 RX ADMIN — Medication 2 MILLIGRAM(S): at 10:22

## 2024-11-19 RX ADMIN — TAMSULOSIN HYDROCHLORIDE 0.4 MILLIGRAM(S): 0.4 CAPSULE ORAL at 22:13

## 2024-11-19 RX ADMIN — SODIUM CHLORIDE 75 MILLILITER(S): 9 INJECTION, SOLUTION INTRAMUSCULAR; INTRAVENOUS; SUBCUTANEOUS at 21:14

## 2024-11-19 RX ADMIN — ACETAMINOPHEN 500MG 975 MILLIGRAM(S): 500 TABLET, COATED ORAL at 22:13

## 2024-11-19 RX ADMIN — Medication 15 MICROGRAM(S)/MIN: at 20:36

## 2024-11-19 RX ADMIN — PANTOPRAZOLE SODIUM 40 MILLIGRAM(S): 40 TABLET, DELAYED RELEASE ORAL at 10:34

## 2024-11-19 RX ADMIN — ACETAMINOPHEN 500MG 975 MILLIGRAM(S): 500 TABLET, COATED ORAL at 23:13

## 2024-11-19 RX ADMIN — Medication 90 MILLIGRAM(S): at 11:20

## 2024-11-19 RX ADMIN — ACETAMINOPHEN 500MG 975 MILLIGRAM(S): 500 TABLET, COATED ORAL at 05:06

## 2024-11-19 RX ADMIN — Medication 2 TABLET(S): at 22:14

## 2024-11-19 RX ADMIN — Medication 30 MILLIGRAM(S): at 20:41

## 2024-11-19 RX ADMIN — OXYCODONE HYDROCHLORIDE 5 MILLIGRAM(S): 30 TABLET ORAL at 20:41

## 2024-11-19 RX ADMIN — HYDROMORPHONE HYDROCHLORIDE 0.5 MILLIGRAM(S): 2 TABLET ORAL at 03:15

## 2024-11-19 RX ADMIN — CLOPIDOGREL 75 MILLIGRAM(S): 75 TABLET, FILM COATED ORAL at 11:46

## 2024-11-19 RX ADMIN — Medication 5 MILLIGRAM(S): at 11:20

## 2024-11-19 RX ADMIN — ACETAMINOPHEN 500MG 975 MILLIGRAM(S): 500 TABLET, COATED ORAL at 06:06

## 2024-11-19 RX ADMIN — RANOLAZINE 500 MILLIGRAM(S): 1000 TABLET, FILM COATED, EXTENDED RELEASE ORAL at 22:14

## 2024-11-19 RX ADMIN — Medication 1 PACKET(S): at 05:07

## 2024-11-19 RX ADMIN — Medication 11 UNIT(S)/HR: at 05:14

## 2024-11-19 RX ADMIN — OXYCODONE HYDROCHLORIDE 10 MILLIGRAM(S): 30 TABLET ORAL at 01:56

## 2024-11-19 RX ADMIN — Medication 100 GRAM(S): at 05:07

## 2024-11-19 RX ADMIN — Medication 10 UNIT(S)/HR: at 05:43

## 2024-11-19 RX ADMIN — Medication 15 MICROGRAM(S)/MIN: at 10:30

## 2024-11-19 RX ADMIN — RANOLAZINE 500 MILLIGRAM(S): 1000 TABLET, FILM COATED, EXTENDED RELEASE ORAL at 20:41

## 2024-11-19 RX ADMIN — ACETAMINOPHEN 500MG 975 MILLIGRAM(S): 500 TABLET, COATED ORAL at 13:19

## 2024-11-19 RX ADMIN — Medication 12 MICROGRAM(S)/MIN: at 12:58

## 2024-11-19 NOTE — PROGRESS NOTE ADULT - PROBLEM SELECTOR PLAN 4
Warm, on room air  -TTE 10/29/24: LVEF 35% with regional wall abnormalities. Dilated LA. Normal RVSF.   -repeat TTE 11/15/24: Not all myocardial wall segments are visualized. Basal and mid inferolateral walls are akinetic. Overall LVSF appears mild to moderately reduced.  - c/w Toprol 50mg as above  - Diuretic: none  - monitor daily weight, strict i/o

## 2024-11-19 NOTE — PROGRESS NOTE ADULT - PROBLEM SELECTOR PLAN 2
Vascular Sx following, appreciate recs  - s/p 11/11/24 LLE angiogram with EIA and FELICITY stents  - s/p 11/14/24 LLE fem to AK-pop bypass ; sites: LFA w/ ?small hematoma since Hep GTT started 11/19 but NTD per Vascular; L popliteal medial knee- w/ Kerlix c/d/i)   - CTA b/l LE at OSH 11/8: post fem-fem bypass, severe atherosclerotic disease involving aorta, iliac, and superficial femoral arteries b/l more on the left  - c/w Plavix 75mg qd and Atorvastatin 80mg qhs  - pain control per Vascular: currently on standing Tylenol 975mg q8h; PRN q6h Oxy 5mg Moderate pain; Oxy 10mg PRN q6h Severe pain; dilaudid IV 0.5mg severe breakthrough PRN q4h.   - bowel regimen: senna, miralax standing; dulcolax prn  - Vasc Sx had planned for DOAC and antiplt monotherapy w/ Plavix on dc     #LLE DVT  - c/w AC : Heparin GTT for now Vascular Sx following, appreciate recs  - s/p 11/11/24 LLE angiogram with EIA and FELICITY stents  - s/p 11/14/24 LLE fem to AK-pop bypass ; sites: LFA w/  hematoma since Hep GTT started 11/19 but as evaluated w/ Dr Rowe and Vascular Surgery at bedside does not require manual compression or US studies at this time NTD per Vascular; L popliteal medial knee- w/ Kerlix c/d/i)   - CTA b/l LE at OSH 11/8: post fem-fem bypass, severe atherosclerotic disease involving aorta, iliac, and superficial femoral arteries b/l more on the left  - c/w Plavix 75mg qd and Atorvastatin 80mg qhs  - pain control per Vascular: currently on standing Tylenol 975mg q8h; PRN q6h Oxy 5mg Moderate pain; Oxy 10mg PRN q6h Severe pain; Dilaudid IV 0.5mg severe breakthrough PRN q4h.   - bowel regimen: senna, Miralax standing; dulcolax prn  - Vasc Sx had planned for DOAC and antiplt monotherapy w/ Plavix on dc     #LLE DVT  - c/w AC : Heparin GTT for now

## 2024-11-19 NOTE — PROGRESS NOTE ADULT - ASSESSMENT
73 yo man PMHx of CAD s/p PCI and recent minimally invasive direct CABG 10/31/24, ICM HFrEF, HTN, CKD, and PAD s/p prior L to R fem-fem bypass who was admitted for CLI of LLE with rest pain, s/p LE angiogram, s/p L common and external iliac stent but prox anastomosis of fem-fem bypass is upwards going (unable to treat endovascularly), w/ occluded L CFA, SFA, and AK popliteal artery w/ reconstitution of BK pop via collaterals w/ AT/PT runoff.    Assessment  1. CAD s/p PCI and minimally invasive direct CABG 10/31/24  EKG done 11/12 3:45 pm NSR, Q wave inferior leads, T wave flattening late precordial leads  EKG post op 11/14 NSR w/ new TWI and STD in lateral and precordial leads  EKG 11/19 with lateral ST depressions (which had resolved since the initial post op change)  2. PAD s/p L-R fem-fem bypass c/b CLI LLE  S/p L common and external iliac stent 11/11 but prox anastomosis of fem-fem bypass is upwards going (unable to treat endovascularly), w/ occluded L CFA, SFA, and AK popliteal artery w/ reconstitution of BK pop via collaterals w/ AT/PT runoff.  Now s/p L fem-pop bypass 11/14  4. ICM HFrEF 35% - euvolemic  5. LLE DVT   V/Q scan negative for PE    #NSTEMI:  Heparin, plavix  Cycle troponin  Plan for cath  Transfer to cardiac telemetry service.    Plan  1. Plavix 75mg PO QD and high intensity statin. Off ASA 81mg PO QD given initiation of AC for DVT, to avoid triple therapy  3. GDMT: metop succinate 50mg PO QD, will start ARNI/MRA/SGLT2i when BP better and GFR improved  4. Imdur 90mg PO QD for anti-anginal therapy, hold for SBP <90      Thank you for the consult and the opportunity to take care of this patient.

## 2024-11-19 NOTE — PROGRESS NOTE ADULT - PROBLEM SELECTOR PLAN 1
s/p prior PCI per pt and recent MIDCAB 10/31/24  - Per vascular pt w/ frequent chest pain this admission, had increased Imdur, also treating w/ Tums/Simethicone; pt baseline trop 30s-40s, however Trop overnight 447.   - Heparin drip started overnight; Eliquis on hold since 11/18 PM  - Trend Trop to peak : 447-->   - DAPT: Aspirin 81mg, Plavix 75mg qd  - c/w Imdur 90mg qd; c/w Atorvastatin 80mg qhs  - c/w Metoprolol succinate 50mg qd  - Pt has eaten full breakfast- start NPO now for [facility] today PM if able, otherwise NPOMN for [facility] tomw s/p prior PCI per pt and recent MIDCAB 10/31/24  #NSTEMI  - EKG 95bpm NSR, QWaves II-AVF; ST Depressions I, II, AVL, V4-V6; c/w prior inpt EKG seen  - Trend Trop to peak : 447--> 368	  - Per vascular pt w/ frequent chest pain this admission, had increased Imdur, also treating w/ Tums/Simethicone; pt baseline trop 30s-40s, however Trop overnight 447.   - Heparin drip started overnight; Eliquis on hold since 11/18 PM  - DAPT: Aspirin 81mg, Plavix 75mg qd  - c/w Imdur 90mg qd; c/w Atorvastatin 80mg qhs  - c/w Metoprolol succinate 50mg qd  - Pt has eaten full breakfast- start NPO now for LHC today if able, otherwise NPOMN for [facility] tomw.  LHC timing also c/b LFA hematoma and Eliquis dose 11/18 PM,  Interventional discussion ongoing  - Pt CP being relieved currently w/ Nitro GTT at 50mcg/hr, VSS, trop downtrending; will continue to monitor carefully  - informed consent complete, in chart

## 2024-11-19 NOTE — PROGRESS NOTE ADULT - ASSESSMENT
74M, Polish speaking, PMHx of CAD s/p PCI and recent minimally invasive direct CABG 10/31/24, ICM HFrEF, HTN, CKD, and PAD s/p prior L to R fem-fem bypass,  who presented to [facility] for left leg pain concerning for acute limb ischemia. Pt was admitted for CLI of LLE with rest pain, s/p LE angiogram, s/p L common and external iliac stent but prox anastomosis of fem-fem bypass is upwards going (unable to treat endovascularly), w/ occluded L CFA, SFA, and AK popliteal artery w/ reconstitution of BK pop via collaterals w/ AT/PT runoff s/p L fem-pop bypass . S/p 1U PRBC 24 due to Hgb 7.7, likely blood loss at L popliteal site (saturated dressing);  now Hgb stable in 8s. Pt w/ consistent angina inpt and troponin usually 30s-40s range.  Overnight with new chest pain and elevated hsTrop 447, EKG w/ Qwaves and worsening ST depressions, rule in NSTEMI; switched apixaban to heparin gtt, transferred to Cardiology, plan for urgent LHC.    EKbpm NSR, QWaves II-AVF; ST Depressions I, II, AVL, V4-V6		  ASA 	IV  Mallampati class: III nearly IV  Anginal Class: IV    -No Known Allergies    -H/H = 8.7/26.2  . Pt denies BRBPR, hematuria, hematochezia, melena. Pt on Aspirin 81mg daily inpt -/ - loaded w/ Asa 81mg qd today (total 324mg). Pt on Plavix 75mg qd 11/10-,  AM, will give another 75mg now given 1 missed dose  per EMR. Pt on Heparin GTT. S/p Eliquis  PM.   -BUN/Cr = 14/1.26  . EF 35%. Euvolemic on exam. IVF to be determined based on procedure day/time    Sedation Plan:   Moderate  Patient Is Suitable Candidate For Sedation?     Yes    Risks & benefits of procedure and alternative therapy have been explained to the patient in Polish Language Line # [id number]; including but not limited to: allergic reaction, bleeding with possible need for blood transfusion, infection, renal and vascular compromise, limb damage, arrhythmia, stroke, vessel dissection/perforation, myocardial infarction, and emergent CABG. Informed consent obtained at bedside and included in chart.

## 2024-11-19 NOTE — PROGRESS NOTE ADULT - SUBJECTIVE AND OBJECTIVE BOX
Patient was seen and examined at bedside. Case discuss with the primary team. Spoke to the patient with the assistance of the Polish  ID   via the [employer] language line .    Pt with chest pain this morning and pt was found to have NSTEMI. Pt was given nitro seen by cardiology and pt is planned for cardiac cath. Pt was transferred from Vascular surgery service to cardiology service.     OBJECTIVE:  Vital Signs Last 24 Hrs  T(C): 36.8 (19 Nov 2024 09:02), Max: 37.1 (18 Nov 2024 20:23)  T(F): 98.2 (19 Nov 2024 09:02), Max: 98.8 (18 Nov 2024 20:23)  HR: 78 (19 Nov 2024 09:02) (78 - 94)  BP: 113/56 (19 Nov 2024 09:02) (97/56 - 133/62)  BP(mean): 75 (19 Nov 2024 09:02) (73 - 89)  RR: 18 (19 Nov 2024 09:02) (18 - 18)  SpO2: 98% (19 Nov 2024 09:02) (95% - 98%)    Parameters below as of 19 Nov 2024 09:02  Patient On (Oxygen Delivery Method): room air      PHYSICAL EXAM:  Gen: NAD laying in bed; appears comfortable  CV: RRR, +S1/S2, no mumur  Pulm: CTA b/l no wheezing or crackles   Abd: soft, NTND + BS no rebound or guarding   Extremities warm, no edema, scattered dry scabs over the L shin   Neuro: AAOX3; nonfocal       LABS:                        8.2    6.61  )-----------( 172      ( 19 Nov 2024 13:30 )             25.8     11-19    136  |  106  |  14  ----------------------------<  122[H]  4.5   |  22  |  1.26    Ca    8.3[L]      19 Nov 2024 02:55  Phos  2.7     11-19  Mg     1.9     11-19      PTT:42.4 sec    Troponin 447->368   PTT 42.4     acetaminophen     Tablet .. 975 milliGRAM(s) Oral every 8 hours  aspirin enteric coated 81 milliGRAM(s) Oral daily  atorvastatin 80 milliGRAM(s) Oral at bedtime  bisacodyl Suppository 10 milliGRAM(s) Rectal daily PRN  calcium carbonate    500 mG (Tums) Chewable 1 Tablet(s) Chew three times a day PRN  clopidogrel Tablet 75 milliGRAM(s) Oral daily  finasteride 5 milliGRAM(s) Oral daily  heparin  Infusion 1100 Unit(s)/Hr IV Continuous <Continuous>  HYDROmorphone  Injectable 0.5 milliGRAM(s) IV Push every 4 hours PRN  influenza  Vaccine (HIGH DOSE) 0.5 milliLiter(s) IntraMuscular once  isosorbide   mononitrate ER Tablet (IMDUR) 90 milliGRAM(s) Oral daily  metoprolol succinate ER 50 milliGRAM(s) Oral daily  nitroglycerin  Infusion 50 MICROgram(s)/Min IV Continuous <Continuous>  oxyCODONE    IR 5 milliGRAM(s) Oral every 6 hours PRN  oxyCODONE    IR 10 milliGRAM(s) Oral every 6 hours PRN  pantoprazole    Tablet 40 milliGRAM(s) Oral daily  polyethylene glycol 3350 17 Gram(s) Oral daily  senna 2 Tablet(s) Oral at bedtime  simethicone 80 milliGRAM(s) Chew two times a day PRN  tamsulosin 0.4 milliGRAM(s) Oral at bedtime

## 2024-11-19 NOTE — PROGRESS NOTE ADULT - SUBJECTIVE AND OBJECTIVE BOX
INTERVAL EVENTS: Chest pain last night, it's better but still has mild chest pain.      PHYSICAL EXAM:  GENERAL: No acute distress  CHEST/LUNG: Clear to auscultation bilaterally  HEART:  No murmurs, S1+s2   ABDOMEN: Soft, Nontender  EXTREMITIES: Warm and well perfused. No edema  NEUROLOGY: AAOx3    LABS:                        8.7    6.92  )-----------( 157      ( 19 Nov 2024 02:55 )             26.2     11-19    136  |  106  |  14  ----------------------------<  122[H]  4.5   |  22  |  1.26    Ca    8.3[L]      19 Nov 2024 02:55  Phos  2.7     11-19  Mg     1.9     11-19        Radiographic Imaging, ECG, echocardiography personally reviewed.

## 2024-11-19 NOTE — PROGRESS NOTE ADULT - NS ATTEND AMEND GEN_ALL_CORE FT
74M, Polish speaking, PMHx of CAD s/p PCI and recent minimally invasive direct CABG 10/31/24, ICM HFrEF, HTN, CKD, and PAD s/p prior L to R fem-fem bypass,  who presented to [facility] for left leg pain concerning for acute limb ischemia. Pt was admitted for CLI of LLE with rest pain, s/p LE angiogram, s/p L common and external iliac stent but prox anastomosis of fem-fem bypass is upwards going (unable to treat endovascularly), w/ occluded L CFA, SFA, and AK popliteal artery w/ reconstitution of BK pop via collaterals w/ AT/PT runoff s/p L fem-pop bypass 11/14. S/p 1U PRBC 11/16/24 due to Hgb 7.7, likely blood loss at L popliteal site (saturated dressing);  now Hgb stable in 8s. Pt w/ consistent angina inpt and troponin usually 30s-40s range.  Overnight with new chest pain and elevated hsTrop 447, EKG w/ Qwaves and worsening ST depressions, rule in NSTEMI; switched apixaban to heparin gtt, transferred to Cardiology, plan for urgent LHC.    1. NSTEMI  Known CAD s.p mini CABG, recent L-R Fem fem bypass, now with severe angina, ST depressions on EKG, and rising troponins. Initially chest pain free, now with recurrent chest pain. Eliquis stopped last night, on heparin drip and nitro drip with resolution of chest pain. LHC today, cath films reviewed, complex CAD.  Left hematoma noted, mild expansion on heparin gtt, vascular called, discussed management, okn to continue heparin and proceed with LHC.  Transfuse 1 prbcs.    During non face-to-face time, I reviewed relevant portions of the patient’s medical record; including vitals, labs, medications, cardiac studies, remaining additional imaging and consultant recommendations. During face-to-face time, I took a relevant history and examined the patient. I also explained to the patient their diagnoses, and specific cardiopulmonary management plan, which required a high level of medical decision making.  I answered all questions related to the patient's medical conditions.

## 2024-11-19 NOTE — PROGRESS NOTE ADULT - PROBLEM SELECTOR PLAN 7
- c/w Tamsulosin and finasteride    N: DASH/TLC diet, NPO for LHC  E: Replete lytes PRN K<4, Mg<2   P: DVT PPX: full AC heparin GTT   C: FULL CODE   Dispo: Cardiac telemetry - c/w Tamsulosin and finasteride    N: DASH/TLC diet, NPO for LHC  E: Replete lytes PRN K<4, Mg<2   P: DVT PPX: full AC heparin GTT   C: FULL CODE   Dispo: Cardiac telemetry  case discussed with Dr. Rowe, Dr. Kolb, and Vascular surgery

## 2024-11-19 NOTE — PROGRESS NOTE ADULT - PROBLEM SELECTOR PLAN 3
- Cr elevated on admission, unclear baseline Cr (~1.5 per Vascular), now Cr 1.2 range  - SCr today 1.26  - Monitor Cr and UOP      #ANEMIA s/p 1u PRBC 11/16/24 for Hgb 7.7  - Suspected blood loss 11/16 from L Popliteal site per Vascular, now stable  - Hgb today 8.7  - Transfuse >8 given cardiac and renal risk factors  - Keep active type & screen (last on 11/18, next due AM 11/21)

## 2024-11-19 NOTE — PROVIDER CONTACT NOTE (CRITICAL VALUE NOTIFICATION) - BACKGROUND
Patient admitted for LLE angiogram with stent placement and LLE bypass. Patient has a hx of a CABG 10/31/24. Patient was rating chest pain as 10/10 and experiencing SOB. ECG and Labs obtained. Medication given as ordered.

## 2024-11-19 NOTE — PROGRESS NOTE ADULT - ASSESSMENT
73yo M, with PMHx of HTN, HLD, BPH, CAD s/p CABG 10/31/24, severe PAD (s/p fem-fem) who presented to [facility] for left leg pain concerning for acute limb ischemia and transferred to [facility], now on vascular service s/p angiogram with fem-fem bypass, L common and external iliac covered stenting and perclose on 11/11. Medicine is consulted for comanagement.     #NSTEMI   #CAD s/p PCI and minimally invasive direct CABG 10/31/24  #Chest pain   #HTN/HLD   -EKG 95bpm NSR, QWaves II-AVF; ST Depressions I, II, AVL, V4-V6; c/w prior inpt EKG  -Pt was seen cardiology and pt is for [facility]  today if able as per cardiology otherwise NPOMN for [facility] tomorrow 11/20 as per cardiology.  [facility] timing also c/b LFA hematoma, Eliquis dose 11/18 PM as well pt had a breakfast this morning as per cardiology.   -Pt's chest being relieved c w/ Nitro GTT at 50mcg/hr, and pt's troponin downtrending; will continue to monitor carefully  -Will continue DAPT:  ASA and Plavix   -Will continue Imdur 90mg PO QD for anti-anginal therapy, hold for SBP <90  -Heparin gtt started overnight and Eliquis held since 11/18 evening dose     #PAD  #s/p LLE angiogram, fem-fem bypass, L common and external iliac covered stenting and perclose on 11/11  -Continue pain control and bowel regimen per vascular team   -Will continue Plavix and ASA; Eliquis held as pt for [facility]     #LLE DVT   -Eliquis held as pt for [facility]   -Will continue Heparin gtt and monitor PTT     #Acute blood loss anemia   -Pt with Hgb of 8.2 this morning   -Pt s/p transfusion of 1 Unit of PRBCs on 11/16 for Hgb 7.7 with appropriate response   -Will continue to trend, transfuse for hemoglobin of 8 or higher given pt's cardiac hx     #FELIPA (resolved)   -Will continue to monitor creatinine   -Avoid nephrotoxins as able, renally dose all medications     #RUL Pulmonary nodule - PET +  -Pt to  follow up outpatient with  Dr. Duarte     #BPH  - Continue flomax and proscar    #Prediabetes  -HgbAc 5.8   -Continue consistent carb diet  -Will continue to monitor FS     #DISPO  -Pt is for [facility]   -Pt was seen by PT and recommended for MIGUEL placement     55 minutes spent on total encounter. The necessity of the time spent during the encounter on this date of service was due to:    Review of hospital course, labs, vitals, medical records.  Bedside exam and speaking to the pt with assistance of Polish   Discussed plan of care with primary team   Documenting the encounter.   75yo M, with PMHx of HTN, HLD, BPH, CAD s/p CABG 10/31/24, severe PAD (s/p fem-fem) who presented to [facility] [place], operated by [facility] for left leg pain concerning for acute limb ischemia and transferred to [facility], now on vascular service s/p angiogram with fem-fem bypass, L common and external iliac covered stenting and perclose on 11/11. Medicine is consulted for comanagement.     #NSTEMI   #CAD s/p PCI and minimally invasive direct CABG 10/31/24  #Chest pain   #HTN/HLD   -EKG 95bpm NSR, QWaves II-AVF; ST Depressions I, II, AVL, V4-V6; c/w prior inpt EKG  -Pt was seen cardiology and pt is for [facility]  today if able as per cardiology otherwise NPOMN for [facility] tomorrow 11/20 as per cardiology.  [facility] timing also c/b LFA hematoma, Eliquis dose 11/18 PM as well pt had a breakfast this morning as per cardiology.   -Pt's chest being relieved c w/ Nitro GTT at 50mcg/hr, and pt's troponin downtrending; will continue to monitor carefully  -Will continue DAPT:  ASA and Plavix   -Will continue Imdur 90mg PO QD for anti-anginal therapy, hold for SBP <90  -Heparin gtt started overnight and Eliquis held since 11/18 evening dose     #PAD  #s/p LLE angiogram, fem-fem bypass, L common and external iliac covered stenting and perclose on 11/11  -Continue pain control and bowel regimen per vascular team   -Will continue Plavix and ASA; Eliquis held as pt for [facility]     #LLE DVT   -Eliquis held as pt for [facility]   -Will continue Heparin gtt and monitor PTT     #Acute blood loss anemia   -Pt with Hgb of 8.2 this morning   -Pt s/p transfusion of 1 Unit of PRBCs on 11/16 for Hgb 7.7 with appropriate response   -Will continue to trend, transfuse for hemoglobin of 8 or higher given pt's cardiac hx     #FELIPA (resolved)   -Will continue to monitor creatinine   -Avoid nephrotoxins as able, renally dose all medications     #RUL Pulmonary nodule - PET +  -Pt to  follow up outpatient with  Dr. Duarte     #BPH  - Continue flomax and proscar    #Prediabetes  -HgbAc 5.8   -Continue consistent carb diet  -Will continue to monitor FS     #DISPO  -Pt is now for [facility]     55 minutes spent on total encounter. The necessity of the time spent during the encounter on this date of service was due to:    Review of hospital course, labs, vitals, medical records.  Bedside exam and speaking to the pt with assistance of Polish   Discussed plan of care with primary team   Documenting the encounter.

## 2024-11-19 NOTE — PROGRESS NOTE ADULT - SUBJECTIVE AND OBJECTIVE BOX
Cardiology PA Adult Progress Note -- Transfer from Vascular to Cardiology Service    Hospital course: 74M PMHx of CAD s/p PCI and recent minimally invasive direct CABG 10/31/24, ICM HFrEF, HTN, CKD, and PAD s/p prior L to R fem-fem bypass,  who presented to [facility] for left leg pain concerning for acute limb ischemia. Pt was admitted for CLI of LLE with rest pain, s/p LE angiogram, s/p L common and external iliac stent but prox anastomosis of fem-fem bypass is upwards going (unable to treat endovascularly), w/ occluded L CFA, SFA, and AK popliteal artery w/ reconstitution of BK pop via collaterals w/ AT/PT runoff s/p L fem-pop bypass 11/14. S/p 1U PRBC 11/16/24 due to Hgb 7.7, likely blood loss at L popliteal site (saturated dressing);  now Hgb stable in 8s. Pt w/ consistent angina inpt and troponin usually 30s-40s range.  Overnight with new chest pain and elevated hsTrop 447, concern for NSTEMI, switched apixaban to heparin gtt, transferred to Cardiology.     Subjective Assessment: Patient seen and examined at bedside. Overnight w/ CP and troponin of 447 (from baseline 30-40s). Eliquis switched from   	  MEDICATIONS:  isosorbide   mononitrate ER Tablet (IMDUR) 90 milliGRAM(s) Oral daily  metoprolol succinate ER 50 milliGRAM(s) Oral daily    acetaminophen     Tablet .. 975 milliGRAM(s) Oral every 8 hours  HYDROmorphone  Injectable 0.5 milliGRAM(s) IV Push every 4 hours PRN  oxyCODONE    IR 5 milliGRAM(s) Oral every 6 hours PRN  oxyCODONE    IR 10 milliGRAM(s) Oral every 6 hours PRN    bisacodyl Suppository 10 milliGRAM(s) Rectal daily PRN  calcium carbonate    500 mG (Tums) Chewable 1 Tablet(s) Chew three times a day PRN  polyethylene glycol 3350 17 Gram(s) Oral daily  senna 2 Tablet(s) Oral at bedtime  simethicone 80 milliGRAM(s) Chew two times a day PRN    atorvastatin 80 milliGRAM(s) Oral at bedtime  finasteride 5 milliGRAM(s) Oral daily    clopidogrel Tablet 75 milliGRAM(s) Oral daily  heparin  Infusion 1100 Unit(s)/Hr IV Continuous <Continuous>  influenza  Vaccine (HIGH DOSE) 0.5 milliLiter(s) IntraMuscular once  tamsulosin 0.4 milliGRAM(s) Oral at bedtime        [PHYSICAL EXAM:  TELEMETRY:  T(C): 36.6 (11-19-24 @ 05:06), Max: 37.1 (11-18-24 @ 20:23)  HR: 82 (11-19-24 @ 05:06) (80 - 94)  BP: 128/60 (11-19-24 @ 05:06) (97/56 - 138/63)  RR: 18 (11-19-24 @ 05:06) (18 - 18)  SpO2: 98% (11-19-24 @ 05:06) (95% - 98%)  Wt(kg): --  I&O's Summary    18 Nov 2024 07:01  -  19 Nov 2024 07:00  --------------------------------------------------------  IN: 560 mL / OUT: 700 mL / NET: -140 mL            LABS:	 	  CARDIAC MARKERS:    Troponin T, High Sensitivity Result: 447: TYPE:(C=Critical, N=Notification, A=Abnormal) C   TESTS: TropT   DATE/TIME CALLED: 11/19/2024 03:31:05 EST   Troponin T, High Sensitivity Result: 38: *                           8.7    6.92  )-----------( 157      ( 19 Nov 2024 02:55 )             26.2     11-19    136  |  106  |  14  ----------------------------<  122[H]  4.5   |  22  |  1.26    Ca    8.3[L]      19 Nov 2024 02:55  Phos  2.7     11-19  Mg     1.9     11-19      proBNP:   Lipid Profile:   HgA1c:   TSH:   PTT - ( 19 Nov 2024 04:26 )  PTT:25.9 sec        < from: TTE Echo Complete w/o Contrast w/ Doppler (11.15.24 @ 10:46) >  CONCLUSIONS:     1. Technically difficult study.   2. Apical window was not available due to bandages.   3. Not all myocardial wall segments are visualized. Basal and mid   inferolateral walls are akinetic. Overall LV systolic function appears   mild to moderately reduced.   4. Probably normal right ventricular systolic function.   5. No significant valvular disease.   6. No pericardial effusion.    < end of copied text >    < from: TTE Echo Complete w/ Contrast w/ Doppler (10.28.24 @ 11:01) >  CONCLUSIONS:     1. Moderately reduced left ventricular systolic function LVEF 35%.    2. Basal and mid anterolateral wall, basal and mid inferior wall, and   basal and mid inferolateral wall are akinetic.   3. Normal right ventricular size and systolic function.   4. Dilated left atrium.   5. Aortic sclerosis without significant stenosis.   6. No evidence of pulmonary hypertension.   7. No pericardial effusion.    < end of copied text >       Cardiology PA Adult Progress Note -- Transfer from Vascular to Cardiology Service    Hospital course: 74M, Polish speaking, PMHx of CAD s/p PCI and recent minimally invasive direct CABG 10/31/24, ICM HFrEF, HTN, CKD, and PAD s/p prior L to R fem-fem bypass,  who presented to [facility] for left leg pain concerning for acute limb ischemia. Pt was admitted for CLI of LLE with rest pain, s/p LE angiogram, s/p L common and external iliac stent but prox anastomosis of fem-fem bypass is upwards going (unable to treat endovascularly), w/ occluded L CFA, SFA, and AK popliteal artery w/ reconstitution of BK pop via collaterals w/ AT/PT runoff s/p L fem-pop bypass 11/14. S/p 1U PRBC 11/16/24 due to Hgb 7.7, likely blood loss at L popliteal site (saturated dressing);  now Hgb stable in 8s. Pt w/ consistent angina inpt and troponin usually 30s-40s range.  Overnight with new chest pain and elevated hsTrop 447, EKG w/ Qwaves and worsening ST depressions, rule in NSTEMI; switched apixaban to heparin gtt, transferred to Cardiology, plan for urgent LHC.     Subjective Assessment: Patient seen and examined at bedside. Overnight w/ CP and troponin of 447 (from baseline 30-40s). Pt currently w/ CP and SOB. Pt endorses occasional cough and orthopnea. Pt denies leg swelling, dizziness, LOC, fall, syncope, N/V/D, fever/chills, hematuria, BRBPR, melena/hematochezia.  	  MEDICATIONS:  isosorbide   mononitrate ER Tablet (IMDUR) 90 milliGRAM(s) Oral daily  metoprolol succinate ER 50 milliGRAM(s) Oral daily    acetaminophen     Tablet .. 975 milliGRAM(s) Oral every 8 hours  HYDROmorphone  Injectable 0.5 milliGRAM(s) IV Push every 4 hours PRN  oxyCODONE    IR 5 milliGRAM(s) Oral every 6 hours PRN  oxyCODONE    IR 10 milliGRAM(s) Oral every 6 hours PRN    bisacodyl Suppository 10 milliGRAM(s) Rectal daily PRN  calcium carbonate    500 mG (Tums) Chewable 1 Tablet(s) Chew three times a day PRN  polyethylene glycol 3350 17 Gram(s) Oral daily  senna 2 Tablet(s) Oral at bedtime  simethicone 80 milliGRAM(s) Chew two times a day PRN    atorvastatin 80 milliGRAM(s) Oral at bedtime  finasteride 5 milliGRAM(s) Oral daily    clopidogrel Tablet 75 milliGRAM(s) Oral daily  heparin  Infusion 1100 Unit(s)/Hr IV Continuous <Continuous>  influenza  Vaccine (HIGH DOSE) 0.5 milliLiter(s) IntraMuscular once  tamsulosin 0.4 milliGRAM(s) Oral at bedtime        [PHYSICAL EXAM:  TELEMETRY:  T(C): 36.6 (11-19-24 @ 05:06), Max: 37.1 (11-18-24 @ 20:23)  HR: 82 (11-19-24 @ 05:06) (80 - 94)  BP: 128/60 (11-19-24 @ 05:06) (97/56 - 138/63)  RR: 18 (11-19-24 @ 05:06) (18 - 18)  SpO2: 98% (11-19-24 @ 05:06) (95% - 98%)  Wt(kg): --  I&O's Summary    18 Nov 2024 07:01  -  19 Nov 2024 07:00  --------------------------------------------------------  IN: 560 mL / OUT: 700 mL / NET: -140 mL      Appearance: Pt seen in bed in mild-mod distress	dt pain  HEENT:   Normal oral mucosa, PERRL, EOMI	  Neck: Supple   Cardiovascular: Normal S1 S2, No JVD, No murmurs  Respiratory: Lungs clear to auscultation/No Rales, Rhonchi, Wheezing	  Gastrointestinal:  Soft, Non-tender, + BS	  Skin: No rashes, +ecchymoses +scabbed pretibial lesions; +incision healing to L groin,  + healing small incisions to chest wall.  Extremities: Normal range of motion, No clubbing, cyanosis or edema  Vascular: Peripheral pulses as follows: +LFA hematoma assessed with Vasc Sx and Dr Soledad at bedside- tender and outlined. RDP 2+. RPT 1+. LDP/PT dopplerable. B/l radial 2+. B/l FA 1+ with Bruits present.    Neurologic: Non-focal  Psychiatry: A & O x 3, Mood & affect appropriate       LABS:	 	  CARDIAC MARKERS:    Troponin T, High Sensitivity Result: 447: TYPE:(C=Critical, N=Notification, A=Abnormal) C   TESTS: TropT   DATE/TIME CALLED: 11/19/2024 03:31:05 EST   Troponin T, High Sensitivity Result: 38: *                           8.7    6.92  )-----------( 157      ( 19 Nov 2024 02:55 )             26.2     11-19    136  |  106  |  14  ----------------------------<  122[H]  4.5   |  22  |  1.26    Ca    8.3[L]      19 Nov 2024 02:55  Phos  2.7     11-19  Mg     1.9     11-19      proBNP:   Lipid Profile:   HgA1c:   TSH:   PTT - ( 19 Nov 2024 04:26 )  PTT:25.9 sec        < from: TTE Echo Complete w/o Contrast w/ Doppler (11.15.24 @ 10:46) >  CONCLUSIONS:     1. Technically difficult study.   2. Apical window was not available due to bandages.   3. Not all myocardial wall segments are visualized. Basal and mid   inferolateral walls are akinetic. Overall LV systolic function appears   mild to moderately reduced.   4. Probably normal right ventricular systolic function.   5. No significant valvular disease.   6. No pericardial effusion.    < end of copied text >    < from: TTE Echo Complete w/ Contrast w/ Doppler (10.28.24 @ 11:01) >  CONCLUSIONS:     1. Moderately reduced left ventricular systolic function LVEF 35%.    2. Basal and mid anterolateral wall, basal and mid inferior wall, and   basal and mid inferolateral wall are akinetic.   3. Normal right ventricular size and systolic function.   4. Dilated left atrium.   5. Aortic sclerosis without significant stenosis.   6. No evidence of pulmonary hypertension.   7. No pericardial effusion.    < end of copied text >       Cardiology PA Adult Progress Note -- Transfer from Vascular to Cardiology Service    Hospital course: 74M, Polish speaking, PMHx of CAD s/p PCI and recent minimally invasive direct CABG 10/31/24, ICM HFrEF, HTN, CKD, and PAD s/p prior L to R fem-fem bypass,  who presented to [facility] for left leg pain concerning for acute limb ischemia. Pt was admitted for CLI of LLE with rest pain, s/p LE angiogram, s/p L common and external iliac stent but prox anastomosis of fem-fem bypass is upwards going (unable to treat endovascularly), w/ occluded L CFA, SFA, and AK popliteal artery w/ reconstitution of BK pop via collaterals w/ AT/PT runoff s/p L fem-pop bypass 11/14. S/p 1U PRBC 11/16/24 due to Hgb 7.7, likely blood loss at L popliteal site (saturated dressing);  now Hgb stable in 8s. Pt w/ consistent angina inpt and troponin usually 30s-40s range.  Overnight with new chest pain and elevated hsTrop 447, EKG w/ Qwaves and worsening ST depressions, rule in NSTEMI; switched apixaban to heparin gtt, transferred to Cardiology, plan for urgent LHC.     Subjective Assessment: Patient seen and examined at bedside. Overnight w/ CP and troponin of 447 (from baseline 30-40s). Pt currently w/ CP and SOB. Pt endorses occasional cough and orthopnea. Pt denies leg swelling, dizziness, LOC, fall, syncope, N/V/D, fever/chills, hematuria, BRBPR, melena/hematochezia.  	  MEDICATIONS:  isosorbide   mononitrate ER Tablet (IMDUR) 90 milliGRAM(s) Oral daily  metoprolol succinate ER 50 milliGRAM(s) Oral daily    acetaminophen     Tablet .. 975 milliGRAM(s) Oral every 8 hours  HYDROmorphone  Injectable 0.5 milliGRAM(s) IV Push every 4 hours PRN  oxyCODONE    IR 5 milliGRAM(s) Oral every 6 hours PRN  oxyCODONE    IR 10 milliGRAM(s) Oral every 6 hours PRN    bisacodyl Suppository 10 milliGRAM(s) Rectal daily PRN  calcium carbonate    500 mG (Tums) Chewable 1 Tablet(s) Chew three times a day PRN  polyethylene glycol 3350 17 Gram(s) Oral daily  senna 2 Tablet(s) Oral at bedtime  simethicone 80 milliGRAM(s) Chew two times a day PRN    atorvastatin 80 milliGRAM(s) Oral at bedtime  finasteride 5 milliGRAM(s) Oral daily    clopidogrel Tablet 75 milliGRAM(s) Oral daily  heparin  Infusion 1100 Unit(s)/Hr IV Continuous <Continuous>  influenza  Vaccine (HIGH DOSE) 0.5 milliLiter(s) IntraMuscular once  tamsulosin 0.4 milliGRAM(s) Oral at bedtime        [PHYSICAL EXAM:  TELEMETRY:  T(C): 36.6 (11-19-24 @ 05:06), Max: 37.1 (11-18-24 @ 20:23)  HR: 82 (11-19-24 @ 05:06) (80 - 94)  BP: 128/60 (11-19-24 @ 05:06) (97/56 - 138/63)  RR: 18 (11-19-24 @ 05:06) (18 - 18)  SpO2: 98% (11-19-24 @ 05:06) (95% - 98%)  Wt(kg): --  I&O's Summary    18 Nov 2024 07:01  -  19 Nov 2024 07:00  --------------------------------------------------------  IN: 560 mL / OUT: 700 mL / NET: -140 mL      Appearance: Pt seen in bed in mild-mod distress	dt pain  HEENT:   Normal oral mucosa, PERRL, EOMI	  Neck: Supple   Cardiovascular: Normal S1 S2, No JVD, No murmurs  Respiratory: Lungs clear to auscultation/No Rales, Rhonchi, Wheezing	  Gastrointestinal:  Soft, Non-tender, + BS	  Skin: No rashes, +ecchymoses +scabbed pretibial lesions; +incision healing to L groin,  + healing small incisions to chest wall.  Extremities: Normal range of motion, No clubbing, cyanosis or edema  Vascular: Peripheral pulses as follows: +LFA hematoma assessed with Vasc Sx and Dr Soledad at bedside- tender and outlined. LDP 2+. LPT 1+. RDP/PT dopplerable. B/l radial 2+. B/l FA 1+ with Bruits present.    Neurologic: Non-focal  Psychiatry: A & O x 3, Mood & affect appropriate       LABS:	 	  CARDIAC MARKERS:    Troponin T, High Sensitivity Result: 447: TYPE:(C=Critical, N=Notification, A=Abnormal) C   TESTS: TropT   DATE/TIME CALLED: 11/19/2024 03:31:05 EST   Troponin T, High Sensitivity Result: 38: *                           8.7    6.92  )-----------( 157      ( 19 Nov 2024 02:55 )             26.2     11-19    136  |  106  |  14  ----------------------------<  122[H]  4.5   |  22  |  1.26    Ca    8.3[L]      19 Nov 2024 02:55  Phos  2.7     11-19  Mg     1.9     11-19      proBNP:   Lipid Profile:   HgA1c:   TSH:   PTT - ( 19 Nov 2024 04:26 )  PTT:25.9 sec        < from: TTE Echo Complete w/o Contrast w/ Doppler (11.15.24 @ 10:46) >  CONCLUSIONS:     1. Technically difficult study.   2. Apical window was not available due to bandages.   3. Not all myocardial wall segments are visualized. Basal and mid   inferolateral walls are akinetic. Overall LV systolic function appears   mild to moderately reduced.   4. Probably normal right ventricular systolic function.   5. No significant valvular disease.   6. No pericardial effusion.    < end of copied text >    < from: TTE Echo Complete w/ Contrast w/ Doppler (10.28.24 @ 11:01) >  CONCLUSIONS:     1. Moderately reduced left ventricular systolic function LVEF 35%.    2. Basal and mid anterolateral wall, basal and mid inferior wall, and   basal and mid inferolateral wall are akinetic.   3. Normal right ventricular size and systolic function.   4. Dilated left atrium.   5. Aortic sclerosis without significant stenosis.   6. No evidence of pulmonary hypertension.   7. No pericardial effusion.    < end of copied text >

## 2024-11-19 NOTE — PROGRESS NOTE ADULT - SUBJECTIVE AND OBJECTIVE BOX
O/N: diet soft/bite sized bc pt does not have dentures, states difficult to chew.     ---------------------------------------------------------------------------  PLEASE CHECK WHEN PRESENT:  [ x ]Heart Failure     [  ] Acute     [  ] Acute on Chronic     [ x ] Chronic     [  ]Diastolic [HFpEF]     [ x ]Systolic [HFrEF]     [  ]Combined [HFpEF & HFrEF]  .................................................................................  [x ] Hypertensive Heart Disease  [x ] CAD  [  ] Atrial Fibrillation     [  ] Paroxysmal A-fib     [  ] Chronic A-fib     [  ] Persistent A-fib     [  ] Longstanding Persistent A-fib     [  ] Permanent A-fib  [  ] Pulmonary Hypertension  [  ] Other:  -------------------------------------------------------------------  [  ] Respiratory failure  [  ] Acute PE   [  ] Acute cor pulmonale  [  ] Asthma/COPD Exacerbation  [  ] COPD on home O2 (Chronic renal Failure)   [  ] Atelectasis   [  ] Pleural effusion  [  ] Aspiration pneumonia  [  ] Obstructive Sleep Apnea  -------------------------------------------------------------------  [ x ] Acute Kidney Injury      [  ] Acute Tubular Necrosis      [  ] Reneal Medullary Necrosis     [  ] Renal Cortical Necrosis     [  ] Other Pathological Lesions:  [  ] Chronic Kidney Disease     [  ]CKD 1     [  ]CKD 2     [  ]CKD 3     [  ]CKD 4     [  ]CKD 5 (ESRD)  [  ]Other:  -------------------------------------------------------------------  [  ] Diabetes  [  ] Diabetic PVD Ulcer  [  ] Neuropathic ulcer to DM  [  ] Diabetes with Nephropathy  [  ] Osteomyelitis due to diabetes  [  ] Hyperglycemia   [  ] Hypoglycemia   --------------------------------------------------------------------  [  ] Malnutrition: See Nutrition Note  [  ] Cachexia  [  ] Other:   [  ] Supplement Ordered:  [  ] Morbid Obesity (BMI >=40]  [  ] Ileus  ---------------------------------------------------------------------  [  ] Sepsis/severe sepsis/septic shock  [  ] Noninfectious SIRS  [  ] UTI  [  ] Pneumonia  [  ] Thrombophlebitis   -----------------------------------------------------------------------  [  ] Acidosis/alkalosis  [  ] Fluid overload  [  ] Hypokalemia  [  ] Hyperkalemia  [  ] Hypomagnesemia  [  ] Hypophosphatemia  [  ] Hyperphosphatemia  ------------------------------------------------------------------------  [  ] Acute blood loss anemia  [  ] Post op blood loss anemia  [  ] Iron deficiency anemia  [  ] Anemia due to chronic disease  [  ] Hypercoagulable state  [  ] Thrombocytopenia  ----------------------------------------------------------------------  [  ] Cerebral infarction  [  ] Transient ischemia attack  [  ] Encephalopathy - Toxic or Metabolic    A/P: 73yo M, with PMHx of HTN, HLD, BPH, HFrEF, CAD s/p CABG 10/31/24, severe PAD (s/p fem-fem) who presented to [facility] for left leg pain concerning for acute limb ischemia. On exam here, patient has an exam more consistent with his prior recent exam, consisting of chronic limb ischemia (motor and sensation intact, doppler signals present in left foot and fem-fem bypass). Given his rest pain, will proceed with arteriography.     #PAD w/ rest pain  - s/p LLE angiogram with EIA and FELICITY stents  - s/p LLE fem to AK-pop bypass 11/14/24  - CTA b/l LE at OSH 11/8: post fem-fem bypass, severe atherosclerotic disease involving aorta, iliac, and superficial femoral arteries b/l more on the left  - c/w plavix and statin  - pain control  - bowel regimen    #CAD s/p prior PCI and recent CABG   - c/w plavix and statin  - c/w imdur  - c/w metoprolol succinate  - Trops wnl  - appreciate cards recs    #HFrEF, Ischemic Cardiomyopathy  - ECHO 10/24: LVEF 35% with regional wall abnormalities  - c/w metoprolol succinate  - repeat TTE 11/15: Not all myocardial wall segments are visualized. Basal and mid inferolateral walls are akinetic. Overall LV systolic function appears mild to moderately reduced.    #RUL Pulmonary nodule - PET +  - Following with Dr. Duatre outpatient.    #FELIPA (RESOLVED)  - Cr elevated on admission, unclear baseline Cr, now Cr 1.2 range  - Monitor Cr and UOP    #BPH  - c/w flomax and proscar    #Prediabetes  - A1C 5.8%    #LLE DVT  - c/w eliquis    #Chest pain  - pt with intermittent chest pain and shortness of breath, likely chronic angina  - VQ scan with no evidence of PE   - c/w Imdur (now increased dose)  - c/w tums    Diet: soft & bite sized while patient does not have dentures  activity: as tolerated  DVTppx: holding heparin GGT  Dispo: [facility]   O/N: diet soft/bite sized bc pt does not have dentures, states difficult to chew.     ---------------------------------------------------------------------------  PLEASE CHECK WHEN PRESENT:  [ x ]Heart Failure     [  ] Acute     [  ] Acute on Chronic     [ x ] Chronic     [  ]Diastolic [HFpEF]     [ x ]Systolic [HFrEF]     [  ]Combined [HFpEF & HFrEF]  .................................................................................  [x ] Hypertensive Heart Disease  [x ] CAD  [  ] Atrial Fibrillation     [  ] Paroxysmal A-fib     [  ] Chronic A-fib     [  ] Persistent A-fib     [  ] Longstanding Persistent A-fib     [  ] Permanent A-fib  [  ] Pulmonary Hypertension  [  ] Other:  -------------------------------------------------------------------  [  ] Respiratory failure  [  ] Acute PE   [  ] Acute cor pulmonale  [  ] Asthma/COPD Exacerbation  [  ] COPD on home O2 (Chronic renal Failure)   [  ] Atelectasis   [  ] Pleural effusion  [  ] Aspiration pneumonia  [  ] Obstructive Sleep Apnea  -------------------------------------------------------------------  [ x ] Acute Kidney Injury      [  ] Acute Tubular Necrosis      [  ] Reneal Medullary Necrosis     [  ] Renal Cortical Necrosis     [  ] Other Pathological Lesions:  [  ] Chronic Kidney Disease     [  ]CKD 1     [  ]CKD 2     [  ]CKD 3     [  ]CKD 4     [  ]CKD 5 (ESRD)  [  ]Other:  -------------------------------------------------------------------  [  ] Diabetes  [  ] Diabetic PVD Ulcer  [  ] Neuropathic ulcer to DM  [  ] Diabetes with Nephropathy  [  ] Osteomyelitis due to diabetes  [  ] Hyperglycemia   [  ] Hypoglycemia   --------------------------------------------------------------------  [  ] Malnutrition: See Nutrition Note  [  ] Cachexia  [  ] Other:   [  ] Supplement Ordered:  [  ] Morbid Obesity (BMI >=40]  [  ] Ileus  ---------------------------------------------------------------------  [  ] Sepsis/severe sepsis/septic shock  [  ] Noninfectious SIRS  [  ] UTI  [  ] Pneumonia  [  ] Thrombophlebitis   -----------------------------------------------------------------------  [  ] Acidosis/alkalosis  [  ] Fluid overload  [  ] Hypokalemia  [  ] Hyperkalemia  [  ] Hypomagnesemia  [  ] Hypophosphatemia  [  ] Hyperphosphatemia  ------------------------------------------------------------------------  [  ] Acute blood loss anemia  [  ] Post op blood loss anemia  [  ] Iron deficiency anemia  [  ] Anemia due to chronic disease  [  ] Hypercoagulable state  [  ] Thrombocytopenia  ----------------------------------------------------------------------  [  ] Cerebral infarction  [  ] Transient ischemia attack  [  ] Encephalopathy - Toxic or Metabolic    A/P: 75yo M, with PMHx of HTN, HLD, BPH, HFrEF, CAD s/p CABG 10/31/24, severe PAD (s/p fem-fem) who presented to [facility] for left leg pain concerning for acute limb ischemia. On exam here, patient has an exam more consistent with his prior recent exam, consisting of chronic limb ischemia (motor and sensation intact, doppler signals present in left foot and fem-fem bypass). Given his rest pain, will proceed with arteriography.     #PAD w/ rest pain  - s/p LLE angiogram with EIA and FELICITY stents  - s/p LLE fem to AK-pop bypass 11/14/24  - CTA b/l LE at OSH 11/8: post fem-fem bypass, severe atherosclerotic disease involving aorta, iliac, and superficial femoral arteries b/l more on the left  - c/w plavix and statin  - pain control  - bowel regimen    #CAD s/p prior PCI and recent CABG   - c/w plavix and statin  - c/w imdur  - c/w metoprolol succinate  - Trops wnl  - appreciate cards recs    #HFrEF, Ischemic Cardiomyopathy  - ECHO 10/24: LVEF 35% with regional wall abnormalities  - c/w metoprolol succinate  - repeat TTE 11/15: Not all myocardial wall segments are visualized. Basal and mid inferolateral walls are akinetic. Overall LV systolic function appears mild to moderately reduced.    #RUL Pulmonary nodule - PET +  - Following with Dr. Duarte outpatient.    #FELIPA on CKD  - Cr elevated on admission, unclear baseline Cr, now Cr 1.2 range  - Monitor Cr and UOP    #BPH  - c/w flomax and proscar    #Prediabetes  - A1C 5.8%    #LLE DVT  - c/w eliquis    #Chest pain  - pt with intermittent chest pain and shortness of breath, likely chronic angina  - VQ scan with no evidence of PE   - c/w Imdur (now increased dose)  - c/w tums    Diet: soft & bite sized while patient does not have dentures  activity: as tolerated  DVTppx: holding heparin GGT  Dispo: [facility]   O/N: diet soft/bite sized bc pt does not have dentures, states difficult to chew. c/o worsening CP given IV dilaudid only slight improvement, EKG similar to prior, CK wnl, troponin 447. cards fellow rec start hep gtt, repeat ekg/trops q6, consider adding ranolazine.    ---------------------------------------------------------------------------  PLEASE CHECK WHEN PRESENT:  [ x ]Heart Failure     [  ] Acute     [  ] Acute on Chronic     [ x ] Chronic     [  ]Diastolic [HFpEF]     [ x ]Systolic [HFrEF]     [  ]Combined [HFpEF & HFrEF]  .................................................................................  [x ] Hypertensive Heart Disease  [x ] CAD  [  ] Atrial Fibrillation     [  ] Paroxysmal A-fib     [  ] Chronic A-fib     [  ] Persistent A-fib     [  ] Longstanding Persistent A-fib     [  ] Permanent A-fib  [  ] Pulmonary Hypertension  [  ] Other:  -------------------------------------------------------------------  [  ] Respiratory failure  [  ] Acute PE   [  ] Acute cor pulmonale  [  ] Asthma/COPD Exacerbation  [  ] COPD on home O2 (Chronic renal Failure)   [  ] Atelectasis   [  ] Pleural effusion  [  ] Aspiration pneumonia  [  ] Obstructive Sleep Apnea  -------------------------------------------------------------------  [ x ] Acute Kidney Injury      [  ] Acute Tubular Necrosis      [  ] Reneal Medullary Necrosis     [  ] Renal Cortical Necrosis     [  ] Other Pathological Lesions:  [  ] Chronic Kidney Disease     [  ]CKD 1     [  ]CKD 2     [  ]CKD 3     [  ]CKD 4     [  ]CKD 5 (ESRD)  [  ]Other:  -------------------------------------------------------------------  [  ] Diabetes  [  ] Diabetic PVD Ulcer  [  ] Neuropathic ulcer to DM  [  ] Diabetes with Nephropathy  [  ] Osteomyelitis due to diabetes  [  ] Hyperglycemia   [  ] Hypoglycemia   --------------------------------------------------------------------  [  ] Malnutrition: See Nutrition Note  [  ] Cachexia  [  ] Other:   [  ] Supplement Ordered:  [  ] Morbid Obesity (BMI >=40]  [  ] Ileus  ---------------------------------------------------------------------  [  ] Sepsis/severe sepsis/septic shock  [  ] Noninfectious SIRS  [  ] UTI  [  ] Pneumonia  [  ] Thrombophlebitis   -----------------------------------------------------------------------  [  ] Acidosis/alkalosis  [  ] Fluid overload  [  ] Hypokalemia  [  ] Hyperkalemia  [  ] Hypomagnesemia  [  ] Hypophosphatemia  [  ] Hyperphosphatemia  ------------------------------------------------------------------------  [  ] Acute blood loss anemia  [  ] Post op blood loss anemia  [  ] Iron deficiency anemia  [  ] Anemia due to chronic disease  [  ] Hypercoagulable state  [  ] Thrombocytopenia  ----------------------------------------------------------------------  [  ] Cerebral infarction  [  ] Transient ischemia attack  [  ] Encephalopathy - Toxic or Metabolic    A/P: 73yo M, with PMHx of HTN, HLD, BPH, HFrEF, CAD s/p CABG 10/31/24, severe PAD (s/p fem-fem) who presented to [facility], [place], operated by [facility] for left leg pain concerning for acute limb ischemia. On exam here, patient has an exam more consistent with his prior recent exam, consisting of chronic limb ischemia (motor and sensation intact, doppler signals present in left foot and fem-fem bypass). Given his rest pain, will proceed with arteriography.     #PAD w/ rest pain  - s/p LLE angiogram with EIA and FELICITY stents  - s/p LLE fem to AK-pop bypass 11/14/24  - CTA b/l LE at OSH 11/8: post fem-fem bypass, severe atherosclerotic disease involving aorta, iliac, and superficial femoral arteries b/l more on the left  - c/w plavix and statin  - pain control  - bowel regimen    #CAD s/p prior PCI and recent CABG   - c/w plavix and statin  - c/w imdur  - c/w metoprolol succinate  - Trops wnl  - appreciate cards recs    #HFrEF, Ischemic Cardiomyopathy  - ECHO 10/24: LVEF 35% with regional wall abnormalities  - c/w metoprolol succinate  - repeat TTE 11/15: Not all myocardial wall segments are visualized. Basal and mid inferolateral walls are akinetic. Overall LV systolic function appears mild to moderately reduced.    #RUL Pulmonary nodule - PET +  - Following with Dr. Duarte outpatient.    #FELIPA on CKD  - Cr elevated on admission, unclear baseline Cr, now Cr 1.2 range  - Monitor Cr and UOP    #BPH  - c/w flomax and proscar    #Prediabetes  - A1C 5.8%    #LLE DVT  - c/w eliquis    #Chest pain  - pt with intermittent chest pain and shortness of breath, likely chronic angina  - VQ scan with no evidence of PE   - c/w Imdur (now increased dose)  - c/w tums    Diet: soft & bite sized while patient does not have dentures  activity: as tolerated  DVTppx: holding heparin GGT  Dispo: [facility]   O/N: diet soft/bite sized bc pt does not have dentures, states difficult to chew. c/o worsening CP given IV dilaudid only slight improvement, EKG similar to prior, CK wnl, troponin 447. cards fellow rec start hep gtt, repeat ekg/trops q6, consider adding ranolazine.    Subjective: Pt seen and examined at bedside this AM. He endorses chest pain improved from overnight. Pt denies LLE paia.     ROS:   Denies Headache, blurred vision, SOB, Abdominal pain, nausea or vomiting     Social   clopidogrel Tablet 75  heparin  Infusion 1100  isosorbide   mononitrate ER Tablet (IMDUR) 90  metoprolol succinate ER 50      Allergies    No Known Allergies    Intolerances        Vital Signs Last 24 Hrs  T(C): 36.6 (19 Nov 2024 05:06), Max: 37.1 (18 Nov 2024 20:23)  T(F): 97.8 (19 Nov 2024 05:06), Max: 98.8 (18 Nov 2024 20:23)  HR: 82 (19 Nov 2024 05:06) (80 - 94)  BP: 128/60 (19 Nov 2024 05:06) (97/56 - 138/63)  BP(mean): 86 (19 Nov 2024 05:06) (73 - 89)  RR: 18 (19 Nov 2024 05:06) (18 - 18)  SpO2: 98% (19 Nov 2024 05:06) (95% - 98%)    Parameters below as of 19 Nov 2024 05:06  Patient On (Oxygen Delivery Method): room air      I&O's Summary    18 Nov 2024 07:01  -  19 Nov 2024 07:00  --------------------------------------------------------  IN: 560 mL / OUT: 700 mL / NET: -140 mL        Physical Exam:  General: alert and awake, NAD  Pulmonary: no respiratory distress  Cardiovascular: RRR  Abdominal: soft  Extrem: WWP, no calf edema, L groin incision c/d/i, mild improving ecchymosis surrounding, soft, moderately tender to palpation, motor and sensory intact bilaterally  Pulses: LLE palp DP, triphasic PT       LABS:                        8.7    6.92  )-----------( 157      ( 19 Nov 2024 02:55 )             26.2     11-19    136  |  106  |  14  ----------------------------<  122[H]  4.5   |  22  |  1.26    Ca    8.3[L]      19 Nov 2024 02:55  Phos  2.7     11-19  Mg     1.9     11-19      PTT - ( 19 Nov 2024 04:26 )  PTT:25.9 sec        ---------------------------------------------------------------------------  PLEASE CHECK WHEN PRESENT:  [ x ]Heart Failure     [  ] Acute     [  ] Acute on Chronic     [ x ] Chronic     [  ]Diastolic [HFpEF]     [ x ]Systolic [HFrEF]     [  ]Combined [HFpEF & HFrEF]  .................................................................................  [x ] Hypertensive Heart Disease  [x ] CAD  [  ] Atrial Fibrillation     [  ] Paroxysmal A-fib     [  ] Chronic A-fib     [  ] Persistent A-fib     [  ] Longstanding Persistent A-fib     [  ] Permanent A-fib  [  ] Pulmonary Hypertension  [  ] Other:  -------------------------------------------------------------------  [  ] Respiratory failure  [  ] Acute PE   [  ] Acute cor pulmonale  [  ] Asthma/COPD Exacerbation  [  ] COPD on home O2 (Chronic renal Failure)   [  ] Atelectasis   [  ] Pleural effusion  [  ] Aspiration pneumonia  [  ] Obstructive Sleep Apnea  -------------------------------------------------------------------  [ x ] Acute Kidney Injury      [  ] Acute Tubular Necrosis      [  ] Reneal Medullary Necrosis     [  ] Renal Cortical Necrosis     [  ] Other Pathological Lesions:  [  ] Chronic Kidney Disease     [  ]CKD 1     [  ]CKD 2     [  ]CKD 3     [  ]CKD 4     [  ]CKD 5 (ESRD)  [  ]Other:  -------------------------------------------------------------------  [  ] Diabetes  [  ] Diabetic PVD Ulcer  [  ] Neuropathic ulcer to DM  [  ] Diabetes with Nephropathy  [  ] Osteomyelitis due to diabetes  [  ] Hyperglycemia   [  ] Hypoglycemia   --------------------------------------------------------------------  [  ] Malnutrition: See Nutrition Note  [  ] Cachexia  [  ] Other:   [  ] Supplement Ordered:  [  ] Morbid Obesity (BMI >=40]  [  ] Ileus  ---------------------------------------------------------------------  [  ] Sepsis/severe sepsis/septic shock  [  ] Noninfectious SIRS  [  ] UTI  [  ] Pneumonia  [  ] Thrombophlebitis   -----------------------------------------------------------------------  [  ] Acidosis/alkalosis  [  ] Fluid overload  [  ] Hypokalemia  [  ] Hyperkalemia  [  ] Hypomagnesemia  [  ] Hypophosphatemia  [  ] Hyperphosphatemia  ------------------------------------------------------------------------  [  ] Acute blood loss anemia  [  ] Post op blood loss anemia  [  ] Iron deficiency anemia  [  ] Anemia due to chronic disease  [  ] Hypercoagulable state  [  ] Thrombocytopenia  ----------------------------------------------------------------------  [  ] Cerebral infarction  [  ] Transient ischemia attack  [  ] Encephalopathy - Toxic or Metabolic    A/P: 75yo M, with PMHx of HTN, HLD, BPH, HFrEF, CAD s/p CABG 10/31/24, severe PAD (s/p fem-fem) who presented to [facility] for left leg pain concerning for acute limb ischemia. On exam here, patient has an exam more consistent with his prior recent exam, consisting of chronic limb ischemia (motor and sensation intact, doppler signals present in left foot and fem-fem bypass). Given his rest pain, will proceed with arteriography.     #PAD w/ rest pain  - s/p LLE angiogram with EIA and FELICITY stents  - s/p LLE fem to AK-pop bypass 11/14/24  - CTA b/l LE at OSH 11/8: post fem-fem bypass, severe atherosclerotic disease involving aorta, iliac, and superficial femoral arteries b/l more on the left  - c/w plavix and statin  - pain control  - bowel regimen    #CAD s/p prior PCI and recent CABG   - Heparin drip started overnight for chest pain and troponin 400s  - Trending q6 troponin   - c/w plavix and statin  - c/w imdur  - c/w metoprolol succinate  - appreciate cards recs    #HFrEF, Ischemic Cardiomyopathy  - ECHO 10/24: LVEF 35% with regional wall abnormalities  - c/w metoprolol succinate  - repeat TTE 11/15: Not all myocardial wall segments are visualized. Basal and mid inferolateral walls are akinetic. Overall LV systolic function appears mild to moderately reduced.    #RUL Pulmonary nodule - PET +  - Following with Dr. Duarte outpatient.    #FELIPA on CKD  - Cr elevated on admission, unclear baseline Cr, now Cr 1.2 range  - Monitor Cr and UOP    #BPH  - c/w flomax and proscar    #Prediabetes  - A1C 5.8%    #LLE DVT  - c/w eliquis    #Chest pain  - pt with intermittent chest pain and shortness of breath, likely chronic angina  - VQ scan with no evidence of PE   - c/w Imdur (now increased dose)  - c/w tums    Diet: soft & bite sized while patient does not have dentures  activity: as tolerated  DVTppx: holding heparin GGT  Dispo: [facility]   O/N: diet soft/bite sized bc pt does not have dentures, states difficult to chew. c/o worsening CP given IV dilaudid only slight improvement, EKG similar to prior, CK wnl, troponin 447. cards fellow rec start hep gtt, repeat ekg/trops q6, consider adding ranolazine.    Subjective: Pt seen and examined at bedside this AM. He endorses chest pain improved from overnight. Pt denies LLE pain    Hospital course for sign out purposes: 75yo M, with PMHx of HTN, HLD, BPH, CAD s/p CABG 10/31/24, severe PAD (s/p fem-fem), CKD (baseline Cr 1.3-1.5) who presented to [facility] [place] for left leg pain concerning for acute limb ischemia. Pt was then transferred on the evening of 11/9. On exam upon presentation, patient had an exam more consistent with his prior recent exam, consistent with chronic limb ischemia (motor and sensation intact, doppler signals present in left foot and fem-fem bypass). Given his rest pain, pt was admitted with the vascular team and on 11/11 underwent LLE angiography with EIA and FELICITY stents. At that time, it was determined pt will need LLE bypass. Post-op, experienced anginal type chest pain; ECG and troponin was unremarkable. On 11/12, pt underwent LLE vein mapping for bypass planning with age indeterminate L SFA pop, and calf vein DVT. Imdur was increased to 60mg per cards after pt was seen by Dr. Pink. That evening, pt was started on a heparin drip for DVT. Pt continued to experience chest discomfort. V/Q scan negative for PE. On 11/14, pt underwent LLE Fem-above knee pop bypass. Post-op, pt continued to experience chest discomfort with ECG notable for ST depressions in lateral leads- troponin wnl. That evening, pt with continued chest pain. Troponin was then trended q6hrs and continued to be non-elevated. Imdur was increased to 90mg for better anti antinial control. At that time, cardiology did not have concern for post-op ACS. Pt continued to have intermittent chest discomfort without ECG changes or hsTrop elevation. On 11/6, pt's Hgb was found to be decreased to 7.7 and LLE above knee pop dressing was found to be saturated. Pt was found to be transfused 1U PRBC with good response. On 11/18, pt was started on Eliquis. On the evening of 11/18, pt ex[erienced severe chest pain, with ECG concern for NSTEMI per cardiology fellow and tropinin 447 (38). Eliquis was discontinued and heparin drip was restarted. Plan for trending hsTrop q6, repeat ECGs, transfer to cardiology.           ROS:   Denies Headache, blurred vision, SOB, Abdominal pain, nausea or vomiting     Social   clopidogrel Tablet 75  heparin  Infusion 1100  isosorbide   mononitrate ER Tablet (IMDUR) 90  metoprolol succinate ER 50      Allergies    No Known Allergies    Intolerances        Vital Signs Last 24 Hrs  T(C): 36.6 (19 Nov 2024 05:06), Max: 37.1 (18 Nov 2024 20:23)  T(F): 97.8 (19 Nov 2024 05:06), Max: 98.8 (18 Nov 2024 20:23)  HR: 82 (19 Nov 2024 05:06) (80 - 94)  BP: 128/60 (19 Nov 2024 05:06) (97/56 - 138/63)  BP(mean): 86 (19 Nov 2024 05:06) (73 - 89)  RR: 18 (19 Nov 2024 05:06) (18 - 18)  SpO2: 98% (19 Nov 2024 05:06) (95% - 98%)    Parameters below as of 19 Nov 2024 05:06  Patient On (Oxygen Delivery Method): room air      I&O's Summary    18 Nov 2024 07:01  -  19 Nov 2024 07:00  --------------------------------------------------------  IN: 560 mL / OUT: 700 mL / NET: -140 mL        Physical Exam:  General: alert and awake, NAD  Pulmonary: no respiratory distress  Cardiovascular: RRR  Abdominal: soft  Extrem: WWP, no calf edema, L groin incision c/d/i, mild improving ecchymosis surrounding, soft, moderately tender to palpation, motor and sensory intact bilaterally  Pulses: LLE palp DP, triphasic PT       LABS:                        8.7    6.92  )-----------( 157      ( 19 Nov 2024 02:55 )             26.2     11-19    136  |  106  |  14  ----------------------------<  122[H]  4.5   |  22  |  1.26    Ca    8.3[L]      19 Nov 2024 02:55  Phos  2.7     11-19  Mg     1.9     11-19      PTT - ( 19 Nov 2024 04:26 )  PTT:25.9 sec        ---------------------------------------------------------------------------  PLEASE CHECK WHEN PRESENT:  [ x ]Heart Failure     [  ] Acute     [  ] Acute on Chronic     [ x ] Chronic     [  ]Diastolic [HFpEF]     [ x ]Systolic [HFrEF]     [  ]Combined [HFpEF & HFrEF]  .................................................................................  [x ] Hypertensive Heart Disease  [x ] CAD  [  ] Atrial Fibrillation     [  ] Paroxysmal A-fib     [  ] Chronic A-fib     [  ] Persistent A-fib     [  ] Longstanding Persistent A-fib     [  ] Permanent A-fib  [  ] Pulmonary Hypertension  [  ] Other:  -------------------------------------------------------------------  [  ] Respiratory failure  [  ] Acute PE   [  ] Acute cor pulmonale  [  ] Asthma/COPD Exacerbation  [  ] COPD on home O2 (Chronic renal Failure)   [  ] Atelectasis   [  ] Pleural effusion  [  ] Aspiration pneumonia  [  ] Obstructive Sleep Apnea  -------------------------------------------------------------------  [ x ] Acute Kidney Injury      [  ] Acute Tubular Necrosis      [  ] Reneal Medullary Necrosis     [  ] Renal Cortical Necrosis     [  ] Other Pathological Lesions:  [  ] Chronic Kidney Disease     [  ]CKD 1     [  ]CKD 2     [  ]CKD 3     [  ]CKD 4     [  ]CKD 5 (ESRD)  [  ]Other:  -------------------------------------------------------------------  [  ] Diabetes  [  ] Diabetic PVD Ulcer  [  ] Neuropathic ulcer to DM  [  ] Diabetes with Nephropathy  [  ] Osteomyelitis due to diabetes  [  ] Hyperglycemia   [  ] Hypoglycemia   --------------------------------------------------------------------  [  ] Malnutrition: See Nutrition Note  [  ] Cachexia  [  ] Other:   [  ] Supplement Ordered:  [  ] Morbid Obesity (BMI >=40]  [  ] Ileus  ---------------------------------------------------------------------  [  ] Sepsis/severe sepsis/septic shock  [  ] Noninfectious SIRS  [  ] UTI  [  ] Pneumonia  [  ] Thrombophlebitis   -----------------------------------------------------------------------  [  ] Acidosis/alkalosis  [  ] Fluid overload  [  ] Hypokalemia  [  ] Hyperkalemia  [  ] Hypomagnesemia  [  ] Hypophosphatemia  [  ] Hyperphosphatemia  ------------------------------------------------------------------------  [  ] Acute blood loss anemia  [  ] Post op blood loss anemia  [  ] Iron deficiency anemia  [  ] Anemia due to chronic disease  [  ] Hypercoagulable state  [  ] Thrombocytopenia  ----------------------------------------------------------------------  [  ] Cerebral infarction  [  ] Transient ischemia attack  [  ] Encephalopathy - Toxic or Metabolic    A/P: 75yo M, with PMHx of HTN, HLD, BPH, HFrEF, CAD s/p CABG 10/31/24, severe PAD (s/p fem-fem) who presented to [facility], [place] for left leg pain concerning for acute limb ischemia. On exam here, patient has an exam more consistent with his prior recent exam, consisting of chronic limb ischemia (motor and sensation intact, doppler signals present in left foot and fem-fem bypass). Given his rest pain, will proceed with arteriography.     #PAD w/ rest pain  - s/p LLE angiogram with EIA and FELICITY stents  - s/p LLE fem to AK-pop bypass 11/14/24  - CTA b/l LE at OSH 11/8: post fem-fem bypass, severe atherosclerotic disease involving aorta, iliac, and superficial femoral arteries b/l more on the left  - c/w plavix and statin  - pain control  - bowel regimen    #CAD s/p prior PCI and recent CABG   - Heparin drip started overnight for chest pain and troponin 400s  - Trending q6 troponin   - c/w plavix and statin  - c/w imdur  - c/w metoprolol succinate  - appreciate cards recs    #HFrEF, Ischemic Cardiomyopathy  - ECHO 10/24: LVEF 35% with regional wall abnormalities  - c/w metoprolol succinate  - repeat TTE 11/15: Not all myocardial wall segments are visualized. Basal and mid inferolateral walls are akinetic. Overall LV systolic function appears mild to moderately reduced.    #RUL Pulmonary nodule - PET +  - Following with Dr. Duarte outpatient.    #FELIPA on CKD  - Cr elevated on admission, unclear baseline Cr, now Cr 1.2 range  - Monitor Cr and UOP    #BPH  - c/w flomax and proscar    #Prediabetes  - A1C 5.8%    #LLE DVT  - c/w eliquis    #Chest pain  - pt with intermittent chest pain and shortness of breath, likely chronic angina  - VQ scan with no evidence of PE   - c/w Imdur (now increased dose)  - c/w tums    Diet: soft & bite sized while patient does not have dentures  activity: as tolerated  DVTppx: holding heparin GGT  Dispo: MIGUEL

## 2024-11-20 LAB
ALBUMIN SERPL ELPH-MCNC: 2.7 G/DL — LOW (ref 3.3–5)
ALP SERPL-CCNC: 51 U/L — SIGNIFICANT CHANGE UP (ref 40–120)
ALT FLD-CCNC: 10 U/L — SIGNIFICANT CHANGE UP (ref 10–45)
ANION GAP SERPL CALC-SCNC: 8 MMOL/L — SIGNIFICANT CHANGE UP (ref 5–17)
APTT BLD: 25.2 SEC — SIGNIFICANT CHANGE UP (ref 24.5–35.6)
AST SERPL-CCNC: 19 U/L — SIGNIFICANT CHANGE UP (ref 10–40)
BILIRUB SERPL-MCNC: 1 MG/DL — SIGNIFICANT CHANGE UP (ref 0.2–1.2)
BUN SERPL-MCNC: 15 MG/DL — SIGNIFICANT CHANGE UP (ref 7–23)
CALCIUM SERPL-MCNC: 8 MG/DL — LOW (ref 8.4–10.5)
CHLORIDE SERPL-SCNC: 108 MMOL/L — SIGNIFICANT CHANGE UP (ref 96–108)
CO2 SERPL-SCNC: 20 MMOL/L — LOW (ref 22–31)
CREAT SERPL-MCNC: 1.26 MG/DL — SIGNIFICANT CHANGE UP (ref 0.5–1.3)
CRP SERPL-MCNC: 19.4 MG/L — HIGH (ref 0–4)
EGFR: 59 ML/MIN/1.73M2 — LOW
ERYTHROCYTE [SEDIMENTATION RATE] IN BLOOD: 35 MM/HR — HIGH
GLUCOSE SERPL-MCNC: 103 MG/DL — HIGH (ref 70–99)
HCT VFR BLD CALC: 27.7 % — LOW (ref 39–50)
HGB BLD-MCNC: 9.1 G/DL — LOW (ref 13–17)
INR BLD: 1.38 — HIGH (ref 0.85–1.16)
MAGNESIUM SERPL-MCNC: 1.8 MG/DL — SIGNIFICANT CHANGE UP (ref 1.6–2.6)
MCHC RBC-ENTMCNC: 30.8 PG — SIGNIFICANT CHANGE UP (ref 27–34)
MCHC RBC-ENTMCNC: 32.9 G/DL — SIGNIFICANT CHANGE UP (ref 32–36)
MCV RBC AUTO: 93.9 FL — SIGNIFICANT CHANGE UP (ref 80–100)
NRBC # BLD: 0 /100 WBCS — SIGNIFICANT CHANGE UP (ref 0–0)
PHOSPHATE SERPL-MCNC: 3 MG/DL — SIGNIFICANT CHANGE UP (ref 2.5–4.5)
PLATELET # BLD AUTO: 157 K/UL — SIGNIFICANT CHANGE UP (ref 150–400)
POTASSIUM SERPL-MCNC: 4.5 MMOL/L — SIGNIFICANT CHANGE UP (ref 3.5–5.3)
POTASSIUM SERPL-SCNC: 4.5 MMOL/L — SIGNIFICANT CHANGE UP (ref 3.5–5.3)
PROT SERPL-MCNC: 5.6 G/DL — LOW (ref 6–8.3)
PROTHROM AB SERPL-ACNC: 15.8 SEC — HIGH (ref 9.9–13.4)
RBC # BLD: 2.95 M/UL — LOW (ref 4.2–5.8)
RBC # FLD: 14.7 % — HIGH (ref 10.3–14.5)
SODIUM SERPL-SCNC: 136 MMOL/L — SIGNIFICANT CHANGE UP (ref 135–145)
WBC # BLD: 5.95 K/UL — SIGNIFICANT CHANGE UP (ref 3.8–10.5)
WBC # FLD AUTO: 5.95 K/UL — SIGNIFICANT CHANGE UP (ref 3.8–10.5)

## 2024-11-20 PROCEDURE — 93010 ELECTROCARDIOGRAM REPORT: CPT

## 2024-11-20 PROCEDURE — 99233 SBSQ HOSP IP/OBS HIGH 50: CPT

## 2024-11-20 RX ORDER — LIDOCAINE 40 MG/G
1 CREAM TOPICAL ONCE
Refills: 0 | Status: COMPLETED | OUTPATIENT
Start: 2024-11-20 | End: 2024-11-20

## 2024-11-20 RX ORDER — RANOLAZINE 1000 MG/1
500 TABLET, FILM COATED, EXTENDED RELEASE ORAL ONCE
Refills: 0 | Status: COMPLETED | OUTPATIENT
Start: 2024-11-20 | End: 2024-11-20

## 2024-11-20 RX ORDER — APIXABAN 2.5 MG/1
5 TABLET, FILM COATED ORAL ONCE
Refills: 0 | Status: COMPLETED | OUTPATIENT
Start: 2024-11-20 | End: 2024-11-20

## 2024-11-20 RX ORDER — SODIUM CHLORIDE 9 MG/ML
1000 INJECTION, SOLUTION INTRAMUSCULAR; INTRAVENOUS; SUBCUTANEOUS
Refills: 0 | Status: COMPLETED | OUTPATIENT
Start: 2024-11-20 | End: 2024-11-20

## 2024-11-20 RX ORDER — APIXABAN 2.5 MG/1
5 TABLET, FILM COATED ORAL EVERY 12 HOURS
Refills: 0 | Status: DISCONTINUED | OUTPATIENT
Start: 2024-11-20 | End: 2024-11-27

## 2024-11-20 RX ORDER — SODIUM CHLORIDE 9 MG/ML
250 INJECTION, SOLUTION INTRAMUSCULAR; INTRAVENOUS; SUBCUTANEOUS ONCE
Refills: 0 | Status: COMPLETED | OUTPATIENT
Start: 2024-11-20 | End: 2024-11-20

## 2024-11-20 RX ORDER — RANOLAZINE 1000 MG/1
1000 TABLET, FILM COATED, EXTENDED RELEASE ORAL
Refills: 0 | Status: DISCONTINUED | OUTPATIENT
Start: 2024-11-20 | End: 2024-11-27

## 2024-11-20 RX ORDER — SODIUM CHLORIDE 9 MG/ML
1000 INJECTION, SOLUTION INTRAMUSCULAR; INTRAVENOUS; SUBCUTANEOUS
Refills: 0 | Status: DISCONTINUED | OUTPATIENT
Start: 2024-11-20 | End: 2024-11-20

## 2024-11-20 RX ORDER — OXYCODONE HYDROCHLORIDE 30 MG/1
5 TABLET ORAL ONCE
Refills: 0 | Status: DISCONTINUED | OUTPATIENT
Start: 2024-11-20 | End: 2024-11-20

## 2024-11-20 RX ADMIN — SODIUM CHLORIDE 75 MILLILITER(S): 9 INJECTION, SOLUTION INTRAMUSCULAR; INTRAVENOUS; SUBCUTANEOUS at 12:11

## 2024-11-20 RX ADMIN — OXYCODONE HYDROCHLORIDE 5 MILLIGRAM(S): 30 TABLET ORAL at 04:10

## 2024-11-20 RX ADMIN — PANTOPRAZOLE SODIUM 40 MILLIGRAM(S): 40 TABLET, DELAYED RELEASE ORAL at 11:19

## 2024-11-20 RX ADMIN — SODIUM CHLORIDE 250 MILLILITER(S): 9 INJECTION, SOLUTION INTRAMUSCULAR; INTRAVENOUS; SUBCUTANEOUS at 16:28

## 2024-11-20 RX ADMIN — ACETAMINOPHEN 500MG 975 MILLIGRAM(S): 500 TABLET, COATED ORAL at 22:15

## 2024-11-20 RX ADMIN — Medication 81 MILLIGRAM(S): at 11:19

## 2024-11-20 RX ADMIN — Medication 5 MILLIGRAM(S): at 11:19

## 2024-11-20 RX ADMIN — Medication 120 MILLIGRAM(S): at 11:20

## 2024-11-20 RX ADMIN — RANOLAZINE 1000 MILLIGRAM(S): 1000 TABLET, FILM COATED, EXTENDED RELEASE ORAL at 21:45

## 2024-11-20 RX ADMIN — LIDOCAINE 1 PATCH: 40 CREAM TOPICAL at 00:32

## 2024-11-20 RX ADMIN — ACETAMINOPHEN 500MG 975 MILLIGRAM(S): 500 TABLET, COATED ORAL at 06:21

## 2024-11-20 RX ADMIN — Medication 80 MILLIGRAM(S): at 21:45

## 2024-11-20 RX ADMIN — ACETAMINOPHEN 500MG 975 MILLIGRAM(S): 500 TABLET, COATED ORAL at 21:45

## 2024-11-20 RX ADMIN — OXYCODONE HYDROCHLORIDE 5 MILLIGRAM(S): 30 TABLET ORAL at 03:27

## 2024-11-20 RX ADMIN — Medication 25 GRAM(S): at 11:18

## 2024-11-20 RX ADMIN — APIXABAN 5 MILLIGRAM(S): 2.5 TABLET, FILM COATED ORAL at 13:14

## 2024-11-20 RX ADMIN — ACETAMINOPHEN 500MG 975 MILLIGRAM(S): 500 TABLET, COATED ORAL at 13:14

## 2024-11-20 RX ADMIN — ACETAMINOPHEN 500MG 975 MILLIGRAM(S): 500 TABLET, COATED ORAL at 07:21

## 2024-11-20 RX ADMIN — CLOPIDOGREL 75 MILLIGRAM(S): 75 TABLET, FILM COATED ORAL at 11:19

## 2024-11-20 RX ADMIN — Medication 15 MICROGRAM(S)/MIN: at 04:11

## 2024-11-20 RX ADMIN — LIDOCAINE 1 PATCH: 40 CREAM TOPICAL at 06:44

## 2024-11-20 RX ADMIN — RANOLAZINE 500 MILLIGRAM(S): 1000 TABLET, FILM COATED, EXTENDED RELEASE ORAL at 06:20

## 2024-11-20 RX ADMIN — TAMSULOSIN HYDROCHLORIDE 0.4 MILLIGRAM(S): 0.4 CAPSULE ORAL at 21:46

## 2024-11-20 RX ADMIN — RANOLAZINE 500 MILLIGRAM(S): 1000 TABLET, FILM COATED, EXTENDED RELEASE ORAL at 16:05

## 2024-11-20 RX ADMIN — SODIUM CHLORIDE 125 MILLILITER(S): 9 INJECTION, SOLUTION INTRAMUSCULAR; INTRAVENOUS; SUBCUTANEOUS at 16:29

## 2024-11-20 RX ADMIN — Medication 2 TABLET(S): at 21:46

## 2024-11-20 NOTE — PROGRESS NOTE ADULT - PROBLEM SELECTOR PLAN 3
- Cr elevated on admission, unclear baseline Cr (~1.5 per Vascular), now Cr 1.2 range  - SCr today 1.26  - Monitor Cr and UOP      #ANEMIA   - Stable, Hgb today 9.1  - s/p 1u PRBC 11/16/24 for Hgb 7.7 due to suspected blood loss @ L Popliteal site per Vascular, now stable  - s/p 1u PRBC 11/19/24 for pre-LHC optimization  - Transfuse >8 given cardiac and renal risk factors  - Keep active type & screen (last on 11/18, next due AM 11/21)

## 2024-11-20 NOTE — PROGRESS NOTE ADULT - PROBLEM SELECTOR PLAN 1
#s/p prior PCI per pt and recent MIDCAB 10/31/24  #NSTEMI Type 2  - EKG 95bpm NSR, QWaves II-AVF; ST Depressions I, II, AVL, V4-V6.  - Per Vascular pt w/ frequent chest pain this admission, had increased Imdur, also treating w/ Tums/Simethicone; pt baseline trop 30s-40s, however Trop 447 on 11/19 AM --> 368. Pt txfr to Cards for urgent LHC.   - S/p Diagnostic LHC 11/19/24:  LIMA-LAD patent. dLM (50-60%), pLAD (70%, diffuse), competitive flow from LIMA, dLAD mild diffuse;  LCX ostial , known; RCA not engaged, known .  [IC Aditya Kolb/Cici]  ACCESS LRA.  LVEDP 2mmHG.   - C/w Triple therapy for now: Asa 81mg qd, Plavix 75mg Qd, Eliquis 5mg BID for DVT. As d/w Dr Rowe: triple therapy until CP improves (pt on PPI as well)  - Suspect ?hypoperfusion ischemic pain:  Started IVF post cath, continuing today 11/20 to increase perfusion  - Maximizing antianginals: Remains on Nitro GTT at 50mcg/min currently to vasodilate; PO Imdur 120mg qd; Ranexa to 1000mg BID.   - c/w Metoprolol succinate 50mg qd  - c/w Atorvastatin 80mg qhs

## 2024-11-20 NOTE — PROGRESS NOTE ADULT - SUBJECTIVE AND OBJECTIVE BOX
SUBJECTIVE: Patient was evaluated at bedside this AM by vascular surgery team. Patient reports continued but unchanged chest pain after LHC last night. Patient reports L groin pain at hematoma site due to frequent evaluation by primary team. PAtient denies any sensory or motor changes, dizziness, lightheadedness or other symptoms of pain.     MEDICATIONS  (STANDING):  acetaminophen     Tablet .. 975 milliGRAM(s) Oral every 8 hours  aspirin enteric coated 81 milliGRAM(s) Oral daily  atorvastatin 80 milliGRAM(s) Oral at bedtime  clopidogrel Tablet 75 milliGRAM(s) Oral daily  finasteride 5 milliGRAM(s) Oral daily  influenza  Vaccine (HIGH DOSE) 0.5 milliLiter(s) IntraMuscular once  isosorbide   mononitrate ER Tablet (IMDUR) 120 milliGRAM(s) Oral daily  magnesium sulfate  IVPB 2 Gram(s) IV Intermittent once  metoprolol succinate ER 50 milliGRAM(s) Oral daily  nitroglycerin  Infusion 50 MICROgram(s)/Min (15 mL/Hr) IV Continuous <Continuous>  pantoprazole    Tablet 40 milliGRAM(s) Oral daily  polyethylene glycol 3350 17 Gram(s) Oral daily  ranolazine 500 milliGRAM(s) Oral two times a day  senna 2 Tablet(s) Oral at bedtime  tamsulosin 0.4 milliGRAM(s) Oral at bedtime    MEDICATIONS  (PRN):  bisacodyl Suppository 10 milliGRAM(s) Rectal daily PRN Constipation  calcium carbonate    500 mG (Tums) Chewable 1 Tablet(s) Chew three times a day PRN Heartburn  HYDROmorphone  Injectable 0.5 milliGRAM(s) IV Push every 4 hours PRN breakthrough pain  oxyCODONE    IR 5 milliGRAM(s) Oral every 6 hours PRN Moderate Pain (4 - 6)  oxyCODONE    IR 10 milliGRAM(s) Oral every 6 hours PRN Severe Pain (7 - 10)  simethicone 80 milliGRAM(s) Chew two times a day PRN Heartburn      Vital Signs Last 24 Hrs  T(C): 36.6 (20 Nov 2024 10:56), Max: 36.9 (19 Nov 2024 14:52)  T(F): 97.9 (20 Nov 2024 10:56), Max: 98.5 (19 Nov 2024 14:52)  HR: 75 (20 Nov 2024 10:56) (75 - 83)  BP: 101/54 (20 Nov 2024 10:56) (87/49 - 125/60)  BP(mean): 76 (20 Nov 2024 10:56) (66 - 85)  RR: 18 (20 Nov 2024 10:56) (17 - 18)  SpO2: 97% (20 Nov 2024 10:56) (95% - 99%)    Parameters below as of 20 Nov 2024 10:56  Patient On (Oxygen Delivery Method): room air        Physical Exam:  General: alert and awake, NAD  Pulmonary: no respiratory distress  Cardiovascular: RRR  Abdominal: soft  Extrem: WWP, no calf edema, L groin incision c/d/i, mild improving ecchymosis surrounding, soft with small hardened hematoma noted but consistent in size since PM evaluation, moderately tender to palpation, motor and sensory intact bilaterally  Pulses: LLE palp DP, triphasic PT     I&O's Summary    19 Nov 2024 07:01  -  20 Nov 2024 07:00  --------------------------------------------------------  IN: 0 mL / OUT: 825 mL / NET: -825 mL    20 Nov 2024 07:01  -  20 Nov 2024 11:03  --------------------------------------------------------  IN: 240 mL / OUT: 0 mL / NET: 240 mL        LABS:                        9.1    5.95  )-----------( 157      ( 20 Nov 2024 05:30 )             27.7     11-20    136  |  108  |  15  ----------------------------<  103[H]  4.5   |  20[L]  |  1.26    Ca    8.0[L]      20 Nov 2024 05:30  Phos  3.0     11-20  Mg     1.8     11-20    TPro  5.6[L]  /  Alb  2.7[L]  /  TBili  1.0  /  DBili  x   /  AST  19  /  ALT  10  /  AlkPhos  51  11-20    PT/INR - ( 20 Nov 2024 05:30 )   PT: 15.8 sec;   INR: 1.38          PTT - ( 20 Nov 2024 05:30 )  PTT:25.2 sec  Urinalysis Basic - ( 20 Nov 2024 05:30 )    Color: x / Appearance: x / SG: x / pH: x  Gluc: 103 mg/dL / Ketone: x  / Bili: x / Urobili: x   Blood: x / Protein: x / Nitrite: x   Leuk Esterase: x / RBC: x / WBC x   Sq Epi: x / Non Sq Epi: x / Bacteria: x      CAPILLARY BLOOD GLUCOSE        LIVER FUNCTIONS - ( 20 Nov 2024 05:30 )  Alb: 2.7 g/dL / Pro: 5.6 g/dL / ALK PHOS: 51 U/L / ALT: 10 U/L / AST: 19 U/L / GGT: x             RADIOLOGY & ADDITIONAL STUDIES:

## 2024-11-20 NOTE — PROGRESS NOTE ADULT - PROBLEM SELECTOR PLAN 4
Warm, on room air  -TTE 10/29/24: LVEF 35% with regional wall abnormalities. Dilated LA. Normal RVSF.   -repeat TTE 11/15/24: Not all myocardial wall segments are visualized. Basal and mid inferolateral walls are akinetic. Overall LVSF appears mild to moderately reduced.  - c/w Toprol 50mg as above  - Diuretic: none. Giving IVF 11/20 in addition to Nitro gtt to increase perfusion and vasodilate  - monitor daily weight, strict i/o

## 2024-11-20 NOTE — PROGRESS NOTE ADULT - PROBLEM SELECTOR PLAN 2
Vascular Sx following, appreciate recs  - s/p 11/11/24 LLE angiogram with EIA and FELICITY stents  - s/p 11/14/24 LLE fem to AK-pop bypass ; LLE c/b hematoma from Hep GTT 11/19, as evaluated w/  Vascular Sx at bedside - no intervention needed- now improving 11/20 off hep gtt.  L popliteal medial knee site- w/ Kerlix c/d/i.    - CTA b/l LE at OSH 11/8: post fem-fem bypass, severe atherosclerotic disease involving aorta, iliac, and superficial femoral arteries b/l more on the left  - c/w Plavix 75mg qd and Atorvastatin 80mg qhs  - pain control per Vascular: currently on standing Tylenol 975mg q8h; PRN q6h Oxy 5mg Moderate pain; Oxy 10mg PRN q6h Severe pain; Dilaudid IV 0.5mg severe breakthrough PRN q4h.   - bowel regimen: senna, Miralax standing; dulcolax prn  - Vasc Sx had planned for DOAC and antiplt monotherapy w/ Plavix on dc     #LLE DVT  - c/w AC : Resume Eliquis 5mg BID 11/20 AM.  (s.p Hep GTT for r/o ACS)

## 2024-11-20 NOTE — ADVANCED PRACTICE NURSE CONSULT - RECOMMEDATIONS
Sacrum: Allevyn foam. Change every other day  Left heel: adaptic touch, foam dressing. Change every other day. Consider podiatry consultation.    Education with patient regarding importance of repositioning and offloading heels. Pt agreeable to pillow to offload heel during my visit.     Discussed with primary RN, nursing management and cardiology FRANCOISE Vivas    Please reconsult if new wound care concerns arise.    Total time spent: 30 minutes       Sacrum: Allevyn foam. Change every other day  Left heel: adaptic touch, foam dressing. Change every other day.     Education with patient regarding importance of repositioning and offloading heels. Pt agreeable to pillow to offload heel during my visit.     Discussed with primary RN, nursing management and cardiology FRANCOISE Vivas  Pt was seen by podiatry 11/11 - recommend outpatient follow up.    Please reconsult if new wound care concerns arise.    Total time spent: 30 minutes

## 2024-11-20 NOTE — PROGRESS NOTE ADULT - ASSESSMENT
74M, Polish speaking, PMHx of CAD s/p PCI and recent minimally invasive direct CABG 10/31/24, ICM HFrEF, HTN, CKD, and PAD s/p prior L to R fem-fem bypass,  who presented to [facility] for left leg pain concerning for acute limb ischemia.  Pt was admitted to Vascular Surgery for CLI of LLE with rest pain, s/p LE angiogram, s/p L common and external iliac stent but prox anastomosis of fem-fem bypass is upwards going (unable to treat endovascularly), w/ occluded L CFA, SFA, and AK popliteal artery w/ reconstitution of BK pop via collaterals w/ AT/PT runoff, now S/P Left fem-pop bypass 11/14/24.   Pt w/ consistent angina inpt and troponin usually 30s-40s range; c/b Troponin peak to 447 w/ concern for ACS, urgently transferred to Cardiology service and brought to [facility] 11/19/24, VILLALOBOS-LAD found patent and chronic  LCx/RCA found unchanged- deemed Type II NSTEMI, possibly due to hypotension/hypoperfusion (LVEDP 2mmHg); Pt continues to be on Nitro gtt and now IVF to improve hemodynamics.

## 2024-11-20 NOTE — PROGRESS NOTE ADULT - PROBLEM SELECTOR PLAN 7
- c/w Tamsulosin and finasteride    N: DASH/TLC diet  E: Replete lytes PRN K<4, Mg<2   P: DVT PPX: full AC Eliquis  C: FULL CODE   Dispo: Cardiac telemetry  case discussed with Dr. Rowe, pt, and Vascular surgery

## 2024-11-20 NOTE — PROGRESS NOTE ADULT - SUBJECTIVE AND OBJECTIVE BOX
Cardiology PA Adult Progress Note    Subjective Assessment: Patient seen and examined at bedside.   	  MEDICATIONS:  isosorbide   mononitrate ER Tablet (IMDUR) 120 milliGRAM(s) Oral daily  metoprolol succinate ER 50 milliGRAM(s) Oral daily  nitroglycerin  Infusion 50 MICROgram(s)/Min IV Continuous <Continuous>  ranolazine 500 milliGRAM(s) Oral two times a day        acetaminophen     Tablet .. 975 milliGRAM(s) Oral every 8 hours  HYDROmorphone  Injectable 0.5 milliGRAM(s) IV Push every 4 hours PRN  oxyCODONE    IR 5 milliGRAM(s) Oral every 6 hours PRN  oxyCODONE    IR 10 milliGRAM(s) Oral every 6 hours PRN    bisacodyl Suppository 10 milliGRAM(s) Rectal daily PRN  calcium carbonate    500 mG (Tums) Chewable 1 Tablet(s) Chew three times a day PRN  pantoprazole    Tablet 40 milliGRAM(s) Oral daily  polyethylene glycol 3350 17 Gram(s) Oral daily  senna 2 Tablet(s) Oral at bedtime  simethicone 80 milliGRAM(s) Chew two times a day PRN    atorvastatin 80 milliGRAM(s) Oral at bedtime  finasteride 5 milliGRAM(s) Oral daily    aspirin enteric coated 81 milliGRAM(s) Oral daily  clopidogrel Tablet 75 milliGRAM(s) Oral daily  influenza  Vaccine (HIGH DOSE) 0.5 milliLiter(s) IntraMuscular once  sodium chloride 0.9%. 1000 milliLiter(s) IV Continuous <Continuous>  tamsulosin 0.4 milliGRAM(s) Oral at bedtime        [PHYSICAL EXAM:  TELEMETRY:  T(C): 36.6 (11-20-24 @ 06:04), Max: 36.9 (11-19-24 @ 14:52)  HR: 75 (11-20-24 @ 07:26) (75 - 83)  BP: 101/50 (11-20-24 @ 07:26) (87/49 - 125/60)  RR: 18 (11-20-24 @ 07:26) (17 - 18)  SpO2: 96% (11-20-24 @ 07:26) (95% - 99%)  Wt(kg): --  I&O's Summary    19 Nov 2024 07:01  -  20 Nov 2024 07:00  --------------------------------------------------------  IN: 0 mL / OUT: 825 mL / NET: -825 mL        	    LABS:	 	                          9.1    5.95  )-----------( 157      ( 20 Nov 2024 05:30 )             27.7     11-20    136  |  108  |  15  ----------------------------<  103[H]  4.5   |  20[L]  |  1.26    Ca    8.0[L]      20 Nov 2024 05:30  Phos  3.0     11-20  Mg     1.8     11-20    TPro  5.6[L]  /  Alb  2.7[L]  /  TBili  1.0  /  DBili  x   /  AST  19  /  ALT  10  /  AlkPhos  51  11-20    proBNP:   Lipid Profile:   HgA1c:   TSH:   PT/INR - ( 20 Nov 2024 05:30 )   PT: 15.8 sec;   INR: 1.38          PTT - ( 20 Nov 2024 05:30 )  PTT:25.2 sec     Cardiology PA Adult Progress Note    Subjective Assessment: Patient seen and examined at bedside. Pt continues to endorse chest pain, some relief with Nitro gtt.  Endorses continued LLE pain.  Pt denies  SOB,  cough, dizziness,  N/V/D, fever/chills.  	  MEDICATIONS:  isosorbide   mononitrate ER Tablet (IMDUR) 120 milliGRAM(s) Oral daily  metoprolol succinate ER 50 milliGRAM(s) Oral daily  nitroglycerin  Infusion 50 MICROgram(s)/Min IV Continuous <Continuous>  ranolazine 500 milliGRAM(s) Oral two times a day        acetaminophen     Tablet .. 975 milliGRAM(s) Oral every 8 hours  HYDROmorphone  Injectable 0.5 milliGRAM(s) IV Push every 4 hours PRN  oxyCODONE    IR 5 milliGRAM(s) Oral every 6 hours PRN  oxyCODONE    IR 10 milliGRAM(s) Oral every 6 hours PRN    bisacodyl Suppository 10 milliGRAM(s) Rectal daily PRN  calcium carbonate    500 mG (Tums) Chewable 1 Tablet(s) Chew three times a day PRN  pantoprazole    Tablet 40 milliGRAM(s) Oral daily  polyethylene glycol 3350 17 Gram(s) Oral daily  senna 2 Tablet(s) Oral at bedtime  simethicone 80 milliGRAM(s) Chew two times a day PRN    atorvastatin 80 milliGRAM(s) Oral at bedtime  finasteride 5 milliGRAM(s) Oral daily    aspirin enteric coated 81 milliGRAM(s) Oral daily  clopidogrel Tablet 75 milliGRAM(s) Oral daily  influenza  Vaccine (HIGH DOSE) 0.5 milliLiter(s) IntraMuscular once  sodium chloride 0.9%. 1000 milliLiter(s) IV Continuous <Continuous>  tamsulosin 0.4 milliGRAM(s) Oral at bedtime        [PHYSICAL EXAM:  TELEMETRY:  T(C): 36.6 (11-20-24 @ 06:04), Max: 36.9 (11-19-24 @ 14:52)  HR: 75 (11-20-24 @ 07:26) (75 - 83)  BP: 101/50 (11-20-24 @ 07:26) (87/49 - 125/60)  RR: 18 (11-20-24 @ 07:26) (17 - 18)  SpO2: 96% (11-20-24 @ 07:26) (95% - 99%)  Wt(kg): --  I&O's Summary    19 Nov 2024 07:01  -  20 Nov 2024 07:00  --------------------------------------------------------  IN: 0 mL / OUT: 825 mL / NET: -825 mL    Appearance: Prior to awakening, pt in NAD. upon awakening, pt in mild-mod distress dt pain  HEENT:   Normal oral mucosa, PERRL, EOMI	  Neck: Supple   Cardiovascular: Normal S1 S2, No JVD, No murmurs  Respiratory: Lungs clear to auscultation/No Rales, Rhonchi, Wheezing	  Gastrointestinal:  Soft, Non-tender, + BS	  Skin: No rashes, +ecchymoses +scabbed pretibial lesions; +incision healing to L groin,  + healing small incisions to chest wall.  Extremities: Normal range of motion, No clubbing, cyanosis or edema  Vascular: Peripheral pulses as follows: +Improving LLE thigh hematoma, decreasing in size.  RDP 2+. RPT 1+. LDP/PT dopplerable. B/l radial 2+; LRA site no hematoma or swelling, distal pulse excellent.  B/l FA 1+ with Bruits present.    Neurologic: Non-focal  Psychiatry: A & O x 3, Mood & affect appropriate     	    LABS:	 	                          9.1    5.95  )-----------( 157      ( 20 Nov 2024 05:30 )             27.7     11-20    136  |  108  |  15  ----------------------------<  103[H]  4.5   |  20[L]  |  1.26    Ca    8.0[L]      20 Nov 2024 05:30  Phos  3.0     11-20  Mg     1.8     11-20    TPro  5.6[L]  /  Alb  2.7[L]  /  TBili  1.0  /  DBili  x   /  AST  19  /  ALT  10  /  AlkPhos  51  11-20    proBNP:   Lipid Profile:   HgA1c:   TSH:   PT/INR - ( 20 Nov 2024 05:30 )   PT: 15.8 sec;   INR: 1.38          PTT - ( 20 Nov 2024 05:30 )  PTT:25.2 sec     Cardiology PA Adult Progress Note    Subjective Assessment: Patient seen and examined at bedside. Pt continues to endorse chest pain, some relief with Nitro gtt.  Endorses continued LLE pain.  Pt denies  SOB,  cough, dizziness,  N/V/D, fever/chills.  	  MEDICATIONS:  isosorbide   mononitrate ER Tablet (IMDUR) 120 milliGRAM(s) Oral daily  metoprolol succinate ER 50 milliGRAM(s) Oral daily  nitroglycerin  Infusion 50 MICROgram(s)/Min IV Continuous <Continuous>  ranolazine 500 milliGRAM(s) Oral two times a day        acetaminophen     Tablet .. 975 milliGRAM(s) Oral every 8 hours  HYDROmorphone  Injectable 0.5 milliGRAM(s) IV Push every 4 hours PRN  oxyCODONE    IR 5 milliGRAM(s) Oral every 6 hours PRN  oxyCODONE    IR 10 milliGRAM(s) Oral every 6 hours PRN    bisacodyl Suppository 10 milliGRAM(s) Rectal daily PRN  calcium carbonate    500 mG (Tums) Chewable 1 Tablet(s) Chew three times a day PRN  pantoprazole    Tablet 40 milliGRAM(s) Oral daily  polyethylene glycol 3350 17 Gram(s) Oral daily  senna 2 Tablet(s) Oral at bedtime  simethicone 80 milliGRAM(s) Chew two times a day PRN    atorvastatin 80 milliGRAM(s) Oral at bedtime  finasteride 5 milliGRAM(s) Oral daily    aspirin enteric coated 81 milliGRAM(s) Oral daily  clopidogrel Tablet 75 milliGRAM(s) Oral daily  influenza  Vaccine (HIGH DOSE) 0.5 milliLiter(s) IntraMuscular once  sodium chloride 0.9%. 1000 milliLiter(s) IV Continuous <Continuous>  tamsulosin 0.4 milliGRAM(s) Oral at bedtime        [PHYSICAL EXAM:  TELEMETRY:  T(C): 36.6 (11-20-24 @ 06:04), Max: 36.9 (11-19-24 @ 14:52)  HR: 75 (11-20-24 @ 07:26) (75 - 83)  BP: 101/50 (11-20-24 @ 07:26) (87/49 - 125/60)  RR: 18 (11-20-24 @ 07:26) (17 - 18)  SpO2: 96% (11-20-24 @ 07:26) (95% - 99%)  Wt(kg): --  I&O's Summary    19 Nov 2024 07:01  -  20 Nov 2024 07:00  --------------------------------------------------------  IN: 0 mL / OUT: 825 mL / NET: -825 mL    Appearance: Prior to awakening, pt in NAD. upon awakening, pt in mild-mod distress dt pain  HEENT:   Normal oral mucosa, PERRL, EOMI	  Neck: Supple   Cardiovascular: Normal S1 S2, No JVD, No murmurs  Respiratory: Lungs clear to auscultation/No Rales, Rhonchi, Wheezing	  Gastrointestinal:  Soft, Non-tender, + BS	  Skin: No rashes, +ecchymoses +scabbed pretibial lesions; +incision healing to L groin,  + healing small incisions to chest wall.  Extremities: Normal range of motion, No clubbing, cyanosis or edema  Vascular: Peripheral pulses as follows: +Improving LLE thigh hematoma, decreasing in size.  LDP 2+. LPT 1+. RDP/PT dopplerable. B/l radial 2+; LRA site no hematoma or swelling, distal pulse excellent.  B/l FA 1+ with Bruits present.    Neurologic: Non-focal  Psychiatry: A & O x 3, Mood & affect appropriate     	    LABS:	 	                          9.1    5.95  )-----------( 157      ( 20 Nov 2024 05:30 )             27.7     11-20    136  |  108  |  15  ----------------------------<  103[H]  4.5   |  20[L]  |  1.26    Ca    8.0[L]      20 Nov 2024 05:30  Phos  3.0     11-20  Mg     1.8     11-20    TPro  5.6[L]  /  Alb  2.7[L]  /  TBili  1.0  /  DBili  x   /  AST  19  /  ALT  10  /  AlkPhos  51  11-20    proBNP:   Lipid Profile:   HgA1c:   TSH:   PT/INR - ( 20 Nov 2024 05:30 )   PT: 15.8 sec;   INR: 1.38          PTT - ( 20 Nov 2024 05:30 )  PTT:25.2 sec

## 2024-11-20 NOTE — PROGRESS NOTE ADULT - ASSESSMENT
3yo M, with PMHx of HTN, HLD, BPH, HFrEF, CAD s/p CABG 10/31/24, severe PAD (s/p fem-fem) who presented to [facility] for left leg pain concerning for acute limb ischemia. On exam here, patient has an exam more consistent with his prior recent exam, consisting of chronic limb ischemia (motor and sensation intact, doppler signals present in left foot and fem-fem bypass). Given his rest pain, decision was made to proceed with arteriography. Patient is now  s/p LLE angiogram with EIA and FELICITY stents and s/p LLE fem to AK-pop bypass 11/14/24. Patient is most recently s/p transfer to cardiology service for NSTEMI s/p [facility] (11/19).    Recommendations:  No acute vascular surgery intervention at this time  L groin hematoma stable with no concern for expansion or worsening  LLE fem to AK-pop bypass patent and well healing  Rest of care per primary team  Vascular surgery will continue to follow    Plan pending final attending attestation

## 2024-11-20 NOTE — PROGRESS NOTE ADULT - NS ATTEND AMEND GEN_ALL_CORE FT
I have made amendments to the documentation where necessary. Additional comments: 74M, Polish speaking, PMHx of CAD s/p PCI and recent minimally invasive direct CABG 10/31/24, ICM HFrEF, HTN, CKD, and PAD s/p prior L to R fem-fem bypass,  who presented to [facility] for left leg pain concerning for acute limb ischemia. Pt was admitted for CLI of LLE with rest pain, s/p LE angiogram, s/p L common and external iliac stent but prox anastomosis of fem-fem bypass is upwards going (unable to treat endovascularly), w/ occluded L CFA, SFA, and AK popliteal artery w/ reconstitution of BK pop via collaterals w/ AT/PT runoff s/p L fem-pop bypass 11/14. S/p 1U PRBC 11/16/24 due to Hgb 7.7, likely blood loss at L popliteal site (saturated dressing);  now Hgb stable in 8s. Pt w/ consistent angina inpt and troponin usually 30s-40s range.  Overnight with new chest pain and elevated hsTrop 447, EKG w/ Qwaves and worsening ST depressions, rule in NSTEMI; switched apixaban to heparin gtt, transferred to Cardiology, plan for urgent LHC.    1. NSTEMI  s.p LHC no interventions.  Continue to monitor Hb.   DAPT, eliquis, nitro drip.    During non face-to-face time, I reviewed relevant portions of the patient’s medical record; including vitals, labs, medications, cardiac studies, remaining additional imaging and consultant recommendations. During face-to-face time, I took a relevant history and examined the patient. I also explained to the patient their diagnoses, and specific cardiopulmonary management plan, which required a high level of medical decision making.  I answered all questions related to the patient's medical conditions. 74M, Polish speaking, PMHx of CAD s/p PCI and recent minimally invasive direct CABG 10/31/24, ICM HFrEF, HTN, CKD, and PAD s/p prior L to R fem-fem bypass,  who presented to [facility] for left leg pain concerning for acute limb ischemia. Pt was admitted for CLI of LLE with rest pain, s/p LE angiogram, s/p L common and external iliac stent but prox anastomosis of fem-fem bypass is upwards going (unable to treat endovascularly), w/ occluded L CFA, SFA, and AK popliteal artery w/ reconstitution of BK pop via collaterals w/ AT/PT runoff s/p L fem-pop bypass 11/14. S/p 1U PRBC 11/16/24 due to Hgb 7.7, likely blood loss at L popliteal site (saturated dressing);  now Hgb stable in 8s. Pt w/ consistent angina inpt and troponin usually 30s-40s range.  Overnight with new chest pain and elevated hsTrop 447, EKG w/ Qwaves and worsening ST depressions, rule in NSTEMI; switched apixaban to heparin gtt, transferred to Cardiology, plan for urgent LHC.    1. NSTEMI  s.p LHC no interventions.  Continue to monitor Hb.   DAPT, eliquis, nitro drip.    During non face-to-face time, I reviewed relevant portions of the patient’s medical record; including vitals, labs, medications, cardiac studies, remaining additional imaging and consultant recommendations. During face-to-face time, I took a relevant history and examined the patient. I also explained to the patient their diagnoses, and specific cardiopulmonary management plan, which required a high level of medical decision making.  I answered all questions related to the patient's medical conditions.

## 2024-11-21 LAB
ALBUMIN SERPL ELPH-MCNC: 3 G/DL — LOW (ref 3.3–5)
ALP SERPL-CCNC: 57 U/L — SIGNIFICANT CHANGE UP (ref 40–120)
ALT FLD-CCNC: 8 U/L — LOW (ref 10–45)
ANION GAP SERPL CALC-SCNC: 9 MMOL/L — SIGNIFICANT CHANGE UP (ref 5–17)
AST SERPL-CCNC: 13 U/L — SIGNIFICANT CHANGE UP (ref 10–40)
BILIRUB SERPL-MCNC: 0.6 MG/DL — SIGNIFICANT CHANGE UP (ref 0.2–1.2)
BUN SERPL-MCNC: 15 MG/DL — SIGNIFICANT CHANGE UP (ref 7–23)
CALCIUM SERPL-MCNC: 8.1 MG/DL — LOW (ref 8.4–10.5)
CHLORIDE SERPL-SCNC: 106 MMOL/L — SIGNIFICANT CHANGE UP (ref 96–108)
CO2 SERPL-SCNC: 20 MMOL/L — LOW (ref 22–31)
CREAT SERPL-MCNC: 1.33 MG/DL — HIGH (ref 0.5–1.3)
EGFR: 55 ML/MIN/1.73M2 — LOW
GLUCOSE SERPL-MCNC: 128 MG/DL — HIGH (ref 70–99)
HCT VFR BLD CALC: 27.5 % — LOW (ref 39–50)
HGB BLD-MCNC: 9.1 G/DL — LOW (ref 13–17)
MAGNESIUM SERPL-MCNC: 1.8 MG/DL — SIGNIFICANT CHANGE UP (ref 1.6–2.6)
MCHC RBC-ENTMCNC: 30.5 PG — SIGNIFICANT CHANGE UP (ref 27–34)
MCHC RBC-ENTMCNC: 33.1 G/DL — SIGNIFICANT CHANGE UP (ref 32–36)
MCV RBC AUTO: 92.3 FL — SIGNIFICANT CHANGE UP (ref 80–100)
NRBC # BLD: 0 /100 WBCS — SIGNIFICANT CHANGE UP (ref 0–0)
PLATELET # BLD AUTO: 161 K/UL — SIGNIFICANT CHANGE UP (ref 150–400)
POTASSIUM SERPL-MCNC: 4.4 MMOL/L — SIGNIFICANT CHANGE UP (ref 3.5–5.3)
POTASSIUM SERPL-SCNC: 4.4 MMOL/L — SIGNIFICANT CHANGE UP (ref 3.5–5.3)
PROT SERPL-MCNC: 5.6 G/DL — LOW (ref 6–8.3)
RBC # BLD: 2.98 M/UL — LOW (ref 4.2–5.8)
RBC # FLD: 14.5 % — SIGNIFICANT CHANGE UP (ref 10.3–14.5)
SODIUM SERPL-SCNC: 135 MMOL/L — SIGNIFICANT CHANGE UP (ref 135–145)
WBC # BLD: 6.64 K/UL — SIGNIFICANT CHANGE UP (ref 3.8–10.5)
WBC # FLD AUTO: 6.64 K/UL — SIGNIFICANT CHANGE UP (ref 3.8–10.5)

## 2024-11-21 PROCEDURE — 99233 SBSQ HOSP IP/OBS HIGH 50: CPT

## 2024-11-21 RX ORDER — NITROGLYCERIN 0.4MG/HR
30 PATCH, TRANSDERMAL 24 HOURS TRANSDERMAL
Qty: 50 | Refills: 0 | Status: DISCONTINUED | OUTPATIENT
Start: 2024-11-21 | End: 2024-11-22

## 2024-11-21 RX ADMIN — ACETAMINOPHEN 500MG 975 MILLIGRAM(S): 500 TABLET, COATED ORAL at 21:56

## 2024-11-21 RX ADMIN — OXYCODONE HYDROCHLORIDE 5 MILLIGRAM(S): 30 TABLET ORAL at 04:06

## 2024-11-21 RX ADMIN — Medication 800 MILLIGRAM(S): at 11:12

## 2024-11-21 RX ADMIN — RANOLAZINE 1000 MILLIGRAM(S): 1000 TABLET, FILM COATED, EXTENDED RELEASE ORAL at 06:07

## 2024-11-21 RX ADMIN — METOPROLOL TARTRATE 50 MILLIGRAM(S): 100 TABLET, FILM COATED ORAL at 09:31

## 2024-11-21 RX ADMIN — APIXABAN 5 MILLIGRAM(S): 2.5 TABLET, FILM COATED ORAL at 09:30

## 2024-11-21 RX ADMIN — APIXABAN 5 MILLIGRAM(S): 2.5 TABLET, FILM COATED ORAL at 02:19

## 2024-11-21 RX ADMIN — ACETAMINOPHEN 500MG 975 MILLIGRAM(S): 500 TABLET, COATED ORAL at 22:30

## 2024-11-21 RX ADMIN — Medication 5 MILLIGRAM(S): at 11:12

## 2024-11-21 RX ADMIN — Medication 120 MILLIGRAM(S): at 11:11

## 2024-11-21 RX ADMIN — CLOPIDOGREL 75 MILLIGRAM(S): 75 TABLET, FILM COATED ORAL at 11:12

## 2024-11-21 RX ADMIN — ACETAMINOPHEN 500MG 975 MILLIGRAM(S): 500 TABLET, COATED ORAL at 16:00

## 2024-11-21 RX ADMIN — OXYCODONE HYDROCHLORIDE 5 MILLIGRAM(S): 30 TABLET ORAL at 04:36

## 2024-11-21 RX ADMIN — Medication 81 MILLIGRAM(S): at 11:12

## 2024-11-21 RX ADMIN — RANOLAZINE 1000 MILLIGRAM(S): 1000 TABLET, FILM COATED, EXTENDED RELEASE ORAL at 17:14

## 2024-11-21 RX ADMIN — ACETAMINOPHEN 500MG 975 MILLIGRAM(S): 500 TABLET, COATED ORAL at 06:07

## 2024-11-21 RX ADMIN — Medication 80 MILLIGRAM(S): at 21:56

## 2024-11-21 RX ADMIN — APIXABAN 5 MILLIGRAM(S): 2.5 TABLET, FILM COATED ORAL at 21:57

## 2024-11-21 RX ADMIN — ACETAMINOPHEN 500MG 975 MILLIGRAM(S): 500 TABLET, COATED ORAL at 15:09

## 2024-11-21 RX ADMIN — Medication 2 TABLET(S): at 21:56

## 2024-11-21 RX ADMIN — POLYETHYLENE GLYCOL 3350 17 GRAM(S): 17 POWDER, FOR SOLUTION ORAL at 11:13

## 2024-11-21 RX ADMIN — Medication 9 MICROGRAM(S)/MIN: at 11:11

## 2024-11-21 RX ADMIN — TAMSULOSIN HYDROCHLORIDE 0.4 MILLIGRAM(S): 0.4 CAPSULE ORAL at 21:57

## 2024-11-21 RX ADMIN — PANTOPRAZOLE SODIUM 40 MILLIGRAM(S): 40 TABLET, DELAYED RELEASE ORAL at 11:12

## 2024-11-21 RX ADMIN — ACETAMINOPHEN 500MG 975 MILLIGRAM(S): 500 TABLET, COATED ORAL at 06:37

## 2024-11-21 NOTE — PROGRESS NOTE ADULT - PROBLEM SELECTOR PLAN 4
Warm, on room air  -TTE 10/29/24: LVEF 35% with regional wall abnormalities. Dilated LA. Normal RVSF.   -repeat TTE 11/15/24: Not all myocardial wall segments are visualized. Basal and mid inferolateral walls are akinetic. Overall LVSF appears mild to moderately reduced.  - c/w Toprol 50mg as above  - Diuretic: none. Giving IVF 11/20 in addition to Nitro gtt to increase perfusion and vasodilate  - monitor daily weight, strict i/o Warm, on room air  -TTE 10/29/24: LVEF 35% with regional wall abnormalities. Dilated LA. Normal RVSF.   -repeat TTE 11/15/24: Not all myocardial wall segments are visualized. Basal and mid inferolateral walls are akinetic. Overall LVSF appears mild to moderately reduced.  - c/w Toprol 50mg as above  - Diuretic: none. Received IVF 11/20 in addition to Nitro gtt to increase perfusion and vasodilate  - monitor daily weight, strict i/o

## 2024-11-21 NOTE — PROGRESS NOTE ADULT - ASSESSMENT
74M, Polish speaking, PMHx of CAD s/p PCI and recent minimally invasive direct CABG 10/31/24, ICM HFrEF, HTN, CKD, and PAD s/p prior L to R fem-fem bypass,  who presented to [facility] for left leg pain concerning for acute limb ischemia.  Pt was admitted to Vascular Surgery for CLI of LLE with rest pain, s/p LE angiogram, s/p L common and external iliac stent but prox anastomosis of fem-fem bypass is upwards going (unable to treat endovascularly), w/ occluded L CFA, SFA, and AK popliteal artery w/ reconstitution of BK pop via collaterals w/ AT/PT runoff, now S/P Left fem-pop bypass 11/14/24.   Pt w/ consistent angina inpt and troponin usually 30s-40s range; c/b Troponin peak to 447 w/ concern for ACS, urgently transferred to Cardiology service and brought to [facility] 11/19/24, VILLALOBOS-LAD found patent and chronic  LCx/RCA found unchanged- deemed Type II NSTEMI, possibly due to hypotension/hypoperfusion (LVEDP 2mmHg); Pt continues to be on Nitro gtt and now IVF to improve hemodynamics.    74M, Polish speaking, PMHx of CAD s/p PCI and recent minimally invasive direct CABG 10/31/24, ICM HFrEF, HTN, CKD, and PAD s/p prior L to R fem-fem bypass,  who presented to [facility] for left leg pain concerning for acute limb ischemia.  Pt was admitted to Vascular Surgery, now s/p stent intervention as well as left fem-pop bypass 11/14. Patient w/ consistent angina and NSTEMI and transferred to cardiology service. Underwent LHC 11/19 with CAD unchanged, presumed NSTEMI 2/2 hypotension/hypoperfusion. Pt still on nitro gtt and IVF to improve hemodynamics.

## 2024-11-21 NOTE — PROGRESS NOTE ADULT - PROBLEM SELECTOR PLAN 3
- Cr elevated on admission, unclear baseline Cr (~1.5 per Vascular), now Cr 1.2 range  - SCr today 1.26  - Monitor Cr and UOP      #ANEMIA   - Stable, Hgb today 9.1  - s/p 1u PRBC 11/16/24 for Hgb 7.7 due to suspected blood loss @ L Popliteal site per Vascular, now stable  - s/p 1u PRBC 11/19/24 for pre-LHC optimization  - Transfuse >8 given cardiac and renal risk factors  - Keep active type & screen (last on 11/18, next due AM 11/21) - Cr elevated on admission, unclear baseline Cr (~1.5 per Vascular), now Cr 1.2 range  - SCr today 1.33  - Monitor Cr and UOP      #ANEMIA   - Stable, Hgb today 9.1  - s/p 1u PRBC 11/16/24 for Hgb 7.7 due to suspected blood loss @ L Popliteal site per Vascular, now stable  - s/p 1u PRBC 11/19/24 for pre-LHC optimization  - Transfuse >8 given cardiac and renal risk factors  - Keep active type & screen (last on 11/18, next due AM 11/21)

## 2024-11-21 NOTE — PROGRESS NOTE ADULT - SUBJECTIVE AND OBJECTIVE BOX
SUBJECTIVE: Patient was evaluated at bedside this AM by vascular surgery team. Patient complains of unchanged 10/10 chest pain however appears comfortable in bed this AM. Patient reports limited mobility of LLE due to pain at hematoma site. Patient denies sensory changes, dizziness, lightheadedness, SOB, or any other symptoms at this time.    MEDICATIONS  (STANDING):  acetaminophen     Tablet .. 975 milliGRAM(s) Oral every 8 hours  apixaban 5 milliGRAM(s) Oral every 12 hours  aspirin enteric coated 81 milliGRAM(s) Oral daily  atorvastatin 80 milliGRAM(s) Oral at bedtime  clopidogrel Tablet 75 milliGRAM(s) Oral daily  finasteride 5 milliGRAM(s) Oral daily  influenza  Vaccine (HIGH DOSE) 0.5 milliLiter(s) IntraMuscular once  isosorbide   mononitrate ER Tablet (IMDUR) 120 milliGRAM(s) Oral daily  metoprolol succinate ER 50 milliGRAM(s) Oral daily  nitroglycerin  Infusion 50 MICROgram(s)/Min (15 mL/Hr) IV Continuous <Continuous>  pantoprazole    Tablet 40 milliGRAM(s) Oral daily  polyethylene glycol 3350 17 Gram(s) Oral daily  ranolazine 1000 milliGRAM(s) Oral two times a day  senna 2 Tablet(s) Oral at bedtime  sodium chloride 0.9%. 1000 milliLiter(s) (125 mL/Hr) IV Continuous <Continuous>  tamsulosin 0.4 milliGRAM(s) Oral at bedtime    MEDICATIONS  (PRN):  bisacodyl Suppository 10 milliGRAM(s) Rectal daily PRN Constipation  calcium carbonate    500 mG (Tums) Chewable 1 Tablet(s) Chew three times a day PRN Heartburn  HYDROmorphone  Injectable 0.5 milliGRAM(s) IV Push every 4 hours PRN breakthrough pain  oxyCODONE    IR 5 milliGRAM(s) Oral every 6 hours PRN Moderate Pain (4 - 6)  oxyCODONE    IR 10 milliGRAM(s) Oral every 6 hours PRN Severe Pain (7 - 10)  simethicone 80 milliGRAM(s) Chew two times a day PRN Heartburn      Vital Signs Last 24 Hrs  T(C): 37.1 (20 Nov 2024 20:20), Max: 37.1 (20 Nov 2024 20:20)  T(F): 98.8 (20 Nov 2024 20:20), Max: 98.8 (20 Nov 2024 20:20)  HR: 81 (21 Nov 2024 05:35) (75 - 82)  BP: 107/51 (21 Nov 2024 05:35) (89/49 - 113/54)  BP(mean): 74 (21 Nov 2024 05:35) (65 - 78)  RR: 18 (21 Nov 2024 05:35) (17 - 20)  SpO2: 92% (21 Nov 2024 05:35) (92% - 98%)    Parameters below as of 21 Nov 2024 05:35  Patient On (Oxygen Delivery Method): room air        Physical Exam:  General: alert and awake, NAD  Pulmonary: no respiratory distress  Cardiovascular: RRR  Abdominal: soft  Extrem: WWP, no calf edema, L groin incision c/d/i, mild improving ecchymosis surrounding, soft with small hardened hematoma noted but consistent to prior exams, moderately tender to palpation, motor and sensory intact bilaterally when not limited by pain  Pulses: LLE palp DP, tri/biphasic PT, triphasic over bypass site     I&O's Summary    20 Nov 2024 07:01  -  21 Nov 2024 07:00  --------------------------------------------------------  IN: 813 mL / OUT: 1625 mL / NET: -812 mL        LABS:                        9.1    6.64  )-----------( 161      ( 21 Nov 2024 05:30 )             27.5     11-21    135  |  106  |  15  ----------------------------<  128[H]  4.4   |  20[L]  |  1.33[H]    Ca    8.1[L]      21 Nov 2024 05:30  Phos  3.0     11-20  Mg     1.8     11-21    TPro  5.6[L]  /  Alb  3.0[L]  /  TBili  0.6  /  DBili  x   /  AST  13  /  ALT  8[L]  /  AlkPhos  57  11-21    PT/INR - ( 20 Nov 2024 05:30 )   PT: 15.8 sec;   INR: 1.38          PTT - ( 20 Nov 2024 05:30 )  PTT:25.2 sec  Urinalysis Basic - ( 21 Nov 2024 05:30 )    Color: x / Appearance: x / SG: x / pH: x  Gluc: 128 mg/dL / Ketone: x  / Bili: x / Urobili: x   Blood: x / Protein: x / Nitrite: x   Leuk Esterase: x / RBC: x / WBC x   Sq Epi: x / Non Sq Epi: x / Bacteria: x      CAPILLARY BLOOD GLUCOSE        LIVER FUNCTIONS - ( 21 Nov 2024 05:30 )  Alb: 3.0 g/dL / Pro: 5.6 g/dL / ALK PHOS: 57 U/L / ALT: 8 U/L / AST: 13 U/L / GGT: x             RADIOLOGY & ADDITIONAL STUDIES:

## 2024-11-21 NOTE — PROGRESS NOTE ADULT - SUBJECTIVE AND OBJECTIVE BOX
Medicine Progress Note    Patient is a 74y old  Male who presents with a chief complaint of transfer for rule out limb ischemia (21 Nov 2024 08:23)      SUBJECTIVE / OVERNIGHT EVENTS:  Feeling a little better this morning compared to yesterday, chest pain was much more severe yesterday but today is nearly dissipated, patient ranks it at a 3/10 and it is worst over the incision site of his prior CABG. Otherwise no new concerns, leg pain has been manageable. Denies having any shortness of breath, no palpitations.     MEDICATIONS  (STANDING):  acetaminophen     Tablet .. 975 milliGRAM(s) Oral every 8 hours  apixaban 5 milliGRAM(s) Oral every 12 hours  aspirin enteric coated 81 milliGRAM(s) Oral daily  atorvastatin 80 milliGRAM(s) Oral at bedtime  clopidogrel Tablet 75 milliGRAM(s) Oral daily  finasteride 5 milliGRAM(s) Oral daily  influenza  Vaccine (HIGH DOSE) 0.5 milliLiter(s) IntraMuscular once  isosorbide   mononitrate ER Tablet (IMDUR) 120 milliGRAM(s) Oral daily  metoprolol succinate ER 50 milliGRAM(s) Oral daily  nitroglycerin  Infusion 30 MICROgram(s)/Min (9 mL/Hr) IV Continuous <Continuous>  pantoprazole    Tablet 40 milliGRAM(s) Oral daily  polyethylene glycol 3350 17 Gram(s) Oral daily  ranolazine 1000 milliGRAM(s) Oral two times a day  senna 2 Tablet(s) Oral at bedtime  sodium chloride 0.9%. 1000 milliLiter(s) (125 mL/Hr) IV Continuous <Continuous>  tamsulosin 0.4 milliGRAM(s) Oral at bedtime    MEDICATIONS  (PRN):  bisacodyl Suppository 10 milliGRAM(s) Rectal daily PRN Constipation  calcium carbonate    500 mG (Tums) Chewable 1 Tablet(s) Chew three times a day PRN Heartburn  HYDROmorphone  Injectable 0.5 milliGRAM(s) IV Push every 4 hours PRN breakthrough pain  oxyCODONE    IR 5 milliGRAM(s) Oral every 6 hours PRN Moderate Pain (4 - 6)  oxyCODONE    IR 10 milliGRAM(s) Oral every 6 hours PRN Severe Pain (7 - 10)  simethicone 80 milliGRAM(s) Chew two times a day PRN Heartburn    CAPILLARY BLOOD GLUCOSE        I&O's Summary    20 Nov 2024 07:01  -  21 Nov 2024 07:00  --------------------------------------------------------  IN: 888 mL / OUT: 1625 mL / NET: -737 mL    21 Nov 2024 07:01  -  21 Nov 2024 12:51  --------------------------------------------------------  IN: 552 mL / OUT: 0 mL / NET: 552 mL        PHYSICAL EXAM:  Vital Signs Last 24 Hrs  T(C): 36.8 (21 Nov 2024 11:00), Max: 37.1 (20 Nov 2024 20:20)  T(F): 98.3 (21 Nov 2024 11:00), Max: 98.8 (20 Nov 2024 20:20)  HR: 81 (21 Nov 2024 12:00) (79 - 89)  BP: 114/56 (21 Nov 2024 12:00) (89/49 - 114/56)  BP(mean): 81 (21 Nov 2024 12:00) (65 - 81)  RR: 16 (21 Nov 2024 12:00) (16 - 20)  SpO2: 93% (21 Nov 2024 12:00) (92% - 98%)    Parameters below as of 21 Nov 2024 12:00  Patient On (Oxygen Delivery Method): room air      Appears comfortable   MMM  Normal WOB on RA, CTAB, some tenderness to palpation of chest wall near CABG incision site   RRR, no mrg   Abdomen soft, nontender, nondistended  Extremities warm and without edema, LLE with some clean gauze dressing, scattered dry scabs over the L shin   AOX3, no focal neuro deficits     LABS:                        9.1    6.64  )-----------( 161      ( 21 Nov 2024 05:30 )             27.5     11-21    135  |  106  |  15  ----------------------------<  128[H]  4.4   |  20[L]  |  1.33[H]    Ca    8.1[L]      21 Nov 2024 05:30  Phos  3.0     11-20  Mg     1.8     11-21    TPro  5.6[L]  /  Alb  3.0[L]  /  TBili  0.6  /  DBili  x   /  AST  13  /  ALT  8[L]  /  AlkPhos  57  11-21    PT/INR - ( 20 Nov 2024 05:30 )   PT: 15.8 sec;   INR: 1.38          PTT - ( 20 Nov 2024 05:30 )  PTT:25.2 sec      Urinalysis Basic - ( 21 Nov 2024 05:30 )    Color: x / Appearance: x / SG: x / pH: x  Gluc: 128 mg/dL / Ketone: x  / Bili: x / Urobili: x   Blood: x / Protein: x / Nitrite: x   Leuk Esterase: x / RBC: x / WBC x   Sq Epi: x / Non Sq Epi: x / Bacteria: x        COVID-19 PCR: NotDetec (07 Nov 2024 14:11)      RADIOLOGY & ADDITIONAL TESTS:  Imaging from Last 24 Hours:    Catheterization on 11/19:     LIMA to LAD - patent    LM - distal 50-60% stenosis   LAD - prox 70% diffuse disease, rest of vessel with mild diffuse  disease, competitive flow from LIMA  LCx - ostial    RCA - not engaged, known  from outside cath   LRA - TR band

## 2024-11-21 NOTE — PROGRESS NOTE ADULT - PROBLEM SELECTOR PLAN 1
#s/p prior PCI per pt and recent MIDCAB 10/31/24  #NSTEMI Type 2  - EKG 95bpm NSR, QWaves II-AVF; ST Depressions I, II, AVL, V4-V6.  - Per Vascular pt w/ frequent chest pain this admission, had increased Imdur, also treating w/ Tums/Simethicone; pt baseline trop 30s-40s, however Trop 447 on 11/19 AM --> 368. Pt txfr to Cards for urgent LHC.   - S/p Diagnostic LHC 11/19/24:  LIMA-LAD patent. dLM (50-60%), pLAD (70%, diffuse), competitive flow from LIMA, dLAD mild diffuse;  LCX ostial , known; RCA not engaged, known .  [IC Aditya Kolb/Cici]  ACCESS LRA.  LVEDP 2mmHG.   - C/w Triple therapy for now: Asa 81mg qd, Plavix 75mg Qd, Eliquis 5mg BID for DVT. As d/w Dr Rowe: triple therapy until CP improves (pt on PPI as well)  - Suspect ?hypoperfusion ischemic pain:  Started IVF post cath, continuing today 11/20 to increase perfusion  - Maximizing antianginals: Remains on Nitro GTT at 50mcg/min currently to vasodilate; PO Imdur 120mg qd; Ranexa to 1000mg BID.   - c/w Metoprolol succinate 50mg qd  - c/w Atorvastatin 80mg qhs #s/p prior PCI per pt and recent MIDCAB 10/31/24  #NSTEMI Type 2  - EKG 95bpm NSR, QWaves II-AVF; ST Depressions I, II, AVL, V4-V6.  - Per Vascular pt w/ frequent chest pain this admission, had increased Imdur, also treating w/ Tums/Simethicone; pt baseline trop 30s-40s, however Trop 447 on 11/19 AM --> 368. Pt txfr to Cards for urgent LHC.   - S/p Diagnostic LHC 11/19/24:  LIMA-LAD patent. dLM (50-60%), pLAD (70%, diffuse), competitive flow from LIMA, dLAD mild diffuse;  LCX ostial , known; RCA not engaged, known .  [IC Aditya Kolb/Cici]  ACCESS LRA.  LVEDP 2mmHG.   - C/w Triple therapy for now: Asa 81mg qd, Plavix 75mg Qd, Eliquis 5mg BID for DVT. As d/w Dr Rowe: triple therapy until CP improves (pt on PPI as well)  - Suspect ?hypoperfusion ischemic pain:  Started IVF post cath, continuing today 11/20 to increase perfusion  - Maximizing antianginals: Remains on Nitro GTT decreased  from 50 to 30mcg/min currently to vasodilate; PO Imdur 120mg qd; Ranexa to 1000mg BID.   - c/w Metoprolol succinate 50mg qd  - c/w Atorvastatin 80mg qhs

## 2024-11-21 NOTE — PROGRESS NOTE ADULT - ASSESSMENT
75yo M, with PMHx of HTN, HLD, BPH, CAD s/p CABG 10/31/24, severe PAD (s/p fem-fem) who presented to [facility] for left leg pain concerning for acute limb ischemia and transferred to [facility], now on vascular service s/p angiogram with fem-fem bypass, L common and external iliac covered stenting and perclose on 11/11. Course was complicated by acutely worsened chest pain on the evening of 11/19 with acute rise in troponins from the 40s to the 3-400s, urgently transferred to the cardiology service and underwent LHC with results as above. Medicine is consulted for comanagement.     #NSTEMI type 2   #CAD s/p PCI and minimally invasive direct CABG 10/31/24  #Chest pain   #HTN/HLD   -s/p LHC on 11/19 with results as above   -Will continue DAPT:  ASA and Plavix   -Will continue Imdur 120 mg PO QD for anti-anginal therapy, hold for SBP <90  -Plan to wean nitroglycerin gtt today   -Continue ranolazine 1000 mg BID   -Continue metoprolol succinate 50 mg daily   -Eliquis restarted for DVT (prior was on heparin gtt)     #PAD  #s/p LLE angiogram, fem-fem bypass, L common and external iliac covered stenting and perclose on 11/11  -Continue pain control and bowel regimen per vascular team   -Will continue Plavix and ASA, Eliquis re-introduced as well for DVT     #LLE DVT   -Continue eliquis as above     #Acute blood loss anemia   -Pt with Hgb improved to the 9s this morning   -Pt s/p transfusion of 1 Unit of PRBCs on 11/16 for Hgb 7.7 with appropriate response   -Will continue to trend, transfuse for hemoglobin of 8 or higher given pt's cardiac hx     #FELIPA  -Will continue to monitor creatinine, may be increased as a result of contrast from LHC   -Avoid nephrotoxins as able, renally dose all medications     #RUL Pulmonary nodule - PET +  -Pt to  follow up outpatient with  Dr. Duarte     #BPH  - Continue flomax and proscar    #Prediabetes  -HgbAc 5.8   -Continue consistent carb diet  -Will continue to monitor FS

## 2024-11-21 NOTE — PROGRESS NOTE ADULT - NS ATTEND AMEND GEN_ALL_CORE FT
74M, Polish speaking, PMHx of CAD s/p PCI and recent minimally invasive direct CABG 10/31/24, ICM HFrEF, HTN, CKD, and PAD s/p prior L to R fem-fem bypass,  who presented to [facility] for left leg pain concerning for acute limb ischemia. Pt was admitted for CLI of LLE with rest pain, s/p LE angiogram, s/p L common and external iliac stent but prox anastomosis of fem-fem bypass is upwards going (unable to treat endovascularly), w/ occluded L CFA, SFA, and AK popliteal artery w/ reconstitution of BK pop via collaterals w/ AT/PT runoff s/p L fem-pop bypass 11/14. S/p 1U PRBC 11/16/24 due to Hgb 7.7, likely blood loss at L popliteal site (saturated dressing);  now Hgb stable in 8s. Pt w/ consistent angina inpt and troponin usually 30s-40s range.  Overnight with new chest pain and elevated hsTrop 447, EKG w/ Qwaves and worsening ST depressions, rule in NSTEMI; switched apixaban to heparin gtt, transferred to Cardiology, plan for urgent LHC.    1. NSTEMI  s.p LHC no interventions.  Continue to monitor Hb.   DAPT, eliquis, wean nitro drip.    During non face-to-face time, I reviewed relevant portions of the patient’s medical record; including vitals, labs, medications, cardiac studies, remaining additional imaging and consultant recommendations. During face-to-face time, I took a relevant history and examined the patient. I also explained to the patient their diagnoses, and specific cardiopulmonary management plan, which required a high level of medical decision making.  I answered all questions related to the patient's medical conditions.

## 2024-11-21 NOTE — PROGRESS NOTE ADULT - ASSESSMENT
75yo M, with PMHx of HTN, HLD, BPH, HFrEF, CAD s/p CABG 10/31/24, severe PAD (s/p fem-fem) who presented to [facility] for left leg pain concerning for acute limb ischemia. On exam here, patient has an exam more consistent with his prior recent exam, consisting of chronic limb ischemia (motor and sensation intact, doppler signals present in left foot and fem-fem bypass). Given his rest pain, decision was made to proceed with arteriography. Patient is now  s/p LLE angiogram with EIA and FELICITY stents and s/p LLE fem to AK-pop bypass 11/14/24. Patient is most recently s/p transfer to cardiology service for NSTEMI s/p [facility] (11/19).    Recommendations:  No acute vascular surgery intervention at this time  L groin hematoma stable with no concern for expansion or worsening  LLE fem to AK-pop bypass patent and well healing  Rest of care per primary team  Vascular surgery will continue to follow    Plan pending final attending attestation

## 2024-11-22 DIAGNOSIS — R13.19 OTHER DYSPHAGIA: ICD-10-CM

## 2024-11-22 LAB
ALBUMIN SERPL ELPH-MCNC: 3.1 G/DL — LOW (ref 3.3–5)
ALP SERPL-CCNC: 55 U/L — SIGNIFICANT CHANGE UP (ref 40–120)
ALT FLD-CCNC: 7 U/L — LOW (ref 10–45)
ANION GAP SERPL CALC-SCNC: 8 MMOL/L — SIGNIFICANT CHANGE UP (ref 5–17)
AST SERPL-CCNC: 12 U/L — SIGNIFICANT CHANGE UP (ref 10–40)
BILIRUB SERPL-MCNC: 0.6 MG/DL — SIGNIFICANT CHANGE UP (ref 0.2–1.2)
BUN SERPL-MCNC: 14 MG/DL — SIGNIFICANT CHANGE UP (ref 7–23)
CALCIUM SERPL-MCNC: 8.5 MG/DL — SIGNIFICANT CHANGE UP (ref 8.4–10.5)
CHLORIDE SERPL-SCNC: 103 MMOL/L — SIGNIFICANT CHANGE UP (ref 96–108)
CO2 SERPL-SCNC: 22 MMOL/L — SIGNIFICANT CHANGE UP (ref 22–31)
CREAT SERPL-MCNC: 1.39 MG/DL — HIGH (ref 0.5–1.3)
EGFR: 53 ML/MIN/1.73M2 — LOW
GLUCOSE SERPL-MCNC: 112 MG/DL — HIGH (ref 70–99)
HCT VFR BLD CALC: 28.1 % — LOW (ref 39–50)
HGB BLD-MCNC: 9.3 G/DL — LOW (ref 13–17)
MAGNESIUM SERPL-MCNC: 1.7 MG/DL — SIGNIFICANT CHANGE UP (ref 1.6–2.6)
MCHC RBC-ENTMCNC: 31 PG — SIGNIFICANT CHANGE UP (ref 27–34)
MCHC RBC-ENTMCNC: 33.1 G/DL — SIGNIFICANT CHANGE UP (ref 32–36)
MCV RBC AUTO: 93.7 FL — SIGNIFICANT CHANGE UP (ref 80–100)
NRBC # BLD: 0 /100 WBCS — SIGNIFICANT CHANGE UP (ref 0–0)
PLATELET # BLD AUTO: 173 K/UL — SIGNIFICANT CHANGE UP (ref 150–400)
POTASSIUM SERPL-MCNC: 4.4 MMOL/L — SIGNIFICANT CHANGE UP (ref 3.5–5.3)
POTASSIUM SERPL-SCNC: 4.4 MMOL/L — SIGNIFICANT CHANGE UP (ref 3.5–5.3)
PROT SERPL-MCNC: 6.1 G/DL — SIGNIFICANT CHANGE UP (ref 6–8.3)
RBC # BLD: 3 M/UL — LOW (ref 4.2–5.8)
RBC # FLD: 14.5 % — SIGNIFICANT CHANGE UP (ref 10.3–14.5)
SODIUM SERPL-SCNC: 133 MMOL/L — LOW (ref 135–145)
WBC # BLD: 5.63 K/UL — SIGNIFICANT CHANGE UP (ref 3.8–10.5)
WBC # FLD AUTO: 5.63 K/UL — SIGNIFICANT CHANGE UP (ref 3.8–10.5)

## 2024-11-22 PROCEDURE — 99291 CRITICAL CARE FIRST HOUR: CPT

## 2024-11-22 PROCEDURE — 99233 SBSQ HOSP IP/OBS HIGH 50: CPT

## 2024-11-22 PROCEDURE — 93925 LOWER EXTREMITY STUDY: CPT | Mod: 26

## 2024-11-22 PROCEDURE — 99497 ADVNCD CARE PLAN 30 MIN: CPT | Mod: 25

## 2024-11-22 PROCEDURE — 99223 1ST HOSP IP/OBS HIGH 75: CPT | Mod: 25

## 2024-11-22 RX ORDER — SODIUM CHLORIDE 9 MG/ML
250 INJECTION, SOLUTION INTRAMUSCULAR; INTRAVENOUS; SUBCUTANEOUS
Refills: 0 | Status: DISCONTINUED | OUTPATIENT
Start: 2024-11-22 | End: 2024-11-22

## 2024-11-22 RX ORDER — SODIUM CHLORIDE 9 MG/ML
250 INJECTION, SOLUTION INTRAMUSCULAR; INTRAVENOUS; SUBCUTANEOUS ONCE
Refills: 0 | Status: COMPLETED | OUTPATIENT
Start: 2024-11-22 | End: 2024-11-22

## 2024-11-22 RX ORDER — NITROGLYCERIN 0.4MG/HR
35 PATCH, TRANSDERMAL 24 HOURS TRANSDERMAL
Qty: 50 | Refills: 0 | Status: DISCONTINUED | OUTPATIENT
Start: 2024-11-22 | End: 2024-11-22

## 2024-11-22 RX ORDER — OXYCODONE HYDROCHLORIDE 30 MG/1
2.5 TABLET ORAL EVERY 4 HOURS
Refills: 0 | Status: DISCONTINUED | OUTPATIENT
Start: 2024-11-22 | End: 2024-11-25

## 2024-11-22 RX ORDER — CYANOCOBALAMIN/FOLIC AC/VIT B6 1-2.2-25MG
1 TABLET ORAL DAILY
Refills: 0 | Status: DISCONTINUED | OUTPATIENT
Start: 2024-11-22 | End: 2024-11-27

## 2024-11-22 RX ORDER — NITROGLYCERIN 0.4MG/HR
40 PATCH, TRANSDERMAL 24 HOURS TRANSDERMAL
Qty: 50 | Refills: 0 | Status: DISCONTINUED | OUTPATIENT
Start: 2024-11-22 | End: 2024-11-22

## 2024-11-22 RX ORDER — NITROGLYCERIN 0.4MG/HR
0.4 PATCH, TRANSDERMAL 24 HOURS TRANSDERMAL ONCE
Refills: 0 | Status: COMPLETED | OUTPATIENT
Start: 2024-11-22 | End: 2024-11-22

## 2024-11-22 RX ORDER — NITROGLYCERIN 0.4MG/HR
50 PATCH, TRANSDERMAL 24 HOURS TRANSDERMAL
Qty: 50 | Refills: 0 | Status: DISCONTINUED | OUTPATIENT
Start: 2024-11-22 | End: 2024-11-22

## 2024-11-22 RX ORDER — NITROGLYCERIN 0.4MG/HR
100 PATCH, TRANSDERMAL 24 HOURS TRANSDERMAL
Qty: 50 | Refills: 0 | Status: DISCONTINUED | OUTPATIENT
Start: 2024-11-22 | End: 2024-11-24

## 2024-11-22 RX ORDER — MULTIVIT WITH MINERALS/LUTEIN
500 TABLET ORAL DAILY
Refills: 0 | Status: DISCONTINUED | OUTPATIENT
Start: 2024-11-22 | End: 2024-11-27

## 2024-11-22 RX ORDER — OXYCODONE HYDROCHLORIDE 30 MG/1
5 TABLET ORAL EVERY 4 HOURS
Refills: 0 | Status: DISCONTINUED | OUTPATIENT
Start: 2024-11-22 | End: 2024-11-25

## 2024-11-22 RX ADMIN — METOPROLOL TARTRATE 50 MILLIGRAM(S): 100 TABLET, FILM COATED ORAL at 07:29

## 2024-11-22 RX ADMIN — Medication 80 MILLIGRAM(S): at 21:48

## 2024-11-22 RX ADMIN — Medication 10.5 MICROGRAM(S)/MIN: at 03:14

## 2024-11-22 RX ADMIN — Medication 81 MILLIGRAM(S): at 11:43

## 2024-11-22 RX ADMIN — Medication 2 MILLIGRAM(S): at 10:57

## 2024-11-22 RX ADMIN — Medication 2 MILLIGRAM(S): at 11:12

## 2024-11-22 RX ADMIN — Medication 2 TABLET(S): at 21:48

## 2024-11-22 RX ADMIN — Medication 5 MILLIGRAM(S): at 11:41

## 2024-11-22 RX ADMIN — Medication 1 TABLET(S): at 14:34

## 2024-11-22 RX ADMIN — Medication 30 MICROGRAM(S)/MIN: at 11:12

## 2024-11-22 RX ADMIN — Medication 500 MILLIGRAM(S): at 14:33

## 2024-11-22 RX ADMIN — Medication 12 MICROGRAM(S)/MIN: at 09:23

## 2024-11-22 RX ADMIN — SODIUM CHLORIDE 125 MILLILITER(S): 9 INJECTION, SOLUTION INTRAMUSCULAR; INTRAVENOUS; SUBCUTANEOUS at 11:02

## 2024-11-22 RX ADMIN — RANOLAZINE 1000 MILLIGRAM(S): 1000 TABLET, FILM COATED, EXTENDED RELEASE ORAL at 18:00

## 2024-11-22 RX ADMIN — RANOLAZINE 1000 MILLIGRAM(S): 1000 TABLET, FILM COATED, EXTENDED RELEASE ORAL at 07:28

## 2024-11-22 RX ADMIN — ACETAMINOPHEN 500MG 975 MILLIGRAM(S): 500 TABLET, COATED ORAL at 15:20

## 2024-11-22 RX ADMIN — SODIUM CHLORIDE 125 MILLILITER(S): 9 INJECTION, SOLUTION INTRAMUSCULAR; INTRAVENOUS; SUBCUTANEOUS at 11:09

## 2024-11-22 RX ADMIN — ACETAMINOPHEN 500MG 975 MILLIGRAM(S): 500 TABLET, COATED ORAL at 14:33

## 2024-11-22 RX ADMIN — Medication 800 MILLIGRAM(S): at 11:42

## 2024-11-22 RX ADMIN — LIDOCAINE 1 PATCH: 40 CREAM TOPICAL at 15:25

## 2024-11-22 RX ADMIN — TAMSULOSIN HYDROCHLORIDE 0.4 MILLIGRAM(S): 0.4 CAPSULE ORAL at 21:47

## 2024-11-22 RX ADMIN — POLYETHYLENE GLYCOL 3350 17 GRAM(S): 17 POWDER, FOR SOLUTION ORAL at 11:43

## 2024-11-22 RX ADMIN — ACETAMINOPHEN 500MG 975 MILLIGRAM(S): 500 TABLET, COATED ORAL at 22:48

## 2024-11-22 RX ADMIN — Medication 15 MICROGRAM(S)/MIN: at 10:44

## 2024-11-22 RX ADMIN — APIXABAN 5 MILLIGRAM(S): 2.5 TABLET, FILM COATED ORAL at 21:47

## 2024-11-22 RX ADMIN — Medication 10.5 MICROGRAM(S)/MIN: at 08:45

## 2024-11-22 RX ADMIN — Medication 30 MICROGRAM(S)/MIN: at 18:01

## 2024-11-22 RX ADMIN — ACETAMINOPHEN 500MG 975 MILLIGRAM(S): 500 TABLET, COATED ORAL at 08:00

## 2024-11-22 RX ADMIN — Medication 120 MILLIGRAM(S): at 11:42

## 2024-11-22 RX ADMIN — ACETAMINOPHEN 500MG 975 MILLIGRAM(S): 500 TABLET, COATED ORAL at 07:29

## 2024-11-22 RX ADMIN — Medication 0.4 MILLIGRAM(S): at 10:51

## 2024-11-22 RX ADMIN — SODIUM CHLORIDE 500 MILLILITER(S): 9 INJECTION, SOLUTION INTRAMUSCULAR; INTRAVENOUS; SUBCUTANEOUS at 10:33

## 2024-11-22 RX ADMIN — CLOPIDOGREL 75 MILLIGRAM(S): 75 TABLET, FILM COATED ORAL at 11:42

## 2024-11-22 RX ADMIN — PANTOPRAZOLE SODIUM 40 MILLIGRAM(S): 40 TABLET, DELAYED RELEASE ORAL at 11:43

## 2024-11-22 RX ADMIN — ACETAMINOPHEN 500MG 975 MILLIGRAM(S): 500 TABLET, COATED ORAL at 21:48

## 2024-11-22 RX ADMIN — APIXABAN 5 MILLIGRAM(S): 2.5 TABLET, FILM COATED ORAL at 07:29

## 2024-11-22 NOTE — PROVIDER CONTACT NOTE (OTHER) - REASON
urine output monitoring
Chest pain and SOB
Pt  c/o chest pain
Patient complaining of 9/10 chest pain
Chest pain
Patient complaining of 9/10 chest pain
Chest Pain with Associated Shortness of Breath

## 2024-11-22 NOTE — DIETITIAN INITIAL EVALUATION ADULT - OTHER INFO
73 y/o male with PMHx of HTN, HLD, BPH, CAD s/p CABG 10/31, PAD s/p remote fem-fem bypass, and and an "abdominal stent for circulation problem." Pt presented to [facility] for left leg pain concerning for acute limb ischemia. He is now s/p US-guided access of fem-fem bypass, aortogram, LLE angiogram with L common and external iliac covered stenting and perclose 11/11. S/p bypass of arterial/ femoral to popliteal using PTFE graft 11/14. Course was complicated by acutely worsened chest pain on the evening of 11/19 with acute rise in troponins from the 40s to the 3-400s, urgently transferred to the cardiology service and underwent LHC. Pt presumed to have NSTEMI 2/2 hypotension/hypoperfusion. Pt on nitro gtt and IVF to improve hemodynamics.     Pt seen this AM on 5UR. Pt is polsih speaking, translate #[id number]. Breakfast at bedside was ~50% consumed. Pt expresses decreased appetite during admit, reported "I don't have an appetite at all." Assume meeting<75% EER consistently during admit, (~12 days). Assume intake is also suboptimal i/s/o reported issues chewing/swallowing at this time/PTA. Pt currently ordered for a soft and bite sized diet - pt without dentures present which has made chewing difficult. Reports issues swallowing during admit/PTA, issues occur both with/without dentures. Pt asking for "crushed up food" of a baby like consistency. Pt reported better PO intake PTA, consuming 3 meals/day, no use of PO supplements. Does report consuming "crushed up foods" PTA. No Know Food Allergies.   /54.     ** Malnutrition **  - PO intake: Assume meeting<75% EER consistently during admit, (~12 days).  - Wasting/loss: noted to orbital region (MILD).  - Wt loss: 10/2024 RD note admit wt 152 pounds + Current Body wt of 132 pounds, suggests that pt is down ~20 pounds / 13% body wt loss x11 months. Pt does remain with edema which is likely masking further wt loss.

## 2024-11-22 NOTE — PROGRESS NOTE ADULT - PROBLEM SELECTOR PLAN 6
LDL 56 10/27/24;  C/w Atorvastatin 80mg QHS    #Prediabetes- A1C 5.8% SBP range today 89 - 125mmHg  - c/w meds as above

## 2024-11-22 NOTE — CONSULT NOTE ADULT - PROBLEM SELECTOR RECOMMENDATION 9
.  Etiology multifactorial: chronic illnesses; acute deterioration following NSTEMI. patient with limited nursing home support. pps 30%-40%  - cw supportive care including repositioning and skin/wound care  - Oral care q6 ATC  - PT cs  - Fall precautions

## 2024-11-22 NOTE — CONSULT NOTE ADULT - ASSESSMENT
- Spoke with patient at bedside with Polish . Patient with very limited insight into his overall medical frailty, the disease trajectories of his chronic conditions, and that he is a poor candidate for advanced therapies/interventions. Recommend further education by primary team. What is most important to patient is maintaining physical strength and functional independence and connecting with [employer] in [place], that can provide him with support, resources as he ages. He has three adult children in [place], but their contact information is at home, otherwise he does not have any friends or family. Discussed the risks/benefits/potential poor outcomes of LST including CPR/intubation in setting of age, chronic illness and overall medical frailty; and discussed alternative to allow natural death. Electing to continue an aggressive approach to his care including cpr and intubation.       -- full note to follow   - will continue to follow with you      75 yo M with advanced, multiple comorbidities including CAD s/p CABG/PCI, ICM, HFrEF, CKD, PAD s/p bypass who initially presented with acute limb ischemia now complicated by NSTEMI. Pending transfer to CCU. Palliative consulted to discuss goals of care and treatment preferences in light of current health status.     Pt with significant burden of chronic illness, impacting his overall prognosis and functional status. Hospital course cb escalating angina and ischemia despite multiple interventions and CCU transfer collectively suggest potentially poor trajectory.  Pt has limited understanding of his poor prognosis and that he is poor candidate for advanced therapies- needs careful communication with .  Has three kids (surrogates) and no local support.  What is most important to patient is maintaining physical strength and functional independence and connecting with [employer] [facility] in [facility], Electing to continue an aggressive approach to his care including cpr and intubation. Discussions to continue as hospital course progresses.        73 yo M with advanced, multiple comorbidities including CAD s/p CABG/PCI, ICM, HFrEF, CKD, PAD s/p bypass who initially presented with acute limb ischemia now complicated by NSTEMI. Pending transfer to CCU. Palliative consulted to discuss goals of care and treatment preferences in light of current health status.     Pt with significant burden of chronic illness, impacting his overall prognosis and functional status. Hospital course cb escalating angina and ischemia despite multiple interventions suggesting potentially poor trajectory.  Pt has limited understanding of his poor prognosis and that he is poor candidate for advanced therapies- needs careful communication with .  Has three kids (surrogates) and no local support.  What is most important to patient is maintaining physical strength and functional independence and connecting with [employer] in [place], Electing to continue an aggressive approach to his care including cpr and intubation. Discussions to continue as hospital course progresses.

## 2024-11-22 NOTE — PROGRESS NOTE ADULT - PROBLEM SELECTOR PROBLEM 6
HLD (hyperlipidemia) Ear Star Wedge Flap Text: The defect edges were debeveled with a #15 blade scalpel.  Given the location of the defect and the proximity to free margins (helical rim) an ear star wedge flap was deemed most appropriate.  Using a sterile surgical marker, the appropriate flap was drawn incorporating the defect and placing the expected incisions between the helical rim and antihelix where possible.  The area thus outlined was incised through and through with a #15 scalpel blade.

## 2024-11-22 NOTE — PROGRESS NOTE ADULT - PROBLEM SELECTOR PLAN 2
Vascular Sx following, appreciate recs  - s/p 11/11/24 LLE angiogram with EIA and FELICITY stents  - s/p 11/14/24 LLE fem to AK-pop bypass ; LLE c/b hematoma from Hep GTT 11/19, as evaluated w/  Vascular Sx at bedside - no intervention needed- now improving 11/20 off hep gtt.  L popliteal medial knee site- w/ Kerlix c/d/i.    - CTA b/l LE at OSH 11/8: post fem-fem bypass, severe atherosclerotic disease involving aorta, iliac, and superficial femoral arteries b/l more on the left  - c/w Plavix 75mg qd and Atorvastatin 80mg qhs  - pain control per Vascular: currently on standing Tylenol 975mg q8h; oxycodone 2.5mg q4h PRN for moderate pain, and oxycodone 5mg q4h PRN for severe pain.   - bowel regimen: senna, Miralax standing; dulcolax prn  - Vasc Sx had planned for DOAC and antiplt monotherapy w/ Plavix on dc     #LLE DVT  - c/w AC : Resume Eliquis 5mg BID 11/20 AM.  (s.p Hep GTT for r/o ACS)

## 2024-11-22 NOTE — DIETITIAN INITIAL EVALUATION ADULT - PERSON TAUGHT/METHOD
D/w pt protein foods. Encouraged PO intake during meals & reviewed importance. Understadning stated, provide additional motivation as needed./verbal instruction/teach back - (Patient repeats in own words)/patient instructed

## 2024-11-22 NOTE — PROGRESS NOTE ADULT - ASSESSMENT
74M, Polish speaking, PMHx of CAD s/p PCI and recent minimally invasive direct CABG 10/31/24, ICM HFrEF, HTN, CKD, and PAD s/p prior L to R fem-fem bypass, who presented to [facility] for left leg pain concerning for acute limb ischemia.  Pt was initially admitted to Vascular Surgery, now s/p peripheral stent intervention as well as left fem-pop bypass 11/14. Patient w/ consistent angina and NSTEMI and transferred to cardiology service. Underwent LHC 11/19 with CAD unchanged with no further intervention, presumed NSTEMI 2/2 hypotension/hypoperfusion. Pt still on nitro gtt and IVF to improve hemodynamics. Yesterday 11/21/24 patient was titrated down on nitro gtt 30, however, overnight had worsening chest pain 10/10 not controlled and nitro was slightly uptitrated again. During morning, he was back on nitro 50mcg along with sublingual nitrostat, however, no significant relieve of angina. Hence, patient was uptitrated to nitro 100mcg gtt with improvement in angina down to 2-3/10 at this juncture. He also needed Morphine 2mg IV x 1 for pain relief as well. Patient also received IVF hydration 500cc total to help with perfusion. Given the severity of angina with needing nitro gtt at 100mcg rate, patient is now being moved to CCU for further management and care. Palliative is also consulted for further discussion regarding GOC.  74M, Polish speaking, PMHx of CAD s/p PCI and recent minimally invasive direct CABG 10/31/24, ICM HFrEF, HTN, CKD, and PAD s/p prior L to R fem-fem bypass, who presented to [facility] for left leg pain concerning for acute limb ischemia.  Pt was initially admitted to Vascular Surgery, now s/p peripheral stent intervention as well as left fem-pop bypass 11/14. Patient w/ consistent angina and NSTEMI and transferred to cardiology service. Underwent LHC 11/19 with CAD unchanged with no further intervention, presumed NSTEMI 2/2 hypotension/hypoperfusion. Pt still on nitro gtt and IVF to improve hemodynamics. Yesterday 11/21/24 patient was titrated down on nitro gtt 30, however, overnight had worsening chest pain 10/10 not controlled and nitro was slightly uptitrated again. During morning, he was back on nitro 50mcg along with sublingual nitrostat, however, no significant relieve of angina. Hence, patient was uptitrated to nitro 100mcg gtt with improvement in angina down to 2-3/10 at this juncture. He also needed Morphine 2mg IV x 1 for pain relief as well. Patient also received IVF hydration 500cc total to help with perfusion. Given the severity of angina with needing nitro gtt at 100mcg rate, patient is now being moved to CCU for further management and care. Palliative is also consulted for further discussion regarding GOC.     74 Polish speaking M w/ PMH of CAD (s/p PCI, CABG 10/31/24), ICM, HFrEF, HTN, CKD, and PAD (s/p prior L to R fem-fem bypass), presenting from [facility] for concern for acute limb ischemia, found to  74 Polish speaking M w/ PMH of CAD (s/p PCI, CABG 10/31/24), ICM, HFrEF, HTN, CKD, and PAD (s/p prior L to R fem-fem bypass), presenting from [facility] for concern for acute limb ischemia, found to have presumed NSTEMI 2/2 hypotension/hypoperfusion. Transferred to CCU for management of angina w/ nitro gtt.  Assessment: 74 Polish speaking M w/ PMH of CAD (s/p PCI, CABG 10/31/24), ICM, HFrEF, HTN, CKD, and PAD (s/p prior L to R fem-fem bypass), presenting from [facility] for concern for acute limb ischemia, found to have presumed NSTEMI 2/2 hypotension/hypoperfusion. Transferred to CCU for management of angina w/ increased nitro gtt.     NEURO / PSYCH  A/o x4.     CARDIOVASCULAR  #NSTEMI, Type 2  #CAD  S/p Minimally Invasive Direct Coronary Artery Bypass (MIDCAB; 10/31/24)      PULM  #  ***    GI  #  ***      #  ***    ENDO  #  ***    RENAL  #  ***    HEME  #  ***    ID  #  ***    MSK  #  ***    PROPHYLAXIS  F: Replete as needed  E: Replete to K > 4, Mg > 2  N: ---  GI PPX: ---  VTE PPX: ---  Access: ---  Code status: ---   Assessment: 74 Polish speaking M w/ PMH of CAD (s/p PCI, CABG 10/31/24), ICM, HFrEF, HTN, CKD, and PAD (s/p prior L to R fem-fem bypass), presenting from [facility] for concern for acute limb ischemia, found to have presumed NSTEMI 2/2 hypotension/hypoperfusion. Transferred to CCU for management of angina w/ increased nitro gtt.     NEURO / PSYCH  A/o x4.     CARDIOVASCULAR  #NSTEMI, Type 2   #CAD  Pt w/ frequent CP this admission; treated w/ increased Imdur, Tums/Simethicone, and nitro; failed sublingual nitro. Suspecting CP to be 2/2 hypoperfusion/ischemic pain.  Baseline trop 30s-40s, however Trop 447 on 11/19 AM --> 368. Pt txfr to Cards for urgent LHC.   S/p Minimally Invasive Direct Coronary Artery Bypass (MIDCAB; 10/31/24).   EKG 95bpm NSR, Q Waves II-AVF; ST Depressions I, II, AVL, V4-V6.  LDL 56 (10/27/24)    - Per Vascular, pt w/ frequent chest pain this admission, had increased Imdur, also treating w/ Tums/Simethicone; pt baseline trop 30s-40s, however Trop 447 on 11/19 AM --> 368. Pt txfr to Cards for urgent LHC.   - LHC 11/19/24:  LIMA-LAD patent. dLM (50-60%), pLAD (70%, diffuse), competitive flow from LIMA, dLAD mild diffuse;  LCX ostial , known; RCA not engaged, known .  [IC Aditya Kolb/Cici]  ACCESS LRA.  LVEDP 2mmHG.    Plan:  - C/w Triple therapy: Asa 81mg qd, Plavix 75mg Qd, Eliquis 5mg BID for DVT. As d/w Dr Rowe: triple therapy until CP improves (pt on PPI as well)  - Maximizing antianginals: Nitro gtt 100mcg gtt; PO Imdur 120mg qd; Ranexa to 1000mg BID. Wean off nitro gtt as tolerated  - c/w Metoprolol succinate 50mg qd  - c/w Atorvastatin 80mg qhs    #PAD  Chronic h/o PAD, requiring fem-fem bypass 20yrs ago per pt.  - s/p 11/11/24 LLE angiogram with EIA and FELICITY stents  - s/p 11/14/24 LLE fem to AK-pop bypass ; LLE c/b hematoma from Hep GTT 11/19, as evaluated w/  Vascular Sx at bedside - no intervention needed- now improving 11/20 off hep gtt.  L popliteal medial knee site- w/ Kerlix c/d/i.    - CTA b/l LE at OSH 11/8: post fem-fem bypass, severe atherosclerotic disease involving aorta, iliac, and superficial femoral arteries b/l more on the left    Plan:  - Vascular Sx following, appreciate recs  - c/w Plavix 75mg qd and Atorvastatin 80mg qhs  - pain control per Vascular: currently on standing Tylenol 975mg q8h; oxycodone 2.5mg q4h PRN for moderate pain, and oxycodone 5mg q4h PRN for severe pain.   - bowel regimen: senna, Miralax standing; dulcolax prn  - Vasc Sx had planned for DOAC and antiplt monotherapy w/ Plavix on dc     #HFrEF  Warm, on room air  -TTE 10/29/24: LVEF 35% with regional wall abnormalities. Dilated LA. Normal RVSF.   -repeat TTE 11/15/24: Not all myocardial wall segments are visualized. Basal and mid inferolateral walls are akinetic. Overall LVSF appears mild to moderately reduced.  - c/w Toprol 50mg as above  - Diuretic: none. Received IVF 11/20 in addition to Nitro gtt to increase perfusion and vasodilate  - monitor daily weight, strict i/o.    #HTN   SBP range today 89 - 125mmHg  - c/w meds as above.      PULM  Stable on RA.    GI  Bowel regimen: miralax, senna  Last BM: 4d ago per pt      #BPH   - c/w Tamsulosin and finasteride    ENDO  #PreDM  A1c 5.8%    RENAL  #CKD  - Cr elevated on admission, unclear baseline Cr (~1.5 per Vascular), now Cr 1.2 range  - SCr today 1.33  - Monitor Cr and UOP    HEME  #ANEMIA   - Stable, Hgb today 9.1  - s/p 1u PRBC 11/16/24 for Hgb 7.7 due to suspected blood loss @ L Popliteal site per Vascular, now stable  - s/p 1u PRBC 11/19/24 for pre-LHC optimization  - Transfuse >8 given cardiac and renal risk factors  - Keep active type & screen (last on 11/18, next due AM 11/21).    ID  DAYNA    MSK  DAYNA    PROPHYLAXIS  N: DASH/TLC diet, pureed per pt request  E: Replete lytes PRN K<4, Mg<2   DVT PPX: full AC Eliquis  C: FULL CODE   Dispo: CCU for nitro gtt     Assessment: 74 Polish speaking M w/ PMH of CAD (s/p PCI, CABG 10/31/24), ICM, HFrEF, HTN, CKD, and PAD (s/p prior L to R fem-fem bypass), presenting from [facility] for concern for acute limb ischemia, found to have presumed NSTEMI 2/2 hypotension/hypoperfusion. Transferred to CCU for management of angina w/ increased nitro gtt.     NEURO / PSYCH  A/o x4.     CARDIOVASCULAR  #NSTEMI, Type 2   #CAD  Pt w/ frequent CP this admission; treated w/ increased Imdur, Tums/Simethicone, and nitro; failed sublingual nitro. Suspecting CP to be 2/2 hypoperfusion/ischemic pain.  Baseline trop 30s-40s, however Trop 447 on 11/19 AM --> 368. Pt txfr to Cards for urgent LHC.   S/p Minimally Invasive Direct Coronary Artery Bypass (MIDCAB; 10/31/24).   EKG 95bpm NSR, Q Waves II-AVF; ST Depressions I, II, AVL, V4-V6.  LDL 56 (10/27/24)    - Per Vascular, pt w/ frequent chest pain this admission, had increased Imdur, also treating w/ Tums/Simethicone; pt baseline trop 30s-40s, however Trop 447 on 11/19 AM --> 368. Pt txfr to Cards for urgent LHC.   - LHC 11/19/24:  LIMA-LAD patent. dLM (50-60%), pLAD (70%, diffuse), competitive flow from LIMA, dLAD mild diffuse;  LCX ostial , known; RCA not engaged, known .  [IC Aditya Kolb/Cici]  ACCESS LRA.  LVEDP 2mmHG.    Plan:  - C/w Triple therapy: Asa 81mg qd, Plavix 75mg Qd, Eliquis 5mg BID for DVT. As d/w Dr Rowe: triple therapy until CP improves (pt on PPI as well)  - Maximizing antianginals: Nitro gtt 100mcg gtt; PO Imdur 120mg qd; Ranexa to 1000mg BID. Wean off nitro gtt as tolerated  - c/w Metoprolol succinate 50mg qd  - c/w Atorvastatin 80mg qhs    #PAD  Chronic h/o PAD, requiring fem-fem bypass 20yrs ago per pt.  - s/p 11/11/24 LLE angiogram with EIA and FELICITY stents  - s/p 11/14/24 LLE fem to AK-pop bypass ; LLE c/b hematoma from Hep GTT 11/19, as evaluated w/  Vascular Sx at bedside - no intervention needed- now improving 11/20 off hep gtt.  L popliteal medial knee site- w/ Kerlix c/d/i.    - CTA b/l LE at OSH 11/8: post fem-fem bypass, severe atherosclerotic disease involving aorta, iliac, and superficial femoral arteries b/l more on the left    Plan:  - Vascular Sx following, appreciate recs  - c/w Plavix 75mg qd and Atorvastatin 80mg qhs  - pain control per Vascular: currently on standing Tylenol 975mg q8h; oxycodone 2.5mg q4h PRN for moderate pain, and oxycodone 5mg q4h PRN for severe pain.   - bowel regimen: senna, Miralax standing; dulcolax prn  - Vasc Sx had planned for DOAC and antiplt monotherapy w/ Plavix on dc     #HFrEF  Warm, on room air  -TTE 10/29/24: LVEF 35% with regional wall abnormalities. Dilated LA. Normal RVSF.   -TTE 11/15/24: Not all myocardial wall segments are visualized. Basal and mid inferolateral walls are akinetic. Overall LVSF appears mild to moderately reduced    Plan:  - c/w Toprol 50mg as above  - Diuretic: none. Received IVF 11/20 in addition to Nitro gtt to increase perfusion and vasodilate  - monitor daily weight, strict i/o.    #HTN   SBP range today 89 - 125mmHg  - c/w meds as above.      PULM  Stable on RA.    GI  Bowel regimen: miralax & senna standing, ducolax supp PRN  Last BM: 4d ago per pt      #BPH   - c/w Tamsulosin and finasteride    ENDO  #PreDM  A1c 5.8%    RENAL  #CKD  - Cr elevated on admission, unclear baseline Cr (~1.5 per Vascular), now Cr 1.2 range  - SCr today 1.39 (11/22)    Plan:  - Monitor Cr and UOP    HEME  #ANEMIA   - Stable, Hgb today 9.3 (11/22)  - s/p 1u PRBC 11/16/24 for Hgb 7.7 due to suspected blood loss @ L Popliteal site per Vascular, now stable  - s/p 1u PRBC 11/19/24 for pre-LHC optimization    Plan:   - Transfuse >8 given cardiac and renal risk factors  - Keep active type & screen (last on 11/18); ordered for 11/22 AM.    ID  DAYNA    MSK  DAYNA    PROPHYLAXIS  N: DASH/TLC diet, pureed per pt request  E: Replete lytes PRN K<4, Mg<2   DVT PPX: full AC Eliquis  C: FULL CODE   Dispo: CCU for nitro gtt

## 2024-11-22 NOTE — PROGRESS NOTE ADULT - SUBJECTIVE AND OBJECTIVE BOX
Medicine Progress Note    Patient is a 74y old  Male who presents with a chief complaint of CAD    ISCHEMIA LLE     (22 Nov 2024 11:10)      SUBJECTIVE / OVERNIGHT EVENTS:  This morning with acutely worsened chest pain rated 10/10, EKG grossly stable, reports some relief with morphine given. Otherwise no new concerns. Pending transfer to CCU given patient requiring high dose nitroglycerin drip for anginal relief. Denies having any shortness of breath.     MEDICATIONS  (STANDING):  acetaminophen     Tablet .. 975 milliGRAM(s) Oral every 8 hours  apixaban 5 milliGRAM(s) Oral every 12 hours  ascorbic acid 500 milliGRAM(s) Oral daily  aspirin enteric coated 81 milliGRAM(s) Oral daily  atorvastatin 80 milliGRAM(s) Oral at bedtime  clopidogrel Tablet 75 milliGRAM(s) Oral daily  finasteride 5 milliGRAM(s) Oral daily  influenza  Vaccine (HIGH DOSE) 0.5 milliLiter(s) IntraMuscular once  isosorbide   mononitrate ER Tablet (IMDUR) 120 milliGRAM(s) Oral daily  metoprolol succinate ER 50 milliGRAM(s) Oral daily  multivitamin 1 Tablet(s) Oral daily  nitroglycerin  Infusion 100 MICROgram(s)/Min (30 mL/Hr) IV Continuous <Continuous>  pantoprazole    Tablet 40 milliGRAM(s) Oral daily  polyethylene glycol 3350 17 Gram(s) Oral daily  ranolazine 1000 milliGRAM(s) Oral two times a day  senna 2 Tablet(s) Oral at bedtime  sodium chloride 0.9%. 250 milliLiter(s) (125 mL/Hr) IV Continuous <Continuous>  tamsulosin 0.4 milliGRAM(s) Oral at bedtime    MEDICATIONS  (PRN):  bisacodyl Suppository 10 milliGRAM(s) Rectal daily PRN Constipation  calcium carbonate    500 mG (Tums) Chewable 1 Tablet(s) Chew three times a day PRN Heartburn  oxyCODONE    IR 2.5 milliGRAM(s) Oral every 4 hours PRN Moderate Pain (4 - 6)  oxyCODONE    IR 5 milliGRAM(s) Oral every 4 hours PRN Severe Pain (7 - 10)  simethicone 80 milliGRAM(s) Chew two times a day PRN Heartburn    CAPILLARY BLOOD GLUCOSE        I&O's Summary    21 Nov 2024 07:01  -  22 Nov 2024 07:00  --------------------------------------------------------  IN: 1017 mL / OUT: 975 mL / NET: 42 mL    22 Nov 2024 07:01  -  22 Nov 2024 14:20  --------------------------------------------------------  IN: 240 mL / OUT: 250 mL / NET: -10 mL        PHYSICAL EXAM:  Vital Signs Last 24 Hrs  T(C): 36.3 (22 Nov 2024 08:46), Max: 36.9 (21 Nov 2024 17:00)  T(F): 97.4 (22 Nov 2024 08:46), Max: 98.5 (21 Nov 2024 17:00)  HR: 74 (22 Nov 2024 12:51) (71 - 79)  BP: 107/51 (22 Nov 2024 12:51) (95/53 - 121/60)  BP(mean): 74 (22 Nov 2024 12:51) (70 - 85)  RR: 16 (22 Nov 2024 12:51) (16 - 18)  SpO2: 95% (22 Nov 2024 12:51) (95% - 100%)    Parameters below as of 22 Nov 2024 12:51  Patient On (Oxygen Delivery Method): room air      Appears uncomfortable due to pain   MMM  Normal WOB on RA, CTAB   RRR, no mrg   Abdomen soft, nontender, nondistended  Extremities warm and without edema, LLE with dried superficial scabs, some hyperpigmentation in bilateral lower extremities   AOX3, no focal neuro deficits     LABS:                        9.3    5.63  )-----------( 173      ( 22 Nov 2024 05:30 )             28.1     11-22    133[L]  |  103  |  14  ----------------------------<  112[H]  4.4   |  22  |  1.39[H]    Ca    8.5      22 Nov 2024 05:30  Mg     1.7     11-22    TPro  6.1  /  Alb  3.1[L]  /  TBili  0.6  /  DBili  x   /  AST  12  /  ALT  7[L]  /  AlkPhos  55  11-22          Urinalysis Basic - ( 22 Nov 2024 05:30 )    Color: x / Appearance: x / SG: x / pH: x  Gluc: 112 mg/dL / Ketone: x  / Bili: x / Urobili: x   Blood: x / Protein: x / Nitrite: x   Leuk Esterase: x / RBC: x / WBC x   Sq Epi: x / Non Sq Epi: x / Bacteria: x        COVID-19 PCR: NotDetec (07 Nov 2024 14:11)      RADIOLOGY & ADDITIONAL TESTS:  Imaging from Last 24 Hours:    Repeat EKG 11/22 AM   Appears similar to priors, some TWI in I, aVL, lateral leads.

## 2024-11-22 NOTE — DIETITIAN INITIAL EVALUATION ADULT - PERTINENT LABORATORY DATA
11-22    133[L]  |  103  |  14  ----------------------------<  112[H]  4.4   |  22  |  1.39[H]    Ca    8.5      22 Nov 2024 05:30  Mg     1.7     11-22    TPro  6.1  /  Alb  3.1[L]  /  TBili  0.6  /  DBili  x   /  AST  12  /  ALT  7[L]  /  AlkPhos  55  11-22  A1C with Estimated Average Glucose Result: 5.8 % (10-27-24 @ 14:07)

## 2024-11-22 NOTE — DIETITIAN INITIAL EVALUATION ADULT - OTHER CALCULATIONS
5'0''  pounds +-10%; %YNI=194.5  IBW used to calculate energy needs due to pt's current body weight exceeding 120% of IBW  Adjusted for age, HF hx, Malnutrition, S/p OR, NSTEM  Fluids per team

## 2024-11-22 NOTE — CONSULT NOTE ADULT - PROBLEM SELECTOR RECOMMENDATION 2
.  No indication for emergent RRT  - continue care per primary team  - comorbidity supports limited long term prognosis  - If RRT indicated for end stage CKD and patient/family declined RRT and/or it was not offered given risks/burdens, then patient would qualify for hospice

## 2024-11-22 NOTE — DIETITIAN INITIAL EVALUATION ADULT - ADD RECOMMEND
1. Recommend SLP Eval.  - NPO prior. However if PO diet remains, pt asking for puree diet.  - With passing results, recommend low sodium diet, ensure MAX x1/day 150kcal/30gm prot, with PO consistency per SLP Recs.   2. Monitor Skin.  - MVI daily, Vit C 500mg/day.  3. Pain/GI per team; Optimize regime PRN. Monitor Labs, Wt, GOC.  4. RD to remain available for additional nutrition interventions as needed.   - High Nutrition Risk.

## 2024-11-22 NOTE — PROGRESS NOTE ADULT - ASSESSMENT
73yo M, with PMHx of HTN, HLD, BPH, CAD s/p CABG 10/31/24, severe PAD (s/p fem-fem) who presented to [facility] for left leg pain concerning for acute limb ischemia and transferred to [facility], now on vascular service s/p angiogram with fem-fem bypass, L common and external iliac covered stenting and perclose on 11/11. Course was complicated by acutely worsened chest pain on the evening of 11/19 with acute rise in troponins from the 40s to the 3-400s, urgently transferred to the cardiology service and underwent LHC with results as above. Now again with acutely worsened chest pain on the morning of 11/22, pending transfer to the CCU for high dose nitroglycerin drip for anti-anginal therapy. Medicine is consulted for comanagement.     #NSTEMI type 2   #CAD s/p PCI and minimally invasive direct CABG 10/31/24  #Chest pain   #HTN/HLD   -s/p LHC on 11/19 with results as above   -Will continue DAPT:  ASA and Plavix   -Will continue Imdur 120 mg PO QD for anti-anginal therapy, hold for SBP <90  -Nitroglycerin drip increased to 100 mg/hr, pending transfer to CCU    -Continue ranolazine 1000 mg BID   -Continue metoprolol succinate 50 mg daily   -Eliquis restarted for DVT (prior was on heparin gtt)   -Primary team giving small IVF bolus 250 cc to help with coronary perfusion     #PAD  #s/p LLE angiogram, fem-fem bypass, L common and external iliac covered stenting and perclose on 11/11  -Continue pain control and bowel regimen per vascular team   -Will continue Plavix and ASA, Eliquis re-introduced as well for DVT     #LLE DVT   -Continue eliquis as above     #Acute blood loss anemia   -Pt with Hgb improved to the 9s this morning   -Pt s/p transfusion of 1 Unit of PRBCs on 11/16 for Hgb 7.7 with appropriate response   -Will continue to trend, transfuse for hemoglobin of 8 or higher given pt's cardiac hx     #FELIPA  -Will continue to monitor creatinine, may be increased as a result of contrast from LHC  -Trial small IVF bolus today   -Avoid nephrotoxins as able, renally dose all medications     #Mild hyponatremia  -Etiology may be poor PO intake vs SIADH from pain?  -Continue to trend, recheck after IV fluid bolus today    #RUL Pulmonary nodule - PET +  -Pt to  follow up outpatient with  Dr. Duarte     #BPH  - Continue flomax and proscar    #Prediabetes  -HgbAc 5.8   -Continue consistent carb diet  -Will continue to monitor FS

## 2024-11-22 NOTE — CONSULT NOTE ADULT - PROBLEM SELECTOR RECOMMENDATION 3
.  hospital couse cb NSTEMI type 2. pt w escalating angina despite aggressive medical mgmt -- NTG gtt, MS IV. Functional status declining given hospitalization and escalating symptoms  - concern for potentially poor trajectory  - need for NTG gtt, MS IV raises concerns about balance between symptom control and quality of life  - poor candidate for advanced therapies  - cw care per primary team

## 2024-11-22 NOTE — CONSULT NOTE ADULT - CONSULT REASON
CLI  Pre op cardiac risk stratification
Palliative consulted to discuss goals of care and treatment preferences in light of current health status.
comanagement
LE wounds

## 2024-11-22 NOTE — PROGRESS NOTE ADULT - PROBLEM SELECTOR PLAN 2
Vascular Sx following, appreciate recs  - s/p 11/11/24 LLE angiogram with EIA and FELCIITY stents  - s/p 11/14/24 LLE fem to AK-pop bypass ; LLE c/b hematoma from Hep GTT 11/19, as evaluated w/  Vascular Sx at bedside - no intervention needed- now improving 11/20 off hep gtt.  L popliteal medial knee site- w/ Kerlix c/d/i.    - CTA b/l LE at OSH 11/8: post fem-fem bypass, severe atherosclerotic disease involving aorta, iliac, and superficial femoral arteries b/l more on the left  - c/w Plavix 75mg qd and Atorvastatin 80mg qhs  - pain control per Vascular: currently on standing Tylenol 975mg q8h; PRN q6h Oxy 5mg Moderate pain; Oxy 10mg PRN q6h Severe pain; Dilaudid IV 0.5mg severe breakthrough PRN q4h.   - bowel regimen: senna, Miralax standing; dulcolax prn  - Vasc Sx had planned for DOAC and antiplt monotherapy w/ Plavix on dc     #LLE DVT  - c/w AC : Resume Eliquis 5mg BID 11/20 AM.  (s.p Hep GTT for r/o ACS) Vascular Sx following, appreciate recs  - s/p 11/11/24 LLE angiogram with EIA and FELICITY stents  - s/p 11/14/24 LLE fem to AK-pop bypass ; LLE c/b hematoma from Hep GTT 11/19, as evaluated w/  Vascular Sx at bedside - no intervention needed- now improving 11/20 off hep gtt.  L popliteal medial knee site- w/ Kerlix c/d/i.    - CTA b/l LE at OSH 11/8: post fem-fem bypass, severe atherosclerotic disease involving aorta, iliac, and superficial femoral arteries b/l more on the left  - c/w Plavix 75mg qd and Atorvastatin 80mg qhs  - pain control per Vascular: currently on standing Tylenol 975mg q8h; oxycodone 2.5mg q4h PRN for moderate pain, and oxycodone 5mg q4h PRN for severe pain.   - bowel regimen: senna, Miralax standing; dulcolax prn  - Vasc Sx had planned for DOAC and antiplt monotherapy w/ Plavix on dc     #LLE DVT  - c/w AC : Resume Eliquis 5mg BID 11/20 AM.  (s.p Hep GTT for r/o ACS)

## 2024-11-22 NOTE — CONSULT NOTE ADULT - REASON FOR ADMISSION
transfer for rule out limb ischemia

## 2024-11-22 NOTE — DIETITIAN NUTRITION RISK NOTIFICATION - TREATMENT: THE FOLLOWING DIET HAS BEEN RECOMMENDED
** Malnutrition **  - PO intake: Assume meeting<75% EER consistently during admit, (~12 days).  - Wasting/loss: noted to orbital region (MILD).  - Wt loss: 10/2024 RD note admit wt 152 pounds + Current Body wt of 132 pounds, suggests that pt is down ~20 pounds / 13% body wt loss x11 months. Pt does remain with edema which is likely masking further wt loss.     Please see RD note 11/22 for Recommendations (Provided to team) and additional details.

## 2024-11-22 NOTE — CONSULT NOTE ADULT - PROBLEM SELECTOR RECOMMENDATION 5
.  Will continue to follow for ongoing GOC discussion / titration of palliative regimen. Emotional support provided, questions answered.    Coping:  well[  ] with difficulty[  ] poor coping[  ] unable to assess[  ]   Social support: strong[  ] adequate[  ] inadequate[x  ]  prison support system at home: strong[  ] adequate[  ] inadequate[ x ]    Active Psychosocial Referrals:  SW/CM[  ] PT/OT[  ] Hospice[  ]  Ethics[  ]  ELLI[facility] Claudio Patient/Family Support[  ] Chaplaincy[   ]    Massage Therapy[  ] Music Therapy [x  ] Holistic RN[  ]   For new or uncontrolled symptoms, please call Palliative Care at [phone] ([phone]). The service is available 24/7 (including nights & weekends) to provide symptom management recommendations over the phone as appropriate.

## 2024-11-22 NOTE — PROGRESS NOTE ADULT - SUBJECTIVE AND OBJECTIVE BOX
***TRANSFER FROM [facility] TO CCU***    HOSPITAL COURSE:  74M, Polish speaking, PMHx of CAD s/p PCI and recent minimally invasive direct CABG 10/31/24, ICM HFrEF, HTN, CKD, and PAD s/p prior L to R fem-fem bypass, who presented to [facility] for left leg pain concerning for acute limb ischemia.  Pt was initially admitted to Vascular Surgery, now s/p peripheral stent intervention as well as left fem-pop bypass 11/14. Patient w/ consistent angina and NSTEMI and transferred to cardiology service. Underwent LHC 11/19 with CAD unchanged with no further intervention, presumed NSTEMI 2/2 hypotension/hypoperfusion. Pt still on nitro gtt and IVF to improve hemodynamics. Yesterday 11/21/24 patient was titrated down on nitro gtt 30, however, overnight had worsening chest pain 10/10 not controlled and nitro was slightly uptitrated again. During morning, he was back on nitro 50mcg along with sublingual nitrostat, however, no significant relieve of angina. Hence, patient was uptitrated to nitro 100mcg gtt with improvement in angina down to 2-3/10 at this juncture. He also needed Morphine 2mg IV x 1 for pain relief as well. Patient also received IVF hydration 500cc total to help with perfusion. Given the severity of angina with needing nitro gtt at 100mcg rate, patient is now being moved to CCU for further management and care. Palliative is also consulted for further discussion regarding GOC.       angina, STEMI  Cath (11/19): CAD unchanged; no need for intervention  Nitro 50>30>50>100  No relief w/ sublingual nitro  IVF  eliquis, asa, plavix  oxycodone PRN ***TRANSFER FROM [facility] TO CCU***    HOSPITAL COURSE:  74M, Polish speaking, PMHx of CAD s/p PCI and recent minimally invasive direct CABG 10/31/24, ICM, HFrEF (EF, HTN, CKD, and PAD s/p prior L to R fem-fem bypass (>20yrs ago), who presented to [facility] for left leg pain concerning for acute limb ischemia.  Pt was initially admitted to Vascular Surgery, now s/p peripheral stent intervention as well as left fem-pop bypass 11/14. Patient w/ consistent angina and NSTEMI and transferred to cardiology service. Underwent LHC 11/19 with CAD unchanged with no further intervention, presumed NSTEMI 2/2 hypotension/hypoperfusion. Pt still on nitro gtt and IVF to improve hemodynamics. Yesterday 11/21/24 patient was titrated down on nitro gtt 30, however, overnight had worsening chest pain 10/10 not controlled and nitro was slightly uptitrated again. During morning, he was back on nitro 50mcg along with sublingual nitrostat, however, no significant relieve of angina. Hence, patient was uptitrated to nitro 100mcg gtt with improvement in angina down to 2-3/10 at this juncture. He also needed Morphine 2mg IV x 1 for pain relief as well. Patient also received IVF hydration 500cc total to help with perfusion. Given the severity of angina with needing nitro gtt at 100mcg rate, patient is now being moved to CCU for further management and care. Palliative is also consulted for further discussion regarding GOC.       angina, STEMI  Cath (11/19): CAD unchanged; no need for intervention  Nitro 50>30>50>100  No relief w/ sublingual nitro  IVF  eliquis, asa, plavix  oxycodone PRN ***TRANSFER FROM [facility] TO CCU***    HOSPITAL COURSE:  74M, Polish speaking, PMHx of CAD s/p PCI and recent minimally invasive direct CABG 10/31/24, ICM, HFrEF (EF, HTN, CKD, and PAD s/p prior L to R fem-fem bypass (>20yrs ago), who presented to [facility] for left leg pain concerning for acute limb ischemia.  Pt was initially admitted to Vascular Surgery, now s/p peripheral stent intervention as well as left fem-pop bypass 11/14. Patient w/ consistent angina and NSTEMI and transferred to cardiology service. Underwent LHC 11/19 with CAD unchanged with no further intervention, presumed NSTEMI 2/2 hypotension/hypoperfusion. Pt was placed on Eliquis, ASA, Plavix. Pt still on nitro gtt and IVF to improve hemodynamics. Yesterday 11/21/24 patient was titrated down on nitro gtt 30, however, overnight had worsening chest pain 10/10 not controlled and nitro was slightly uptitrated again. During morning, he was back on nitro 50mcg along with sublingual nitrostat, however, no significant relieve of angina. Hence, patient was uptitrated to nitro 100mcg gtt with improvement in angina down to 2-3/10 at this juncture. He also needed Morphine 2mg IV x 1 for pain relief as well. Patient also received IVF hydration 500cc total to help with perfusion. Given the severity of angina with needing nitro gtt at 100mcg rate, patient is now being moved to CCU for further management and care. Palliative is also consulted for further discussion regarding GOC.       INTERVAL HPI:  Pt arrived on unit and examined at bedside. Pt states his CP has been significantly improved to 1-2 out of 10.       ICU Vital Signs Last 24 Hrs  T(C): 36.7 (22 Nov 2024 16:00), Max: 36.9 (21 Nov 2024 17:00)  T(F): 98.1 (22 Nov 2024 16:00), Max: 98.5 (21 Nov 2024 17:00)  HR: 78 (22 Nov 2024 16:00) (71 - 79)  BP: 93/50 (22 Nov 2024 16:00) (93/50 - 121/60)  BP(mean): 65 (22 Nov 2024 16:00) (65 - 85)  ABP: --  ABP(mean): --  RR: 16 (22 Nov 2024 16:00) (16 - 18)  SpO2: 99% (22 Nov 2024 16:00) (95% - 100%)    O2 Parameters below as of 22 Nov 2024 15:50  Patient On (Oxygen Delivery Method): room air          I&O's Summary    21 Nov 2024 07:01  -  22 Nov 2024 07:00  --------------------------------------------------------  IN: 1017 mL / OUT: 975 mL / NET: 42 mL    22 Nov 2024 07:01  -  22 Nov 2024 16:56  --------------------------------------------------------  IN: 660 mL / OUT: 850 mL / NET: -190 mL          LABS:                        9.3    5.63  )-----------( 173      ( 22 Nov 2024 05:30 )             28.1     11-22    133[L]  |  103  |  14  ----------------------------<  112[H]  4.4   |  22  |  1.39[H]    Ca    8.5      22 Nov 2024 05:30  Mg     1.7     11-22    TPro  6.1  /  Alb  3.1[L]  /  TBili  0.6  /  DBili  x   /  AST  12  /  ALT  7[L]  /  AlkPhos  55  11-22      Urinalysis Basic - ( 22 Nov 2024 05:30 )    Color: x / Appearance: x / SG: x / pH: x  Gluc: 112 mg/dL / Ketone: x  / Bili: x / Urobili: x   Blood: x / Protein: x / Nitrite: x   Leuk Esterase: x / RBC: x / WBC x   Sq Epi: x / Non Sq Epi: x / Bacteria: x      CAPILLARY BLOOD GLUCOSE            RADIOLOGY & ADDITIONAL TESTS:    Consultant(s) Notes Reviewed:  [x ] YES  [ ] NO    MEDICATIONS  (STANDING):  acetaminophen     Tablet .. 975 milliGRAM(s) Oral every 8 hours  apixaban 5 milliGRAM(s) Oral every 12 hours  ascorbic acid 500 milliGRAM(s) Oral daily  aspirin enteric coated 81 milliGRAM(s) Oral daily  atorvastatin 80 milliGRAM(s) Oral at bedtime  clopidogrel Tablet 75 milliGRAM(s) Oral daily  finasteride 5 milliGRAM(s) Oral daily  influenza  Vaccine (HIGH DOSE) 0.5 milliLiter(s) IntraMuscular once  isosorbide   mononitrate ER Tablet (IMDUR) 120 milliGRAM(s) Oral daily  metoprolol succinate ER 50 milliGRAM(s) Oral daily  multivitamin 1 Tablet(s) Oral daily  nitroglycerin  Infusion 100 MICROgram(s)/Min (30 mL/Hr) IV Continuous <Continuous>  pantoprazole    Tablet 40 milliGRAM(s) Oral daily  polyethylene glycol 3350 17 Gram(s) Oral daily  ranolazine 1000 milliGRAM(s) Oral two times a day  senna 2 Tablet(s) Oral at bedtime  sodium chloride 0.9%. 250 milliLiter(s) (125 mL/Hr) IV Continuous <Continuous>  tamsulosin 0.4 milliGRAM(s) Oral at bedtime    MEDICATIONS  (PRN):  bisacodyl Suppository 10 milliGRAM(s) Rectal daily PRN Constipation  calcium carbonate    500 mG (Tums) Chewable 1 Tablet(s) Chew three times a day PRN Heartburn  oxyCODONE    IR 2.5 milliGRAM(s) Oral every 4 hours PRN Moderate Pain (4 - 6)  oxyCODONE    IR 5 milliGRAM(s) Oral every 4 hours PRN Severe Pain (7 - 10)  simethicone 80 milliGRAM(s) Chew two times a day PRN Heartburn      PHYSICAL EXAM:  GENERAL:   HEAD:  Atraumatic, Normocephalic  EYES: EOMI, PERRLA, conjunctiva and sclera clear  NECK: Supple, No JVD, Normal thyroid, no enlarged nodes  NERVOUS SYSTEM:  Alert & Awake.   CHEST/LUNG: B/L good air entry; No rales, rhonchi, or wheezing  HEART: S1S2 normal, no S3, Regular rate and rhythm; No murmurs  ABDOMEN: Soft, Nontender, Nondistended; Bowel sounds present  EXTREMITIES:  2+ Peripheral Pulses, No clubbing, cyanosis, or edema  LYMPH: No lymphadenopathy noted  SKIN: No rashes or lesions    Care Discussed with Consultants/Other Providers [ x] YES  [ ] NO ***TRANSFER FROM [facility] TO CCU***    HOSPITAL COURSE:  74M, Polish speaking, PMHx of CAD s/p PCI and recent minimally invasive direct CABG 10/31/24, ICM, HFrEF (EF, HTN, CKD, and PAD s/p prior L to R fem-fem bypass (>20yrs ago), who presented to [facility] for left leg pain concerning for acute limb ischemia.  Pt was initially admitted to Vascular Surgery, now s/p peripheral stent intervention as well as left fem-pop bypass 11/14. Patient w/ consistent angina and NSTEMI and transferred to cardiology service. Underwent LHC 11/19 with CAD unchanged with no further intervention, presumed NSTEMI 2/2 hypotension/hypoperfusion. Pt was placed on Eliquis, ASA, Plavix. Pt still on nitro gtt and IVF to improve hemodynamics. Yesterday 11/21/24 patient was titrated down on nitro gtt 30, however, overnight had worsening chest pain 10/10 not controlled and nitro was slightly uptitrated again. During morning, he was back on nitro 50mcg along with sublingual nitrostat, however, no significant relieve of angina. Hence, patient was uptitrated to nitro 100mcg gtt with improvement in angina down to 2-3/10 at this juncture. He also needed Morphine 2mg IV x 1 for pain relief as well. Patient also received IVF hydration 500cc total to help with perfusion. Given the severity of angina with needing nitro gtt at 100mcg rate, patient is now being moved to CCU for further management and care. Palliative is also consulted for further discussion regarding GOC.       INTERVAL HPI:  Pt arrived on unit and examined at bedside. Pt states his CP has been significantly improved to 1-2 out of 10 w/ nitro 100mcg gtt. Pt unable to describe the pain in quality but states it worsens w/ exertion and palpation. W/ palpation, the pain increases from 2 to 4. Reports endorsing SOB when CP is worse. Denies palpitation, abd pain, N/V. Pt has not complaint at this time.       ICU Vital Signs Last 24 Hrs  T(C): 36.7 (22 Nov 2024 16:00), Max: 36.9 (21 Nov 2024 17:00)  T(F): 98.1 (22 Nov 2024 16:00), Max: 98.5 (21 Nov 2024 17:00)  HR: 78 (22 Nov 2024 16:00) (71 - 79)  BP: 93/50 (22 Nov 2024 16:00) (93/50 - 121/60)  BP(mean): 65 (22 Nov 2024 16:00) (65 - 85)  ABP: --  ABP(mean): --  RR: 16 (22 Nov 2024 16:00) (16 - 18)  SpO2: 99% (22 Nov 2024 16:00) (95% - 100%)    O2 Parameters below as of 22 Nov 2024 15:50  Patient On (Oxygen Delivery Method): room air          I&O's Summary    21 Nov 2024 07:01  -  22 Nov 2024 07:00  --------------------------------------------------------  IN: 1017 mL / OUT: 975 mL / NET: 42 mL    22 Nov 2024 07:01  -  22 Nov 2024 16:56  --------------------------------------------------------  IN: 660 mL / OUT: 850 mL / NET: -190 mL          LABS:                        9.3    5.63  )-----------( 173      ( 22 Nov 2024 05:30 )             28.1     11-22    133[L]  |  103  |  14  ----------------------------<  112[H]  4.4   |  22  |  1.39[H]    Ca    8.5      22 Nov 2024 05:30  Mg     1.7     11-22    TPro  6.1  /  Alb  3.1[L]  /  TBili  0.6  /  DBili  x   /  AST  12  /  ALT  7[L]  /  AlkPhos  55  11-22      Urinalysis Basic - ( 22 Nov 2024 05:30 )    Color: x / Appearance: x / SG: x / pH: x  Gluc: 112 mg/dL / Ketone: x  / Bili: x / Urobili: x   Blood: x / Protein: x / Nitrite: x   Leuk Esterase: x / RBC: x / WBC x   Sq Epi: x / Non Sq Epi: x / Bacteria: x      CAPILLARY BLOOD GLUCOSE            RADIOLOGY & ADDITIONAL TESTS:    Consultant(s) Notes Reviewed:  [x ] YES  [ ] NO    MEDICATIONS  (STANDING):  acetaminophen     Tablet .. 975 milliGRAM(s) Oral every 8 hours  apixaban 5 milliGRAM(s) Oral every 12 hours  ascorbic acid 500 milliGRAM(s) Oral daily  aspirin enteric coated 81 milliGRAM(s) Oral daily  atorvastatin 80 milliGRAM(s) Oral at bedtime  clopidogrel Tablet 75 milliGRAM(s) Oral daily  finasteride 5 milliGRAM(s) Oral daily  influenza  Vaccine (HIGH DOSE) 0.5 milliLiter(s) IntraMuscular once  isosorbide   mononitrate ER Tablet (IMDUR) 120 milliGRAM(s) Oral daily  metoprolol succinate ER 50 milliGRAM(s) Oral daily  multivitamin 1 Tablet(s) Oral daily  nitroglycerin  Infusion 100 MICROgram(s)/Min (30 mL/Hr) IV Continuous <Continuous>  pantoprazole    Tablet 40 milliGRAM(s) Oral daily  polyethylene glycol 3350 17 Gram(s) Oral daily  ranolazine 1000 milliGRAM(s) Oral two times a day  senna 2 Tablet(s) Oral at bedtime  sodium chloride 0.9%. 250 milliLiter(s) (125 mL/Hr) IV Continuous <Continuous>  tamsulosin 0.4 milliGRAM(s) Oral at bedtime    MEDICATIONS  (PRN):  bisacodyl Suppository 10 milliGRAM(s) Rectal daily PRN Constipation  calcium carbonate    500 mG (Tums) Chewable 1 Tablet(s) Chew three times a day PRN Heartburn  oxyCODONE    IR 2.5 milliGRAM(s) Oral every 4 hours PRN Moderate Pain (4 - 6)  oxyCODONE    IR 5 milliGRAM(s) Oral every 4 hours PRN Severe Pain (7 - 10)  simethicone 80 milliGRAM(s) Chew two times a day PRN Heartburn      PHYSICAL EXAM:  GENERAL:   HEAD:  Atraumatic, Normocephalic  EYES: EOMI, PERRLA, conjunctiva and sclera clear  NECK: Supple, No JVD   NERVOUS SYSTEM:  Alert & Awake.   CHEST/LUNG: B/L good air entry; No rales, rhonchi, or wheezing  HEART: S1S2 normal, no S3, Regular rate and rhythm; No murmurs  ABDOMEN: Soft, Nontender, mildly distended; Bowel sounds present  EXTREMITIES:  2+ Peripheral Pulses, No clubbing, cyanosis. LLE edema present below the knee.   SKIN: No rashes or lesions    Care Discussed with Consultants/Other Providers [ x] YES  [ ] NO ***TRANSFER FROM [facility] TO CCU***    HOSPITAL COURSE:  74M, Polish speaking, PMHx of CAD s/p PCI and recent minimally invasive direct CABG 10/31/24, ICM, HFrEF, HTN, CKD, and PAD s/p prior L to R fem-fem bypass (>20yrs ago), who presented to [facility] for left leg pain concerning for acute limb ischemia.  Pt was initially admitted to Vascular Surgery, now s/p peripheral stent intervention as well as left fem-pop bypass 11/14. Patient w/ consistent angina and NSTEMI and transferred to cardiology service. Underwent LHC 11/19 with CAD unchanged with no further intervention, presumed NSTEMI 2/2 hypotension/hypoperfusion. Pt was placed on Eliquis, ASA, Plavix. Pt still on nitro gtt and IVF to improve hemodynamics. Yesterday 11/21/24 patient was titrated down on nitro gtt 30, however, overnight had worsening chest pain 10/10 not controlled and nitro was slightly uptitrated again. During morning, he was back on nitro 50mcg along with sublingual nitrostat, however, no significant relieve of angina. Hence, patient was uptitrated to nitro 100mcg gtt with improvement in angina down to 2-3/10 at this juncture. He also needed Morphine 2mg IV x 1 for pain relief as well. Patient also received IVF hydration 500cc total to help with perfusion. Given the severity of angina with needing nitro gtt at 100mcg rate, patient is now being moved to CCU for further management and care. Palliative is also consulted for further discussion regarding GOC.       INTERVAL HPI:  Pt arrived on unit and examined at bedside. Pt states his CP has been significantly improved to 1-2 out of 10 w/ nitro 100mcg gtt. Pt unable to describe the pain in quality but states it worsens w/ exertion and palpation. W/ palpation, the pain increases from 2 to 4. Reports endorsing SOB when CP is worse. Denies palpitation, abd pain, N/V. Pt has not complaint at this time.       ICU Vital Signs Last 24 Hrs  T(C): 36.7 (22 Nov 2024 16:00), Max: 36.9 (21 Nov 2024 17:00)  T(F): 98.1 (22 Nov 2024 16:00), Max: 98.5 (21 Nov 2024 17:00)  HR: 78 (22 Nov 2024 16:00) (71 - 79)  BP: 93/50 (22 Nov 2024 16:00) (93/50 - 121/60)  BP(mean): 65 (22 Nov 2024 16:00) (65 - 85)  ABP: --  ABP(mean): --  RR: 16 (22 Nov 2024 16:00) (16 - 18)  SpO2: 99% (22 Nov 2024 16:00) (95% - 100%)    O2 Parameters below as of 22 Nov 2024 15:50  Patient On (Oxygen Delivery Method): room air          I&O's Summary    21 Nov 2024 07:01  -  22 Nov 2024 07:00  --------------------------------------------------------  IN: 1017 mL / OUT: 975 mL / NET: 42 mL    22 Nov 2024 07:01  -  22 Nov 2024 16:56  --------------------------------------------------------  IN: 660 mL / OUT: 850 mL / NET: -190 mL          LABS:                        9.3    5.63  )-----------( 173      ( 22 Nov 2024 05:30 )             28.1     11-22    133[L]  |  103  |  14  ----------------------------<  112[H]  4.4   |  22  |  1.39[H]    Ca    8.5      22 Nov 2024 05:30  Mg     1.7     11-22    TPro  6.1  /  Alb  3.1[L]  /  TBili  0.6  /  DBili  x   /  AST  12  /  ALT  7[L]  /  AlkPhos  55  11-22      Urinalysis Basic - ( 22 Nov 2024 05:30 )    Color: x / Appearance: x / SG: x / pH: x  Gluc: 112 mg/dL / Ketone: x  / Bili: x / Urobili: x   Blood: x / Protein: x / Nitrite: x   Leuk Esterase: x / RBC: x / WBC x   Sq Epi: x / Non Sq Epi: x / Bacteria: x      CAPILLARY BLOOD GLUCOSE            RADIOLOGY & ADDITIONAL TESTS:    Consultant(s) Notes Reviewed:  [x ] YES  [ ] NO    MEDICATIONS  (STANDING):  acetaminophen     Tablet .. 975 milliGRAM(s) Oral every 8 hours  apixaban 5 milliGRAM(s) Oral every 12 hours  ascorbic acid 500 milliGRAM(s) Oral daily  aspirin enteric coated 81 milliGRAM(s) Oral daily  atorvastatin 80 milliGRAM(s) Oral at bedtime  clopidogrel Tablet 75 milliGRAM(s) Oral daily  finasteride 5 milliGRAM(s) Oral daily  influenza  Vaccine (HIGH DOSE) 0.5 milliLiter(s) IntraMuscular once  isosorbide   mononitrate ER Tablet (IMDUR) 120 milliGRAM(s) Oral daily  metoprolol succinate ER 50 milliGRAM(s) Oral daily  multivitamin 1 Tablet(s) Oral daily  nitroglycerin  Infusion 100 MICROgram(s)/Min (30 mL/Hr) IV Continuous <Continuous>  pantoprazole    Tablet 40 milliGRAM(s) Oral daily  polyethylene glycol 3350 17 Gram(s) Oral daily  ranolazine 1000 milliGRAM(s) Oral two times a day  senna 2 Tablet(s) Oral at bedtime  sodium chloride 0.9%. 250 milliLiter(s) (125 mL/Hr) IV Continuous <Continuous>  tamsulosin 0.4 milliGRAM(s) Oral at bedtime    MEDICATIONS  (PRN):  bisacodyl Suppository 10 milliGRAM(s) Rectal daily PRN Constipation  calcium carbonate    500 mG (Tums) Chewable 1 Tablet(s) Chew three times a day PRN Heartburn  oxyCODONE    IR 2.5 milliGRAM(s) Oral every 4 hours PRN Moderate Pain (4 - 6)  oxyCODONE    IR 5 milliGRAM(s) Oral every 4 hours PRN Severe Pain (7 - 10)  simethicone 80 milliGRAM(s) Chew two times a day PRN Heartburn      PHYSICAL EXAM:  GENERAL:   HEAD:  Atraumatic, Normocephalic  EYES: EOMI, PERRLA, conjunctiva and sclera clear  NECK: Supple, No JVD   NERVOUS SYSTEM:  Alert & Awake.   CHEST/LUNG: B/L good air entry; No rales, rhonchi, or wheezing  HEART: S1S2 normal, no S3, Regular rate and rhythm; No murmurs  ABDOMEN: Soft, Nontender, mildly distended; Bowel sounds present  EXTREMITIES:  2+ Peripheral Pulses, No clubbing, cyanosis. LLE edema present below the knee.   SKIN: No rashes or lesions    Care Discussed with Consultants/Other Providers [ x] YES  [ ] NO

## 2024-11-22 NOTE — PROGRESS NOTE ADULT - PROBLEM SELECTOR PLAN 3
- Cr elevated on admission, unclear baseline Cr (~1.5 per Vascular), now Cr 1.2 range  - SCr today 1.33  - Monitor Cr and UOP      #ANEMIA   - Stable, Hgb today 9.1  - s/p 1u PRBC 11/16/24 for Hgb 7.7 due to suspected blood loss @ L Popliteal site per Vascular, now stable  - s/p 1u PRBC 11/19/24 for pre-LHC optimization  - Transfuse >8 given cardiac and renal risk factors  - Keep active type & screen (last on 11/18, next due AM 11/21)

## 2024-11-22 NOTE — PROGRESS NOTE ADULT - PROBLEM SELECTOR PLAN 5
SBP range today 89 - 125mmHg  - c/w meds as above Warm, on room air  -TTE 10/29/24: LVEF 35% with regional wall abnormalities. Dilated LA. Normal RVSF.   -repeat TTE 11/15/24: Not all myocardial wall segments are visualized. Basal and mid inferolateral walls are akinetic. Overall LVSF appears mild to moderately reduced.  - c/w Toprol 50mg as above  - Diuretic: none. Received IVF 11/20 in addition to Nitro gtt to increase perfusion and vasodilate  - monitor daily weight, strict i/o

## 2024-11-22 NOTE — PROGRESS NOTE ADULT - PROBLEM SELECTOR PLAN 7
- c/w Tamsulosin and finasteride    N: DASH/TLC diet  E: Replete lytes PRN K<4, Mg<2   P: DVT PPX: full AC Eliquis  C: FULL CODE   Dispo: Cardiac telemetry  case discussed with Dr. Rowe, pt, and Vascular surgery LDL 56 10/27/24;  C/w Atorvastatin 80mg QHS    #Prediabetes- A1C 5.8%

## 2024-11-22 NOTE — PROVIDER CONTACT NOTE (OTHER) - DATE AND TIME:
16-Nov-2024 10:50
20-Nov-2024 02:53
22-Nov-2024 03:12
22-Nov-2024 00:17
16-Nov-2024 15:40
16-Nov-2024 05:22
21-Nov-2024 03:40

## 2024-11-22 NOTE — PROVIDER CONTACT NOTE (OTHER) - SITUATION
Patient complaining of 9/10 chest pain
Patient complaining of 9/10 chest pain
Patient reports chest pain
Patient reports 10/10 ongoing chest pain
Pt c/o generalized chest pain now complains it is a lot and c/o LE pain
Pt is reporting chest pain score 10/10 and SOB.
Pt voided total 100 cc over 8 hour shift;   Pt is not distended and without pain. Bladder scan max reading 85cc.  3 readings 80,85,60.  IVF in progress

## 2024-11-22 NOTE — PROGRESS NOTE ADULT - SUBJECTIVE AND OBJECTIVE BOX
Cardiology PA Adult Progress Note    SUBJECTIVE ASSESSMENT:  	  MEDICATIONS:  isosorbide   mononitrate ER Tablet (IMDUR) 120 milliGRAM(s) Oral daily  metoprolol succinate ER 50 milliGRAM(s) Oral daily  nitroglycerin  Infusion 35 MICROgram(s)/Min IV Continuous <Continuous>  ranolazine 1000 milliGRAM(s) Oral two times a day        acetaminophen     Tablet .. 975 milliGRAM(s) Oral every 8 hours    bisacodyl Suppository 10 milliGRAM(s) Rectal daily PRN  calcium carbonate    500 mG (Tums) Chewable 1 Tablet(s) Chew three times a day PRN  pantoprazole    Tablet 40 milliGRAM(s) Oral daily  polyethylene glycol 3350 17 Gram(s) Oral daily  senna 2 Tablet(s) Oral at bedtime  simethicone 80 milliGRAM(s) Chew two times a day PRN    atorvastatin 80 milliGRAM(s) Oral at bedtime  finasteride 5 milliGRAM(s) Oral daily    apixaban 5 milliGRAM(s) Oral every 12 hours  aspirin enteric coated 81 milliGRAM(s) Oral daily  clopidogrel Tablet 75 milliGRAM(s) Oral daily  influenza  Vaccine (HIGH DOSE) 0.5 milliLiter(s) IntraMuscular once  sodium chloride 0.9%. 1000 milliLiter(s) IV Continuous <Continuous>  tamsulosin 0.4 milliGRAM(s) Oral at bedtime    	  VITAL SIGNS:  T(C): 36.4 (11-22-24 @ 06:44), Max: 36.9 (11-21-24 @ 17:00)  HR: 79 (11-22-24 @ 06:44) (72 - 89)  BP: 121/60 (11-22-24 @ 06:44) (101/55 - 121/60)  RR: 16 (11-22-24 @ 06:44) (16 - 17)  SpO2: 99% (11-22-24 @ 06:44) (93% - 99%)  Wt(kg): --    I&O's Summary    21 Nov 2024 07:01  -  22 Nov 2024 07:00  --------------------------------------------------------  IN: 1017 mL / OUT: 975 mL / NET: 42 mL                                           PHYSICAL EXAM:  Appearance: Normal	  HEENT: Normal oral mucosa, PERRL, EOMI	  Neck: Supple, + JVD/ - JVD; ___ Carotid Bruit   Cardiovascular: Normal S1 S2, No murmurs  Respiratory: Lungs clear to auscultation/Decreased Breath Sounds/No Rales, Rhonchi, Wheezing	  Gastrointestinal:  Soft, Non-tender, + BS	  Skin: No rashes, No ecchymoses, No cyanosis  Extremities: Normal range of motion, No clubbing, cyanosis or edema  Vascular: Peripheral pulses palpable 2+ bilaterally  Neurologic: Non-focal  Psychiatry: A & O x 3, Mood & affect appropriate    LABS:	 	                                  9.3    5.63  )-----------( 173      ( 22 Nov 2024 05:30 )             28.1     11-22    133[L]  |  103  |  14  ----------------------------<  112[H]  4.4   |  22  |  1.39[H]    Ca    8.5      22 Nov 2024 05:30  Mg     1.7     11-22    TPro  6.1  /  Alb  3.1[L]  /  TBili  0.6  /  DBili  x   /  AST  12  /  ALT  7[L]  /  AlkPhos  55  11-22    proBNP:   Lipid Profile:   HgA1c:   TSH:    [facility] Cardiology PA Adult Progress Note    SUBJECTIVE ASSESSMENT:  	..  MEDICATIONS:  isosorbide   mononitrate ER Tablet (IMDUR) 120 milliGRAM(s) Oral daily  metoprolol succinate ER 50 milliGRAM(s) Oral daily  nitroglycerin  Infusion 35 MICROgram(s)/Min IV Continuous <Continuous>  ranolazine 1000 milliGRAM(s) Oral two times a day        acetaminophen     Tablet .. 975 milliGRAM(s) Oral every 8 hours    bisacodyl Suppository 10 milliGRAM(s) Rectal daily PRN  calcium carbonate    500 mG (Tums) Chewable 1 Tablet(s) Chew three times a day PRN  pantoprazole    Tablet 40 milliGRAM(s) Oral daily  polyethylene glycol 3350 17 Gram(s) Oral daily  senna 2 Tablet(s) Oral at bedtime  simethicone 80 milliGRAM(s) Chew two times a day PRN    atorvastatin 80 milliGRAM(s) Oral at bedtime  finasteride 5 milliGRAM(s) Oral daily    apixaban 5 milliGRAM(s) Oral every 12 hours  aspirin enteric coated 81 milliGRAM(s) Oral daily  clopidogrel Tablet 75 milliGRAM(s) Oral daily  influenza  Vaccine (HIGH DOSE) 0.5 milliLiter(s) IntraMuscular once  sodium chloride 0.9%. 1000 milliLiter(s) IV Continuous <Continuous>  tamsulosin 0.4 milliGRAM(s) Oral at bedtime    	  VITAL SIGNS:  T(C): 36.4 (11-22-24 @ 06:44), Max: 36.9 (11-21-24 @ 17:00)  HR: 79 (11-22-24 @ 06:44) (72 - 89)  BP: 121/60 (11-22-24 @ 06:44) (101/55 - 121/60)  RR: 16 (11-22-24 @ 06:44) (16 - 17)  SpO2: 99% (11-22-24 @ 06:44) (93% - 99%)  Wt(kg): --    I&O's Summary    21 Nov 2024 07:01  -  22 Nov 2024 07:00  --------------------------------------------------------  IN: 1017 mL / OUT: 975 mL / NET: 42 mL                                           PHYSICAL EXAM:  Appearance: Normal	  HEENT: Normal oral mucosa, PERRL, EOMI	  Neck: Supple, + JVD/ - JVD; ___ Carotid Bruit   Cardiovascular: Normal S1 S2, No murmurs  Respiratory: Lungs clear to auscultation/Decreased Breath Sounds/No Rales, Rhonchi, Wheezing	  Gastrointestinal:  Soft, Non-tender, + BS	  Skin: No rashes, No ecchymoses, No cyanosis  Extremities: Normal range of motion, No clubbing, cyanosis or edema  Vascular: Peripheral pulses palpable 2+ bilaterally  Neurologic: Non-focal  Psychiatry: A & O x 3, Mood & affect appropriate    LABS:	 	                                  9.3    5.63  )-----------( 173      ( 22 Nov 2024 05:30 )             28.1     11-22    133[L]  |  103  |  14  ----------------------------<  112[H]  4.4   |  22  |  1.39[H]    Ca    8.5      22 Nov 2024 05:30  Mg     1.7     11-22    TPro  6.1  /  Alb  3.1[L]  /  TBili  0.6  /  DBili  x   /  AST  12  /  ALT  7[L]  /  AlkPhos  55  11-22    proBNP:   Lipid Profile:   HgA1c:   TSH:    Cardiology PA Adult Progress Note    SUBJECTIVE ASSESSMENT:    ***TRANSFER NOTE FROM 5 URIS TO CCU***    74M, Polish speaking, PMHx of CAD s/p PCI and recent minimally invasive direct CABG 10/31/24, ICM HFrEF, HTN, CKD, and PAD s/p prior L to R fem-fem bypass, who presented to [facility], [place] for left leg pain concerning for acute limb ischemia.  Pt was initially admitted to Vascular Surgery, now s/p peripheral stent intervention as well as left fem-pop bypass 11/14. Patient w/ consistent angina and NSTEMI and transferred to cardiology service. Underwent LHC 11/19 with CAD unchanged with no further intervention, presumed NSTEMI 2/2 hypotension/hypoperfusion. Pt still on nitro gtt and IVF to improve hemodynamics. Yesterday 11/21/24 patient was titrated down on nitro gtt 30, however, overnight had worsening chest pain 10/10 not controlled and nitro was slightly uptitrated again. During morning, he was back on nitro 50mcg along with sublingual nitrostat, however, no significant relieve of angina. Hence, patient was uptitrated to nitro 100mcg gtt with improvement in angina down to 2-3/10 at this juncture. He also needed Morphine 2mg IV x 1 for pain relief as well. Patient also received IVF hydration 500cc total to help with perfusion. Given the severity of angina with needing nitro gtt at 100mcg rate, patient is now being moved to CCU for further management and care. Palliative is also consulted for further discussion regarding GOC.     	  MEDICATIONS:  isosorbide   mononitrate ER Tablet (IMDUR) 120 milliGRAM(s) Oral daily  metoprolol succinate ER 50 milliGRAM(s) Oral daily  nitroglycerin  Infusion 35 MICROgram(s)/Min IV Continuous <Continuous>  ranolazine 1000 milliGRAM(s) Oral two times a day  acetaminophen     Tablet .. 975 milliGRAM(s) Oral every 8 hours  bisacodyl Suppository 10 milliGRAM(s) Rectal daily PRN  calcium carbonate    500 mG (Tums) Chewable 1 Tablet(s) Chew three times a day PRN  pantoprazole    Tablet 40 milliGRAM(s) Oral daily  polyethylene glycol 3350 17 Gram(s) Oral daily  senna 2 Tablet(s) Oral at bedtime  simethicone 80 milliGRAM(s) Chew two times a day PRN  atorvastatin 80 milliGRAM(s) Oral at bedtime  finasteride 5 milliGRAM(s) Oral daily  apixaban 5 milliGRAM(s) Oral every 12 hours  aspirin enteric coated 81 milliGRAM(s) Oral daily  clopidogrel Tablet 75 milliGRAM(s) Oral daily  influenza  Vaccine (HIGH DOSE) 0.5 milliLiter(s) IntraMuscular once  sodium chloride 0.9%. 1000 milliLiter(s) IV Continuous <Continuous>  tamsulosin 0.4 milliGRAM(s) Oral at bedtime    	  VITAL SIGNS:  T(C): 36.4 (11-22-24 @ 06:44), Max: 36.9 (11-21-24 @ 17:00)  HR: 79 (11-22-24 @ 06:44) (72 - 89)  BP: 121/60 (11-22-24 @ 06:44) (101/55 - 121/60)  RR: 16 (11-22-24 @ 06:44) (16 - 17)  SpO2: 99% (11-22-24 @ 06:44) (93% - 99%)  Wt(kg): --    I&O's Summary    21 Nov 2024 07:01  -  22 Nov 2024 07:00  --------------------------------------------------------  IN: 1017 mL / OUT: 975 mL / NET: 42 mL                                           PHYSICAL EXAM:  Appearance: Normal	  HEENT:  EOMI	  Neck: Supple  Cardiovascular: Normal S1 S2, No murmurs  Respiratory: Lungs clear to auscultation	  Gastrointestinal:  Soft, Non-tender  Skin: No rashes, No ecchymoses, No cyanosis  Extremities: Normal range of motion, No clubbing, cyanosis or edema  Vascular: Peripheral pulses palpable 2+ bilaterally  Neurologic: Non-focal  Psychiatry: A & O x 3, Mood & affect appropriate    LABS:	 	                                  9.3    5.63  )-----------( 173      ( 22 Nov 2024 05:30 )             28.1     11-22    133[L]  |  103  |  14  ----------------------------<  112[H]  4.4   |  22  |  1.39[H]    Ca    8.5      22 Nov 2024 05:30  Mg     1.7     11-22    TPro  6.1  /  Alb  3.1[L]  /  TBili  0.6  /  DBili  x   /  AST  12  /  ALT  7[L]  /  AlkPhos  55  11-22     Cardiology PA Adult Progress Note    SUBJECTIVE ASSESSMENT:    ***TRANSFER NOTE FROM 5 URIS TO CCU***    74M, Polish speaking, PMHx of CAD s/p PCI and recent minimally invasive direct CABG 10/31/24, ICM HFrEF, HTN, CKD, and PAD s/p prior L to R fem-fem bypass, who presented to [facility] for left leg pain concerning for acute limb ischemia.  Pt was initially admitted to Vascular Surgery, now s/p peripheral stent intervention as well as left fem-pop bypass 11/14. Patient w/ consistent angina and NSTEMI and transferred to cardiology service. Underwent LHC 11/19 with CAD unchanged with no further intervention, presumed NSTEMI 2/2 hypotension/hypoperfusion. Pt still on nitro gtt and IVF to improve hemodynamics. Yesterday 11/21/24 patient was titrated down on nitro gtt 30, however, overnight had worsening chest pain 10/10 not controlled and nitro was slightly uptitrated again. During morning, he was back on nitro 50mcg along with sublingual nitrostat, however, no significant relieve of angina. Hence, patient was uptitrated to nitro 100mcg gtt with improvement in angina down to 2-3/10 at this juncture. He also needed Morphine 2mg IV x 1 for pain relief as well. Patient also received IVF hydration 500cc total to help with perfusion. Given the severity of angina with needing nitro gtt at 100mcg rate, patient is now being moved to CCU for further management and care. Palliative is also consulted for further discussion regarding GOC. Also, per nutritionist recs, patient mentioned some swallowing difficulty with food so placed a speech and swallow order and changed the diet to puree in the meantime.     	  MEDICATIONS:  isosorbide   mononitrate ER Tablet (IMDUR) 120 milliGRAM(s) Oral daily  metoprolol succinate ER 50 milliGRAM(s) Oral daily  nitroglycerin  Infusion 35 MICROgram(s)/Min IV Continuous <Continuous>  ranolazine 1000 milliGRAM(s) Oral two times a day  acetaminophen     Tablet .. 975 milliGRAM(s) Oral every 8 hours  bisacodyl Suppository 10 milliGRAM(s) Rectal daily PRN  calcium carbonate    500 mG (Tums) Chewable 1 Tablet(s) Chew three times a day PRN  pantoprazole    Tablet 40 milliGRAM(s) Oral daily  polyethylene glycol 3350 17 Gram(s) Oral daily  senna 2 Tablet(s) Oral at bedtime  simethicone 80 milliGRAM(s) Chew two times a day PRN  atorvastatin 80 milliGRAM(s) Oral at bedtime  finasteride 5 milliGRAM(s) Oral daily  apixaban 5 milliGRAM(s) Oral every 12 hours  aspirin enteric coated 81 milliGRAM(s) Oral daily  clopidogrel Tablet 75 milliGRAM(s) Oral daily  influenza  Vaccine (HIGH DOSE) 0.5 milliLiter(s) IntraMuscular once  sodium chloride 0.9%. 1000 milliLiter(s) IV Continuous <Continuous>  tamsulosin 0.4 milliGRAM(s) Oral at bedtime    	  VITAL SIGNS:  T(C): 36.4 (11-22-24 @ 06:44), Max: 36.9 (11-21-24 @ 17:00)  HR: 79 (11-22-24 @ 06:44) (72 - 89)  BP: 121/60 (11-22-24 @ 06:44) (101/55 - 121/60)  RR: 16 (11-22-24 @ 06:44) (16 - 17)  SpO2: 99% (11-22-24 @ 06:44) (93% - 99%)  Wt(kg): --    I&O's Summary    21 Nov 2024 07:01  -  22 Nov 2024 07:00  --------------------------------------------------------  IN: 1017 mL / OUT: 975 mL / NET: 42 mL                                           PHYSICAL EXAM:  Appearance: Normal	  HEENT:  EOMI	  Neck: Supple  Cardiovascular: Normal S1 S2, No murmurs  Respiratory: Lungs clear to auscultation	  Gastrointestinal:  Soft, Non-tender  Skin: No rashes, No ecchymoses, No cyanosis  Extremities: Normal range of motion, No clubbing, cyanosis or edema  Vascular: Peripheral pulses palpable 2+ bilaterally  Neurologic: Non-focal  Psychiatry: A & O x 3, Mood & affect appropriate    LABS:	 	                                  9.3    5.63  )-----------( 173      ( 22 Nov 2024 05:30 )             28.1     11-22    133[L]  |  103  |  14  ----------------------------<  112[H]  4.4   |  22  |  1.39[H]    Ca    8.5      22 Nov 2024 05:30  Mg     1.7     11-22    TPro  6.1  /  Alb  3.1[L]  /  TBili  0.6  /  DBili  x   /  AST  12  /  ALT  7[L]  /  AlkPhos  55  11-22

## 2024-11-22 NOTE — PROVIDER CONTACT NOTE (OTHER) - BACKGROUND
Patient s/p 11/11: LL angiogram and 11/14: LLE fem to [place] pop bypass. CABG done on 10/31/2024.
Patient currently on nitroglycerin gtt at 9cc/hr
Pt with recent history of CABG and LE bypass with  NSTEMI post surgery
Pt admitted for CAD.

## 2024-11-22 NOTE — PROVIDER CONTACT NOTE (OTHER) - ASSESSMENT
VSS pt denies SOB
Patient reports 10/10 ongoing chest pain that started a few days after his procedure. Patient reports associated shortness of breath. /62, HR 85, O2 95% on RA. Per vascular, patient with possible Type 2 NSTEMI post op. Trops trending down, EKG unchanged.
VSS pt denies shortness of breath
Pt's vitals are stable; complaints of chest pain and SOB.

## 2024-11-22 NOTE — PROGRESS NOTE ADULT - PROBLEM SELECTOR PLAN 1
#s/p prior PCI per pt and recent MIDCAB 10/31/24  #NSTEMI Type 2  - EKG 95bpm NSR, QWaves II-AVF; ST Depressions I, II, AVL, V4-V6.  - Per Vascular pt w/ frequent chest pain this admission, had increased Imdur, also treating w/ Tums/Simethicone; pt baseline trop 30s-40s, however Trop 447 on 11/19 AM --> 368. Pt txfr to Cards for urgent LHC.   - S/p Diagnostic LHC 11/19/24:  LIMA-LAD patent. dLM (50-60%), pLAD (70%, diffuse), competitive flow from LIMA, dLAD mild diffuse;  LCX ostial , known; RCA not engaged, known .  [IC Aditya Kolb/Cici]  ACCESS LRA.  LVEDP 2mmHG.   - C/w Triple therapy for now: Asa 81mg qd, Plavix 75mg Qd, Eliquis 5mg BID for DVT. As d/w Dr Rowe: triple therapy until CP improves (pt on PPI as well)  - Suspect ?hypoperfusion ischemic pain:  Started IVF post cath, continuing today 11/20 to increase perfusion  - Maximizing antianginals: Remains on Nitro GTT decreased  from 50 to 30mcg/min currently to vasodilate; PO Imdur 120mg qd; Ranexa to 1000mg BID.   - c/w Metoprolol succinate 50mg qd  - c/w Atorvastatin 80mg qhs  -Update: patient today being moved to CCU for worsening angina needing nitro at 100mcg gtt.

## 2024-11-22 NOTE — DIETITIAN INITIAL EVALUATION ADULT - NSFNSGIIOFT_GEN_A_CORE
GI WNL per flow sheets  LBM+ documented 11/18   Bowel care noted PTA per H&P  Currently ordered for MYLICON, Senna, MIRALAX, TUMS, DULCOLAX and Protonix

## 2024-11-22 NOTE — PROGRESS NOTE ADULT - PROBLEM SELECTOR PLAN 1
#s/p prior PCI per pt and recent MIDCAB 10/31/24  #NSTEMI Type 2  - EKG 95bpm NSR, QWaves II-AVF; ST Depressions I, II, AVL, V4-V6.  - Per Vascular pt w/ frequent chest pain this admission, had increased Imdur, also treating w/ Tums/Simethicone; pt baseline trop 30s-40s, however Trop 447 on 11/19 AM --> 368. Pt txfr to Cards for urgent LHC.   - S/p Diagnostic LHC 11/19/24:  LIMA-LAD patent. dLM (50-60%), pLAD (70%, diffuse), competitive flow from LIMA, dLAD mild diffuse;  LCX ostial , known; RCA not engaged, known .  [IC Aditya Kolb/Cici]  ACCESS LRA.  LVEDP 2mmHG.   - C/w Triple therapy for now: Asa 81mg qd, Plavix 75mg Qd, Eliquis 5mg BID for DVT. As d/w Dr Rowe: triple therapy until CP improves (pt on PPI as well)  - Suspect ?hypoperfusion ischemic pain:  Started IVF post cath, continuing today 11/20 to increase perfusion  - Maximizing antianginals: Remains on Nitro GTT decreased  from 50 to 30mcg/min currently to vasodilate; PO Imdur 120mg qd; Ranexa to 1000mg BID.   - c/w Metoprolol succinate 50mg qd  - c/w Atorvastatin 80mg qhs #s/p prior PCI per pt and recent MIDCAB 10/31/24  #NSTEMI Type 2  - EKG 95bpm NSR, QWaves II-AVF; ST Depressions I, II, AVL, V4-V6.  - Per Vascular pt w/ frequent chest pain this admission, had increased Imdur, also treating w/ Tums/Simethicone; pt baseline trop 30s-40s, however Trop 447 on 11/19 AM --> 368. Pt txfr to Cards for urgent LHC.   - S/p Diagnostic LHC 11/19/24:  LIMA-LAD patent. dLM (50-60%), pLAD (70%, diffuse), competitive flow from LIMA, dLAD mild diffuse;  LCX ostial , known; RCA not engaged, known .  [IC Aditya Kolb/Cici]  ACCESS LRA.  LVEDP 2mmHG.   - C/w Triple therapy for now: Asa 81mg qd, Plavix 75mg Qd, Eliquis 5mg BID for DVT. As d/w Dr Rowe: triple therapy until CP improves (pt on PPI as well)  - Suspect ?hypoperfusion ischemic pain:  Started IVF post cath, continuing today 11/20 to increase perfusion  - Maximizing antianginals: Remains on Nitro GTT decreased  from 50 to 30mcg/min currently to vasodilate; PO Imdur 120mg qd; Ranexa to 1000mg BID.   - c/w Metoprolol succinate 50mg qd  - c/w Atorvastatin 80mg qhs  -Update: patient today being moved to CCU for worsening angina needing nitro at 100mcg gtt.

## 2024-11-22 NOTE — PROVIDER CONTACT NOTE (OTHER) - ACTION/TREATMENT ORDERED:
EKG.
FRANCOISE Wu made aware and assessed pt at bedside. Patient placed on 2L via NC. EKG ordered and completed by PCA. IV Dilaudid 0.5 mg administered given that he is also c/o of generalized pain.
To recheck bladder scan at 6pm
BP stable, Pt given dilaudid for le pain, O2 2l nc placed for chest pain, 12 lead EKG to be done,  Pt to receive Imdur as this is recurrent complaint and continue to monitor
PA made aware with order to increase nitroglycerin gtt to 35micrograms/ hour.
PA made aware with order for EKG, EKG taken.

## 2024-11-22 NOTE — PROGRESS NOTE ADULT - PROBLEM SELECTOR PLAN 4
Warm, on room air  -TTE 10/29/24: LVEF 35% with regional wall abnormalities. Dilated LA. Normal RVSF.   -repeat TTE 11/15/24: Not all myocardial wall segments are visualized. Basal and mid inferolateral walls are akinetic. Overall LVSF appears mild to moderately reduced.  - c/w Toprol 50mg as above  - Diuretic: none. Received IVF 11/20 in addition to Nitro gtt to increase perfusion and vasodilate  - monitor daily weight, strict i/o

## 2024-11-22 NOTE — PROGRESS NOTE ADULT - ASSESSMENT
74M, Polish speaking, PMHx of CAD s/p PCI and recent minimally invasive direct CABG 10/31/24, ICM HFrEF, HTN, CKD, and PAD s/p prior L to R fem-fem bypass,  who presented to [facility] for left leg pain concerning for acute limb ischemia.  Pt was admitted to Vascular Surgery, now s/p stent intervention as well as left fem-pop bypass 11/14. Patient w/ consistent angina and NSTEMI and transferred to cardiology service. Underwent LHC 11/19 with CAD unchanged, presumed NSTEMI 2/2 hypotension/hypoperfusion. Pt still on nitro gtt and IVF to improve hemodynamics.        74M, Polish speaking, PMHx of CAD s/p PCI and recent minimally invasive direct CABG 10/31/24, ICM HFrEF, HTN, CKD, and PAD s/p prior L to R fem-fem bypass, who presented to [facility] for left leg pain concerning for acute limb ischemia.  Pt was initially admitted to Vascular Surgery, now s/p peripheral stent intervention as well as left fem-pop bypass 11/14. Patient w/ consistent angina and NSTEMI and transferred to cardiology service. Underwent LHC 11/19 with CAD unchanged with no further intervention, presumed NSTEMI 2/2 hypotension/hypoperfusion. Pt still on nitro gtt and IVF to improve hemodynamics. Yesterday 11/21/24 patient was titrated down on nitro gtt 30, however, overnight had worsening chest pain 10/10 not controlled and nitro was slightly uptitrated again. During morning, he was back on nitro 50mcg along with sublingual nitrostat, however, no significant relieve of angina. Hence, patient was uptitrated to nitro 100mcg gtt with improvement in angina down to 2-3/10 at this juncture. He also needed Morphine 2mg IV x 1 for pain relief as well. Patient also received IVF hydration 500cc total to help with perfusion. Given the severity of angina with needing nitro gtt at 100mcg rate, patient is now being moved to CCU for further management and care. Palliative is also consulted for further discussion regarding GOC.  74M, Polish speaking, PMHx of CAD s/p PCI and recent minimally invasive direct CABG 10/31/24, ICM HFrEF, HTN, CKD, and PAD s/p prior L to R fem-fem bypass, who presented to [facility] for left leg pain concerning for acute limb ischemia.  Pt was initially admitted to Vascular Surgery, now s/p peripheral stent intervention as well as left fem-pop bypass 11/14. Patient w/ consistent angina and NSTEMI and transferred to cardiology service. Underwent LHC 11/19 with CAD unchanged with no further intervention, presumed NSTEMI 2/2 hypotension/hypoperfusion. Pt still on nitro gtt and IVF to improve hemodynamics. Yesterday 11/21/24 patient was titrated down on nitro gtt 30, however, overnight had worsening chest pain 10/10 not controlled and nitro was slightly uptitrated again. During morning, he was back on nitro 50mcg along with sublingual nitrostat, however, no significant relieve of angina. Hence, patient was uptitrated to nitro 100mcg gtt with improvement in angina down to 2-3/10 at this juncture. He also needed Morphine 2mg IV x 1 for pain relief as well. Patient also received IVF hydration 500cc total to help with perfusion. Given the severity of angina with needing nitro gtt at 100mcg rate, patient is now being moved to CCU for further management and care. Palliative is also consulted for further discussion regarding GOC. Also, per nutritionist recs, patient mentioned some swallowing difficulty with food so placed a speech and swallow order and changed the diet to puree in the meantime.

## 2024-11-22 NOTE — CONSULT NOTE ADULT - SUBJECTIVE AND OBJECTIVE BOX
[facility] Geriatrics and Palliative Care  Mariel Whitney, Geriatrics & Palliative Care NP  Contact Info: Call [phone] (HEAL Line) or message on Microsoft Teams    HPI:  This is a 73 y/o male with PMHx of HTN, HLD, BPH, CAD s/p CABG 10/31, PAD s/p remote fem-fem bypass, and and an "abdominal stent for circulation problems". Patient was recently admitted here 10/27/24 - 11/8/24 after initially presenting with a chief complaint of leg pain, being worked up for CLTI with plan for arteriography, but this procedure was deferred as he developed ACS and ultimately went for CABG. His post-procedure course was noteworthy for identification of PET-positive lung malignancy with plan for outpatient staging. on 11/8 into 11/9 he was evaluated for leg pain and on exam not found to have pedal signals or pulses. With this, he was transferred from [facility] [place] to [facility], [place] for heparin drip and CT-A. CT-A images are not available but show s/p fem-fem bypass. He was accepted at [facility] as a transfer for continuity and arrived here this morning on heparin. Today he reports bilateral leg pain, left worse than right. He denies weakness or sensory changes at this time.     Of note, while at [facility] the patient was also accessed by CCU for sternal chest pain. Troponins and EKG were negative. Pain as attributed to surgical sternotomy pain    Denies CP/SOB/N/V/D/dizziness/cough/fever/chills.  Denies h/o CVA, surgery other than the bypass/stents.  (10 Nov 2024 07:05)      PAST MEDICAL & SURGICAL HISTORY:  CAD (coronary artery disease)      BPH (benign prostatic hyperplasia)      HTN (hypertension)      HLD (hyperlipidemia)      PAD (peripheral artery disease)      S/P femoral-femoral bypass surgery          FAMILY HISTORY:   Reviewed; no history of     in mother or father    Opiate Naive (Y/N):   iStop reviewed (Y/N): Yes.   No Rx found on iStop review (Ref#:           Items that are not checked are not present  PSYCHOSOCIAL ASSESSMENT:  - Significant other/partner: [  ]  Children: [  ]  - Living Situation: Home [  ] Long term care [  ]  Rehab[  ]  Other[  ]  - Support system: strong[  ] adequate[  ] inadequate[  ]  - Gnosticist/Spiritual practice: deferred   - Role of organized Mandaen important [  ] some [  ] unable to assess dt pt mentation [  ]  - Coping: well[  ]  with difficulty[  ]  poor coping[  ]  unable to assess dt pt mentation [  ]    ADVANCE DIRECTIVES:    - MOLST[  ]     - Living Will [  ]     DECISION MAKER(s):  - Health Care Proxy(s) [  ]  Surrogate(s)[  ] Guardian[  ]           Name(s)/Phone Number(s):     BASELINE (I)ADLs (prior to admission):  - [place]:  Total[  ] Moderate[  ] Dependent[  ]    Allergies    No Known Allergies    Intolerances        Medications:      MEDICATIONS  (STANDING):  acetaminophen     Tablet .. 975 milliGRAM(s) Oral every 8 hours  apixaban 5 milliGRAM(s) Oral every 12 hours  ascorbic acid 500 milliGRAM(s) Oral daily  aspirin enteric coated 81 milliGRAM(s) Oral daily  atorvastatin 80 milliGRAM(s) Oral at bedtime  clopidogrel Tablet 75 milliGRAM(s) Oral daily  finasteride 5 milliGRAM(s) Oral daily  influenza  Vaccine (HIGH DOSE) 0.5 milliLiter(s) IntraMuscular once  isosorbide   mononitrate ER Tablet (IMDUR) 120 milliGRAM(s) Oral daily  metoprolol succinate ER 50 milliGRAM(s) Oral daily  multivitamin 1 Tablet(s) Oral daily  nitroglycerin  Infusion 100 MICROgram(s)/Min (30 mL/Hr) IV Continuous <Continuous>  pantoprazole    Tablet 40 milliGRAM(s) Oral daily  polyethylene glycol 3350 17 Gram(s) Oral daily  ranolazine 1000 milliGRAM(s) Oral two times a day  senna 2 Tablet(s) Oral at bedtime  sodium chloride 0.9%. 250 milliLiter(s) (125 mL/Hr) IV Continuous <Continuous>  tamsulosin 0.4 milliGRAM(s) Oral at bedtime    MEDICATIONS  (PRN):  bisacodyl Suppository 10 milliGRAM(s) Rectal daily PRN Constipation  calcium carbonate    500 mG (Tums) Chewable 1 Tablet(s) Chew three times a day PRN Heartburn  oxyCODONE    IR 2.5 milliGRAM(s) Oral every 4 hours PRN Moderate Pain (4 - 6)  oxyCODONE    IR 5 milliGRAM(s) Oral every 4 hours PRN Severe Pain (7 - 10)  simethicone 80 milliGRAM(s) Chew two times a day PRN Heartburn      24 hour PRN use:      acetaminophen     Tablet ..   975 milliGRAM(s) Oral (11-22-24 @ 14:33)   975 milliGRAM(s) Oral (11-22-24 @ 07:29)   975 milliGRAM(s) Oral (11-21-24 @ 21:56)    morphine  - Injectable   2 milliGRAM(s) IV Push (11-22-24 @ 10:57)        PRESENT SYMPTOMS:   [ ] Patient unable to self report   Source if other than patient:  [ ]Family   [ ]Team     Pain [  ]  Location :        Quality:  Radiation:  Timing:  Aggravating factors:  Minimal acceptable level (0-10 scale):   Severity in last 24h (0-10 scale) :  Current score (0-10 scale):  Improves with:     PAIN AD Score:   [email or web address] (press ctrl +  left click to view)    If [  ], pt denies symptom.   Dyspnea:         [  ]  Anxiety:           [  ]  Difficulty sleeping: [  ]  Fatigue:           [  ]  Nausea:           [  ]  Loss of appetite:     [  ]  Dysphagia: [  ]  Constipation:   [  ]        LBM   Other Symptoms:    All other review of systems negative [  ]    ECOG Performance:       Current Palliative Performance Scale/Karnofsky Score:    %  Preadmit Karnofsky:   %          PEx:  General:   alert  oriented x       lethargic, distressed, cachexia,  nonverbal,  unarousable, verbal  Behavioral: Anxiety  Delirium Agitation Cooperative  HEENT: atraumatic,  No temporal wasting,  No dry mouth,  ET tube/trach  RESP: Reg rhythm, No  tachypnea/labored breathing,  No audible excessive secretions,  CTAB, diminished bilat bases  CV: RRR, S1S2,  No  tachycardia  GI: soft non distended non tender  incontinent               PEG/NG/OG tube                 constipation  last BM:   : normal  incontinent  oliguria/anuria  ramirez  MUSK: weakness x4,  edema, ambulatory with assistance,   mostly/fully  BB/WC bound  SKIN:  Poor skin turgor, Pallor, Pressure ulcer stage:   NEURO:  No deficits, cognitive impairment, encephalopathic dsyphagia dysarthria paresis  Oral intake ability: unable/only mouth care, minimal moderate full capability      T(C): 36.7 (11-22-24 @ 16:00), Max: 36.7 (11-21-24 @ 21:18)  HR: 76 (11-22-24 @ 17:00) (71 - 83)  BP: 110/54 (11-22-24 @ 17:00) (87/50 - 132/62)  RR: 18 (11-22-24 @ 17:00) (16 - 18)  SpO2: 99% (11-22-24 @ 17:00) (95% - 100%)  Wt(kg): --    Labs:    CBC:                        9.3    5.63  )-----------( 173      ( 22 Nov 2024 05:30 )             28.1     CMP:    11-22    133[L]  |  103  |  14  ----------------------------<  112[H]  4.4   |  22  |  1.39[H]    Ca    8.5      22 Nov 2024 05:30  Mg     1.7     11-22    TPro  6.1  /  Alb  3.1[L]  /  TBili  0.6  /  DBili  x   /  AST  12  /  ALT  7[L]  /  AlkPhos  55  11-22       Urinalysis Basic - ( 22 Nov 2024 05:30 )    Color: x / Appearance: x / SG: x / pH: x  Gluc: 112 mg/dL / Ketone: x  / Bili: x / Urobili: x   Blood: x / Protein: x / Nitrite: x   Leuk Esterase: x / RBC: x / WBC x   Sq Epi: x / Non Sq Epi: x / Bacteria: x        RADIOLOGY & ADDITIONAL STUDIES:  Imaging:  Reviewed      GO discussion:  [facility] Geriatrics and Palliative Care  Mariel Whitney, Geriatrics & Palliative Care NP  Contact Info: Call [phone] (HEAL Line) or message on Microsoft Teams    HPI:  74M, Polish speaking, PMHx of CAD s/p PCI and recent minimally invasive direct CABG 10/31/24, ICM, HFrEF, HTN, CKD, and PAD s/p prior L to R fem-fem bypass (>20yrs ago), who presented to [facility] for left leg pain concerning for acute limb ischemia.  Pt was initially admitted to Vascular Surgery, now s/p peripheral stent intervention as well as left fem-pop bypass 11/14. Patient w/ consistent angina and NSTEMI and transferred to cardiology service. Underwent LHC 11/19 with CAD unchanged with no further intervention, presumed NSTEMI 2/2 hypotension/hypoperfusion. Pt was placed on Eliquis, ASA, Plavix. Pt still on nitro gtt and IVF to improve hemodynamics. Yesterday 11/21/24 patient was titrated down on nitro gtt 30, however, overnight had worsening chest pain 10/10 not controlled and nitro was slightly uptitrated again. During morning, he was back on nitro 50mcg along with sublingual nitrostat, however, no significant relieve of angina. Hence, patient was uptitrated to nitro 100mcg gtt with improvement in angina down to 2-3/10 at this juncture. He also needed Morphine 2mg IV x 1 for pain relief as well. Patient also received IVF hydration 500cc total to help with perfusion. Given the severity of angina with needing nitro gtt at 100mcg rate, patient is now being moved to CCU for further management and care. Palliative is also consulted for further discussion regarding GOC.     Pt seen and examined with Polish  [id number]. Comprehensive ROS as noted below, collateral obtained from chart/team. Exploration of GOC documented as below.    PAST MEDICAL & SURGICAL HISTORY:  CAD (coronary artery disease)  BPH (benign prostatic hyperplasia)  HTN (hypertension)  HLD (hyperlipidemia)  PAD (peripheral artery disease)  S/P femoral-femoral bypass surgery      FAMILY HISTORY:   Reviewed; no history of     in mother or father    Opiate Naive (Y/N):   iStop reviewed (Y/N): Yes.   No Rx found on iStop review (Ref#:           Items that are not checked are not present  PSYCHOSOCIAL ASSESSMENT:  - Significant other/partner: [  ]  Children: [ x ] three adult children in [place]  - Living Situation: Home [x  ] Long term care [  ]  Rehab[  ]  Other[  ]  - Support system: strong[  ] adequate[  ] inadequate[ x ]  - Episcopal/Spiritual practice: deferred   - Role of organized Worship important [  ] some [  ] unable to assess dt pt mentation [  ]  - Coping: well[x  ]  with difficulty[  ]  poor coping[  ]  unable to assess dt pt mentation [  ]    ADVANCE DIRECTIVES:    - MOLST[  ]     - Living Will [  ]     DECISION MAKER(s):  - Health Care Proxy(s) [  ]  Surrogate(s)[  ] Guardian[  ]           Name(s)/Phone Number(s):   - his surrogate decision makers would collectively be his three adult children, who live in [place]; he does not have their contact information with him, it's at his apt     BASELINE (I)ADLs (prior to admission):  - [place]:  Total[  ] Moderate[x  ] Dependent[  ]    Allergies    No Known Allergies    Intolerances        Medications:      MEDICATIONS  (STANDING):  acetaminophen     Tablet .. 975 milliGRAM(s) Oral every 8 hours  apixaban 5 milliGRAM(s) Oral every 12 hours  ascorbic acid 500 milliGRAM(s) Oral daily  aspirin enteric coated 81 milliGRAM(s) Oral daily  atorvastatin 80 milliGRAM(s) Oral at bedtime  clopidogrel Tablet 75 milliGRAM(s) Oral daily  finasteride 5 milliGRAM(s) Oral daily  influenza  Vaccine (HIGH DOSE) 0.5 milliLiter(s) IntraMuscular once  isosorbide   mononitrate ER Tablet (IMDUR) 120 milliGRAM(s) Oral daily  metoprolol succinate ER 50 milliGRAM(s) Oral daily  multivitamin 1 Tablet(s) Oral daily  nitroglycerin  Infusion 100 MICROgram(s)/Min (30 mL/Hr) IV Continuous <Continuous>  pantoprazole    Tablet 40 milliGRAM(s) Oral daily  polyethylene glycol 3350 17 Gram(s) Oral daily  ranolazine 1000 milliGRAM(s) Oral two times a day  senna 2 Tablet(s) Oral at bedtime  sodium chloride 0.9%. 250 milliLiter(s) (125 mL/Hr) IV Continuous <Continuous>  tamsulosin 0.4 milliGRAM(s) Oral at bedtime    MEDICATIONS  (PRN):  bisacodyl Suppository 10 milliGRAM(s) Rectal daily PRN Constipation  calcium carbonate    500 mG (Tums) Chewable 1 Tablet(s) Chew three times a day PRN Heartburn  oxyCODONE    IR 2.5 milliGRAM(s) Oral every 4 hours PRN Moderate Pain (4 - 6)  oxyCODONE    IR 5 milliGRAM(s) Oral every 4 hours PRN Severe Pain (7 - 10)  simethicone 80 milliGRAM(s) Chew two times a day PRN Heartburn      24 hour PRN use:      acetaminophen     Tablet ..   975 milliGRAM(s) Oral (11-22-24 @ 14:33)   975 milliGRAM(s) Oral (11-22-24 @ 07:29)   975 milliGRAM(s) Oral (11-21-24 @ 21:56)    morphine  - Injectable   2 milliGRAM(s) IV Push (11-22-24 @ 10:57)        PRESENT SYMPTOMS:   [ ] Patient unable to self report   Source if other than patient:  [ ]Family   [ ]Team     Pain [  ] denies chest pain     If [  ], pt denies symptom.   Dyspnea:         [  ]   Anxiety:           [  ]  Difficulty sleeping: [  ]  Fatigue:           [x  ]  Nausea:           [  ]  Loss of appetite:     [x  ]  Dysphagia: [  ]  Constipation:   [  ]        Other Symptoms: overall debility     All other review of systems negative [ x ]    ECOG Performance:   3    Current Palliative Performance Scale/Karnofsky Score: 30   %  Preadmit Karnofsky:   60%          PEx:  General: older man sitting up in bed watching TV, no acute distress   alert  oriented x    3  verbal [place]  Behavioral:  Cooperative  HEENT: atraumatic,  +mild temporal wasting,  No dry mouth  RESP: Reg rhythm, No  tachypnea/labored breathing,  No audible excessive secretions,  CTAB, diminished bilat bases  CV: RRR, S1S2,  No  tachycardia  GI: soft non distended non tender   MUSK: weakness x4,  edema, ambulatory with assistance,  SKIN:  Poor skin turgor, Pallor, LLE with dried superficial scabs, some hyperpigmentation in bilateral lower extremities   NEURO:  No deficits, cognitive impairment, encephalopathic dsyphagia dysarthria paresis  Oral intake ability: full capability    T(C): 36.7 (11-22-24 @ 16:00), Max: 36.7 (11-21-24 @ 21:18)  HR: 76 (11-22-24 @ 17:00) (71 - 83)  BP: 110/54 (11-22-24 @ 17:00) (87/50 - 132/62)  RR: 18 (11-22-24 @ 17:00) (16 - 18)  SpO2: 99% (11-22-24 @ 17:00) (95% - 100%)  Wt(kg): --    Labs:    CBC:                        9.3    5.63  )-----------( 173      ( 22 Nov 2024 05:30 )             28.1     CMP:    11-22    133[L]  |  103  |  14  ----------------------------<  112[H]  4.4   |  22  |  1.39[H]    Ca    8.5      22 Nov 2024 05:30  Mg     1.7     11-22    TPro  6.1  /  Alb  3.1[L]  /  TBili  0.6  /  DBili  x   /  AST  12  /  ALT  7[L]  /  AlkPhos  55  11-22       Urinalysis Basic - ( 22 Nov 2024 05:30 )    Color: x / Appearance: x / SG: x / pH: x  Gluc: 112 mg/dL / Ketone: x  / Bili: x / Urobili: x   Blood: x / Protein: x / Nitrite: x   Leuk Esterase: x / RBC: x / WBC x   Sq Epi: x / Non Sq Epi: x / Bacteria: x        RADIOLOGY & ADDITIONAL STUDIES:  Imaging:  Reviewed      GO discussion:

## 2024-11-22 NOTE — DIETITIAN INITIAL EVALUATION ADULT - PERTINENT MEDS FT
MEDICATIONS  (STANDING):  acetaminophen     Tablet .. 975 milliGRAM(s) Oral every 8 hours  apixaban 5 milliGRAM(s) Oral every 12 hours  aspirin enteric coated 81 milliGRAM(s) Oral daily  atorvastatin 80 milliGRAM(s) Oral at bedtime  clopidogrel Tablet 75 milliGRAM(s) Oral daily  finasteride 5 milliGRAM(s) Oral daily  influenza  Vaccine (HIGH DOSE) 0.5 milliLiter(s) IntraMuscular once  isosorbide   mononitrate ER Tablet (IMDUR) 120 milliGRAM(s) Oral daily  metoprolol succinate ER 50 milliGRAM(s) Oral daily  nitroglycerin  Infusion 100 MICROgram(s)/Min (30 mL/Hr) IV Continuous <Continuous>  pantoprazole    Tablet 40 milliGRAM(s) Oral daily  polyethylene glycol 3350 17 Gram(s) Oral daily  ranolazine 1000 milliGRAM(s) Oral two times a day  senna 2 Tablet(s) Oral at bedtime  sodium chloride 0.9%. 250 milliLiter(s) (125 mL/Hr) IV Continuous <Continuous>  tamsulosin 0.4 milliGRAM(s) Oral at bedtime    MEDICATIONS  (PRN):  bisacodyl Suppository 10 milliGRAM(s) Rectal daily PRN Constipation  calcium carbonate    500 mG (Tums) Chewable 1 Tablet(s) Chew three times a day PRN Heartburn  simethicone 80 milliGRAM(s) Chew two times a day PRN Heartburn

## 2024-11-22 NOTE — CONSULT NOTE ADULT - CONVERSATION DETAILS
[facility] held at bedside with the patient and a Polish .    Patient demonstrated limited understanding of his overall medical frailty, the progressive nature of his chronic conditions, and his limited candidacy for aggressive medical interventions.     What is important to him is maintaining physical strength and functional independence. He also desires connection with the [employer] in [place] for support and resources. He has three adult children in [place], but their contact information is at his apt. He does not have any local friends or family.    The risks, benefits, and potential poor outcomes of life-sustaining treatment (LST), including CPR and intubation, were discussed in the context of his age, chronic illnesses, and overall frailty. The alternative of allowing natural death was also presented. Despite these discussions, the patient elected to pursue an aggressive approach to care, including CPR and intubation. [facility] held at bedside with the patient and a Polish .    Patient demonstrated limited understanding of his overall medical frailty, the progressive nature of his chronic conditions, and his limited candidacy for aggressive medical interventions.     What is important to him is maintaining physical strength and functional independence. He also desires connection with the [employer] in [place] for support and resources. He has three adult children in [place], but their contact information is at his apt. He does not have any local friends or family.    The risks, benefits, and potential poor outcomes of life-sustaining treatment (LST), including CPR and intubation, were discussed in the context of his age, chronic illnesses, and overall frailty. The alternative of allowing natural death was also explained. Patient elected to pursue an aggressive approach to care, including CPR and intubation. [facility] held at bedside with the patient and a Polish .    Patient demonstrated limited understanding of his overall medical frailty, the progressive nature of his chronic conditions, and his limited candidacy for aggressive medical interventions.     What is important to him is maintaining physical strength and functional independence. He also desires connection with the [employer] in [place] for support and resources. He has three adult children in [place], but their contact information is at his apt. He does not have any local friends or family.    The risks, benefits, and potential poor outcomes of LST including CPR and intubation, were discussed in the context of his age, chronic illnesses, and overall medical frailty. Explained alternative/ to allow natural death. Patient elected to pursue an aggressive approach to care, including CPR and intubation.

## 2024-11-22 NOTE — CONSULT NOTE ADULT - PROBLEM SELECTOR RECOMMENDATION 4
.  - Full code--this choice dose not align with his priorities and reflects his limited understanding of his prognosis  - Polish-speaking   - Goals: to maintain physical function and independence  - Lacks local family/friend support; three adult children live in [place] and their contact info is at [facility] home    In addition to the E/M visit, an advance care planning meeting was performed. Start time: 330; End time:400 Total time: 30min. A in-person meeting to discuss advance care planning was held today regarding:  JULIOCESAR Sanabria   Primary decision maker:  Patient is able to participate in decision making;  Alternate/surrogate:  Three adult children  . Discussed advance directives including, but not limited to, healthcare proxy and code status, as well as disease trajectory, patient's values/goals, and health care options that are available for end of life care. Decision regarding code status:  FULL CODE; Documentation completed today:  [facility] note

## 2024-11-22 NOTE — PROGRESS NOTE ADULT - PROBLEM SELECTOR PLAN 4
Warm, on room air  -TTE 10/29/24: LVEF 35% with regional wall abnormalities. Dilated LA. Normal RVSF.   -repeat TTE 11/15/24: Not all myocardial wall segments are visualized. Basal and mid inferolateral walls are akinetic. Overall LVSF appears mild to moderately reduced.  - c/w Toprol 50mg as above  - Diuretic: none. Received IVF 11/20 in addition to Nitro gtt to increase perfusion and vasodilate  - monitor daily weight, strict i/o Per nutritionist, patient mentioned some swallowing difficulty with food so placed a speech and swallow order and changed the diet to puree in the meantime.

## 2024-11-23 DIAGNOSIS — R53.81 OTHER MALAISE: ICD-10-CM

## 2024-11-23 DIAGNOSIS — Z51.5 ENCOUNTER FOR PALLIATIVE CARE: ICD-10-CM

## 2024-11-23 DIAGNOSIS — I25.10 ATHEROSCLEROTIC HEART DISEASE OF NATIVE CORONARY ARTERY WITHOUT ANGINA PECTORIS: ICD-10-CM

## 2024-11-23 DIAGNOSIS — Z71.89 OTHER SPECIFIED COUNSELING: ICD-10-CM

## 2024-11-23 LAB
ALBUMIN SERPL ELPH-MCNC: 3.1 G/DL — LOW (ref 3.3–5)
ALP SERPL-CCNC: 58 U/L — SIGNIFICANT CHANGE UP (ref 40–120)
ALT FLD-CCNC: 8 U/L — LOW (ref 10–45)
ANION GAP SERPL CALC-SCNC: 9 MMOL/L — SIGNIFICANT CHANGE UP (ref 5–17)
AST SERPL-CCNC: 13 U/L — SIGNIFICANT CHANGE UP (ref 10–40)
BILIRUB SERPL-MCNC: 0.6 MG/DL — SIGNIFICANT CHANGE UP (ref 0.2–1.2)
BLD GP AB SCN SERPL QL: NEGATIVE — SIGNIFICANT CHANGE UP
BUN SERPL-MCNC: 14 MG/DL — SIGNIFICANT CHANGE UP (ref 7–23)
CALCIUM SERPL-MCNC: 8.6 MG/DL — SIGNIFICANT CHANGE UP (ref 8.4–10.5)
CHLORIDE SERPL-SCNC: 105 MMOL/L — SIGNIFICANT CHANGE UP (ref 96–108)
CO2 SERPL-SCNC: 22 MMOL/L — SIGNIFICANT CHANGE UP (ref 22–31)
CREAT SERPL-MCNC: 1.35 MG/DL — HIGH (ref 0.5–1.3)
EGFR: 54 ML/MIN/1.73M2 — LOW
GLUCOSE SERPL-MCNC: 113 MG/DL — HIGH (ref 70–99)
HCT VFR BLD CALC: 29.1 % — LOW (ref 39–50)
HGB BLD-MCNC: 9.4 G/DL — LOW (ref 13–17)
MAGNESIUM SERPL-MCNC: 1.8 MG/DL — SIGNIFICANT CHANGE UP (ref 1.6–2.6)
MCHC RBC-ENTMCNC: 29.7 PG — SIGNIFICANT CHANGE UP (ref 27–34)
MCHC RBC-ENTMCNC: 32.3 G/DL — SIGNIFICANT CHANGE UP (ref 32–36)
MCV RBC AUTO: 92.1 FL — SIGNIFICANT CHANGE UP (ref 80–100)
NRBC # BLD: 0 /100 WBCS — SIGNIFICANT CHANGE UP (ref 0–0)
PHOSPHATE SERPL-MCNC: 3.3 MG/DL — SIGNIFICANT CHANGE UP (ref 2.5–4.5)
PLATELET # BLD AUTO: 196 K/UL — SIGNIFICANT CHANGE UP (ref 150–400)
POTASSIUM SERPL-MCNC: 4.9 MMOL/L — SIGNIFICANT CHANGE UP (ref 3.5–5.3)
POTASSIUM SERPL-SCNC: 4.9 MMOL/L — SIGNIFICANT CHANGE UP (ref 3.5–5.3)
PROT SERPL-MCNC: 6.1 G/DL — SIGNIFICANT CHANGE UP (ref 6–8.3)
RBC # BLD: 3.16 M/UL — LOW (ref 4.2–5.8)
RBC # FLD: 14.4 % — SIGNIFICANT CHANGE UP (ref 10.3–14.5)
RH IG SCN BLD-IMP: POSITIVE — SIGNIFICANT CHANGE UP
SODIUM SERPL-SCNC: 136 MMOL/L — SIGNIFICANT CHANGE UP (ref 135–145)
WBC # BLD: 4.86 K/UL — SIGNIFICANT CHANGE UP (ref 3.8–10.5)
WBC # FLD AUTO: 4.86 K/UL — SIGNIFICANT CHANGE UP (ref 3.8–10.5)

## 2024-11-23 PROCEDURE — 99291 CRITICAL CARE FIRST HOUR: CPT

## 2024-11-23 PROCEDURE — 71045 X-RAY EXAM CHEST 1 VIEW: CPT | Mod: 26

## 2024-11-23 RX ORDER — ISOSORBIDE MONONITRATE 10 MG
240 TABLET ORAL EVERY 24 HOURS
Refills: 0 | Status: DISCONTINUED | OUTPATIENT
Start: 2024-11-23 | End: 2024-11-24

## 2024-11-23 RX ADMIN — POLYETHYLENE GLYCOL 3350 17 GRAM(S): 17 POWDER, FOR SOLUTION ORAL at 13:51

## 2024-11-23 RX ADMIN — Medication 1 TABLET(S): at 13:52

## 2024-11-23 RX ADMIN — ACETAMINOPHEN 500MG 975 MILLIGRAM(S): 500 TABLET, COATED ORAL at 06:05

## 2024-11-23 RX ADMIN — ACETAMINOPHEN 500MG 975 MILLIGRAM(S): 500 TABLET, COATED ORAL at 14:30

## 2024-11-23 RX ADMIN — APIXABAN 5 MILLIGRAM(S): 2.5 TABLET, FILM COATED ORAL at 19:28

## 2024-11-23 RX ADMIN — RANOLAZINE 1000 MILLIGRAM(S): 1000 TABLET, FILM COATED, EXTENDED RELEASE ORAL at 06:05

## 2024-11-23 RX ADMIN — METOPROLOL TARTRATE 50 MILLIGRAM(S): 100 TABLET, FILM COATED ORAL at 06:05

## 2024-11-23 RX ADMIN — RANOLAZINE 1000 MILLIGRAM(S): 1000 TABLET, FILM COATED, EXTENDED RELEASE ORAL at 19:27

## 2024-11-23 RX ADMIN — Medication 5 MILLIGRAM(S): at 15:51

## 2024-11-23 RX ADMIN — TAMSULOSIN HYDROCHLORIDE 0.4 MILLIGRAM(S): 0.4 CAPSULE ORAL at 22:47

## 2024-11-23 RX ADMIN — Medication 81 MILLIGRAM(S): at 13:52

## 2024-11-23 RX ADMIN — CLOPIDOGREL 75 MILLIGRAM(S): 75 TABLET, FILM COATED ORAL at 13:54

## 2024-11-23 RX ADMIN — ACETAMINOPHEN 500MG 975 MILLIGRAM(S): 500 TABLET, COATED ORAL at 22:48

## 2024-11-23 RX ADMIN — ACETAMINOPHEN 500MG 975 MILLIGRAM(S): 500 TABLET, COATED ORAL at 16:00

## 2024-11-23 RX ADMIN — APIXABAN 5 MILLIGRAM(S): 2.5 TABLET, FILM COATED ORAL at 11:05

## 2024-11-23 RX ADMIN — Medication 2 TABLET(S): at 22:47

## 2024-11-23 RX ADMIN — Medication 30 MICROGRAM(S)/MIN: at 09:14

## 2024-11-23 RX ADMIN — PANTOPRAZOLE SODIUM 40 MILLIGRAM(S): 40 TABLET, DELAYED RELEASE ORAL at 13:53

## 2024-11-23 RX ADMIN — ACETAMINOPHEN 500MG 975 MILLIGRAM(S): 500 TABLET, COATED ORAL at 23:30

## 2024-11-23 RX ADMIN — Medication 240 MILLIGRAM(S): at 13:51

## 2024-11-23 RX ADMIN — Medication 80 MILLIGRAM(S): at 22:47

## 2024-11-23 RX ADMIN — Medication 500 MILLIGRAM(S): at 19:27

## 2024-11-23 RX ADMIN — Medication 25 GRAM(S): at 06:04

## 2024-11-23 NOTE — PROGRESS NOTE ADULT - ASSESSMENT
Assessment: 74 Polish speaking M w/ PMH of CAD (s/p PCI, CABG 10/31/24), ICM, HFrEF, HTN, CKD, and PAD (s/p prior L to R fem-fem bypass), presenting from [facility] for concern for acute limb ischemia, found to have presumed NSTEMI 2/2 hypotension/hypoperfusion. Transferred to CCU for management of angina w/ increased nitro gtt. Now weaned down to 40mcg    NEURO / PSYCH  A/o x4    CARDIOVASCULAR  #NSTEMI, Type 2   #CAD  Pt w/ frequent CP this admission; treated w/ increased Imdur, Tums/Simethicone, and nitro; failed sublingual nitro. Suspecting CP to be 2/2 hypoperfusion/ischemic pain.  Baseline trop 30s-40s, however Trop 447 on 11/19 AM --> 368. Pt txfr to [facility] for urgent C.   S/p Minimally Invasive Direct Coronary Artery Bypass (MIDCAB; 10/31/24).   EKG 95bpm NSR, Q Waves II-AVF; ST Depressions I, II, AVL, V4-V6.  LDL 56 (10/27/24)    - Per Vascular, pt w/ frequent chest pain this admission, had increased Imdur, also treating w/ Tums/Simethicone; pt baseline trop 30s-40s, however Trop 447 on 11/19 AM --> 368. Pt txfr to [facility] for urgent LHC.   - [facility] 11/19/24:  LIMA-LAD patent. dLM (50-60%), pLAD (70%, diffuse), competitive flow from LIMA, dLAD mild diffuse;  LCX ostial , known; RCA not engaged, known .  [IC Aditya Kolb/Cici]  ACCESS LRA.  LVEDP 2mmHG.    Plan:  - C/w Triple therapy: Asa 81mg qd, Plavix 75mg Qd, Eliquis 5mg BID for DVT. As d/w Dr Rowe: triple therapy until CP improves (pt on PPI as well)  - Antianginals:                - weaned Nitro gtt to 40mcg gtt, wean off as tolerated               - increased PO Imdur to 240mg qd               - c/w Ranexa to 1000mg BID  - c/w Metoprolol succinate 50mg qd  - c/w Atorvastatin 80mg qhs    #PAD  Chronic h/o PAD, requiring fem-fem bypass 20yrs ago per pt.  - s/p 11/11/24 LLE angiogram with EIA and FELICITY stents  - s/p 11/14/24 LLE fem to AK-pop bypass ; LLE c/b hematoma from Hep GTT 11/19, as evaluated w/  Vascular Sx at bedside - no intervention needed- now improving 11/20 off hep gtt.  L popliteal medial knee site- w/ Kerlix c/d/i.    - CTA b/l LE at OSH 11/8: post fem-fem bypass, severe atherosclerotic disease involving aorta, iliac, and superficial femoral arteries b/l more on the left    Plan:  - Vascular Sx following, appreciate recs  - c/w Plavix 75mg qd and Atorvastatin 80mg qhs  - pain control per Vascular: currently on standing Tylenol 975mg q8h; oxycodone 2.5mg q4h PRN for moderate pain, and oxycodone 5mg q4h PRN for severe pain.   - bowel regimen: senna, Miralax standing; dulcolax prn  - Vasc Sx had planned for DOAC and antiplt monotherapy w/ Plavix on dc     #HFrEF  Warm, on room air  -TTE 10/29/24: LVEF 35% with regional wall abnormalities. Dilated LA. Normal RVSF.   -TTE 11/15/24: Not all myocardial wall segments are visualized. Basal and mid inferolateral walls are akinetic. Overall LVSF appears mild to moderately reduced    Plan:  - c/w Toprol 50mg as above  - Diuretic: none. Received IVF 11/20 in addition to Nitro gtt to increase perfusion and vasodilate  - monitor daily weight, strict i/o.    #HTN   SBP range today 89 - 125mmHg  - c/w meds as above.      PULM  Stable on RA.    GI  Bowel regimen: miralax & senna standing, ducolax supp PRN  Last BM: 4d ago per pt      #BPH   - c/w Tamsulosin and finasteride    ENDO  #PreDM  A1c 5.8%    RENAL  #CKD  - Cr elevated on admission, unclear baseline Cr (~1.5 per Vascular), now Cr 1.2 range  - SCr today 1.39 (11/22)    Plan:  - Monitor Cr and UOP    HEME  #ANEMIA   - Stable, Hgb today 9.3 (11/22)  - s/p 1u PRBC 11/16/24 for Hgb 7.7 due to suspected blood loss @ L Popliteal site per Vascular, now stable  - s/p 1u PRBC 11/19/24 for pre-LHC optimization  - give 1u pRBC on 11/23 for Hgb optimization, current level 9.4    Plan:   - Transfuse >8 given cardiac and renal risk factors  - Keep active type & screen (last on 11/18); ordered for 11/22 AM.    ID  DAYNA    MSK  DAYNA    PROPHYLAXIS  N: DASH/TLC diet, pureed per pt request  E: Replete lytes PRN K<4, Mg<2   DVT PPX: full AC Eliquis  C: FULL CODE   Dispo: CCU

## 2024-11-23 NOTE — PROGRESS NOTE ADULT - SUBJECTIVE AND OBJECTIVE BOX
HOSPITAL COURSE: 74M, Polish speaking, PMHx of CAD s/p PCI and recent minimally invasive direct CABG 10/31/24, ICM, HFrEF, HTN, CKD, and PAD s/p prior L to R fem-fem bypass (>20yrs ago), who presented to [facility] for left leg pain concerning for acute limb ischemia.  Pt was initially admitted to Vascular Surgery, now s/p peripheral stent intervention as well as left fem-pop bypass 11/14. Patient w/ consistent angina and NSTEMI and transferred to cardiology service. Underwent LHC 11/19 with CAD unchanged with no further intervention, presumed NSTEMI 2/2 hypotension/hypoperfusion. Pt was placed on Eliquis, ASA, Plavix. Pt still on nitro gtt and IVF to improve hemodynamics. Yesterday 11/21/24 patient was titrated down on nitro gtt 30, however, overnight had worsening chest pain 10/10 not controlled and nitro was slightly uptitrated again. During morning, he was back on nitro 50mcg along with sublingual nitrostat, however, no significant relieve of angina. Hence, patient was uptitrated to nitro 100mcg gtt with improvement in angina down to 2-3/10 at this juncture. He also needed Morphine 2mg IV x 1 for pain relief as well. Patient also received IVF hydration 500cc total to help with perfusion. Given the severity of angina with needing nitro gtt at 100mcg rate, patient is now being moved to CCU for further management and care. Palliative is also consulted for further discussion regarding GOC.   In the CCU, nitro gtt weaned to 25mcg rate and then weaned off but patient developed acute chest pain again and therefore it was restarted, currently on 40mcg rate. Patient also had nose bleeds in the morning on 11/23 which resolved after holding pressure. Of note, duplex US of left groin showed 5x1cm fluid collection, pt not endorsing pain or discomfort in that area. Imdur dosing was increased to 240mg daily and attempts were made to wean down the nitro gtt. Given 1u RBC for optimization of Hgb given NSTEMI.    INTERVAL/OVERNIGHT EVENTS: nitro weaned to 25. per radiology, duplex US on L groin with 5x1cm fluid collection. Nosebleed from R nostril at 6:55am, instructed pt to hold pressure. nitro resumed at 6:45am for chest pain    SUBJECTIVE:  Patient seen and examined at bedside, comfortable, still endorsing diffuse chest pain that is localized to the chest wall and doesn't radiate to the back or extremities. He states he has some mild abdominal discomfort but the rest of ROS is unremarkable.     Vital Signs Last 12 Hrs  T(F): 98.2 (11-23-24 @ 13:39), Max: 98.2 (11-23-24 @ 13:39)  HR: 72 (11-23-24 @ 14:00) (69 - 76)  BP: 98/54 (11-23-24 @ 14:00) (98/54 - 151/69)  BP(mean): 73 (11-23-24 @ 14:00) (72 - 99)  RR: 25 (11-23-24 @ 14:00) (17 - 25)  SpO2: 99% (11-23-24 @ 14:00) (97% - 100%)  I&O's Summary    22 Nov 2024 07:01  -  23 Nov 2024 07:00  --------------------------------------------------------  IN: 937.5 mL / OUT: 2000 mL / NET: -1062.5 mL    23 Nov 2024 07:01  -  23 Nov 2024 14:59  --------------------------------------------------------  IN: 296 mL / OUT: 300 mL / NET: -4 mL    PHYSICAL EXAM:  General: NAD, sitting and eating breakfast on arrival  HEENT: PERRL, EOM intact, sclera anicteric  Cardiovascular: RRR; no MRG; no JVD  Respiratory: CTAB; no WRR  GI/: soft; NT, mildly distended with abdominal discomfort  Extremities: WWP; 2+ peripheral pulses bilaterally; mild LE edema, tender to palpation of the b/l LE  Skin: residual wounds on LLE  Neurologic: AOx3; no focal deficits; follows all commands    LABS:                        9.4    4.86  )-----------( 196      ( 23 Nov 2024 05:06 )             29.1     11-23    136  |  105  |  14  ----------------------------<  113[H]  4.9   |  22  |  1.35[H]    Ca    8.6      23 Nov 2024 05:06  Phos  3.3     11-23  Mg     1.8     11-23    TPro  6.1  /  Alb  3.1[L]  /  TBili  0.6  /  DBili  x   /  AST  13  /  ALT  8[L]  /  AlkPhos  58  11-23      Urinalysis Basic - ( 23 Nov 2024 05:06 )    Color: x / Appearance: x / SG: x / pH: x  Gluc: 113 mg/dL / Ketone: x  / Bili: x / Urobili: x   Blood: x / Protein: x / Nitrite: x   Leuk Esterase: x / RBC: x / WBC x   Sq Epi: x / Non Sq Epi: x / Bacteria: x          RADIOLOGY & ADDITIONAL TESTS:    MEDICATIONS  (STANDING):  acetaminophen     Tablet .. 975 milliGRAM(s) Oral every 8 hours  apixaban 5 milliGRAM(s) Oral every 12 hours  ascorbic acid 500 milliGRAM(s) Oral daily  aspirin enteric coated 81 milliGRAM(s) Oral daily  atorvastatin 80 milliGRAM(s) Oral at bedtime  clopidogrel Tablet 75 milliGRAM(s) Oral daily  finasteride 5 milliGRAM(s) Oral daily  influenza  Vaccine (HIGH DOSE) 0.5 milliLiter(s) IntraMuscular once  isosorbide   mononitrate ER Tablet (IMDUR) 240 milliGRAM(s) Oral every 24 hours  metoprolol succinate ER 50 milliGRAM(s) Oral daily  multivitamin 1 Tablet(s) Oral daily  nitroglycerin  Infusion 100 MICROgram(s)/Min (30 mL/Hr) IV Continuous <Continuous>  pantoprazole    Tablet 40 milliGRAM(s) Oral daily  polyethylene glycol 3350 17 Gram(s) Oral daily  ranolazine 1000 milliGRAM(s) Oral two times a day  senna 2 Tablet(s) Oral at bedtime  tamsulosin 0.4 milliGRAM(s) Oral at bedtime    MEDICATIONS  (PRN):  bisacodyl Suppository 10 milliGRAM(s) Rectal daily PRN Constipation  calcium carbonate    500 mG (Tums) Chewable 1 Tablet(s) Chew three times a day PRN Heartburn  oxyCODONE    IR 2.5 milliGRAM(s) Oral every 4 hours PRN Moderate Pain (4 - 6)  oxyCODONE    IR 5 milliGRAM(s) Oral every 4 hours PRN Severe Pain (7 - 10)  simethicone 80 milliGRAM(s) Chew two times a day PRN Heartburn

## 2024-11-24 LAB
ALBUMIN SERPL ELPH-MCNC: 3.2 G/DL — LOW (ref 3.3–5)
ALP SERPL-CCNC: 59 U/L — SIGNIFICANT CHANGE UP (ref 40–120)
ALT FLD-CCNC: 10 U/L — SIGNIFICANT CHANGE UP (ref 10–45)
ANION GAP SERPL CALC-SCNC: 7 MMOL/L — SIGNIFICANT CHANGE UP (ref 5–17)
AST SERPL-CCNC: 15 U/L — SIGNIFICANT CHANGE UP (ref 10–40)
BASOPHILS # BLD AUTO: 0.01 K/UL — SIGNIFICANT CHANGE UP (ref 0–0.2)
BASOPHILS NFR BLD AUTO: 0.2 % — SIGNIFICANT CHANGE UP (ref 0–2)
BILIRUB SERPL-MCNC: 0.8 MG/DL — SIGNIFICANT CHANGE UP (ref 0.2–1.2)
BUN SERPL-MCNC: 16 MG/DL — SIGNIFICANT CHANGE UP (ref 7–23)
CALCIUM SERPL-MCNC: 8.6 MG/DL — SIGNIFICANT CHANGE UP (ref 8.4–10.5)
CHLORIDE SERPL-SCNC: 104 MMOL/L — SIGNIFICANT CHANGE UP (ref 96–108)
CO2 SERPL-SCNC: 24 MMOL/L — SIGNIFICANT CHANGE UP (ref 22–31)
CREAT SERPL-MCNC: 1.35 MG/DL — HIGH (ref 0.5–1.3)
EGFR: 54 ML/MIN/1.73M2 — LOW
EOSINOPHIL # BLD AUTO: 0.13 K/UL — SIGNIFICANT CHANGE UP (ref 0–0.5)
EOSINOPHIL NFR BLD AUTO: 2.6 % — SIGNIFICANT CHANGE UP (ref 0–6)
GLUCOSE SERPL-MCNC: 108 MG/DL — HIGH (ref 70–99)
HCT VFR BLD CALC: 32.4 % — LOW (ref 39–50)
HGB BLD-MCNC: 10.7 G/DL — LOW (ref 13–17)
IMM GRANULOCYTES NFR BLD AUTO: 0.2 % — SIGNIFICANT CHANGE UP (ref 0–0.9)
LYMPHOCYTES # BLD AUTO: 1.12 K/UL — SIGNIFICANT CHANGE UP (ref 1–3.3)
LYMPHOCYTES # BLD AUTO: 22.4 % — SIGNIFICANT CHANGE UP (ref 13–44)
MAGNESIUM SERPL-MCNC: 1.9 MG/DL — SIGNIFICANT CHANGE UP (ref 1.6–2.6)
MCHC RBC-ENTMCNC: 30.7 PG — SIGNIFICANT CHANGE UP (ref 27–34)
MCHC RBC-ENTMCNC: 33 G/DL — SIGNIFICANT CHANGE UP (ref 32–36)
MCV RBC AUTO: 93.1 FL — SIGNIFICANT CHANGE UP (ref 80–100)
MONOCYTES # BLD AUTO: 0.54 K/UL — SIGNIFICANT CHANGE UP (ref 0–0.9)
MONOCYTES NFR BLD AUTO: 10.8 % — SIGNIFICANT CHANGE UP (ref 2–14)
NEUTROPHILS # BLD AUTO: 3.19 K/UL — SIGNIFICANT CHANGE UP (ref 1.8–7.4)
NEUTROPHILS NFR BLD AUTO: 63.8 % — SIGNIFICANT CHANGE UP (ref 43–77)
NRBC # BLD: 0 /100 WBCS — SIGNIFICANT CHANGE UP (ref 0–0)
PHOSPHATE SERPL-MCNC: 3.1 MG/DL — SIGNIFICANT CHANGE UP (ref 2.5–4.5)
PLATELET # BLD AUTO: 213 K/UL — SIGNIFICANT CHANGE UP (ref 150–400)
POTASSIUM SERPL-MCNC: 4.6 MMOL/L — SIGNIFICANT CHANGE UP (ref 3.5–5.3)
POTASSIUM SERPL-SCNC: 4.6 MMOL/L — SIGNIFICANT CHANGE UP (ref 3.5–5.3)
PROT SERPL-MCNC: 6.3 G/DL — SIGNIFICANT CHANGE UP (ref 6–8.3)
RBC # BLD: 3.48 M/UL — LOW (ref 4.2–5.8)
RBC # FLD: 14.6 % — HIGH (ref 10.3–14.5)
SODIUM SERPL-SCNC: 135 MMOL/L — SIGNIFICANT CHANGE UP (ref 135–145)
WBC # BLD: 5 K/UL — SIGNIFICANT CHANGE UP (ref 3.8–10.5)
WBC # FLD AUTO: 5 K/UL — SIGNIFICANT CHANGE UP (ref 3.8–10.5)

## 2024-11-24 PROCEDURE — 71045 X-RAY EXAM CHEST 1 VIEW: CPT | Mod: 26

## 2024-11-24 PROCEDURE — 99291 CRITICAL CARE FIRST HOUR: CPT

## 2024-11-24 RX ORDER — LIDOCAINE 40 MG/G
1 CREAM TOPICAL EVERY 24 HOURS
Refills: 0 | Status: DISCONTINUED | OUTPATIENT
Start: 2024-11-24 | End: 2024-11-25

## 2024-11-24 RX ORDER — ISOSORBIDE MONONITRATE 10 MG
240 TABLET ORAL EVERY 24 HOURS
Refills: 0 | Status: DISCONTINUED | OUTPATIENT
Start: 2024-11-24 | End: 2024-11-27

## 2024-11-24 RX ORDER — METOPROLOL TARTRATE 100 MG/1
25 TABLET, FILM COATED ORAL ONCE
Refills: 0 | Status: COMPLETED | OUTPATIENT
Start: 2024-11-24 | End: 2024-11-24

## 2024-11-24 RX ADMIN — LIDOCAINE 1 PATCH: 40 CREAM TOPICAL at 22:00

## 2024-11-24 RX ADMIN — Medication 1 TABLET(S): at 12:11

## 2024-11-24 RX ADMIN — ACETAMINOPHEN 500MG 975 MILLIGRAM(S): 500 TABLET, COATED ORAL at 14:22

## 2024-11-24 RX ADMIN — METOPROLOL TARTRATE 50 MILLIGRAM(S): 100 TABLET, FILM COATED ORAL at 06:14

## 2024-11-24 RX ADMIN — Medication 80 MILLIGRAM(S): at 22:02

## 2024-11-24 RX ADMIN — ACETAMINOPHEN 500MG 975 MILLIGRAM(S): 500 TABLET, COATED ORAL at 22:02

## 2024-11-24 RX ADMIN — Medication 5 MILLIGRAM(S): at 15:37

## 2024-11-24 RX ADMIN — ACETAMINOPHEN 500MG 975 MILLIGRAM(S): 500 TABLET, COATED ORAL at 23:00

## 2024-11-24 RX ADMIN — LIDOCAINE 1 PATCH: 40 CREAM TOPICAL at 18:41

## 2024-11-24 RX ADMIN — ACETAMINOPHEN 500MG 975 MILLIGRAM(S): 500 TABLET, COATED ORAL at 07:00

## 2024-11-24 RX ADMIN — RANOLAZINE 1000 MILLIGRAM(S): 1000 TABLET, FILM COATED, EXTENDED RELEASE ORAL at 18:26

## 2024-11-24 RX ADMIN — APIXABAN 5 MILLIGRAM(S): 2.5 TABLET, FILM COATED ORAL at 18:26

## 2024-11-24 RX ADMIN — PANTOPRAZOLE SODIUM 40 MILLIGRAM(S): 40 TABLET, DELAYED RELEASE ORAL at 13:50

## 2024-11-24 RX ADMIN — CLOPIDOGREL 75 MILLIGRAM(S): 75 TABLET, FILM COATED ORAL at 12:11

## 2024-11-24 RX ADMIN — Medication 81 MILLIGRAM(S): at 12:11

## 2024-11-24 RX ADMIN — METOPROLOL TARTRATE 25 MILLIGRAM(S): 100 TABLET, FILM COATED ORAL at 11:15

## 2024-11-24 RX ADMIN — LIDOCAINE 1 PATCH: 40 CREAM TOPICAL at 09:41

## 2024-11-24 RX ADMIN — POLYETHYLENE GLYCOL 3350 17 GRAM(S): 17 POWDER, FOR SOLUTION ORAL at 12:11

## 2024-11-24 RX ADMIN — Medication 100 GRAM(S): at 06:15

## 2024-11-24 RX ADMIN — TAMSULOSIN HYDROCHLORIDE 0.4 MILLIGRAM(S): 0.4 CAPSULE ORAL at 22:02

## 2024-11-24 RX ADMIN — Medication 30 MICROGRAM(S)/MIN: at 07:33

## 2024-11-24 RX ADMIN — APIXABAN 5 MILLIGRAM(S): 2.5 TABLET, FILM COATED ORAL at 06:16

## 2024-11-24 RX ADMIN — Medication 240 MILLIGRAM(S): at 14:22

## 2024-11-24 RX ADMIN — Medication 500 MILLIGRAM(S): at 12:11

## 2024-11-24 RX ADMIN — ACETAMINOPHEN 500MG 975 MILLIGRAM(S): 500 TABLET, COATED ORAL at 06:14

## 2024-11-24 RX ADMIN — ACETAMINOPHEN 500MG 975 MILLIGRAM(S): 500 TABLET, COATED ORAL at 15:39

## 2024-11-24 RX ADMIN — RANOLAZINE 1000 MILLIGRAM(S): 1000 TABLET, FILM COATED, EXTENDED RELEASE ORAL at 06:14

## 2024-11-24 NOTE — PROGRESS NOTE ADULT - ATTENDING COMMENTS
75 yo man PMHx of CAD s/p PCI and recent minimally invasive direct CABG 10/31/24, ICM HFrEF, HTN, CKD, and PAD s/p prior L to R fem-fem bypass who was admitted for CLI of LLE with rest pain, s/p LE angiogram, s/p L common and external iliac stent but prox anastomosis of fem-fem bypass is upwards going (unable to treat endovascularly), w/ occluded L CFA, SFA, and AK popliteal artery w/ reconstitution of BK pop via collaterals w/ AT/PT runoff.    Assessment  1. CAD s/p PCI and minimally invasive direct CABG 10/31/24  EKG done 11/12 3:45 pm NSR, Q wave inferior leads, T wave flattening late precordial leads  EKG post op 11/14 NSR w/ new TWI and STD in lateral and precordial leads  ON w/ CP, EKG change, and elevated hsTrop concerning for NSTEMI  2. PAD s/p L-R fem-fem bypass c/b CLI LLE  S/p L common and external iliac stent 11/11 but prox anastomosis of fem-fem bypass is upwards going (unable to treat endovascularly), w/ occluded L CFA, SFA, and AK popliteal artery w/ reconstitution of BK pop via collaterals w/ AT/PT runoff.  Now s/p L fem-pop bypass 11/14  4. ICM HFrEF 35% - euvolemic  5. LLE DVT   V/Q scan negative for PE    Plan  1. Plavix 75mg PO QD and high intensity statin. Off ASA 81mg PO QD given initiation of AC for DVT, to avoid triple therapy. On heparin gtt.  2. Transfer to tele service, concern for postop NSTEMI, tenative [facility]  3. GDMT: metop succinate 50mg PO QD, will start ARNI/MRA/SGLT2i when BP better and GFR improved  4. Imdur 90mg PO QD for anti-anginal therapy, hold for SBP <90    Thank you for the consult and the opportunity to take care of this patient.     Marquis Pink M.D.  Cardiology | Vascular Cardiology Attending  Please call (c) [phone] for any questions    During non face-to-face time, I reviewed relevant portions of the patient's medical record. During face-to-face time, I took a relevant history and examined the patient. I also explained differential diagnoses, relevant cardiac diagnoses, workup, and management plan, which required a high level of medical decision making. I answered all questions related to the patient's medical conditions.
Pt is a 73 y/o man c CAD s/p PCI s/p CABG, JOE, Fem Pop bypass for acute limb ischemia now with chest pain. LHC done and no interventions done and pt cont with chest pain.    Pt started on nitro and increased to 100 and transferred to the CCU for angina mgmt.    Pt given fluid boluses and Morphine for pain    afeb, 75, 108/54, 98%  Na 136  K 4.9  Cr 1.35    Hgb 9.4  Plt 196    ECG: nsr c inf Qs  CXR: WNL    - nitro gtt turned off and pt with chest pain  - increase PO nitrates  - will transfuse pt 1 unit PRBC per MINT trail for angina, observed better outcomes  - wean down nitro post PRBC, increase nitrates
Pt is a 75 y/o man c CAD s/p PCI s/p CABG, JOE, Fem Pop bypass for acute limb ischemia now with chest pain. LHC done and no interventions done and pt cont with chest pain.    Pt started on nitro and increased to 100 and transferred to the CCU for angina mgmt.    Pt given fluid boluses and Morphine for pain    afeb, 69, 119/57, 98%    Cr 1.35    Cr 1.35    ECG: nsr c inf Qs  CXR: WNL    - nitro gtt turned off and pt s chest pain  - increased PO nitrates  - transfused pt 1 unit PRBC per MINT trial for angina, observed better outcomes  - consider ambulation and d/c pt with cardiac rehab for angina control
Pt is a 75 y/o man c CAD s/p PCI s/p CABG, JOE, Fem Pop bypass for acute limb ischemia now with chest pain. LHC done and no interventions done and pt cont with chest pain.    Pt started on nitro and increased to 100 and transferred to the CCU for angina mgmt.    Pt given fluid boluses and Morphine for pain    Agree to admit to the CICU and adjust nitro gtt for chest pain.

## 2024-11-24 NOTE — PROGRESS NOTE ADULT - ASSESSMENT
Assessment: 74 Polish speaking M w/ PMH of CAD (s/p PCI, CABG 10/31/24), ICM, HFrEF, HTN, CKD, and PAD (s/p prior L to R fem-fem bypass), presenting from [facility] for concern for acute limb ischemia, found to have presumed NSTEMI 2/2 hypotension/hypoperfusion. Transferred to CCU for management of angina w/ increased nitro gtt. Now weaned down to 30mcg w/ increased imdur to 240mg qd.     NEURO / PSYCH  A/o x4    CARDIOVASCULAR  #NSTEMI, Type 2   #CAD  Pt w/ frequent CP this admission; treated w/ increased Imdur, Tums/Simethicone, and nitro; failed sublingual nitro. Suspecting CP to be 2/2 hypoperfusion/ischemic pain.  Baseline trop 30s-40s, however Trop 447 on 11/19 AM --> 368. Pt txfr to Cards for urgent LHC.   S/p Minimally Invasive Direct Coronary Artery Bypass (MIDCAB; 10/31/24).   EKG 95bpm NSR, Q Waves II-AVF; ST Depressions I, II, AVL, V4-V6.  LDL 56 (10/27/24)    - Per Vascular, pt w/ frequent chest pain this admission, had increased Imdur, also treating w/ Tums/Simethicone; pt baseline trop 30s-40s, however Trop 447 on 11/19 AM --> 368. Pt txfr to Cards for urgent LHC.   - LHC 11/19/24:  LIMA-LAD patent. dLM (50-60%), pLAD (70%, diffuse), competitive flow from LIMA, dLAD mild diffuse;  LCX ostial , known; RCA not engaged, known .  [IC Aditya Kolb/Cici]  ACCESS LRA.  LVEDP 2mmHG.    Plan:  - C/w Triple therapy: Asa 81mg qd, Plavix 75mg Qd, Eliquis 5mg BID for DVT. As d/w Dr Rowe: triple therapy until CP improves (pt on PPI as well)  - Antianginals:                - weaned Nitro gtt to 30mcg gtt, wean off as tolerated               - increased PO Imdur to 240mg qd               - c/w Ranexa to 1000mg BID  - c/w Metoprolol succinate 50mg qd  - c/w Atorvastatin 80mg qhs    #PAD  Chronic h/o PAD, requiring fem-fem bypass 20yrs ago per pt.  - s/p 11/11/24 LLE angiogram with EIA and FELICITY stents  - s/p 11/14/24 LLE fem to AK-pop bypass ; LLE c/b hematoma from Hep GTT 11/19, as evaluated w/  Vascular Sx at bedside - no intervention needed- now improving 11/20 off hep gtt.  L popliteal medial knee site- w/ Kerlix c/d/i.    - CTA b/l LE at OSH 11/8: post fem-fem bypass, severe atherosclerotic disease involving aorta, iliac, and superficial femoral arteries b/l more on the left    Plan:  - Vascular Sx following, appreciate recs  - c/w Plavix 75mg qd and Atorvastatin 80mg qhs  - pain control per Vascular: currently on standing Tylenol 975mg q8h; oxycodone 2.5mg q4h PRN for moderate pain, and oxycodone 5mg q4h PRN for severe pain.   - bowel regimen: senna, Miralax standing; dulcolax prn  - Vasc Sx had planned for DOAC and antiplt monotherapy w/ Plavix on dc     #HFrEF  Warm, on room air  -TTE 10/29/24: LVEF 35% with regional wall abnormalities. Dilated LA. Normal RVSF.   -TTE 11/15/24: Not all myocardial wall segments are visualized. Basal and mid inferolateral walls are akinetic. Overall LVSF appears mild to moderately reduced    Plan:  - c/w Toprol 50mg as above  - Diuretic: none. Received IVF 11/20 in addition to Nitro gtt to increase perfusion and vasodilate  - monitor daily weight, strict i/o.    #HTN   SBP range today 89 - 125mmHg  - c/w meds as above.      PULM  Stable on RA.    GI  Bowel regimen: miralax & senna standing, ducolax supp PRN  Last BM: 4d ago per pt      #BPH   - c/w Tamsulosin and finasteride    ENDO  #PreDM  A1c 5.8%    RENAL  #CKD  - Cr elevated on admission, unclear baseline Cr (~1.5 per Vascular)  - SCr today 1.35 (11/24)    Plan:  - Monitor Cr and UOP    HEME  #ANEMIA   - Stable, Hgb today 9.3 (11/22)  - s/p 1u PRBC 11/16/24 for Hgb 7.7 due to suspected blood loss @ L Popliteal site per Vascular, now stable  - s/p 1u PRBC 11/19/24 for pre-LHC optimization  - give 1u pRBC on 11/23 for Hgb optimization, current level 9.4    Plan:   - Transfuse >8 given cardiac and renal risk factors  - Keep active type & screen (last on 11/23; next 11/26)    ID  DAYNA    MSK  DAYNA    PROPHYLAXIS  N: DASH/TLC diet, pureed per pt request  E: Replete lytes PRN K<4, Mg<2   DVT PPX: full AC Eliquis  C: FULL CODE   Dispo: CCU     Assessment: 74 Polish speaking M w/ PMH of CAD (s/p PCI, CABG 10/31/24), ICM, HFrEF, HTN, CKD, and PAD (s/p prior L to R fem-fem bypass), presenting from [facility] for concern for acute limb ischemia, found to have presumed NSTEMI 2/2 hypotension/hypoperfusion. Transferred to CCU for management of angina w/ increased nitro gtt. Now weaned down to 30mcg w/ increased imdur to 240mg qd.     NEURO / PSYCH  A/o x4    CARDIOVASCULAR  #NSTEMI, Type 2   #CAD  Pt w/ frequent CP this admission; treated w/ increased Imdur, Tums/Simethicone, and nitro; failed sublingual nitro. Suspecting CP to be 2/2 hypoperfusion/ischemic pain.  Baseline trop 30s-40s, however Trop 447 on 11/19 AM --> 368. Pt txfr to Cards for urgent LHC.   S/p Minimally Invasive Direct Coronary Artery Bypass (MIDCAB; 10/31/24).   EKG 95bpm NSR, Q Waves II-AVF; ST Depressions I, II, AVL, V4-V6.  LDL 56 (10/27/24)    - Per Vascular, pt w/ frequent chest pain this admission, had increased Imdur, also treating w/ Tums/Simethicone; pt baseline trop 30s-40s, however Trop 447 on 11/19 AM --> 368. Pt txfr to Cards for urgent LHC.   - LHC 11/19/24:  LIMA-LAD patent. dLM (50-60%), pLAD (70%, diffuse), competitive flow from LIMA, dLAD mild diffuse;  LCX ostial , known; RCA not engaged, known .  [IC Aditya Kolb/Cici]  ACCESS LRA.  LVEDP 2mmHG.    Plan:  - C/w Triple therapy: Asa 81mg qd, Plavix 75mg Qd, Eliquis 5mg BID for DVT. As d/w Dr Rowe: triple therapy until CP improves (pt on PPI as well)  - Antianginals:                - weaned Nitro gtt off; c/o CP at level 6-7, not worsening upon exertion               - c/w PO Imdur to 240mg qd               - c/w Ranexa to 1000mg BID  - c/w Metoprolol succinate 50mg qd; consider increasing if HR elevates when pt more active  - c/w Atorvastatin 80mg qhs    #PAD  Chronic h/o PAD, requiring fem-fem bypass 20yrs ago per pt.  - s/p 11/11/24 LLE angiogram with EIA and FELICITY stents  - s/p 11/14/24 LLE fem to AK-pop bypass ; LLE c/b hematoma from Hep GTT 11/19, as evaluated w/  Vascular Sx at bedside - no intervention needed- now improving 11/20 off hep gtt.  L popliteal medial knee site- w/ Kerlix c/d/i.    - CTA b/l LE at OSH 11/8: post fem-fem bypass, severe atherosclerotic disease involving aorta, iliac, and superficial femoral arteries b/l more on the left    Plan:  - Vascular Sx following, appreciate recs  - Wound care following for LLE wounds.   - c/w Plavix 75mg qd and Atorvastatin 80mg qhs  - pain control per Vascular: currently on standing Tylenol 975mg q8h; oxycodone 2.5mg q4h PRN for moderate pain, and oxycodone 5mg q4h PRN for severe pain.   - bowel regimen: senna, Miralax standing; dulcolax prn  - Vasc Sx had planned for DOAC and antiplt monotherapy w/ Plavix on dc     #HFrEF  Warm, on room air  -TTE 10/29/24: LVEF 35% with regional wall abnormalities. Dilated LA. Normal RVSF.   -TTE 11/15/24: Not all myocardial wall segments are visualized. Basal and mid inferolateral walls are akinetic. Overall LVSF appears mild to moderately reduced    Plan:  - c/w Toprol 50mg as above  - Diuretic: none. Received IVF 11/20 in addition to Nitro gtt to increase perfusion and vasodilate  - monitor daily weight, strict i/o.    #HTN   Stable  - c/w meds as above.      PULM  Stable on RA.    GI  Bowel regimen: miralax & senna standing, ducolax supp PRN  Last BM: 4d ago per pt      #BPH   - c/w Tamsulosin and finasteride    ENDO  #PreDM  A1c 5.8%    RENAL  #CKD  - Cr elevated on admission, unclear baseline Cr (~1.5 per Vascular)  - SCr today 1.35 (11/24)    Plan:  - Monitor Cr and UOP    HEME  #ANEMIA   - Stable, Hgb today 10.7 after 1u transfusion on 11/23.  - s/p 1u PRBC 11/16/24 for Hgb 7.7 due to suspected blood loss @ L Popliteal site per Vascular, now stable  - s/p 1u PRBC 11/19/24 for pre-LHC optimization  - give 1u pRBC on 11/23 for Hgb optimization, current level 9.4    Plan:   - Transfuse >8 given cardiac and renal risk factors  - Keep active type & screen (last on 11/23; next 11/26)    ID  DAYNA    MSK  DAYNA    PROPHYLAXIS  N: DASH/TLC diet, pureed per pt request  E: Replete lytes PRN K<4, Mg<2   DVT PPX: full AC Eliquis  C: FULL CODE   Dispo: CCU; expecting step down when pt stable w/ nitro gtt dc'ed

## 2024-11-24 NOTE — PROGRESS NOTE ADULT - SUBJECTIVE AND OBJECTIVE BOX
Patient is a 74y old  Male who presents with a chief complaint of transfer for rule out limb ischemia (24 Nov 2024 06:34)      INTERVAL HPI/OVERNIGHT EVENTS:   O/N: Pt reported worsening CP/SOB w/ decreased nitro gtt. EKG unchanged from prior (NSR, old inferior infarct, TWI in lateral leads). Trp wnl.   AM: Reports having CP of 4 as people were moving him around for CXR. States his CP previous to them moving him was 2-3. Pt states he has not walked around for a while since he had the stents in LE for PAD but at baseline, he walks w/ a cane at home.     ICU Vital Signs Last 24 Hrs  T(C): 36.6 (24 Nov 2024 09:00), Max: 36.7 (23 Nov 2024 18:10)  T(F): 97.8 (24 Nov 2024 09:00), Max: 98.1 (24 Nov 2024 05:08)  HR: 70 (24 Nov 2024 15:00) (65 - 78)  BP: 125/74 (24 Nov 2024 15:00) (91/52 - 127/58)  BP(mean): 94 (24 Nov 2024 15:00) (66 - 94)  ABP: --  ABP(mean): --  RR: 26 (24 Nov 2024 15:00) (14 - 40)  SpO2: 99% (24 Nov 2024 15:00) (94% - 99%)    O2 Parameters below as of 24 Nov 2024 16:00  Patient On (Oxygen Delivery Method): room air          I&O's Summary    23 Nov 2024 07:01  -  24 Nov 2024 07:00  --------------------------------------------------------  IN: 1396 mL / OUT: 1800 mL / NET: -404 mL    24 Nov 2024 07:01  -  24 Nov 2024 16:18  --------------------------------------------------------  IN: 493.5 mL / OUT: 1100 mL / NET: -606.5 mL          LABS:                        10.7   5.00  )-----------( 213      ( 24 Nov 2024 05:30 )             32.4     11-24    135  |  104  |  16  ----------------------------<  108[H]  4.6   |  24  |  1.35[H]    Ca    8.6      24 Nov 2024 05:30  Phos  3.1     11-24  Mg     1.9     11-24    TPro  6.3  /  Alb  3.2[L]  /  TBili  0.8  /  DBili  x   /  AST  15  /  ALT  10  /  AlkPhos  59  11-24      Urinalysis Basic - ( 24 Nov 2024 05:30 )    Color: x / Appearance: x / SG: x / pH: x  Gluc: 108 mg/dL / Ketone: x  / Bili: x / Urobili: x   Blood: x / Protein: x / Nitrite: x   Leuk Esterase: x / RBC: x / WBC x   Sq Epi: x / Non Sq Epi: x / Bacteria: x      CAPILLARY BLOOD GLUCOSE            RADIOLOGY & ADDITIONAL TESTS:    Consultant(s) Notes Reviewed:  [x ] YES  [ ] NO    MEDICATIONS  (STANDING):  acetaminophen     Tablet .. 975 milliGRAM(s) Oral every 8 hours  apixaban 5 milliGRAM(s) Oral every 12 hours  ascorbic acid 500 milliGRAM(s) Oral daily  aspirin enteric coated 81 milliGRAM(s) Oral daily  atorvastatin 80 milliGRAM(s) Oral at bedtime  clopidogrel Tablet 75 milliGRAM(s) Oral daily  finasteride 5 milliGRAM(s) Oral daily  influenza  Vaccine (HIGH DOSE) 0.5 milliLiter(s) IntraMuscular once  isosorbide   mononitrate ER Tablet (IMDUR) 240 milliGRAM(s) Oral every 24 hours  lidocaine   4% Patch 1 Patch Transdermal every 24 hours  lidocaine   4% Patch 1 Patch Transdermal every 24 hours  metoprolol succinate ER 50 milliGRAM(s) Oral daily  multivitamin 1 Tablet(s) Oral daily  pantoprazole    Tablet 40 milliGRAM(s) Oral daily  polyethylene glycol 3350 17 Gram(s) Oral daily  ranolazine 1000 milliGRAM(s) Oral two times a day  senna 2 Tablet(s) Oral at bedtime  tamsulosin 0.4 milliGRAM(s) Oral at bedtime    MEDICATIONS  (PRN):  bisacodyl Suppository 10 milliGRAM(s) Rectal daily PRN Constipation  calcium carbonate    500 mG (Tums) Chewable 1 Tablet(s) Chew three times a day PRN Heartburn  oxyCODONE    IR 2.5 milliGRAM(s) Oral every 4 hours PRN Moderate Pain (4 - 6)  oxyCODONE    IR 5 milliGRAM(s) Oral every 4 hours PRN Severe Pain (7 - 10)  simethicone 80 milliGRAM(s) Chew two times a day PRN Heartburn      PHYSICAL EXAM:  GENERAL:   HEAD:  Atraumatic, Normocephalic  EYES: EOMI, PERRLA, conjunctiva and sclera clear  NECK: Supple, No JVD   NERVOUS SYSTEM:  Alert & Awake.   CHEST/LUNG: B/L good air entry; No rales, rhonchi, or wheezing  HEART: S1S2 normal, no S3, Regular rate and rhythm; No murmurs  ABDOMEN: Soft, Nontender, Nondistended; Bowel sounds present  EXTREMITIES:  2+ Peripheral Pulses, No clubbing, cyanosis, or edema. Multiple scabs over L anterior shin; painful upon palpation.   SKIN: No rashes or lesions    Care Discussed with Consultants/Other Providers [ x] YES  [ ] NO Patient is a 74y old  Male who presents with a chief complaint of transfer for rule out limb ischemia (24 Nov 2024 06:34)      INTERVAL HPI/OVERNIGHT EVENTS:   O/N: Pt reported worsening CP/SOB w/ decreased nitro gtt. EKG unchanged from prior (NSR, old inferior infarct, TWI in lateral leads). Trp wnl.   AM: Reports having CP of 4 as people were moving him around for CXR. States his CP previous to them moving him was 2-3. Pt states he has not walked around for a while since he had the stents in LE for PAD but at baseline, he walks w/ a cane at home.     ICU Vital Signs Last 24 Hrs  T(C): 36.6 (24 Nov 2024 09:00), Max: 36.7 (23 Nov 2024 18:10)  T(F): 97.8 (24 Nov 2024 09:00), Max: 98.1 (24 Nov 2024 05:08)  HR: 70 (24 Nov 2024 15:00) (65 - 78)  BP: 125/74 (24 Nov 2024 15:00) (91/52 - 127/58)  BP(mean): 94 (24 Nov 2024 15:00) (66 - 94)  ABP: --  ABP(mean): --  RR: 26 (24 Nov 2024 15:00) (14 - 40)  SpO2: 99% (24 Nov 2024 15:00) (94% - 99%)    O2 Parameters below as of 24 Nov 2024 16:00  Patient On (Oxygen Delivery Method): room air          I&O's Summary    23 Nov 2024 07:01  -  24 Nov 2024 07:00  --------------------------------------------------------  IN: 1396 mL / OUT: 1800 mL / NET: -404 mL    24 Nov 2024 07:01  -  24 Nov 2024 16:18  --------------------------------------------------------  IN: 493.5 mL / OUT: 1100 mL / NET: -606.5 mL          LABS:                        10.7   5.00  )-----------( 213      ( 24 Nov 2024 05:30 )             32.4     11-24    135  |  104  |  16  ----------------------------<  108[H]  4.6   |  24  |  1.35[H]    Ca    8.6      24 Nov 2024 05:30  Phos  3.1     11-24  Mg     1.9     11-24    TPro  6.3  /  Alb  3.2[L]  /  TBili  0.8  /  DBili  x   /  AST  15  /  ALT  10  /  AlkPhos  59  11-24      Urinalysis Basic - ( 24 Nov 2024 05:30 )    Color: x / Appearance: x / SG: x / pH: x  Gluc: 108 mg/dL / Ketone: x  / Bili: x / Urobili: x   Blood: x / Protein: x / Nitrite: x   Leuk Esterase: x / RBC: x / WBC x   Sq Epi: x / Non Sq Epi: x / Bacteria: x      CAPILLARY BLOOD GLUCOSE            RADIOLOGY & ADDITIONAL TESTS:    Consultant(s) Notes Reviewed:  [x ] YES  [ ] NO    MEDICATIONS  (STANDING):  acetaminophen     Tablet .. 975 milliGRAM(s) Oral every 8 hours  apixaban 5 milliGRAM(s) Oral every 12 hours  ascorbic acid 500 milliGRAM(s) Oral daily  aspirin enteric coated 81 milliGRAM(s) Oral daily  atorvastatin 80 milliGRAM(s) Oral at bedtime  clopidogrel Tablet 75 milliGRAM(s) Oral daily  finasteride 5 milliGRAM(s) Oral daily  influenza  Vaccine (HIGH DOSE) 0.5 milliLiter(s) IntraMuscular once  isosorbide   mononitrate ER Tablet (IMDUR) 240 milliGRAM(s) Oral every 24 hours  lidocaine   4% Patch 1 Patch Transdermal every 24 hours  lidocaine   4% Patch 1 Patch Transdermal every 24 hours  metoprolol succinate ER 50 milliGRAM(s) Oral daily  multivitamin 1 Tablet(s) Oral daily  pantoprazole    Tablet 40 milliGRAM(s) Oral daily  polyethylene glycol 3350 17 Gram(s) Oral daily  ranolazine 1000 milliGRAM(s) Oral two times a day  senna 2 Tablet(s) Oral at bedtime  tamsulosin 0.4 milliGRAM(s) Oral at bedtime    MEDICATIONS  (PRN):  bisacodyl Suppository 10 milliGRAM(s) Rectal daily PRN Constipation  calcium carbonate    500 mG (Tums) Chewable 1 Tablet(s) Chew three times a day PRN Heartburn  oxyCODONE    IR 2.5 milliGRAM(s) Oral every 4 hours PRN Moderate Pain (4 - 6)  oxyCODONE    IR 5 milliGRAM(s) Oral every 4 hours PRN Severe Pain (7 - 10)  simethicone 80 milliGRAM(s) Chew two times a day PRN Heartburn      PHYSICAL EXAM:  GENERAL:   HEAD:  Atraumatic, Normocephalic  EYES: EOMI, PERRLA, conjunctiva and sclera clear  NECK: Supple, No JVD   NERVOUS SYSTEM:  Alert & Awake.   CHEST/LUNG: B/L good air entry; No rales, rhonchi, or wheezing. Pain in chest upon palpation; worse L>R  HEART: S1S2 normal, no S3, Regular rate and rhythm; No murmurs  ABDOMEN: Soft, Nontender, Nondistended; Bowel sounds present  EXTREMITIES:  2+ Peripheral Pulses, No clubbing, cyanosis, or edema. Multiple scabs over L anterior shin; painful upon palpation.   SKIN: No rashes or lesions    Care Discussed with Consultants/Other Providers [ x] YES  [ ] NO Patient is a 74y old  Male who presents with a chief complaint of transfer for rule out limb ischemia (24 Nov 2024 06:34)      INTERVAL HPI/OVERNIGHT EVENTS:   O/N: Pt reported worsening CP/SOB w/ decreased nitro gtt. EKG unchanged from prior (NSR, old inferior infarct, TWI in lateral leads). Trp wnl.   AM: Reports having CP of 4 as people were moving him around for CXR. States his CP previous to them moving him was 2-3. Pt states he has not walked around for a while since he had the stents in LE for PAD but at baseline, he walks w/ a cane at home.     ICU Vital Signs Last 24 Hrs  T(C): 36.6 (24 Nov 2024 09:00), Max: 36.7 (23 Nov 2024 18:10)  T(F): 97.8 (24 Nov 2024 09:00), Max: 98.1 (24 Nov 2024 05:08)  HR: 70 (24 Nov 2024 15:00) (65 - 78)  BP: 125/74 (24 Nov 2024 15:00) (91/52 - 127/58)  BP(mean): 94 (24 Nov 2024 15:00) (66 - 94)  ABP: --  ABP(mean): --  RR: 26 (24 Nov 2024 15:00) (14 - 40)  SpO2: 99% (24 Nov 2024 15:00) (94% - 99%)    O2 Parameters below as of 24 Nov 2024 16:00  Patient On (Oxygen Delivery Method): room air          I&O's Summary    23 Nov 2024 07:01  -  24 Nov 2024 07:00  --------------------------------------------------------  IN: 1396 mL / OUT: 1800 mL / NET: -404 mL    24 Nov 2024 07:01  -  24 Nov 2024 16:18  --------------------------------------------------------  IN: 493.5 mL / OUT: 1100 mL / NET: -606.5 mL          LABS:                        10.7   5.00  )-----------( 213      ( 24 Nov 2024 05:30 )             32.4     11-24    135  |  104  |  16  ----------------------------<  108[H]  4.6   |  24  |  1.35[H]    Ca    8.6      24 Nov 2024 05:30  Phos  3.1     11-24  Mg     1.9     11-24    TPro  6.3  /  Alb  3.2[L]  /  TBili  0.8  /  DBili  x   /  AST  15  /  ALT  10  /  AlkPhos  59  11-24      Urinalysis Basic - ( 24 Nov 2024 05:30 )    Color: x / Appearance: x / SG: x / pH: x  Gluc: 108 mg/dL / Ketone: x  / Bili: x / Urobili: x   Blood: x / Protein: x / Nitrite: x   Leuk Esterase: x / RBC: x / WBC x   Sq Epi: x / Non Sq Epi: x / Bacteria: x      CAPILLARY BLOOD GLUCOSE            RADIOLOGY & ADDITIONAL TESTS:    Consultant(s) Notes Reviewed:  [x ] YES  [ ] NO    MEDICATIONS  (STANDING):  acetaminophen     Tablet .. 975 milliGRAM(s) Oral every 8 hours  apixaban 5 milliGRAM(s) Oral every 12 hours  ascorbic acid 500 milliGRAM(s) Oral daily  aspirin enteric coated 81 milliGRAM(s) Oral daily  atorvastatin 80 milliGRAM(s) Oral at bedtime  clopidogrel Tablet 75 milliGRAM(s) Oral daily  finasteride 5 milliGRAM(s) Oral daily  influenza  Vaccine (HIGH DOSE) 0.5 milliLiter(s) IntraMuscular once  isosorbide   mononitrate ER Tablet (IMDUR) 240 milliGRAM(s) Oral every 24 hours  lidocaine   4% Patch 1 Patch Transdermal every 24 hours  lidocaine   4% Patch 1 Patch Transdermal every 24 hours  metoprolol succinate ER 50 milliGRAM(s) Oral daily  multivitamin 1 Tablet(s) Oral daily  pantoprazole    Tablet 40 milliGRAM(s) Oral daily  polyethylene glycol 3350 17 Gram(s) Oral daily  ranolazine 1000 milliGRAM(s) Oral two times a day  senna 2 Tablet(s) Oral at bedtime  tamsulosin 0.4 milliGRAM(s) Oral at bedtime    MEDICATIONS  (PRN):  bisacodyl Suppository 10 milliGRAM(s) Rectal daily PRN Constipation  calcium carbonate    500 mG (Tums) Chewable 1 Tablet(s) Chew three times a day PRN Heartburn  oxyCODONE    IR 2.5 milliGRAM(s) Oral every 4 hours PRN Moderate Pain (4 - 6)  oxyCODONE    IR 5 milliGRAM(s) Oral every 4 hours PRN Severe Pain (7 - 10)  simethicone 80 milliGRAM(s) Chew two times a day PRN Heartburn      PHYSICAL EXAM:  GENERAL: a/o x 4  HEAD:  Atraumatic, Normocephalic  EYES: EOMI, PERRLA, conjunctiva and sclera clear  NECK: Supple, No JVD   NERVOUS SYSTEM:  Alert & Awake.   CHEST/LUNG: B/L good air entry; No rales, rhonchi, or wheezing. Pain in chest upon palpation; worse L>R  HEART: S1S2 normal, no S3, Regular rate and rhythm; No murmurs  ABDOMEN: Soft, Nontender, Nondistended; Bowel sounds present  EXTREMITIES:  2+ Peripheral Pulses, No clubbing, cyanosis, or edema. Multiple scabs over L anterior shin; painful upon palpation.   SKIN: No rashes or lesions    Care Discussed with Consultants/Other Providers [ x] YES  [ ] NO

## 2024-11-25 DIAGNOSIS — D62 ACUTE POSTHEMORRHAGIC ANEMIA: ICD-10-CM

## 2024-11-25 DIAGNOSIS — I50.22 CHRONIC SYSTOLIC (CONGESTIVE) HEART FAILURE: ICD-10-CM

## 2024-11-25 DIAGNOSIS — I73.9 PERIPHERAL VASCULAR DISEASE, UNSPECIFIED: ICD-10-CM

## 2024-11-25 DIAGNOSIS — I50.20 UNSPECIFIED SYSTOLIC (CONGESTIVE) HEART FAILURE: ICD-10-CM

## 2024-11-25 DIAGNOSIS — Z87.438 PERSONAL HISTORY OF OTHER DISEASES OF MALE GENITAL ORGANS: ICD-10-CM

## 2024-11-25 DIAGNOSIS — Z29.9 ENCOUNTER FOR PROPHYLACTIC MEASURES, UNSPECIFIED: ICD-10-CM

## 2024-11-25 DIAGNOSIS — I82.409 ACUTE EMBOLISM AND THROMBOSIS OF UNSPECIFIED DEEP VEINS OF UNSPECIFIED LOWER EXTREMITY: ICD-10-CM

## 2024-11-25 LAB
ALBUMIN SERPL ELPH-MCNC: 3 G/DL — LOW (ref 3.3–5)
ALP SERPL-CCNC: 60 U/L — SIGNIFICANT CHANGE UP (ref 40–120)
ALT FLD-CCNC: 9 U/L — LOW (ref 10–45)
ANION GAP SERPL CALC-SCNC: 9 MMOL/L — SIGNIFICANT CHANGE UP (ref 5–17)
AST SERPL-CCNC: 13 U/L — SIGNIFICANT CHANGE UP (ref 10–40)
BASOPHILS # BLD AUTO: 0.01 K/UL — SIGNIFICANT CHANGE UP (ref 0–0.2)
BASOPHILS NFR BLD AUTO: 0.2 % — SIGNIFICANT CHANGE UP (ref 0–2)
BILIRUB SERPL-MCNC: 0.4 MG/DL — SIGNIFICANT CHANGE UP (ref 0.2–1.2)
BUN SERPL-MCNC: 17 MG/DL — SIGNIFICANT CHANGE UP (ref 7–23)
CALCIUM SERPL-MCNC: 8.9 MG/DL — SIGNIFICANT CHANGE UP (ref 8.4–10.5)
CHLORIDE SERPL-SCNC: 103 MMOL/L — SIGNIFICANT CHANGE UP (ref 96–108)
CO2 SERPL-SCNC: 22 MMOL/L — SIGNIFICANT CHANGE UP (ref 22–31)
CREAT SERPL-MCNC: 1.35 MG/DL — HIGH (ref 0.5–1.3)
EGFR: 54 ML/MIN/1.73M2 — LOW
EOSINOPHIL # BLD AUTO: 0.12 K/UL — SIGNIFICANT CHANGE UP (ref 0–0.5)
EOSINOPHIL NFR BLD AUTO: 2.2 % — SIGNIFICANT CHANGE UP (ref 0–6)
GLUCOSE SERPL-MCNC: 117 MG/DL — HIGH (ref 70–99)
HCT VFR BLD CALC: 35.2 % — LOW (ref 39–50)
HGB BLD-MCNC: 11.2 G/DL — LOW (ref 13–17)
IMM GRANULOCYTES NFR BLD AUTO: 0.4 % — SIGNIFICANT CHANGE UP (ref 0–0.9)
LYMPHOCYTES # BLD AUTO: 1.27 K/UL — SIGNIFICANT CHANGE UP (ref 1–3.3)
LYMPHOCYTES # BLD AUTO: 22.9 % — SIGNIFICANT CHANGE UP (ref 13–44)
MAGNESIUM SERPL-MCNC: 1.9 MG/DL — SIGNIFICANT CHANGE UP (ref 1.6–2.6)
MCHC RBC-ENTMCNC: 30.4 PG — SIGNIFICANT CHANGE UP (ref 27–34)
MCHC RBC-ENTMCNC: 31.8 G/DL — LOW (ref 32–36)
MCV RBC AUTO: 95.7 FL — SIGNIFICANT CHANGE UP (ref 80–100)
MONOCYTES # BLD AUTO: 0.58 K/UL — SIGNIFICANT CHANGE UP (ref 0–0.9)
MONOCYTES NFR BLD AUTO: 10.5 % — SIGNIFICANT CHANGE UP (ref 2–14)
NEUTROPHILS # BLD AUTO: 3.55 K/UL — SIGNIFICANT CHANGE UP (ref 1.8–7.4)
NEUTROPHILS NFR BLD AUTO: 63.8 % — SIGNIFICANT CHANGE UP (ref 43–77)
NRBC # BLD: 0 /100 WBCS — SIGNIFICANT CHANGE UP (ref 0–0)
PHOSPHATE SERPL-MCNC: 3.2 MG/DL — SIGNIFICANT CHANGE UP (ref 2.5–4.5)
PLATELET # BLD AUTO: 218 K/UL — SIGNIFICANT CHANGE UP (ref 150–400)
POTASSIUM SERPL-MCNC: 4.4 MMOL/L — SIGNIFICANT CHANGE UP (ref 3.5–5.3)
POTASSIUM SERPL-SCNC: 4.4 MMOL/L — SIGNIFICANT CHANGE UP (ref 3.5–5.3)
PROT SERPL-MCNC: 6.4 G/DL — SIGNIFICANT CHANGE UP (ref 6–8.3)
RBC # BLD: 3.68 M/UL — LOW (ref 4.2–5.8)
RBC # FLD: 14.5 % — SIGNIFICANT CHANGE UP (ref 10.3–14.5)
SODIUM SERPL-SCNC: 134 MMOL/L — LOW (ref 135–145)
WBC # BLD: 5.55 K/UL — SIGNIFICANT CHANGE UP (ref 3.8–10.5)
WBC # FLD AUTO: 5.55 K/UL — SIGNIFICANT CHANGE UP (ref 3.8–10.5)

## 2024-11-25 PROCEDURE — 93010 ELECTROCARDIOGRAM REPORT: CPT

## 2024-11-25 PROCEDURE — 99233 SBSQ HOSP IP/OBS HIGH 50: CPT

## 2024-11-25 PROCEDURE — 71045 X-RAY EXAM CHEST 1 VIEW: CPT | Mod: 26

## 2024-11-25 RX ORDER — SPIRONOLACTONE 25 MG
25 TABLET ORAL EVERY 24 HOURS
Refills: 0 | Status: DISCONTINUED | OUTPATIENT
Start: 2024-11-25 | End: 2024-11-27

## 2024-11-25 RX ORDER — METOPROLOL TARTRATE 100 MG/1
25 TABLET, FILM COATED ORAL ONCE
Refills: 0 | Status: COMPLETED | OUTPATIENT
Start: 2024-11-25 | End: 2024-11-25

## 2024-11-25 RX ORDER — METOPROLOL TARTRATE 100 MG/1
75 TABLET, FILM COATED ORAL EVERY 24 HOURS
Refills: 0 | Status: DISCONTINUED | OUTPATIENT
Start: 2024-11-26 | End: 2024-11-26

## 2024-11-25 RX ORDER — DAPAGLIFLOZIN 10 MG/1
10 TABLET, FILM COATED ORAL EVERY 24 HOURS
Refills: 0 | Status: DISCONTINUED | OUTPATIENT
Start: 2024-11-25 | End: 2024-11-27

## 2024-11-25 RX ADMIN — POLYETHYLENE GLYCOL 3350 17 GRAM(S): 17 POWDER, FOR SOLUTION ORAL at 11:27

## 2024-11-25 RX ADMIN — DAPAGLIFLOZIN 10 MILLIGRAM(S): 10 TABLET, FILM COATED ORAL at 11:26

## 2024-11-25 RX ADMIN — Medication 1 TABLET(S): at 11:26

## 2024-11-25 RX ADMIN — PANTOPRAZOLE SODIUM 40 MILLIGRAM(S): 40 TABLET, DELAYED RELEASE ORAL at 13:19

## 2024-11-25 RX ADMIN — TAMSULOSIN HYDROCHLORIDE 0.4 MILLIGRAM(S): 0.4 CAPSULE ORAL at 21:10

## 2024-11-25 RX ADMIN — ACETAMINOPHEN 500MG 975 MILLIGRAM(S): 500 TABLET, COATED ORAL at 06:23

## 2024-11-25 RX ADMIN — Medication 500 MILLIGRAM(S): at 11:27

## 2024-11-25 RX ADMIN — METOPROLOL TARTRATE 50 MILLIGRAM(S): 100 TABLET, FILM COATED ORAL at 06:23

## 2024-11-25 RX ADMIN — METOPROLOL TARTRATE 25 MILLIGRAM(S): 100 TABLET, FILM COATED ORAL at 10:42

## 2024-11-25 RX ADMIN — Medication 80 MILLIGRAM(S): at 21:10

## 2024-11-25 RX ADMIN — Medication 100 GRAM(S): at 06:50

## 2024-11-25 RX ADMIN — Medication 2 TABLET(S): at 21:11

## 2024-11-25 RX ADMIN — Medication 25 MILLIGRAM(S): at 11:26

## 2024-11-25 RX ADMIN — CLOPIDOGREL 75 MILLIGRAM(S): 75 TABLET, FILM COATED ORAL at 11:27

## 2024-11-25 RX ADMIN — ACETAMINOPHEN 500MG 975 MILLIGRAM(S): 500 TABLET, COATED ORAL at 06:57

## 2024-11-25 RX ADMIN — Medication 240 MILLIGRAM(S): at 13:32

## 2024-11-25 RX ADMIN — RANOLAZINE 1000 MILLIGRAM(S): 1000 TABLET, FILM COATED, EXTENDED RELEASE ORAL at 06:23

## 2024-11-25 RX ADMIN — APIXABAN 5 MILLIGRAM(S): 2.5 TABLET, FILM COATED ORAL at 18:43

## 2024-11-25 RX ADMIN — Medication 5 MILLIGRAM(S): at 16:35

## 2024-11-25 RX ADMIN — APIXABAN 5 MILLIGRAM(S): 2.5 TABLET, FILM COATED ORAL at 06:23

## 2024-11-25 RX ADMIN — RANOLAZINE 1000 MILLIGRAM(S): 1000 TABLET, FILM COATED, EXTENDED RELEASE ORAL at 18:44

## 2024-11-25 NOTE — PROGRESS NOTE ADULT - ASSESSMENT
Assessment: 74 Polish speaking M w/ PMH of CAD (s/p PCI, CABG 10/31/24), ICM, HFrEF, HTN, CKD, and PAD (s/p prior L to R fem-fem bypass), [facility] for concern for acute limb ischemia, found to have presumed NSTEMI 2/2 hypotension/hypoperfusion. Transferred to CCU for management of angina w/ increased nitro gtt. Now weaned down to 30mcg w/ increased imdur to 240mg qd.     NEURO / PSYCH  A/o x4    CARDIOVASCULAR  #NSTEMI, Type 2   #CAD  Frequent CP this admission; treated w/ increased Imdur, Tums/Simethicone, and nitro; failed sublingual nitro. Likely 2/2 hypoperfusion/ischemic pain vs pain from recent MIDCAB; 10/31/24.  Trop 447 on 11/19 AM >> 368. Pt transferred to [facility] for urgent LHC.   - LHC 11/19/24:  Known  in ostial LCX & RCA. LIMA-LAD patent. dLM (50-60%), pLAD (70%, diffuse), competitive flow from LIMA, dLAD mild diffuse.    Plan:  - C/w Triple therapy: Asa 81mg qd, Plavix 75mg Qd, Eliquis 5mg BID for DVT. As d/w Dr Rowe: triple therapy until CP improves (pt on PPI as well)  - Antianginals:                - weaned Nitro gtt off; c/o CP at level 6-7, not worsening upon exertion               - c/w PO Imdur to 240mg qd               - c/w Ranexa to 1000mg BID  - c/w Metoprolol succinate 50mg qd; consider increasing if HR elevates when pt more active  - c/w Atorvastatin 80mg qhs    #PAD  Chronic h/o PAD, requiring fem-fem bypass 20yrs ago per pt.  - s/p 11/11/24 LLE angiogram with EIA and FELICITY stents  - s/p 11/14/24 LLE fem to AK-pop bypass ; LLE c/b hematoma from Hep GTT 11/19, as evaluated w/  Vascular Sx at bedside - no intervention needed- now improving 11/20 off hep gtt.  L popliteal medial knee site- w/ Kerlix c/d/i.    - CTA b/l LE at OSH 11/8: post fem-fem bypass, severe atherosclerotic disease involving aorta, iliac, and superficial femoral arteries b/l more on the left    Plan:  - Vascular Sx following, appreciate recs  - Wound care following for LLE wounds.   - c/w Plavix 75mg qd and Atorvastatin 80mg qhs  - pain control per Vascular: currently on standing Tylenol 975mg q8h; oxycodone 2.5mg q4h PRN for moderate pain, and oxycodone 5mg q4h PRN for severe pain.   - bowel regimen: senna, Miralax standing; dulcolax prn  - Vasc Sx had planned for DOAC and antiplt monotherapy w/ Plavix on dc     #HFrEF  Warm, on room air  -TTE 10/29/24: LVEF 35% with regional wall abnormalities. Dilated LA. Normal RVSF.   -TTE 11/15/24: Not all myocardial wall segments are visualized. Basal and mid inferolateral walls are akinetic. Overall LVSF appears mild to moderately reduced    Plan:  - c/w Toprol 50mg as above  - Diuretic: none. Received IVF 11/20 in addition to Nitro gtt to increase perfusion and vasodilate  - monitor daily weight, strict i/o.    #HTN   Stable  - c/w meds as above.      PULM  Stable on RA.    GI  Bowel regimen: miralax & senna standing, ducolax supp PRN  Last BM: 4d ago per pt      #BPH   - c/w Tamsulosin and finasteride    ENDO  #PreDM  A1c 5.8%    RENAL  #CKD  - Cr elevated on admission, unclear baseline Cr (~1.5 per Vascular)  - SCr today 1.35 (11/24)    Plan:  - Monitor Cr and UOP    HEME  #ANEMIA   - Stable, Hgb today 10.7 after 1u transfusion on 11/23.  - s/p 1u PRBC 11/16/24 for Hgb 7.7 due to suspected blood loss @ L Popliteal site per Vascular, now stable  - s/p 1u PRBC 11/19/24 for pre-LHC optimization  - give 1u pRBC on 11/23 for Hgb optimization, current level 9.4    Plan:   - Transfuse >8 given cardiac and renal risk factors  - Keep active type & screen (last on 11/23; next 11/26)    ID  DAYNA    MSK  DAYNA    PROPHYLAXIS  N: DASH/TLC diet, pureed per pt request  E: Replete lytes PRN K<4, Mg<2   DVT PPX: full AC Eliquis  C: FULL CODE   Dispo: CCU; expecting step down when pt stable w/ nitro gtt dc'ed     Assessment: 74 Polish speaking M w/ PMH of CAD (s/p PCI, CABG 10/31/24), ICM, HFrEF, HTN, CKD, and PAD (s/p prior L to R fem-fem bypass), presenting from [facility] for concern for acute limb ischemia, found to have presumed NSTEMI 2/2 hypotension/hypoperfusion. Transferred to CCU for management of angina w/ increased nitro gtt. Now weaned off of nitro; stable for step down.     NEURO / PSYCH  A/o x4    CARDIOVASCULAR  #NSTEMI, Type 2   #CAD  Frequent CP this admission; treated w/ increased Imdur, Tums/Simethicone, and nitro; failed sublingual nitro. Likely 2/2 hypoperfusion/ischemic pain vs pain from recent MIDCAB; 10/31/24.  Trop 447 on 11/19 AM >> 368. Pt transferred to [facility] for urgent [facility].   - LHC 11/19/24:  Known  in ostial LCX & RCA. LIMA-LAD patent. dLM (50-60%), pLAD (70%, diffuse), competitive flow from LIMA, dLAD mild diffuse.    Plan:  - C/w Plavix 75mg Qd, Eliquis 5mg BID for DVT. Will dc ASA  - Antianginals:                - weaned Nitro gtt off; c/o CP at level 2-3, not worsening upon exertion               - c/w PO Imdur to 240mg qd               - c/w Ranexa to 1000mg BID               - increasing toprol to 75mg today and 100mg qd starting tomorrow  - c/w Atorvastatin 80mg qhs    #PAD  Chronic h/o PAD, requiring fem-fem bypass 20yrs ago per pt.  - s/p 11/11/24 LLE angiogram with EIA and FELICITY stents  - s/p 11/14/24 LLE fem to AK-pop bypass ; LLE c/b hematoma from Hep GTT 11/19, as evaluated w/  Vascular Sx at bedside - no intervention needed- now improving 11/20 off hep gtt.  L popliteal medial knee site- w/ Kerlix c/d/i.    - CTA b/l LE at OSH 11/8: post fem-fem bypass, severe atherosclerotic disease involving aorta, iliac, and superficial femoral arteries b/l more on the left    Plan:  - Vascular Sx following, appreciate recs  - Wound care following for LLE wounds.   - c/w Plavix 75mg qd and Atorvastatin 80mg qhs  - pain control per Vascular: currently on standing Tylenol 975mg q8h; oxycodone 2.5mg q4h PRN for moderate pain, and oxycodone 5mg q4h PRN for severe pain.   - bowel regimen: senna, Miralax standing; dulcolax prn  - Vasc Sx had planned for DOAC and antiplt monotherapy w/ Plavix on dc     #HFrEF  Warm, on room air  -TTE 10/29/24: LVEF 35% with regional wall abnormalities. Dilated LA. Normal RVSF.   -TTE 11/15/24: Not all myocardial wall segments are visualized. Basal and mid inferolateral walls are akinetic. Overall LVSF appears mild to moderately reduced    Plan:  - c/w Toprol 50mg as above  - Diuretic: none. Received IVF 11/20 in addition to Nitro gtt to increase perfusion and vasodilate  - monitor daily weight, strict i/o.    #HTN   Stable  - c/w meds as above.      PULM  Stable on RA.    GI  Bowel regimen: miralax & senna standing, ducolax supp PRN  Last BM: 4d ago per pt      #BPH   - c/w Tamsulosin and finasteride    ENDO  #PreDM  A1c 5.8%    RENAL  #CKD  - Cr elevated on admission, unclear baseline Cr (~1.5 per Vascular)  - SCr today 1.35 (11/24)    Plan:  - Monitor Cr and UOP    HEME  #ANEMIA   - Stable, Hgb today 10.7 after 1u transfusion on 11/23.  - s/p 1u PRBC 11/16/24 for Hgb 7.7 due to suspected blood loss @ L Popliteal site per Vascular, now stable  - s/p 1u PRBC 11/19/24 for pre-LHC optimization  - give 1u pRBC on 11/23 for Hgb optimization, current level 9.4    Plan:   - Transfuse >8 given cardiac and renal risk factors  - Keep active type & screen (last on 11/23; next 11/26)    ID  DAYNA    MSK  DAYNA    PROPHYLAXIS  N: DASH/TLC diet, pureed per pt request  E: Replete lytes PRN K<4, Mg<2   DVT PPX: full AC Eliquis  C: FULL CODE   Dispo: CCU; expecting step down when pt stable w/ nitro gtt dc'ed     Assessment: 74 Polish speaking M w/ PMH of CAD (s/p PCI, CABG 10/31/24), ICM, HFrEF, HTN, CKD, and PAD (s/p prior L to R fem-fem bypass), presenting from [facility] for concern for acute limb ischemia, found to have presumed NSTEMI 2/2 hypotension/hypoperfusion. Transferred to CCU for management of angina w/ increased nitro gtt. Now weaned off of nitro; stable for step down.     NEURO / PSYCH  A/o x4    CARDIOVASCULAR  #NSTEMI, Type 2   #CAD  Frequent CP this admission; treated w/ increased Imdur, Tums/Simethicone, and nitro; failed sublingual nitro. Likely 2/2 hypoperfusion/ischemic pain vs pain from recent MIDCAB; 10/31/24.  Trop 447 on 11/19 AM >> 368. Pt transferred to [facility] for urgent [facility].   - LHC 11/19/24:  Known  in ostial LCX & RCA. LIMA-LAD patent. dLM (50-60%), pLAD (70%, diffuse), competitive flow from LIMA, dLAD mild diffuse.    Plan:  - C/w Plavix 75mg Qd, Eliquis 5mg BID for DVT. Will dc ASA  - Antianginals:                - weaned Nitro gtt off; c/o CP at level 2-3, not worsening upon exertion               - c/w PO Imdur to 240mg qd               - c/w Ranexa to 1000mg BID               - increasing toprol to 75mg today; 100mg qd starting tomorrow  - c/w Atorvastatin 80mg qhs    #PAD  Chronic h/o PAD, requiring fem-fem bypass 20yrs ago per pt.  - s/p 11/11/24 LLE angiogram with EIA and FELICITY stents  - s/p 11/14/24 LLE fem to AK-pop bypass ; LLE c/b hematoma from Hep GTT 11/19, as evaluated w/  Vascular Sx at bedside - no intervention needed- now improving 11/20 off hep gtt.  L popliteal medial knee site- w/ Kerlix c/d/i.    - CTA b/l LE at OSH 11/8: post fem-fem bypass, severe atherosclerotic disease involving aorta, iliac, and superficial femoral arteries b/l more on the left    Plan:  - Vascular Sx following, appreciate recs  - Wound care following for LLE wounds.   - c/w Plavix 75mg qd and Atorvastatin 80mg qhs  - pain control per Vascular: currently on standing Tylenol 975mg q8h; oxycodone 2.5mg q4h PRN for moderate pain, and oxycodone 5mg q4h PRN for severe pain.   - bowel regimen: senna, Miralax standing; dulcolax prn  - Vasc Sx had planned for DOAC and antiplt monotherapy w/ Plavix on dc     #HFrEF  Warm, on room air  -TTE 10/29/24: LVEF 35% with regional wall abnormalities. Dilated LA. Normal RVSF.   -TTE 11/15/24: Not all myocardial wall segments are visualized. Basal and mid inferolateral walls are akinetic. Overall LVSF appears mild to moderately reduced    Plan:  - GDMT: starting farxiga and spironolactone; c/w Toprol as above  - Diuretic: none. Received IVF 11/20 in addition to Nitro gtt to increase perfusion and vasodilate  - monitor daily weight, strict i/o.    #HTN   Stable  - c/w meds as above.      PULM  Stable on RA.    GI  Bowel regimen: miralax & senna standing, ducolax supp PRN  Last BM: 11/24      #BPH   - c/w Tamsulosin and finasteride    ENDO  #PreDM  A1c 5.8%    RENAL  #CKD  - Cr elevated on admission, unclear baseline Cr (~1.5 per Vascular)  - SCr today 1.35 (11/24)    Plan:  - Monitor Cr and UOP    HEME  #ANEMIA   - Stable, Hgb today 10.7 after 1u transfusion on 11/23.  - s/p 1u PRBC 11/16/24 for Hgb 7.7 due to suspected blood loss @ L Popliteal site per Vascular, now stable  - s/p 1u PRBC 11/19/24 for pre-LHC optimization  - give 1u pRBC on 11/23 for Hgb optimization, current level 9.4    Plan:   - Transfuse >8 given cardiac and renal risk factors  - Keep active type & screen (last on 11/23; next 11/26)    ID  DAYNA    MSK  PT consult placed    PROPHYLAXIS  N: DASH/TLC diet, pureed per pt request  E: Replete lytes PRN K<4, Mg<2   DVT PPX: full AC Eliquis  C: FULL CODE   Dispo: step down

## 2024-11-25 NOTE — PROGRESS NOTE ADULT - PROBLEM SELECTOR PLAN 1
.  Etiology multifactorial: chronic illnesses; acute deterioration following NSTEMI. patient with limited intermediate support. pps 30%-40%  - cw supportive care including repositioning and skin/wound care  - Oral care q6 ATC  - PT cs  - Fall precautions

## 2024-11-25 NOTE — PROGRESS NOTE ADULT - ASSESSMENT
74M, Polish speaking, PMHx of CAD s/p PCI and recent minimally invasive direct CABG 10/31/24, ICM, HFrEF, HTN, CKD, and PAD s/p prior L to R fem-fem bypass (>20yrs ago), who initially  presented to [facility] for left leg pain concerning for acute limb ischemia.  Pt was transferred to to Vascular Surgery [facility] 11/12, now s/p 11/11/24 LLE angiogram with EIA and FELICITY stents, and LLE fem to AK-pop bypass  c/b hematoma. Course also c/b  angina and elevated troponins,  s/p [facility] 11/19 with CAD unchanged with no further intervention, presumed NSTEMI 2/2 hypotension/hypoperfusion. Post cath patient remained with CP and moved to CCU 11/22.  In CCU patient initially on max IV nitro dose, now titrated off and stable on oral imdur, ranexa and BB.  Patient is now stepped down to 5U cards

## 2024-11-25 NOTE — PROGRESS NOTE ADULT - SUBJECTIVE AND OBJECTIVE BOX
o/n: Mg 1g IV x1    SUBJECTIVE: Patient was evaluated at bedside this AM by vascular surgery team. Patient reports continued unrelieved chest pain and now also reports LLE heel pain but denies trauma or wounds to the area. Patient has no other complaints at this time.    MEDICATIONS  (STANDING):  acetaminophen     Tablet .. 975 milliGRAM(s) Oral every 8 hours  apixaban 5 milliGRAM(s) Oral every 12 hours  ascorbic acid 500 milliGRAM(s) Oral daily  aspirin enteric coated 81 milliGRAM(s) Oral daily  atorvastatin 80 milliGRAM(s) Oral at bedtime  clopidogrel Tablet 75 milliGRAM(s) Oral daily  finasteride 5 milliGRAM(s) Oral daily  influenza  Vaccine (HIGH DOSE) 0.5 milliLiter(s) IntraMuscular once  isosorbide   mononitrate ER Tablet (IMDUR) 240 milliGRAM(s) Oral every 24 hours  lidocaine   4% Patch 1 Patch Transdermal every 24 hours  lidocaine   4% Patch 1 Patch Transdermal every 24 hours  metoprolol succinate ER 50 milliGRAM(s) Oral daily  multivitamin 1 Tablet(s) Oral daily  pantoprazole    Tablet 40 milliGRAM(s) Oral daily  polyethylene glycol 3350 17 Gram(s) Oral daily  ranolazine 1000 milliGRAM(s) Oral two times a day  senna 2 Tablet(s) Oral at bedtime  tamsulosin 0.4 milliGRAM(s) Oral at bedtime    MEDICATIONS  (PRN):  bisacodyl Suppository 10 milliGRAM(s) Rectal daily PRN Constipation  calcium carbonate    500 mG (Tums) Chewable 1 Tablet(s) Chew three times a day PRN Heartburn  oxyCODONE    IR 2.5 milliGRAM(s) Oral every 4 hours PRN Moderate Pain (4 - 6)  oxyCODONE    IR 5 milliGRAM(s) Oral every 4 hours PRN Severe Pain (7 - 10)  simethicone 80 milliGRAM(s) Chew two times a day PRN Heartburn      Vital Signs Last 24 Hrs  T(C): 36.3 (25 Nov 2024 08:57), Max: 37.2 (25 Nov 2024 01:08)  T(F): 97.4 (25 Nov 2024 08:57), Max: 98.9 (25 Nov 2024 01:08)  HR: 71 (25 Nov 2024 09:00) (65 - 74)  BP: 112/55 (25 Nov 2024 09:00) (92/52 - 127/58)  BP(mean): 78 (25 Nov 2024 09:00) (66 - 94)  RR: 28 (25 Nov 2024 09:00) (11 - 28)  SpO2: 97% (25 Nov 2024 09:00) (93% - 99%)    Parameters below as of 25 Nov 2024 10:00  Patient On (Oxygen Delivery Method): room air        Physical Exam:  General: alert and awake, NAD  Pulmonary: no respiratory distress  Cardiovascular: RRR  Abdominal: soft  Extrem: WWP, no calf edema, L groin incision c/d/i, minimal ecchymosis surrounding, soft with small hardened hematoma noted but improved from prior exams, minimally tender to palpation, motor and sensory intact bilaterally when not limited by pain, LLE heel with allevyn dressing to prevent pressure ulcers however no wounds noted  Pulses: LLE palp DP, tri/biphasic PT, biphasic over distal bypass site and triphasic over proximal bypass site    I&O's Summary    24 Nov 2024 07:01  -  25 Nov 2024 07:00  --------------------------------------------------------  IN: 833.5 mL / OUT: 2150 mL / NET: -1316.5 mL        LABS:                        11.2   5.55  )-----------( 218      ( 25 Nov 2024 05:30 )             35.2     11-25    134[L]  |  103  |  17  ----------------------------<  117[H]  4.4   |  22  |  1.35[H]    Ca    8.9      25 Nov 2024 05:30  Phos  3.2     11-25  Mg     1.9     11-25    TPro  6.4  /  Alb  3.0[L]  /  TBili  0.4  /  DBili  x   /  AST  13  /  ALT  9[L]  /  AlkPhos  60  11-25      Urinalysis Basic - ( 25 Nov 2024 05:30 )    Color: x / Appearance: x / SG: x / pH: x  Gluc: 117 mg/dL / Ketone: x  / Bili: x / Urobili: x   Blood: x / Protein: x / Nitrite: x   Leuk Esterase: x / RBC: x / WBC x   Sq Epi: x / Non Sq Epi: x / Bacteria: x      CAPILLARY BLOOD GLUCOSE        LIVER FUNCTIONS - ( 25 Nov 2024 05:30 )  Alb: 3.0 g/dL / Pro: 6.4 g/dL / ALK PHOS: 60 U/L / ALT: 9 U/L / AST: 13 U/L / GGT: x             RADIOLOGY & ADDITIONAL STUDIES:

## 2024-11-25 NOTE — PROGRESS NOTE ADULT - ASSESSMENT
75yo M, with PMHx of HTN, HLD, BPH, HFrEF, CAD s/p CABG 10/31/24, severe PAD (s/p fem-fem) who presented to [facility] for left leg pain concerning for acute limb ischemia. On exam here, patient has an exam more consistent with his prior recent exam, consisting of chronic limb ischemia (motor and sensation intact, doppler signals present in left foot and fem-fem bypass). Given his rest pain, decision was made to proceed with arteriography. Patient is now  s/p LLE angiogram with EIA and FELICITY stents and s/p LLE fem to AK-pop bypass 11/14/24. Patient is most recently s/p transfer to cardiology service for NSTEMI s/p [facility] (11/19).    Recommendations:  No acute vascular surgery intervention at this time  L groin hematoma improved  LLE fem to AK-pop bypass patent and well healing  Rest of care per primary team  Vascular surgery will continue to follow    Plan pending final attending attestation

## 2024-11-25 NOTE — PROGRESS NOTE ADULT - PROBLEM SELECTOR PLAN 4
.  - Full code--this choice dose not align with his priorities and reflects his limited understanding of his prognosis  - Polish-speaking   - Goals: to maintain physical function and independence, he is eager to work with PT  - Lacks local family/friend support; three adult children live in [place] and their contact info is at pts home

## 2024-11-25 NOTE — PROGRESS NOTE ADULT - PROBLEM SELECTOR PLAN 5
.  Will continue to follow peripherally for support. Emotional support provided, questions answered.    Coping:  well[ s ] with difficulty[  ] poor coping[  ] unable to assess[  ]   Social support: strong[  ] adequate[  ] inadequate[ s]  [facility] support system at home: strong[  ] adequate[  ] inadequate[  s]    Active Psychosocial Referrals:  SW/CM[ s ] PT/OT[  ] Hospice[  ]  Ethics[  ]  ELLIOTT Claudio Patient/Family Support[  ] Chaplaincy[   ]    Massage Therapy[ x ] Music Therapy [x  ] Holistic RN[  ]     For new or uncontrolled symptoms, please call Palliative Care at [phone]). The service is available 24/7 (including nights & weekends) to provide symptom management recommendations over the phone as appropriate.

## 2024-11-25 NOTE — PROGRESS NOTE ADULT - SUBJECTIVE AND OBJECTIVE BOX
[facility]  Mariel [facility] NP  Contact Info: Call [phone] (HEAL Line) or message on [employer] Teams    SUBJECTIVE/OBJECTIVE: Interval events noted. Weaned from NTG gtt pending step down.  See patient's PRN use for the past 24hrs noted below. No acute complaints at time of visit. Comprehensive symptom assessment as below. No change in GOC. Extensive time spent discussing plan of care with patient.    ALLERGIES: No Known Allergies    MEDICATIONS: REVIEWED  MEDICATIONS  (STANDING):  apixaban 5 milliGRAM(s) Oral every 12 hours  ascorbic acid 500 milliGRAM(s) Oral daily  atorvastatin 80 milliGRAM(s) Oral at bedtime  clopidogrel Tablet 75 milliGRAM(s) Oral daily  dapagliflozin 10 milliGRAM(s) Oral every 24 hours  finasteride 5 milliGRAM(s) Oral daily  influenza  Vaccine (HIGH DOSE) 0.5 milliLiter(s) IntraMuscular once  isosorbide   mononitrate ER Tablet (IMDUR) 240 milliGRAM(s) Oral every 24 hours  multivitamin 1 Tablet(s) Oral daily  pantoprazole    Tablet 40 milliGRAM(s) Oral daily  polyethylene glycol 3350 17 Gram(s) Oral daily  ranolazine 1000 milliGRAM(s) Oral two times a day  senna 2 Tablet(s) Oral at bedtime  spironolactone 25 milliGRAM(s) Oral every 24 hours  tamsulosin 0.4 milliGRAM(s) Oral at bedtime    MEDICATIONS  (PRN):  acetaminophen     Tablet .. 975 milliGRAM(s) Oral every 8 hours PRN moderate (4-6), severe (7-10) pain  bisacodyl Suppository 10 milliGRAM(s) Rectal daily PRN Constipation  calcium carbonate    500 mG (Tums) Chewable 1 Tablet(s) Chew three times a day PRN Heartburn  simethicone 80 milliGRAM(s) Chew two times a day PRN Heartburn    Analgesic Use (Scheduled and PRNs) for past 24 hours (6a-6a):    acetaminophen     Tablet ..   975 milliGRAM(s) Oral (11-25-24 @ 06:23)   975 milliGRAM(s) Oral (11-24-24 @ 22:02)    PRESENT SYMPTOMS:   [ ] Patient unable to self report   Source if other than patient:  [ ]Family   [ ]Team     Pain [  ] denies chest pain     If [  ], pt denies symptom.   Dyspnea:         [  ]   Anxiety:           [  ]  Difficulty sleeping: [  ]  Fatigue:           [x  ]  Nausea:           [  ]  Loss of appetite:     [x  ]  Dysphagia: [  ]  Constipation:   [  ]        Other Symptoms: overall debility     All other review of systems negative [ x ]    ECOG Performance:   3    Current Palliative Performance Scale/Karnofsky Score: 30   %  Preadmit Karnofsky:   60%          PEx:  General: older man sitting up in bed watching TV, no acute distress   alert  oriented x    3  verbal [place]  Behavioral:  Cooperative  HEENT: atraumatic,  +mild temporal wasting,  No dry mouth  RESP: Reg rhythm, No  tachypnea/labored breathing,  No audible excessive secretions,  CTAB, diminished bilat bases  CV: RRR, S1S2,  No  tachycardia  GI: soft non distended non tender   MUSK: weakness x4,  edema, ambulatory with assistance  SKIN:  Poor skin turgor, LLE with dried superficial scabs, some hyperpigmentation in bilateral lower extremities   NEURO:  No deficits, cognitive impairment, encephalopathic dsyphagia dysarthria paresis  Oral intake ability: full capability    T(C): 36.4 (11-25-24 @ 14:00), Max: 37.2 (11-25-24 @ 01:08)  HR: 69 (11-25-24 @ 15:00) (65 - 74)  BP: 117/58 (11-25-24 @ 15:00) (102/51 - 122/59)  RR: 18 (11-25-24 @ 15:00) (11 - 28)  SpO2: 95% (11-25-24 @ 15:00) (93% - 98%)  Wt(kg): --      LABS: REVIEWED  CBC:                        11.2   5.55  )-----------( 218      ( 25 Nov 2024 05:30 )             35.2     CMP:    11-25    134[L]  |  103  |  17  ----------------------------<  117[H]  4.4   |  22  |  1.35[H]    Ca    8.9      25 Nov 2024 05:30  Phos  3.2     11-25  Mg     1.9     11-25    TPro  6.4  /  Alb  3.0[L]  /  TBili  0.4  /  DBili  x   /  AST  13  /  ALT  9[L]  /  AlkPhos  60  11-25      RADIOLOGY & ADDITIONAL STUDIES: Reviewed    DISCUSSION OF CASE: Primary Team and RN - to discuss plan of care

## 2024-11-25 NOTE — PROGRESS NOTE ADULT - PROBLEM SELECTOR PLAN 2
.  No indication for emergent RRT  - continue care per primary team  - comorbidity supports limited long term prognosis  - If RRT indicated for end stage CKD and patient/family declined RRT and/or it was not offered given risks/burdens, then patient would qualify for hospice.

## 2024-11-25 NOTE — PROGRESS NOTE ADULT - PROBLEM SELECTOR PLAN 3
continue eliquis 5mg BID  Doppler venous 11/12 Age-indeterminate deep vein thrombosis of the LEFT superficial femoral vein, LEFT popliteal vein, and LEFT calf vein

## 2024-11-25 NOTE — PROGRESS NOTE ADULT - PROBLEM SELECTOR PLAN 6
- Stable, Hgb today 11.2 (11/25) after 1u transfusion on 11/23.  - s/p 1u PRBC 11/16/24 for Hgb 7.7 due to suspected blood loss @ L Popliteal site per Vascular, now stable  - s/p 1u PRBC 11/19/24 for pre-LHC optimization  - give 1u pRBC on 11/23 for Hgb optimization, current level 9.4    Plan:   - Transfuse >8 given cardiac and renal risk factors  - Keep active type & screen (last on 11/23; next 11/26 ordered)

## 2024-11-25 NOTE — PROGRESS NOTE ADULT - PROBLEM SELECTOR PLAN 3
.  hospital course cb NSTEMI type 2. pt w escalating angina despite aggressive medical mgmt -- previously requiring NTG gtt, MS IV. Functional status declining given hospitalization and escalating symptoms  - Concern for potentially poor trajectory, poor candidate for advanced therapies, high risk for acute decompensation or life threatening complication   - cw care per primary team; recommend ongoing re education about trajectory with patient, ideally including his adult children/surrogates, however patient does not have their contact information

## 2024-11-25 NOTE — PROGRESS NOTE ADULT - PROBLEM SELECTOR PLAN 1
NSTEMI type 2  Likely secondary to hypoperfusion/ischemic pain vs pain from recent MIDCAB; 10/31/24.  Trop 447 on 11/19 AM >> 368..   - s/p LHC 11/19/24:  Known  in ostial LCX & RCA. LIMA-LAD patent. dLM (50-60%), pLAD (70%, diffuse), competitive flow from LIMA, dLAD mild diffuse.    Plan:  - was on  Triple therapy  until 11/25: Asa 81mg qd stopped 11/25 c/w, Plavix 75mg Qd, Eliquis 5mg BID for DVT LLE.   - Antianginals:                - weaned Nitro gtt off; baseline CP at level 2-3, not worsening upon exertion               - c/w PO Imdur to 240mg qd               - c/w Ranexa to 1000mg BID  - c/w Metoprolol succinate 75mg qd; consider increasing to 100mg on discharge  if HR elevates when pt more active  - c/w Atorvastatin 80mg qhs

## 2024-11-25 NOTE — PROGRESS NOTE ADULT - SUBJECTIVE AND OBJECTIVE BOX
HOSPITAL COURSE:   74M, Polish speaking, PMHx of CAD s/p PCI and recent minimally invasive direct CABG 10/31/24, ICM, HFrEF, HTN, CKD, and PAD s/p prior L to R fem-fem bypass (>20yrs ago), who presented to [facility] for left leg pain concerning for acute limb ischemia.  Pt was initially admitted to Vascular Surgery, now s/p peripheral stent intervention as well as left fem-pop bypass 11/14. Patient w/ consistent angina and NSTEMI and transferred to cardiology service. Underwent LHC 11/19 with CAD unchanged with no further intervention, presumed NSTEMI 2/2 hypotension/hypoperfusion. Pt was placed on Eliquis, ASA, Plavix. Pt still on nitro gtt and IVF to improve hemodynamics. Yesterday 11/21/24 patient was titrated down on nitro gtt 30, however, overnight had worsening chest pain 10/10 not controlled and nitro was slightly uptitrated again. During morning, he was back on nitro 50mcg along with sublingual nitrostat, however, no significant relieve of angina. Hence, patient was uptitrated to nitro 100mcg gtt with improvement in angina down to 2-3/10 at this juncture. He also needed Morphine 2mg IV x 1 for pain relief as well. Patient also received IVF hydration 500cc total to help with perfusion. Given the severity of angina with needing nitro gtt at 100mcg rate, patient is now being moved to CCU for further management and care. Palliative is also consulted for further discussion regarding GOC.   In the CCU, nitro gtt weaned off w/ increase in po imdur. Pt reports CP of 6-7 w/ nitro gtt weaned off; reproducible. Walk test was performed and pt did not report increased intensity of CP and vss; thus, no CP worsening w/ exertion. Pt stable for return to vascular service for continuity of their work up on his chronic PAD.     Of note, duplex US of left groin showed 5x1cm fluid collection, pt not endorsing pain or discomfort in that area. Imdur dosing was increased to 240mg daily and attempts were made to wean down the nitro gtt. Given 1u RBC for optimization of Hgb given NSTEMI.       INTERVAL HPI/OVERNIGHT EVENTS:   No overnight events   Afebrile, hemodynamically stable     ICU Vital Signs Last 24 Hrs  T(C): 36.7 (25 Nov 2024 05:08), Max: 37.2 (25 Nov 2024 01:08)  T(F): 98 (25 Nov 2024 05:08), Max: 98.9 (25 Nov 2024 01:08)  HR: 70 (25 Nov 2024 06:00) (65 - 74)  BP: 119/58 (25 Nov 2024 06:00) (91/52 - 127/58)  BP(mean): 83 (25 Nov 2024 06:00) (66 - 94)  ABP: --  ABP(mean): --  RR: 19 (25 Nov 2024 06:00) (11 - 37)  SpO2: 97% (25 Nov 2024 06:00) (93% - 99%)    O2 Parameters below as of 25 Nov 2024 07:00  Patient On (Oxygen Delivery Method): room air          I&O's Summary    23 Nov 2024 07:01  -  24 Nov 2024 07:00  --------------------------------------------------------  IN: 1396 mL / OUT: 1800 mL / NET: -404 mL    24 Nov 2024 07:01  -  25 Nov 2024 06:51  --------------------------------------------------------  IN: 733.5 mL / OUT: 1900 mL / NET: -1166.5 mL          LABS:                        11.2   5.55  )-----------( 218      ( 25 Nov 2024 05:30 )             35.2     11-25    134[L]  |  103  |  17  ----------------------------<  117[H]  4.4   |  22  |  1.35[H]    Ca    8.9      25 Nov 2024 05:30  Phos  3.2     11-25  Mg     1.9     11-25    TPro  6.4  /  Alb  3.0[L]  /  TBili  0.4  /  DBili  x   /  AST  13  /  ALT  9[L]  /  AlkPhos  60  11-25      Urinalysis Basic - ( 25 Nov 2024 05:30 )    Color: x / Appearance: x / SG: x / pH: x  Gluc: 117 mg/dL / Ketone: x  / Bili: x / Urobili: x   Blood: x / Protein: x / Nitrite: x   Leuk Esterase: x / RBC: x / WBC x   Sq Epi: x / Non Sq Epi: x / Bacteria: x      CAPILLARY BLOOD GLUCOSE            RADIOLOGY & ADDITIONAL TESTS:    Consultant(s) Notes Reviewed:  [x ] YES  [ ] NO    MEDICATIONS  (STANDING):  acetaminophen     Tablet .. 975 milliGRAM(s) Oral every 8 hours  apixaban 5 milliGRAM(s) Oral every 12 hours  ascorbic acid 500 milliGRAM(s) Oral daily  aspirin enteric coated 81 milliGRAM(s) Oral daily  atorvastatin 80 milliGRAM(s) Oral at bedtime  clopidogrel Tablet 75 milliGRAM(s) Oral daily  finasteride 5 milliGRAM(s) Oral daily  influenza  Vaccine (HIGH DOSE) 0.5 milliLiter(s) IntraMuscular once  isosorbide   mononitrate ER Tablet (IMDUR) 240 milliGRAM(s) Oral every 24 hours  lidocaine   4% Patch 1 Patch Transdermal every 24 hours  lidocaine   4% Patch 1 Patch Transdermal every 24 hours  magnesium sulfate  IVPB 1 Gram(s) IV Intermittent once  metoprolol succinate ER 50 milliGRAM(s) Oral daily  multivitamin 1 Tablet(s) Oral daily  pantoprazole    Tablet 40 milliGRAM(s) Oral daily  polyethylene glycol 3350 17 Gram(s) Oral daily  ranolazine 1000 milliGRAM(s) Oral two times a day  senna 2 Tablet(s) Oral at bedtime  tamsulosin 0.4 milliGRAM(s) Oral at bedtime    MEDICATIONS  (PRN):  bisacodyl Suppository 10 milliGRAM(s) Rectal daily PRN Constipation  calcium carbonate    500 mG (Tums) Chewable 1 Tablet(s) Chew three times a day PRN Heartburn  oxyCODONE    IR 2.5 milliGRAM(s) Oral every 4 hours PRN Moderate Pain (4 - 6)  oxyCODONE    IR 5 milliGRAM(s) Oral every 4 hours PRN Severe Pain (7 - 10)  simethicone 80 milliGRAM(s) Chew two times a day PRN Heartburn      PHYSICAL EXAM:  GENERAL:   HEAD:  Atraumatic, Normocephalic  EYES: EOMI, PERRLA, conjunctiva and sclera clear  NECK: Supple, No JVD, Normal thyroid, no enlarged nodes  NERVOUS SYSTEM:  Alert & Awake.   CHEST/LUNG: B/L good air entry; No rales, rhonchi, or wheezing  HEART: S1S2 normal, no S3, Regular rate and rhythm; No murmurs  ABDOMEN: Soft, Nontender, Nondistended; Bowel sounds present  EXTREMITIES:  2+ Peripheral Pulses, No clubbing, cyanosis, or edema  LYMPH: No lymphadenopathy noted  SKIN: No rashes or lesions    Care Discussed with Consultants/Other Providers [ x] YES  [ ] NO *** TRANSFER FROM CCU TO TELE***    HOSPITAL COURSE:   74M, Polish speaking, PMHx of CAD s/p PCI and recent minimally invasive direct CABG 10/31/24, ICM, HFrEF, HTN, CKD, and PAD s/p prior L to R fem-fem bypass (>20yrs ago), who presented to [facility] for left leg pain concerning for acute limb ischemia.  Pt was initially admitted to Vascular Surgery, now s/p peripheral stent intervention as well as left fem-pop bypass 11/14. Patient w/ consistent angina and NSTEMI and transferred to cardiology service. Underwent LHC 11/19 with CAD unchanged with no further intervention, presumed NSTEMI 2/2 hypotension/hypoperfusion. Pt was placed on Eliquis, ASA, Plavix. Pt still on nitro gtt and IVF to improve hemodynamics. Yesterday 11/21/24 patient was titrated down on nitro gtt 30, however, overnight had worsening chest pain 10/10 not controlled and nitro was slightly uptitrated again. During morning, he was back on nitro 50mcg along with sublingual nitrostat, however, no significant relieve of angina. Hence, patient was uptitrated to nitro 100mcg gtt with improvement in angina down to 2-3/10 at this juncture. He also needed Morphine 2mg IV x 1 for pain relief as well. Patient also received IVF hydration 500cc total to help with perfusion. Given the severity of angina with needing nitro gtt at 100mcg rate, patient is now being moved to CCU for further management and care. Palliative is also consulted for further discussion regarding GOC.   In the CCU, nitro gtt weaned off w/ increase in po imdur. Pt reports CP of 6-7 w/ nitro gtt weaned off; reproducible. Walk test was performed and pt did not report increased intensity of CP and vss; thus, no CP worsening w/ exertion. Today, pt reports CP of only 2-3 at rest and 4 upon palpation. Increasing metoprolol to 75mg today and pt should be receiving metoprolol 100mg starting tomorrow as a part of his antianginal regimen.     Of note, duplex US of left groin showed 5x1cm fluid collection, pt not endorsing pain or discomfort in that area. Imdur dosing was increased to 240mg daily and attempts were made to wean down the nitro gtt. Given 1u RBC for optimization of Hgb given NSTEMI.       INTERVAL HPI/OVERNIGHT EVENTS:   No overnight events   Afebrile, hemodynamically stable     ICU Vital Signs Last 24 Hrs  T(C): 36.7 (25 Nov 2024 05:08), Max: 37.2 (25 Nov 2024 01:08)  T(F): 98 (25 Nov 2024 05:08), Max: 98.9 (25 Nov 2024 01:08)  HR: 70 (25 Nov 2024 06:00) (65 - 74)  BP: 119/58 (25 Nov 2024 06:00) (91/52 - 127/58)  BP(mean): 83 (25 Nov 2024 06:00) (66 - 94)  ABP: --  ABP(mean): --  RR: 19 (25 Nov 2024 06:00) (11 - 37)  SpO2: 97% (25 Nov 2024 06:00) (93% - 99%)    O2 Parameters below as of 25 Nov 2024 07:00  Patient On (Oxygen Delivery Method): room air          I&O's Summary    23 Nov 2024 07:01  -  24 Nov 2024 07:00  --------------------------------------------------------  IN: 1396 mL / OUT: 1800 mL / NET: -404 mL    24 Nov 2024 07:01  -  25 Nov 2024 06:51  --------------------------------------------------------  IN: 733.5 mL / OUT: 1900 mL / NET: -1166.5 mL          LABS:                        11.2   5.55  )-----------( 218      ( 25 Nov 2024 05:30 )             35.2     11-25    134[L]  |  103  |  17  ----------------------------<  117[H]  4.4   |  22  |  1.35[H]    Ca    8.9      25 Nov 2024 05:30  Phos  3.2     11-25  Mg     1.9     11-25    TPro  6.4  /  Alb  3.0[L]  /  TBili  0.4  /  DBili  x   /  AST  13  /  ALT  9[L]  /  AlkPhos  60  11-25      Urinalysis Basic - ( 25 Nov 2024 05:30 )    Color: x / Appearance: x / SG: x / pH: x  Gluc: 117 mg/dL / Ketone: x  / Bili: x / Urobili: x   Blood: x / Protein: x / Nitrite: x   Leuk Esterase: x / RBC: x / WBC x   Sq Epi: x / Non Sq Epi: x / Bacteria: x      CAPILLARY BLOOD GLUCOSE            RADIOLOGY & ADDITIONAL TESTS:    Consultant(s) Notes Reviewed:  [x ] YES  [ ] NO    MEDICATIONS  (STANDING):  acetaminophen     Tablet .. 975 milliGRAM(s) Oral every 8 hours  apixaban 5 milliGRAM(s) Oral every 12 hours  ascorbic acid 500 milliGRAM(s) Oral daily  aspirin enteric coated 81 milliGRAM(s) Oral daily  atorvastatin 80 milliGRAM(s) Oral at bedtime  clopidogrel Tablet 75 milliGRAM(s) Oral daily  finasteride 5 milliGRAM(s) Oral daily  influenza  Vaccine (HIGH DOSE) 0.5 milliLiter(s) IntraMuscular once  isosorbide   mononitrate ER Tablet (IMDUR) 240 milliGRAM(s) Oral every 24 hours  lidocaine   4% Patch 1 Patch Transdermal every 24 hours  lidocaine   4% Patch 1 Patch Transdermal every 24 hours  magnesium sulfate  IVPB 1 Gram(s) IV Intermittent once  metoprolol succinate ER 50 milliGRAM(s) Oral daily  multivitamin 1 Tablet(s) Oral daily  pantoprazole    Tablet 40 milliGRAM(s) Oral daily  polyethylene glycol 3350 17 Gram(s) Oral daily  ranolazine 1000 milliGRAM(s) Oral two times a day  senna 2 Tablet(s) Oral at bedtime  tamsulosin 0.4 milliGRAM(s) Oral at bedtime    MEDICATIONS  (PRN):  bisacodyl Suppository 10 milliGRAM(s) Rectal daily PRN Constipation  calcium carbonate    500 mG (Tums) Chewable 1 Tablet(s) Chew three times a day PRN Heartburn  oxyCODONE    IR 2.5 milliGRAM(s) Oral every 4 hours PRN Moderate Pain (4 - 6)  oxyCODONE    IR 5 milliGRAM(s) Oral every 4 hours PRN Severe Pain (7 - 10)  simethicone 80 milliGRAM(s) Chew two times a day PRN Heartburn      PHYSICAL EXAM:  GENERAL:   HEAD:  Atraumatic, Normocephalic  EYES: EOMI, PERRLA, conjunctiva and sclera clear  NERVOUS SYSTEM:  Alert & Awake.   CHEST/LUNG: B/L good air entry; No rales, rhonchi, or wheezing  HEART: S1S2 normal, no S3, Regular rate and rhythm; No murmurs. Tenderness in chest upon palpation; L>R  ABDOMEN: Soft, Nontender, Nondistended; Bowel sounds present  EXTREMITIES:  2+ Peripheral Pulses, No clubbing, cyanosis, or edema. LLE below the knee tender upon palpation. Multiple scabbed over wounds on L anterior shin.    Care Discussed with Consultants/Other Providers [ x] YES  [ ] NO

## 2024-11-25 NOTE — PROGRESS NOTE ADULT - PROBLEM SELECTOR PLAN 5
- Cr elevated on admission, unclear baseline Cr (~1.5 per Vascular)  - SCr has been stable at  1.35 (11/24 and 11/25)

## 2024-11-25 NOTE — PROGRESS NOTE ADULT - PROBLEM SELECTOR PLAN 2
acute on chronic  h/o PAD with hx  fem-fem bypass 20yrs ago per pt.  - s/p 11/11/24 LLE angiogram with EIA and FELICITY stents  - s/p 11/14/24 LLE fem to AK-pop bypass ; LLE c/b hematoma from Hep GTT 11/19, as evaluated w/  Vascular Sx at bedside - no intervention needed- now improving 11/20 off hep gtt.  L popliteal medial knee site- w/ Kerlix c/d/i.   LLE fem to AK-pop bypass patent and well healing  - Wound care following for LLE wounds.   -- pain control per Vascular: currently on standing Tylenol 975mg q8h;    - f/up final vascular surgery recs ( ? no further intervention)  -Doppler venous 11/12 Age-indeterminate deep vein thrombosis of the LEFT superficial femoral vein, LEFT popliteal vein, and LEFT calf vein , continue eliquis 5mg BID  Vasc Sx had planned for DOAC and antiplt monotherapy w/ Plavix on dc    c/w Plavix 75mg qd  and eliquis 5mg BID and Atorvastatin 80mg qhs      - Doppler 11/22/24 b/l LE arterial   1.  No evidence of pseudoaneurysm bilaterally.  2.  Unorganized pocket of mildly complex fluid/blood products below   incision in left groin soft tissues.  3.  Partially imaged bypass graft in the proximal left thigh demonstrates   patency.

## 2024-11-25 NOTE — PROGRESS NOTE ADULT - PROBLEM SELECTOR PLAN 4
Warm, on room air  - c/w Toprol 75mg as above   - started on aldactone 25mg and farxiga 10mg 11/25 by CCU   Diuretic: none.   - monitor daily weight, strict i/o.  -consider adding entresto  before discharge if BP stable  -TTE 10/29/24: LVEF 35% with regional wall abnormalities. Dilated LA. Normal RVSF.   -TTE 11/15/24: Not all myocardial wall segments are visualized. Basal and mid inferolateral walls are akinetic. Overall LVSF appears mild to moderately reduced

## 2024-11-25 NOTE — PROGRESS NOTE ADULT - ASSESSMENT
73 yo M with advanced, multiple comorbidities including CAD s/p CABG/PCI, ICM, HFrEF, CKD, PAD s/p bypass who initially presented with acute limb ischemia now complicated by NSTEMI. Palliative consulted to discuss goals of care and treatment preferences in light of current health status.     Pt with significant burden of chronic illness, impacting his overall prognosis and functional status. Hospital course cb escalating angina and ischemia despite multiple interventions suggesting potentially poor trajectory.  Pt has limited understanding of his poor prognosis and that he is poor candidate for advanced therapies- needs careful communication with .  Has three kids (surrogates) and no local support.  What is most important to patient is maintaining physical strength and functional independence and connecting with [employer] in [facility], Electing to continue an aggressive approach to his care including cpr and intubation.

## 2024-11-25 NOTE — PROGRESS NOTE ADULT - SUBJECTIVE AND OBJECTIVE BOX
Admit to [facility] vascular 11/10  transfer to cards 11/19  transfer to CCU 11/22  Step down to 5U 11/15    74M, Polish speaking, PMHx of CAD s/p PCI and recent minimally invasive direct CABG 10/31/24, ICM, HFrEF, HTN, CKD, and PAD s/p prior L to R fem-fem bypass (>20yrs ago), who presented to [facility] for left leg pain concerning for acute limb ischemia.  Pt was initially admitted to Vascular Surgery 11/10, now s/p 11/11/24 LLE angiogram with EIA and FELICITY stents, and LLE fem to AK-pop bypass  c/b hematoma. While on vascular service patient w/ consistent angina and NSTEMI and transferred to cardiology service on 11/19. Underwent C 11/19 with CAD unchanged with no further intervention, presumed NSTEMI 2/2 hypotension/hypoperfusion. Pt was placed on Eliquis, ASA, Plavix ( asa stopped 11/15). Post cath  patient was on nitro gtt and IVF to improve hemodynamics.  Given the severity of angina with needing nitro gtt at 100mcg rate, patient is now being moved to CCU on 11/22 for further management and care. Palliative is also consulted for further discussion regarding GOC.   In the CCU, nitro gtt weaned off w/ increase in po imdur. Pt reports CP of 6-7 w/ nitro gtt weaned off; reproducible. Walk test was performed and pt did not report increased intensity of CP and vss; thus, no CP worsening w/ exertion.   Of note, duplex US of left groin showed 5x1cm fluid collection, pt not endorsing pain or discomfort in that area. Imdur dosing was increased to 240mg daily and attempts were made to wean down the nitro gtt. Given 1u RBC for optimization of Hgb given NSTEMI, last 10/23     On 11/25 patient was deemed to be stable to step down to 5U cards off nitro drip            MEDS:   acetaminophen     Tablet .. 975 milliGRAM(s) Oral every 8 hours PRN  apixaban 5 milliGRAM(s) Oral every 12 hours  ascorbic acid 500 milliGRAM(s) Oral daily  atorvastatin 80 milliGRAM(s) Oral at bedtime  bisacodyl Suppository 10 milliGRAM(s) Rectal daily PRN  calcium carbonate    500 mG (Tums) Chewable 1 Tablet(s) Chew three times a day PRN  clopidogrel Tablet 75 milliGRAM(s) Oral daily  dapagliflozin 10 milliGRAM(s) Oral every 24 hours  finasteride 5 milliGRAM(s) Oral daily  influenza  Vaccine (HIGH DOSE) 0.5 milliLiter(s) IntraMuscular once  isosorbide   mononitrate ER Tablet (IMDUR) 240 milliGRAM(s) Oral every 24 hours  multivitamin 1 Tablet(s) Oral daily  pantoprazole    Tablet 40 milliGRAM(s) Oral daily  polyethylene glycol 3350 17 Gram(s) Oral daily  ranolazine 1000 milliGRAM(s) Oral two times a day  senna 2 Tablet(s) Oral at bedtime  simethicone 80 milliGRAM(s) Chew two times a day PRN  spironolactone 25 milliGRAM(s) Oral every 24 hours  tamsulosin 0.4 milliGRAM(s) Oral at bedtime    T(C): 36.3 (11-25-24 @ 08:57), Max: 37.2 (11-25-24 @ 01:08)  HR: 68 (11-25-24 @ 11:00) (65 - 74)  BP: 106/52 (11-25-24 @ 11:00) (92/52 - 125/74)  RR: 19 (11-25-24 @ 11:00) (11 - 28)  SpO2: 95% (11-25-24 @ 11:00) (93% - 99%)                              11.2   5.55  )-----------( 218      ( 25 Nov 2024 05:30 )             35.2     11-25    134[L]  |  103  |  17  ----------------------------<  117[H]  4.4   |  22  |  1.35[H]    Ca    8.9      25 Nov 2024 05:30  Phos  3.2     11-25  Mg     1.9     11-25    TPro  6.4  /  Alb  3.0[L]  /  TBili  0.4  /  DBili  x   /  AST  13  /  ALT  9[L]  /  AlkPhos  60  11-25         Admit to [facility] vascular 11/12  transfer to cards 11/19  transfer to CCU 11/22  Step down to 5U 11/15    74M, Polish speaking, PMHx of CAD s/p PCI and recent minimally invasive direct CABG 10/31/24, ICM, HFrEF, HTN, CKD, and PAD s/p prior L to R fem-fem bypass (>20yrs ago), who presented to [facility] for left leg pain concerning for acute limb ischemia.  Pt was initially admitted to Vascular Surgery 11/10, now s/p 11/11/24 LLE angiogram with EIA and FELICITY stents, and LLE fem to AK-pop bypass  c/b hematoma. While on vascular service patient w/ consistent angina and NSTEMI and transferred to cardiology service on 11/19. Underwent C 11/19 with CAD unchanged with no further intervention, presumed NSTEMI 2/2 hypotension/hypoperfusion. Pt was placed on Eliquis, ASA, Plavix ( asa stopped 11/15). Post cath  patient was on nitro gtt and IVF to improve hemodynamics.  Given the severity of angina with needing nitro gtt at 100mcg rate, patient is now being moved to CCU on 11/22 for further management and care. Palliative is also consulted for further discussion regarding GOC.   In the CCU, nitro gtt weaned off w/ increase in po imdur. Pt reports CP of 6-7 w/ nitro gtt weaned off; reproducible. Walk test was performed and pt did not report increased intensity of CP and vss; thus, no CP worsening w/ exertion.   Of note, duplex US of left groin showed 5x1cm fluid collection, pt not endorsing pain or discomfort in that area. Imdur dosing was increased to 240mg daily and attempts were made to wean down the nitro gtt. Given 1u RBC for optimization of Hgb given NSTEMI, last 10/23     On 11/25 patient was deemed to be stable to step down to 5U cards off nitro drip            MEDS on transfer from CCU :   acetaminophen     Tablet .. 975 milliGRAM(s) Oral every 8 hours PRN  apixaban 5 milliGRAM(s) Oral every 12 hours  ascorbic acid 500 milliGRAM(s) Oral daily  atorvastatin 80 milliGRAM(s) Oral at bedtime  bisacodyl Suppository 10 milliGRAM(s) Rectal daily PRN  calcium carbonate    500 mG (Tums) Chewable 1 Tablet(s) Chew three times a day PRN  clopidogrel Tablet 75 milliGRAM(s) Oral daily  dapagliflozin 10 milliGRAM(s) Oral every 24 hours  finasteride 5 milliGRAM(s) Oral daily  influenza  Vaccine (HIGH DOSE) 0.5 milliLiter(s) IntraMuscular once  isosorbide   mononitrate ER Tablet (IMDUR) 240 milliGRAM(s) Oral every 24 hours  multivitamin 1 Tablet(s) Oral daily  pantoprazole    Tablet 40 milliGRAM(s) Oral daily  polyethylene glycol 3350 17 Gram(s) Oral daily  ranolazine 1000 milliGRAM(s) Oral two times a day  senna 2 Tablet(s) Oral at bedtime  simethicone 80 milliGRAM(s) Chew two times a day PRN  spironolactone 25 milliGRAM(s) Oral every 24 hours  tamsulosin 0.4 milliGRAM(s) Oral at bedtime    T(C): 36.3 (11-25-24 @ 08:57), Max: 37.2 (11-25-24 @ 01:08)  HR: 68 (11-25-24 @ 11:00) (65 - 74)  BP: 106/52 (11-25-24 @ 11:00) (92/52 - 125/74)  RR: 19 (11-25-24 @ 11:00) (11 - 28)  SpO2: 95% (11-25-24 @ 11:00) (93% - 99%)                              11.2   5.55  )-----------( 218      ( 25 Nov 2024 05:30 )             35.2     11-25    134[L]  |  103  |  17  ----------------------------<  117[H]  4.4   |  22  |  1.35[H]    Ca    8.9      25 Nov 2024 05:30  Phos  3.2     11-25  Mg     1.9     11-25    TPro  6.4  /  Alb  3.0[L]  /  TBili  0.4  /  DBili  x   /  AST  13  /  ALT  9[L]  /  AlkPhos  60  11-25         Admit to [facility] vascular 11/12  transfer to cards 11/19  transfer to CCU 11/22  Step down to 5U 11/15    74M, Polish speaking, PMHx of CAD s/p PCI and recent minimally invasive direct CABG 10/31/24, ICM, HFrEF, HTN, CKD, and PAD s/p prior L to R fem-fem bypass (>20yrs ago), who presented to [facility] for left leg pain concerning for acute limb ischemia.  Pt was initially admitted to Vascular Surgery 11/10, now s/p 11/11/24 LLE angiogram with EIA and FELICITY stents, and LLE fem to AK-pop bypass  c/b hematoma. Doppler + for LLE DVT.  While on vascular service patient w/ consistent angina and NSTEMI and transferred to cardiology service on 11/19. Underwent C 11/19 with CAD unchanged with no further intervention, presumed NSTEMI 2/2 hypotension/hypoperfusion. Pt was placed on Eliquis, ASA, Plavix ( asa stopped 11/15). Post cath  patient was on nitro gtt and IVF to improve hemodynamics.  Given the severity of angina with needing nitro gtt at 100mcg rate, patient is now being moved to CCU on 11/22 for further management and care. Palliative is also consulted for further discussion regarding GOC.   In the CCU, nitro gtt weaned off w/ increase in po imdur. Pt reports CP of 6-7 w/ nitro gtt weaned off; reproducible. Walk test was performed and pt did not report increased intensity of CP and vss; thus, no CP worsening w/ exertion.   Of note, duplex US of left groin showed 5x1cm fluid collection, pt not endorsing pain or discomfort in that area. Imdur dosing was increased to 240mg daily and attempts were made to wean down the nitro gtt. Given 1u RBC for optimization of Hgb given NSTEMI, last 10/23     On 11/25 patient was deemed to be stable to step down to 5U cards off nitro drip            MEDS on transfer from CCU :   acetaminophen     Tablet .. 975 milliGRAM(s) Oral every 8 hours PRN  apixaban 5 milliGRAM(s) Oral every 12 hours  ascorbic acid 500 milliGRAM(s) Oral daily  atorvastatin 80 milliGRAM(s) Oral at bedtime  bisacodyl Suppository 10 milliGRAM(s) Rectal daily PRN  calcium carbonate    500 mG (Tums) Chewable 1 Tablet(s) Chew three times a day PRN  clopidogrel Tablet 75 milliGRAM(s) Oral daily  dapagliflozin 10 milliGRAM(s) Oral every 24 hours  finasteride 5 milliGRAM(s) Oral daily  influenza  Vaccine (HIGH DOSE) 0.5 milliLiter(s) IntraMuscular once  isosorbide   mononitrate ER Tablet (IMDUR) 240 milliGRAM(s) Oral every 24 hours  multivitamin 1 Tablet(s) Oral daily  pantoprazole    Tablet 40 milliGRAM(s) Oral daily  polyethylene glycol 3350 17 Gram(s) Oral daily  ranolazine 1000 milliGRAM(s) Oral two times a day  senna 2 Tablet(s) Oral at bedtime  simethicone 80 milliGRAM(s) Chew two times a day PRN  spironolactone 25 milliGRAM(s) Oral every 24 hours  tamsulosin 0.4 milliGRAM(s) Oral at bedtime    T(C): 36.3 (11-25-24 @ 08:57), Max: 37.2 (11-25-24 @ 01:08)  HR: 68 (11-25-24 @ 11:00) (65 - 74)  BP: 106/52 (11-25-24 @ 11:00) (92/52 - 125/74)  RR: 19 (11-25-24 @ 11:00) (11 - 28)  SpO2: 95% (11-25-24 @ 11:00) (93% - 99%)                              11.2   5.55  )-----------( 218      ( 25 Nov 2024 05:30 )             35.2     11-25    134[L]  |  103  |  17  ----------------------------<  117[H]  4.4   |  22  |  1.35[H]    Ca    8.9      25 Nov 2024 05:30  Phos  3.2     11-25  Mg     1.9     11-25    TPro  6.4  /  Alb  3.0[L]  /  TBili  0.4  /  DBili  x   /  AST  13  /  ALT  9[L]  /  AlkPhos  60  11-25         Admit to [facility] vascular 11/12  transfer to cards 11/19  transfer to CCU 11/22  Step down to 5U 11/15    74M, Polish speaking, PMHx of CAD s/p PCI and recent minimally invasive direct CABG 10/31/24, ICM, HFrEF, HTN, CKD, and PAD s/p prior L to R fem-fem bypass (>20yrs ago), who presented to [facility] for left leg pain concerning for acute limb ischemia.  Pt was initially admitted to Vascular Surgery 11/10, now s/p 11/11/24 LLE angiogram with EIA and FELICITY stents, and LLE fem to AK-pop bypass  c/b hematoma. Doppler + for LLE DVT.  While on vascular service patient w/ consistent angina and NSTEMI and transferred to cardiology service on 11/19. Underwent C 11/19 with CAD unchanged with no further intervention, presumed NSTEMI 2/2 hypotension/hypoperfusion. Pt was placed on Eliquis, ASA, Plavix ( asa stopped 11/15). Post cath  patient was on nitro gtt and IVF to improve hemodynamics.  Given the severity of angina with needing nitro gtt at 100mcg rate, patient is now being moved to CCU on 11/22 for further management and care. Palliative is also consulted for further discussion regarding GOC.   In the CCU, nitro gtt weaned off w/ increase in po imdur. Pt reports CP of 6-7 w/ nitro gtt weaned off; reproducible. Walk test was performed and pt did not report increased intensity of CP and vss; thus, no CP worsening w/ exertion.   Of note, duplex US of left groin showed 5x1cm fluid collection, pt not endorsing pain or discomfort in that area. Imdur dosing was increased to 240mg daily and attempts were made to wean down the nitro gtt. Given 1u RBC for optimization of Hgb given NSTEMI, last 10/23     On 11/25 patient was deemed to be stable to step down to 5U cards off nitro drip            MEDS on transfer from CCU :   Metoprolol ER 75mg po daily  acetaminophen     Tablet .. 975 milliGRAM(s) Oral every 8 hours PRN  apixaban 5 milliGRAM(s) Oral every 12 hours  ascorbic acid 500 milliGRAM(s) Oral daily  atorvastatin 80 milliGRAM(s) Oral at bedtime  bisacodyl Suppository 10 milliGRAM(s) Rectal daily PRN  calcium carbonate    500 mG (Tums) Chewable 1 Tablet(s) Chew three times a day PRN  clopidogrel Tablet 75 milliGRAM(s) Oral daily  dapagliflozin 10 milliGRAM(s) Oral every 24 hours  finasteride 5 milliGRAM(s) Oral daily  influenza  Vaccine (HIGH DOSE) 0.5 milliLiter(s) IntraMuscular once  isosorbide   mononitrate ER Tablet (IMDUR) 240 milliGRAM(s) Oral every 24 hours  multivitamin 1 Tablet(s) Oral daily  pantoprazole    Tablet 40 milliGRAM(s) Oral daily  polyethylene glycol 3350 17 Gram(s) Oral daily  ranolazine 1000 milliGRAM(s) Oral two times a day  senna 2 Tablet(s) Oral at bedtime  simethicone 80 milliGRAM(s) Chew two times a day PRN  spironolactone 25 milliGRAM(s) Oral every 24 hours  tamsulosin 0.4 milliGRAM(s) Oral at bedtime    T(C): 36.3 (11-25-24 @ 08:57), Max: 37.2 (11-25-24 @ 01:08)  HR: 68 (11-25-24 @ 11:00) (65 - 74)  BP: 106/52 (11-25-24 @ 11:00) (92/52 - 125/74)  RR: 19 (11-25-24 @ 11:00) (11 - 28)  SpO2: 95% (11-25-24 @ 11:00) (93% - 99%)  GENERAL: NAD  HEAD:  Atraumatic, Normocephalic  EYES: EOMI, PERRLA, conjunctiva and sclera clear  NECK: Supple, No JVD,   CHEST/LUNG: B/L good air entry; No rales, rhonchi, or wheezing  HEART: S1S2 normal, no S3, Regular rate and rhythm; No murmurs  ABDOMEN: Soft, Nontender, Nondistended; Bowel sounds present  EXTREMITIES: warm,  no calf edema, L groin incision c/d/i, minimal ecchymosis surrounding, soft with small hardened hematoma noted  minimally tender to palpation, motor and sensory intact bilaterally when not limited by pain, LLE heel with allevyn dressing to prevent pressure ulcers however no wounds noted  Pulses: LLE palp DP, tri/biphasic PT, biphasic over distal bypass site and triphasic over proximal bypass site  Neuro A+O x 4                              11.2   5.55  )-----------( 218      ( 25 Nov 2024 05:30 )             35.2     11-25    134[L]  |  103  |  17  ----------------------------<  117[H]  4.4   |  22  |  1.35[H]    Ca    8.9      25 Nov 2024 05:30  Phos  3.2     11-25  Mg     1.9     11-25    TPro  6.4  /  Alb  3.0[L]  /  TBili  0.4  /  DBili  x   /  AST  13  /  ALT  9[L]  /  AlkPhos  60  11-25         Admit to [facility] vascular 11/12  transfer to cards 11/19  transfer to CCU 11/22  Step down to 5U 11/15    74M, Polish speaking, PMHx of CAD s/p PCI and recent minimally invasive direct CABG 10/31/24, ICM, HFrEF, HTN, CKD, and PAD s/p prior L to R fem-fem bypass (>20yrs ago), who presented to [facility] for left leg pain concerning for acute limb ischemia.  Pt was initially admitted to Vascular Surgery 11/10, now s/p 11/11/24 LLE angiogram with EIA and FELICITY stents, and LLE fem to AK-pop bypass  c/b hematoma. Doppler + for LLE DVT 11/12.  While on vascular service patient w/ consistent angina and NSTEMI and transferred to cardiology service on 11/19. Underwent C 11/19 with CAD unchanged with no further intervention, presumed NSTEMI 2/2 hypotension/hypoperfusion. Pt was placed on Eliquis, ASA, Plavix ( asa stopped 11/15). Post cath  patient was on nitro gtt and IVF to improve hemodynamics.  Given the severity of angina with needing nitro gtt at 100mcg rate, patient is now being moved to CCU on 11/22 for further management and care. Palliative is also consulted for further discussion regarding GOC.   In the CCU, nitro gtt weaned off w/ increase in po imdur. Pt reports CP of 6-7 w/ nitro gtt weaned off; reproducible. Walk test was performed and pt did not report increased intensity of CP and vss; thus, no CP worsening w/ exertion.   Of note, duplex US of left groin showed 5x1cm fluid collection, pt not endorsing pain or discomfort in that area. Imdur dosing was increased to 240mg daily and attempts were made to wean down the nitro gtt. Given 1u RBC for optimization of Hgb given NSTEMI, last 10/23     On 11/25 patient was deemed to be stable to step down to 5U cards off nitro drip            MEDS on transfer from CCU :   Metoprolol ER 75mg po daily  acetaminophen     Tablet .. 975 milliGRAM(s) Oral every 8 hours PRN  apixaban 5 milliGRAM(s) Oral every 12 hours  ascorbic acid 500 milliGRAM(s) Oral daily  atorvastatin 80 milliGRAM(s) Oral at bedtime  bisacodyl Suppository 10 milliGRAM(s) Rectal daily PRN  calcium carbonate    500 mG (Tums) Chewable 1 Tablet(s) Chew three times a day PRN  clopidogrel Tablet 75 milliGRAM(s) Oral daily  dapagliflozin 10 milliGRAM(s) Oral every 24 hours  finasteride 5 milliGRAM(s) Oral daily  influenza  Vaccine (HIGH DOSE) 0.5 milliLiter(s) IntraMuscular once  isosorbide   mononitrate ER Tablet (IMDUR) 240 milliGRAM(s) Oral every 24 hours  multivitamin 1 Tablet(s) Oral daily  pantoprazole    Tablet 40 milliGRAM(s) Oral daily  polyethylene glycol 3350 17 Gram(s) Oral daily  ranolazine 1000 milliGRAM(s) Oral two times a day  senna 2 Tablet(s) Oral at bedtime  simethicone 80 milliGRAM(s) Chew two times a day PRN  spironolactone 25 milliGRAM(s) Oral every 24 hours  tamsulosin 0.4 milliGRAM(s) Oral at bedtime    T(C): 36.3 (11-25-24 @ 08:57), Max: 37.2 (11-25-24 @ 01:08)  HR: 68 (11-25-24 @ 11:00) (65 - 74)  BP: 106/52 (11-25-24 @ 11:00) (92/52 - 125/74)  RR: 19 (11-25-24 @ 11:00) (11 - 28)  SpO2: 95% (11-25-24 @ 11:00) (93% - 99%)  GENERAL: NAD  HEAD:  Atraumatic, Normocephalic  EYES: EOMI, PERRLA, conjunctiva and sclera clear  NECK: Supple, No JVD,   CHEST/LUNG: B/L good air entry; No rales, rhonchi, or wheezing  HEART: S1S2 normal, no S3, Regular rate and rhythm; No murmurs  ABDOMEN: Soft, Nontender, Nondistended; Bowel sounds present  EXTREMITIES: warm,  no calf edema, L groin incision c/d/i, minimal ecchymosis surrounding, soft with small hardened hematoma noted  minimally tender to palpation, motor and sensory intact bilaterally when not limited by pain, LLE heel with allevyn dressing to prevent pressure ulcers however no wounds noted  Pulses: LLE palp DP, tri/biphasic PT, biphasic over distal bypass site and triphasic over proximal bypass site  Neuro A+O x 4                              11.2   5.55  )-----------( 218      ( 25 Nov 2024 05:30 )             35.2     11-25    134[L]  |  103  |  17  ----------------------------<  117[H]  4.4   |  22  |  1.35[H]    Ca    8.9      25 Nov 2024 05:30  Phos  3.2     11-25  Mg     1.9     11-25    TPro  6.4  /  Alb  3.0[L]  /  TBili  0.4  /  DBili  x   /  AST  13  /  ALT  9[L]  /  AlkPhos  60  11-25         Admit to [facility] vascular 11/12  transfer to cards 11/19  transfer to CCU 11/22  Step down to 5U 11/15    74M, Polish speaking, PMHx of CAD s/p PCI and recent minimally invasive direct CABG 10/31/24, ICM, HFrEF, HTN, CKD, and PAD s/p prior L to R fem-fem bypass (>20yrs ago), who presented to [facility] for left leg pain concerning for acute limb ischemia.  Pt was initially admitted to Vascular Surgery 11/10, now s/p 11/11/24 LLE angiogram with EIA and FELICITY stents, and LLE fem to AK-pop bypass  c/b hematoma. Doppler + for LLE DVT 11/12.  While on vascular service patient w/ consistent angina and NSTEMI and transferred to cardiology service on 11/19. Underwent C 11/19 with CAD unchanged with no further intervention, presumed NSTEMI 2/2 hypotension/hypoperfusion. Pt was placed on Eliquis, ASA, Plavix ( asa stopped 11/15). Post cath  patient was on nitro gtt and IVF to improve hemodynamics.  Given the severity of angina with needing nitro gtt at 100mcg rate, patient is now being moved to CCU on 11/22 for further management and care. Palliative is also consulted for further discussion regarding GOC.   In the CCU, nitro gtt weaned off w/ increase in po imdur. Pt reports CP of 6-7 w/ nitro gtt weaned off; reproducible. Walk test was performed and pt did not report increased intensity of CP and vss; thus, no CP worsening w/ exertion.   Of note, duplex US of left groin showed 5x1cm fluid collection, pt not endorsing pain or discomfort in that area. Imdur dosing was increased to 240mg daily and attempts were made to wean down the nitro gtt. Given 1u RBC for optimization of Hgb given NSTEMI, last 10/23     On 11/25 patient was deemed to be stable to step down to 5U cards off nitro drip, on max imdur ER 240mg and ranexa 1000mg BID            MEDS on transfer from CCU :   Metoprolol ER 75mg po daily  acetaminophen     Tablet .. 975 milliGRAM(s) Oral every 8 hours PRN  apixaban 5 milliGRAM(s) Oral every 12 hours  ascorbic acid 500 milliGRAM(s) Oral daily  atorvastatin 80 milliGRAM(s) Oral at bedtime  bisacodyl Suppository 10 milliGRAM(s) Rectal daily PRN  calcium carbonate    500 mG (Tums) Chewable 1 Tablet(s) Chew three times a day PRN  clopidogrel Tablet 75 milliGRAM(s) Oral daily  dapagliflozin 10 milliGRAM(s) Oral every 24 hours  finasteride 5 milliGRAM(s) Oral daily  influenza  Vaccine (HIGH DOSE) 0.5 milliLiter(s) IntraMuscular once  isosorbide   mononitrate ER Tablet (IMDUR) 240 milliGRAM(s) Oral every 24 hours  multivitamin 1 Tablet(s) Oral daily  pantoprazole    Tablet 40 milliGRAM(s) Oral daily  polyethylene glycol 3350 17 Gram(s) Oral daily  ranolazine 1000 milliGRAM(s) Oral two times a day  senna 2 Tablet(s) Oral at bedtime  simethicone 80 milliGRAM(s) Chew two times a day PRN  spironolactone 25 milliGRAM(s) Oral every 24 hours  tamsulosin 0.4 milliGRAM(s) Oral at bedtime    T(C): 36.3 (11-25-24 @ 08:57), Max: 37.2 (11-25-24 @ 01:08)  HR: 68 (11-25-24 @ 11:00) (65 - 74)  BP: 106/52 (11-25-24 @ 11:00) (92/52 - 125/74)  RR: 19 (11-25-24 @ 11:00) (11 - 28)  SpO2: 95% (11-25-24 @ 11:00) (93% - 99%)  GENERAL: NAD  HEAD:  Atraumatic, Normocephalic  EYES: EOMI, PERRLA, conjunctiva and sclera clear  NECK: Supple, No JVD,   CHEST/LUNG: B/L good air entry; No rales, rhonchi, or wheezing  HEART: S1S2 normal, no S3, Regular rate and rhythm; No murmurs  ABDOMEN: Soft, Nontender, Nondistended; Bowel sounds present  EXTREMITIES: warm,  no calf edema, L groin incision c/d/i, minimal ecchymosis surrounding, soft with small hardened hematoma noted  minimally tender to palpation, motor and sensory intact bilaterally when not limited by pain, LLE heel with allevyn dressing to prevent pressure ulcers however no wounds noted  Pulses: LLE palp DP, tri/biphasic PT, biphasic over distal bypass site and triphasic over proximal bypass site  Neuro A+O x 4                              11.2   5.55  )-----------( 218      ( 25 Nov 2024 05:30 )             35.2     11-25    134[L]  |  103  |  17  ----------------------------<  117[H]  4.4   |  22  |  1.35[H]    Ca    8.9      25 Nov 2024 05:30  Phos  3.2     11-25  Mg     1.9     11-25    TPro  6.4  /  Alb  3.0[L]  /  TBili  0.4  /  DBili  x   /  AST  13  /  ALT  9[L]  /  AlkPhos  60  11-25         Admit to [facility] vascular 11/12  transfer to cards 11/19  transfer to CCU 11/22  Step down to 5U 11/15    74M, Polish speaking, PMHx of CAD s/p PCI and recent minimally invasive direct CABG 10/31/24, ICM, HFrEF, HTN, CKD, and PAD s/p prior L to R fem-fem bypass (>20yrs ago), who presented to [facility] for left leg pain concerning for acute limb ischemia.  Pt was initially admitted to Vascular Surgery 11/10, now s/p 11/11/24 LLE angiogram with EIA and FELICITY stents, and LLE fem to AK-pop bypass  c/b hematoma. Doppler + for LLE DVT 11/12.  While on vascular service patient w/ consistent angina and NSTEMI and transferred to cardiology service on 11/19. Underwent C 11/19 with CAD unchanged with no further intervention, presumed NSTEMI 2/2 hypotension/hypoperfusion. Pt was placed on Eliquis, ASA, Plavix ( asa stopped 11/15). Post cath  patient was on nitro gtt and IVF to improve hemodynamics.  Given the severity of angina with needing nitro gtt at 100mcg rate, patient is now being moved to CCU on 11/22 for further management and care. Palliative is also consulted for further discussion regarding GOC.   In the CCU, nitro gtt weaned off w/ increase in po imdur. Pt reports CP of 6-7 w/ nitro gtt weaned off; reproducible. Walk test was performed and pt did not report increased intensity of CP and vss; thus, no CP worsening w/ exertion.   Of note, duplex US of left groin showed 5x1cm fluid collection, pt not endorsing pain or discomfort in that area. Imdur dosing was increased to 240mg daily and attempts were made to wean down the nitro gtt. Given 1u RBC for optimization of Hgb given NSTEMI, last 10/23     On 11/25 patient was deemed to be stable to step down to 5U cards off nitro drip, on max imdur ER 240mg and ranexa 1000mg BID            MEDS on transfer from CCU :   Metoprolol ER 75mg po daily  acetaminophen     Tablet .. 975 milliGRAM(s) Oral every 8 hours PRN  apixaban 5 milliGRAM(s) Oral every 12 hours  ascorbic acid 500 milliGRAM(s) Oral daily  atorvastatin 80 milliGRAM(s) Oral at bedtime  bisacodyl Suppository 10 milliGRAM(s) Rectal daily PRN  calcium carbonate    500 mG (Tums) Chewable 1 Tablet(s) Chew three times a day PRN  clopidogrel Tablet 75 milliGRAM(s) Oral daily  dapagliflozin 10 milliGRAM(s) Oral every 24 hours  finasteride 5 milliGRAM(s) Oral daily  influenza  Vaccine (HIGH DOSE) 0.5 milliLiter(s) IntraMuscular once  isosorbide   mononitrate ER Tablet (IMDUR) 240 milliGRAM(s) Oral every 24 hours  multivitamin 1 Tablet(s) Oral daily  pantoprazole    Tablet 40 milliGRAM(s) Oral daily  polyethylene glycol 3350 17 Gram(s) Oral daily  ranolazine 1000 milliGRAM(s) Oral two times a day  senna 2 Tablet(s) Oral at bedtime  simethicone 80 milliGRAM(s) Chew two times a day PRN  spironolactone 25 milliGRAM(s) Oral every 24 hours  tamsulosin 0.4 milliGRAM(s) Oral at bedtime    T(C): 36.3 (11-25-24 @ 08:57), Max: 37.2 (11-25-24 @ 01:08)  HR: 68 (11-25-24 @ 11:00) (65 - 74)  BP: 106/52 (11-25-24 @ 11:00) (92/52 - 125/74)  RR: 19 (11-25-24 @ 11:00) (11 - 28)  SpO2: 95% (11-25-24 @ 11:00) (93% - 99%)  GENERAL: NAD  HEAD:  Atraumatic, Normocephalic  EYES: EOMI, PERRLA, conjunctiva and sclera clear  NECK: Supple, No JVD,   CHEST/LUNG: B/L good air entry; No rales, rhonchi, or wheezing  HEART: S1S2 normal, no S3, Regular rate and rhythm; No murmurs  ABDOMEN: Soft, Nontender, Nondistended; Bowel sounds present  EXTREMITIES: warm,  no calf edema, L groin incision c/d/i, minimal ecchymosis surrounding, no hematoma appreciated   Pulses: LLE palp DP 1+ , R palpable DP/PT Neuro A+O x 4                              11.2   5.55  )-----------( 218      ( 25 Nov 2024 05:30 )             35.2     11-25    134[L]  |  103  |  17  ----------------------------<  117[H]  4.4   |  22  |  1.35[H]    Ca    8.9      25 Nov 2024 05:30  Phos  3.2     11-25  Mg     1.9     11-25    TPro  6.4  /  Alb  3.0[L]  /  TBili  0.4  /  DBili  x   /  AST  13  /  ALT  9[L]  /  AlkPhos  60  11-25

## 2024-11-26 LAB
ANION GAP SERPL CALC-SCNC: 9 MMOL/L — SIGNIFICANT CHANGE UP (ref 5–17)
BLD GP AB SCN SERPL QL: NEGATIVE — SIGNIFICANT CHANGE UP
BUN SERPL-MCNC: 22 MG/DL — SIGNIFICANT CHANGE UP (ref 7–23)
CALCIUM SERPL-MCNC: 8.9 MG/DL — SIGNIFICANT CHANGE UP (ref 8.4–10.5)
CHLORIDE SERPL-SCNC: 102 MMOL/L — SIGNIFICANT CHANGE UP (ref 96–108)
CO2 SERPL-SCNC: 22 MMOL/L — SIGNIFICANT CHANGE UP (ref 22–31)
CREAT SERPL-MCNC: 1.42 MG/DL — HIGH (ref 0.5–1.3)
EGFR: 51 ML/MIN/1.73M2 — LOW
GLUCOSE SERPL-MCNC: 104 MG/DL — HIGH (ref 70–99)
HCT VFR BLD CALC: 35.5 % — LOW (ref 39–50)
HGB BLD-MCNC: 11.7 G/DL — LOW (ref 13–17)
MAGNESIUM SERPL-MCNC: 1.9 MG/DL — SIGNIFICANT CHANGE UP (ref 1.6–2.6)
MCHC RBC-ENTMCNC: 30 PG — SIGNIFICANT CHANGE UP (ref 27–34)
MCHC RBC-ENTMCNC: 33 G/DL — SIGNIFICANT CHANGE UP (ref 32–36)
MCV RBC AUTO: 91 FL — SIGNIFICANT CHANGE UP (ref 80–100)
NRBC # BLD: 0 /100 WBCS — SIGNIFICANT CHANGE UP (ref 0–0)
PLATELET # BLD AUTO: 259 K/UL — SIGNIFICANT CHANGE UP (ref 150–400)
POTASSIUM SERPL-MCNC: 4.2 MMOL/L — SIGNIFICANT CHANGE UP (ref 3.5–5.3)
POTASSIUM SERPL-SCNC: 4.2 MMOL/L — SIGNIFICANT CHANGE UP (ref 3.5–5.3)
RBC # BLD: 3.9 M/UL — LOW (ref 4.2–5.8)
RBC # FLD: 14.3 % — SIGNIFICANT CHANGE UP (ref 10.3–14.5)
RH IG SCN BLD-IMP: POSITIVE — SIGNIFICANT CHANGE UP
SODIUM SERPL-SCNC: 133 MMOL/L — LOW (ref 135–145)
WBC # BLD: 6.07 K/UL — SIGNIFICANT CHANGE UP (ref 3.8–10.5)
WBC # FLD AUTO: 6.07 K/UL — SIGNIFICANT CHANGE UP (ref 3.8–10.5)

## 2024-11-26 PROCEDURE — 85025 COMPLETE CBC W/AUTO DIFF WBC: CPT

## 2024-11-26 PROCEDURE — 86891 AUTOLOGOUS BLOOD OP SALVAGE: CPT

## 2024-11-26 PROCEDURE — 71046 X-RAY EXAM CHEST 2 VIEWS: CPT

## 2024-11-26 PROCEDURE — 97161 PT EVAL LOW COMPLEX 20 MIN: CPT

## 2024-11-26 PROCEDURE — 99233 SBSQ HOSP IP/OBS HIGH 50: CPT

## 2024-11-26 PROCEDURE — A9552: CPT

## 2024-11-26 PROCEDURE — 82550 ASSAY OF CK (CPK): CPT

## 2024-11-26 PROCEDURE — C1751: CPT

## 2024-11-26 PROCEDURE — 83036 HEMOGLOBIN GLYCOSYLATED A1C: CPT

## 2024-11-26 PROCEDURE — 75635 CT ANGIO ABDOMINAL ARTERIES: CPT | Mod: MC

## 2024-11-26 PROCEDURE — 86923 COMPATIBILITY TEST ELECTRIC: CPT

## 2024-11-26 PROCEDURE — 84443 ASSAY THYROID STIM HORMONE: CPT

## 2024-11-26 PROCEDURE — 84100 ASSAY OF PHOSPHORUS: CPT

## 2024-11-26 PROCEDURE — 85027 COMPLETE CBC AUTOMATED: CPT

## 2024-11-26 PROCEDURE — 82553 CREATINE MB FRACTION: CPT

## 2024-11-26 PROCEDURE — 80053 COMPREHEN METABOLIC PANEL: CPT

## 2024-11-26 PROCEDURE — C9399: CPT

## 2024-11-26 PROCEDURE — 97110 THERAPEUTIC EXERCISES: CPT

## 2024-11-26 PROCEDURE — 74018 RADEX ABDOMEN 1 VIEW: CPT

## 2024-11-26 PROCEDURE — 83735 ASSAY OF MAGNESIUM: CPT

## 2024-11-26 PROCEDURE — 83605 ASSAY OF LACTIC ACID: CPT

## 2024-11-26 PROCEDURE — 93970 EXTREMITY STUDY: CPT

## 2024-11-26 PROCEDURE — 93925 LOWER EXTREMITY STUDY: CPT

## 2024-11-26 PROCEDURE — 97530 THERAPEUTIC ACTIVITIES: CPT

## 2024-11-26 PROCEDURE — 85730 THROMBOPLASTIN TIME PARTIAL: CPT

## 2024-11-26 PROCEDURE — 86850 RBC ANTIBODY SCREEN: CPT

## 2024-11-26 PROCEDURE — P9045: CPT

## 2024-11-26 PROCEDURE — 85576 BLOOD PLATELET AGGREGATION: CPT

## 2024-11-26 PROCEDURE — 97164 PT RE-EVAL EST PLAN CARE: CPT

## 2024-11-26 PROCEDURE — 80048 BASIC METABOLIC PNL TOTAL CA: CPT

## 2024-11-26 PROCEDURE — 93880 EXTRACRANIAL BILAT STUDY: CPT

## 2024-11-26 PROCEDURE — 80076 HEPATIC FUNCTION PANEL: CPT

## 2024-11-26 PROCEDURE — 93321 DOPPLER ECHO F-UP/LMTD STD: CPT

## 2024-11-26 PROCEDURE — 82330 ASSAY OF CALCIUM: CPT

## 2024-11-26 PROCEDURE — C1729: CPT

## 2024-11-26 PROCEDURE — 93005 ELECTROCARDIOGRAM TRACING: CPT

## 2024-11-26 PROCEDURE — 85610 PROTHROMBIN TIME: CPT

## 2024-11-26 PROCEDURE — 97165 OT EVAL LOW COMPLEX 30 MIN: CPT

## 2024-11-26 PROCEDURE — 82803 BLOOD GASES ANY COMBINATION: CPT

## 2024-11-26 PROCEDURE — 97168 OT RE-EVAL EST PLAN CARE: CPT

## 2024-11-26 PROCEDURE — 81001 URINALYSIS AUTO W/SCOPE: CPT

## 2024-11-26 PROCEDURE — 99232 SBSQ HOSP IP/OBS MODERATE 35: CPT

## 2024-11-26 PROCEDURE — C8929: CPT

## 2024-11-26 PROCEDURE — 71045 X-RAY EXAM CHEST 1 VIEW: CPT

## 2024-11-26 PROCEDURE — 84484 ASSAY OF TROPONIN QUANT: CPT

## 2024-11-26 PROCEDURE — C1889: CPT

## 2024-11-26 PROCEDURE — 87635 SARS-COV-2 COVID-19 AMP PRB: CPT

## 2024-11-26 PROCEDURE — 97116 GAIT TRAINING THERAPY: CPT

## 2024-11-26 PROCEDURE — S2900: CPT

## 2024-11-26 PROCEDURE — 97535 SELF CARE MNGMENT TRAINING: CPT

## 2024-11-26 PROCEDURE — 80061 LIPID PANEL: CPT

## 2024-11-26 PROCEDURE — 83880 ASSAY OF NATRIURETIC PEPTIDE: CPT

## 2024-11-26 PROCEDURE — 84132 ASSAY OF SERUM POTASSIUM: CPT

## 2024-11-26 PROCEDURE — 36415 COLL VENOUS BLD VENIPUNCTURE: CPT

## 2024-11-26 PROCEDURE — 86900 BLOOD TYPING SEROLOGIC ABO: CPT

## 2024-11-26 PROCEDURE — P9047: CPT

## 2024-11-26 PROCEDURE — 84295 ASSAY OF SERUM SODIUM: CPT

## 2024-11-26 PROCEDURE — 78815 PET IMAGE W/CT SKULL-THIGH: CPT | Mod: MC

## 2024-11-26 PROCEDURE — 86901 BLOOD TYPING SEROLOGIC RH(D): CPT

## 2024-11-26 PROCEDURE — 82962 GLUCOSE BLOOD TEST: CPT

## 2024-11-26 RX ORDER — METOPROLOL TARTRATE 100 MG/1
100 TABLET, FILM COATED ORAL DAILY
Refills: 0 | Status: DISCONTINUED | OUTPATIENT
Start: 2024-11-27 | End: 2024-11-27

## 2024-11-26 RX ORDER — ACETAMINOPHEN 500MG 500 MG/1
975 TABLET, COATED ORAL EVERY 8 HOURS
Refills: 0 | Status: DISCONTINUED | OUTPATIENT
Start: 2024-11-26 | End: 2024-11-27

## 2024-11-26 RX ORDER — METOPROLOL TARTRATE 100 MG/1
25 TABLET, FILM COATED ORAL ONCE
Refills: 0 | Status: COMPLETED | OUTPATIENT
Start: 2024-11-26 | End: 2024-11-26

## 2024-11-26 RX ADMIN — Medication 1 TABLET(S): at 12:16

## 2024-11-26 RX ADMIN — PANTOPRAZOLE SODIUM 40 MILLIGRAM(S): 40 TABLET, DELAYED RELEASE ORAL at 12:16

## 2024-11-26 RX ADMIN — RANOLAZINE 1000 MILLIGRAM(S): 1000 TABLET, FILM COATED, EXTENDED RELEASE ORAL at 18:35

## 2024-11-26 RX ADMIN — Medication 2 TABLET(S): at 21:04

## 2024-11-26 RX ADMIN — Medication 80 MILLIGRAM(S): at 21:03

## 2024-11-26 RX ADMIN — METOPROLOL TARTRATE 25 MILLIGRAM(S): 100 TABLET, FILM COATED ORAL at 12:23

## 2024-11-26 RX ADMIN — TAMSULOSIN HYDROCHLORIDE 0.4 MILLIGRAM(S): 0.4 CAPSULE ORAL at 21:04

## 2024-11-26 RX ADMIN — APIXABAN 5 MILLIGRAM(S): 2.5 TABLET, FILM COATED ORAL at 05:57

## 2024-11-26 RX ADMIN — DAPAGLIFLOZIN 10 MILLIGRAM(S): 10 TABLET, FILM COATED ORAL at 12:16

## 2024-11-26 RX ADMIN — Medication 400 MILLIGRAM(S): at 12:15

## 2024-11-26 RX ADMIN — ACETAMINOPHEN 500MG 975 MILLIGRAM(S): 500 TABLET, COATED ORAL at 19:03

## 2024-11-26 RX ADMIN — APIXABAN 5 MILLIGRAM(S): 2.5 TABLET, FILM COATED ORAL at 18:36

## 2024-11-26 RX ADMIN — ACETAMINOPHEN 500MG 975 MILLIGRAM(S): 500 TABLET, COATED ORAL at 20:03

## 2024-11-26 RX ADMIN — Medication 240 MILLIGRAM(S): at 14:25

## 2024-11-26 RX ADMIN — CLOPIDOGREL 75 MILLIGRAM(S): 75 TABLET, FILM COATED ORAL at 12:16

## 2024-11-26 RX ADMIN — Medication 25 MILLIGRAM(S): at 12:16

## 2024-11-26 RX ADMIN — METOPROLOL TARTRATE 75 MILLIGRAM(S): 100 TABLET, FILM COATED ORAL at 05:57

## 2024-11-26 RX ADMIN — Medication 500 MILLIGRAM(S): at 12:16

## 2024-11-26 RX ADMIN — Medication 5 MILLIGRAM(S): at 12:16

## 2024-11-26 RX ADMIN — RANOLAZINE 1000 MILLIGRAM(S): 1000 TABLET, FILM COATED, EXTENDED RELEASE ORAL at 05:57

## 2024-11-26 NOTE — ADVANCED PRACTICE NURSE CONSULT - REASON FOR CONSULT
f/up
possible sacral pressure injury  left heel pressure injury, present on admission    Per chart review, 73yo M with PMHx of HTN, HLD, BPH, HFrEF, CAD s/p CABG 10/31/24, severe PAD (s/p fem-fem) who presented to [facility] for left leg pain concerning for acute limb ischemia. On exam here, patient has an exam more consistent with his prior recent exam, consisting of chronic limb ischemia (motor and sensation intact, doppler signals present in left foot and fem-fem bypass). Given his rest pain, decision was made to proceed with arteriography. Patient is now  s/p LLE angiogram with EIA and FELICITY stents and s/p LLE fem to AK-pop bypass 11/14/24. Patient is most recently s/p transfer to cardiology service for NSTEMI s/p [facility] (11/19).

## 2024-11-26 NOTE — PROGRESS NOTE ADULT - NUTRITIONAL ASSESSMENT
This patient has been assessed with a concern for Malnutrition and has been determined to have a diagnosis/diagnoses of Moderate protein-calorie malnutrition.    This patient is being managed with:   Diet DASH/TLC-  Sodium & Cholesterol Restricted  Pureed (PUREED)  Entered: Nov 22 2024  1:41PM  
74M PMHx of CAD s/p PCI and recent minimally invasive direct CABG 10/31/24, ICM HFrEF, HTN, CKD, and PAD s/p prior L to R fem-fem bypass,  who presented to [facility], [place] for left leg pain concerning for acute limb ischemia. Pt was admitted for CLI of LLE with rest pain, s/p LE angiogram, s/p L common and external iliac stent but prox anastomosis of fem-fem bypass is upwards going (unable to treat endovascularly), w/ occluded L CFA, SFA, and AK popliteal artery w/ reconstitution of BK pop via collaterals w/ AT/PT runoff s/p L fem-pop bypass 11/14. S/p 1U PRBC 11/16/24 due to Hgb 7.7, likely blood loss at L popliteal site (saturated dressing);  now Hgb stable in 8s. Pt w/ consistent angina inpt and troponin usually 30s-40s range.  Overnight with new chest pain and elevated hsTrop 447, concern for NSTEMI, switched apixaban to heparin gtt, transferred to Cardiology.
This patient has been assessed with a concern for Malnutrition and has been determined to have a diagnosis/diagnoses of Moderate protein-calorie malnutrition.    This patient is being managed with:   Diet DASH/TLC-  Sodium & Cholesterol Restricted  Pureed (PUREED)  Entered: Nov 22 2024  1:41PM  
This patient has been assessed with a concern for Malnutrition and has been determined to have a diagnosis/diagnoses of Moderate protein-calorie malnutrition.    This patient is being managed with:   Diet DASH/TLC-  Sodium & Cholesterol Restricted  Pureed (PUREED)  Entered: Nov 22 2024  1:41PM

## 2024-11-26 NOTE — PROGRESS NOTE ADULT - SUBJECTIVE AND OBJECTIVE BOX
Cardiology PA Adult Progress Note    Subjective Assessment: Patient seen and examined at bedside.   	  MEDICATIONS:  isosorbide   mononitrate ER Tablet (IMDUR) 240 milliGRAM(s) Oral every 24 hours  metoprolol succinate ER 75 milliGRAM(s) Oral every 24 hours  ranolazine 1000 milliGRAM(s) Oral two times a day  spironolactone 25 milliGRAM(s) Oral every 24 hours    acetaminophen     Tablet .. 975 milliGRAM(s) Oral every 8 hours PRN    bisacodyl Suppository 10 milliGRAM(s) Rectal daily PRN  calcium carbonate    500 mG (Tums) Chewable 1 Tablet(s) Chew three times a day PRN  pantoprazole    Tablet 40 milliGRAM(s) Oral daily  polyethylene glycol 3350 17 Gram(s) Oral daily  senna 2 Tablet(s) Oral at bedtime  simethicone 80 milliGRAM(s) Chew two times a day PRN    atorvastatin 80 milliGRAM(s) Oral at bedtime  dapagliflozin 10 milliGRAM(s) Oral every 24 hours  finasteride 5 milliGRAM(s) Oral daily    apixaban 5 milliGRAM(s) Oral every 12 hours  ascorbic acid 500 milliGRAM(s) Oral daily  clopidogrel Tablet 75 milliGRAM(s) Oral daily  influenza  Vaccine (HIGH DOSE) 0.5 milliLiter(s) IntraMuscular once  magnesium oxide 400 milliGRAM(s) Oral once  multivitamin 1 Tablet(s) Oral daily  tamsulosin 0.4 milliGRAM(s) Oral at bedtime        [PHYSICAL EXAM:  TELEMETRY:  T(C): 36.7 (11-26-24 @ 06:00), Max: 36.7 (11-26-24 @ 06:00)  HR: 69 (11-26-24 @ 06:00) (68 - 74)  BP: 113/58 (11-26-24 @ 06:00) (103/51 - 120/57)  RR: 16 (11-26-24 @ 06:00) (16 - 28)  SpO2: 97% (11-26-24 @ 06:00) (95% - 98%)  Wt(kg): --  I&O's Summary    25 Nov 2024 07:01  -  26 Nov 2024 07:00  --------------------------------------------------------  IN: 920 mL / OUT: 1925 mL / NET: -1005 mL        	    LABS:	 	                   11.7   6.07  )-----------( 259      ( 26 Nov 2024 05:30 )             35.5     11-26    133[L]  |  102  |  22  ----------------------------<  104[H]  4.2   |  22  |  1.42[H]    Ca    8.9      26 Nov 2024 05:30  Phos  3.2     11-25  Mg     1.9     11-26    TPro  6.4  /  Alb  3.0[L]  /  TBili  0.4  /  DBili  x   /  AST  13  /  ALT  9[L]  /  AlkPhos  60  11-25    proBNP:   Lipid Profile:   HgA1c:   TSH:        Cardiology PA Adult Progress Note    Subjective Assessment: Patient seen and examined at bedside. says his chest pain is "okay" currently. denies sob, f/c/n/v/d.   	  MEDICATIONS:  isosorbide   mononitrate ER Tablet (IMDUR) 240 milliGRAM(s) Oral every 24 hours  metoprolol succinate ER 75 milliGRAM(s) Oral every 24 hours  ranolazine 1000 milliGRAM(s) Oral two times a day  spironolactone 25 milliGRAM(s) Oral every 24 hours    acetaminophen     Tablet .. 975 milliGRAM(s) Oral every 8 hours PRN    bisacodyl Suppository 10 milliGRAM(s) Rectal daily PRN  calcium carbonate    500 mG (Tums) Chewable 1 Tablet(s) Chew three times a day PRN  pantoprazole    Tablet 40 milliGRAM(s) Oral daily  polyethylene glycol 3350 17 Gram(s) Oral daily  senna 2 Tablet(s) Oral at bedtime  simethicone 80 milliGRAM(s) Chew two times a day PRN    atorvastatin 80 milliGRAM(s) Oral at bedtime  dapagliflozin 10 milliGRAM(s) Oral every 24 hours  finasteride 5 milliGRAM(s) Oral daily    apixaban 5 milliGRAM(s) Oral every 12 hours  ascorbic acid 500 milliGRAM(s) Oral daily  clopidogrel Tablet 75 milliGRAM(s) Oral daily  influenza  Vaccine (HIGH DOSE) 0.5 milliLiter(s) IntraMuscular once  magnesium oxide 400 milliGRAM(s) Oral once  multivitamin 1 Tablet(s) Oral daily  tamsulosin 0.4 milliGRAM(s) Oral at bedtime        [PHYSICAL EXAM:  TELEMETRY: nsr   T(C): 36.7 (11-26-24 @ 06:00), Max: 36.7 (11-26-24 @ 06:00)  HR: 69 (11-26-24 @ 06:00) (68 - 74)  BP: 113/58 (11-26-24 @ 06:00) (103/51 - 120/57)  RR: 16 (11-26-24 @ 06:00) (16 - 28)  SpO2: 97% (11-26-24 @ 06:00) (95% - 98%)  Wt(kg): --  I&O's Summary    25 Nov 2024 07:01 - 26 Nov 2024 07:00  --------------------------------------------------------  IN: 920 mL / OUT: 1925 mL / NET: -1005 mL      Appearance: NAD in bed  HEENT:   Normal oral mucosa, PERRL, EOMI	  Neck: Supple   Cardiovascular: Normal S1 S2, No JVD, No murmurs  Respiratory: Lungs clear to auscultation/No Rales, Rhonchi, Wheezing	  Gastrointestinal:  Soft, Non-tender, + BS	  Skin: No rashes, +ecchymoses +scabbed pretibial lesions; +incision healing to L groin,  + healing small incisions to chest wall.  Extremities: Normal range of motion, No clubbing, cyanosis or edema  Vascular: Peripheral pulses as follows:   LDP 2+. LPT 1+. RDP/PT dopplerable. B/l radial 2+; LRA site no hematoma or swelling, distal pulse excellent.  B/l FA 1+ with Bruits present.    Neurologic: Non-focal  Psychiatry: A & O x 3, Mood & affect appropriate   	    LABS:	 	                   11.7   6.07  )-----------( 259      ( 26 Nov 2024 05:30 )             35.5     11-26    133[L]  |  102  |  22  ----------------------------<  104[H]  4.2   |  22  |  1.42[H]    Ca    8.9      26 Nov 2024 05:30  Phos  3.2     11-25  Mg     1.9     11-26    TPro  6.4  /  Alb  3.0[L]  /  TBili  0.4  /  DBili  x   /  AST  13  /  ALT  9[L]  /  AlkPhos  60  11-25    proBNP:   Lipid Profile:   HgA1c:   TSH:

## 2024-11-26 NOTE — PROGRESS NOTE ADULT - ASSESSMENT
74M, Polish speaking, PMHx of CAD s/p PCI and recent minimally invasive direct CABG 10/31/24, ICM, HFrEF, HTN, CKD, and PAD s/p prior L to R fem-fem bypass (>20yrs ago), who initially  presented to [facility] [place], operated by [facility] for left leg pain concerning for acute limb ischemia.  Pt was transferred to Vascular Surgery [facility] 11/12, now s/p 11/11/24 LLE angiogram with EIA and FELICITY stents, and LLE fem to AK-pop bypass  c/b hematoma. Course also c/b  angina and elevated troponins,  s/p [facility] 11/19 with CAD unchanged with no further intervention, presumed NSTEMI-Type 2 due to hypotension/hypoperfusion. Post cath patient remained with CP and moved to CCU 11/22 for max dose IV Nitro gtt @100mcg/min; successfully titrated off and stable on oral Imdur, Ranexa and BB; stepped down to 5U Cards 11/25 and DC planning to [facility].

## 2024-11-26 NOTE — PROGRESS NOTE ADULT - SUBJECTIVE AND OBJECTIVE BOX
SUBJECTIVE: Patient was evaluated at bedside this AM by vascular surgery team. Patient continues to endorse L heel pain but denies any sensory or motor changes. Patient states his chest pain is unchanged.    MEDICATIONS  (STANDING):  apixaban 5 milliGRAM(s) Oral every 12 hours  ascorbic acid 500 milliGRAM(s) Oral daily  atorvastatin 80 milliGRAM(s) Oral at bedtime  clopidogrel Tablet 75 milliGRAM(s) Oral daily  dapagliflozin 10 milliGRAM(s) Oral every 24 hours  finasteride 5 milliGRAM(s) Oral daily  influenza  Vaccine (HIGH DOSE) 0.5 milliLiter(s) IntraMuscular once  isosorbide   mononitrate ER Tablet (IMDUR) 240 milliGRAM(s) Oral every 24 hours  magnesium oxide 400 milliGRAM(s) Oral once  metoprolol succinate ER 25 milliGRAM(s) Oral once  multivitamin 1 Tablet(s) Oral daily  pantoprazole    Tablet 40 milliGRAM(s) Oral daily  polyethylene glycol 3350 17 Gram(s) Oral daily  ranolazine 1000 milliGRAM(s) Oral two times a day  senna 2 Tablet(s) Oral at bedtime  spironolactone 25 milliGRAM(s) Oral every 24 hours  tamsulosin 0.4 milliGRAM(s) Oral at bedtime    MEDICATIONS  (PRN):  acetaminophen     Tablet .. 975 milliGRAM(s) Oral every 8 hours PRN moderate (4-6), severe (7-10) pain  bisacodyl Suppository 10 milliGRAM(s) Rectal daily PRN Constipation  calcium carbonate    500 mG (Tums) Chewable 1 Tablet(s) Chew three times a day PRN Heartburn  simethicone 80 milliGRAM(s) Chew two times a day PRN Heartburn      Vital Signs Last 24 Hrs  T(C): 36.7 (26 Nov 2024 09:01), Max: 36.7 (26 Nov 2024 06:00)  T(F): 98 (26 Nov 2024 09:01), Max: 98.1 (26 Nov 2024 06:00)  HR: 73 (26 Nov 2024 09:01) (68 - 74)  BP: 101/58 (26 Nov 2024 09:01) (101/58 - 120/57)  BP(mean): 74 (26 Nov 2024 09:01) (69 - 83)  RR: 17 (26 Nov 2024 09:01) (16 - 21)  SpO2: 98% (26 Nov 2024 09:01) (95% - 98%)    Parameters below as of 26 Nov 2024 09:01  Patient On (Oxygen Delivery Method): room air        Physical Exam:  General: alert and awake, NAD  Pulmonary: no respiratory distress  Cardiovascular: RRR  Abdominal: soft  Extrem: WWP, no calf edema, L groin incision c/d/i, minimal ecchymosis surrounding, soft with small hardened hematoma noted but improved from prior exams, minimally tender to palpation, motor and sensory intact bilaterally when not limited by pain, LLE heel with allevyn dressing to prevent pressure ulcers however no wounds noted  Pulses: LLE triphasic DP/PT, biphasic over distal bypass site and triphasic over proximal bypass site    I&O's Summary    25 Nov 2024 07:01  -  26 Nov 2024 07:00  --------------------------------------------------------  IN: 920 mL / OUT: 1925 mL / NET: -1005 mL        LABS:                        11.7   6.07  )-----------( 259      ( 26 Nov 2024 05:30 )             35.5     11-26    133[L]  |  102  |  22  ----------------------------<  104[H]  4.2   |  22  |  1.42[H]    Ca    8.9      26 Nov 2024 05:30  Phos  3.2     11-25  Mg     1.9     11-26    TPro  6.4  /  Alb  3.0[L]  /  TBili  0.4  /  DBili  x   /  AST  13  /  ALT  9[L]  /  AlkPhos  60  11-25      Urinalysis Basic - ( 26 Nov 2024 05:30 )    Color: x / Appearance: x / SG: x / pH: x  Gluc: 104 mg/dL / Ketone: x  / Bili: x / Urobili: x   Blood: x / Protein: x / Nitrite: x   Leuk Esterase: x / RBC: x / WBC x   Sq Epi: x / Non Sq Epi: x / Bacteria: x      CAPILLARY BLOOD GLUCOSE        LIVER FUNCTIONS - ( 25 Nov 2024 05:30 )  Alb: 3.0 g/dL / Pro: 6.4 g/dL / ALK PHOS: 60 U/L / ALT: 9 U/L / AST: 13 U/L / GGT: x             RADIOLOGY & ADDITIONAL STUDIES:

## 2024-11-26 NOTE — ADVANCED PRACTICE NURSE CONSULT - RECOMMEDATIONS
Sacrum: Allevyn foam. Change every 3 days  Left heel, left lateral lower leg: Aquacel Ag, foam dressing. Change every 3 days     Education with patient regarding importance of repositioning and offloading heels. Pt preferring to keep legs bent to avoid pressure on heel.     Discussed with primary RN, nursing management and cardiology FRANCOISE Vivas    Please reconsult if new wound care concerns arise.    Total time spent: 30 minutes

## 2024-11-26 NOTE — PROGRESS NOTE ADULT - PROBLEM SELECTOR PLAN 1
NSTEMI type 2  Likely secondary to hypoperfusion/ischemic pain vs pain from recent MIDCAB; 10/31/24.  Trop 447 on 11/19 AM >> 368.  - s/p LHC 11/19/24:  Known  in ostial LCX & RCA. LIMA-LAD patent. dLM (50-60%), pLAD (70%, diffuse), competitive flow from LIMA, dLAD mild diffuse.  - DAPT/AC:  S.p Triple therapy until 11/25: Asa 81mg qd stopped 11/25 c/w Plavix 75mg Qd and Eliquis 5mg BID (for DVT LLE).   - Antianginals:       - weaned Nitro gtt off; baseline CP at level 2-3, not worsening upon exertion      - c/w PO Imdur to 240mg qd      - c/w Ranexa to 1000mg BID      - increased Metoprolol succinate to 100mg qd;   - c/w Atorvastatin 80mg qhs

## 2024-11-26 NOTE — PROGRESS NOTE ADULT - PROBLEM SELECTOR PLAN 6
- Stable, Hgb today 11.7    - s/p 1u PRBC 11/16/24 for Hgb 7.7 due to suspected blood loss @ L Popliteal site per Vascular, now stable  - s/p 1u PRBC 11/19/24 for pre-LHC optimization  - s/p 1u pRBC on 11/23 for Hgb optimization  - Transfuse >8 given cardiac and renal risk factors  - Keep active type & screen (last on 11/26)

## 2024-11-26 NOTE — ADVANCED PRACTICE NURSE CONSULT - ASSESSMENT
Utilized Polish  #[id number] during entire visit.  Pt awake, alert, able to help turn side to side.   Per documentation pt intermittently refusing repositioning and  heel offloading.     Sacrum: predominately blanching erythema. There is one small area directly over sacrum approx 0.4x0.3cm that is dark brown discoloration. Not painful, no temperature changes. Monitor.   Right heel intact  LLE: groin hematoma. Scattered areas of thickened eschar to medial and lateral lower leg.   Left heel: approx 4x4cm ruptured serosang filled blister. Foot warm, dry, minimal nonpitting edema. 
Utilized Polish  during entire visit.  Pt awake, alert, able to turn side to side with minimal assist. Was OOB earlier today   Refusing heel boot.     Sacrum: Unchanged from previous assessment 11/20. Predominately blanching erythema. There is one small area directly over sacrum approx 0.4x0.3cm that is dark brown discoloration. Not painful, no temperature changes, feels dry.  LLE: Numerous of the previous eschars have fallen off with intact skin beneath. One open area to lateral leg approx 2x1cm with deep dark red wound bed, small serous drainage.    Left heel, stage 3 (evolved from DTI): 1x2cm deep dark red wound bed, small serous tissue. Foot warm, dry, minimal nonpitting edema.

## 2024-11-26 NOTE — PROGRESS NOTE ADULT - NS ATTEND AMEND GEN_ALL_CORE FT
Mr. Sanabria is a 74M with history of CAD with PCI and MIDCAB, ICM, CKD and PAD with hx of L-R fem-fem bypass and presented with acute limb ischemia and admitted initially to vascular surgery underwent  LLE fem to AK pop bypass c/b hematoma and further complicated by angina and NSTEMI and transferred to cardiology, Coronary anatomy unchanged and deemed type 2 MI in setting of anemia/hypoperfusion and optimized from antianginal standpoint, stepped down from CCU to telemetry.     Review of studies:  [facility] 11/19/24:  LIMA-LAD patent, native dLM 50-60% stenosis, pLAD 70% diffuse disease, dLAD mild diffuse;  LCX ostial , known; RCA not engaged, known .    Exam:  NAD  JVP normal  Lungs CTA  WWP, no edema, healing wounds with bandage on L leg.     Type 2 MI- Optimize antianaginals increase BB to 100mg daily, Imdur is at 240mg, ranexa 1000mg BID, Plavix/Eliquis- PPI.   ICM- LVEF 35%, RV function normal, no significant valve disease. on Farxiga, Salvador, Toprol as above. Add arb vs entresto.   Anemia- s/p PRBC, hgb stable  PAD- Eliquis/plavix as above  DVT- on eliquis

## 2024-11-26 NOTE — PROGRESS NOTE ADULT - PROBLEM SELECTOR PLAN 3
Doppler venous 11/12 Age-indeterminate DVT of LEFT superficial femoral vein, LEFT popliteal vein, and LEFT calf vein  -continue Eliquis 5mg BID and antiplt as above

## 2024-11-26 NOTE — PROGRESS NOTE ADULT - PROBLEM SELECTOR PLAN 8
N: DASH/TLC diet, pureed per pt request  E: Replete lytes PRN K<4, Mg<2   DVT PPX: full AC Eliquis  GI:Bowel regimen: Miralax & senna standing, dulcolax supp PRN  C: FULL CODE   Dispo: [facility]ney d/w Dr Mcfadden
- c/w Tamsulosin and finasteride    N: DASH/TLC diet  E: Replete lytes PRN K<4, Mg<2   P: DVT PPX: full AC Eliquis  C: FULL CODE   Dispo: Cardiac telemetry  case discussed with Dr. Rowe, pt, and Vascular surgery
N: DASH/TLC diet, pureed per pt request  E: Replete lytes PRN K<4, Mg<2   DVT PPX: full AC Eliquis  GI:Bowel regimen: miralax & senna standing, ducolax supp PRN  C: FULL CODE   Dispo: step down 5U 11/25, rehab on discharge

## 2024-11-26 NOTE — PROGRESS NOTE ADULT - ASSESSMENT
73yo M, with PMHx of HTN, HLD, BPH, HFrEF, CAD s/p CABG 10/31/24, severe PAD (s/p fem-fem) who presented to [facility] for left leg pain concerning for acute limb ischemia. On exam here, patient has an exam more consistent with his prior recent exam, consisting of chronic limb ischemia (motor and sensation intact, doppler signals present in left foot and fem-fem bypass). Given his rest pain, decision was made to proceed with arteriography. Patient is now  s/p LLE angiogram with EIA and FELICITY stents and s/p LLE fem to AK-pop bypass 11/14/24. Patient is most recently s/p transfer to cardiology service for NSTEMI s/p [facility] (11/19).    Recommendations:  No acute vascular surgery intervention at this time  L groin hematoma improved  LLE fem to AK-pop bypass patent and well healing  Rest of care per primary team  Vascular surgery will continue to follow    Plan discussed with chief resident and attending

## 2024-11-26 NOTE — PROGRESS NOTE ADULT - ASSESSMENT
73yo M, with PMHx of HTN, HLD, BPH, CAD s/p CABG 10/31/24, severe PAD (s/p fem-fem) who presented to [facility] for left leg pain concerning for acute limb ischemia and transferred to [facility], now on vascular service s/p angiogram with fem-fem bypass, L common and external iliac covered stenting and perclose on 11/11. Course was complicated by acutely worsened chest pain on the evening of 11/19 with acute rise in troponins from the 40s to the 3-400s, urgently transferred to the cardiology service and underwent LHC with results as above. Now again with acutely worsened chest pain on the morning of 11/22, s/p transfer to the CCU for nitroglycerin drip, now weaned off and stable to transfer back to floors. Medicine following for comanagement.     #NSTEMI type 2   #CAD s/p PCI and minimally invasive direct CABG 10/31/24  #Chest pain   #HTN/HLD   -s/p LHC on 11/19 with results as above   -Will continue plavix 75 mg daily   -Will continue Imdur 240 mg PO QD for anti-anginal therapy, hold for SBP <90  -Continue ranolazine 1000 mg BID   -Continue metoprolol succinate 100 mg daily     #PAD  #s/p LLE angiogram, fem-fem bypass, L common and external iliac covered stenting and perclose on 11/11  -Continue pain control and bowel regimen per vascular team   -Will continue Plavix, Eliquis re-introduced as well for DVT     #LLE DVT   -Continue eliquis as above     #Acute blood loss anemia   -Pt with stable hemoglobin   -Pt s/p transfusion of 1 Unit of PRBCs on 11/16 for Hgb 7.7 with appropriate response   -Will continue to trend, transfuse for hemoglobin of 8 or higher given pt's cardiac hx     #FELIPA  -Will continue to monitor creatinine, may be increased as a result of contrast from C  -Avoid nephrotoxins as able, renally dose all medications     #Mild hyponatremia  -Etiology may be poor PO intake vs SIADH from pain?  -Continue to trend, if continues to worsen would consider sending urine osm and urine na     #RUL Pulmonary nodule - PET +  -Pt to  follow up outpatient with  Dr. Duarte     #BPH  - Continue flomax and proscar    #Prediabetes  -HgbAc 5.8   -Continue consistent carb diet  -Will continue to monitor FS

## 2024-11-26 NOTE — CHART NOTE - NSCHARTNOTEFT_GEN_A_CORE
Cardiology PA Event Note:    Called to bedside as Pt reporting 10/10 chest pain at rest.  On arrival, Pt noted to be visibly uncomfortable currently on NTG gtt @50mcg/hr recently uptitrated again today after weaning yesterday and getting IVF 250cc bolus plus additional 125cc/hr x 2hours.         VS: 128/59  HR 76  SPO2 98% RA RR 18  Repeat ECG SR no acute ischemic changes compared to previous ecg.   D/W Dr Rowe, recommend increase NTG gtt to 100mcg/min.  Per RN MGR, unable to uptitrate dose above 50mcg/min on stepdown unit; pt would need to move to CCU with CCU RN.  CCU Fellow Dr Lee came to bedside, given no available beds in CCU at this time, Pt given SL NTG x1 at 10:45am & Morphine 2mg IVP x 1 at 10:55am and Oxygen 2lNC applied ofr comfort.  Pt reporting minimal change in CP now at 8-9/10 at 11:10am.  Nursing staff able to arrange ANM to take over care for pt so NTG gtt can be uptitrated to 100mcg/min.  After uptitration of drip, pt now reporting cp improving down to 2-3/10 at 11:30am.      A/P 74M, Polish speaking, PMHx of CAD s/p PCI and recent minimally invasive direct CABG 10/31/24, ICM HFrEF, HTN, CKD, and PAD s/p prior L to R fem-fem bypass,  who presented to [facility] for left leg pain concerning for acute limb ischemia.  Pt was admitted to Vascular Surgery, now s/p stent intervention as well as left fem-pop bypass 11/14. Patient w/ consistent angina and NSTEMI and transferred to cardiology service. Underwent C 11/19 with CAD unchanged, presumed NSTEMI 2/2 hypotension/hypoperfusion. Pt still on nitro gtt and IVF to improve hemodynamics now with worsening cp after attempt to wean off ntg gtt.  -Acute cp with no acute ischemic ecg changes  -CP improving with uptitration of NTG gtt to 100mcg/min  - Anti-anginal Therapy Maximization: Continue with Imdur 120mg qd; Ranexa to 1000mg BID, Metoprolol succinate 50mg qd  -Continue ASA, Plavix, Eliquis, Atorvastatin  -Closely Monitor BP & volume status   -Anticipate upgrade to CCU if remains on current dose  -Palliative care consult per Dr Rowe.
Admitting Diagnosis:   Patient is a 74y old  Male who presents with a chief complaint of transfer for rule out limb ischemia (26 Nov 2024 11:10)      PAST MEDICAL & SURGICAL HISTORY:  CAD (coronary artery disease)      BPH (benign prostatic hyperplasia)      HTN (hypertension)      HLD (hyperlipidemia)      PAD (peripheral artery disease)      S/P femoral-femoral bypass surgery          Current Nutrition Order:  DASH Puree Diet 11/22      PO Intake: Good (%) [   ]  Fair (50-75%) [  XX ] Poor (<25%) [   ]    GI Issues:   LBM per flow sheets 11/24+   Ordered for Senna MIRALAX DULCOLAX TUMS and MYLICON     Pain: none per flow sheets     Skin Integrity:  Geo 17  L Heel DTI  Pressure ulcer sacrum stage I   +1 Edema left leg  Wound care following for LLE wounds  MVI/VitC ordered   Pt with +venous ulcer          11-25-24 @ 07:01  -  11-26-24 @ 07:00  --------------------------------------------------------  IN: 920 mL / OUT: 1925 mL / NET: -1005 mL    11-26-24 @ 07:01  -  11-26-24 @ 13:13  --------------------------------------------------------  IN: 550 mL / OUT: 375 mL / NET: 175 mL        Labs:   11-26    133[L]  |  102  |  22  ----------------------------<  104[H]  4.2   |  22  |  1.42[H]    Ca    8.9      26 Nov 2024 05:30  Phos  3.2     11-25  Mg     1.9     11-26    TPro  6.4  /  Alb  3.0[L]  /  TBili  0.4  /  DBili  x   /  AST  13  /  ALT  9[L]  /  AlkPhos  60  11-25    CAPILLARY BLOOD GLUCOSE          Medications:  MEDICATIONS  (STANDING):  apixaban 5 milliGRAM(s) Oral every 12 hours  ascorbic acid 500 milliGRAM(s) Oral daily  atorvastatin 80 milliGRAM(s) Oral at bedtime  clopidogrel Tablet 75 milliGRAM(s) Oral daily  dapagliflozin 10 milliGRAM(s) Oral every 24 hours  finasteride 5 milliGRAM(s) Oral daily  influenza  Vaccine (HIGH DOSE) 0.5 milliLiter(s) IntraMuscular once  isosorbide   mononitrate ER Tablet (IMDUR) 240 milliGRAM(s) Oral every 24 hours  multivitamin 1 Tablet(s) Oral daily  pantoprazole    Tablet 40 milliGRAM(s) Oral daily  polyethylene glycol 3350 17 Gram(s) Oral daily  ranolazine 1000 milliGRAM(s) Oral two times a day  senna 2 Tablet(s) Oral at bedtime  spironolactone 25 milliGRAM(s) Oral every 24 hours  tamsulosin 0.4 milliGRAM(s) Oral at bedtime    MEDICATIONS  (PRN):  acetaminophen     Tablet .. 975 milliGRAM(s) Oral every 8 hours PRN moderate (4-6), severe (7-10) pain  bisacodyl Suppository 10 milliGRAM(s) Rectal daily PRN Constipation  calcium carbonate    500 mG (Tums) Chewable 1 Tablet(s) Chew three times a day PRN Heartburn  simethicone 80 milliGRAM(s) Chew two times a day PRN Heartburn      Height for BMI (FEET)	5 Feet  Height for BMI (INCHES)	0 Inch(s)  Height for BMI (CENTIMETERS)	152.4 Centimeter(s)  Weight for BMI (lbs)	132 lb  Weight for BMI (kg)	59.9 kg  Body Mass Index	25.7   pounds +-10% / %KFK=922.5    Weight Change:  11/22 120.3   11/20 130 pounds   11/15 125.6 pounds     ** Malnutrition **  - PO intake: Pt expresses decreased appetite during admit, reported "I don't have an appetite at all." Assume meeting<75% EER consistently during admit, (~12 days). Assume intake is also suboptimal i/s/o reported issues chewing/swallowing at this time/PTA.   - Wasting/loss: noted to orbital region (MILD).  - Wt loss: 10/2024 RD note admit wt 152 pounds + Current Body wt of 132 pounds, suggests that pt is down ~20 pounds / 13% body wt loss x11 months. Pt does remain with edema which is likely masking further wt loss.    Estimated energy needs:   IBW used to calculate energy needs due to pt's current body weight exceeding 120% of IBW  Adjusted for age, HF hx, Malnutrition, S/p OR, NSTEM, Skin   Estimated Energy Needs From (chana/kg)	30  Estimated Energy Needs To (cahna/kg)	35  Estimated Energy Needs Calculated From (chana/kg)	1440  Estimated Energy Needs Calculated To (chana/kg)	1680  Estimated Protein Needs From (g/kg)	1.25  Estimated Protein Needs To (g/kg)	1.7  Estimated Protein Needs Calculated From (g/kg)	59.5  Estimated Protein Needs Calculated To (g/kg)	80.92  ** Fluids per team **     Subjective: 74M, PMHx CAD s/p PCI, recent minimally invasive direct CABG 10/31/24, ICM, HFrEF, HTN, CKD, PAD s/p prior L to R fem-fem bypass (>20yrs ago), presented to [facility] [place], operated by [facility] for left leg pain concerning for acute limb ischemia. Pt was initially admitted to Vascular Surgery 11/10; s/p 11/11 LLE angiogram with EIA and FELICITY stents, LLE fem to AK-pop bypass c/b hematoma. Doppler + for LLE DVT 11/12. While on vascular service pt with consistent angina and NSTEMI -- transferred to cardiology service 11/19. Underwent LHC 11/19 with CAD unchanged with no further intervention, presumed NSTEMI 2/2 hypotension/hypoperfusion. Post cath was on nitro gtt and IVF to improve hemodynamics. Given the severity of angina with needing nitro gtt at 100mcg rate, pt moved to CCU 11/22 for further management and care. Palliative consulted for further discussion regarding GOC. In the CCU, nitro gtt weaned off. On 11/25 deemed to be stable to step down to 5U cards off nitro drip.    Pt seen this AM on 5UR. Pt is polsih speaking,  #[id number]. Ongoing planning for D/c per rounds today - pending. Pt with c/o issues chewing and swallowing during time of RD inital visit. Pt has since bee seen by SLP 11/23. Pt now on puree diet which he reports to be tolerating without issues chewing/swallowing. This AM Breakfast at bedside was ~50% consumed.   Please see below for nutritions recommendations. Recommendations Provided to team.     Prior PES: Malnutrition...MODERATE In acute setting related to presumed intake<EER as evidenced by: Wasting/loss, PO intake, Wt loss  >> ongoing at this time  GOAL: Pt will meet at ~75% of nutrient needs via feasible route, Show no further s/s of malnutrition    Recommendations:  1. Current Diet: DASH Puree Diet.  - Low sodium > DASH.  - Add Ensure MAX x1/day 150kcal/30gm prot.  - Monitor PO intake/appetite  - Honor pt food preferences as able.  - Monitor for diet tolerance. Should pt be noted with s/s of issues swallowing, recommend NPO/SLP.   3. Pain/GI per team; Optimize regime PRN.  4. Labs: Monitor BMP, CBC, glucose, lytes - Replete PRN, trend renal indices, LFTs.  5. Monitor Skin, wt daily, GOC.  6. RD to remain available for additional nutrition interventions as needed.     Education: XX -- provided today. Spoke about current diet and protein foods. Understanding stated, provide additional motivation as needed.     Risk Level: High [ XX  ] Moderate [   ] Low [   ]
Vascular cardiology attending note    75 yo man PMHx of CAD s/p PCI and recent minimally invasive direct CABG 10/31/24, ICM HFrEF, HTN, CKD, and PAD s/p prior L to R fem-fem bypass who was admitted for CLI of LLE with rest pain, s/p LE angiogram, s/p L common and external iliac stent but prox anastomosis of fem-fem bypass is upwards going (unable to treat endovascularly), w/ occluded L CFA, SFA, and AK popliteal artery w/ reconstitution of BK pop via collaterals w/ AT/PT runoff s/p L fem-pop bypass 11/14.     Vascular cardiology following for PAD, LLE DVT, also has CAD w/ recent MIDCABG 10/31/24. Overnight with new chest pain and elevated hsTrop. Concern for NSTEMI. Switched apixaban to heparin gtt. Vascular cardiology fellow will see the patient shortly. Will ask cardiology tele team for transfer. Will need [facility].

## 2024-11-26 NOTE — PROGRESS NOTE ADULT - PROBLEM SELECTOR PLAN 4
Warm, on room air  - GDMT: Toprol 100mg as above; started on aldactone 25mg and farxiga 10mg 11/25 by CCU   - Diuretic: none.   - monitor daily weight, strict i/o.  - consider adding Entresto before discharge if BP stable  - TTE 10/29/24: LVEF 35% with regional wall abnormalities. Dilated LA. Normal RVSF.   - TTE 11/15/24: Not all myocardial wall segments are visualized. Basal and mid inferolateral walls are akinetic. Overall LVSF appears mild to moderately reduced

## 2024-11-26 NOTE — PROGRESS NOTE ADULT - PROBLEM SELECTOR PLAN 2
acute on chronic  h/o PAD with hx  fem-fem bypass 20yrs ago per pt.  - s/p 11/11/24 LLE angiogram with EIA and FELICITY stents  - s/p 11/14/24 LLE fem to AK-pop bypass ; LLE c/b hematoma from Hep GTT 11/19, as evaluated w/  Vascular Sx at bedside - no intervention needed- now improving 11/20 off hep gtt- now resolved.  L popliteal medial knee site- w/ Kerlix c/d/i.   LLE fem to AK-pop bypass patent and well healing  - Wound care following for LLE wounds.   - pain control per Vascular: currently on standing Tylenol 975mg q8h;   - f/up final vascular surgery recs for dc  - c/w Plavix 75mg qd  and Eliquis 5mg BID; c/w Atorvastatin 80mg qhs    Doppler 11/22/24 b/l LE arterial:   1. No evidence of pseudoaneurysm bilaterally.  2. Unorganized pocket of mildly complex fluid/blood products below incision in left groin soft tissues.  3. Partially imaged bypass graft in the proximal left thigh demonstrates patency.

## 2024-11-27 ENCOUNTER — TRANSCRIPTION ENCOUNTER (OUTPATIENT)
Age: 74
End: 2024-11-27

## 2024-11-27 VITALS
OXYGEN SATURATION: 96 % | DIASTOLIC BLOOD PRESSURE: 59 MMHG | HEART RATE: 72 BPM | RESPIRATION RATE: 18 BRPM | SYSTOLIC BLOOD PRESSURE: 132 MMHG

## 2024-11-27 LAB
ANION GAP SERPL CALC-SCNC: 9 MMOL/L — SIGNIFICANT CHANGE UP (ref 5–17)
BUN SERPL-MCNC: 27 MG/DL — HIGH (ref 7–23)
CALCIUM SERPL-MCNC: 8.9 MG/DL — SIGNIFICANT CHANGE UP (ref 8.4–10.5)
CHLORIDE SERPL-SCNC: 99 MMOL/L — SIGNIFICANT CHANGE UP (ref 96–108)
CO2 SERPL-SCNC: 21 MMOL/L — LOW (ref 22–31)
CREAT SERPL-MCNC: 1.52 MG/DL — HIGH (ref 0.5–1.3)
EGFR: 47 ML/MIN/1.73M2 — LOW
GLUCOSE SERPL-MCNC: 129 MG/DL — HIGH (ref 70–99)
HCT VFR BLD CALC: 35.6 % — LOW (ref 39–50)
HGB BLD-MCNC: 12 G/DL — LOW (ref 13–17)
MAGNESIUM SERPL-MCNC: 1.9 MG/DL — SIGNIFICANT CHANGE UP (ref 1.6–2.6)
MCHC RBC-ENTMCNC: 31.2 PG — SIGNIFICANT CHANGE UP (ref 27–34)
MCHC RBC-ENTMCNC: 33.7 G/DL — SIGNIFICANT CHANGE UP (ref 32–36)
MCV RBC AUTO: 92.5 FL — SIGNIFICANT CHANGE UP (ref 80–100)
NRBC # BLD: 0 /100 WBCS — SIGNIFICANT CHANGE UP (ref 0–0)
PLATELET # BLD AUTO: 242 K/UL — SIGNIFICANT CHANGE UP (ref 150–400)
POTASSIUM SERPL-MCNC: 4.2 MMOL/L — SIGNIFICANT CHANGE UP (ref 3.5–5.3)
POTASSIUM SERPL-SCNC: 4.2 MMOL/L — SIGNIFICANT CHANGE UP (ref 3.5–5.3)
RBC # BLD: 3.85 M/UL — LOW (ref 4.2–5.8)
RBC # FLD: 14.5 % — SIGNIFICANT CHANGE UP (ref 10.3–14.5)
SODIUM SERPL-SCNC: 129 MMOL/L — LOW (ref 135–145)
WBC # BLD: 5.91 K/UL — SIGNIFICANT CHANGE UP (ref 3.8–10.5)
WBC # FLD AUTO: 5.91 K/UL — SIGNIFICANT CHANGE UP (ref 3.8–10.5)

## 2024-11-27 PROCEDURE — 84100 ASSAY OF PHOSPHORUS: CPT

## 2024-11-27 PROCEDURE — 99233 SBSQ HOSP IP/OBS HIGH 50: CPT

## 2024-11-27 PROCEDURE — 86900 BLOOD TYPING SEROLOGIC ABO: CPT

## 2024-11-27 PROCEDURE — 80048 BASIC METABOLIC PNL TOTAL CA: CPT

## 2024-11-27 PROCEDURE — 80053 COMPREHEN METABOLIC PANEL: CPT

## 2024-11-27 PROCEDURE — 93306 TTE W/DOPPLER COMPLETE: CPT

## 2024-11-27 PROCEDURE — C1889: CPT

## 2024-11-27 PROCEDURE — 86901 BLOOD TYPING SEROLOGIC RH(D): CPT

## 2024-11-27 PROCEDURE — 84540 ASSAY OF URINE/UREA-N: CPT

## 2024-11-27 PROCEDURE — 71045 X-RAY EXAM CHEST 1 VIEW: CPT

## 2024-11-27 PROCEDURE — 84295 ASSAY OF SERUM SODIUM: CPT

## 2024-11-27 PROCEDURE — 82962 GLUCOSE BLOOD TEST: CPT

## 2024-11-27 PROCEDURE — 85610 PROTHROMBIN TIME: CPT

## 2024-11-27 PROCEDURE — 86140 C-REACTIVE PROTEIN: CPT

## 2024-11-27 PROCEDURE — 97165 OT EVAL LOW COMPLEX 30 MIN: CPT

## 2024-11-27 PROCEDURE — 83735 ASSAY OF MAGNESIUM: CPT

## 2024-11-27 PROCEDURE — 82553 CREATINE MB FRACTION: CPT

## 2024-11-27 PROCEDURE — 93970 EXTREMITY STUDY: CPT

## 2024-11-27 PROCEDURE — C1768: CPT

## 2024-11-27 PROCEDURE — 85652 RBC SED RATE AUTOMATED: CPT

## 2024-11-27 PROCEDURE — 85347 COAGULATION TIME ACTIVATED: CPT

## 2024-11-27 PROCEDURE — 82330 ASSAY OF CALCIUM: CPT

## 2024-11-27 PROCEDURE — 85730 THROMBOPLASTIN TIME PARTIAL: CPT

## 2024-11-27 PROCEDURE — C1757: CPT

## 2024-11-27 PROCEDURE — 73630 X-RAY EXAM OF FOOT: CPT

## 2024-11-27 PROCEDURE — 97116 GAIT TRAINING THERAPY: CPT

## 2024-11-27 PROCEDURE — C1874: CPT

## 2024-11-27 PROCEDURE — 84132 ASSAY OF SERUM POTASSIUM: CPT

## 2024-11-27 PROCEDURE — 36415 COLL VENOUS BLD VENIPUNCTURE: CPT

## 2024-11-27 PROCEDURE — 83605 ASSAY OF LACTIC ACID: CPT

## 2024-11-27 PROCEDURE — 99239 HOSP IP/OBS DSCHRG MGMT >30: CPT

## 2024-11-27 PROCEDURE — C1725: CPT

## 2024-11-27 PROCEDURE — C1769: CPT

## 2024-11-27 PROCEDURE — C9399: CPT

## 2024-11-27 PROCEDURE — 82803 BLOOD GASES ANY COMBINATION: CPT

## 2024-11-27 PROCEDURE — 93005 ELECTROCARDIOGRAM TRACING: CPT

## 2024-11-27 PROCEDURE — 85025 COMPLETE CBC W/AUTO DIFF WBC: CPT

## 2024-11-27 PROCEDURE — C8929: CPT

## 2024-11-27 PROCEDURE — 85027 COMPLETE CBC AUTOMATED: CPT

## 2024-11-27 PROCEDURE — C1760: CPT

## 2024-11-27 PROCEDURE — 97164 PT RE-EVAL EST PLAN CARE: CPT

## 2024-11-27 PROCEDURE — 85014 HEMATOCRIT: CPT

## 2024-11-27 PROCEDURE — 86850 RBC ANTIBODY SCREEN: CPT

## 2024-11-27 PROCEDURE — 82947 ASSAY GLUCOSE BLOOD QUANT: CPT

## 2024-11-27 PROCEDURE — 82550 ASSAY OF CK (CPK): CPT

## 2024-11-27 PROCEDURE — A9540: CPT

## 2024-11-27 PROCEDURE — 97161 PT EVAL LOW COMPLEX 20 MIN: CPT

## 2024-11-27 PROCEDURE — 84484 ASSAY OF TROPONIN QUANT: CPT

## 2024-11-27 PROCEDURE — 93925 LOWER EXTREMITY STUDY: CPT

## 2024-11-27 PROCEDURE — 76000 FLUOROSCOPY <1 HR PHYS/QHP: CPT

## 2024-11-27 PROCEDURE — C1887: CPT

## 2024-11-27 PROCEDURE — 82570 ASSAY OF URINE CREATININE: CPT

## 2024-11-27 PROCEDURE — A9567: CPT

## 2024-11-27 PROCEDURE — C1894: CPT

## 2024-11-27 PROCEDURE — 86923 COMPATIBILITY TEST ELECTRIC: CPT

## 2024-11-27 PROCEDURE — 97530 THERAPEUTIC ACTIVITIES: CPT

## 2024-11-27 PROCEDURE — 36430 TRANSFUSION BLD/BLD COMPNT: CPT

## 2024-11-27 PROCEDURE — P9016: CPT

## 2024-11-27 PROCEDURE — 78582 LUNG VENTILAT&PERFUS IMAGING: CPT | Mod: MC

## 2024-11-27 RX ORDER — ACETAMINOPHEN 500MG 500 MG/1
3 TABLET, COATED ORAL
Qty: 0 | Refills: 0 | DISCHARGE
Start: 2024-11-27

## 2024-11-27 RX ORDER — SPIRONOLACTONE 25 MG
1 TABLET ORAL
Qty: 0 | Refills: 0 | DISCHARGE
Start: 2024-11-27

## 2024-11-27 RX ORDER — DAPAGLIFLOZIN 10 MG/1
1 TABLET, FILM COATED ORAL
Qty: 0 | Refills: 0 | DISCHARGE
Start: 2024-11-27

## 2024-11-27 RX ORDER — METOPROLOL TARTRATE 100 MG/1
2.5 TABLET, FILM COATED ORAL
Qty: 75 | Refills: 0
Start: 2024-11-27 | End: 2024-12-26

## 2024-11-27 RX ORDER — METOPROLOL TARTRATE 100 MG/1
2.5 TABLET, FILM COATED ORAL
Qty: 0 | Refills: 0 | DISCHARGE

## 2024-11-27 RX ORDER — RANOLAZINE 1000 MG/1
1 TABLET, FILM COATED, EXTENDED RELEASE ORAL
Qty: 0 | Refills: 0 | DISCHARGE
Start: 2024-11-27

## 2024-11-27 RX ORDER — APIXABAN 2.5 MG/1
1 TABLET, FILM COATED ORAL
Qty: 0 | Refills: 0 | DISCHARGE
Start: 2024-11-27

## 2024-11-27 RX ORDER — CALCIUM CARBONATE 500(1250)
1 TABLET ORAL
Qty: 0 | Refills: 0 | DISCHARGE
Start: 2024-11-27

## 2024-11-27 RX ORDER — ISOSORBIDE MONONITRATE 60 MG/1
1 TABLET, EXTENDED RELEASE ORAL
Refills: 0 | DISCHARGE

## 2024-11-27 RX ORDER — LIDOCAINE 40 MG/G
1 CREAM TOPICAL
Qty: 0 | Refills: 0 | DISCHARGE
Start: 2024-11-27

## 2024-11-27 RX ORDER — METOPROLOL TARTRATE 100 MG/1
1 TABLET, FILM COATED ORAL
Qty: 0 | Refills: 0 | DISCHARGE

## 2024-11-27 RX ORDER — METOPROLOL TARTRATE 50 MG
1 TABLET ORAL
Refills: 0 | DISCHARGE

## 2024-11-27 RX ORDER — CYANOCOBALAMIN/FOLIC AC/VIT B6 1-2.2-25MG
1 TABLET ORAL
Qty: 0 | Refills: 0 | DISCHARGE
Start: 2024-11-27

## 2024-11-27 RX ORDER — SIMETHICONE 125 MG
1 CAPSULE ORAL
Qty: 0 | Refills: 0 | DISCHARGE
Start: 2024-11-27

## 2024-11-27 RX ORDER — ASPIRIN/MAG CARB/ALUMINUM AMIN 325 MG
1 TABLET ORAL
Refills: 0 | DISCHARGE

## 2024-11-27 RX ORDER — MULTIVIT WITH MINERALS/LUTEIN
1 TABLET ORAL
Qty: 0 | Refills: 0 | DISCHARGE
Start: 2024-11-27

## 2024-11-27 RX ORDER — METOPROLOL TARTRATE 100 MG/1
25 TABLET, FILM COATED ORAL ONCE
Refills: 0 | Status: COMPLETED | OUTPATIENT
Start: 2024-11-27 | End: 2024-11-27

## 2024-11-27 RX ORDER — ISOSORBIDE MONONITRATE 10 MG
2 TABLET ORAL
Qty: 0 | Refills: 0 | DISCHARGE
Start: 2024-11-27

## 2024-11-27 RX ORDER — LIDOCAINE 40 MG/G
1 CREAM TOPICAL EVERY 24 HOURS
Refills: 0 | Status: DISCONTINUED | OUTPATIENT
Start: 2024-11-27 | End: 2024-11-27

## 2024-11-27 RX ORDER — METOPROLOL TARTRATE 100 MG/1
1 TABLET, FILM COATED ORAL
Qty: 0 | Refills: 0 | DISCHARGE
Start: 2024-11-27

## 2024-11-27 RX ORDER — ACETAMINOPHEN 500MG 500 MG/1
1000 TABLET, COATED ORAL ONCE
Refills: 0 | Status: COMPLETED | OUTPATIENT
Start: 2024-11-27 | End: 2024-11-27

## 2024-11-27 RX ADMIN — METOPROLOL TARTRATE 25 MILLIGRAM(S): 100 TABLET, FILM COATED ORAL at 12:17

## 2024-11-27 RX ADMIN — DAPAGLIFLOZIN 10 MILLIGRAM(S): 10 TABLET, FILM COATED ORAL at 12:18

## 2024-11-27 RX ADMIN — APIXABAN 5 MILLIGRAM(S): 2.5 TABLET, FILM COATED ORAL at 05:37

## 2024-11-27 RX ADMIN — Medication 240 MILLIGRAM(S): at 13:03

## 2024-11-27 RX ADMIN — LIDOCAINE 1 PATCH: 40 CREAM TOPICAL at 13:38

## 2024-11-27 RX ADMIN — Medication 500 MILLIGRAM(S): at 12:19

## 2024-11-27 RX ADMIN — POLYETHYLENE GLYCOL 3350 17 GRAM(S): 17 POWDER, FOR SOLUTION ORAL at 12:19

## 2024-11-27 RX ADMIN — ACETAMINOPHEN 500MG 975 MILLIGRAM(S): 500 TABLET, COATED ORAL at 05:37

## 2024-11-27 RX ADMIN — CLOPIDOGREL 75 MILLIGRAM(S): 75 TABLET, FILM COATED ORAL at 12:18

## 2024-11-27 RX ADMIN — METOPROLOL TARTRATE 100 MILLIGRAM(S): 100 TABLET, FILM COATED ORAL at 06:00

## 2024-11-27 RX ADMIN — ACETAMINOPHEN 500MG 400 MILLIGRAM(S): 500 TABLET, COATED ORAL at 08:57

## 2024-11-27 RX ADMIN — ACETAMINOPHEN 500MG 1000 MILLIGRAM(S): 500 TABLET, COATED ORAL at 09:12

## 2024-11-27 RX ADMIN — PANTOPRAZOLE SODIUM 40 MILLIGRAM(S): 40 TABLET, DELAYED RELEASE ORAL at 12:16

## 2024-11-27 RX ADMIN — Medication 25 MILLIGRAM(S): at 12:18

## 2024-11-27 RX ADMIN — Medication 400 MILLIGRAM(S): at 12:19

## 2024-11-27 RX ADMIN — ACETAMINOPHEN 500MG 975 MILLIGRAM(S): 500 TABLET, COATED ORAL at 06:37

## 2024-11-27 RX ADMIN — Medication 1 TABLET(S): at 12:25

## 2024-11-27 RX ADMIN — Medication 5 MILLIGRAM(S): at 12:17

## 2024-11-27 RX ADMIN — RANOLAZINE 1000 MILLIGRAM(S): 1000 TABLET, FILM COATED, EXTENDED RELEASE ORAL at 05:37

## 2024-11-27 NOTE — DISCHARGE NOTE NURSING/CASE MANAGEMENT/SOCIAL WORK - NSDCPEFALRISK_GEN_ALL_CORE
For information on Fall & Injury Prevention, visit: [email or web address] OR  [email or web address] OR  https://www.cdc.gov/steadi/patient.html

## 2024-11-27 NOTE — DISCHARGE NOTE NURSING/CASE MANAGEMENT/SOCIAL WORK - PATIENT PORTAL LINK FT
You can access the FollowMyHealth Patient Portal offered by [facility] by registering at the following website: [email or web address]. By joining [employer]’s FollowMyHealth portal, you will also be able to view your health information using other applications (apps) compatible with our system.

## 2024-11-27 NOTE — PROGRESS NOTE ADULT - TIME BILLING
Bedside exam and interview.  Reviewed of laboratory data, radiology results, consultants' recommendations.   Discussion with patient/caregivers/housestaff and interdisciplinary staff (such as , social workers, etc).  Documentation of encounter.  Interventions were performed as documented above.  Excludes teaching time and/or separately reported services.
Bedside exam and interview.  Reviewed of laboratory data, radiology results, consultants' recommendations.   Discussion with patient/caregivers/housestaff and interdisciplinary staff (such as , social workers, etc).  Documentation of encounter.  Interventions were performed as documented above.  Excludes teaching time and/or separately reported services.
Review of hospital course, labs, vitals, medical records.   Bedside exam and interview   Discussed plan of care with primary team ACP and housestaff   Documenting the encounter  Excludes teaching and separately reported services
More than 50 percent of which was spent on counseling and coordination of care.
Review of hospital course, labs, vitals, medical records.   Bedside exam and interview   Discussed plan of care with primary team ACP and housestaff   Documenting the encounter  Excludes teaching and separately reported services
Bedside exam and interview.  Reviewed of laboratory data, radiology results, consultants' recommendations.   Discussion with patient/caregivers/housestaff and interdisciplinary staff (such as , social workers, etc).  Documentation of encounter.  Interventions were performed as documented above.  Excludes teaching time and/or separately reported services.
Review of hospital course, labs, vitals, medical records.   Bedside exam and interview   Discussed plan of care with primary team ACP and housestaff   Documenting the encounter  Excludes teaching and separately reported services
Bedside exam and interview.  Reviewed of laboratory data, radiology results, consultants' recommendations.   Discussion with patient/caregivers/housestaff and interdisciplinary staff (such as , social workers, etc).  Documentation of encounter.  Interventions were performed as documented above.  Excludes teaching time and/or separately reported services.

## 2024-11-27 NOTE — DISCHARGE NOTE NURSING/CASE MANAGEMENT/SOCIAL WORK - NSDCFUADDAPPT_GEN_ALL_CORE_FT
Follow up with cardiologist Dr. Pink on 12/3/24 at 9:30 AM.     Follow up with Vascular Dr. Valerio on 12/16/24 at 10:15 AM.

## 2024-11-27 NOTE — PROGRESS NOTE ADULT - SUBJECTIVE AND OBJECTIVE BOX
Medicine Progress Note    Patient is a 74y old  Male who presents with a chief complaint of transfer for rule out limb ischemia (26 Nov 2024 14:07)      SUBJECTIVE / OVERNIGHT EVENTS:  Seen with assistance of telephone Polish  #[id number]. Feeling well this morning, does have a bit of pain in the L leg but denies having any chest pain or shortness of breath. Tolerating PO.    MEDICATIONS  (STANDING):  apixaban 5 milliGRAM(s) Oral every 12 hours  ascorbic acid 500 milliGRAM(s) Oral daily  atorvastatin 80 milliGRAM(s) Oral at bedtime  clopidogrel Tablet 75 milliGRAM(s) Oral daily  dapagliflozin 10 milliGRAM(s) Oral every 24 hours  finasteride 5 milliGRAM(s) Oral daily  influenza  Vaccine (HIGH DOSE) 0.5 milliLiter(s) IntraMuscular once  isosorbide   mononitrate ER Tablet (IMDUR) 240 milliGRAM(s) Oral every 24 hours  magnesium oxide 400 milliGRAM(s) Oral once  metoprolol succinate  milliGRAM(s) Oral daily  multivitamin 1 Tablet(s) Oral daily  pantoprazole    Tablet 40 milliGRAM(s) Oral daily  polyethylene glycol 3350 17 Gram(s) Oral daily  ranolazine 1000 milliGRAM(s) Oral two times a day  senna 2 Tablet(s) Oral at bedtime  spironolactone 25 milliGRAM(s) Oral every 24 hours  tamsulosin 0.4 milliGRAM(s) Oral at bedtime    MEDICATIONS  (PRN):  acetaminophen     Tablet .. 975 milliGRAM(s) Oral every 8 hours PRN Moderate Pain (4 - 6), Severe Pain (7 - 10)  bisacodyl Suppository 10 milliGRAM(s) Rectal daily PRN Constipation  calcium carbonate    500 mG (Tums) Chewable 1 Tablet(s) Chew three times a day PRN Heartburn  simethicone 80 milliGRAM(s) Chew two times a day PRN Heartburn    CAPILLARY BLOOD GLUCOSE        I&O's Summary    26 Nov 2024 07:01  -  27 Nov 2024 07:00  --------------------------------------------------------  IN: 730 mL / OUT: 1525 mL / NET: -795 mL    27 Nov 2024 07:01  -  27 Nov 2024 11:33  --------------------------------------------------------  IN: 0 mL / OUT: 100 mL / NET: -100 mL        PHYSICAL EXAM:  Vital Signs Last 24 Hrs  T(C): 36.7 (27 Nov 2024 08:52), Max: 36.7 (26 Nov 2024 17:45)  T(F): 98.1 (27 Nov 2024 08:52), Max: 98.1 (27 Nov 2024 08:52)  HR: 75 (27 Nov 2024 08:52) (67 - 77)  BP: 112/56 (27 Nov 2024 08:52) (103/53 - 138/61)  BP(mean): 76 (27 Nov 2024 08:52) (75 - 90)  RR: 17 (27 Nov 2024 08:52) (17 - 18)  SpO2: 98% (27 Nov 2024 08:52) (96% - 98%)    Parameters below as of 27 Nov 2024 08:52  Patient On (Oxygen Delivery Method): room air    Appears comfortable   MMM  Normal WOB on RA, CTAB   RRR, no mrg   Abdomen soft, nontender, nondistended  Extremities warm and without edema, LLE with dried superficial scabs, some hyperpigmentation in bilateral lower extremities   AOX3, no focal neuro deficits     LABS:                        12.0   5.91  )-----------( 242      ( 27 Nov 2024 05:30 )             35.6     11-27    129[L]  |  99  |  27[H]  ----------------------------<  129[H]  4.2   |  21[L]  |  1.52[H]    Ca    8.9      27 Nov 2024 05:30  Mg     1.9     11-27            Urinalysis Basic - ( 27 Nov 2024 05:30 )    Color: x / Appearance: x / SG: x / pH: x  Gluc: 129 mg/dL / Ketone: x  / Bili: x / Urobili: x   Blood: x / Protein: x / Nitrite: x   Leuk Esterase: x / RBC: x / WBC x   Sq Epi: x / Non Sq Epi: x / Bacteria: x        COVID-19 PCR: NotDetec (07 Nov 2024 14:11)      RADIOLOGY & ADDITIONAL TESTS:  Imaging from Last 24 Hours:    None new

## 2024-11-27 NOTE — PROGRESS NOTE ADULT - PROVIDER SPECIALTY LIST ADULT
Cardiology
Cardiology
Hospitalist
Vascular Cardiology
Vascular Surgery
Hospitalist
Vascular Cardiology
Vascular Surgery
Cardiology
Hospitalist
Vascular Cardiology
Vascular Surgery
CCU
Hospitalist
Vascular Cardiology
Vascular Cardiology
Cardiology
Hospitalist
CCU
Hospitalist
Hospitalist
Vascular Surgery
Hospitalist
CCU
Cardiology
Palliative Care
CCU
Cardiology
Palliative Care

## 2024-11-27 NOTE — PROGRESS NOTE ADULT - REASON FOR ADMISSION
transfer for rule out limb ischemia
CCU step down to 5U 11/25
transfer for rule out limb ischemia

## 2024-11-27 NOTE — PROGRESS NOTE ADULT - ASSESSMENT
73yo M, with PMHx of HTN, HLD, BPH, CAD s/p CABG 10/31/24, severe PAD (s/p fem-fem) who presented to [facility] for left leg pain concerning for acute limb ischemia and transferred to [facility], now on vascular service s/p angiogram with fem-fem bypass, L common and external iliac covered stenting and perclose on 11/11. Course was complicated by acutely worsened chest pain on the evening of 11/19 with acute rise in troponins from the 40s to the 3-400s, urgently transferred to the cardiology service and underwent LHC with results as above. Now again with acutely worsened chest pain on the morning of 11/22, s/p transfer to the CCU for nitroglycerin drip, now weaned off and stable to transfer back to floors. Medicine following for comanagement.     #NSTEMI type 2   #CAD s/p PCI and minimally invasive direct CABG 10/31/24  #Chest pain   #HTN/HLD   -s/p LHC on 11/19 with results as above   -Will continue plavix 75 mg daily   -Will continue Imdur 240 mg PO QD for anti-anginal therapy, hold for SBP <90  -Continue ranolazine 1000 mg BID   -Continue metoprolol succinate 100 mg daily     #PAD  #s/p LLE angiogram, fem-fem bypass, L common and external iliac covered stenting and perclose on 11/11  -Continue pain control and bowel regimen per vascular team   -Will continue Plavix, Eliquis re-introduced as well for DVT     #LLE DVT   -Continue eliquis as above     #Acute blood loss anemia   -Pt with stable hemoglobin   -Pt s/p transfusion of 1 Unit of PRBCs on 11/16 for Hgb 7.7 with appropriate response   -Will continue to trend, transfuse for hemoglobin of 8 or higher given pt's cardiac hx     #FELIPA  -Will continue to monitor creatinine, may be increased as a result of contrast from [facility]  -Avoid nephrotoxins as able, renally dose all medications     #Mild hyponatremia  -Etiology may be poor PO intake vs SIADH from pain?  -Send urine osm, urine sodium     #RUL Pulmonary nodule - PET +  -Pt to  follow up outpatient with  Dr. Duarte     #BPH  - Continue flomax and proscar    #Prediabetes  -HgbAc 5.8   -Continue consistent carb diet  -Will continue to monitor FS

## 2024-11-27 NOTE — PROGRESS NOTE ADULT - PROBLEM SELECTOR PLAN 5
.  Emotional support provided, questions answered. Palliative medicine will sign off.  Please contact Palliative Medicine 24/7 at [phone] if the patient's symptoms and/or goals of care change, need to be readdressed, or if patient is readmitted in the future.    Coping:  well[ s ] with difficulty[  ] poor coping[  ] unable to assess[  ]   Social support: strong[  ] adequate[  ] inadequate[ s]  shelter support system at home: strong[  ] adequate[  ] inadequate[  s]    Active Psychosocial Referrals:  SW/CM[ s ] PT/OT[  ] Hospice[  ]  Ethics[  ]  ELLIOTT Claudio Patient/Family Support[  ] Chaplaincy[   ]    Massage Therapy[ ] Music Therapy [ x  ] Holistic RN[  ]

## 2024-11-27 NOTE — PROGRESS NOTE ADULT - SUBJECTIVE AND OBJECTIVE BOX
[facility] Geriatrics and Palliative Care  Mariel [facility] Geriatric[facility] Pall[facility] NP  Contact Info: Call [phone] (HEAL Line) or message on Microsoft Teams    SUBJECTIVE/OBJECTIVE: Interval events noted. See patient's PRN use for the past 24hrs noted below. Pt seen with Polish . No acute complaints at time of visit. Comprehensive symptom assessment as below. No change in GOC. Time spent discussing plan of care with patient.    ALLERGIES: No Known Allergies    MEDICATIONS: REVIEWED  MEDICATIONS  (STANDING):  apixaban 5 milliGRAM(s) Oral every 12 hours  ascorbic acid 500 milliGRAM(s) Oral daily  atorvastatin 80 milliGRAM(s) Oral at bedtime  clopidogrel Tablet 75 milliGRAM(s) Oral daily  dapagliflozin 10 milliGRAM(s) Oral every 24 hours  finasteride 5 milliGRAM(s) Oral daily  influenza  Vaccine (HIGH DOSE) 0.5 milliLiter(s) IntraMuscular once  isosorbide   mononitrate ER Tablet (IMDUR) 240 milliGRAM(s) Oral every 24 hours  magnesium oxide 400 milliGRAM(s) Oral once  metoprolol succinate ER 25 milliGRAM(s) Oral once  metoprolol succinate  milliGRAM(s) Oral daily  multivitamin 1 Tablet(s) Oral daily  pantoprazole    Tablet 40 milliGRAM(s) Oral daily  polyethylene glycol 3350 17 Gram(s) Oral daily  ranolazine 1000 milliGRAM(s) Oral two times a day  senna 2 Tablet(s) Oral at bedtime  spironolactone 25 milliGRAM(s) Oral every 24 hours  tamsulosin 0.4 milliGRAM(s) Oral at bedtime    MEDICATIONS  (PRN):  acetaminophen     Tablet .. 975 milliGRAM(s) Oral every 8 hours PRN Moderate Pain (4 - 6), Severe Pain (7 - 10)  bisacodyl Suppository 10 milliGRAM(s) Rectal daily PRN Constipation  calcium carbonate    500 mG (Tums) Chewable 1 Tablet(s) Chew three times a day PRN Heartburn  simethicone 80 milliGRAM(s) Chew two times a day PRN Heartburn    Analgesic Use (Scheduled and PRNs) for past 24 hours (6a-6a):    acetaminophen     Tablet ..   975 milliGRAM(s) Oral (11-27-24 @ 05:37)   975 milliGRAM(s) Oral (11-26-24 @ 19:03)    acetaminophen   IVPB ..   400 mL/Hr IV Intermittent (11-27-24 @ 08:57)    PRESENT SYMPTOMS:   [ ] Patient unable to self report   Source if other than patient:  [ ]Family   [ ]Team     Pain [  ] denies chest pain     If [  ], pt denies symptom.   Dyspnea:         [  ]   Anxiety:           [  ]  Difficulty sleeping: [  ]  Fatigue:           [x  ]  Nausea:           [  ]  Loss of appetite:     [x  ]  Dysphagia: [  ]  Constipation:   [  ]        Other Symptoms: overall debility     All other review of systems negative [ x ]    ECOG Performance:   3    Current Palliative Performance Scale/Karnofsky Score: 30   %  Preadmit Karnofsky:   60%          PEx:  General: older man sitting up in bed watching TV, no acute distress   alert  oriented x    3  verbal [place]  Behavioral:  Cooperative  HEENT: atraumatic,  +mild temporal wasting,  No dry mouth  RESP: Reg rhythm, No  tachypnea/labored breathing,  No audible excessive secretions,  CTAB, diminished bilat bases  CV: RRR, S1S2,  No  tachycardia  GI: soft non distended non tender   MUSK: weakness x4,  edema, ambulatory with assistance  SKIN:  Poor skin turgor, LLE with dried superficial scabs, some hyperpigmentation in bilateral lower extremities   NEURO:  No deficits, cognitive impairment, encephalopathic dsyphagia dysarthria paresis  Oral intake ability: full capability    T(C): 36.7 (11-27-24 @ 08:52), Max: 36.7 (11-26-24 @ 17:45)  HR: 75 (11-27-24 @ 08:52) (67 - 77)  BP: 112/56 (11-27-24 @ 08:52) (103/53 - 134/63)  RR: 17 (11-27-24 @ 08:52) (17 - 18)  SpO2: 98% (11-27-24 @ 08:52) (96% - 98%)  Wt(kg): --      LABS: REVIEWED  CBC:                        12.0   5.91  )-----------( 242      ( 27 Nov 2024 05:30 )             35.6     CMP:    11-27    129[L]  |  99  |  27[H]  ----------------------------<  129[H]  4.2   |  21[L]  |  1.52[H]    Ca    8.9      27 Nov 2024 05:30  Mg     1.9     11-27      RADIOLOGY & ADDITIONAL STUDIES:  No new    DISCUSSION OF CASE: Family - to provide updates and emotional support; Primary Team and RN - to discuss plan of care    CARE COORDINATION:   - see unit SW/CM Care Coordination Notes

## 2024-11-27 NOTE — PROGRESS NOTE ADULT - SUBJECTIVE AND OBJECTIVE BOX
SUBJECTIVE: Patient was evaluated at bedside today by vascular surgery team. Patient has no complaints at this time and plans to be discharged to [facility] today.    MEDICATIONS  (STANDING):  apixaban 5 milliGRAM(s) Oral every 12 hours  ascorbic acid 500 milliGRAM(s) Oral daily  atorvastatin 80 milliGRAM(s) Oral at bedtime  clopidogrel Tablet 75 milliGRAM(s) Oral daily  dapagliflozin 10 milliGRAM(s) Oral every 24 hours  finasteride 5 milliGRAM(s) Oral daily  influenza  Vaccine (HIGH DOSE) 0.5 milliLiter(s) IntraMuscular once  isosorbide   mononitrate ER Tablet (IMDUR) 240 milliGRAM(s) Oral every 24 hours  lidocaine   4% Patch 1 Patch Transdermal every 24 hours  metoprolol succinate  milliGRAM(s) Oral daily  multivitamin 1 Tablet(s) Oral daily  pantoprazole    Tablet 40 milliGRAM(s) Oral daily  polyethylene glycol 3350 17 Gram(s) Oral daily  ranolazine 1000 milliGRAM(s) Oral two times a day  senna 2 Tablet(s) Oral at bedtime  spironolactone 25 milliGRAM(s) Oral every 24 hours  tamsulosin 0.4 milliGRAM(s) Oral at bedtime    MEDICATIONS  (PRN):  acetaminophen     Tablet .. 975 milliGRAM(s) Oral every 8 hours PRN Moderate Pain (4 - 6), Severe Pain (7 - 10)  bisacodyl Suppository 10 milliGRAM(s) Rectal daily PRN Constipation  calcium carbonate    500 mG (Tums) Chewable 1 Tablet(s) Chew three times a day PRN Heartburn  simethicone 80 milliGRAM(s) Chew two times a day PRN Heartburn      Vital Signs Last 24 Hrs  T(C): 36.7 (27 Nov 2024 12:09), Max: 36.7 (26 Nov 2024 17:45)  T(F): 98.1 (27 Nov 2024 12:09), Max: 98.1 (27 Nov 2024 08:52)  HR: 72 (27 Nov 2024 12:52) (67 - 75)  BP: 132/59 (27 Nov 2024 12:52) (103/53 - 134/63)  BP(mean): 85 (27 Nov 2024 12:52) (75 - 90)  RR: 18 (27 Nov 2024 12:52) (17 - 18)  SpO2: 96% (27 Nov 2024 12:52) (95% - 98%)    Parameters below as of 27 Nov 2024 12:52  Patient On (Oxygen Delivery Method): room air        Physical Exam:  General: NAD, resting comfortably in bed  Pulmonary: Nonlabored breathing, no respiratory distress  Cardiovascular: NSR  Abdominal: soft, NT/ND  Extremities: WWP, normal strength, L groin and medial knee incision c/d/i  Neuro: A/O x 3, CNs II-XII grossly intact, no focal deficits, normal motor/sensation  Pulses: LLE triphasic DP/PT, biphasic over distal bypass site and triphasic over proximal bypass site    I&O's Summary    26 Nov 2024 07:01  -  27 Nov 2024 07:00  --------------------------------------------------------  IN: 730 mL / OUT: 1525 mL / NET: -795 mL    27 Nov 2024 07:01  -  27 Nov 2024 14:07  --------------------------------------------------------  IN: 380 mL / OUT: 600 mL / NET: -220 mL        LABS:                        12.0   5.91  )-----------( 242      ( 27 Nov 2024 05:30 )             35.6     11-27    129[L]  |  99  |  27[H]  ----------------------------<  129[H]  4.2   |  21[L]  |  1.52[H]    Ca    8.9      27 Nov 2024 05:30  Mg     1.9     11-27        Urinalysis Basic - ( 27 Nov 2024 05:30 )    Color: x / Appearance: x / SG: x / pH: x  Gluc: 129 mg/dL / Ketone: x  / Bili: x / Urobili: x   Blood: x / Protein: x / Nitrite: x   Leuk Esterase: x / RBC: x / WBC x   Sq Epi: x / Non Sq Epi: x / Bacteria: x      CAPILLARY BLOOD GLUCOSE            RADIOLOGY & ADDITIONAL STUDIES:

## 2024-11-27 NOTE — PROGRESS NOTE ADULT - PROBLEM SELECTOR PROBLEM 1
CAD (coronary artery disease)
Debility
CAD (coronary artery disease)
Debility
CAD (coronary artery disease)
CAD (coronary artery disease)

## 2024-11-27 NOTE — PROGRESS NOTE ADULT - PROBLEM SELECTOR PLAN 1
.  Etiology multifactorial: chronic illnesses; acute deterioration following NSTEMI. patient with limited FDC support. pps 40%  - cw supportive care including repositioning and skin/wound care  - Oral care q6 ATC  - PT cs, rec MIGUEL  - Fall precautions

## 2024-11-27 NOTE — PROGRESS NOTE ADULT - ASSESSMENT
73yo M, with PMHx of HTN, HLD, BPH, HFrEF, CAD s/p CABG 10/31/24, severe PAD (s/p fem-fem) who presented to [facility] for left leg pain concerning for acute limb ischemia. On exam here, patient has an exam more consistent with his prior recent exam, consisting of chronic limb ischemia (motor and sensation intact, doppler signals present in left foot and fem-fem bypass). Given his rest pain, decision was made to proceed with arteriography. Patient is now  s/p LLE angiogram with EIA and FELICITY stents and s/p LLE fem to AK-pop bypass 11/14/24. Patient is most recently s/p transfer to cardiology service for NSTEMI s/p C (11/19).    Recommendations:  No acute vascular surgery intervention at this time  LLE fem to AK-pop bypass patent and well healing  Rest of care per primary team  Vascular surgery will continue to follow  Patient should follow-up with Dr. Valerio outpatient 2 weeks from discharge, please contact Dr. Valerio's office to schedule your appointment:  [phone]  [address] [place] [address], [address], [place] [address]      Plan discussed with chief resident and attending

## 2024-11-27 NOTE — PROGRESS NOTE ADULT - ASSESSMENT
73 yo M with advanced, multiple comorbidities including CAD s/p CABG/PCI, ICM, HFrEF, CKD, PAD s/p bypass who initially presented with acute limb ischemia now complicated by NSTEMI. Palliative consulted to discuss goals of care and treatment preferences in light of current health status.     Pt with significant burden of chronic illness, impacting his overall prognosis and functional status. Hospital course cb periods of angina and ischemia despite multiple interventions suggesting potentially poor trajectory.  Pt has limited understanding of his poor prognosis and that he is poor candidate for advanced therapies- needs careful communication with .  Has three kids (surrogates) and no local support.  What is most important to patient is maintaining physical strength and functional independence and connecting with [facility], Electing to continue an aggressive approach to his care including cpr and intubation.     Note that if patient elected to take a comfort based approach and elected to enroll in home hospice, he is currently too debilitated and without enough residential support at home that would support hospice referral. Needs MIGUEL.

## 2024-11-27 NOTE — PROGRESS NOTE ADULT - PROBLEM SELECTOR PLAN 3
.  hospital course cb NSTEMI type 2. pt w periods of angina despite aggressive medical mgmt -- previously requiring NTG gtt, MS IV, CCU level of care. Functional status declining given hospitalization and symptom burden.   - Concern for potentially poor trajectory, poor candidate for advanced therapies, high risk for acute decompensation or life threatening complication   - cw care per primary team; recommend ongoing re education about trajectory with patient, ideally including his adult children/surrogates, however patient does not have their contact information

## 2024-11-27 NOTE — PROGRESS NOTE ADULT - NS ATTEST RISK PROBLEM GEN_ALL_CORE FT
Chronic Illness With Severe Exacerbation/Progression
Chronic Illness With Severe Exacerbation/Progression

## 2024-12-02 DIAGNOSIS — I50.42 CHRONIC COMBINED SYSTOLIC (CONGESTIVE) AND DIASTOLIC (CONGESTIVE) HEART FAILURE: ICD-10-CM

## 2024-12-02 DIAGNOSIS — I99.8 OTHER DISORDER OF CIRCULATORY SYSTEM: ICD-10-CM

## 2024-12-02 DIAGNOSIS — N40.0 BENIGN PROSTATIC HYPERPLASIA WITHOUT LOWER URINARY TRACT SYMPMS: ICD-10-CM

## 2024-12-02 RX ORDER — METOPROLOL SUCCINATE 50 MG/1
50 TABLET, EXTENDED RELEASE ORAL DAILY
Refills: 0 | Status: ACTIVE | COMMUNITY
Start: 2024-12-02

## 2024-12-02 RX ORDER — SIMETHICONE 80 MG/1
80 TABLET, CHEWABLE ORAL TWICE DAILY
Refills: 0 | Status: ACTIVE | COMMUNITY
Start: 2024-12-02

## 2024-12-02 RX ORDER — BISACODYL 10 MG/1
10 SUPPOSITORY RECTAL DAILY
Refills: 0 | Status: ACTIVE | COMMUNITY
Start: 2024-12-02

## 2024-12-02 RX ORDER — ACETAMINOPHEN 325 MG/1
325 TABLET ORAL EVERY 8 HOURS
Refills: 0 | Status: ACTIVE | COMMUNITY
Start: 2024-12-02

## 2024-12-02 RX ORDER — TAMSULOSIN HYDROCHLORIDE 0.4 MG/1
0.4 CAPSULE ORAL
Qty: 90 | Refills: 3 | Status: ACTIVE | COMMUNITY
Start: 2024-12-02

## 2024-12-02 RX ORDER — CALCIUM CARBONATE 500 MG/1
500 TABLET, CHEWABLE ORAL 3 TIMES DAILY
Refills: 0 | Status: ACTIVE | COMMUNITY
Start: 2024-12-02

## 2024-12-02 RX ORDER — FINASTERIDE 5 MG/1
5 TABLET, FILM COATED ORAL DAILY
Qty: 90 | Refills: 1 | Status: ACTIVE | COMMUNITY
Start: 2024-12-02

## 2024-12-02 RX ORDER — APIXABAN 5 MG/1
5 TABLET, FILM COATED ORAL
Qty: 180 | Refills: 3 | Status: ACTIVE | COMMUNITY
Start: 2024-12-02

## 2024-12-02 RX ORDER — PNV NO.95/FERROUS FUM/FOLIC AC 28MG-0.8MG
TABLET ORAL DAILY
Refills: 0 | Status: ACTIVE | COMMUNITY
Start: 2024-12-02

## 2024-12-02 RX ORDER — RANOLAZINE 1000 MG/1
1000 TABLET, EXTENDED RELEASE ORAL
Refills: 0 | Status: ACTIVE | COMMUNITY
Start: 2024-12-02

## 2024-12-02 RX ORDER — CLOPIDOGREL BISULFATE 75 MG/1
75 TABLET, FILM COATED ORAL
Qty: 90 | Refills: 1 | Status: ACTIVE | COMMUNITY
Start: 2024-12-02

## 2024-12-02 RX ORDER — PANTOPRAZOLE 40 MG/1
40 TABLET, DELAYED RELEASE ORAL DAILY
Qty: 90 | Refills: 1 | Status: ACTIVE | COMMUNITY
Start: 2024-12-02

## 2024-12-02 RX ORDER — DAPAGLIFLOZIN 10 MG/1
10 TABLET, FILM COATED ORAL
Qty: 90 | Refills: 3 | Status: ACTIVE | COMMUNITY
Start: 2024-12-02

## 2024-12-02 RX ORDER — ATORVASTATIN CALCIUM 80 MG/1
80 TABLET, FILM COATED ORAL DAILY
Qty: 90 | Refills: 2 | Status: ACTIVE | COMMUNITY
Start: 2024-12-02

## 2024-12-02 RX ORDER — ISOSORBIDE MONONITRATE 120 MG/1
120 TABLET, EXTENDED RELEASE ORAL DAILY
Refills: 0 | Status: ACTIVE | COMMUNITY
Start: 2024-12-02

## 2024-12-02 RX ORDER — SENNOSIDES 8.6 MG TABLETS 8.6 MG/1
8.6 TABLET ORAL AT BEDTIME
Refills: 0 | Status: ACTIVE | COMMUNITY
Start: 2024-12-02

## 2024-12-02 RX ORDER — LIDOCAINE 40 MG/G
4 PATCH TOPICAL DAILY
Refills: 0 | Status: ACTIVE | COMMUNITY
Start: 2024-12-02

## 2024-12-02 RX ORDER — POLYETHYLENE GLYCOL 3350 17 G/17G
17 POWDER, FOR SOLUTION ORAL DAILY
Refills: 0 | Status: ACTIVE | COMMUNITY
Start: 2024-12-02

## 2024-12-02 RX ORDER — MULTIVIT-MIN/FOLIC/VIT K/LYCOP 400-300MCG
500 TABLET ORAL DAILY
Refills: 0 | Status: ACTIVE | COMMUNITY
Start: 2024-12-02

## 2024-12-02 RX ORDER — SPIRONOLACTONE 25 MG/1
25 TABLET ORAL
Qty: 90 | Refills: 1 | Status: ACTIVE | COMMUNITY
Start: 2024-12-02

## 2024-12-03 ENCOUNTER — APPOINTMENT (OUTPATIENT)
Dept: HEART AND VASCULAR | Facility: CLINIC | Age: 74
End: 2024-12-03

## 2024-12-03 ENCOUNTER — NON-APPOINTMENT (OUTPATIENT)
Age: 74
End: 2024-12-03

## 2024-12-06 ENCOUNTER — TRANSCRIPTION ENCOUNTER (OUTPATIENT)
Age: 74
End: 2024-12-06

## 2024-12-09 ENCOUNTER — APPOINTMENT (OUTPATIENT)
Dept: CARDIOLOGY | Facility: CLINIC | Age: 74
End: 2024-12-09
Payer: MEDICARE

## 2024-12-09 VITALS
HEIGHT: 64.96 IN | OXYGEN SATURATION: 99 % | RESPIRATION RATE: 16 BRPM | WEIGHT: 133 LBS | DIASTOLIC BLOOD PRESSURE: 67 MMHG | SYSTOLIC BLOOD PRESSURE: 116 MMHG | BODY MASS INDEX: 22.16 KG/M2 | HEART RATE: 62 BPM

## 2024-12-09 PROCEDURE — 93000 ELECTROCARDIOGRAM COMPLETE: CPT

## 2024-12-09 PROCEDURE — 99215 OFFICE O/P EST HI 40 MIN: CPT | Mod: 25

## 2024-12-16 ENCOUNTER — APPOINTMENT (OUTPATIENT)
Dept: VASCULAR SURGERY | Facility: CLINIC | Age: 74
End: 2024-12-16

## 2025-01-16 NOTE — PROGRESS NOTE ADULT - SUBJECTIVE AND OBJECTIVE BOX
Physical Therapy Visit    Visit Type: Daily Treatment Note  Visit: 8  Referring Provider: Da Kurtz MD  Medical Diagnosis (from order): S39.012A - Strain of lumbar region, initial encounter     SUBJECTIVE                                                                                                               States that low back is doing okay. Continues working with new restrictions and that has been going okay. Continues using TENS unit 2x/day which helps symptoms. Sleeping is getting a little better and can sleep on either side.     Pain / Symptoms  - Pain rating (out of 10): Current: 8       OBJECTIVE                                                                                                                     Range of Motion (ROM)   (degrees unless noted; active unless noted; norms in ( ); negative=lacking to 0, positive=beyond 0)  Lumbar:    - Flexion (60-80):  40%     - Extension (25):  10%     - Side Bend (25-35):         Left:  30%          Right:  30%          Palpation  Left  - Lumbar Paraspinals: hypertonic  - Quadratus Lumborum: hypertonic  Right  - Lumbar Paraspinals: hypertonic and tenderness  - Quadratus Lumborum: hypertonic and trigger point  - Piriformis: trigger point and hypertonic                 Treatment     Therapeutic Exercise  Unable to tolerate NuStep for greater than 2 minutes   Sidelying open book stretch - 10 reps each side   Windshield wipers in prone to tolerance  - 4 reps each side   Supine hip adduction isometrics - 5 reps with 5 second holds  Supine hip abduction isometrics - 5 reps with 5 second holds   Pelvic tilts with preference to posterior pelvic tilt than anterior - 10 reps   Pelvic rocking side to side - 10 reps   Sciatic nerve glide in 90/90 position - 7 reps each side      Manual Therapy   Sidelying soft tissue mobilization to right piriformis, lumbar paraspinals and quadratus lumborum in prone  Grade 1-2 lumbar posterior-anterior mobilizations        Skilled input: verbal instruction/cues, tactile instruction/cues, demonstration and as detailed above    Writer verbally educated and received verbal consent for hand placement, positioning of patient, and techniques to be performed today from patient for clothing adjustments for techniques and hand placement and palpation for techniques as described above and how they are pertinent to the patient's plan of care.  Home Exercise Program  Access Code: [email or web address]  URL: [email or web address]  Date: 12/24/2024  Prepared by: Terrell Hopkins    Exercises  - Supine Lower Trunk Rotation  - 3 x daily - 7 x weekly - 2-3 sets - 10 reps  - Supine Pelvic Tilt  - 3 x daily - 7 x weekly - 2-3 sets - 10 reps  - Supine Transversus Abdominis Bracing - Hands on Stomach  - 3 x daily - 7 x weekly - 2-3 sets - 10 reps      ASSESSMENT                                                                                                            Tolerating prone position with more ease and less pain. Continues to prefer lumbar flexion over extension. Tolerated supine exercises well however still unable to tolerate sitting for long periods of time. Lumbar range of motion improved since initial visit. Would continue to benefit from PT services in order to address pain and limited range of motion to facilitate all job related duties.   Pain after session: not rated at end of session.  Education:   - Results of above outlined education: Verbalizes understanding and Demonstrates understanding    PLAN                                                                                                                           Suggestions for next session as indicated: Progress per plan of care, lumbar manual therapy, lumbar range of motion and aerobic activity        Therapy procedure time and total treatment time can be found documented on the Time Entry flowsheet     Cardiology PA Adult Progress Note    SUBJECTIVE ASSESSMENT:  	  MEDICATIONS:  isosorbide   mononitrate ER Tablet (IMDUR) 120 milliGRAM(s) Oral daily  metoprolol succinate ER 50 milliGRAM(s) Oral daily  nitroglycerin  Infusion 50 MICROgram(s)/Min IV Continuous <Continuous>  ranolazine 1000 milliGRAM(s) Oral two times a day        acetaminophen     Tablet .. 975 milliGRAM(s) Oral every 8 hours  HYDROmorphone  Injectable 0.5 milliGRAM(s) IV Push every 4 hours PRN  oxyCODONE    IR 5 milliGRAM(s) Oral every 6 hours PRN  oxyCODONE    IR 10 milliGRAM(s) Oral every 6 hours PRN    bisacodyl Suppository 10 milliGRAM(s) Rectal daily PRN  calcium carbonate    500 mG (Tums) Chewable 1 Tablet(s) Chew three times a day PRN  pantoprazole    Tablet 40 milliGRAM(s) Oral daily  polyethylene glycol 3350 17 Gram(s) Oral daily  senna 2 Tablet(s) Oral at bedtime  simethicone 80 milliGRAM(s) Chew two times a day PRN    atorvastatin 80 milliGRAM(s) Oral at bedtime  finasteride 5 milliGRAM(s) Oral daily    apixaban 5 milliGRAM(s) Oral every 12 hours  aspirin enteric coated 81 milliGRAM(s) Oral daily  clopidogrel Tablet 75 milliGRAM(s) Oral daily  influenza  Vaccine (HIGH DOSE) 0.5 milliLiter(s) IntraMuscular once  sodium chloride 0.9%. 1000 milliLiter(s) IV Continuous <Continuous>  tamsulosin 0.4 milliGRAM(s) Oral at bedtime    	  VITAL SIGNS:  T(C): 37.1 (11-20-24 @ 20:20), Max: 37.1 (11-20-24 @ 20:20)  HR: 81 (11-21-24 @ 05:35) (75 - 82)  BP: 107/51 (11-21-24 @ 05:35) (89/49 - 113/54)  RR: 18 (11-21-24 @ 05:35) (17 - 20)  SpO2: 92% (11-21-24 @ 05:35) (92% - 98%)  Wt(kg): --    I&O's Summary    20 Nov 2024 07:01 - 21 Nov 2024 07:00  --------------------------------------------------------  IN: 888 mL / OUT: 1625 mL / NET: -737 mL                                           PHYSICAL EXAM:  Appearance: Normal	  HEENT: Normal oral mucosa, PERRL, EOMI	  Neck: Supple, + JVD/ - JVD; ___ Carotid Bruit   Cardiovascular: Normal S1 S2, No murmurs  Respiratory: Lungs clear to auscultation/Decreased Breath Sounds/No Rales, Rhonchi, Wheezing	  Gastrointestinal:  Soft, Non-tender, + BS	  Skin: No rashes, No ecchymoses, No cyanosis  Extremities: Normal range of motion, No clubbing, cyanosis or edema  Vascular: Peripheral pulses palpable 2+ bilaterally  Neurologic: Non-focal  Psychiatry: A & O x 3, Mood & affect appropriate    LABS:	 	                                  9.1    6.64  )-----------( 161      ( 21 Nov 2024 05:30 )             27.5     11-21    135  |  106  |  15  ----------------------------<  128[H]  4.4   |  20[L]  |  1.33[H]    Ca    8.1[L]      21 Nov 2024 05:30  Phos  3.0     11-20  Mg     1.8     11-21    TPro  5.6[L]  /  Alb  3.0[L]  /  TBili  0.6  /  DBili  x   /  AST  13  /  ALT  8[L]  /  AlkPhos  57  11-21    proBNP:   Lipid Profile:   HgA1c:   TSH:   PT/INR - ( 20 Nov 2024 05:30 )   PT: 15.8 sec;   INR: 1.38          PTT - ( 20 Nov 2024 05:30 )  PTT:25.2 sec Cardiology PA Adult Progress Note    SUBJECTIVE ASSESSMENT:  	  Reports chest pain improving since yesterday    MEDICATIONS:  isosorbide   mononitrate ER Tablet (IMDUR) 120 milliGRAM(s) Oral daily  metoprolol succinate ER 50 milliGRAM(s) Oral daily  nitroglycerin  Infusion 50 MICROgram(s)/Min IV Continuous <Continuous>  ranolazine 1000 milliGRAM(s) Oral two times a day  acetaminophen     Tablet .. 975 milliGRAM(s) Oral every 8 hours  HYDROmorphone  Injectable 0.5 milliGRAM(s) IV Push every 4 hours PRN  oxyCODONE    IR 5 milliGRAM(s) Oral every 6 hours PRN  oxyCODONE    IR 10 milliGRAM(s) Oral every 6 hours PRN  bisacodyl Suppository 10 milliGRAM(s) Rectal daily PRN  calcium carbonate    500 mG (Tums) Chewable 1 Tablet(s) Chew three times a day PRN  pantoprazole    Tablet 40 milliGRAM(s) Oral daily  polyethylene glycol 3350 17 Gram(s) Oral daily  senna 2 Tablet(s) Oral at bedtime  simethicone 80 milliGRAM(s) Chew two times a day PRN  atorvastatin 80 milliGRAM(s) Oral at bedtime  finasteride 5 milliGRAM(s) Oral daily  apixaban 5 milliGRAM(s) Oral every 12 hours  aspirin enteric coated 81 milliGRAM(s) Oral daily  clopidogrel Tablet 75 milliGRAM(s) Oral daily  influenza  Vaccine (HIGH DOSE) 0.5 milliLiter(s) IntraMuscular once  sodium chloride 0.9%. 1000 milliLiter(s) IV Continuous <Continuous>  tamsulosin 0.4 milliGRAM(s) Oral at bedtime    	  VITAL SIGNS:  T(C): 37.1 (11-20-24 @ 20:20), Max: 37.1 (11-20-24 @ 20:20)  HR: 81 (11-21-24 @ 05:35) (75 - 82)  BP: 107/51 (11-21-24 @ 05:35) (89/49 - 113/54)  RR: 18 (11-21-24 @ 05:35) (17 - 20)  SpO2: 92% (11-21-24 @ 05:35) (92% - 98%)  Wt(kg): --    I&O's Summary    20 Nov 2024 07:01  -  21 Nov 2024 07:00  --------------------------------------------------------  IN: 888 mL / OUT: 1625 mL / NET: -737 mL                                           PHYSICAL EXAM:  Appearance: Normal	  HEENT: EOMI	  Neck: Supple  Cardiovascular: Normal S1 S2, No murmurs  Respiratory: Lungs clear to auscultation  Gastrointestinal:  Soft, Non-tender  Skin: No rashes, No ecchymoses, No cyanosis  Extremities: Normal range of motion, No clubbing, cyanosis or edema  Vascular: Peripheral pulses palpable 2+ bilaterally  Neurologic: Non-focal  Psychiatry: A & O x 3, Mood & affect appropriate    LABS:	 	                                  9.1    6.64  )-----------( 161      ( 21 Nov 2024 05:30 )             27.5     11-21    135  |  106  |  15  ----------------------------<  128[H]  4.4   |  20[L]  |  1.33[H]    Ca    8.1[L]      21 Nov 2024 05:30  Phos  3.0     11-20  Mg     1.8     11-21    TPro  5.6[L]  /  Alb  3.0[L]  /  TBili  0.6  /  DBili  x   /  AST  13  /  ALT  8[L]  /  AlkPhos  57  11-21       PT/INR - ( 20 Nov 2024 05:30 )   PT: 15.8 sec;   INR: 1.38          PTT - ( 20 Nov 2024 05:30 )  PTT:25.2 sec

## 2025-01-21 NOTE — PHYSICAL THERAPY INITIAL EVALUATION ADULT - FOLLOWS COMMANDS/ANSWERS QUESTIONS, REHAB EVAL
100% of the time

## 2025-01-23 ENCOUNTER — EMERGENCY (EMERGENCY)
Facility: HOSPITAL | Age: 75
LOS: 0 days | Discharge: ANOTHER HOSPITAL | End: 2025-01-24
Attending: STUDENT IN AN ORGANIZED HEALTH CARE EDUCATION/TRAINING PROGRAM
Payer: MEDICARE

## 2025-01-23 VITALS
SYSTOLIC BLOOD PRESSURE: 166 MMHG | DIASTOLIC BLOOD PRESSURE: 97 MMHG | OXYGEN SATURATION: 98 % | TEMPERATURE: 98 F | RESPIRATION RATE: 18 BRPM | WEIGHT: 149.91 LBS | HEIGHT: 65 IN | HEART RATE: 107 BPM

## 2025-01-23 DIAGNOSIS — N18.9 CHRONIC KIDNEY DISEASE, UNSPECIFIED: ICD-10-CM

## 2025-01-23 DIAGNOSIS — L03.116 CELLULITIS OF LEFT LOWER LIMB: ICD-10-CM

## 2025-01-23 DIAGNOSIS — M25.551 PAIN IN RIGHT HIP: ICD-10-CM

## 2025-01-23 DIAGNOSIS — Z95.5 PRESENCE OF CORONARY ANGIOPLASTY IMPLANT AND GRAFT: ICD-10-CM

## 2025-01-23 DIAGNOSIS — E11.22 TYPE 2 DIABETES MELLITUS WITH DIABETIC CHRONIC KIDNEY DISEASE: ICD-10-CM

## 2025-01-23 DIAGNOSIS — I12.9 HYPERTENSIVE CHRONIC KIDNEY DISEASE WITH STAGE 1 THROUGH STAGE 4 CHRONIC KIDNEY DISEASE, OR UNSPECIFIED CHRONIC KIDNEY DISEASE: ICD-10-CM

## 2025-01-23 DIAGNOSIS — M25.541 PAIN IN JOINTS OF RIGHT HAND: ICD-10-CM

## 2025-01-23 DIAGNOSIS — Z95.828 PRESENCE OF OTHER VASCULAR IMPLANTS AND GRAFTS: Chronic | ICD-10-CM

## 2025-01-23 DIAGNOSIS — I21.4 NON-ST ELEVATION (NSTEMI) MYOCARDIAL INFARCTION: ICD-10-CM

## 2025-01-23 DIAGNOSIS — I25.10 ATHEROSCLEROTIC HEART DISEASE OF NATIVE CORONARY ARTERY WITHOUT ANGINA PECTORIS: ICD-10-CM

## 2025-01-23 DIAGNOSIS — R00.0 TACHYCARDIA, UNSPECIFIED: ICD-10-CM

## 2025-01-23 LAB
ALBUMIN SERPL ELPH-MCNC: 3.5 G/DL — SIGNIFICANT CHANGE UP (ref 3.3–5)
ALP SERPL-CCNC: 96 U/L — SIGNIFICANT CHANGE UP (ref 40–120)
ALT FLD-CCNC: 23 U/L — SIGNIFICANT CHANGE UP (ref 12–78)
ANION GAP SERPL CALC-SCNC: 5 MMOL/L — SIGNIFICANT CHANGE UP (ref 5–17)
APTT BLD: 29.9 SEC — SIGNIFICANT CHANGE UP (ref 24.5–35.6)
AST SERPL-CCNC: 21 U/L — SIGNIFICANT CHANGE UP (ref 15–37)
BASOPHILS # BLD AUTO: 0.02 K/UL — SIGNIFICANT CHANGE UP (ref 0–0.2)
BASOPHILS NFR BLD AUTO: 0.2 % — SIGNIFICANT CHANGE UP (ref 0–2)
BILIRUB SERPL-MCNC: 0.3 MG/DL — SIGNIFICANT CHANGE UP (ref 0.2–1.2)
BUN SERPL-MCNC: 28 MG/DL — HIGH (ref 7–23)
CALCIUM SERPL-MCNC: 9.3 MG/DL — SIGNIFICANT CHANGE UP (ref 8.5–10.1)
CHLORIDE SERPL-SCNC: 105 MMOL/L — SIGNIFICANT CHANGE UP (ref 96–108)
CO2 SERPL-SCNC: 28 MMOL/L — SIGNIFICANT CHANGE UP (ref 22–31)
CREAT SERPL-MCNC: 1.63 MG/DL — HIGH (ref 0.5–1.3)
EGFR: 43 ML/MIN/1.73M2 — LOW
EOSINOPHIL # BLD AUTO: 0.12 K/UL — SIGNIFICANT CHANGE UP (ref 0–0.5)
EOSINOPHIL NFR BLD AUTO: 1.1 % — SIGNIFICANT CHANGE UP (ref 0–6)
GLUCOSE SERPL-MCNC: 119 MG/DL — HIGH (ref 70–99)
HCT VFR BLD CALC: 45.1 % — SIGNIFICANT CHANGE UP (ref 39–50)
HGB BLD-MCNC: 14.8 G/DL — SIGNIFICANT CHANGE UP (ref 13–17)
IMM GRANULOCYTES NFR BLD AUTO: 0.5 % — SIGNIFICANT CHANGE UP (ref 0–0.9)
INR BLD: 1.03 RATIO — SIGNIFICANT CHANGE UP (ref 0.85–1.16)
LYMPHOCYTES # BLD AUTO: 0.93 K/UL — LOW (ref 1–3.3)
LYMPHOCYTES # BLD AUTO: 8.7 % — LOW (ref 13–44)
MAGNESIUM SERPL-MCNC: 2 MG/DL — SIGNIFICANT CHANGE UP (ref 1.6–2.6)
MCHC RBC-ENTMCNC: 31.7 PG — SIGNIFICANT CHANGE UP (ref 27–34)
MCHC RBC-ENTMCNC: 32.8 G/DL — SIGNIFICANT CHANGE UP (ref 32–36)
MCV RBC AUTO: 96.6 FL — SIGNIFICANT CHANGE UP (ref 80–100)
MONOCYTES # BLD AUTO: 0.8 K/UL — SIGNIFICANT CHANGE UP (ref 0–0.9)
MONOCYTES NFR BLD AUTO: 7.5 % — SIGNIFICANT CHANGE UP (ref 2–14)
NEUTROPHILS # BLD AUTO: 8.73 K/UL — HIGH (ref 1.8–7.4)
NEUTROPHILS NFR BLD AUTO: 82 % — HIGH (ref 43–77)
NRBC # BLD: 0 /100 WBCS — SIGNIFICANT CHANGE UP (ref 0–0)
PLATELET # BLD AUTO: 186 K/UL — SIGNIFICANT CHANGE UP (ref 150–400)
POTASSIUM SERPL-MCNC: 5 MMOL/L — SIGNIFICANT CHANGE UP (ref 3.5–5.3)
POTASSIUM SERPL-SCNC: 5 MMOL/L — SIGNIFICANT CHANGE UP (ref 3.5–5.3)
PROT SERPL-MCNC: 7.8 GM/DL — SIGNIFICANT CHANGE UP (ref 6–8.3)
PROTHROM AB SERPL-ACNC: 12 SEC — SIGNIFICANT CHANGE UP (ref 9.9–13.4)
RBC # BLD: 4.67 M/UL — SIGNIFICANT CHANGE UP (ref 4.2–5.8)
RBC # FLD: 13.9 % — SIGNIFICANT CHANGE UP (ref 10.3–14.5)
SODIUM SERPL-SCNC: 138 MMOL/L — SIGNIFICANT CHANGE UP (ref 135–145)
TROPONIN I, HIGH SENSITIVITY RESULT: 174.4 NG/L — HIGH
WBC # BLD: 10.65 K/UL — HIGH (ref 3.8–10.5)
WBC # FLD AUTO: 10.65 K/UL — HIGH (ref 3.8–10.5)

## 2025-01-23 PROCEDURE — 70450 CT HEAD/BRAIN W/O DYE: CPT | Mod: 26

## 2025-01-23 PROCEDURE — 99291 CRITICAL CARE FIRST HOUR: CPT

## 2025-01-23 PROCEDURE — 93010 ELECTROCARDIOGRAM REPORT: CPT

## 2025-01-23 RX ORDER — CLOPIDOGREL BISULFATE 75 MG/1
300 TABLET, FILM COATED ORAL ONCE
Refills: 0 | Status: COMPLETED | OUTPATIENT
Start: 2025-01-23 | End: 2025-01-23

## 2025-01-23 RX ORDER — HEPARIN SODIUM 1000 [USP'U]/ML
INJECTION, SOLUTION INTRAVENOUS; SUBCUTANEOUS
Qty: 25000 | Refills: 0 | Status: DISCONTINUED | OUTPATIENT
Start: 2025-01-23 | End: 2025-01-24

## 2025-01-23 RX ORDER — HEPARIN SODIUM 1000 [USP'U]/ML
4100 INJECTION, SOLUTION INTRAVENOUS; SUBCUTANEOUS ONCE
Refills: 0 | Status: COMPLETED | OUTPATIENT
Start: 2025-01-23 | End: 2025-01-24

## 2025-01-23 RX ORDER — HEPARIN SODIUM 1000 [USP'U]/ML
4100 INJECTION, SOLUTION INTRAVENOUS; SUBCUTANEOUS EVERY 6 HOURS
Refills: 0 | Status: DISCONTINUED | OUTPATIENT
Start: 2025-01-23 | End: 2025-01-24

## 2025-01-23 RX ORDER — SODIUM CHLORIDE 9 MG/ML
1000 INJECTION, SOLUTION INTRAMUSCULAR; INTRAVENOUS; SUBCUTANEOUS ONCE
Refills: 0 | Status: COMPLETED | OUTPATIENT
Start: 2025-01-23 | End: 2025-01-23

## 2025-01-23 RX ORDER — CLINDAMYCIN HYDROCHLORIDE 300 MG/1
600 CAPSULE ORAL ONCE
Refills: 0 | Status: COMPLETED | OUTPATIENT
Start: 2025-01-23 | End: 2025-01-23

## 2025-01-23 RX ORDER — ASPIRIN 81 MG
324 TABLET, DELAYED RELEASE (ENTERIC COATED) ORAL ONCE
Refills: 0 | Status: COMPLETED | OUTPATIENT
Start: 2025-01-23 | End: 2025-01-23

## 2025-01-23 RX ORDER — ACETAMINOPHEN 80 MG/.8ML
1000 SOLUTION/ DROPS ORAL ONCE
Refills: 0 | Status: COMPLETED | OUTPATIENT
Start: 2025-01-23 | End: 2025-01-23

## 2025-01-23 RX ADMIN — ACETAMINOPHEN 400 MILLIGRAM(S): 80 SOLUTION/ DROPS ORAL at 22:36

## 2025-01-23 RX ADMIN — CLINDAMYCIN HYDROCHLORIDE 100 MILLIGRAM(S): 300 CAPSULE ORAL at 23:49

## 2025-01-23 RX ADMIN — SODIUM CHLORIDE 1000 MILLILITER(S): 9 INJECTION, SOLUTION INTRAMUSCULAR; INTRAVENOUS; SUBCUTANEOUS at 22:36

## 2025-01-23 NOTE — ED ADULT NURSE NOTE - NS_NURSE_UNIT_LOCATION_ED_ALL_ED
Constitutional: well-appearing in no apparent distress.   Eyes: no conjunctival injection, symmetric gaze.   ENT: mucous membranes moist, no mouth sores or mucosal bleeding, normal dentition, symmetric facies . 2 small mouth sores, one on each side of upper buccal mucosa.   Neck: no thyromegaly or masses appreciated.   Pulmonary: clear to auscultation bilaterally, no wheezing.   Cardiac: No murmurs, rubs, gallops.   Chest: Mediport.   Abdomen: normoactive bowel sounds, soft and nontender, no hepatosplenomegaly or masses appreciated.   Rectal:. deferred.   Genitourinary: , no testicular mass.   Lymphatic: no adenopathy appreciated.   Back: no palpable tenderness.   Musculoskeletal: full range of motion and no deformities appreciated, no masses and normal strength in all extremities.   Skin:. resolving steroid induced maculopapular rash, striae on lower back.   Neurology: PERRL, extraocular movements intact, cranial nerves II-XII grossly intact.   Psychiatric: affect appropriate.     Lansky: 80: Active, but tires more quickly Emergency Dept.

## 2025-01-23 NOTE — ED ADULT TRIAGE NOTE - CHIEF COMPLAINT QUOTE
bibems from home c/o left knee pain s/p fall in bathroom.  denies any head injury, LOC.  pt c/o left sided chest pain now.   pt denies any med hx.  nkda.

## 2025-01-23 NOTE — ED PROVIDER NOTE - PHYSICAL EXAMINATION
General appearance: Nontoxic appearing, conversant, afebrile    Eyes: anicteric sclerae, CARLOS ALBERTO, EOMI   HENT: Atraumatic; oropharynx clear, MMM and no ulcerations, no pharyngeal erythema or exudate   Neck: Trachea midline; Full range of motion, supple   Pulm: CTA bl, normal respiratory effort and no intercostal retractions, normal work of breathing   CV: RRR, No murmurs, rubs, or gallops   Abdomen: Soft, non-tender, non-distended; no guarding or rebound   Back: No midline ttp, step offs, deformities, no cvat    Extremities: No peripheral edema, no gross deformities, FROM x4, 5/5 MS x4, gross sensation intact    Skin: Dry, normal temperature, turgor and texture; no rash, L lower leg warm, erythematous, no fluctuance, L groin wound site still with sutures, + erythema, no discharge   Psych: Appropriate affect, cooperative

## 2025-01-23 NOTE — ED ADULT NURSE NOTE - TEMPLATE LIST FOR HEAD TO TOE ASSESSMENT
Pharmacist Admission Medication History    Admission medication history is complete. The information provided in this note is only as accurate as the sources available at the time of the update.    Medication reconciliation/reorder completed by provider prior to medication history? Yes    Information Source(s): Family member (Mother Eve Padilla), Hospital records and CareEverywhere/SureScripts     Changes made to PTA medication list:    Added: Neosporin, Gentamicin cream, Lactulose, Miralax, Senna, Colace     Deleted: Aluminum hydroxide, captopril, clonidine, sodium bicarbonate, prednisone daily (all old, not taking)     Changed: Calcium carbonate (takes with meals and at HS), vitamin D (dose and frequency), Prednisone/tobramycin (Red zone prescriptions)    Medication History Completed By: HERMILA CHAUDHRY RPh 4/8/2023 12:07 PM    PTA Med List   Medication Sig Last Dose     acetaminophen (TYLENOL) 500 MG tablet Take 500 mg by mouth every 8 hours as needed   at PRN     albuterol (2.5 MG/3ML) 0.083% nebulizer solution Take 1 ampule by nebulization 4 times daily   at With Ipatropium     amLODIPine (NORVASC) 5 MG tablet Take 5 mg by mouth 2 times daily Past Week     azithromycin (ZITHROMAX) 250 MG tablet Take 250 mg by mouth three times a week Mon, Wed, Fri Past Week     calcium carbonate (TUMS) 500 MG chewable tablet Take 1,000 mg by mouth 3 times daily Past Week     calcium carbonate (TUMS) 500 MG chewable tablet Take 1,000 mg by mouth At Bedtime      cholecalciferol (VITAMIN D3) 25 mcg (1000 units) capsule Take 1 capsule by mouth 2 times daily Past Week     cinacalcet (SENSIPAR) 30 MG tablet Take 30 mg by mouth daily Past Week     Cranberry 500 MG TABS Take by mouth 3 times daily (with meals) Past Week     docusate sodium (COLACE) 100 MG capsule Take 100 mg by mouth 2 times daily      famotidine (PEPCID) 40 MG tablet Take 40 mg by mouth 2 times daily       ferrous gluconate (FERGON) 324 (38 Fe) MG tablet Take 1 tablet (324  mg) by mouth daily      gentamicin (GARAMYCIN) 0.1 % external cream Apply topically daily Peritoneal dialysis site      ipratropium (ATROVENT) 0.02 % neb solution Take 0.5 mg by nebulization 4 times daily   at With albuterol     lactulose 20 GM/30ML solution Take 20 g by mouth 2 times daily as needed for constipation      Melatonin 10 MG TABS tablet Take 10 mg by mouth nightly as needed  at PRN     mupirocin (BACTROBAN) 2 % external ointment Apply topically daily as needed (around stoma)  at PRN     neomycin-bacitracin-polymyxin (NEOSPORIN) 5-400-5000 ointment Apply topically daily as needed (Around Stoma - uses EITHER neosporin OR Bactroban)  at PRN     polyethylene glycol (MIRALAX) 17 g packet Take 1 packet by mouth 2 times daily      predniSONE (DELTASONE) 20 MG tablet Take 20 mg by mouth daily as needed Take only when in Red Zone More than a month at PRN     sennosides (SENOKOT) 8.6 MG tablet Take 1 tablet by mouth 2 times daily      sodium chloride 0.9%, bottle, 0.9 % irrigation Irrigate with as directed 3 times daily Bladder irrigation Past Week     tobramycin, PF, (DAX) 300 MG/5ML nebulizer solution Take 1 ampule by nebulization as needed Take only when in Red Zone More than a month at PRN     traZODone (DESYREL) 50 MG tablet Take 25 mg by mouth nightly as needed More than a month at PRN        Fall

## 2025-01-23 NOTE — ED ADULT NURSE NOTE - NSFALLHARMRISKINTERV_ED_ALL_ED

## 2025-01-23 NOTE — ED PROVIDER NOTE - RATE
Discharge Summary - Jazmine Tran 43 y.o. female MRN: [id number]    Unit/Bed#: E5 -01 Encounter: [id number]      Pre-Operative Diagnosis: Pre-Op Diagnosis Codes:      * Esophageal reflux [K21.9]     * Hiatal hernia [K44.9]     * Heartburn [R12]     * Bloating [R14.0]     * Belching [R14.2]    Post-Operative Diagnosis: Post-Op Diagnosis Codes:     * Esophageal reflux [K21.9]     * Hiatal hernia [K44.9]     * Heartburn [R12]     * Bloating [R14.0]     * Belching [R14.2]    Procedures Performed:  Procedure(s):  ROBOTIC PEHR with Mesh    Surgeon: Blanca Shah MD    See H & P for full details of admission and Operative Note for full details of operations performed.     Patient tolerated surgery well without complications. In the morning postoperative Day 1, the patient had mild nausea and abdominal pain. UGI obtained and normal in findings. Tolerated a clear liquid diet without vomiting. Able to ambulate and voiding independently. Patient was deemed ready for discharge home.     Patient was seen and examined prior to discharge.      Provisions for Follow-Up Care:  See After Visit Summary/Discharge Instructions for information related to follow-up care and home orders.      Disposition: Home, in stable condition.     Planned Readmission: No    Discharge Medications:  See After Visit Summary/Discharge Instructions for reconciled discharge medications provided to patient and family.      Post Operative instructions: Reviewed with patient and/or family.    Signature:   Loretta Peñaloza PA-C  Date: 7/9/2024 Time: 11:20 AM          101 88

## 2025-01-23 NOTE — ED ADULT NURSE NOTE - OBJECTIVE STATEMENT
pt aaox4, ambulatory with a walker. pt presents s/p fall at home today. Pt reports he tripped over his walker while in the bathroom, denies LOC and headstrike, pt unsure if on blood thinners. Pt reports that while in ambulance he started to R sided chest pain that radiates in sternum. Pt also reports L lower leg pain for 4 months that has gradually gotten worse. Pt reports bypass surgery 3 months ago, incision and stitches noted on L thigh. Reports suprapubic pain/lower abdominal pain started shortly after surgery, reports lower abdominal pain s/p eating for the last 2 weeks. Reports pain with urination. Pt reports that while in the ambulance who reports no medical problems but takes many medication. pt aaox4, ambulatory with a walker. pt presents s/p fall at home today. Pt reports he tripped over his walker while in the bathroom, denies LOC and headstrike, pt unsure if on blood thinners. Pt reports that while in ambulance he started to R sided chest pain that radiates in sternum. Pt also reports L lower leg pain for 4 months that has gradually gotten worse. Pt reports bypass surgery 3 months ago, incision and stitches noted on L thigh, red and warm to touch. Reports suprapubic pain/lower abdominal pain started shortly after surgery, reports lower abdominal pain s/p eating for the last 2 weeks. Reports pain with urination. Pt reports that while in the ambulance who reports no medical problems but takes many medication.

## 2025-01-23 NOTE — ED PROVIDER NOTE - CLINICAL SUMMARY MEDICAL DECISION MAKING FREE TEXT BOX
75yo male with pmh CAD s/p CABG 10/31/24, PAD, HTN, CHF, CKD, presenting with fall.  Was walking o bathroom at home with his walker he usually uses and felt unsteady and shaky and fell onto R side.  He was unable to get up and crawled over to his phone and called EMS.  Has some pain to R hip, intact rom and no deformities.  Has been having pain to LLE, on exam there is erythema and warmth to distal L leg and L groin access site concerning for cellulitis.  Will start abx.  While en route in ambulance he reported substernal cp, he states similar to when he was in the hospital for his cabg.  Pain is mild at this time.  No fever, couhg, nausea, vomiting, back pain, diaphoresis.  Denies head strike, ac use.  States he has not been taking his meds including antiplatelets at least for a weak as they "finished."  ECG with elevation avr, lateral depressions, seems comfortable at this time.  Called cardiology - will check trop and labs.  Called to expedite CT r/o ich prior to ac/ antiplatelets.

## 2025-01-23 NOTE — ED PROVIDER NOTE - CRITICAL CARE ATTENDING CONTRIBUTION TO CARE
Upon my evaluation, this patient had a high probability of imminent or life-threatening deterioration due to nstemi, which required my direct attention, intervention, and personal management.  The patient has a  medical condition that impairs one or more vital organ systems.  Frequent personal assessment and adjustment of medical interventions was performed.      I have personally provided 40 minutes of critical care time exclusive of time spent on separately billable procedures. Time includes review of laboratory data, radiology results, discussion with consultants, patient and family; monitoring for potential decompensation, as well as time spent retrieving data and reviewing the chart and documenting the visit. Interventions were performed as documented above.

## 2025-01-23 NOTE — ED ADULT NURSE NOTE - ED STAT RN HANDOFF DETAILS
report given to EMS. Multiple attempts made to contact transfer center. Pt a&ox4, resting in stretcher with heparin running at 8mL/hr. IV patent and intact. safety measures in place.

## 2025-01-23 NOTE — ED ADULT NURSE NOTE - NAME OF THE PERSON WHO RECEIVED REPORT AT THE FACILITY THE PATIENT IS TRANSFERRING TO
get multiple attempts made to contact transfer center multiple attempts made to contact transfer center. Report given to JOURDAN uFlton at 01:40

## 2025-01-23 NOTE — ED PROVIDER NOTE - NSRISKOFTRANSFER_ED_A_ED
Speech Treatment Note    Today's date: 2021  Patient name: Belkys Jean  : 2019  MRN: [id number]  Referring provider: Jay Vazquez PA-C  Dx:   Encounter Diagnosis     ICD-10-CM    1  Speech and language deficits  F80 9     R47 9    2  Speech delay  F80 9        Start Time: 1400  Stop Time: 1455  Total time in clinic (min): 55 minutes     Visit Number: 15  Co-Treatment with Occupational Therapy     Subjective/Behavioral: Ray negative for risk factors of COVID-19 with parent questionnaire and Ray wore a mask for session  Temperature was  within normal limits   Speech therapist wore a KN95 mask and goggles for the session  Ray tolerated wearing a mask for the session and his mother also wore a mask    Ray's mother accompanied him to session   She reported no new changes  Joy Isabel was alert and cooperative for the entire session today      Objective:     Speech therapist targeted expressive and receptive language skills during code treatment session with occupational therapy today  During a swing activity and a puzzle matching game, speech therapist asked Joy Isabel a variety of yes/ no questions in relation to factual information and labeling of the items in the puzzle  Progress was noted today as he answer these questions with 19/20 accuracy independently  He achieved 20/20 accuracy with expectant wait time  Speech therapist continued to target spontaneous requesting with 3 or more words  A combination of techniques were utilized including; expansion on utterances, verbal models, carrier phrases, and verbal prompting  Ray utilized 3+ word utterances to request x50 with these prompts, spontaneously x20 as session progressed  He demonstrated difficulty with thought flexibility and ability to change the request from "I want" independently  Models were provided for "I pick", "I see," "I found", etc   Ray imitated models    These goals were addressed through pretend play with magnets, a toy ball, and pretend food/cooking activities with OT  Nav Mckeon was able to pretend that colored gems were different colored food, naming them different food items to match the colors (e g , yellow gems were pineapple, blue gems were blueberries)        Assessment:        Nav Mckeon is a pleasant 1year-old boy a who presents with a moderate receptive language disorder, and a moderate to severe expressive language disorder   Nav cMkeon is also presenting with a moderate pragmatic language disorder  Maria De Jesus Menjivarcesar is characterized by difficulty with the following age-appropriate tasks; functionally requesting and rejecting, answering questions, utterance length, identification of basic concepts, following directions, social use of language and pragmatics, requesting assistance, and participating in conversations  Kasandra Morgan this time, it is recommended that Ray receive speech therapy services   Without speech therapy, he would be at risk for; further developmental delay, social isolation, behaviors, learning difficulties, dependence on others for communication, and difficulty expressing wants and needs      Ray still benefits from verbal models and prompting to utilize 3 or more words especially in treatment sessions to express wants/needs and protest  Leva Altvaleria continues and scripting, making it difficult to answer questions that are factual or in conversation   Intermittently, Nav Mckeon will refuse to speak to request, utilizing gestures and grunting only  This is most common when he is unable to verbalize what he wants  Imaginary play is beneficial in improving langugae and utterance length  During times where he is overwhelmed, he is unable to express feelings or wants/needs resulting in meltdowns and significant frustration  Ray may benefit from picture choices to help express wants/needs in times of frustration and his mother and father are working on this at [facility], Nav Mckeon was able to answer basic factual yes/no questions without difficulty for the first time  He is beginning to increase spontaneous requesting with 3 or more words however he still benefits from verbal models especially to increase utterance and vocabulary repertoire        Other:Parent was provided with home exercises/ activies to target goals in plan of care , Discussed session and progress with caregiver/family member after today's session  Parent is in agreement with POC at this time    Recommendations:Continue with Plan of Care, Recommend consult with developmental pediatrician (Mother was given this information) and developmental optometrist       Deterioration of Condition En Route/Death or Disability/Increased Pain/Transportation Risk (There is always a risk of traffic delays resulting in deterioration of condition.)

## 2025-01-24 ENCOUNTER — RESULT REVIEW (OUTPATIENT)
Age: 75
End: 2025-01-24

## 2025-01-24 ENCOUNTER — INPATIENT (INPATIENT)
Facility: HOSPITAL | Age: 75
LOS: 6 days | Discharge: NURSING, REHAB OR LONG-TERM CARE | DRG: 282 | End: 2025-01-31
Attending: STUDENT IN AN ORGANIZED HEALTH CARE EDUCATION/TRAINING PROGRAM | Admitting: STUDENT IN AN ORGANIZED HEALTH CARE EDUCATION/TRAINING PROGRAM
Payer: COMMERCIAL

## 2025-01-24 VITALS
HEART RATE: 92 BPM | OXYGEN SATURATION: 100 % | TEMPERATURE: 98 F | RESPIRATION RATE: 22 BRPM | DIASTOLIC BLOOD PRESSURE: 75 MMHG | SYSTOLIC BLOOD PRESSURE: 157 MMHG

## 2025-01-24 VITALS
DIASTOLIC BLOOD PRESSURE: 66 MMHG | RESPIRATION RATE: 20 BRPM | HEART RATE: 90 BPM | HEIGHT: 67 IN | SYSTOLIC BLOOD PRESSURE: 122 MMHG | TEMPERATURE: 98 F | OXYGEN SATURATION: 97 % | WEIGHT: 154.32 LBS
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
## 2025-01-24 DIAGNOSIS — I21.4 NON-ST ELEVATION (NSTEMI) MYOCARDIAL INFARCTION: ICD-10-CM

## 2025-01-24 DIAGNOSIS — Z65.9 PROBLEM RELATED TO UNSPECIFIED PSYCHOSOCIAL CIRCUMSTANCES: ICD-10-CM

## 2025-01-24 DIAGNOSIS — Z29.9 ENCOUNTER FOR PROPHYLACTIC MEASURES, UNSPECIFIED: ICD-10-CM

## 2025-01-24 DIAGNOSIS — L03.90 CELLULITIS, UNSPECIFIED: ICD-10-CM

## 2025-01-24 DIAGNOSIS — I73.9 PERIPHERAL VASCULAR DISEASE, UNSPECIFIED: ICD-10-CM

## 2025-01-24 DIAGNOSIS — N18.30 CHRONIC KIDNEY DISEASE, STAGE 3 UNSPECIFIED: ICD-10-CM

## 2025-01-24 DIAGNOSIS — Z87.438 PERSONAL HISTORY OF OTHER DISEASES OF MALE GENITAL ORGANS: ICD-10-CM

## 2025-01-24 DIAGNOSIS — Z95.828 PRESENCE OF OTHER VASCULAR IMPLANTS AND GRAFTS: Chronic | ICD-10-CM

## 2025-01-24 LAB
ADD ON TEST-SPECIMEN IN LAB: SIGNIFICANT CHANGE UP
ALBUMIN SERPL ELPH-MCNC: 3.8 G/DL — SIGNIFICANT CHANGE UP (ref 3.3–5)
ALP SERPL-CCNC: 77 U/L — SIGNIFICANT CHANGE UP (ref 40–120)
ALT FLD-CCNC: 16 U/L — SIGNIFICANT CHANGE UP (ref 10–45)
ANION GAP SERPL CALC-SCNC: 12 MMOL/L — SIGNIFICANT CHANGE UP (ref 5–17)
ANION GAP SERPL CALC-SCNC: 12 MMOL/L — SIGNIFICANT CHANGE UP (ref 5–17)
APTT BLD: 45.1 SEC — HIGH (ref 24.5–35.6)
APTT BLD: 54.1 SEC — HIGH (ref 24.5–35.6)
AST SERPL-CCNC: 25 U/L — SIGNIFICANT CHANGE UP (ref 10–40)
BASOPHILS # BLD AUTO: 0.01 K/UL — SIGNIFICANT CHANGE UP (ref 0–0.2)
BASOPHILS NFR BLD AUTO: 0.1 % — SIGNIFICANT CHANGE UP (ref 0–2)
BILIRUB SERPL-MCNC: 0.4 MG/DL — SIGNIFICANT CHANGE UP (ref 0.2–1.2)
BUN SERPL-MCNC: 25 MG/DL — HIGH (ref 7–23)
BUN SERPL-MCNC: 25 MG/DL — HIGH (ref 7–23)
CALCIUM SERPL-MCNC: 9.1 MG/DL — SIGNIFICANT CHANGE UP (ref 8.4–10.5)
CALCIUM SERPL-MCNC: 9.5 MG/DL — SIGNIFICANT CHANGE UP (ref 8.4–10.5)
CHLORIDE SERPL-SCNC: 103 MMOL/L — SIGNIFICANT CHANGE UP (ref 96–108)
CHLORIDE SERPL-SCNC: 104 MMOL/L — SIGNIFICANT CHANGE UP (ref 96–108)
CK MB CFR SERPL CALC: 12.1 NG/ML — HIGH (ref 0–6.7)
CK MB CFR SERPL CALC: 13.4 NG/ML — HIGH (ref 0–6.7)
CK SERPL-CCNC: 95 U/L — SIGNIFICANT CHANGE UP (ref 30–200)
CK SERPL-CCNC: 98 U/L — SIGNIFICANT CHANGE UP (ref 30–200)
CO2 SERPL-SCNC: 22 MMOL/L — SIGNIFICANT CHANGE UP (ref 22–31)
CO2 SERPL-SCNC: 23 MMOL/L — SIGNIFICANT CHANGE UP (ref 22–31)
CREAT SERPL-MCNC: 1.44 MG/DL — HIGH (ref 0.5–1.3)
CREAT SERPL-MCNC: 1.44 MG/DL — HIGH (ref 0.5–1.3)
EGFR: 50 ML/MIN/1.73M2 — LOW
EGFR: 50 ML/MIN/1.73M2 — LOW
EOSINOPHIL # BLD AUTO: 0.06 K/UL — SIGNIFICANT CHANGE UP (ref 0–0.5)
EOSINOPHIL NFR BLD AUTO: 0.7 % — SIGNIFICANT CHANGE UP (ref 0–6)
GLUCOSE SERPL-MCNC: 104 MG/DL — HIGH (ref 70–99)
GLUCOSE SERPL-MCNC: 117 MG/DL — HIGH (ref 70–99)
HCT VFR BLD CALC: 37.7 % — LOW (ref 39–50)
HCT VFR BLD CALC: 38.1 % — LOW (ref 39–50)
HCT VFR BLD CALC: 44 % — SIGNIFICANT CHANGE UP (ref 39–50)
HGB BLD-MCNC: 12.2 G/DL — LOW (ref 13–17)
HGB BLD-MCNC: 12.6 G/DL — LOW (ref 13–17)
HGB BLD-MCNC: 14.8 G/DL — SIGNIFICANT CHANGE UP (ref 13–17)
IMM GRANULOCYTES NFR BLD AUTO: 0.4 % — SIGNIFICANT CHANGE UP (ref 0–0.9)
INR BLD: 1.17 RATIO — HIGH (ref 0.85–1.16)
LYMPHOCYTES # BLD AUTO: 1.76 K/UL — SIGNIFICANT CHANGE UP (ref 1–3.3)
LYMPHOCYTES # BLD AUTO: 21.3 % — SIGNIFICANT CHANGE UP (ref 13–44)
MAGNESIUM SERPL-MCNC: 1.8 MG/DL — SIGNIFICANT CHANGE UP (ref 1.6–2.6)
MCHC RBC-ENTMCNC: 31.3 PG — SIGNIFICANT CHANGE UP (ref 27–34)
MCHC RBC-ENTMCNC: 31.8 PG — SIGNIFICANT CHANGE UP (ref 27–34)
MCHC RBC-ENTMCNC: 31.9 PG — SIGNIFICANT CHANGE UP (ref 27–34)
MCHC RBC-ENTMCNC: 32.4 G/DL — SIGNIFICANT CHANGE UP (ref 32–36)
MCHC RBC-ENTMCNC: 33.1 G/DL — SIGNIFICANT CHANGE UP (ref 32–36)
MCHC RBC-ENTMCNC: 33.6 G/DL — SIGNIFICANT CHANGE UP (ref 32–36)
MCV RBC AUTO: 94.4 FL — SIGNIFICANT CHANGE UP (ref 80–100)
MCV RBC AUTO: 96.5 FL — SIGNIFICANT CHANGE UP (ref 80–100)
MCV RBC AUTO: 96.7 FL — SIGNIFICANT CHANGE UP (ref 80–100)
MONOCYTES # BLD AUTO: 0.87 K/UL — SIGNIFICANT CHANGE UP (ref 0–0.9)
MONOCYTES NFR BLD AUTO: 10.5 % — SIGNIFICANT CHANGE UP (ref 2–14)
NEUTROPHILS # BLD AUTO: 5.55 K/UL — SIGNIFICANT CHANGE UP (ref 1.8–7.4)
NEUTROPHILS NFR BLD AUTO: 67 % — SIGNIFICANT CHANGE UP (ref 43–77)
NRBC # BLD: 0 /100 WBCS — SIGNIFICANT CHANGE UP (ref 0–0)
NRBC BLD-RTO: 0 /100 WBCS — SIGNIFICANT CHANGE UP (ref 0–0)
NT-PROBNP SERPL-SCNC: 2432 PG/ML — HIGH (ref 0–300)
PHOSPHATE SERPL-MCNC: 2.4 MG/DL — LOW (ref 2.5–4.5)
PLATELET # BLD AUTO: 162 K/UL — SIGNIFICANT CHANGE UP (ref 150–400)
PLATELET # BLD AUTO: 181 K/UL — SIGNIFICANT CHANGE UP (ref 150–400)
PLATELET # BLD AUTO: 182 K/UL — SIGNIFICANT CHANGE UP (ref 150–400)
POTASSIUM SERPL-MCNC: 4.2 MMOL/L — SIGNIFICANT CHANGE UP (ref 3.5–5.3)
POTASSIUM SERPL-MCNC: 4.9 MMOL/L — SIGNIFICANT CHANGE UP (ref 3.5–5.3)
POTASSIUM SERPL-SCNC: 4.2 MMOL/L — SIGNIFICANT CHANGE UP (ref 3.5–5.3)
POTASSIUM SERPL-SCNC: 4.9 MMOL/L — SIGNIFICANT CHANGE UP (ref 3.5–5.3)
PROT SERPL-MCNC: 6.8 G/DL — SIGNIFICANT CHANGE UP (ref 6–8.3)
PROTHROM AB SERPL-ACNC: 13.4 SEC — SIGNIFICANT CHANGE UP (ref 9.9–13.4)
RBC # BLD: 3.9 M/UL — LOW (ref 4.2–5.8)
RBC # BLD: 3.95 M/UL — LOW (ref 4.2–5.8)
RBC # BLD: 4.66 M/UL — SIGNIFICANT CHANGE UP (ref 4.2–5.8)
RBC # FLD: 13.6 % — SIGNIFICANT CHANGE UP (ref 10.3–14.5)
RBC # FLD: 13.7 % — SIGNIFICANT CHANGE UP (ref 10.3–14.5)
RBC # FLD: 13.8 % — SIGNIFICANT CHANGE UP (ref 10.3–14.5)
SODIUM SERPL-SCNC: 138 MMOL/L — SIGNIFICANT CHANGE UP (ref 135–145)
SODIUM SERPL-SCNC: 138 MMOL/L — SIGNIFICANT CHANGE UP (ref 135–145)
TROPONIN T, HIGH SENSITIVITY RESULT: 241 NG/L — HIGH (ref 0–51)
TROPONIN T, HIGH SENSITIVITY RESULT: 248 NG/L — HIGH (ref 0–51)
TROPONIN T, HIGH SENSITIVITY RESULT: 272 NG/L — HIGH (ref 0–51)
TROPONIN T, HIGH SENSITIVITY RESULT: 312 NG/L — HIGH (ref 0–51)
WBC # BLD: 6.18 K/UL — SIGNIFICANT CHANGE UP (ref 3.8–10.5)
WBC # BLD: 7.43 K/UL — SIGNIFICANT CHANGE UP (ref 3.8–10.5)
WBC # BLD: 8.28 K/UL — SIGNIFICANT CHANGE UP (ref 3.8–10.5)
WBC # FLD AUTO: 6.18 K/UL — SIGNIFICANT CHANGE UP (ref 3.8–10.5)
WBC # FLD AUTO: 7.43 K/UL — SIGNIFICANT CHANGE UP (ref 3.8–10.5)
WBC # FLD AUTO: 8.28 K/UL — SIGNIFICANT CHANGE UP (ref 3.8–10.5)

## 2025-01-24 PROCEDURE — 99222 1ST HOSP IP/OBS MODERATE 55: CPT | Mod: 25

## 2025-01-24 PROCEDURE — 51703 INSERT BLADDER CATH COMPLEX: CPT

## 2025-01-24 PROCEDURE — 93010 ELECTROCARDIOGRAM REPORT: CPT

## 2025-01-24 PROCEDURE — 99291 CRITICAL CARE FIRST HOUR: CPT | Mod: 25

## 2025-01-24 PROCEDURE — 93306 TTE W/DOPPLER COMPLETE: CPT | Mod: 26

## 2025-01-24 PROCEDURE — 93356 MYOCRD STRAIN IMG SPCKL TRCK: CPT

## 2025-01-24 PROCEDURE — 73502 X-RAY EXAM HIP UNI 2-3 VIEWS: CPT | Mod: 26,RT

## 2025-01-24 PROCEDURE — 71045 X-RAY EXAM CHEST 1 VIEW: CPT | Mod: 26

## 2025-01-24 PROCEDURE — 73590 X-RAY EXAM OF LOWER LEG: CPT | Mod: 26,LT

## 2025-01-24 PROCEDURE — 93459 L HRT ART/GRFT ANGIO: CPT | Mod: 26

## 2025-01-24 PROCEDURE — 99222 1ST HOSP IP/OBS MODERATE 55: CPT | Mod: GC

## 2025-01-24 PROCEDURE — 99152 MOD SED SAME PHYS/QHP 5/>YRS: CPT

## 2025-01-24 PROCEDURE — 73552 X-RAY EXAM OF FEMUR 2/>: CPT | Mod: 26,LT

## 2025-01-24 RX ORDER — POLYETHYLENE GLYCOL 3350 17 G/17G
17 POWDER, FOR SOLUTION ORAL ONCE
Refills: 0 | Status: COMPLETED | OUTPATIENT
Start: 2025-01-24 | End: 2025-01-24

## 2025-01-24 RX ORDER — RANOLAZINE 500 MG/1
1000 TABLET, FILM COATED, EXTENDED RELEASE ORAL
Refills: 0 | Status: DISCONTINUED | OUTPATIENT
Start: 2025-01-24 | End: 2025-01-29

## 2025-01-24 RX ORDER — MECOBAL/LEVOMEFOLAT CA/B6 PHOS 2-3-35 MG
1 TABLET ORAL DAILY
Refills: 0 | Status: DISCONTINUED | OUTPATIENT
Start: 2025-01-24 | End: 2025-01-31

## 2025-01-24 RX ORDER — HEPARIN SODIUM,PORCINE 10000/ML
VIAL (ML) INJECTION
Qty: 25000 | Refills: 0 | Status: DISCONTINUED | OUTPATIENT
Start: 2025-01-24 | End: 2025-01-24

## 2025-01-24 RX ORDER — ASCORBIC ACID 500 MG/ML
500 VIAL (ML) INJECTION DAILY
Refills: 0 | Status: DISCONTINUED | OUTPATIENT
Start: 2025-01-24 | End: 2025-01-31

## 2025-01-24 RX ORDER — RANOLAZINE 500 MG/1
1000 TABLET, FILM COATED, EXTENDED RELEASE ORAL
Refills: 0 | Status: DISCONTINUED | OUTPATIENT
Start: 2025-01-24 | End: 2025-01-24

## 2025-01-24 RX ORDER — CARVEDILOL 6.25 MG
25 TABLET ORAL EVERY 12 HOURS
Refills: 0 | Status: DISCONTINUED | OUTPATIENT
Start: 2025-01-24 | End: 2025-01-24

## 2025-01-24 RX ORDER — METOPROLOL SUCCINATE 25 MG
125 TABLET, EXTENDED RELEASE 24 HR ORAL DAILY
Refills: 0 | Status: DISCONTINUED | OUTPATIENT
Start: 2025-01-24 | End: 2025-01-24

## 2025-01-24 RX ORDER — TAMSULOSIN HYDROCHLORIDE 0.4 MG/1
0.4 CAPSULE ORAL AT BEDTIME
Refills: 0 | Status: DISCONTINUED | OUTPATIENT
Start: 2025-01-24 | End: 2025-01-26

## 2025-01-24 RX ORDER — CEFAZOLIN SODIUM IN 0.9 % NACL 2 G/10 ML
1000 SYRINGE (ML) INTRAVENOUS EVERY 8 HOURS
Refills: 0 | Status: COMPLETED | OUTPATIENT
Start: 2025-01-24 | End: 2025-01-28

## 2025-01-24 RX ORDER — CLINDAMYCIN HYDROCHLORIDE 300 MG/1
600 CAPSULE ORAL EVERY 8 HOURS
Refills: 0 | Status: DISCONTINUED | OUTPATIENT
Start: 2025-01-24 | End: 2025-01-24

## 2025-01-24 RX ORDER — CARVEDILOL 6.25 MG
25 TABLET ORAL EVERY 12 HOURS
Refills: 0 | Status: DISCONTINUED | OUTPATIENT
Start: 2025-01-24 | End: 2025-01-28

## 2025-01-24 RX ORDER — ANTISEPTIC SURGICAL SCRUB 0.04 MG/ML
1 SOLUTION TOPICAL DAILY
Refills: 0 | Status: DISCONTINUED | OUTPATIENT
Start: 2025-01-24 | End: 2025-01-31

## 2025-01-24 RX ORDER — HEPARIN SODIUM,PORCINE 10000/ML
VIAL (ML) INJECTION
Qty: 25000 | Refills: 0 | Status: DISCONTINUED | OUTPATIENT
Start: 2025-01-24 | End: 2025-01-25

## 2025-01-24 RX ORDER — ATORVASTATIN CALCIUM 80 MG/1
80 TABLET, FILM COATED ORAL AT BEDTIME
Refills: 0 | Status: DISCONTINUED | OUTPATIENT
Start: 2025-01-24 | End: 2025-01-28

## 2025-01-24 RX ORDER — NITROGLYCERIN 0.4 MG/1
0.4 TABLET SUBLINGUAL ONCE
Refills: 0 | Status: COMPLETED | OUTPATIENT
Start: 2025-01-24 | End: 2025-01-24

## 2025-01-24 RX ORDER — ACETAMINOPHEN 160 MG/5ML
650 SUSPENSION ORAL EVERY 6 HOURS
Refills: 0 | Status: DISCONTINUED | OUTPATIENT
Start: 2025-01-24 | End: 2025-01-31

## 2025-01-24 RX ORDER — HEPARIN SODIUM,PORCINE 10000/ML
4100 VIAL (ML) INJECTION EVERY 6 HOURS
Refills: 0 | Status: DISCONTINUED | OUTPATIENT
Start: 2025-01-24 | End: 2025-01-24

## 2025-01-24 RX ORDER — DAPAGLIFLOZIN 5 MG/1
10 TABLET, FILM COATED ORAL EVERY 24 HOURS
Refills: 0 | Status: DISCONTINUED | OUTPATIENT
Start: 2025-01-24 | End: 2025-01-31

## 2025-01-24 RX ORDER — ASPIRIN 81 MG/1
81 TABLET, COATED ORAL DAILY
Refills: 0 | Status: DISCONTINUED | OUTPATIENT
Start: 2025-01-24 | End: 2025-01-25

## 2025-01-24 RX ADMIN — HEPARIN SODIUM 4100 UNIT(S): 1000 INJECTION, SOLUTION INTRAVENOUS; SUBCUTANEOUS at 00:22

## 2025-01-24 RX ADMIN — Medication 324 MILLIGRAM(S): at 00:02

## 2025-01-24 RX ADMIN — Medication 500 MILLIGRAM(S): at 12:28

## 2025-01-24 RX ADMIN — ACETAMINOPHEN 650 MILLIGRAM(S): 160 SUSPENSION ORAL at 06:46

## 2025-01-24 RX ADMIN — RANOLAZINE 1000 MILLIGRAM(S): 500 TABLET, FILM COATED, EXTENDED RELEASE ORAL at 17:31

## 2025-01-24 RX ADMIN — Medication 240 MILLIGRAM(S): at 12:32

## 2025-01-24 RX ADMIN — ACETAMINOPHEN 650 MILLIGRAM(S): 160 SUSPENSION ORAL at 21:27

## 2025-01-24 RX ADMIN — ASPIRIN 81 MILLIGRAM(S): 81 TABLET, COATED ORAL at 12:26

## 2025-01-24 RX ADMIN — TAMSULOSIN HYDROCHLORIDE 0.4 MILLIGRAM(S): 0.4 CAPSULE ORAL at 21:03

## 2025-01-24 RX ADMIN — Medication 25 MILLIGRAM(S): at 17:31

## 2025-01-24 RX ADMIN — DAPAGLIFLOZIN 10 MILLIGRAM(S): 5 TABLET, FILM COATED ORAL at 16:42

## 2025-01-24 RX ADMIN — Medication 800 UNIT(S)/HR: at 22:09

## 2025-01-24 RX ADMIN — Medication 100 MILLIGRAM(S): at 16:42

## 2025-01-24 RX ADMIN — Medication 25 MILLIGRAM(S): at 12:26

## 2025-01-24 RX ADMIN — Medication 1 TABLET(S): at 12:27

## 2025-01-24 RX ADMIN — Medication 100 MILLIGRAM(S): at 21:03

## 2025-01-24 RX ADMIN — Medication 5 MILLIGRAM(S): at 12:28

## 2025-01-24 RX ADMIN — Medication 125 MILLIGRAM(S): at 12:31

## 2025-01-24 RX ADMIN — CLOPIDOGREL BISULFATE 300 MILLIGRAM(S): 75 TABLET, FILM COATED ORAL at 00:02

## 2025-01-24 RX ADMIN — ACETAMINOPHEN 650 MILLIGRAM(S): 160 SUSPENSION ORAL at 12:32

## 2025-01-24 RX ADMIN — HEPARIN SODIUM 800 UNIT(S)/HR: 1000 INJECTION, SOLUTION INTRAVENOUS; SUBCUTANEOUS at 00:25

## 2025-01-24 RX ADMIN — Medication 800 UNIT(S)/HR: at 07:14

## 2025-01-24 RX ADMIN — ATORVASTATIN CALCIUM 80 MILLIGRAM(S): 80 TABLET, FILM COATED ORAL at 21:03

## 2025-01-24 RX ADMIN — Medication 75 MILLIGRAM(S): at 12:27

## 2025-01-24 RX ADMIN — ACETAMINOPHEN 650 MILLIGRAM(S): 160 SUSPENSION ORAL at 20:27

## 2025-01-24 RX ADMIN — ANTISEPTIC SURGICAL SCRUB 1 APPLICATION(S): 0.04 SOLUTION TOPICAL at 21:03

## 2025-01-24 RX ADMIN — Medication 800 UNIT(S)/HR: at 03:23

## 2025-01-24 RX ADMIN — POLYETHYLENE GLYCOL 3350 17 GRAM(S): 17 POWDER, FOR SOLUTION ORAL at 20:27

## 2025-01-24 RX ADMIN — NITROGLYCERIN 0.4 MILLIGRAM(S): 0.4 TABLET SUBLINGUAL at 12:37

## 2025-01-24 NOTE — H&P ADULT - PROBLEM SELECTOR PLAN 2
Left lower extremity with erythema and increased heat in setting of increased infection risk with chronic PAD. No leukocytosis or fever.     Plan:  - Ancef 1 g q8h (01/24 - 01/28)  - f/u MRSA swab  - wound care consulted, recs appreciated

## 2025-01-24 NOTE — H&P ADULT - NSHPPHYSICALEXAM_GEN_ALL_CORE
Vital Signs Last 24 Hrs  T(C): 36.6 (24 Jan 2025 11:54), Max: 36.7 (24 Jan 2025 01:11)  T(F): 97.8 (24 Jan 2025 11:54), Max: 98.1 (24 Jan 2025 01:11)  HR: 86 (24 Jan 2025 15:10) (75 - 107)  BP: 136/63 (24 Jan 2025 15:10) (112/51 - 206/96)  BP(mean): 91 (24 Jan 2025 15:10) (70 - 137)  RR: 17 (24 Jan 2025 15:10) (16 - 22)  SpO2: 99% (24 Jan 2025 15:10) (95% - 100%)    Parameters below as of 24 Jan 2025 15:10  Patient On (Oxygen Delivery Method): room air        PHYSICAL EXAM:  GENERAL: NAD, well-developed  HEAD:  Atraumatic, Normocephalic  EYES: EOMI, conjunctiva and sclera clear  ENMT: Moist mucous membranes,  NECK: Supple, No JVD  NERVOUS SYSTEM: AOX3, motor grossly intact in b/l UE and b/l LE  PSYCHIATRIC: Appropriate affect and mood  CHEST/LUNG: Clear to auscultation bilaterally; No rales, rhonchi, wheezing, or rubs  HEART: Regular rate and rhythm; No murmurs, rubs, or gallops. No LE edema  ABDOMEN: Soft, Nontender, Nondistended; Bowel sounds present  EXTREMITIES:  chronic stasis changes in bilateral lower extremities, flaking of skin and healing scabs across bilateral lower extremities. Weak pulses in bilateral lower extremities. On left lower extremity over calf, area of erythema and increased heat without purulence.  SKIN: No rashes or lesions

## 2025-01-24 NOTE — ED ADULT NURSE REASSESSMENT NOTE - NS ED NURSE REASSESS COMMENT FT1
spoke with resident from team 1 (Alissa Groves) in regards to repeat CBC and PTT. Provider Damian Renteria placed orders for CBC/PTT, attempted to contact this provider -no answer- when speaking with MD Groves she will get back to me if labs need to drawn now since labs were drawn at 6;30am 1/24 and next CBC/ PTT according to heparin normogram is not due till 1230 this afternoon.

## 2025-01-24 NOTE — PROCEDURE NOTE - ADDITIONAL PROCEDURE DETAILS
Urology paged to assist with difficult ramirez insertion s/p failed straight attempt by primary team   Under sterile condition, a 18Fr coude ramirez catheter inserted into urinary bladder per urethral meatus with immediate drainage of 1200ml of clear yellow urine  Balloon inflated with 10cc of sterile water. Pt tolerated procedure w/o complication. Patient with urinary retention,  failed straight cath attempt by primary team   Under sterile condition, a 18Fr coude ramirez catheter inserted into urinary bladder per urethral meatus with immediate drainage of 1200ml of clear yellow urine  Balloon inflated with 10cc of sterile water. Pt tolerated procedure w/o complication.

## 2025-01-24 NOTE — ED PROVIDER NOTE - CLINICAL SUMMARY MEDICAL DECISION MAKING FREE TEXT BOX
74-year-old male post speaking PMH of CAD status post PCI minimally invasive direct CABG 10/31/2024, ICM, HFrEF, HTN, CKD, and PAD status post R left to right femorofemoral bypass presenting as a transfer from [facility] for NSTEMI evaluation.  Patient initially presented after mechanical fall in which he was walking to the bathroom using his walker when he felt unsteady and shaky and fell on his right side.  Workup there was notable for  ST depressions in lateral leads with troponin of 174.4.  CBC was normal coags were normal CMP showed stable CKD with creatinine of 1.63.  CT head showed no acute pathology.  Was loaded with aspirin, Plavix, heparin infusion was started and transferred to [place] for further evaluation of NSTEMI.  Currently in discomfort.  Denies any chest pain, shortness of breath.  Supposed to be on Plavix and Xarelto at home but sounds like he is not taking these    No acute distress, vitals nonactionable.  Regular rate and rhythm.  CTAB with normal work of breathing on room air, abdomen soft nontender.  Left lower extremity with area of erythema and warmth surrounding incision site left medial thigh.  Distal lower extremity also warm and erythematous.  Normal affect, moving extremities.     Currently being treated for NSTEMI with resolved chest pain, STD still present in lateral leads..  Will trend troponins, CK and CK-MB.  Will also obtain plain imaging imaging of left lower extremity.  He got a dose of clindamycin at outside hospital.  We will give abx as needed and admit to telemetry. heparin infusion going.

## 2025-01-24 NOTE — CONSULT NOTE ADULT - SUBJECTIVE AND OBJECTIVE BOX
Wound SURGERY CONSULT NOTE    HPI:  74M, Polish speaking, PMHx of CAD s/p PCI and minimally invasive direct CABG (LIMA --> LAD) 10/31/24, ICM, HFrEF, HTN, CKD, and PAD s/p prior L to R fem-fem bypass (>20yrs ago), who initially presented to [facility] after a mechanical fall and transferred to [facility] for NSTEMI.    Patient states that 3 days ago, his legs started to feel shaky. Over the 3 days, this shakiness continued to worsen. He usually walks at home with a walker and when he was walking to the bathroom, he felt unsteady and fell to the ground. He called EMS and was brought to [facility].    Wound consult requested by team to assist w/ management of BLE wounds. Pt c/o pain, but no drainage, odor, color change, or swelling. Pt denies trauma, just blistering- no really  Offloading and pericare initiated upon admission as pt Increasingly sedentary 2/2 to illness. Pt is continent of urine & stool. No h/o bites, scratches, falls, trauma. Appetite good w/o weight loss. All questions asked and answered to pt's expressed understanding and satisfaction w/ Polish  Isabella's assistance    Current Diet: Diet, DASH/TLC:   Sodium & Cholesterol Restricted (01-24-25 @ 06:30)      PAST MEDICAL & SURGICAL HISTORY:  CAD (coronary artery disease)      BPH (benign prostatic hyperplasia)      HTN (hypertension)      HLD (hyperlipidemia)      PAD (peripheral artery disease)  S/P femoral-femoral bypass surgery        REVIEW OF SYSTEMS: General/ Skin/ Vasc/  MSK: see HPI  All other systems negative    MEDICATIONS  (STANDING):  ascorbic acid 500 milliGRAM(s) Oral daily  aspirin  chewable 81 milliGRAM(s) Oral daily  atorvastatin 80 milliGRAM(s) Oral at bedtime  ceFAZolin   IVPB 1000 milliGRAM(s) IV Intermittent every 8 hours  chlorhexidine 2% Cloths 1 Application(s) Topical daily  clopidogrel Tablet 75 milliGRAM(s) Oral daily  dapagliflozin 10 milliGRAM(s) Oral every 24 hours  finasteride 5 milliGRAM(s) Oral daily  heparin  Infusion.  Unit(s)/Hr (8 mL/Hr) IV Continuous <Continuous>  isosorbide   mononitrate ER Tablet (IMDUR) 240 milliGRAM(s) Oral daily  metoprolol succinate  milliGRAM(s) Oral daily  multivitamin 1 Tablet(s) Oral daily  ranolazine 1000 milliGRAM(s) Oral two times a day  spironolactone 25 milliGRAM(s) Oral every 24 hours  tamsulosin 0.4 milliGRAM(s) Oral at bedtime    MEDICATIONS  (PRN):  acetaminophen   Tablet 650 milliGRAM(s) Oral every 6 hours PRN Temp greater or equal to 38C (100.4F), Mild Pain (1 - 3)  heparin  Injectable 4100 Unit(s) IV Push every 6 hours PRN For aPTT less than 40      No Known Allergies      SOCIAL HISTORY:  walks w/ walker. Former smoker, No current smoking, ETOH, drugs    FAMILY HISTORY no h/o  significant problems    PHYSICAL EXAM:  Vital Signs Last 24 Hrs  T(C): 36.6 (24 Jan 2025 11:54), Max: 36.7 (24 Jan 2025 01:11)  T(F): 97.8 (24 Jan 2025 11:54), Max: 98.1 (24 Jan 2025 01:11)  HR: 86 (24 Jan 2025 15:10) (75 - 107)  BP: 136/63 (24 Jan 2025 15:10) (112/51 - 206/96)  BP(mean): 91 (24 Jan 2025 15:10) (70 - 137)  RR: 17 (24 Jan 2025 15:10) (16 - 22)  SpO2: 99% (24 Jan 2025 15:10) (95% - 100%)    Parameters below as of 24 Jan 2025 15:10  Patient On (Oxygen Delivery Method): room air      NAD, A&Ox3, WD/ WN/ WG  Versa Care P500 bed  HEENT:  NC/AT EOMI, sclera clear, mucosa moist, throat clear, trachea midline, neck supple  Respiratory: nonlabored w/ equal chest rise  Gastrointestinal: soft NT/ND   Neurology:  strength & sensation grossly intact  Psych: calm/ appropriate  Musculoskeletal:  FROM, no deformities/ contractures  Vascular: BLE equally warm,  no cyanosis, clubbing, edema nor acute ischemia        BLE hemosiderin staining/ varicose veins        Lt thigh incision c/d/i w/ sutures as per CTS     BLE dried black adherent eschars      no blistering or drainage  No odor, erythema, increased warmth, tenderness, induration, fluctuance, nor crepitus  Skin:  moist w/ good turgor        LABS/ CULTURES/ RADIOLOGY:                        14.8   7.43  )-----------( 182      ( 24 Jan 2025 13:12 )             44.0       138  |  103  |  25  ----------------------------<  104      [01-24-25 @ 06:44]  4.2   |  23  |  1.44        Ca     9.1     [01-24-25 @ 06:44]      Mg     1.8     [01-24-25 @ 06:44]      Phos  2.4     [01-24-25 @ 06:44]    TPro  6.8  /  Alb  3.8  /  TBili  0.4  /  DBili  x   /  AST  25  /  ALT  16  /  AlkPhos  77  [01-24-25 @ 02:41]    PT/INR: PT 13.4 , INR 1.17       [01-24-25 @ 06:44]  PTT: 45.1       [01-24-25 @ 13:12]      A1C with Estimated Average Glucose Result: 5.8 % (10-27-24 @ 14:07)    < from: Xray Tibia + Fibula 2 Views, Left (01.24.25 @ 03:23) >  ACC: [id number] EXAM:  XR TIB FIB AP LAT 2 VIEWS LT   ORDERED BY:  ELMER KELLER     ACC: [id number] EXAM:  XR FEMUR 2 VIEWS LT   ORDERED BY:  ELMER KELLER     PROCEDURE DATE:  01/24/2025          INTERPRETATION:  CLINICAL INDICATION: Pain    TECHNIQUE: 2 views of the left femur; 2 views of the left tibia/fibula    COMPARISON: No similar prior comparisons available.    IMPRESSION:    Left femur:    No definite acute displaced fracture seen. However, if there is high   clinical concern for fracture or if the patient is unable to bear weight,   MRI is a more sensitive test to evaluate for fracture.    Mild left hip arthrosis. Left femoral head is seated within the   acetabulum.    Soft tissue swelling about the thigh. Atherosclerotic calcifications are   noted. Probable vascular graft noted.    Left tibia/fibula:    No acute displaced fracture. Soft tissue swelling about the calf.    Atherosclerotic calcifications are noted.    < end of copied text >  < from: VA Duplex Lower Extrem Arterial, Bilat (11.22.24 @ 20:15) >  ACC: [id number] EXAM:  US DPLX LWR EXT ARTS COMPL BI   ORDERED BY: KEL MONTES     PROCEDURE DATE:  11/22/2024          INTERPRETATION:  BILATERAL LOWER EXTREMITY ARTERIAL DUPLEX DOPPLER   ULTRASOUND Performed 11/22/2024 8:15 PM    INDICATION: Evaluate for pseudoaneurysm. Status post left lower extremity   femoral popliteal bypass 11/14/2024. History of EIA and FELICITY stents as   well as femoral-femoral bypass many years ago per patient.    TECHNIQUE: Limited duplex Doppler evaluation including gray-scale   ultrasound imaging, color flow Doppler imaging, and Doppler spectral   analysis of the bilateral lower extremity was performed.    PRIOR STUDIES:  Lower extremity venous Doppler 11/12/2024.    FINDINGS:      RIGHT LOWER EXTREMITY:    Right common femoral artery: Biphasic waveform. PSV: 105 cm/sec. Bypass   is seen.    Right deep femoral artery: Biphasic waveform. PSV: 112 cm/sec.    Right superficial femoral artery, proximal: Monophasic waveform. PSV: 39   cm/sec. Atherosclerotic plaqueseen.      LEFT LOWER EXTREMITY:    Bypass: Biphasic waveform, PSV of 44 cm/s.    Left common femoral artery: Patent, biphasic waveform, PSV not measured.    Left deep femoral artery and superficial proximal femoral artery could   not be visualized.    MISCELLANEOUS:    No evidence of pseudoaneurysm bilaterally.    No evidence of DVT seen in the right SFJ, CFV, DFV, or proximal femoral   vein.  Limited evaluation of the left lower extremity due to soft tissue   swelling and shadowing from femoral/femoral bypass per technologist,   however, no evidence of DVT seen in the left SFJ, CFV, DFV, or proximal   femoral vein.    In the left groin/inguinal region under the site of the incision,   measuring 5.6 x 1.0 x 1.3 cm, there is a pocket of unorganized fluid with   few avascular internal echoes which probably represent blood products, no   surrounding edema or soft tissue hyperemia.    IMPRESSION:  1.  No evidence of pseudoaneurysm bilaterally.  2.  Unorganized pocket of mildly complex fluid/blood products below   incision in left groin soft tissues.  3.  Partially imaged bypass graft in the proximal left thigh demonstrates   patency.  4.  Left deep femoral artery and superficial proximal femoral artery   could not be visualized.    Dr. Santillan discussed with Ila Merrill on 11/23/2024 at 6:34AM.    < end of copied text >

## 2025-01-24 NOTE — H&P ADULT - HISTORY OF PRESENT ILLNESS
74M, Polish speaking, PMHx of CAD s/p PCI and minimally invasive direct CABG (LIMA --> LAD) 10/31/24, ICM, HFrEF, HTN, CKD, and PAD s/p prior L to R fem-fem bypass (>20yrs ago), who initially presented to [facility] after a mechanical fall and transferred to [facility] for NSTEMI.    Patient states that 3 days ago, his legs started to feel shaky. Over the 3 days, this shakiness continued to worsen. He usually walks at home with a walker and when he was walking to the bathroom, he felt unsteady and fell to the ground. He called EMS and was brought to [facility]. 74M, Polish speaking, PMHx of CAD s/p PCI and minimally invasive direct CABG (LIMA --> LAD) 10/31/24, ICM, HFrEF, HTN, CKD, and PAD s/p prior L to R fem-fem bypass (>20yrs ago), who initially presented to [facility] after a mechanical fall and transferred to [facility] for NSTEMI.    Patient states that 3 days ago, his legs started to feel shaky. Over the 3 days, this shakiness continued to worsen. He usually walks at home with a walker and when he was walking to the bathroom, he felt unsteady and fell to the ground. Denies head strike. Denies chest pain during the event. He called EMS and was brought to [facility]. Patient notes that 3-4 weeks ago, he "finished" the course of all of his medications from his last hospitalization in 11/2024 after the bottles became empty. Since that time he has not taken any home medications. ROS otherwise negative.    Patient underwent minimally invasive direct CABG in 10/24. Patient also with recent extensive hospitalization in 11/10/24-11/27/24 where he was admitted to the vascular surgery service for [place] angiogram with EIA and FELICITY stents and ROBERT [id number] bypass c/b hematoma. While admitted, had an NSTEMI and was transferred to cardiology service. He underwent LHC on 11/19 with patent LIMA to LAD graft, LCx ostial , ostial RCA w/ known   in 10/24. Continued to have angina and was briefly in CCU for nitro ggt. Discharged and followed up with Dr. Marquis Pink on 12/09/24.    [facility] ED Course: On arrival to [facility] vitals noted at /97 |  | RR 18 | T 97.5 | SpO2 98% ORA. Patient was complaining of L sided chest pain, L knee pain. Initial ECG at [facility] w/ STD in lateral leads. CBC WNL. Coags WNL. CMP w/ stable CKD (Cr 1.63, baseline 1.52). hsTroponin I noted at 174.4. CTH w/ no acute pathology/intracranial hemorrhage Patient was loaded with  mg, plavix 300 mg, heparin ggt started in  ED. Patient thereafter transferred to [facility] for NSTEMI    [facility] ED: Upon arrival to the ED at [facility]. Patient stated that his chest pain has resolved.

## 2025-01-24 NOTE — PATIENT PROFILE ADULT - FALL HARM RISK - HARM RISK INTERVENTIONS

## 2025-01-24 NOTE — H&P ADULT - NSHPREVIEWOFSYSTEMS_GEN_ALL_CORE
REVIEW OF SYSTEMS    General: no fevers chills    Skin: flaky skin present  	  ENMT: no runny nose, sore throat	    Respiratory and Thorax: no cough, SOB when having chest pain  	  Cardiovascular: + chest pain	    Gastrointestinal: no vomiting/diarrhea/constipation	    Genitourinary: difficulty initiating stream	    Musculoskeletal: no muscle aches	    Neurological: no lightheadedness		    psych: no changes in mood

## 2025-01-24 NOTE — ED PROVIDER NOTE - PROGRESS NOTE DETAILS
Felipe Renteria MD PGY-1: On heparin infusion, coags normal at outside hospital.  Repeat labs here with normal CBC.  Initial troponin 41, repeat 272, creatinine stable from prior.  proBNP 2432.  Clindamycin dose due 8 AM.  Cardiology evaluated patient, nothing to do at this time.  Will plan admission to telemetry.  Tolerating feedings.

## 2025-01-24 NOTE — H&P ADULT - PROBLEM SELECTOR PLAN 6
Patient with poor health literacy. Self-discontinued all home medications after finishing the bottles. No social support.    Plan:  - social work consult, recs appreciated Patient was prescribed tamsulosin and finasteride at home. Patient was not taking these in the last 4 weeks.    Plan:  - continue home tamsulosin  - continue home finasteride  - bladder scans q8h

## 2025-01-24 NOTE — CHART NOTE - NSCHARTNOTEFT_GEN_A_CORE
Pt received s/p dx cath via LRA by dr Little, has known disease, Diag 99%, no intervention at this time, continue medical management.  Pt unable to urinate and bladder scan with >700cc urine retention.  Unable to place the Hoffman catheter 2/2 resistance.  Urology called and placed coude catheter at the bedside.  >1000cc drained at this time.     Monitor I&O closely  Please remove Hoffman when pt able to get OOB post radial band if left wrist cath site is stable.   Pt being recovered and awaiting telemetry bed post radial band removal in [facility]   May resume heparin gtt 6 hr without initial bolus post band removal if cath site is stable.  EKG as needed for chest discomfort   house cardiology team following    Geoff Gonsales NP  [phone]  cardiac short stay unit

## 2025-01-24 NOTE — PROVIDER CONTACT NOTE (OTHER) - ACTION/TREATMENT ORDERED:
Provider made aware. wound care consult placed, turn and position q 2 hrs. will endorse to next shift. Monitoring remains ongoing.

## 2025-01-24 NOTE — H&P ADULT - PROBLEM SELECTOR PLAN 7
- DVT Prophylaxis: heparin ggt  - PT consulted, recs appreciated Patient with poor health literacy. Self-discontinued all home medications after finishing the bottles. No social support.    Plan:  - social work consult, recs appreciated

## 2025-01-24 NOTE — ED ADULT NURSE NOTE - NSFALLRISKINTERV_ED_ALL_ED

## 2025-01-24 NOTE — H&P ADULT - PROBLEM SELECTOR PLAN 3
Plan:  - Patient falling s/p feeling legs were shaky and unbalanced in setting of chronic PAD and poor ambulation at baseline. Extensive cardiac history is also a contributing factor and subsequent increased chest pain while in transit to the hospital.   - CT head and x-rays of bilateral lower extremities without acute findings    Plan:  - NSTEMI workup/treatment as above  - PT consulted, recs appreciated  - risk precautions

## 2025-01-24 NOTE — ED ADULT NURSE NOTE - OBJECTIVE STATEMENT
Pt is 75 y/o male, presenting to the ED as transfer from [facility] as NSTEMI tx. PMH of CAD s/p CABG, PAD, HTN, HF, and CKD. Pt reports that he initially went to hospital s/p fall. As per pt, he felt unsteady and fell onto R side. @ prior hospital, being treated for cellulitis to [facility], given clindamycin. Pt also noted to have elevated troponins, given aspirin, Plavix, and started on heparin @ 0025 for ACS. Upon assessment, pt AxOx3, sitting up in stretcher and speaking in full sentences. Breathing spontaneously and unlabored, >95% RA, . Abdomen soft and nontender to palpation. EKG done, given to MD, pt placed on continuous cardiac monitor. Pt moves all extremities w/ = strength. Pt denies SOB, chest pain, n/v/d, dizziness, vision changes, chills, and fever. Safety and comfort measures provided- bed in lowest position, locked, and blanket given.

## 2025-01-24 NOTE — H&P ADULT - NSHPSOCIALHISTORY_GEN_ALL_CORE
Lives at home by himself and ambulates with a walker. No family or friends nearby.     Remote smoking history 30-years-ago. Smoked 1-2 ppd for 10 years. Past occasional alcohol use. No recreational drug use.

## 2025-01-24 NOTE — H&P ADULT - PROBLEM SELECTOR PLAN 1
Plan:  - Cardiology consulted, recs appreciated  - Patient self-discontinued all medications for 3-4 weeks prior to admission, stating that he had finished the course of medications. Chest pain on presentation similar to prior episodes of chest pain.  - s/p Aspirin and plavix loading at VS    Plan:  - heparin ggt  - Cardiology consulted, recs appreciated  - [facility] on 01/24  - f/u lipid panel in AM  - f/u hgbA1c in AM  - c/w aspirin 81 mg qd  - c/w clopidogrel 75 mg qd  - c/w heparin ggt  - c/w Imdur 240 mg qd   - c/w metoprolol succinate 125 mg qd  - c/w ranolazine qd  - c/w spironolactone 25 mg qd Presenting as a transfer from [place] for NSTEMI. Patient self-discontinued all medications for 3-4 weeks prior to admission, stating that he had finished the course of medications. Chest pain on presentation similar to prior episodes of chest pain.  - s/p Aspirin and plavix loading at     Plan:  - heparin ggt  - Cardiology consulted, recs appreciated  - [facility] on 01/24  - f/u lipid panel in AM  - f/u hgbA1c in AM  - c/w aspirin 81 mg qd  - c/w clopidogrel 75 mg qd  - c/w heparin ggt  - c/w Imdur 240 mg qd   - c/w metoprolol succinate 125 mg qd  - c/w ranolazine qd  - c/w spironolactone 25 mg qd

## 2025-01-24 NOTE — CONSULT NOTE ADULT - SUBJECTIVE AND OBJECTIVE BOX
Cardiology Consult Note  ------------------------------------------------------------------------------------------  SUBJECTIVE: Patient is a 75yo male with PMHx significant for CAD s/p CABG 10/31/24, PAD, HTN, HFmrEF, CKD, who initially presented to [facility] after a mechanical fall. Patient stated he was walking to the bathroom at home with his walker he usually uses, felt unsteady and shaky. He fell onto R side.  He was unable to get up and crawled over to his phone and called EMS.  Has some pain to R hip, intact rom and no deformities.  Has been having pain to LLE, on exam there is erythema and warmth to distal L leg and L groin access site concerning for cellulitis.  Will start abx.  While en route in ambulance he reported substernal cp, he states similar to when he was in the hospital for his cabg.  Pain is mild at this time.  No fever, couhg, nausea, vomiting, back pain, diaphoresis.  Denies head strike, ac use.  States he has not been taking his meds including antiplatelets at least for a weak as they "finished.       -------------------------------------------------------------------------------------------  VS:  T(F): 98.1 (01-24), Max: 98.1 (01-24)  HR: 90 (01-24) (90 - 90)  BP: 122/66 (01-24) (122/66 - 122/66)  RR: 20 (01-24)  SpO2: 97% (01-24)  I&O's Summary    PHYSICAL EXAM:  GENERAL: NAD  HEAD: Atraumatic, Normocephalic.  ENT: Moist mucous membranes.  NECK: Supple, No JVD.  CHEST/LUNG: Clear to auscultation bilaterally; No rales, rhonchi, wheezing, or rubs. Unlabored respirations.  HEART: Regular rate and rhythm; No murmurs, rubs, or gallops.  ABDOMEN: Bowel sounds present; Soft, Nontender, Nondistended.   EXTREMITIES: No edema. 2+ Peripheral Pulses, brisk capillary refill. No clubbing or cyanosis.     -------------------------------------------------------------------------------------------  LABS: pending an [facility], Trop I at [facility]-VS noted at 174      -------------------------------------------------------------------------------------------  Meds:    -------------------------------------------------------------------------------------------  Cardiovascular Diagnostic Testing:    ECG: NSR, STD in lateral leads    Echo:     Stress Testing:    Cath:    -------------------------------------------------------------------------------------------   Cardiology Consult Note  ------------------------------------------------------------------------------------------  SUBJECTIVE: 74M, Polish speaking, PMHx of CAD s/p PCI and minimally invasive direct CABG (LIMA --> LAD) 10/31/24, ICM, HFrEF, HTN, CKD, and PAD s/p prior L to R fem-fem bypass (>20yrs ago), who initially presented to [facility] after a mechanical fall. Patient stated he was walking to the bathroom at home with his walker he usually uses, felt unsteady and shaky. He fell onto R side.  He was unable to get up and crawled over to his phone and called EMS.  On initial encounter at [facility] patient noted some pain to R hip, had intact ROM and no deformities. While en route to [facility] via ambulance he reported L-sided/substernal cp, he states similar to when he was in the hospital for his CABG. Upon arrival to [facility] pain was reported as mild. Patient denied fever, cough, nausea, vomiting, back pain, diaphoresis.  Denied head strike, ac use. In fact, patient States he has not been taking any of his meds including antiplatelets at least for a week prior to presentation and potentially longer, stating they "finished."     [facility] ED Course: On arrival to [facility] vitals noted at /97 |  | RR 18 | T 97.5 | SpO2 98% ORA. Patient was complaining of L sided chest pain, L knee pain. Initial ECG at [facility] w/ STD in lateral leads. CBC WNL. Coags WNL. CMP w/ stable CKD (Cr 1.63, baseline 1.52). hsTroponin I noted at 174.4. CTH w/ no acute pathology/intracranial hemmorhage. Patient was loaded with  mg, plavix 300 mg, heparin ggt started in  ED. Patient thereafter transferred to [facility] for NSTEMI    [facility] ED: Upon arrival to the ED at [facility]       -------------------------------------------------------------------------------------------  VS:  T(F): 98.1 (01-24), Max: 98.1 (01-24)  HR: 90 (01-24) (90 - 90)  BP: 122/66 (01-24) (122/66 - 122/66)  RR: 20 (01-24)  SpO2: 97% (01-24)  I&O's Summary    PHYSICAL EXAM:  GENERAL: NAD  HEAD: Atraumatic, Normocephalic.  ENT: Moist mucous membranes.  NECK: Supple, No JVD.  CHEST/LUNG: Clear to auscultation bilaterally; No rales, rhonchi, wheezing, or rubs. Unlabored respirations.  HEART: Regular rate and rhythm; No murmurs, rubs, or gallops.  ABDOMEN: Bowel sounds present; Soft, Nontender, Nondistended.   EXTREMITIES: No edema. 2+ Peripheral Pulses, brisk capillary refill. No clubbing or cyanosis.     -------------------------------------------------------------------------------------------  LABS: pending an [facility], Trop I at [facility]-VS noted at 174      -------------------------------------------------------------------------------------------  Meds:    Home regimen:  Home Medications:   * Patient Currently Takes Medications as of 27-Nov-2024 13:20 documented in Structured Notes  · 	lidocaine 4% topical film: Apply topically to affected area once a day as needed for  pain Apply to left leg  · 	ascorbic acid 500 mg oral tablet: 1 tab(s) orally once a day  · 	Multiple Vitamins oral tablet: 1 tab(s) orally once a day  · 	simethicone 80 mg oral tablet, chewable: 1 tab(s) orally 2 times a day As needed Heartburn  · 	bisacodyl 10 mg rectal suppository: 1 suppository(ies) rectal once a day As needed Constipation  · 	calcium carbonate 500 mg (200 mg elemental calcium) oral tablet, chewable: 1 tab(s) orally 3 times a day As needed Heartburn  · 	acetaminophen 325 mg oral tablet: 3 tab(s) orally every 8 hours As needed Moderate Pain (4 - 6), Severe Pain (7 - 10)  · 	isosorbide mononitrate 120 mg oral tablet, extended release: 2 tab(s) orally every 24 hours  · 	ranolazine 1000 mg oral tablet, extended release: 1 tab(s) orally 2 times a day  · 	spironolactone 25 mg oral tablet: 1 tab(s) orally every 24 hours  · 	dapagliflozin 10 mg oral tablet: 1 tab(s) orally every 24 hours  · 	apixaban 5 mg oral tablet: 1 tab(s) orally every 12 hours  · 	pantoprazole 40 mg oral delayed release tablet: 1 tab(s) orally once a day  · 	senna leaf extract oral tablet: 2 tab(s) orally once a day (at bedtime)  · 	polyethylene glycol 3350 oral powder for reconstitution: 17 gram(s) orally once a day  · 	Toprol-XL 50 mg oral tablet, extended release: 2.5 tab(s) orally once a day Take 2.5 tablets once daily for a total of 125mg  · 	finasteride 5 mg oral tablet: 1 tab(s) orally once a day  · 	Lipitor 80 mg oral tablet: 1 tab(s) orally once a day (at bedtime)  · 	tamsulosin 0.4 mg oral capsule: 1 cap(s) orally once a day (at bedtime)  · 	Plavix 75 mg oral tablet: 1 tab(s) orally once a day    -------------------------------------------------------------------------------------------  Cardiovascular Diagnostic Testing:    ECG: NSR, STD in lateral leads    Echo:     CONCLUSIONS:    TTE 11/19/2024     1. Moderately reduced left ventricular systolic function.   2. Basal and mid anterior wall, basal and mid anterolateral wall, and   basal and mid inferolateral wall are akinetic.   3. Normal right ventricular size and systolic function.   4. No significant valvular disease.   5. No evidence of pulmonary hypertension.   6. No pericardial effusion.      Cath:    11/19/24    Diagnostic Conclusions:     LIMA to LAD - patent      LM - distal 50-60% stenosis     LAD - prox 70% diffuse disease, rest of vessel with mild diffuse disease, competitive flow from LIMA    LCx - ostial      RCA - not engaged, known  from outside cath       -------------------------------------------------------------------------------------------   Cardiology Consult Note  ------------------------------------------------------------------------------------------  SUBJECTIVE: 74M, Polish speaking, PMHx of CAD s/p PCI and minimally invasive direct CABG (LIMA --> LAD) 10/31/24, ICM, HFrEF, HTN, CKD, and PAD s/p prior L to R fem-fem bypass (>20yrs ago), who initially presented to [facility] after a mechanical fall. Patient stated he was walking to the bathroom at home with his walker he usually uses, felt unsteady and shaky. He fell onto R side.  He was unable to get up and crawled over to his phone and called EMS.  On initial encounter at [facility] patient noted some pain to R hip, had intact ROM and no deformities. While en route to [facility] via ambulance he reported L-sided/substernal cp, he states similar to when he was in the hospital for his CABG. Upon arrival to [facility] pain was reported as mild. Patient denied fever, cough, nausea, vomiting, back pain, diaphoresis.  Denied head strike, ac use. In fact, patient States he has not been taking any of his meds including antiplatelets at least for a week prior to presentation and potentially longer, stating they "finished."     [facility] ED Course: On arrival to [facility] vitals noted at /97 |  | RR 18 | T 97.5 | SpO2 98% ORA. Patient was complaining of L sided chest pain, L knee pain. Initial ECG at [facility] w/ STD in lateral leads. CBC WNL. Coags WNL. CMP w/ stable CKD (Cr 1.63, baseline 1.52). hsTroponin I noted at 174.4. CTH w/ no acute pathology/intracranial hemorrhage Patient was loaded with  mg, plavix 300 mg, heparin ggt started in  ED. Patient thereafter transferred to [facility] for NSTEMI    [facility] ED: Upon arrival to the ED at [facility] AVSS. Patient stated that his chest pain has resolved      -------------------------------------------------------------------------------------------  VS:  T(F): 98.1 (01-24), Max: 98.1 (01-24)  HR: 90 (01-24) (90 - 90)  BP: 122/66 (01-24) (122/66 - 122/66)  RR: 20 (01-24)  SpO2: 97% (01-24)  I&O's Summary    PHYSICAL EXAM:  GENERAL: NAD  HEAD: Atraumatic, Normocephalic.  ENT: Moist mucous membranes.  NECK: Supple, No JVD.  CHEST/LUNG: Clear to auscultation bilaterally; No rales, rhonchi, wheezing, or rubs. Unlabored respirations.  HEART: Regular rate and rhythm; No murmurs, rubs, or gallops.  ABDOMEN: Bowel sounds present; Soft, Nontender, Nondistended.   EXTREMITIES: No edema. 2+ Peripheral Pulses, brisk capillary refill. No clubbing or cyanosis.     -------------------------------------------------------------------------------------------  LABS: pending an [facility], Trop I at [facility]-VS noted at 174      -------------------------------------------------------------------------------------------  Meds:    Home regimen:  Home Medications:   * Patient Currently Takes Medications as of 27-Nov-2024 13:20 documented in Structured Notes  · 	lidocaine 4% topical film: Apply topically to affected area once a day as needed for  pain Apply to left leg  · 	ascorbic acid 500 mg oral tablet: 1 tab(s) orally once a day  · 	Multiple Vitamins oral tablet: 1 tab(s) orally once a day  · 	simethicone 80 mg oral tablet, chewable: 1 tab(s) orally 2 times a day As needed Heartburn  · 	bisacodyl 10 mg rectal suppository: 1 suppository(ies) rectal once a day As needed Constipation  · 	calcium carbonate 500 mg (200 mg elemental calcium) oral tablet, chewable: 1 tab(s) orally 3 times a day As needed Heartburn  · 	acetaminophen 325 mg oral tablet: 3 tab(s) orally every 8 hours As needed Moderate Pain (4 - 6), Severe Pain (7 - 10)  · 	isosorbide mononitrate 120 mg oral tablet, extended release: 2 tab(s) orally every 24 hours  · 	ranolazine 1000 mg oral tablet, extended release: 1 tab(s) orally 2 times a day  · 	spironolactone 25 mg oral tablet: 1 tab(s) orally every 24 hours  · 	dapagliflozin 10 mg oral tablet: 1 tab(s) orally every 24 hours  · 	apixaban 5 mg oral tablet: 1 tab(s) orally every 12 hours  · 	pantoprazole 40 mg oral delayed release tablet: 1 tab(s) orally once a day  · 	senna leaf extract oral tablet: 2 tab(s) orally once a day (at bedtime)  · 	polyethylene glycol 3350 oral powder for reconstitution: 17 gram(s) orally once a day  · 	Toprol-XL 50 mg oral tablet, extended release: 2.5 tab(s) orally once a day Take 2.5 tablets once daily for a total of 125mg  · 	finasteride 5 mg oral tablet: 1 tab(s) orally once a day  · 	Lipitor 80 mg oral tablet: 1 tab(s) orally once a day (at bedtime)  · 	tamsulosin 0.4 mg oral capsule: 1 cap(s) orally once a day (at bedtime)  · 	Plavix 75 mg oral tablet: 1 tab(s) orally once a day    -------------------------------------------------------------------------------------------  Cardiovascular Diagnostic Testing:    ECG: NSR, STD in lateral leads    Echo:     CONCLUSIONS:    TTE 11/19/2024     1. Moderately reduced left ventricular systolic function.   2. Basal and mid anterior wall, basal and mid anterolateral wall, and   basal and mid inferolateral wall are akinetic.   3. Normal right ventricular size and systolic function.   4. No significant valvular disease.   5. No evidence of pulmonary hypertension.   6. No pericardial effusion.      Cath:    11/19/24    Diagnostic Conclusions:     LIMA to LAD - patent      LM - distal 50-60% stenosis     LAD - prox 70% diffuse disease, rest of vessel with mild diffuse disease, competitive flow from LIMA    LCx - ostial      RCA - not engaged, known  from outside cath       -------------------------------------------------------------------------------------------   Cardiology Consult Note  ------------------------------------------------------------------------------------------  SUBJECTIVE: 74M, Polish speaking, PMHx of CAD s/p PCI and minimally invasive direct CABG (LIMA --> LAD) 10/31/24, ICM, HFrEF, HTN, CKD, and PAD s/p prior L to R fem-fem bypass (>20yrs ago), who initially presented to [facility] after a mechanical fall. Patient stated he was walking to the bathroom at home with his walker he usually uses, felt unsteady and shaky. He fell onto R side.  He was unable to get up and crawled over to his phone and called EMS.  On initial encounter at [facility] patient noted some pain to R hip, had intact ROM and no deformities. While en route to [facility] via ambulance he reported L-sided/substernal cp, he states similar to when he was in the hospital for his CABG. Upon arrival to [facility] pain was reported as mild. Patient denied fever, cough, nausea, vomiting, back pain, diaphoresis.  Denied head strike, ac use. In fact, patient States he has not been taking any of his meds including antiplatelets at least for a week prior to presentation and potentially longer, stating they "finished."     [facility] ED Course: On arrival to [facility] vitals noted at /97 |  | RR 18 | T 97.5 | SpO2 98% ORA. Patient was complaining of L sided chest pain, L knee pain. Initial ECG at [facility] w/ STD in lateral leads. CBC WNL. Coags WNL. CMP w/ stable CKD (Cr 1.63, baseline 1.52). hsTroponin I noted at 174.4. CTH w/ no acute pathology/intracranial hemorrhage Patient was loaded with  mg, plavix 300 mg, heparin ggt started in  ED. Patient thereafter transferred to [facility] for NSTEMI    [facility] ED: Upon arrival to the ED at [facility]. Patient stated that his chest pain has resolved      -------------------------------------------------------------------------------------------  VS:  T(F): 98.1 (01-24), Max: 98.1 (01-24)  HR: 90 (01-24) (90 - 90)  BP: 122/66 (01-24) (122/66 - 122/66)  RR: 20 (01-24)  SpO2: 97% (01-24)  I&O's Summary    PHYSICAL EXAM:  GENERAL: NAD  HEAD: Atraumatic, Normocephalic.  ENT: Moist mucous membranes.  NECK: Supple, No JVD.  CHEST/LUNG: Clear to auscultation bilaterally; No rales, rhonchi, wheezing, or rubs. Unlabored respirations.  HEART: Regular rate and rhythm; No murmurs, rubs, or gallops.  ABDOMEN: Bowel sounds present; Soft, Nontender, Nondistended.   EXTREMITIES: LLE erythematous    -------------------------------------------------------------------------------------------  LABS: pending an [facility] [place] I at [facility]-VS noted at 174      -------------------------------------------------------------------------------------------  Meds:    Home regimen:  Home Medications:   * Patient Currently Takes Medications as of 27-Nov-2024 13:20 documented in Structured Notes  · 	lidocaine 4% topical film: Apply topically to affected area once a day as needed for  pain Apply to left leg  · 	ascorbic acid 500 mg oral tablet: 1 tab(s) orally once a day  · 	Multiple Vitamins oral tablet: 1 tab(s) orally once a day  · 	simethicone 80 mg oral tablet, chewable: 1 tab(s) orally 2 times a day As needed Heartburn  · 	bisacodyl 10 mg rectal suppository: 1 suppository(ies) rectal once a day As needed Constipation  · 	calcium carbonate 500 mg (200 mg elemental calcium) oral tablet, chewable: 1 tab(s) orally 3 times a day As needed Heartburn  · 	acetaminophen 325 mg oral tablet: 3 tab(s) orally every 8 hours As needed Moderate Pain (4 - 6), Severe Pain (7 - 10)  · 	isosorbide mononitrate 120 mg oral tablet, extended release: 2 tab(s) orally every 24 hours  · 	ranolazine 1000 mg oral tablet, extended release: 1 tab(s) orally 2 times a day  · 	spironolactone 25 mg oral tablet: 1 tab(s) orally every 24 hours  · 	dapagliflozin 10 mg oral tablet: 1 tab(s) orally every 24 hours  · 	apixaban 5 mg oral tablet: 1 tab(s) orally every 12 hours  · 	pantoprazole 40 mg oral delayed release tablet: 1 tab(s) orally once a day  · 	senna leaf extract oral tablet: 2 tab(s) orally once a day (at bedtime)  · 	polyethylene glycol 3350 oral powder for reconstitution: 17 gram(s) orally once a day  · 	Toprol-XL 50 mg oral tablet, extended release: 2.5 tab(s) orally once a day Take 2.5 tablets once daily for a total of 125mg  · 	finasteride 5 mg oral tablet: 1 tab(s) orally once a day  · 	Lipitor 80 mg oral tablet: 1 tab(s) orally once a day (at bedtime)  · 	tamsulosin 0.4 mg oral capsule: 1 cap(s) orally once a day (at bedtime)  · 	Plavix 75 mg oral tablet: 1 tab(s) orally once a day    -------------------------------------------------------------------------------------------  Cardiovascular Diagnostic Testing:    ECG: NSR, STD in lateral leads    Echo:     CONCLUSIONS:    TTE 11/19/2024     1. Moderately reduced left ventricular systolic function.   2. Basal and mid anterior wall, basal and mid anterolateral wall, and   basal and mid inferolateral wall are akinetic.   3. Normal right ventricular size and systolic function.   4. No significant valvular disease.   5. No evidence of pulmonary hypertension.   6. No pericardial effusion.      Cath:    11/19/24    Diagnostic Conclusions:     LIMA to LAD - patent      LM - distal 50-60% stenosis     LAD - prox 70% diffuse disease, rest of vessel with mild diffuse disease, competitive flow from LIMA    LCx - ostial      RCA - not engaged, known  from outside cath       -------------------------------------------------------------------------------------------

## 2025-01-24 NOTE — ED PROVIDER NOTE - ATTENDING CONTRIBUTION TO CARE
I have personally provided the amount of critical care time documented below, excluding time spent on separate procedures: NSTEMI on heparin     I have personally performed a face to face medical and diagnostic evaluation of the patient. I have discussed with and reviewed the Resident's note and agree with the History, ROS, Physical Exam and MDM unless otherwise indicated. A brief summary of my personal evaluation and impression can be found below.    74-year-old male, past medical history as detailed above presenting as transfer from outside hospital for NSTEMI.  Patient initially presenting for mechanical fall, was having weakness and shortness of breath leading up to the fall.  Found to have a cellulitis of the left lower extremity as well as NSTEMI on labs.  Given heparin, dual antiplatelet.  Started on clindamycin for cellulitis and sent for further evaluation by cardiology.  On exam, vital signs stable, exam consistent with outside hospital.  Cardiology at bedside, planning for continuation of heparin, echo in the morning.  Anticipate admission on telemetry. Michelle Perez, ED Attending

## 2025-01-24 NOTE — H&P ADULT - ASSESSMENT
Elton Sanabria is a 74-year-old male, Polish speaking, PMHx of CAD s/p PCI and minimally invasive direct CABG (LIMA --> LAD) 10/31/24, ICM, HFrEF, HTN, CKD, and PAD s/p prior L to R fem-fem bypass (>20yrs ago), who initially presented to [facility] after a mechanical fall and transferred to [facility] for NSTEMI. Pending heart cath.

## 2025-01-24 NOTE — CONSULT NOTE ADULT - ASSESSMENT
74-year-old male, Polish speaking, PMHx of CAD s/p PCI and minimally invasive direct CABG (LIMA --> LAD) 10/31/24, ICM, HFrEF, HTN, CKD, and PAD s/p prior L to R fem-fem bypass (>20yrs ago), who initially presented to [facility] after a mechanical fall and transferred to [facility] for NSTEMI. Pending heart cath.    Wound Consult requested to assist w/ management of BLE wound  BLE PVD w/ stasis dermatitis  LLE incision    BLE wounds- medihoney dressing  LLE incision as per CTS  BLE elevation & Compression  Consider ALLYN/PVR    BLE Duplex no DVT, patent grafts    LLE Xray no disloc, fx, OM, calcif noted. (+)sts of calf  Abx per Medicine/ ID  Moisturize intact skin w/ SWEEN cream BID  Nutrition Consult for optimization         encourage high quality protein, savita/ prosource, MVI & Vit C to promote wound healing  Hyperglycemia -  ADA diet and FS w/ ISS  Continue turning and positioning w/ offloading assistive devices as per protocol  Buttocks/ Sacrum Zenaida BID and prn soiling        Continue w/ attends under pads and Pericare as per protocol  Waffle Cushion to chair when oob to chair  Continue w/ low air loss pressure redistribution bed surface   PCare as per medicine, will follow w/ you  Upon discharge f/u as outpatient at [facility] [address] [place] [address] [phone]  Seen w/ RN and D/w team & RN  Thank you for this consult  Samira Lo PA-C [id number]  Nights/ Weekends/ Holidays please call:  General Surgery Consult pager [phone]) for emergencies  Wound PT for multilayer leg wrapping or VAC issues ([phone])   I spent 55minutes face to face w/ this pt of which more than 50% of the time was spent counseling & coordinating care of this pt.

## 2025-01-24 NOTE — H&P ADULT - PROBLEM SELECTOR PLAN 4
History of femoral-femoral bypass over 20 years ago and recent in 2024.    Plan:  - medications as above

## 2025-01-24 NOTE — PATIENT PROFILE ADULT - PACKS PER DAY
Gabrielle Andre  9/24/1935  1) BMP 9/13. Diagnosis: CKD  Ilsa Marquez, APRN CNP on 9/10/2024 at 1:19 PM    
1

## 2025-01-24 NOTE — CONSULT NOTE ADULT - ASSESSMENT
74M, Polish speaking, PMHx of CAD s/p PCI and minimally invasive direct CABG (LIMA --> LAD) 10/31/24, ICM, HFrEF, HTN, CKD, and PAD s/p prior L to R fem-fem bypass (>20yrs ago), history of provoked DVT in 11/2024 who initially presented to [facility] after a mechanical fall. While en route to [facility] via ambulance he reported L-sided/substernal cp, he states similar to when he was in the hospital for his CABG. Upon arrival to [facility] pain was reported as mild. Denied head strike, ac use. Patient stated he has not been taking any of his meds including antiplatelets at least for a week prior to presentation and potentially longer, stating they "finished."  On arrival to [facility] vitals noted at /97 |  | RR 18 | T 97.5 | SpO2 98% ORA. Patient was complaining of L sided chest pain, L knee pain. Initial ECG at [facility] w/ STD in lateral leads. CBC WNL. Coags WNL. CMP w/ stable CKD (Cr 1.63, baseline 1.52). hsTroponin I noted at 174.4. CTH w/ no acute pathology/intracranial hemmorhage. Patient was loaded with  mg, plavix 300 mg, heparin ggt started in  ED. Patient thereafter transferred to [facility] for NSTEMI. Upon arrival to the ED at [facility] AVSS. Cardiology are consulted for NSTEMI    #Elevated cardiac enzymes  #Chest pain  - Patient with significant CAD history including minimally invasive direct CABG (LIMA to LAD) in 10/2024. Had elevated cardiac enzymes in 11/2024 and underwent LHC w/ patent LIMA to LAD graft, LCx ostial , RCA reportedly w/ known  (was not engaged)  - Additionally has history of chronic angina (supposed to be) on medical anti-anginal therapy  - S/p , plavix 300  - Heparin ggt initiated at , can continue for now  - Repeat labs (including hsTrop, CK-MB, CPK)  - Can maintain on ASA 81 QD, plavix 75 mg QD for now  - Per chart review home regimen should be eliquis 5 mg BID (on board for recent proked DVT) and plavix 75 mg QD  - Resume home antianginal meds  - Resume home high intensity statin (lipitor 80)  - Unclear utility of LHC at this time given known extensive CAD w/ relatively recent evaluation, will discuss with IC in the AM    ***recommendations are not final until attending attestation*** 74M, Polish speaking, PMHx of CAD s/p PCI and minimally invasive direct CABG (LIMA --> LAD) 10/31/24, ICM, HFrEF, HTN, CKD, and PAD s/p prior L to R fem-fem bypass (>20yrs ago), history of provoked DVT in 11/2024 who initially presented to [facility] after a mechanical fall. While en route to [facility] via ambulance he reported L-sided/substernal cp, he states similar to when he was in the hospital for his CABG. Upon arrival to [facility] pain was reported as mild. Denied head strike, ac use. Patient stated he has not been taking any of his meds including antiplatelets at least for a week prior to presentation and potentially longer, stating they "finished."  On arrival to [facility] vitals noted at /97 |  | RR 18 | T 97.5 | SpO2 98% ORA. Patient was complaining of L sided chest pain, L knee pain. Initial ECG at [facility] w/ STD in lateral leads. CBC WNL. Coags WNL. CMP w/ stable CKD (Cr 1.63, baseline 1.52). hsTroponin I noted at 174.4. CTH w/ no acute pathology/intracranial hemmorhage. Patient was loaded with  mg, plavix 300 mg, heparin ggt started in  ED. Patient thereafter transferred to [facility] for NSTEMI. Upon arrival to the ED at [facility] AVSS. Cardiology are consulted for NSTEMI    #Elevated cardiac enzymes  #Chest pain  - Patient with significant CAD history including minimally invasive direct CABG (LIMA to LAD) in 10/2024. Had elevated cardiac enzymes in 11/2024 and underwent LHC w/ patent LIMA to LAD graft, LCx ostial , ostial RCA w/ known  (was not engaged)  - Additionally has history of chronic angina (supposed to be) on medical anti-anginal therapy. Patient w/ polypharmacy and poor medical literacy. He lives alone and manages his own medications. He is unsure of which medications he has recently taken  - S/p , plavix 300 as OSH  - Repeat labs (including hsTrop, CK-MB, CPK)  - Heparin ggt initiated at , can continue for now  - Can maintain on ASA 81 QD, plavix 75 mg QD for now  - Per chart review home regimen should be eliquis 5 mg BID (on board for recent proked DVT) and plavix 75 mg QD  - Resume home antianginal meds  - Resume home high intensity statin (lipitor 80)  - Unclear utility of [facility] at this time given known extensive CAD w/ relatively recent evaluation, will discuss with IC in the AM    #Provoked DVT  - Follows with vascular cardiology at [facility] for PAD, provoked DVT. Per chart review AC to be on board through February  - Maintain on heparin ggt as above for now, eventual transition back to eliquis    ***recommendations are not final until attending attestation*** 74M, Polish speaking, PMHx of CAD s/p PCI and minimally invasive direct CABG (LIMA --> LAD) 10/31/24, ICM, HFrEF, HTN, CKD, and PAD s/p prior L to R fem-fem bypass (>20yrs ago), history of provoked DVT in 11/2024 who initially presented to [facility] after a mechanical fall. While en route to [facility] via ambulance he reported L-sided/substernal cp, he states similar to when he was in the hospital for his CABG. Upon arrival to [facility] pain was reported as mild. Denied head strike, ac use. Patient stated he has not been taking any of his meds including antiplatelets at least for a week prior to presentation and potentially longer, stating they "finished."  On arrival to [facility] vitals noted at /97 |  | RR 18 | T 97.5 | SpO2 98% ORA. Patient was complaining of L sided chest pain, L knee pain. Initial ECG at [facility] w/ STD in lateral leads. CBC WNL. Coags WNL. CMP w/ stable CKD (Cr 1.63, baseline 1.52). hsTroponin I noted at 174.4. CTH w/ no acute pathology/intracranial hemmorhage. Patient was loaded with  mg, plavix 300 mg, heparin ggt started in  ED. Patient thereafter transferred to [facility] for NSTEMI. Upon arrival to the ED at [facility] AVSS. Cardiology are consulted for NSTEMI    #Elevated cardiac enzymes  #Chest pain  - Patient with significant CAD history including minimally invasive direct CABG (LIMA to LAD) in 10/2024. Had elevated cardiac enzymes in 11/2024 and underwent LHC w/ patent LIMA to LAD graft, LCx ostial , ostial RCA w/ known  (was not engaged)  - Additionally has history of chronic angina (supposed to be) on medical anti-anginal therapy. Patient w/ polypharmacy and poor medical literacy. He lives alone and manages his own medications. He is unsure of which medications he has recently taken.  - S/p , plavix 300 as OSH  - Repeat labs (including hsTrop, CK-MB, CPK)  - Heparin ggt initiated at , can continue for now  - Can maintain on ASA 81 QD, plavix 75 mg QD for now  - Per chart review home regimen should be eliquis 5 mg BID (on board for recent proked DVT) and plavix 75 mg QD  - Resume home antianginal meds  - Resume home high intensity statin (lipitor 80)  - Unclear utility of [facility] at this time given known extensive CAD w/ relatively recent evaluation, will discuss with IC in the AM  - ?infectious competent as possible underlying . Has LLE erythema concerning for possible cellulitis, given IV abx at       #Provoked DVT  - Follows with vascular cardiology at [facility] for PAD, provoked DVT. Per chart review AC to be on board through February  - Maintain on heparin ggt as above for now, eventual transition back to eliquis    ***recommendations are not final until attending attestation*** 74M, Polish speaking, PMHx of CAD s/p PCI and minimally invasive direct CABG (LIMA --> LAD) 10/31/24, ICM, HFrEF, HTN, CKD, and PAD s/p prior L to R fem-fem bypass (>20yrs ago), history of provoked DVT in 11/2024 who initially presented to [facility] after a mechanical fall. While en route to [facility] via ambulance he reported L-sided/substernal cp, he states similar to when he was in the hospital for his CABG. Upon arrival to [facility] pain was reported as mild. Denied head strike, ac use. Patient stated he has not been taking any of his meds including antiplatelets at least for a week prior to presentation and potentially longer, stating they "finished."  On arrival to [facility] vitals noted at /97 |  | RR 18 | T 97.5 | SpO2 98% ORA. Patient was complaining of L sided chest pain, L knee pain. Initial ECG at [facility] w/ STD in lateral leads. CBC WNL. Coags WNL. CMP w/ stable CKD (Cr 1.63, baseline 1.52). hsTroponin I noted at 174.4. CTH w/ no acute pathology/intracranial hemmorhage. Patient was loaded with  mg, plavix 300 mg, heparin ggt started in  ED. Patient thereafter transferred to [facility] for NSTEMI. Upon arrival to the ED at [facility] AVSS. Cardiology are consulted for NSTEMI    #Elevated cardiac enzymes  #Chest pain  - Patient with significant CAD history including minimally invasive direct CABG (LIMA to LAD) in 10/2024. Had elevated cardiac enzymes in 11/2024 and underwent LHC w/ patent LIMA to LAD graft, LCx ostial , ostial RCA w/ known  (was not engaged)  - Additionally has history of chronic angina (supposed to be) on medical anti-anginal therapy. Patient w/ polypharmacy and poor medical literacy. He lives alone and manages his own medications. He is unsure of which medications he has recently taken.  - S/p , plavix 300 as OSH  - Repeat labs (including hsTrop, CK-MB, CPK)  - Heparin ggt initiated at , can continue for now  - Can maintain on ASA 81 QD, plavix 75 mg QD for now  - Per chart review home regimen should be eliquis 5 mg BID (on board for recent proked DVT) and plavix 75 mg QD  - Resume home antianginal meds  - Resume home high intensity statin (lipitor 80)  - Will add on for [facility] 1/24  - ?infectious competent as possible underlying . Has LLE erythema concerning for possible cellulitis, given IV abx at       #Provoked DVT  - Follows with vascular cardiology at [facility] for PAD, provoked DVT. Per chart review AC to be on board through February  - Maintain on heparin ggt as above for now, eventual transition back to eliquis    ***recommendations are not final until attending attestation*** 74M, Polish speaking, PMHx of CAD s/p PCI and minimally invasive direct CABG (LIMA --> LAD) 10/31/24, ICM, HFrEF, HTN, CKD, and PAD s/p prior L to R fem-fem bypass (>20yrs ago), history of provoked DVT in 11/2024 who initially presented to [facility] after a mechanical fall. While en route to [facility] via ambulance he reported L-sided/substernal cp, he states similar to when he was in the hospital for his CABG. Upon arrival to [facility] pain was reported as mild. Denied head strike, ac use. Patient stated he has not been taking any of his meds including antiplatelets at least for a week prior to presentation and potentially longer, stating they "finished."  On arrival to [facility] vitals noted at /97 |  | RR 18 | T 97.5 | SpO2 98% ORA. Patient was complaining of L sided chest pain, L knee pain. Initial ECG at [facility] w/ STD in lateral leads. CBC WNL. Coags WNL. CMP w/ stable CKD (Cr 1.63, baseline 1.52). hsTroponin I noted at 174.4. CTH w/ no acute pathology/intracranial hemmorhage. Patient was loaded with  mg, plavix 300 mg, heparin ggt started in  ED. Patient thereafter transferred to [facility] for NSTEMI. Upon arrival to the ED at [facility] AVSS. Cardiology are consulted for NSTEMI    #Elevated cardiac enzymes  #Chest pain  - Patient with significant CAD history including minimally invasive direct CABG (LIMA to LAD) in 10/2024. Had elevated cardiac enzymes in 11/2024 and underwent LHC w/ patent LIMA to LAD graft, LCx ostial , ostial RCA w/ known  (was not engaged)  - Additionally has history of chronic angina (supposed to be) on medical anti-anginal therapy. Patient w/ polypharmacy and poor medical literacy. He lives alone and manages his own medications. He is unsure of which medications he has recently taken.  - S/p , plavix 300 as OSH  - Repeat labs (including hsTrop, CK-MB, CPK)  - Heparin ggt initiated at , can continue for now  - Can maintain on ASA 81 QD, plavix 75 mg QD for now  - Per chart review home regimen should be eliquis 5 mg BID (on board for recent proked DVT) and plavix 75 mg QD  - Resume home antianginal meds  - Resume home high intensity statin (lipitor 80)  - Will add on for [facility] 1/24  - ?infectious competent as possible underlying . Has LLE erythema concerning for possible cellulitis, given IV abx at       #Provoked DVT  - Follows with vascular cardiology at [facility] for PAD, provoked DVT. Per chart review AC to be on board through February  - Maintain on heparin ggt as above for now, eventual transition back to eliquis    ***recommendations are not final until attending attestation***    This patient was seen and examined personally by me and the plan was discussed with the fellow and/or resident above. Amendments were made as necessary to the above. Agree with the excellent note and plan above.     45 minutes were spent on this encounter for extensive review of medical record details including labs and/or imaging studies and/or adjacent care team and consultant records, as well as review and reconciliation of current medications. Time was spent on obtaining a history, performing physical examination of patient, and answering patient and/or family questions regarding plan of care. Time was also spent discussing plan of care with patient’s other care team members including primary and consulting teams. Time also was spent on documentation of this encounter into the EHR.    Franco Boucher MD, MPhil, [facility]  Cardiologist, [facility]  ; [facility] and [facility]  Email: [email or web address] on-service contact/call information, go to [email or web address] and use "[employer]" to login.  Outpatient Cardiology appointments, call [phone] to arrange with a colleague; I do not have outpatient Cardiology clinic.

## 2025-01-24 NOTE — CONSULT NOTE ADULT - ATTENDING COMMENTS
74M, Polish speaking, PMHx of CAD s/p PCI and minimally invasive direct CABG (LIMA --> LAD) 10/31/24, ICM, HFrEF, HTN, CKD, and PAD s/p prior L to R fem-fem bypass (>20yrs ago), history of provoked DVT in 11/2024 who initially presented to [facility] after a mechanical fall. While en route to [facility] via ambulance he reported L-sided/substernal cp, he states similar to when he was in the hospital for his CABG. Upon arrival to [facility] pain was reported as mild. Denied head strike, ac use. Patient stated he has not been taking any of his meds including antiplatelets at least for a week prior to presentation and potentially longer, stating they "finished."  On arrival to [facility] vitals noted at /97 |  | RR 18 | T 97.5 | SpO2 98% ORA. Patient was complaining of L sided chest pain, L knee pain. Initial ECG at [facility] w/ STD in lateral leads. CBC WNL. Coags WNL. CMP w/ stable CKD (Cr 1.63, baseline 1.52). hsTroponin I noted at 174.4. CTH w/ no acute pathology/intracranial hemmorhage. Patient was loaded with  mg, plavix 300 mg, heparin ggt started in [facility] ED. Patient thereafter transferred to [facility] for NSTEMI. Upon arrival to the ED at [facility] AVSS. Cardiology are consulted for NSTEMI    Patient with significant CAD history including minimally invasive direct CABG (LIMA to LAD) in 10/2024. Had elevated cardiac enzymes in 11/2024 and underwent LHC w/ patent LIMA to LAD graft, LCx ostial , ostial RCA w/ known  (was not engaged)  - c/w medical mgt for the NSTEMI - awaiting [facility]

## 2025-01-24 NOTE — H&P ADULT - NSHPLABSRESULTS_GEN_ALL_CORE
Labs:                        14.8   7.43  )-----------( 182      ( 24 Jan 2025 13:12 )             44.0     01-24    138  |  103  |  25[H]  ----------------------------<  104[H]  4.2   |  23  |  1.44[H]    Ca    9.1      24 Jan 2025 06:44  Phos  2.4     01-24  Mg     1.8     01-24    TPro  6.8  /  Alb  3.8  /  TBili  0.4  /  DBili  x   /  AST  25  /  ALT  16  /  AlkPhos  77  01-24    PT/INR - ( 24 Jan 2025 06:44 )   PT: 13.4 sec;   INR: 1.17 ratio         PTT - ( 24 Jan 2025 13:12 )  PTT:45.1 sec

## 2025-01-24 NOTE — H&P ADULT - ATTENDING COMMENTS
74-year-old male, Polish speaking, PMHx of CAD s/p PCI and minimally invasive direct CABG (LIMA --> LAD) 10/31/24, ICM, HFrEF, HTN, CKD, and PAD s/p prior L to R fem-fem bypass (>20yrs ago), who initially presented to [facility] after a mechanical fall and transferred to [facility] for NSTEMI.    #NSTEMI  - patient with chest pain and elevated troponin on arrival  - hx of severe multivessel cardiac disease  - having intermittent chest pain, but improved compared to arrival  - cardiology following, appreciate recs  - s/p LHC 1/24 with 99% occlusion of Diag, recommended for medical management  - continue statin  - continue plavix  - likely can stop heparin gtt after LHC and transition to eliquis 5mg BID (hx of DVT)    #Cellulitis  - erythema, warmth and pain to touch on left lower extremity concerning for cellulitis  - start ancef  - hx of wounds and skin changes, likely source. plan to consult wound care team    #PAD  - continue home medications    #HFrEF  - repeat TTE, prior tte with EF 35%  - continue b-blocker  - continue spironolactone  - continue SGLT2i  - will not start at this time ACEi/ARB/ARNI in setting of renal function    Rest of plan as above.

## 2025-01-24 NOTE — H&P ADULT - PROBLEM SELECTOR PLAN 5
Patient was prescribed tamsulosin and finasteride at home. Patient was not taking these in the last 4 weeks.    Plan:  - continue home tamsulosin  - continue home finasteride  - bladder scans q8h Patient with likely stage 3 CKD. EGFR has varied between 40s-50.    Plan:  - CTM Patient with likely stage 3 CKD. EGFR has varied between 40s-50.    Plan:  - CTM  - PCP follow up outpatient

## 2025-01-24 NOTE — H&P ADULT - PROBLEM SELECTOR PLAN 8
- DVT Prophylaxis: heparin ggt  - PT consulted, recs appreciated  - Dispo: pending cardiac workup and PT evaluation

## 2025-01-25 ENCOUNTER — TRANSCRIPTION ENCOUNTER (OUTPATIENT)
Age: 75
End: 2025-01-25

## 2025-01-25 LAB
A1C WITH ESTIMATED AVERAGE GLUCOSE RESULT: 5.4 % — SIGNIFICANT CHANGE UP (ref 4–5.6)
ALBUMIN SERPL ELPH-MCNC: 3.5 G/DL — SIGNIFICANT CHANGE UP (ref 3.3–5)
ALP SERPL-CCNC: 65 U/L — SIGNIFICANT CHANGE UP (ref 40–120)
ALT FLD-CCNC: 14 U/L — SIGNIFICANT CHANGE UP (ref 10–45)
ANION GAP SERPL CALC-SCNC: 11 MMOL/L — SIGNIFICANT CHANGE UP (ref 5–17)
APTT BLD: 28.7 SEC — SIGNIFICANT CHANGE UP (ref 24.5–35.6)
APTT BLD: 38.9 SEC — HIGH (ref 24.5–35.6)
AST SERPL-CCNC: 23 U/L — SIGNIFICANT CHANGE UP (ref 10–40)
BILIRUB SERPL-MCNC: 0.6 MG/DL — SIGNIFICANT CHANGE UP (ref 0.2–1.2)
BUN SERPL-MCNC: 26 MG/DL — HIGH (ref 7–23)
CALCIUM SERPL-MCNC: 9.3 MG/DL — SIGNIFICANT CHANGE UP (ref 8.4–10.5)
CHLORIDE SERPL-SCNC: 104 MMOL/L — SIGNIFICANT CHANGE UP (ref 96–108)
CHOLEST SERPL-MCNC: 195 MG/DL — SIGNIFICANT CHANGE UP
CO2 SERPL-SCNC: 21 MMOL/L — LOW (ref 22–31)
CREAT SERPL-MCNC: 1.53 MG/DL — HIGH (ref 0.5–1.3)
EGFR: 47 ML/MIN/1.73M2 — LOW
ESTIMATED AVERAGE GLUCOSE: 108 MG/DL — SIGNIFICANT CHANGE UP (ref 68–114)
GLUCOSE SERPL-MCNC: 124 MG/DL — HIGH (ref 70–99)
HCT VFR BLD CALC: 39.5 % — SIGNIFICANT CHANGE UP (ref 39–50)
HDLC SERPL-MCNC: 51 MG/DL — SIGNIFICANT CHANGE UP
HGB BLD-MCNC: 13.2 G/DL — SIGNIFICANT CHANGE UP (ref 13–17)
INR BLD: 1.08 RATIO — SIGNIFICANT CHANGE UP (ref 0.85–1.16)
LACTATE SERPL-SCNC: 1.4 MMOL/L — SIGNIFICANT CHANGE UP (ref 0.5–2)
LIPID PNL WITH DIRECT LDL SERPL: 121 MG/DL — HIGH
MAGNESIUM SERPL-MCNC: 2 MG/DL — SIGNIFICANT CHANGE UP (ref 1.6–2.6)
MCHC RBC-ENTMCNC: 31.9 PG — SIGNIFICANT CHANGE UP (ref 27–34)
MCHC RBC-ENTMCNC: 33.4 G/DL — SIGNIFICANT CHANGE UP (ref 32–36)
MCV RBC AUTO: 95.4 FL — SIGNIFICANT CHANGE UP (ref 80–100)
NON HDL CHOLESTEROL: 144 MG/DL — HIGH
NRBC # BLD: 0 /100 WBCS — SIGNIFICANT CHANGE UP (ref 0–0)
NRBC BLD-RTO: 0 /100 WBCS — SIGNIFICANT CHANGE UP (ref 0–0)
PHOSPHATE SERPL-MCNC: 4.1 MG/DL — SIGNIFICANT CHANGE UP (ref 2.5–4.5)
PLATELET # BLD AUTO: 165 K/UL — SIGNIFICANT CHANGE UP (ref 150–400)
POTASSIUM SERPL-MCNC: 4.8 MMOL/L — SIGNIFICANT CHANGE UP (ref 3.5–5.3)
POTASSIUM SERPL-SCNC: 4.8 MMOL/L — SIGNIFICANT CHANGE UP (ref 3.5–5.3)
PROT SERPL-MCNC: 6.4 G/DL — SIGNIFICANT CHANGE UP (ref 6–8.3)
PROTHROM AB SERPL-ACNC: 12.3 SEC — SIGNIFICANT CHANGE UP (ref 9.9–13.4)
RBC # BLD: 4.14 M/UL — LOW (ref 4.2–5.8)
RBC # FLD: 13.5 % — SIGNIFICANT CHANGE UP (ref 10.3–14.5)
SODIUM SERPL-SCNC: 136 MMOL/L — SIGNIFICANT CHANGE UP (ref 135–145)
TRIGL SERPL-MCNC: 129 MG/DL — SIGNIFICANT CHANGE UP
WBC # BLD: 8.78 K/UL — SIGNIFICANT CHANGE UP (ref 3.8–10.5)
WBC # FLD AUTO: 8.78 K/UL — SIGNIFICANT CHANGE UP (ref 3.8–10.5)

## 2025-01-25 PROCEDURE — 99232 SBSQ HOSP IP/OBS MODERATE 35: CPT | Mod: GC

## 2025-01-25 RX ORDER — ONDANSETRON 4 MG/1
4 TABLET, ORALLY DISINTEGRATING ORAL ONCE
Refills: 0 | Status: COMPLETED | OUTPATIENT
Start: 2025-01-25 | End: 2025-01-25

## 2025-01-25 RX ORDER — APIXABAN 5 MG/1
10 TABLET, FILM COATED ORAL EVERY 12 HOURS
Refills: 0 | Status: DISCONTINUED | OUTPATIENT
Start: 2025-01-25 | End: 2025-01-31

## 2025-01-25 RX ADMIN — ACETAMINOPHEN 650 MILLIGRAM(S): 160 SUSPENSION ORAL at 21:18

## 2025-01-25 RX ADMIN — Medication 1000 UNIT(S)/HR: at 07:21

## 2025-01-25 RX ADMIN — Medication 100 MILLIGRAM(S): at 05:03

## 2025-01-25 RX ADMIN — Medication 500 MILLIGRAM(S): at 12:10

## 2025-01-25 RX ADMIN — DAPAGLIFLOZIN 10 MILLIGRAM(S): 5 TABLET, FILM COATED ORAL at 18:10

## 2025-01-25 RX ADMIN — Medication 100 MILLIGRAM(S): at 13:16

## 2025-01-25 RX ADMIN — ANTISEPTIC SURGICAL SCRUB 1 APPLICATION(S): 0.04 SOLUTION TOPICAL at 12:11

## 2025-01-25 RX ADMIN — Medication 100 MILLIGRAM(S): at 21:17

## 2025-01-25 RX ADMIN — Medication 1 TABLET(S): at 12:10

## 2025-01-25 RX ADMIN — ONDANSETRON 4 MILLIGRAM(S): 4 TABLET, ORALLY DISINTEGRATING ORAL at 17:58

## 2025-01-25 RX ADMIN — Medication 5 MILLIGRAM(S): at 12:10

## 2025-01-25 RX ADMIN — ATORVASTATIN CALCIUM 80 MILLIGRAM(S): 80 TABLET, FILM COATED ORAL at 21:18

## 2025-01-25 RX ADMIN — ACETAMINOPHEN 650 MILLIGRAM(S): 160 SUSPENSION ORAL at 22:18

## 2025-01-25 RX ADMIN — Medication 75 MILLIGRAM(S): at 12:09

## 2025-01-25 RX ADMIN — TAMSULOSIN HYDROCHLORIDE 0.4 MILLIGRAM(S): 0.4 CAPSULE ORAL at 21:18

## 2025-01-25 RX ADMIN — APIXABAN 10 MILLIGRAM(S): 5 TABLET, FILM COATED ORAL at 13:16

## 2025-01-25 RX ADMIN — Medication 240 MILLIGRAM(S): at 12:10

## 2025-01-25 RX ADMIN — Medication 1200 UNIT(S)/HR: at 12:07

## 2025-01-25 RX ADMIN — Medication 1000 UNIT(S)/HR: at 05:01

## 2025-01-25 RX ADMIN — Medication 25 MILLIGRAM(S): at 12:09

## 2025-01-25 NOTE — PROVIDER CONTACT NOTE (OTHER) - ACTION/TREATMENT ORDERED:
Provider made aware. No interventions at this present time. Will let provider know if there are any HD changes. CBC to be drawn in AM. Monitoring remains ongoing.

## 2025-01-25 NOTE — DISCHARGE NOTE PROVIDER - NSDCMRMEDTOKEN_GEN_ALL_CORE_FT
acetaminophen 325 mg oral tablet: 3 tab(s) orally every 8 hours As needed Moderate Pain (4 - 6), Severe Pain (7 - 10)  apixaban 5 mg oral tablet: 1 tab(s) orally every 12 hours  ascorbic acid 500 mg oral tablet: 1 tab(s) orally once a day  bisacodyl 10 mg rectal suppository: 1 suppository(ies) rectal once a day As needed Constipation  calcium carbonate 500 mg (200 mg elemental calcium) oral tablet, chewable: 1 tab(s) orally 3 times a day As needed Heartburn  dapagliflozin 10 mg oral tablet: 1 tab(s) orally every 24 hours  finasteride 5 mg oral tablet: 1 tab(s) orally once a day  isosorbide mononitrate 120 mg oral tablet, extended release: 2 tab(s) orally every 24 hours  lidocaine 4% topical film: Apply topically to affected area once a day as needed for  pain Apply to left leg  Lipitor 80 mg oral tablet: 1 tab(s) orally once a day (at bedtime)  Multiple Vitamins oral tablet: 1 tab(s) orally once a day  pantoprazole 40 mg oral delayed release tablet: 1 tab(s) orally once a day  Plavix 75 mg oral tablet: 1 tab(s) orally once a day  polyethylene glycol 3350 oral powder for reconstitution: 17 gram(s) orally once a day  ranolazine 1000 mg oral tablet, extended release: 1 tab(s) orally 2 times a day  senna leaf extract oral tablet: 2 tab(s) orally once a day (at bedtime)  simethicone 80 mg oral tablet, chewable: 1 tab(s) orally 2 times a day As needed Heartburn  spironolactone 25 mg oral tablet: 1 tab(s) orally every 24 hours  tamsulosin 0.4 mg oral capsule: 1 cap(s) orally once a day (at bedtime)  Toprol-XL 50 mg oral tablet, extended release: 2.5 tab(s) orally once a day Take 2.5 tablets once daily for a total of 125mg   acetaminophen 325 mg oral tablet: 3 tab(s) orally every 8 hours As needed Moderate Pain (4 - 6), Severe Pain (7 - 10)  apixaban 5 mg oral tablet: 1 tab(s) orally every 12 hours Start taking 5 mg apixaban twice daily starting February 2nd.  apixaban 5 mg oral tablet: 2 tab(s) orally every 12 hours Only continue 10 mg of apixaban twice daily through February 1st. Then take 5 mg apixaban twice daily after.  ascorbic acid 500 mg oral tablet: 1 tab(s) orally once a day  calcium carbonate 500 mg (200 mg elemental calcium) oral tablet, chewable: 1 tab(s) orally 3 times a day As needed Heartburn  dapagliflozin 10 mg oral tablet: 1 tab(s) orally every 24 hours  doxazosin 4 mg oral tablet: 1 tab(s) orally once a day (at bedtime)  finasteride 5 mg oral tablet: 1 tab(s) orally once a day  isosorbide mononitrate 120 mg oral tablet, extended release: 1 tab(s) orally once a day  lidocaine 4% topical film: Apply topically to affected area once a day as needed for  pain Apply to left leg  metoprolol tartrate 25 mg oral tablet: 1 tab(s) orally 2 times a day  Multiple Vitamins oral tablet: 1 tab(s) orally once a day  pantoprazole 40 mg oral delayed release tablet: 1 tab(s) orally once a day  Plavix 75 mg oral tablet: 1 tab(s) orally once a day  polyethylene glycol 3350 oral powder for reconstitution: 17 gram(s) orally once a day  rosuvastatin 40 mg oral tablet: 1 tab(s) orally once a day (at bedtime)  senna leaf extract oral tablet: 2 tab(s) orally once a day (at bedtime)  spironolactone 25 mg oral tablet: 1 tab(s) orally every 24 hours   acetaminophen 325 mg oral tablet: 3 tab(s) orally every 8 hours As needed Moderate Pain (4 - 6), Severe Pain (7 - 10)  apixaban 5 mg oral tablet: 1 tab(s) orally every 12 hours Start taking 5 mg apixaban twice daily starting February 2nd.  apixaban 5 mg oral tablet: 2 tab(s) orally every 12 hours Only continue 10 mg of apixaban twice daily through February 1st. Then take 5 mg apixaban twice daily after.  ascorbic acid 500 mg oral tablet: 1 tab(s) orally once a day  calcium carbonate 500 mg (200 mg elemental calcium) oral tablet, chewable: 1 tab(s) orally 3 times a day As needed Heartburn  dapagliflozin 10 mg oral tablet: 1 tab(s) orally every 24 hours  doxazosin 4 mg oral tablet: 1 tab(s) orally once a day (at bedtime)  finasteride 5 mg oral tablet: 1 tab(s) orally once a day  isosorbide mononitrate 120 mg oral tablet, extended release: 1 tab(s) orally once a day  lidocaine 4% topical film: Apply topically to affected area once a day as needed for  pain Apply to left leg  metoprolol tartrate 25 mg oral tablet: 1 tab(s) orally 2 times a day  Multiple Vitamins oral tablet: 1 tab(s) orally once a day  nitroglycerin 0.4 mg sublingual tablet: 1 tab(s) sublingually every 5 minutes as needed for  chest pain may administer up to 2 tablets in a 15-minute period  pantoprazole 40 mg oral delayed release tablet: 1 tab(s) orally once a day  Plavix 75 mg oral tablet: 1 tab(s) orally once a day  polyethylene glycol 3350 oral powder for reconstitution: 17 gram(s) orally once a day  rosuvastatin 40 mg oral tablet: 1 tab(s) orally once a day (at bedtime)  senna leaf extract oral tablet: 2 tab(s) orally once a day (at bedtime)  spironolactone 25 mg oral tablet: 1 tab(s) orally every 24 hours

## 2025-01-25 NOTE — DISCHARGE NOTE PROVIDER - NSDCCPCAREPLAN_GEN_ALL_CORE_FT
PRINCIPAL DISCHARGE DIAGNOSIS  Diagnosis: NSTEMI (non-ST elevation myocardial infarction)  Assessment and Plan of Treatment: You presented to the hospital after a fall and was found to have heart damage. This was likely because you were no longer taking the medications for your heart. You underwent a catheterization of your heart and was found that there were vessels that were smaller than they should be. No stents could be inserted because of the angle of the blood vessels, so you were managed with blood thinners.   Please return to the hospital if you have increasing chest pain or shortness of breath.  Please continue taking all of your medications and have a pharmacy refill the medications once the bottles empty. You will keep taking these medications every day and every month, do not stop taking them.     PRINCIPAL DISCHARGE DIAGNOSIS  Diagnosis: NSTEMI (non-ST elevation myocardial infarction)  Assessment and Plan of Treatment: You presented to the hospital after a fall and was found to have heart damage. This was likely because you were no longer taking the medications for your heart. You underwent a catheterization of your heart and was found that there were vessels that were smaller than they should be. No stents could be inserted because of the angle of the blood vessels, so you were managed with blood thinners and other medications to promote blood flow through the heart.   Please return to the hospital if you have increasing chest pain or shortness of breath.  - Please continue taking all of your medications and have a pharmacy refill the medications once the bottles empty. You will keep taking these medications every day and every month, do not stop taking them.  - continue Eliquis 10 mg twice daily through February 1st. Starting February 2nd, start taking Eliquis 5 mg twice daily  - Please follow up with your vascular surgeon for removal of sutures and follow up within 1 week ([phone])  - Please follow up with your cardiologist for continued adjustment of medications within 1 week  - Please follow up with urology to address the blood in your urine within 1 month

## 2025-01-25 NOTE — DISCHARGE NOTE PROVIDER - NSDCCPTREATMENT_GEN_ALL_CORE_FT
PRINCIPAL PROCEDURE  Procedure: Left heart cardiac cath  Findings and Treatment:   < end of copied text >  Diagnostic Conclusions:   Patent LIMA to LAD and occluded LCX and RCA.  Ischemia likely from  medium sized D1 however, because of the angle of  takeoff from the LAD- PCI of the diagonal would be quite difficult.  Maximize antianginal therapy< from: Cardiac Catheterization (01.24.25 @ 13:26) >

## 2025-01-25 NOTE — PHYSICAL THERAPY INITIAL EVALUATION ADULT - ADDITIONAL COMMENTS
Pt states he lives in an apt with not steps to enter bldg and elevator to his apt. Pt states that he uses a RW for ambulation and has had no other falls previous to this one.

## 2025-01-25 NOTE — PROGRESS NOTE ADULT - PROBLEM SELECTOR PLAN 2
Left lower extremity with erythema and increased heat in setting of increased infection risk with chronic PAD. No leukocytosis or fever.     Plan:  - Ancef 1 g q8h (01/24 - 01/28)  - f/u MRSA swab  - wound care consulted, recs appreciated Left lower extremity with erythema and increased heat in setting of increased infection risk with chronic PAD. No leukocytosis or fever.     Plan:  - Ancef 1 g q8h (01/24 - 01/28)  - wound care consulted, recs appreciated

## 2025-01-25 NOTE — PROGRESS NOTE ADULT - ASSESSMENT
Elton Sanabria is a 74-year-old male, Polish speaking, PMHx of CAD s/p PCI and minimally invasive direct CABG (LIMA --> LAD) 10/31/24, ICM, HFrEF, HTN, CKD, and PAD s/p prior L to R fem-fem bypass (>20yrs ago), who initially presented to [facility] after a mechanical fall and transferred to [facility] for NSTEMI. Pending heart cath. Elton Sanabria is a 74-year-old male, Polish speaking, PMHx of CAD s/p PCI and minimally invasive direct CABG (LIMA --> LAD) 10/31/24, ICM, HFrEF, HTN, CKD, and PAD s/p prior L to R fem-fem bypass (>20yrs ago), who initially presented to [facility] after a mechanical fall and transferred to [facility] for NSTEMI. s/p LHC, no stent due to anatomy.

## 2025-01-25 NOTE — DISCHARGE NOTE PROVIDER - NSFOLLOWUPCLINICS_GEN_ALL_ED_FT
Cardiology at [facility]  Cardiology  [address] [place] [address]  Phone: [phone]  Fax:   Follow Up Time: 1 week

## 2025-01-25 NOTE — PROGRESS NOTE ADULT - PROBLEM SELECTOR PLAN 1
Presenting as a transfer from [place] for NSTEMI. Patient self-discontinued all medications for 3-4 weeks prior to admission, stating that he had finished the course of medications. Chest pain on presentation similar to prior episodes of chest pain.  - s/p Aspirin and plavix loading at     Plan:  - heparin ggt  - Cardiology consulted, recs appreciated  - [facility] on 01/24  - f/u lipid panel in AM  - f/u hgbA1c in AM  - c/w aspirin 81 mg qd  - c/w clopidogrel 75 mg qd  - c/w heparin ggt  - c/w Imdur 240 mg qd   - c/w metoprolol succinate 125 mg qd  - c/w ranolazine qd  - c/w spironolactone 25 mg qd Presenting as a transfer from [place] for NSTEMI. Patient self-discontinued all medications for 3-4 weeks prior to admission, stating that he had finished the course of medications. Chest pain on presentation similar to prior episodes of chest pain.  - TTE with decreased EF 40-45%  - s/p Aspirin and plavix loading at VS    Plan:  - heparin ggt -> Eliquis 10 mg BID loading dose  - Cardiology consulted, recs appreciated  - [facility] on 01/24  - d/c aspirin 81 mg qd  - c/w clopidogrel 75 mg qd  - c/w Imdur 240 mg qd   - c/w carvedilol 25 mg BID  - c/w ranolazine qd  - c/w spironolactone 25 mg qd

## 2025-01-25 NOTE — PROGRESS NOTE ADULT - PROBLEM SELECTOR PLAN 8
- DVT Prophylaxis: heparin ggt  - PT consulted, recs appreciated  - Dispo: pending cardiac workup and PT evaluation - DVT Prophylaxis: Eliquis  - Diet: soft and bite sized  - PT consulted, recs appreciated - MIGUEL  - Dispo: pending cardiac workup and MIGUEL placement

## 2025-01-25 NOTE — PROGRESS NOTE ADULT - PROBLEM SELECTOR PLAN 7
Patient with poor health literacy. Self-discontinued all home medications after finishing the bottles. No social support.    Plan:  - social work consult, recs appreciated

## 2025-01-25 NOTE — DISCHARGE NOTE PROVIDER - PROVIDER TOKENS
PROVIDER:[TOKEN:[[id number]],FOLLOWUP:[1 week],ESTABLISHEDPATIENT:[T]],PROVIDER:[[id number]],FOLLOWUP:[1 week],ESTABLISHEDPATIENT:[T]] PROVIDER:[TOKEN:[[id number]],FOLLOWUP:[1 week],ESTABLISHEDPATIENT:[T]],PROVIDER:[[id number]],FOLLOWUP:[1 week],ESTABLISHEDPATIENT:[T]],PROVIDER:[TOKEN:[[id number]],FOLLOWUP:[1-3 days],ESTABLISHEDPATIENT:[T]],PROVIDER:[TOKEN:[[id number]],FOLLOWUP:[1 month]]

## 2025-01-25 NOTE — PROGRESS NOTE ADULT - PROBLEM SELECTOR PLAN 5
Patient with likely stage 3 CKD. EGFR has varied between 40s-50.    Plan:  - CTM  - PCP follow up outpatient

## 2025-01-25 NOTE — PHYSICAL THERAPY INITIAL EVALUATION ADULT - NSPTDISCHREC_GEN_A_CORE
Subacute Rehab - Pt will benefit from subacute rehab prior to D/C home to maximize functional independence. If pt d/c home pt requires assist for all functional mobility & home PT for progressive ambulation training, transfers, bed mobs. DME: transport chair, shower chair/Sub-acute Rehab

## 2025-01-25 NOTE — DISCHARGE NOTE PROVIDER - CARE PROVIDERS DIRECT ADDRESSES
[email or web address]

## 2025-01-25 NOTE — DISCHARGE NOTE PROVIDER - ATTENDING DISCHARGE PHYSICAL EXAMINATION:
Vital Signs Last 24 Hrs  T(C): 36.9 (31 Jan 2025 15:54), Max: 37.4 (30 Jan 2025 20:00)  T(F): 98.5 (31 Jan 2025 15:54), Max: 99.3 (30 Jan 2025 20:00)  HR: 64 (31 Jan 2025 15:54) (64 - 74)  BP: 121/66 (31 Jan 2025 15:54) (97/56 - 121/66)  BP(mean): --  RR: 18 (31 Jan 2025 15:54) (17 - 18)  SpO2: 98% (31 Jan 2025 15:54) (94% - 98%)    Parameters below as of 31 Jan 2025 15:54  Patient On (Oxygen Delivery Method): room air        GENERAL: NAD  HEAD:  Atraumatic, Normocephalic  EYES: EOMI, conjunctiva and sclera clear  CHEST/LUNG: Clear to auscultation bilaterally; No rales, rhonchi, wheezing, or rubs  HEART: Regular rate and rhythm; No murmurs, rubs, or gallops  ABDOMEN: Soft, mildly tender to palpation, Nondistended;   SKIN: Chronic stasis changes on bilateral lower extremities  NERVOUS SYSTEM:  Alert & Oriented X3, no focal deficits

## 2025-01-25 NOTE — PHYSICAL THERAPY INITIAL EVALUATION ADULT - PERTINENT HX OF CURRENT PROBLEM, REHAB EVAL
74-year-old male, Polish speaking, PMHx of CAD s/p PCI and minimally invasive direct CABG (LIMA --> LAD) 10/31/24, ICM, HFrEF, HTN, CKD, and PAD s/p prior L to R fem-fem bypass (>20yrs ago), who initially presented to [facility] after a mechanical fall and transferred to [facility] for NSTEMI. Pending heart cath. Dx: NSTEMI (non-ST elevation myocardial infarction), Cellulitis Left lower extremity,  Fall from ground level - CT head and x-rays of bilateral lower extremities without acute findings, Peripheral artery disease, stage 3 kidney failure, Hx of BPH, pt with poor health literacy.

## 2025-01-25 NOTE — DISCHARGE NOTE PROVIDER - NSDCFUADDAPPT_GEN_ALL_CORE_FT
APPTS ARE READY TO BE MADE: [X] YES    Best Family or Patient Contact (if needed):    Additional Information about above appointments (if needed):    1: Cardiology within 1 week  2: Primary care within 1 week  3:     Other comments or requests:    APPTS ARE READY TO BE MADE: [X] YES    Best Family or Patient Contact (if needed):    Additional Information about above appointments (if needed):    1: Cardiology within 1 week  2: Primary care within 1 week  3:     Other comments or requests:     Patient is being discharged to rehab. Caregiver will arrange follow up.   APPTS ARE READY TO BE MADE: [X] YES    Best Family or Patient Contact (if needed):    Additional Information about above appointments (if needed):    1: Cardiology within 1 week  2: Primary care within 1 week  3: Urology within 1 month  4: Vascular surgery within 1 week    Other comments or requests:     Patient is being discharged to rehab. Caregiver will arrange follow up.

## 2025-01-25 NOTE — PROGRESS NOTE ADULT - SUBJECTIVE AND OBJECTIVE BOX
ODALYS HAGER  74y  MRN: [id number]    Patient is a 74y old  Male who presents with a chief complaint of GLF and NSTEMI (24 Jan 2025 15:28)      Interval/Overnight Events: no events ON.     Subjective: Pt seen and examined at bedside.     MEDICATIONS  (STANDING):  ascorbic acid 500 milliGRAM(s) Oral daily  aspirin  chewable 81 milliGRAM(s) Oral daily  atorvastatin 80 milliGRAM(s) Oral at bedtime  carvedilol 25 milliGRAM(s) Oral every 12 hours  ceFAZolin   IVPB 1000 milliGRAM(s) IV Intermittent every 8 hours  chlorhexidine 2% Cloths 1 Application(s) Topical daily  clopidogrel Tablet 75 milliGRAM(s) Oral daily  dapagliflozin 10 milliGRAM(s) Oral every 24 hours  finasteride 5 milliGRAM(s) Oral daily  heparin  Infusion.  Unit(s)/Hr (8 mL/Hr) IV Continuous <Continuous>  isosorbide   mononitrate ER Tablet (IMDUR) 240 milliGRAM(s) Oral daily  multivitamin 1 Tablet(s) Oral daily  ranolazine 1000 milliGRAM(s) Oral two times a day  spironolactone 25 milliGRAM(s) Oral every 24 hours  tamsulosin 0.4 milliGRAM(s) Oral at bedtime    MEDICATIONS  (PRN):  acetaminophen     Tablet .. 650 milliGRAM(s) Oral every 6 hours PRN Temp greater or equal to 38C (100.4F), Mild Pain (1 - 3)      Objective:    Vitals: Vital Signs Last 24 Hrs  T(C): 37 (01-25-25 @ 04:06), Max: 37.1 (01-24-25 @ 21:00)  T(F): 98.6 (01-25-25 @ 04:06), Max: 98.8 (01-24-25 @ 21:00)  HR: 69 (01-25-25 @ 04:06) (68 - 98)  BP: 103/54 (01-25-25 @ 04:06) (99/50 - 206/96)  BP(mean): 90 (01-24-25 @ 17:59) (71 - 137)  RR: 18 (01-25-25 @ 04:06) (15 - 18)  SpO2: 97% (01-25-25 @ 04:06) (94% - 100%)                I&O's Summary    24 Jan 2025 07:01  -  25 Jan 2025 07:00  --------------------------------------------------------  IN: 530 mL / OUT: 1650 mL / NET: -1120 mL        PHYSICAL EXAM:  GENERAL: NAD  HEAD:  Atraumatic, Normocephalic  EYES: EOMI, conjunctiva and sclera clear  CHEST/LUNG: Clear to auscultation bilaterally; No rales, rhonchi, wheezing, or rubs  HEART: Regular rate and rhythm; No murmurs, rubs, or gallops  ABDOMEN: Soft, Nontender, Nondistended;   SKIN: No rashes or lesions  NERVOUS SYSTEM:  Alert & Oriented X3, no focal deficits    LABS:                        13.2   8.78  )-----------( 165      ( 25 Jan 2025 04:26 )             39.5                         14.8   7.43  )-----------( 182      ( 24 Jan 2025 13:12 )             44.0                         12.2   6.18  )-----------( 162      ( 24 Jan 2025 06:44 )             37.7     01-25    136  |  104  |  26[H]  ----------------------------<  124[H]  4.8   |  21[L]  |  1.53[H]  01-24    138  |  103  |  25[H]  ----------------------------<  104[H]  4.2   |  23  |  1.44[H]  01-24    138  |  104  |  25[H]  ----------------------------<  117[H]  4.9   |  22  |  1.44[H]    Ca    9.3      25 Jan 2025 04:26  Ca    9.1      24 Jan 2025 06:44  Ca    9.5      24 Jan 2025 02:41  Phos  4.1     01-25  Mg     2.0     01-25    TPro  6.4  /  Alb  3.5  /  TBili  0.6  /  DBili  x   /  AST  23  /  ALT  14  /  AlkPhos  65  01-25  TPro  6.8  /  Alb  3.8  /  TBili  0.4  /  DBili  x   /  AST  25  /  ALT  16  /  AlkPhos  77  01-24  TPro  7.8  /  Alb  3.5  /  TBili  0.3  /  DBili  x   /  AST  21  /  ALT  23  /  AlkPhos  96  01-23    CAPILLARY BLOOD GLUCOSE        PT/INR - ( 25 Jan 2025 04:26 )   PT: 12.3 sec;   INR: 1.08 ratio         PTT - ( 25 Jan 2025 04:26 )  PTT:28.7 sec    Urinalysis Basic - ( 25 Jan 2025 04:26 )    Color: x / Appearance: x / SG: x / pH: x  Gluc: 124 mg/dL / Ketone: x  / Bili: x / Urobili: x   Blood: x / Protein: x / Nitrite: x   Leuk Esterase: x / RBC: x / WBC x   Sq Epi: x / Non Sq Epi: x / Bacteria: x          RADIOLOGY & ADDITIONAL TESTS:    Imaging Personally Reviewed:  [x ] YES  [ ] NO    Consultants involved in case:   Consultant(s) Notes Reviewed:  [ x] YES  [ ] NO:   Care Discussed with Consultants/Other Providers [x ] YES  [ ] NO           ODALYS HAGER  74y  MRN: [id number]    Patient is a 74y old  Male who presents with a chief complaint of GLF and NSTEMI (24 Jan 2025 15:28)    Translation: [id number]  Interval/Overnight Events: no events ON.     Subjective: Pt seen and examined at bedside. Feels well this AM, had 3x episodes of chest pain since admission, but can't remember if they were before or after the catheterization. Drinking without issue, but troubles with eating in setting of dentures still being at home. Had a bowel movement today. No SOB.    MEDICATIONS  (STANDING):  ascorbic acid 500 milliGRAM(s) Oral daily  aspirin  chewable 81 milliGRAM(s) Oral daily  atorvastatin 80 milliGRAM(s) Oral at bedtime  carvedilol 25 milliGRAM(s) Oral every 12 hours  ceFAZolin   IVPB 1000 milliGRAM(s) IV Intermittent every 8 hours  chlorhexidine 2% Cloths 1 Application(s) Topical daily  clopidogrel Tablet 75 milliGRAM(s) Oral daily  dapagliflozin 10 milliGRAM(s) Oral every 24 hours  finasteride 5 milliGRAM(s) Oral daily  heparin  Infusion.  Unit(s)/Hr (8 mL/Hr) IV Continuous <Continuous>  isosorbide   mononitrate ER Tablet (IMDUR) 240 milliGRAM(s) Oral daily  multivitamin 1 Tablet(s) Oral daily  ranolazine 1000 milliGRAM(s) Oral two times a day  spironolactone 25 milliGRAM(s) Oral every 24 hours  tamsulosin 0.4 milliGRAM(s) Oral at bedtime    MEDICATIONS  (PRN):  acetaminophen     Tablet .. 650 milliGRAM(s) Oral every 6 hours PRN Temp greater or equal to 38C (100.4F), Mild Pain (1 - 3)      Objective:    Vitals: Vital Signs Last 24 Hrs  T(C): 37 (01-25-25 @ 04:06), Max: 37.1 (01-24-25 @ 21:00)  T(F): 98.6 (01-25-25 @ 04:06), Max: 98.8 (01-24-25 @ 21:00)  HR: 69 (01-25-25 @ 04:06) (68 - 98)  BP: 103/54 (01-25-25 @ 04:06) (99/50 - 206/96)  BP(mean): 90 (01-24-25 @ 17:59) (71 - 137)  RR: 18 (01-25-25 @ 04:06) (15 - 18)  SpO2: 97% (01-25-25 @ 04:06) (94% - 100%)                I&O's Summary    24 Jan 2025 07:01  -  25 Jan 2025 07:00  --------------------------------------------------------  IN: 530 mL / OUT: 1650 mL / NET: -1120 mL        PHYSICAL EXAM:  GENERAL: NAD  HEAD:  Atraumatic, Normocephalic  EYES: EOMI, conjunctiva and sclera clear  CHEST/LUNG: Clear to auscultation bilaterally; No rales, rhonchi, wheezing, or rubs  HEART: Regular rate and rhythm; No murmurs, rubs, or gallops  ABDOMEN: Soft, Nontender, Nondistended;   SKIN: right lower leg wrapped, no ROBERT  NERVOUS SYSTEM:  Alert & Oriented X3, no focal deficits    LABS:                        13.2   8.78  )-----------( 165      ( 25 Jan 2025 04:26 )             39.5                         14.8   7.43  )-----------( 182      ( 24 Jan 2025 13:12 )             44.0                         12.2   6.18  )-----------( 162      ( 24 Jan 2025 06:44 )             37.7     01-25    136  |  104  |  26[H]  ----------------------------<  124[H]  4.8   |  21[L]  |  1.53[H]  01-24    138  |  103  |  25[H]  ----------------------------<  104[H]  4.2   |  23  |  1.44[H]  01-24    138  |  104  |  25[H]  ----------------------------<  117[H]  4.9   |  22  |  1.44[H]    Ca    9.3      25 Jan 2025 04:26  Ca    9.1      24 Jan 2025 06:44  Ca    9.5      24 Jan 2025 02:41  Phos  4.1     01-25  Mg     2.0     01-25    TPro  6.4  /  Alb  3.5  /  TBili  0.6  /  DBili  x   /  AST  23  /  ALT  14  /  AlkPhos  65  01-25  TPro  6.8  /  Alb  3.8  /  TBili  0.4  /  DBili  x   /  AST  25  /  ALT  16  /  AlkPhos  77  01-24  TPro  7.8  /  Alb  3.5  /  TBili  0.3  /  DBili  x   /  AST  21  /  ALT  23  /  AlkPhos  96  01-23    CAPILLARY BLOOD GLUCOSE        PT/INR - ( 25 Jan 2025 04:26 )   PT: 12.3 sec;   INR: 1.08 ratio         PTT - ( 25 Jan 2025 04:26 )  PTT:28.7 sec    Urinalysis Basic - ( 25 Jan 2025 04:26 )    Color: x / Appearance: x / SG: x / pH: x  Gluc: 124 mg/dL / Ketone: x  / Bili: x / Urobili: x   Blood: x / Protein: x / Nitrite: x   Leuk Esterase: x / RBC: x / WBC x   Sq Epi: x / Non Sq Epi: x / Bacteria: x          RADIOLOGY & ADDITIONAL TESTS:    Imaging Personally Reviewed:  [x ] YES  [ ] NO    Consultants involved in case:   Consultant(s) Notes Reviewed:  [ x] YES  [ ] NO:   Care Discussed with Consultants/Other Providers [x ] YES  [ ] NO

## 2025-01-25 NOTE — PROGRESS NOTE ADULT - PROBLEM SELECTOR PLAN 3
Patient falling s/p feeling legs were shaky and unbalanced in setting of chronic PAD and poor ambulation at baseline. Extensive cardiac history is also a contributing factor and subsequent increased chest pain while in transit to the hospital.   - CT head and x-rays of bilateral lower extremities without acute findings    Plan:  - NSTEMI workup/treatment as above  - PT consulted, recs appreciated  - risk precautions

## 2025-01-25 NOTE — PROGRESS NOTE ADULT - PROBLEM SELECTOR PLAN 6
Patient was prescribed tamsulosin and finasteride at home. Patient was not taking these in the last 4 weeks.    Plan:  - continue home tamsulosin  - continue home finasteride  - bladder scans q8h

## 2025-01-25 NOTE — DISCHARGE NOTE PROVIDER - HOSPITAL COURSE
HPI:  74M, Polish speaking, PMHx of CAD s/p PCI and minimally invasive direct CABG (LIMA --> LAD) 10/31/24, ICM, HFrEF, HTN, CKD, and PAD s/p prior L to R fem-fem bypass (>20yrs ago), who initially presented to [facility] after a mechanical fall and transferred to [facility] for NSTEMI.    Patient states that 3 days ago, his legs started to feel shaky. Over the 3 days, this shakiness continued to worsen. He usually walks at home with a walker and when he was walking to the bathroom, he felt unsteady and fell to the ground. Denies head strike. Denies chest pain during the event. He called EMS and was brought to [facility]. Patient notes that 3-4 weeks ago, he "finished" the course of all of his medications from his last hospitalization in 11/2024 after the bottles became empty. Since that time he has not taken any home medications. ROS otherwise negative.    Patient underwent minimally invasive direct CABG in 10/24. Patient also with recent extensive hospitalization in 11/10/24-11/27/24 where he was admitted to the vascular surgery service for [place] angiogram with EIA and FELICITY stents and ROBERT [id number]-MY-[id number] bypass c/b hematoma. While admitted, had an NSTEMI and was transferred to cardiology service. He underwent LHC on 11/19 with patent LIMA to LAD graft, LCx ostial , ostial RCA w/ known   in 10/24. Continued to have angina and was briefly in CCU for nitro ggt. Discharged and followed up with Dr. Marquis Pink on 12/09/24.    [facility] ED Course: On arrival to [facility] vitals noted at /97 |  | RR 18 | T 97.5 | SpO2 98% ORA. Patient was complaining of L sided chest pain, L knee pain. Initial ECG at [facility] w/ STD in lateral leads. CBC WNL. Coags WNL. CMP w/ stable CKD (Cr 1.63, baseline 1.52). hsTroponin I noted at 174.4. CTH w/ no acute pathology/intracranial hemorrhage Patient was loaded with  mg, plavix 300 mg, heparin ggt started in  ED. Patient thereafter transferred to [facility] for NSTEMI    [facility] ED: Upon arrival to the ED at [facility]. Patient stated that his chest pain has resolved.  (24 Jan 2025 09:35)    Hospital Course:    Cardiology was consulted. Patient underwent a LHC on 01/24 and found Patent LIMA to LAD and occluded LCX and RCA.  Ischemia likely from medium sized D1 however, because of the angle of takeoff from the LAD- PCI of the diagonal would be quite difficult, so patient was managed with medical management. Heparin drip was transitioned to Eliquis. Patient also presented with a left lower extremity cellulitis and was started on Ancef. Social work was consulted due to patient's lack of support system. PT was consulted and recommended sub-acute rehab.       Important Medication Changes and Reason:    Active or Pending Issues Requiring Follow-up:    Advanced Directives:   [ ] Full code  [ ] DNR  [ ] Hospice    Discharge Diagnoses:  - NSTEMI  - ground level fall           HPI:  74M, Polish speaking, PMHx of CAD s/p PCI and minimally invasive direct CABG (LIMA --> LAD) 10/31/24, ICM, HFrEF, HTN, CKD, and PAD s/p prior L to R fem-fem bypass (>20yrs ago), who initially presented to [facility] after a mechanical fall and transferred to [facility] for NSTEMI.    Patient states that 3 days ago, his legs started to feel shaky. Over the 3 days, this shakiness continued to worsen. He usually walks at home with a walker and when he was walking to the bathroom, he felt unsteady and fell to the ground. Denies head strike. Denies chest pain during the event. He called EMS and was brought to [facility]. Patient notes that 3-4 weeks ago, he "finished" the course of all of his medications from his last hospitalization in 11/2024 after the bottles became empty. Since that time he has not taken any home medications. ROS otherwise negative.    Patient underwent minimally invasive direct CABG in 10/24. Patient also with recent extensive hospitalization in 11/10/24-11/27/24 where he was admitted to the vascular surgery service for [place] angiogram with EIA and FELICITY stents and ROBERT pfhfrfg-RD-[id number] bypass c/b hematoma. While admitted, had an NSTEMI and was transferred to cardiology service. He underwent LHC on 11/19 with patent LIMA to LAD graft, LCx ostial , ostial RCA w/ known   in 10/24. Continued to have angina and was briefly in CCU for nitro ggt. Discharged and followed up with Dr. Marquis Pink on 12/09/24.    [facility] ED Course: On arrival to [facility] vitals noted at /97 |  | RR 18 | T 97.5 | SpO2 98% ORA. Patient was complaining of L sided chest pain, L knee pain. Initial ECG at [facility] w/ STD in lateral leads. CBC WNL. Coags WNL. CMP w/ stable CKD (Cr 1.63, baseline 1.52). hsTroponin I noted at 174.4. CTH w/ no acute pathology/intracranial hemorrhage Patient was loaded with  mg, plavix 300 mg, heparin ggt started in  ED. Patient thereafter transferred to [facility] for NSTEMI    [facility] ED: Upon arrival to the ED at [facility]. Patient stated that his chest pain has resolved.  (24 Jan 2025 09:35)    Hospital Course:    Cardiology was consulted. Patient underwent a LHC on 01/24 and found Patent LIMA to LAD and occluded LCX and RCA.  Ischemia likely from medium sized D1 however, because of the angle of takeoff from the LAD- PCI of the diagonal would be quite difficult, so patient was managed with medical management. Patient continued to have chest pain and adjustments were made to his anti-anginals. Ranolazine was discontinued since patient was unable to swallow the pill size and the pill could not be crushed. Heparin drip was transitioned to Eliquis. Patient also presented with a left lower extremity cellulitis and was started on Ancef. Speech/swallow was consulted for dysphagia. Patient was able to swallow a regular diet with compensatory techniques, but patient voluntarily wanted to remain on a soft and bite sized diet. Social work was consulted due to patient's lack of support system. PT was consulted and recommended sub-acute rehab.     Important Medication Changes and Reason:  - continue Eliquis    Active or Pending Issues Requiring Follow-up:  - Please follow up with your vascular surgeon for removal of sutures and follow up  - Please follow up with your cardiologist for continued adjustment of medications.       Advanced Directives:   [ ] Full code  [ ] DNR  [ ] Hospice    Discharge Diagnoses:  - NSTEMI  - ground level fall           HPI:  74M, Polish speaking, PMHx of CAD s/p PCI and minimally invasive direct CABG (LIMA --> LAD) 10/31/24, ICM, HFrEF, HTN, CKD, and PAD s/p prior L to R fem-fem bypass (>20yrs ago), who initially presented to [facility] after a mechanical fall and transferred to [facility] for NSTEMI.    Patient states that 3 days ago, his legs started to feel shaky. Over the 3 days, this shakiness continued to worsen. He usually walks at home with a walker and when he was walking to the bathroom, he felt unsteady and fell to the ground. Denies head strike. Denies chest pain during the event. He called EMS and was brought to [facility]. Patient notes that 3-4 weeks ago, he "finished" the course of all of his medications from his last hospitalization in 11/2024 after the bottles became empty. Since that time he has not taken any home medications. ROS otherwise negative.    Patient underwent minimally invasive direct CABG in 10/24. Patient also with recent extensive hospitalization in 11/10/24-11/27/24 where he was admitted to the vascular surgery service for [place] angiogram with EIA and FELICITY stents and ROBERT jhrodpy-SM-supahqwuk bypass c/b hematoma. While admitted, had an NSTEMI and was transferred to cardiology service. He underwent LHC on 11/19 with patent LIMA to LAD graft, LCx ostial , ostial RCA w/ known   in 10/24. Continued to have angina and was briefly in CCU for nitro ggt. Discharged and followed up with Dr. Marquis Pink on 12/09/24.    [facility] ED Course: On arrival to [facility] vitals noted at /97 |  | RR 18 | T 97.5 | SpO2 98% ORA. Patient was complaining of L sided chest pain, L knee pain. Initial ECG at [facility] w/ STD in lateral leads. CBC WNL. Coags WNL. CMP w/ stable CKD (Cr 1.63, baseline 1.52). hsTroponin I noted at 174.4. CTH w/ no acute pathology/intracranial hemorrhage Patient was loaded with  mg, plavix 300 mg, heparin ggt started in  ED. Patient thereafter transferred to [facility] for NSTEMI    [facility] ED: Upon arrival to the ED at [facility]. Patient stated that his chest pain has resolved.  (24 Jan 2025 09:35)    Hospital Course:    Cardiology was consulted. Patient underwent a LHC on 01/24 and found Patent LIMA to LAD and occluded LCX and RCA.  Ischemia likely from medium sized D1 however, because of the angle of takeoff from the LAD- PCI of the diagonal would be quite difficult, so patient was managed with medical management. Patient continued to have chest pain and adjustments were made to his anti-anginals. Ranolazine was discontinued since patient was unable to swallow the pill size and the pill could not be crushed. Heparin drip was transitioned to Eliquis. Patient also presented with a left lower extremity cellulitis and was started on Ancef. Speech/swallow was consulted for dysphagia. Patient was able to swallow a regular diet with compensatory techniques, but patient voluntarily wanted to remain on a soft and bite sized diet. Social work was consulted due to patient's lack of support system. PT was consulted and recommended sub-acute rehab.     Important Medication Changes and Reason:  - continue Eliquis 10 mg BID loading dose through morning of February 1st. For the second dose on February 1st, change to 5 mg BID. Continue 5 mg BID thereafter.           Active or Pending Issues Requiring Follow-up:  - Please follow up with your vascular surgeon for removal of sutures and follow up within 1 week  - Please follow up with your cardiologist for continued adjustment of medications within 1 week  - Please follow up with urology to address the blood in your urine within 1 month  -       Discharge Diagnoses:  - NSTEMI  - ground level fall           HPI:  74M, Polish speaking, PMHx of CAD s/p PCI and minimally invasive direct CABG (LIMA --> LAD) 10/31/24, ICM, HFrEF, HTN, CKD, and PAD s/p prior L to R fem-fem bypass (>20yrs ago), who initially presented to [facility] after a mechanical fall and transferred to [facility] for NSTEMI.    Patient states that 3 days ago, his legs started to feel shaky. Over the 3 days, this shakiness continued to worsen. He usually walks at home with a walker and when he was walking to the bathroom, he felt unsteady and fell to the ground. Denies head strike. Denies chest pain during the event. He called EMS and was brought to [facility]. Patient notes that 3-4 weeks ago, he "finished" the course of all of his medications from his last hospitalization in 11/2024 after the bottles became empty. Since that time he has not taken any home medications. ROS otherwise negative.    Patient underwent minimally invasive direct CABG in 10/24. Patient also with recent extensive hospitalization in 11/10/24-11/27/24 where he was admitted to the vascular surgery service for [place] angiogram with EIA and FELICITY stents and ROBERT ntybadn-SA-zfvkjqbet bypass c/b hematoma. While admitted, had an NSTEMI and was transferred to cardiology service. He underwent LHC on 11/19 with patent LIMA to LAD graft, LCx ostial , ostial RCA w/ known   in 10/24. Continued to have angina and was briefly in CCU for nitro ggt. Discharged and followed up with Dr. Marquis Pink on 12/09/24.    [facility] ED Course: On arrival to [facility] vitals noted at /97 |  | RR 18 | T 97.5 | SpO2 98% ORA. Patient was complaining of L sided chest pain, L knee pain. Initial ECG at [facility] w/ STD in lateral leads. CBC WNL. Coags WNL. CMP w/ stable CKD (Cr 1.63, baseline 1.52). hsTroponin I noted at 174.4. CTH w/ no acute pathology/intracranial hemorrhage Patient was loaded with  mg, plavix 300 mg, heparin ggt started in  ED. Patient thereafter transferred to [facility] for NSTEMI    [facility] ED: Upon arrival to the ED at [facility]. Patient stated that his chest pain has resolved.  (24 Jan 2025 09:35)    Hospital Course:    Cardiology was consulted. Patient underwent a LHC on 01/24 and found Patent LIMA to LAD and occluded LCX and RCA.  Ischemia likely from medium sized D1 however, because of the angle of takeoff from the LAD- PCI of the diagonal would be quite difficult, so patient was managed with medical management. Patient continued to have chest pain and adjustments were made to his anti-anginals. Ranolazine was discontinued since patient was unable to swallow the pill size and the pill could not be crushed. Heparin drip was transitioned to Eliquis. Patient also presented with a left lower extremity cellulitis and was started on Ancef. Speech/swallow was consulted for dysphagia. Patient was able to swallow a regular diet with compensatory techniques, but patient voluntarily wanted to remain on a soft and bite sized diet. Social work was consulted due to patient's lack of support system. PT was consulted and recommended sub-acute rehab.     Important Medication Changes and Reason:  - continue Eliquis 10 mg BID loading dose through February 1st. Starting February 2nd, start taking Eliquis 5 mg BID.  - Changing from atorvastatin to rosuvastatin in setting of myalgias  - Changing from tamsulosin to doxazosin in setting of low blood pressure  - Changing Imdur dose to 120 mg daily in setting of low blood pressure  - Nitroglycerin prn for chest pain  - Stopped Ranexa in setting of pill being too large to swallow and cannot be crushed  - Changed metoprolol succinate to metoprolol tartrate in setting of pill being too large to swallow and cannot be crushed      Active or Pending Issues Requiring Follow-up:  - Please follow up with your vascular surgeon for removal of sutures and follow up within 1 week  - Please follow up with your cardiologist for continued adjustment of medications within 1 week  - Please follow up with urology to address the blood in your urine within 1 month    Discharge Diagnoses:  - NSTEMI  - ground level fall           HPI:  74M, Polish speaking, PMHx of CAD s/p PCI and minimally invasive direct CABG (LIMA --> LAD) 10/31/24, ICM, HFrEF, HTN, CKD, and PAD s/p prior L to R fem-fem bypass (>20yrs ago), who initially presented to [facility] after a mechanical fall and transferred to [facility] for NSTEMI.    Patient states that 3 days ago, his legs started to feel shaky. Over the 3 days, this shakiness continued to worsen. He usually walks at home with a walker and when he was walking to the bathroom, he felt unsteady and fell to the ground. Denies head strike. Denies chest pain during the event. He called EMS and was brought to [facility]. Patient notes that 3-4 weeks ago, he "finished" the course of all of his medications from his last hospitalization in 11/2024 after the bottles became empty. Since that time he has not taken any home medications. ROS otherwise negative.    Patient underwent minimally invasive direct CABG in 10/24. Patient also with recent extensive hospitalization in 11/10/24-11/27/24 where he was admitted to the vascular surgery service for [place] angiogram with EIA and FELICITY stents and ROBERT pkvixhw-NH-cffacdhtg bypass c/b hematoma. While admitted, had an NSTEMI and was transferred to cardiology service. He underwent LHC on 11/19 with patent LIMA to LAD graft, LCx ostial , ostial RCA w/ known   in 10/24. Continued to have angina and was briefly in CCU for nitro ggt. Discharged and followed up with Dr. Marquis Pink on 12/09/24.    [facility] ED Course: On arrival to [facility] vitals noted at /97 |  | RR 18 | T 97.5 | SpO2 98% ORA. Patient was complaining of L sided chest pain, L knee pain. Initial ECG at [facility] w/ STD in lateral leads. CBC WNL. Coags WNL. CMP w/ stable CKD (Cr 1.63, baseline 1.52). hsTroponin I noted at 174.4. CTH w/ no acute pathology/intracranial hemorrhage Patient was loaded with  mg, plavix 300 mg, heparin ggt started in  ED. Patient thereafter transferred to [facility] for NSTEMI    [facility] ED: Upon arrival to the ED at [facility]. Patient stated that his chest pain has resolved.  (24 Jan 2025 09:35)    Hospital Course:    Cardiology was consulted. Patient underwent a LHC on 01/24 and found Patent LIMA to LAD and occluded LCX and RCA.  Ischemia likely from medium sized D1 however, because of the angle of takeoff from the LAD- PCI of the diagonal would be quite difficult, so patient was managed with medical management. Patient continued to have chest pain and adjustments were made to his anti-anginals. Ranolazine was discontinued since patient was unable to swallow the pill size and the pill could not be crushed. Heparin drip was transitioned to Eliquis. Patient also presented with a left lower extremity cellulitis and was started on Ancef. Speech/swallow was consulted for dysphagia. Patient was able to swallow a regular diet with compensatory techniques, but patient voluntarily wanted to remain on a soft and bite sized diet. Social work was consulted due to patient's lack of support system. PT was consulted and recommended sub-acute rehab.     Important Medication Changes and Reason:  - continue Eliquis 10 mg BID loading dose through February 1st. Starting February 2nd, start taking Eliquis 5 mg BID.  - Changing from atorvastatin to rosuvastatin in setting of myalgias  - Changing from tamsulosin to doxazosin in setting of low blood pressure  - Changing Imdur dose to 120 mg daily in setting of low blood pressure  - Nitroglycerin prn for chest pain  - Stopped Ranexa in setting of pill being too large to swallow and cannot be crushed  - Changed metoprolol succinate to metoprolol tartrate in setting of pill being too large to swallow and cannot be crushed    Active or Pending Issues Requiring Follow-up:  - Please follow up with your vascular surgeon for removal of sutures and follow up within 1 week  - Please follow up with your cardiologist for continued adjustment of medications within 1 week  - Please follow up with urology to address the blood in your urine within 1 month  - Please follow up with your primary care doctor within 1 week    Discharge Diagnoses:  - NSTEMI  - ground level fall

## 2025-01-25 NOTE — DISCHARGE NOTE PROVIDER - CARE PROVIDER_API CALL
GUS SIMONS  [address] [place] [id number]  Phone: [phone]  Fax: ([phone]  Established Patient  Follow Up Time: 1 week    Vivian Lowe  Cardiovascular Disease  [address] [place] [address], [address] [place] [id number]  Phone: [phone]  Fax: [phone]  Established Patient  Follow Up Time: 1 week   GSU SIMONS  [address] [place] [address]  Phone: ([phone]  Fax: ()-  Established Patient  Follow Up Time: 1 week    Vivian Lowe  Cardiovascular Disease  [address], [address] [place] [address]  Phone: [phone]  Fax: [phone]  Established Patient  Follow Up Time: 1 week    Partha Valerio  Vascular Surgery  [address]  [place] [address]  Phone: [phone]  Fax: [phone]  Established Patient  Follow Up Time: 1-3 days    Alley Pierce  Urology  [address] [place] [id number]  Phone: [phone]  Fax: [phone]  Follow Up Time: 1 month

## 2025-01-26 LAB
ALBUMIN SERPL ELPH-MCNC: 3.4 G/DL — SIGNIFICANT CHANGE UP (ref 3.3–5)
ALP SERPL-CCNC: 58 U/L — SIGNIFICANT CHANGE UP (ref 40–120)
ALT FLD-CCNC: 9 U/L — LOW (ref 10–45)
ANION GAP SERPL CALC-SCNC: 15 MMOL/L — SIGNIFICANT CHANGE UP (ref 5–17)
APTT BLD: 25 SEC — SIGNIFICANT CHANGE UP (ref 24.5–35.6)
AST SERPL-CCNC: 20 U/L — SIGNIFICANT CHANGE UP (ref 10–40)
BILIRUB SERPL-MCNC: 0.5 MG/DL — SIGNIFICANT CHANGE UP (ref 0.2–1.2)
BUN SERPL-MCNC: 34 MG/DL — HIGH (ref 7–23)
CALCIUM SERPL-MCNC: 9.2 MG/DL — SIGNIFICANT CHANGE UP (ref 8.4–10.5)
CHLORIDE SERPL-SCNC: 101 MMOL/L — SIGNIFICANT CHANGE UP (ref 96–108)
CO2 SERPL-SCNC: 20 MMOL/L — LOW (ref 22–31)
CREAT SERPL-MCNC: 1.76 MG/DL — HIGH (ref 0.5–1.3)
EGFR: 40 ML/MIN/1.73M2 — LOW
GLUCOSE SERPL-MCNC: 105 MG/DL — HIGH (ref 70–99)
HCT VFR BLD CALC: 32.8 % — LOW (ref 39–50)
HCT VFR BLD CALC: 36.8 % — LOW (ref 39–50)
HGB BLD-MCNC: 10.8 G/DL — LOW (ref 13–17)
HGB BLD-MCNC: 12.1 G/DL — LOW (ref 13–17)
INR BLD: 1.23 RATIO — HIGH (ref 0.85–1.16)
MAGNESIUM SERPL-MCNC: 1.9 MG/DL — SIGNIFICANT CHANGE UP (ref 1.6–2.6)
MCHC RBC-ENTMCNC: 31.3 PG — SIGNIFICANT CHANGE UP (ref 27–34)
MCHC RBC-ENTMCNC: 31.9 PG — SIGNIFICANT CHANGE UP (ref 27–34)
MCHC RBC-ENTMCNC: 32.9 G/DL — SIGNIFICANT CHANGE UP (ref 32–36)
MCHC RBC-ENTMCNC: 32.9 G/DL — SIGNIFICANT CHANGE UP (ref 32–36)
MCV RBC AUTO: 95.1 FL — SIGNIFICANT CHANGE UP (ref 80–100)
MCV RBC AUTO: 97.1 FL — SIGNIFICANT CHANGE UP (ref 80–100)
NRBC # BLD: 0 /100 WBCS — SIGNIFICANT CHANGE UP (ref 0–0)
NRBC # BLD: 0 /100 WBCS — SIGNIFICANT CHANGE UP (ref 0–0)
NRBC BLD-RTO: 0 /100 WBCS — SIGNIFICANT CHANGE UP (ref 0–0)
NRBC BLD-RTO: 0 /100 WBCS — SIGNIFICANT CHANGE UP (ref 0–0)
PHOSPHATE SERPL-MCNC: 4.3 MG/DL — SIGNIFICANT CHANGE UP (ref 2.5–4.5)
PLATELET # BLD AUTO: 149 K/UL — LOW (ref 150–400)
PLATELET # BLD AUTO: 166 K/UL — SIGNIFICANT CHANGE UP (ref 150–400)
POTASSIUM SERPL-MCNC: 4.1 MMOL/L — SIGNIFICANT CHANGE UP (ref 3.5–5.3)
POTASSIUM SERPL-SCNC: 4.1 MMOL/L — SIGNIFICANT CHANGE UP (ref 3.5–5.3)
PROT SERPL-MCNC: 6.3 G/DL — SIGNIFICANT CHANGE UP (ref 6–8.3)
PROTHROM AB SERPL-ACNC: 14.1 SEC — HIGH (ref 9.9–13.4)
RBC # BLD: 3.45 M/UL — LOW (ref 4.2–5.8)
RBC # BLD: 3.79 M/UL — LOW (ref 4.2–5.8)
RBC # FLD: 13.2 % — SIGNIFICANT CHANGE UP (ref 10.3–14.5)
RBC # FLD: 13.3 % — SIGNIFICANT CHANGE UP (ref 10.3–14.5)
SODIUM SERPL-SCNC: 136 MMOL/L — SIGNIFICANT CHANGE UP (ref 135–145)
WBC # BLD: 6.19 K/UL — SIGNIFICANT CHANGE UP (ref 3.8–10.5)
WBC # BLD: 6.95 K/UL — SIGNIFICANT CHANGE UP (ref 3.8–10.5)
WBC # FLD AUTO: 6.19 K/UL — SIGNIFICANT CHANGE UP (ref 3.8–10.5)
WBC # FLD AUTO: 6.95 K/UL — SIGNIFICANT CHANGE UP (ref 3.8–10.5)

## 2025-01-26 PROCEDURE — 99232 SBSQ HOSP IP/OBS MODERATE 35: CPT | Mod: GC

## 2025-01-26 RX ORDER — DOXAZOSIN MESYLATE 4 MG
4 TABLET ORAL AT BEDTIME
Refills: 0 | Status: DISCONTINUED | OUTPATIENT
Start: 2025-01-26 | End: 2025-01-31

## 2025-01-26 RX ADMIN — ACETAMINOPHEN 650 MILLIGRAM(S): 160 SUSPENSION ORAL at 20:53

## 2025-01-26 RX ADMIN — Medication 4 MILLIGRAM(S): at 22:17

## 2025-01-26 RX ADMIN — Medication 500 MILLIGRAM(S): at 11:32

## 2025-01-26 RX ADMIN — Medication 5 MILLIGRAM(S): at 11:32

## 2025-01-26 RX ADMIN — Medication 25 MILLIGRAM(S): at 11:33

## 2025-01-26 RX ADMIN — Medication 100 MILLIGRAM(S): at 21:18

## 2025-01-26 RX ADMIN — DAPAGLIFLOZIN 10 MILLIGRAM(S): 5 TABLET, FILM COATED ORAL at 17:54

## 2025-01-26 RX ADMIN — Medication 100 MILLIGRAM(S): at 13:11

## 2025-01-26 RX ADMIN — Medication 75 MILLIGRAM(S): at 11:33

## 2025-01-26 RX ADMIN — ANTISEPTIC SURGICAL SCRUB 1 APPLICATION(S): 0.04 SOLUTION TOPICAL at 11:33

## 2025-01-26 RX ADMIN — Medication 240 MILLIGRAM(S): at 11:33

## 2025-01-26 RX ADMIN — APIXABAN 10 MILLIGRAM(S): 5 TABLET, FILM COATED ORAL at 17:54

## 2025-01-26 RX ADMIN — Medication 100 MILLIGRAM(S): at 05:08

## 2025-01-26 RX ADMIN — ACETAMINOPHEN 650 MILLIGRAM(S): 160 SUSPENSION ORAL at 19:53

## 2025-01-26 RX ADMIN — ATORVASTATIN CALCIUM 80 MILLIGRAM(S): 80 TABLET, FILM COATED ORAL at 21:17

## 2025-01-26 RX ADMIN — APIXABAN 10 MILLIGRAM(S): 5 TABLET, FILM COATED ORAL at 05:07

## 2025-01-26 RX ADMIN — Medication 1 TABLET(S): at 11:33

## 2025-01-26 NOTE — PROGRESS NOTE ADULT - PROBLEM SELECTOR PLAN 1
Presenting as a transfer from [place] for NSTEMI. Patient self-discontinued all medications for 3-4 weeks prior to admission, stating that he had finished the course of medications. Chest pain on presentation similar to prior episodes of chest pain.  - TTE with decreased EF 40-45%  - s/p Aspirin and plavix loading at VS    Plan:  - heparin ggt -> Eliquis 10 mg BID loading dose  - Cardiology consulted, recs appreciated  - [facility] on 01/24  - d/c aspirin 81 mg qd  - c/w clopidogrel 75 mg qd  - c/w Imdur 240 mg qd   - c/w carvedilol 25 mg BID  - c/w ranolazine qd  - c/w spironolactone 25 mg qd

## 2025-01-26 NOTE — PROGRESS NOTE ADULT - PROBLEM SELECTOR PLAN 8
- DVT Prophylaxis: Eliquis  - Diet: soft and bite sized  - PT consulted, recs appreciated - MIGUEL  - Dispo: pending cardiac workup and MIGUEL placement

## 2025-01-26 NOTE — PROVIDER CONTACT NOTE (OTHER) - ACTION/TREATMENT ORDERED:
Per MD will continue to monitor, per MD will do CBC at 2200 , no need of holding Eliquis  until bleeding is increasing.

## 2025-01-26 NOTE — PROGRESS NOTE ADULT - PROBLEM SELECTOR PLAN 2
Left lower extremity with erythema and increased heat in setting of increased infection risk with chronic PAD. No leukocytosis or fever.     Plan:  - Ancef 1 g q8h (01/24 - 01/28)  - wound care consulted, recs appreciated

## 2025-01-26 NOTE — PROVIDER CONTACT NOTE (OTHER) - ACTION/TREATMENT ORDERED:
Provider made aware. Blood cultures order placed. Monitoring remains ongoing. Will endorse to next shift.

## 2025-01-26 NOTE — PROGRESS NOTE ADULT - SUBJECTIVE AND OBJECTIVE BOX
***************************************************************  Carlton Mar) [facility] PGY3  Internal Medicine   ***************************************************************    ODALYS HAGER  74y  MRN: [id number]    Patient is a 74y old  Male who presents with a chief complaint of GLF and NSTEMI (25 Jan 2025 15:37)      Subjective:     MEDICATIONS  (STANDING):  apixaban 10 milliGRAM(s) Oral every 12 hours  ascorbic acid 500 milliGRAM(s) Oral daily  atorvastatin 80 milliGRAM(s) Oral at bedtime  carvedilol 25 milliGRAM(s) Oral every 12 hours  ceFAZolin   IVPB 1000 milliGRAM(s) IV Intermittent every 8 hours  chlorhexidine 2% Cloths 1 Application(s) Topical daily  clopidogrel Tablet 75 milliGRAM(s) Oral daily  dapagliflozin 10 milliGRAM(s) Oral every 24 hours  finasteride 5 milliGRAM(s) Oral daily  isosorbide   mononitrate ER Tablet (IMDUR) 240 milliGRAM(s) Oral daily  multivitamin 1 Tablet(s) Oral daily  ranolazine 1000 milliGRAM(s) Oral two times a day  spironolactone 25 milliGRAM(s) Oral every 24 hours  tamsulosin 0.4 milliGRAM(s) Oral at bedtime    MEDICATIONS  (PRN):  acetaminophen     Tablet .. 650 milliGRAM(s) Oral every 6 hours PRN Temp greater or equal to 38C (100.4F), Mild Pain (1 - 3)      Objective:    Vitals: Vital Signs Last 24 Hrs  T(C): 36.6 (01-26-25 @ 04:27), Max: 37.3 (01-25-25 @ 20:12)  T(F): 97.9 (01-26-25 @ 04:27), Max: 99.1 (01-25-25 @ 20:12)  HR: 74 (01-26-25 @ 04:27) (69 - 82)  BP: 100/52 (01-26-25 @ 04:27) (92/50 - 104/52)  BP(mean): --  RR: 18 (01-26-25 @ 04:27) (18 - 18)  SpO2: 96% (01-26-25 @ 04:27) (95% - 99%)            I&O's Summary    24 Jan 2025 07:01  -  25 Jan 2025 07:00  --------------------------------------------------------  IN: 530 mL / OUT: 1650 mL / NET: -1120 mL    25 Jan 2025 07:01  -  26 Jan 2025 06:47  --------------------------------------------------------  IN: 480 mL / OUT: 800 mL / NET: -320 mL        PHYSICAL EXAM:  GENERAL: NAD  HEAD:  Atraumatic, Normocephalic  EYES: EOMI, conjunctiva and sclera clear  CHEST/LUNG: Clear to auscultation bilaterally; No rales, rhonchi, wheezing, or rubs  HEART: Regular rate and rhythm; No murmurs, rubs, or gallops  ABDOMEN: Soft, Nontender, Nondistended;   SKIN: No rashes or lesions  NERVOUS SYSTEM:  Alert & Oriented X3, no focal deficits    LABS:  01-26    136  |  101  |  34[H]  ----------------------------<  105[H]  4.1   |  20[L]  |  1.76[H]  01-25    136  |  104  |  26[H]  ----------------------------<  124[H]  4.8   |  21[L]  |  1.53[H]  01-24    138  |  103  |  25[H]  ----------------------------<  104[H]  4.2   |  23  |  1.44[H]    Ca    9.2      26 Jan 2025 06:01  Ca    9.3      25 Jan 2025 04:26  Ca    9.1      24 Jan 2025 06:44  Phos  4.3     01-26  Mg     1.9     01-26    TPro  6.3  /  Alb  3.4  /  TBili  0.5  /  DBili  x   /  AST  20  /  ALT  9[L]  /  AlkPhos  58  01-26  TPro  6.4  /  Alb  3.5  /  TBili  0.6  /  DBili  x   /  AST  23  /  ALT  14  /  AlkPhos  65  01-25  TPro  6.8  /  Alb  3.8  /  TBili  0.4  /  DBili  x   /  AST  25  /  ALT  16  /  AlkPhos  77  01-24      PT/INR - ( 26 Jan 2025 06:00 )   PT: 14.1 sec;   INR: 1.23 ratio         PTT - ( 26 Jan 2025 06:00 )  PTT:25.0 sec              Urinalysis Basic - ( 26 Jan 2025 06:01 )    Color: x / Appearance: x / SG: x / pH: x  Gluc: 105 mg/dL / Ketone: x  / Bili: x / Urobili: x   Blood: x / Protein: x / Nitrite: x   Leuk Esterase: x / RBC: x / WBC x   Sq Epi: x / Non Sq Epi: x / Bacteria: x                              12.1   6.95  )-----------( 166      ( 26 Jan 2025 05:58 )             36.8                         13.2   8.78  )-----------( 165      ( 25 Jan 2025 04:26 )             39.5                         14.8   7.43  )-----------( 182      ( 24 Jan 2025 13:12 )             44.0     CAPILLARY BLOOD GLUCOSE          RADIOLOGY & ADDITIONAL TESTS:    Imaging Personally Reviewed:  [x ] YES  [ ] NO    Consultants involved in case:   Consultant(s) Notes Reviewed:  [ x] YES  [ ] NO:   Care Discussed with Consultants/Other Providers [x ] YES  [ ] NO         ***************************************************************  Carlton Mar) [facility] PGY3  Internal Medicine   ***************************************************************    ODALYS HAGER  74y  MRN: [id number]    Patient is a 74y old  Male who presents with a chief complaint of GLF and NSTEMI (25 Jan 2025 15:37)      Subjective: NAEO. No acute complaints.     MEDICATIONS  (STANDING):  apixaban 10 milliGRAM(s) Oral every 12 hours  ascorbic acid 500 milliGRAM(s) Oral daily  atorvastatin 80 milliGRAM(s) Oral at bedtime  carvedilol 25 milliGRAM(s) Oral every 12 hours  ceFAZolin   IVPB 1000 milliGRAM(s) IV Intermittent every 8 hours  chlorhexidine 2% Cloths 1 Application(s) Topical daily  clopidogrel Tablet 75 milliGRAM(s) Oral daily  dapagliflozin 10 milliGRAM(s) Oral every 24 hours  finasteride 5 milliGRAM(s) Oral daily  isosorbide   mononitrate ER Tablet (IMDUR) 240 milliGRAM(s) Oral daily  multivitamin 1 Tablet(s) Oral daily  ranolazine 1000 milliGRAM(s) Oral two times a day  spironolactone 25 milliGRAM(s) Oral every 24 hours  tamsulosin 0.4 milliGRAM(s) Oral at bedtime    MEDICATIONS  (PRN):  acetaminophen     Tablet .. 650 milliGRAM(s) Oral every 6 hours PRN Temp greater or equal to 38C (100.4F), Mild Pain (1 - 3)      Objective:    Vitals: Vital Signs Last 24 Hrs  T(C): 36.6 (01-26-25 @ 04:27), Max: 37.3 (01-25-25 @ 20:12)  T(F): 97.9 (01-26-25 @ 04:27), Max: 99.1 (01-25-25 @ 20:12)  HR: 74 (01-26-25 @ 04:27) (69 - 82)  BP: 100/52 (01-26-25 @ 04:27) (92/50 - 104/52)  BP(mean): --  RR: 18 (01-26-25 @ 04:27) (18 - 18)  SpO2: 96% (01-26-25 @ 04:27) (95% - 99%)            I&O's Summary    24 Jan 2025 07:01  -  25 Jan 2025 07:00  --------------------------------------------------------  IN: 530 mL / OUT: 1650 mL / NET: -1120 mL    25 Jan 2025 07:01  -  26 Jan 2025 06:47  --------------------------------------------------------  IN: 480 mL / OUT: 800 mL / NET: -320 mL        PHYSICAL EXAM:  GENERAL: NAD  HEAD:  Atraumatic, Normocephalic  EYES: EOMI, conjunctiva and sclera clear  CHEST/LUNG: Clear to auscultation bilaterally; No rales, rhonchi, wheezing, or rubs  HEART: Regular rate and rhythm; No murmurs, rubs, or gallops  ABDOMEN: Soft, Nontender, Nondistended;   SKIN: No rashes or lesions  NERVOUS SYSTEM:  Alert & Oriented X3, no focal deficits    LABS:  01-26    136  |  101  |  34[H]  ----------------------------<  105[H]  4.1   |  20[L]  |  1.76[H]  01-25    136  |  104  |  26[H]  ----------------------------<  124[H]  4.8   |  21[L]  |  1.53[H]  01-24    138  |  103  |  25[H]  ----------------------------<  104[H]  4.2   |  23  |  1.44[H]    Ca    9.2      26 Jan 2025 06:01  Ca    9.3      25 Jan 2025 04:26  Ca    9.1      24 Jan 2025 06:44  Phos  4.3     01-26  Mg     1.9     01-26    TPro  6.3  /  Alb  3.4  /  TBili  0.5  /  DBili  x   /  AST  20  /  ALT  9[L]  /  AlkPhos  58  01-26  TPro  6.4  /  Alb  3.5  /  TBili  0.6  /  DBili  x   /  AST  23  /  ALT  14  /  AlkPhos  65  01-25  TPro  6.8  /  Alb  3.8  /  TBili  0.4  /  DBili  x   /  AST  25  /  ALT  16  /  AlkPhos  77  01-24      PT/INR - ( 26 Jan 2025 06:00 )   PT: 14.1 sec;   INR: 1.23 ratio         PTT - ( 26 Jan 2025 06:00 )  PTT:25.0 sec              Urinalysis Basic - ( 26 Jan 2025 06:01 )    Color: x / Appearance: x / SG: x / pH: x  Gluc: 105 mg/dL / Ketone: x  / Bili: x / Urobili: x   Blood: x / Protein: x / Nitrite: x   Leuk Esterase: x / RBC: x / WBC x   Sq Epi: x / Non Sq Epi: x / Bacteria: x                              12.1   6.95  )-----------( 166      ( 26 Jan 2025 05:58 )             36.8                         13.2   8.78  )-----------( 165      ( 25 Jan 2025 04:26 )             39.5                         14.8   7.43  )-----------( 182      ( 24 Jan 2025 13:12 )             44.0     CAPILLARY BLOOD GLUCOSE          RADIOLOGY & ADDITIONAL TESTS:    Imaging Personally Reviewed:  [x ] YES  [ ] NO    Consultants involved in case:   Consultant(s) Notes Reviewed:  [ x] YES  [ ] NO:   Care Discussed with Consultants/Other Providers [x ] YES  [ ] NO

## 2025-01-26 NOTE — PROGRESS NOTE ADULT - ASSESSMENT
Elton Sanabria is a 74-year-old male, Polish speaking, PMHx of CAD s/p PCI and minimally invasive direct CABG (LIMA --> LAD) 10/31/24, ICM, HFrEF, HTN, CKD, and PAD s/p prior L to R fem-fem bypass (>20yrs ago), who initially presented to [facility] after a mechanical fall and transferred to [facility] for NSTEMI. s/p LHC, no stent due to anatomy.

## 2025-01-27 LAB
ALBUMIN SERPL ELPH-MCNC: 3.4 G/DL — SIGNIFICANT CHANGE UP (ref 3.3–5)
ALP SERPL-CCNC: 57 U/L — SIGNIFICANT CHANGE UP (ref 40–120)
ALT FLD-CCNC: <5 U/L — LOW (ref 10–45)
ANION GAP SERPL CALC-SCNC: 15 MMOL/L — SIGNIFICANT CHANGE UP (ref 5–17)
APTT BLD: 29.1 SEC — SIGNIFICANT CHANGE UP (ref 24.5–35.6)
AST SERPL-CCNC: 15 U/L — SIGNIFICANT CHANGE UP (ref 10–40)
BILIRUB SERPL-MCNC: 0.4 MG/DL — SIGNIFICANT CHANGE UP (ref 0.2–1.2)
BUN SERPL-MCNC: 28 MG/DL — HIGH (ref 7–23)
CALCIUM SERPL-MCNC: 9 MG/DL — SIGNIFICANT CHANGE UP (ref 8.4–10.5)
CHLORIDE SERPL-SCNC: 103 MMOL/L — SIGNIFICANT CHANGE UP (ref 96–108)
CO2 SERPL-SCNC: 20 MMOL/L — LOW (ref 22–31)
CREAT SERPL-MCNC: 1.7 MG/DL — HIGH (ref 0.5–1.3)
EGFR: 41 ML/MIN/1.73M2 — LOW
GLUCOSE SERPL-MCNC: 101 MG/DL — HIGH (ref 70–99)
HCT VFR BLD CALC: 33.4 % — LOW (ref 39–50)
HGB BLD-MCNC: 11.2 G/DL — LOW (ref 13–17)
MAGNESIUM SERPL-MCNC: 1.9 MG/DL — SIGNIFICANT CHANGE UP (ref 1.6–2.6)
MCHC RBC-ENTMCNC: 32 PG — SIGNIFICANT CHANGE UP (ref 27–34)
MCHC RBC-ENTMCNC: 33.5 G/DL — SIGNIFICANT CHANGE UP (ref 32–36)
MCV RBC AUTO: 95.4 FL — SIGNIFICANT CHANGE UP (ref 80–100)
NRBC # BLD: 0 /100 WBCS — SIGNIFICANT CHANGE UP (ref 0–0)
NRBC BLD-RTO: 0 /100 WBCS — SIGNIFICANT CHANGE UP (ref 0–0)
PHOSPHATE SERPL-MCNC: 3.8 MG/DL — SIGNIFICANT CHANGE UP (ref 2.5–4.5)
PLATELET # BLD AUTO: 142 K/UL — LOW (ref 150–400)
POTASSIUM SERPL-MCNC: 4 MMOL/L — SIGNIFICANT CHANGE UP (ref 3.5–5.3)
POTASSIUM SERPL-SCNC: 4 MMOL/L — SIGNIFICANT CHANGE UP (ref 3.5–5.3)
PROT SERPL-MCNC: 6.3 G/DL — SIGNIFICANT CHANGE UP (ref 6–8.3)
RBC # BLD: 3.5 M/UL — LOW (ref 4.2–5.8)
RBC # FLD: 13.2 % — SIGNIFICANT CHANGE UP (ref 10.3–14.5)
SODIUM SERPL-SCNC: 138 MMOL/L — SIGNIFICANT CHANGE UP (ref 135–145)
TROPONIN T, HIGH SENSITIVITY RESULT: 214 NG/L — HIGH (ref 0–51)
WBC # BLD: 5.46 K/UL — SIGNIFICANT CHANGE UP (ref 3.8–10.5)
WBC # FLD AUTO: 5.46 K/UL — SIGNIFICANT CHANGE UP (ref 3.8–10.5)

## 2025-01-27 PROCEDURE — 99232 SBSQ HOSP IP/OBS MODERATE 35: CPT

## 2025-01-27 PROCEDURE — 99232 SBSQ HOSP IP/OBS MODERATE 35: CPT | Mod: GC

## 2025-01-27 PROCEDURE — 93010 ELECTROCARDIOGRAM REPORT: CPT

## 2025-01-27 RX ORDER — PHENAZOPYRIDINE HCL 100 MG
200 TABLET ORAL ONCE
Refills: 0 | Status: COMPLETED | OUTPATIENT
Start: 2025-01-27 | End: 2025-01-27

## 2025-01-27 RX ORDER — LIDOCAINE HYDROCHLORIDE 30 MG/G
1 CREAM TOPICAL EVERY 24 HOURS
Refills: 0 | Status: DISCONTINUED | OUTPATIENT
Start: 2025-01-27 | End: 2025-01-31

## 2025-01-27 RX ORDER — SENNOSIDES 8.6 MG
2 TABLET ORAL AT BEDTIME
Refills: 0 | Status: DISCONTINUED | OUTPATIENT
Start: 2025-01-27 | End: 2025-01-31

## 2025-01-27 RX ORDER — HYDROMORPHONE HYDROCHLORIDE 4 MG/ML
2 INJECTION, SOLUTION INTRAMUSCULAR; INTRAVENOUS; SUBCUTANEOUS ONCE
Refills: 0 | Status: DISCONTINUED | OUTPATIENT
Start: 2025-01-27 | End: 2025-01-27

## 2025-01-27 RX ADMIN — Medication 2 TABLET(S): at 21:57

## 2025-01-27 RX ADMIN — Medication 5 MILLIGRAM(S): at 11:46

## 2025-01-27 RX ADMIN — Medication 1 TABLET(S): at 11:46

## 2025-01-27 RX ADMIN — Medication 500 MILLIGRAM(S): at 11:46

## 2025-01-27 RX ADMIN — HYDROMORPHONE HYDROCHLORIDE 2 MILLIGRAM(S): 4 INJECTION, SOLUTION INTRAMUSCULAR; INTRAVENOUS; SUBCUTANEOUS at 11:40

## 2025-01-27 RX ADMIN — ACETAMINOPHEN 650 MILLIGRAM(S): 160 SUSPENSION ORAL at 09:15

## 2025-01-27 RX ADMIN — Medication 4 MILLIGRAM(S): at 21:55

## 2025-01-27 RX ADMIN — Medication 25 MILLIGRAM(S): at 17:18

## 2025-01-27 RX ADMIN — ACETAMINOPHEN 650 MILLIGRAM(S): 160 SUSPENSION ORAL at 03:20

## 2025-01-27 RX ADMIN — Medication 75 MILLIGRAM(S): at 11:47

## 2025-01-27 RX ADMIN — Medication 100 MILLIGRAM(S): at 21:55

## 2025-01-27 RX ADMIN — APIXABAN 10 MILLIGRAM(S): 5 TABLET, FILM COATED ORAL at 05:07

## 2025-01-27 RX ADMIN — Medication 25 MILLIGRAM(S): at 11:46

## 2025-01-27 RX ADMIN — ACETAMINOPHEN 650 MILLIGRAM(S): 160 SUSPENSION ORAL at 02:20

## 2025-01-27 RX ADMIN — Medication 100 MILLIGRAM(S): at 13:23

## 2025-01-27 RX ADMIN — ANTISEPTIC SURGICAL SCRUB 1 APPLICATION(S): 0.04 SOLUTION TOPICAL at 11:46

## 2025-01-27 RX ADMIN — Medication 200 MILLIGRAM(S): at 06:15

## 2025-01-27 RX ADMIN — LIDOCAINE HYDROCHLORIDE 1 PATCH: 30 CREAM TOPICAL at 20:04

## 2025-01-27 RX ADMIN — ATORVASTATIN CALCIUM 80 MILLIGRAM(S): 80 TABLET, FILM COATED ORAL at 21:55

## 2025-01-27 RX ADMIN — Medication 100 MILLIGRAM(S): at 05:07

## 2025-01-27 RX ADMIN — APIXABAN 10 MILLIGRAM(S): 5 TABLET, FILM COATED ORAL at 17:19

## 2025-01-27 RX ADMIN — HYDROMORPHONE HYDROCHLORIDE 2 MILLIGRAM(S): 4 INJECTION, SOLUTION INTRAMUSCULAR; INTRAVENOUS; SUBCUTANEOUS at 10:40

## 2025-01-27 RX ADMIN — LIDOCAINE HYDROCHLORIDE 1 PATCH: 30 CREAM TOPICAL at 15:05

## 2025-01-27 RX ADMIN — ACETAMINOPHEN 650 MILLIGRAM(S): 160 SUSPENSION ORAL at 08:13

## 2025-01-27 RX ADMIN — DAPAGLIFLOZIN 10 MILLIGRAM(S): 5 TABLET, FILM COATED ORAL at 17:27

## 2025-01-27 NOTE — PROGRESS NOTE ADULT - SUBJECTIVE AND OBJECTIVE BOX
Interval history:    No events. s/p LHC.     ROS; negative      Physical examination:    GENERAL: Vitals stable. In no acute distress, AOx3  HEAD: Atraumatic, Normocephalic.  NECK: Supple, No JVD.  CHEST/LUNG: no crackles, rales or wheezing  HEART: regular rate and rhythm, +systolic murmur on LSB  ABDOMEN: Soft, nontender, nondistended.   EXTREMITIES: +L-LE dressing.     Signs Last 24 Hrs  T(C): 36.9 (27 Jan 2025 10:36), Max: 37.9 (26 Jan 2025 21:46)  T(F): 98.4 (27 Jan 2025 10:36), Max: 100.3 (26 Jan 2025 21:46)  HR: 84 (27 Jan 2025 10:36) (83 - 85)  BP: 100/57 (27 Jan 2025 10:36) (92/39 - 109/50)  BP(mean): --  RR: 18 (27 Jan 2025 10:36) (17 - 18)  SpO2: 96% (27 Jan 2025 10:36) (93% - 97%)    Parameters below as of 27 Jan 2025 10:36  Patient On (Oxygen Delivery Method): room air    Pertinent labs and images reviewed personally by me.     CBC:            11.2   5.46  )-----------( 142      ( 01-27-25 @ 06:20 )             33.4         Chem:         ( 01-27-25 @ 06:19 )    138  |  103  |  28[H]  ----------------------------<  101[H]  4.0   |  20[L]  |  1.70[H]        Liver Functions: ( 01-27-25 @ 06:19 )  Alb: 3.4 g/dL / Pro: 6.3 g/dL / ALK PHOS: 57 U/L / ALT: <5 U/L / AST: 15 U/L / GGT: x            Cath 1/24/2025  Diagnostic Findings:     Coronary Angiography   The coronary circulation is right dominant.      LM   Left main artery: There is a 50 % stenosis.      LAD   Proximal left anterior descending: There is a 70 % stenosis. First  diagonal: There is a 99 % stenosis.    CX   Circumflex: There is a 100 % stenosis.      RCA   Right coronary artery: There is a 100 % stenosis.      TTE 1/24/2025     1. Left ventricular cavity is normal in size. Left ventricular wall thickness is normal. Left ventricular systolic function is mildly to moderately decreased with an ejection fraction visually estimated at 40 to 45 %. Regional wall motion abnormalities present.   2. Multiple segmental abnormalities exist. See findings.   3. Normal right ventricular cavity size, with normal wall thickness, and normal right ventricular systolic function.   4. No pericardial effusion seen.   5. Left ventricular global longitudinal strain is -13.0 % is abnormal (> -16%). Images were acquired on a [employer] ultrasound system and processed on the ultrasound machine with a heart rate of 80 bpm and a blood pressure of 123/63 mmHg.   6. No prior echocardiogram is available for comparison.   7. There is no evidence of a left ventricular thrombus.

## 2025-01-27 NOTE — PROGRESS NOTE ADULT - PROBLEM SELECTOR PLAN 1
Presenting as a transfer from [place] for NSTEMI. Patient self-discontinued all medications for 3-4 weeks prior to admission, stating that he had finished the course of medications. Chest pain on presentation similar to prior episodes of chest pain.  - TTE with decreased EF 40-45%  - s/p Aspirin and plavix loading at VS    Plan:  - heparin ggt -> Eliquis 10 mg BID loading dose  - Cardiology consulted, recs appreciated  - [facility] on 01/24  - d/c aspirin 81 mg qd  - c/w clopidogrel 75 mg qd  - c/w Imdur 240 mg qd   - c/w carvedilol 25 mg BID  - c/w ranolazine qd  - c/w spironolactone 25 mg qd

## 2025-01-27 NOTE — SWALLOW BEDSIDE ASSESSMENT ADULT - SWALLOW EVAL: DIAGNOSIS
Patient is a 74y old male with pmhx of CAD, CABG, HFrEF, HTN, CKD, and PAD, who presents with a chief complaint of GLF and NSTEMI. Patient now presents with evidence of an oropharyngeal dysphagia. Swallow sequence is marked by prolonged/seemingly effortful mastication with regular solids as well as expectoration mid-chewing stating "this is too hard I am scared to swallow"; suspect timely pharyngeal swallow trigger, palpable hyolaryngeal excursion. Suspect pharyngeal residue with soft and bite sized/easy to chew as evidenced by multiple swallows (2-3 per bolus). Liquid washes of thin provided which was minimally effective, patient stating "I still feel it stuck"; No overt s/s of aspiration observed across trials. Objective testing is recommended to further assess swallow mechanism.

## 2025-01-27 NOTE — SWALLOW BEDSIDE ASSESSMENT ADULT - H & P REVIEW
74-year-old male, Polish speaking, PMHx of CAD s/p PCI and minimally invasive direct CABG (LIMA --> LAD) 10/31/24, ICM, HFrEF, HTN, CKD, and PAD s/p prior L to R fem-fem bypass (>20yrs ago), who initially presented to [facility] after a mechanical fall and transferred to [facility] for NSTEMI. Pending heart cath. Dx: NSTEMI (non-ST elevation myocardial infarction), Cellulitis Left lower extremity,  Fall from ground level - CT head and x-rays of bilateral lower extremities without acute findings, Peripheral artery disease, stage 3 kidney failure, Hx of BPH, pt with poor health literacy./yes 74-year-old male, Polish speaking, PMHx of CAD s/p PCI and minimally invasive direct CABG (LIMA --> LAD) 10/31/24, ICM, HFrEF, HTN, CKD, and PAD s/p prior L to R fem-fem bypass (>20yrs ago), who initially presented to [facility]- after a mechanical fall and transferred to [facility] for NSTEMI. Pending heart cath. Dx: NSTEMI (non-ST elevation myocardial infarction), Cellulitis Left lower extremity,  Fall from ground level - CT head and x-rays of bilateral lower extremities without acute findings, Peripheral artery disease, stage 3 kidney failure, Hx of BPH, pt with poor health literacy. Per EMR, Drinking without issue, but troubles with eating in setting of dentures still being at home./yes

## 2025-01-27 NOTE — PROGRESS NOTE ADULT - SUBJECTIVE AND OBJECTIVE BOX
Internal Medicine Progress Note    Patient is a 74y old  Male who presents with a chief complaint of GLF and NSTEMI (26 Jan 2025 06:47)    OVERNIGHT EVENTS/SUBJECTIVE:    OBJECTIVE:  Vital Signs Last 24 Hrs  T(C): 37.1 (27 Jan 2025 08:19), Max: 37.9 (26 Jan 2025 21:46)  T(F): 98.7 (27 Jan 2025 08:19), Max: 100.3 (26 Jan 2025 21:46)  HR: 84 (27 Jan 2025 08:19) (72 - 85)  BP: 95/54 (27 Jan 2025 08:19) (92/39 - 109/50)  BP(mean): --  RR: 18 (27 Jan 2025 08:19) (17 - 18)  SpO2: 96% (27 Jan 2025 08:19) (93% - 97%)    Parameters below as of 27 Jan 2025 08:19  Patient On (Oxygen Delivery Method): room air      I&O's Detail    26 Jan 2025 07:01  -  27 Jan 2025 07:00  --------------------------------------------------------  IN:    Oral Fluid: 480 mL  Total IN: 480 mL    OUT:    Indwelling Catheter - Urethral (mL): 1100 mL  Total OUT: 1100 mL    Total NET: -620 mL        Daily     Daily   Physical Exam:  General: NAD, resting comfortably in bed  Neuro: A&Ox4, 5/5 strength in all ext  HEENT: NC/AT, EOMI, normal hearing, oral mucosa moist, no oral lesions noted, no pharyngeal erythema, uvula midline  Neck: supple, thyroid not enlarged, no LAD  Resp: Breathing comfortably on RA, LCTA b/l  CV: Normal sinus rhythm, S1 and S2, no r/m/g  Abd: soft, non-distended, non-tender. No HSM.  Ext: ROMIx4, no edema, +2 pulses bilaterally  Skin: Warm and dry, no rashes or discolorations  Psych: Appropriate affect    Medications:  MEDICATIONS  (STANDING):  apixaban 10 milliGRAM(s) Oral every 12 hours  ascorbic acid 500 milliGRAM(s) Oral daily  atorvastatin 80 milliGRAM(s) Oral at bedtime  carvedilol 25 milliGRAM(s) Oral every 12 hours  ceFAZolin   IVPB 1000 milliGRAM(s) IV Intermittent every 8 hours  chlorhexidine 2% Cloths 1 Application(s) Topical daily  clopidogrel Tablet 75 milliGRAM(s) Oral daily  dapagliflozin 10 milliGRAM(s) Oral every 24 hours  doxazosin 4 milliGRAM(s) Oral at bedtime  finasteride 5 milliGRAM(s) Oral daily  isosorbide   mononitrate ER Tablet (IMDUR) 240 milliGRAM(s) Oral daily  multivitamin 1 Tablet(s) Oral daily  ranolazine 1000 milliGRAM(s) Oral two times a day  spironolactone 25 milliGRAM(s) Oral every 24 hours    MEDICATIONS  (PRN):  acetaminophen     Tablet .. 650 milliGRAM(s) Oral every 6 hours PRN Temp greater or equal to 38C (100.4F), Mild Pain (1 - 3)      Labs:                        11.2   5.46  )-----------( 142      ( 27 Jan 2025 06:20 )             33.4     01-27    138  |  103  |  28[H]  ----------------------------<  101[H]  4.0   |  20[L]  |  1.70[H]    Ca    9.0      27 Jan 2025 06:19  Phos  3.8     01-27  Mg     1.9     01-27    TPro  6.3  /  Alb  3.4  /  TBili  0.4  /  DBili  x   /  AST  15  /  ALT  <5[L]  /  AlkPhos  57  01-27    PT/INR - ( 26 Jan 2025 06:00 )   PT: 14.1 sec;   INR: 1.23 ratio         PTT - ( 27 Jan 2025 06:21 )  PTT:29.1 sec  Urinalysis Basic - ( 27 Jan 2025 06:19 )    Color: x / Appearance: x / SG: x / pH: x  Gluc: 101 mg/dL / Ketone: x  / Bili: x / Urobili: x   Blood: x / Protein: x / Nitrite: x   Leuk Esterase: x / RBC: x / WBC x   Sq Epi: x / Non Sq Epi: x / Bacteria: x      COVID-19 PCR: NotDetejenny (07 Nov 2024 14:11)      Radiology: Internal Medicine Progress Note    Patient is a 74y old  Male who presents with a chief complaint of GLF and NSTEMI (26 Jan 2025 06:47)    OVERNIGHT EVENTS/SUBJECTIVE: No overnight events. He feels some whole body 'bone' pain that improves a little bit with tylenol    OBJECTIVE:  Vital Signs Last 24 Hrs  T(C): 37.1 (27 Jan 2025 08:19), Max: 37.9 (26 Jan 2025 21:46)  T(F): 98.7 (27 Jan 2025 08:19), Max: 100.3 (26 Jan 2025 21:46)  HR: 84 (27 Jan 2025 08:19) (72 - 85)  BP: 95/54 (27 Jan 2025 08:19) (92/39 - 109/50)  BP(mean): --  RR: 18 (27 Jan 2025 08:19) (17 - 18)  SpO2: 96% (27 Jan 2025 08:19) (93% - 97%)    Parameters below as of 27 Jan 2025 08:19  Patient On (Oxygen Delivery Method): room air      I&O's Detail    26 Jan 2025 07:01  -  27 Jan 2025 07:00  --------------------------------------------------------  IN:    Oral Fluid: 480 mL  Total IN: 480 mL    OUT:    Indwelling Catheter - Urethral (mL): 1100 mL  Total OUT: 1100 mL    Total NET: -620 mL        Daily     Daily   Physical Exam:  General: NAD, resting comfortably in bed  Neuro: A&Ox4  HEENT: NC/AT, EOMI, normal hearing, oral mucosa jacobo  Resp: Breathing comfortably on RA, LCTA b/l  CV: Normal sinus rhythm, S1 and S2, no r/m/g  Abd: soft, non-distended, non-tender. No HSM.  Ext: no edema, +2 pulses bilaterally  Skin: Warm and dry, no rashes or discolorations  Psych: Appropriate affect    Medications:  MEDICATIONS  (STANDING):  apixaban 10 milliGRAM(s) Oral every 12 hours  ascorbic acid 500 milliGRAM(s) Oral daily  atorvastatin 80 milliGRAM(s) Oral at bedtime  carvedilol 25 milliGRAM(s) Oral every 12 hours  ceFAZolin   IVPB 1000 milliGRAM(s) IV Intermittent every 8 hours  chlorhexidine 2% Cloths 1 Application(s) Topical daily  clopidogrel Tablet 75 milliGRAM(s) Oral daily  dapagliflozin 10 milliGRAM(s) Oral every 24 hours  doxazosin 4 milliGRAM(s) Oral at bedtime  finasteride 5 milliGRAM(s) Oral daily  isosorbide   mononitrate ER Tablet (IMDUR) 240 milliGRAM(s) Oral daily  multivitamin 1 Tablet(s) Oral daily  ranolazine 1000 milliGRAM(s) Oral two times a day  spironolactone 25 milliGRAM(s) Oral every 24 hours    MEDICATIONS  (PRN):  acetaminophen     Tablet .. 650 milliGRAM(s) Oral every 6 hours PRN Temp greater or equal to 38C (100.4F), Mild Pain (1 - 3)      Labs:                        11.2   5.46  )-----------( 142      ( 27 Jan 2025 06:20 )             33.4     01-27    138  |  103  |  28[H]  ----------------------------<  101[H]  4.0   |  20[L]  |  1.70[H]    Ca    9.0      27 Jan 2025 06:19  Phos  3.8     01-27  Mg     1.9     01-27    TPro  6.3  /  Alb  3.4  /  TBili  0.4  /  DBili  x   /  AST  15  /  ALT  <5[L]  /  AlkPhos  57  01-27    PT/INR - ( 26 Jan 2025 06:00 )   PT: 14.1 sec;   INR: 1.23 ratio         PTT - ( 27 Jan 2025 06:21 )  PTT:29.1 sec  Urinalysis Basic - ( 27 Jan 2025 06:19 )    Color: x / Appearance: x / SG: x / pH: x  Gluc: 101 mg/dL / Ketone: x  / Bili: x / Urobili: x   Blood: x / Protein: x / Nitrite: x   Leuk Esterase: x / RBC: x / WBC x   Sq Epi: x / Non Sq Epi: x / Bacteria: x      COVID-19 PCR: NotDetec (07 Nov 2024 14:11)      Radiology:

## 2025-01-27 NOTE — SWALLOW BEDSIDE ASSESSMENT ADULT - SLP GENERAL OBSERVATIONS
Patient received upright in bed; AAOx4; on room air; Polish  utilized throughout (ID#[id number]). Able to make all wants/needs known and follow commands. Per discussion with patient at bedside, endorses difficulty swallowing solids for over 1 year. While eating solids he reports food feeling "stuck" and "cannot get it down", takes sips of liquid to try to clear which sometimes makes him vomit; expresses concern to swallow due to this. States difficulty swallowing occurs with and without eating with dentures in place.

## 2025-01-27 NOTE — SWALLOW BEDSIDE ASSESSMENT ADULT - ADDITIONAL RECOMMENDATIONS
long term goal: the patient will safely tolerate the least restrictive oral diet without showing overt s/s of aspiration.

## 2025-01-27 NOTE — SWALLOW BEDSIDE ASSESSMENT ADULT - SWALLOW EVAL: VELAR ELEVATION
intact Z Plasty Text: The lesion was extirpated to the level of the fat with a #15 scalpel blade.  Given the location of the defect, shape of the defect and the proximity to free margins a Z-plasty was deemed most appropriate for repair.  Using a sterile surgical marker, the appropriate transposition arms of the Z-plasty were drawn incorporating the defect and placing the expected incisions within the relaxed skin tension lines where possible.    The area thus outlined was incised deep to adipose tissue with a #15 scalpel blade.  The skin margins were undermined to an appropriate distance in all directions utilizing iris scissors.  The opposing transposition arms were then transposed into place in opposite direction and anchored with interrupted buried subcutaneous sutures.

## 2025-01-27 NOTE — SWALLOW BEDSIDE ASSESSMENT ADULT - ORAL PHASE
prolonged/incomplete mastication and independent expectoration with regular solid, patient stating "this is too hard I am scared to swallow"; prolonged  yet functional mastication with easy to chew/soft and bite sized Within functional limits

## 2025-01-27 NOTE — PROVIDER CONTACT NOTE (OTHER) - ACTION/TREATMENT ORDERED:
T1 Nathan Ramos notified, Tylenol given as ordered. No new orders at this time. Care ongoing, safety maintained.

## 2025-01-27 NOTE — SWALLOW BEDSIDE ASSESSMENT ADULT - PHARYNGEAL PHASE
suspect pharyngeal residue as evidenced by multiple swallows per bolus intake (2-3) with soft and bite sized and easy to chew/Complaints of pharyngeal stasis no overt s/s of aspiration observed/Within functional limits

## 2025-01-27 NOTE — SWALLOW BEDSIDE ASSESSMENT ADULT - COMMENTS
IMAGING:  CXR - No fracture. Rather mild right upper lobe infiltrate at this   time. Clips in both groin areas again noted. No fracture or change from November 5, 2024.    *Patient is new to this service. No prior SLP exams in [facility] or [facility] in PACS. ON THIS ADMISSION: 1/24 - Per EMR, Patient says pill is stuck, trying to drink water and bring up the pill. Patient not short of breath, able to talk. internal med -  Drinking without issue, but troubles with eating in setting of dentures still being at home IMAGING:  CXR - No fracture. Rather mild right upper lobe infiltrate at this time. Clips in both groin areas again noted. No fracture or change from November 5, 2024.    *Patient is new to this service. No prior SLP exams in [facility] or [facility] in PACS. ON THIS ADMISSION: 1/24 - Per EMR, Patient says pill is stuck, trying to drink water and bring up the pill.

## 2025-01-27 NOTE — PROGRESS NOTE ADULT - ASSESSMENT
Elton Sanabria is a 74-year-old male, Polish speaking, PMHx of CAD s/p PCI and minimally invasive direct CABG (LIMA --> LAD) 10/31/24, ICM, HFrEF, HTN, CKD, and PAD s/p prior L to R fem-fem bypass (>20yrs ago), who initially presented to [facility] after a mechanical fall and transferred to [facility] for NSTEMI. s/p LHC, no stent due to anatomy.

## 2025-01-28 DIAGNOSIS — R31.9 HEMATURIA, UNSPECIFIED: ICD-10-CM

## 2025-01-28 LAB
ADD ON TEST-SPECIMEN IN LAB: SIGNIFICANT CHANGE UP
ANION GAP SERPL CALC-SCNC: 14 MMOL/L — SIGNIFICANT CHANGE UP (ref 5–17)
ANION GAP SERPL CALC-SCNC: 16 MMOL/L — SIGNIFICANT CHANGE UP (ref 5–17)
BUN SERPL-MCNC: 26 MG/DL — HIGH (ref 7–23)
BUN SERPL-MCNC: 29 MG/DL — HIGH (ref 7–23)
CALCIUM SERPL-MCNC: 9.2 MG/DL — SIGNIFICANT CHANGE UP (ref 8.4–10.5)
CALCIUM SERPL-MCNC: 9.2 MG/DL — SIGNIFICANT CHANGE UP (ref 8.4–10.5)
CHLORIDE SERPL-SCNC: 100 MMOL/L — SIGNIFICANT CHANGE UP (ref 96–108)
CHLORIDE SERPL-SCNC: 102 MMOL/L — SIGNIFICANT CHANGE UP (ref 96–108)
CO2 SERPL-SCNC: 19 MMOL/L — LOW (ref 22–31)
CO2 SERPL-SCNC: 21 MMOL/L — LOW (ref 22–31)
CREAT SERPL-MCNC: 1.56 MG/DL — HIGH (ref 0.5–1.3)
CREAT SERPL-MCNC: 1.62 MG/DL — HIGH (ref 0.5–1.3)
EGFR: 44 ML/MIN/1.73M2 — LOW
EGFR: 46 ML/MIN/1.73M2 — LOW
GLUCOSE SERPL-MCNC: 118 MG/DL — HIGH (ref 70–99)
GLUCOSE SERPL-MCNC: 98 MG/DL — SIGNIFICANT CHANGE UP (ref 70–99)
HCT VFR BLD CALC: 35.5 % — LOW (ref 39–50)
HGB BLD-MCNC: 11.7 G/DL — LOW (ref 13–17)
MCHC RBC-ENTMCNC: 31 PG — SIGNIFICANT CHANGE UP (ref 27–34)
MCHC RBC-ENTMCNC: 33 G/DL — SIGNIFICANT CHANGE UP (ref 32–36)
MCV RBC AUTO: 93.9 FL — SIGNIFICANT CHANGE UP (ref 80–100)
NRBC # BLD: 0 /100 WBCS — SIGNIFICANT CHANGE UP (ref 0–0)
NRBC BLD-RTO: 0 /100 WBCS — SIGNIFICANT CHANGE UP (ref 0–0)
PLATELET # BLD AUTO: 137 K/UL — LOW (ref 150–400)
POTASSIUM SERPL-MCNC: 4.1 MMOL/L — SIGNIFICANT CHANGE UP (ref 3.5–5.3)
POTASSIUM SERPL-MCNC: 4.4 MMOL/L — SIGNIFICANT CHANGE UP (ref 3.5–5.3)
POTASSIUM SERPL-SCNC: 4.1 MMOL/L — SIGNIFICANT CHANGE UP (ref 3.5–5.3)
POTASSIUM SERPL-SCNC: 4.4 MMOL/L — SIGNIFICANT CHANGE UP (ref 3.5–5.3)
RBC # BLD: 3.78 M/UL — LOW (ref 4.2–5.8)
RBC # FLD: 13.1 % — SIGNIFICANT CHANGE UP (ref 10.3–14.5)
SODIUM SERPL-SCNC: 135 MMOL/L — SIGNIFICANT CHANGE UP (ref 135–145)
SODIUM SERPL-SCNC: 137 MMOL/L — SIGNIFICANT CHANGE UP (ref 135–145)
TROPONIN T, HIGH SENSITIVITY RESULT: 139 NG/L — HIGH (ref 0–51)
TROPONIN T, HIGH SENSITIVITY RESULT: 198 NG/L — HIGH (ref 0–51)
WBC # BLD: 4.27 K/UL — SIGNIFICANT CHANGE UP (ref 3.8–10.5)
WBC # FLD AUTO: 4.27 K/UL — SIGNIFICANT CHANGE UP (ref 3.8–10.5)

## 2025-01-28 PROCEDURE — 74230 X-RAY XM SWLNG FUNCJ C+: CPT | Mod: 26

## 2025-01-28 PROCEDURE — 99232 SBSQ HOSP IP/OBS MODERATE 35: CPT

## 2025-01-28 PROCEDURE — 93010 ELECTROCARDIOGRAM REPORT: CPT | Mod: 77

## 2025-01-28 PROCEDURE — 99232 SBSQ HOSP IP/OBS MODERATE 35: CPT | Mod: GC

## 2025-01-28 PROCEDURE — 93010 ELECTROCARDIOGRAM REPORT: CPT

## 2025-01-28 RX ORDER — POLYETHYLENE GLYCOL 3350 17 G/17G
17 POWDER, FOR SOLUTION ORAL DAILY
Refills: 0 | Status: DISCONTINUED | OUTPATIENT
Start: 2025-01-28 | End: 2025-01-31

## 2025-01-28 RX ORDER — NITROGLYCERIN 0.4 MG/1
0.4 TABLET SUBLINGUAL
Refills: 0 | Status: COMPLETED | OUTPATIENT
Start: 2025-01-28 | End: 2025-01-29

## 2025-01-28 RX ORDER — CARVEDILOL 6.25 MG
12.5 TABLET ORAL EVERY 12 HOURS
Refills: 0 | Status: DISCONTINUED | OUTPATIENT
Start: 2025-01-28 | End: 2025-01-29

## 2025-01-28 RX ORDER — ACETAMINOPHEN 160 MG/5ML
1000 SUSPENSION ORAL ONCE
Refills: 0 | Status: COMPLETED | OUTPATIENT
Start: 2025-01-28 | End: 2025-01-28

## 2025-01-28 RX ORDER — NITROGLYCERIN 0.4 MG/1
0.4 TABLET SUBLINGUAL ONCE
Refills: 0 | Status: COMPLETED | OUTPATIENT
Start: 2025-01-28 | End: 2025-01-28

## 2025-01-28 RX ORDER — ROSUVASTATIN CALCIUM 10 MG/1
40 TABLET, FILM COATED ORAL AT BEDTIME
Refills: 0 | Status: DISCONTINUED | OUTPATIENT
Start: 2025-01-28 | End: 2025-01-31

## 2025-01-28 RX ORDER — POLYETHYLENE GLYCOL 3350 17 G/17G
17 POWDER, FOR SOLUTION ORAL
Refills: 0 | Status: DISCONTINUED | OUTPATIENT
Start: 2025-01-28 | End: 2025-01-28

## 2025-01-28 RX ADMIN — Medication 100 MILLIGRAM(S): at 13:17

## 2025-01-28 RX ADMIN — APIXABAN 10 MILLIGRAM(S): 5 TABLET, FILM COATED ORAL at 17:24

## 2025-01-28 RX ADMIN — Medication 2 TABLET(S): at 21:47

## 2025-01-28 RX ADMIN — Medication 100 MILLIGRAM(S): at 05:31

## 2025-01-28 RX ADMIN — ACETAMINOPHEN 400 MILLIGRAM(S): 160 SUSPENSION ORAL at 11:14

## 2025-01-28 RX ADMIN — DAPAGLIFLOZIN 10 MILLIGRAM(S): 5 TABLET, FILM COATED ORAL at 17:25

## 2025-01-28 RX ADMIN — Medication 1 TABLET(S): at 11:17

## 2025-01-28 RX ADMIN — Medication 75 MILLIGRAM(S): at 11:17

## 2025-01-28 RX ADMIN — Medication 500 MILLIGRAM(S): at 11:17

## 2025-01-28 RX ADMIN — LIDOCAINE HYDROCHLORIDE 1 PATCH: 30 CREAM TOPICAL at 04:02

## 2025-01-28 RX ADMIN — ACETAMINOPHEN 650 MILLIGRAM(S): 160 SUSPENSION ORAL at 21:50

## 2025-01-28 RX ADMIN — ACETAMINOPHEN 650 MILLIGRAM(S): 160 SUSPENSION ORAL at 22:50

## 2025-01-28 RX ADMIN — APIXABAN 10 MILLIGRAM(S): 5 TABLET, FILM COATED ORAL at 05:32

## 2025-01-28 RX ADMIN — ACETAMINOPHEN 650 MILLIGRAM(S): 160 SUSPENSION ORAL at 18:00

## 2025-01-28 RX ADMIN — Medication 25 MILLIGRAM(S): at 11:50

## 2025-01-28 RX ADMIN — Medication 4 MILLIGRAM(S): at 21:46

## 2025-01-28 RX ADMIN — Medication 100 MILLIGRAM(S): at 21:46

## 2025-01-28 RX ADMIN — Medication 5 MILLIGRAM(S): at 11:17

## 2025-01-28 RX ADMIN — ROSUVASTATIN CALCIUM 40 MILLIGRAM(S): 10 TABLET, FILM COATED ORAL at 21:46

## 2025-01-28 RX ADMIN — NITROGLYCERIN 0.4 MILLIGRAM(S): 0.4 TABLET SUBLINGUAL at 19:54

## 2025-01-28 RX ADMIN — NITROGLYCERIN 0.4 MILLIGRAM(S): 0.4 TABLET SUBLINGUAL at 20:02

## 2025-01-28 RX ADMIN — ACETAMINOPHEN 650 MILLIGRAM(S): 160 SUSPENSION ORAL at 17:23

## 2025-01-28 RX ADMIN — NITROGLYCERIN 0.4 MILLIGRAM(S): 0.4 TABLET SUBLINGUAL at 05:31

## 2025-01-28 RX ADMIN — ANTISEPTIC SURGICAL SCRUB 1 APPLICATION(S): 0.04 SOLUTION TOPICAL at 11:17

## 2025-01-28 RX ADMIN — Medication 12.5 MILLIGRAM(S): at 09:07

## 2025-01-28 RX ADMIN — ACETAMINOPHEN 400 MILLIGRAM(S): 160 SUSPENSION ORAL at 23:50

## 2025-01-28 RX ADMIN — ACETAMINOPHEN 1000 MILLIGRAM(S): 160 SUSPENSION ORAL at 12:14

## 2025-01-28 NOTE — PROGRESS NOTE ADULT - PROBLEM SELECTOR PLAN 3
Patient falling s/p feeling legs were shaky and unbalanced in setting of chronic PAD and poor ambulation at baseline. Extensive cardiac history is also a contributing factor and subsequent increased chest pain while in transit to the hospital.   - CT head and x-rays of bilateral lower extremities without acute findings    Plan:  - NSTEMI workup/treatment as above  - PT consulted, recs appreciated  - risk precautions Left lower extremity with erythema and increased heat in setting of increased infection risk with chronic PAD. No leukocytosis or fever.     Plan:  - Ancef 1 g q8h (01/24 - 01/28)  - wound care consulted, recs appreciated

## 2025-01-28 NOTE — CONSULT NOTE ADULT - SUBJECTIVE AND OBJECTIVE BOX
HPI:  Patient is a 74y Male presenting as a transfer from [place] for NSTEMI  Urology consulted for gross hematuria.  Pt states he had few days of gross hematuria prior to admission which resolved spontaneously.  States that this episode of hematuria began after a straight catheterization. pt currently on plavix and eliquis  +ex smoker  admits to LUTS    PAST MEDICAL & SURGICAL HISTORY:  CAD (coronary artery disease)      BPH (benign prostatic hyperplasia)      HTN (hypertension)      HLD (hyperlipidemia)      PAD (peripheral artery disease)      S/P femoral-femoral bypass surgery        FAMILY HISTORY:   No known  malignancy   SOCIAL HISTORY: Retired   Tobacco hx: smoker/nonsmoker   MEDICATIONS  (STANDING):  apixaban 10 milliGRAM(s) Oral every 12 hours  ascorbic acid 500 milliGRAM(s) Oral daily  carvedilol 12.5 milliGRAM(s) Oral every 12 hours  ceFAZolin   IVPB 1000 milliGRAM(s) IV Intermittent every 8 hours  chlorhexidine 2% Cloths 1 Application(s) Topical daily  clopidogrel Tablet 75 milliGRAM(s) Oral daily  dapagliflozin 10 milliGRAM(s) Oral every 24 hours  doxazosin 4 milliGRAM(s) Oral at bedtime  finasteride 5 milliGRAM(s) Oral daily  isosorbide   mononitrate ER Tablet (IMDUR) 240 milliGRAM(s) Oral daily  lidocaine   4% Patch 1 Patch Transdermal every 24 hours  multivitamin 1 Tablet(s) Oral daily  polyethylene glycol 3350 17 Gram(s) Oral daily  ranolazine 1000 milliGRAM(s) Oral two times a day  rosuvastatin 40 milliGRAM(s) Oral at bedtime  senna 2 Tablet(s) Oral at bedtime  spironolactone 25 milliGRAM(s) Oral every 24 hours    MEDICATIONS  (PRN):  acetaminophen     Tablet .. 650 milliGRAM(s) Oral every 6 hours PRN Temp greater or equal to 38C (100.4F), Mild Pain (1 - 3)    Allergies    No Known Allergies    Intolerances        REVIEW OF SYSTEMS: Pertinent positives and negatives as stated in HPI, otherwise negative    Vital signs  T(C): 36.8 (01-28-25 @ 08:54), Max: 37.1 (01-27-25 @ 20:17)  HR: 75 (01-28-25 @ 11:14)  BP: 97/56 (01-28-25 @ 11:14)  SpO2: 95% (01-28-25 @ 09:55)  Wt(kg): --    Physical Exam  Gen: NAD  Abd: Soft, NT, ND, no rebound tenderness or guarding  : WNL  NEURO: A&Ox3, no focal neurological deficits, CN 2-12 grossly intact  SKIN: warm, dry, no rash.    LABS:    01-28 @ 05:12    WBC 4.27  / Hct 35.5  / SCr 1.62     01-27 @ 06:20    WBC 5.46  / Hct 33.4  / SCr --       01-28    135  |  100  |  26[H]  ----------------------------<  98  4.1   |  21[L]  |  1.62[H]    Ca    9.2      28 Jan 2025 05:12  Phos  3.8     01-27  Mg     1.9     01-27    TPro  6.3  /  Alb  3.4  /  TBili  0.4  /  DBili  x   /  AST  15  /  ALT  <5[L]  /  AlkPhos  57  01-27    PTT - ( 27 Jan 2025 06:21 )  PTT:29.1 sec  Urinalysis Basic - ( 28 Jan 2025 05:12 )    Color: x / Appearance: x / SG: x / pH: x  Gluc: 98 mg/dL / Ketone: x  / Bili: x / Urobili: x   Blood: x / Protein: x / Nitrite: x   Leuk Esterase: x / RBC: x / WBC x   Sq Epi: x / Non Sq Epi: x / Bacteria: x

## 2025-01-28 NOTE — PROGRESS NOTE ADULT - ASSESSMENT
Case Management Assessment  Initial Evaluation    Date/Time of Evaluation: 2/27/2024 2:18 PM  Assessment Completed by: Adelaida Turner RN    If patient is discharged prior to next notation, then this note serves as note for discharge by case management.    Patient Name: Ro Martinez                   YOB: 1987  Diagnosis: Dehydration [E86.0]  Pyelonephritis [N12]  Acute pyelonephritis [N10]  Abdominal pain, unspecified abdominal location [R10.9]                   Date / Time: 2/24/2024 11:39 AM    Patient Admission Status: Inpatient   Readmission Risk (Low < 19, Mod (19-27), High > 27): Readmission Risk Score: 10.1    Current PCP: Virginie Son APRN - NP  PCP verified by CM? Yes    Chart Reviewed: Yes      History Provided by: Patient  Patient Orientation: Alert and Oriented, Person, Place, Situation, Self    Patient Cognition: Alert    Hospitalization in the last 30 days (Readmission):  No    If yes, Readmission Assessment in  Navigator will be completed.    Advance Directives:      Code Status: Prior   Patient's Primary Decision Maker is: Legal Next of Kin      Discharge Planning:    Patient lives with: Friends Type of Home:    Primary Care Giver: Self  Patient Support Systems include: Friends/Neighbors   Current Financial resources: Medicaid  Current community resources: None  Current services prior to admission: None            Current DME:              Type of Home Care services:  None    ADLS  Prior functional level: Independent in ADLs/IADLs  Current functional level: Independent in ADLs/IADLs    PT AM-PAC:   /24  OT AM-PAC:   /24    Family can provide assistance at DC: Yes  Would you like Case Management to discuss the discharge plan with any other family members/significant others, and if so, who? No  Plans to Return to Present Housing: Yes  Other Identified Issues/Barriers to RETURNING to current housing: none  Potential Assistance needed at discharge: N/A             Elton Sanabria is a 74-year-old male, Polish speaking, PMHx of CAD s/p PCI and minimally invasive direct CABG (LIMA --> LAD) 10/31/24, ICM, HFrEF, HTN, CKD, and PAD s/p prior L to R fem-fem bypass (>20yrs ago), who initially presented to [facility] after a mechanical fall and transferred to [facility] for NSTEMI. s/p LHC, no stent due to anatomy.

## 2025-01-28 NOTE — PROGRESS NOTE ADULT - SUBJECTIVE AND OBJECTIVE BOX
ODALYS HAGER  74y  MRN: [id number]    Patient is a 74y old  Male who presents with a chief complaint of GLF and NSTEMI (27 Jan 2025 14:51)      Interval/Overnight Events: no events ON.     Subjective: Pt seen and examined at bedside.     MEDICATIONS  (STANDING):  apixaban 10 milliGRAM(s) Oral every 12 hours  ascorbic acid 500 milliGRAM(s) Oral daily  atorvastatin 80 milliGRAM(s) Oral at bedtime  carvedilol 25 milliGRAM(s) Oral every 12 hours  ceFAZolin   IVPB 1000 milliGRAM(s) IV Intermittent every 8 hours  chlorhexidine 2% Cloths 1 Application(s) Topical daily  clopidogrel Tablet 75 milliGRAM(s) Oral daily  dapagliflozin 10 milliGRAM(s) Oral every 24 hours  doxazosin 4 milliGRAM(s) Oral at bedtime  finasteride 5 milliGRAM(s) Oral daily  isosorbide   mononitrate ER Tablet (IMDUR) 240 milliGRAM(s) Oral daily  lidocaine   4% Patch 1 Patch Transdermal every 24 hours  multivitamin 1 Tablet(s) Oral daily  ranolazine 1000 milliGRAM(s) Oral two times a day  senna 2 Tablet(s) Oral at bedtime  spironolactone 25 milliGRAM(s) Oral every 24 hours    MEDICATIONS  (PRN):  acetaminophen     Tablet .. 650 milliGRAM(s) Oral every 6 hours PRN Temp greater or equal to 38C (100.4F), Mild Pain (1 - 3)      Objective:    Vitals: Vital Signs Last 24 Hrs  T(C): 37.1 (01-28-25 @ 04:00), Max: 37.1 (01-27-25 @ 08:19)  T(F): 98.7 (01-28-25 @ 04:00), Max: 98.7 (01-27-25 @ 08:19)  HR: 77 (01-28-25 @ 05:56) (77 - 90)  BP: 105/52 (01-28-25 @ 05:56) (95/51 - 119/64)  BP(mean): --  RR: 18 (01-28-25 @ 05:56) (17 - 18)  SpO2: 94% (01-28-25 @ 05:56) (94% - 96%)                I&O's Summary    27 Jan 2025 07:01  -  28 Jan 2025 07:00  --------------------------------------------------------  IN: 490 mL / OUT: 1300 mL / NET: -810 mL        PHYSICAL EXAM:  GENERAL: NAD  HEAD:  Atraumatic, Normocephalic  EYES: EOMI, conjunctiva and sclera clear  CHEST/LUNG: Clear to auscultation bilaterally; No rales, rhonchi, wheezing, or rubs  HEART: Regular rate and rhythm; No murmurs, rubs, or gallops  ABDOMEN: Soft, Nontender, Nondistended;   SKIN: No rashes or lesions  NERVOUS SYSTEM:  Alert & Oriented X3, no focal deficits    LABS:                        11.7   4.27  )-----------( 137      ( 28 Jan 2025 05:12 )             35.5                         11.2   5.46  )-----------( 142      ( 27 Jan 2025 06:20 )             33.4                         10.8   6.19  )-----------( 149      ( 26 Jan 2025 22:25 )             32.8     01-28    135  |  100  |  26[H]  ----------------------------<  98  4.1   |  21[L]  |  1.62[H]  01-27    138  |  103  |  28[H]  ----------------------------<  101[H]  4.0   |  20[L]  |  1.70[H]  01-26    136  |  101  |  34[H]  ----------------------------<  105[H]  4.1   |  20[L]  |  1.76[H]    Ca    9.2      28 Jan 2025 05:12  Ca    9.0      27 Jan 2025 06:19  Ca    9.2      26 Jan 2025 06:01  Phos  3.8     01-27  Mg     1.9     01-27    TPro  6.3  /  Alb  3.4  /  TBili  0.4  /  DBili  x   /  AST  15  /  ALT  <5[L]  /  AlkPhos  57  01-27  TPro  6.3  /  Alb  3.4  /  TBili  0.5  /  DBili  x   /  AST  20  /  ALT  9[L]  /  AlkPhos  58  01-26    CAPILLARY BLOOD GLUCOSE        PTT - ( 27 Jan 2025 06:21 )  PTT:29.1 sec    Urinalysis Basic - ( 28 Jan 2025 05:12 )    Color: x / Appearance: x / SG: x / pH: x  Gluc: 98 mg/dL / Ketone: x  / Bili: x / Urobili: x   Blood: x / Protein: x / Nitrite: x   Leuk Esterase: x / RBC: x / WBC x   Sq Epi: x / Non Sq Epi: x / Bacteria: x          RADIOLOGY & ADDITIONAL TESTS:    Imaging Personally Reviewed:  [x ] YES  [ ] NO    Consultants involved in case:   Consultant(s) Notes Reviewed:  [ x] YES  [ ] NO:   Care Discussed with Consultants/Other Providers [x ] YES  [ ] NO           ODALYS HAGER  74y  MRN: [id number]    Patient is a 74y old  Male who presents with a chief complaint of GLF and NSTEMI (27 Jan 2025 14:51)      Interval/Overnight Events: no events ON. Had an episode of chest pain overnight, EKG without changes, troponins downtrending still, and resolved with nitroglycerin.    Subjective: Pt seen and examined at bedside. Feels all over achiness and pain. Endorses continued hematuria - notes that this has been going on for 3 weeks before admission.     MEDICATIONS  (STANDING):  apixaban 10 milliGRAM(s) Oral every 12 hours  ascorbic acid 500 milliGRAM(s) Oral daily  atorvastatin 80 milliGRAM(s) Oral at bedtime  carvedilol 25 milliGRAM(s) Oral every 12 hours  ceFAZolin   IVPB 1000 milliGRAM(s) IV Intermittent every 8 hours  chlorhexidine 2% Cloths 1 Application(s) Topical daily  clopidogrel Tablet 75 milliGRAM(s) Oral daily  dapagliflozin 10 milliGRAM(s) Oral every 24 hours  doxazosin 4 milliGRAM(s) Oral at bedtime  finasteride 5 milliGRAM(s) Oral daily  isosorbide   mononitrate ER Tablet (IMDUR) 240 milliGRAM(s) Oral daily  lidocaine   4% Patch 1 Patch Transdermal every 24 hours  multivitamin 1 Tablet(s) Oral daily  ranolazine 1000 milliGRAM(s) Oral two times a day  senna 2 Tablet(s) Oral at bedtime  spironolactone 25 milliGRAM(s) Oral every 24 hours    MEDICATIONS  (PRN):  acetaminophen     Tablet .. 650 milliGRAM(s) Oral every 6 hours PRN Temp greater or equal to 38C (100.4F), Mild Pain (1 - 3)      Objective:    Vitals: Vital Signs Last 24 Hrs  T(C): 37.1 (01-28-25 @ 04:00), Max: 37.1 (01-27-25 @ 08:19)  T(F): 98.7 (01-28-25 @ 04:00), Max: 98.7 (01-27-25 @ 08:19)  HR: 77 (01-28-25 @ 05:56) (77 - 90)  BP: 105/52 (01-28-25 @ 05:56) (95/51 - 119/64)  BP(mean): --  RR: 18 (01-28-25 @ 05:56) (17 - 18)  SpO2: 94% (01-28-25 @ 05:56) (94% - 96%)                I&O's Summary    27 Jan 2025 07:01  -  28 Jan 2025 07:00  --------------------------------------------------------  IN: 490 mL / OUT: 1300 mL / NET: -810 mL        PHYSICAL EXAM:  GENERAL: NAD  HEAD:  Atraumatic, Normocephalic  EYES: EOMI, conjunctiva and sclera clear  CHEST/LUNG: Clear to auscultation bilaterally; No rales, rhonchi, wheezing, or rubs  HEART: Regular rate and rhythm; No murmurs, rubs, or gallops  ABDOMEN: Soft, Nontender, Nondistended;   SKIN: No rashes or lesions  EXTREMITIES: tenderness to muscles diffusely  NERVOUS SYSTEM:  Alert & Oriented X3, no focal deficits    LABS:                        11.7   4.27  )-----------( 137      ( 28 Jan 2025 05:12 )             35.5                         11.2   5.46  )-----------( 142      ( 27 Jan 2025 06:20 )             33.4                         10.8   6.19  )-----------( 149      ( 26 Jan 2025 22:25 )             32.8     01-28    135  |  100  |  26[H]  ----------------------------<  98  4.1   |  21[L]  |  1.62[H]  01-27    138  |  103  |  28[H]  ----------------------------<  101[H]  4.0   |  20[L]  |  1.70[H]  01-26    136  |  101  |  34[H]  ----------------------------<  105[H]  4.1   |  20[L]  |  1.76[H]    Ca    9.2      28 Jan 2025 05:12  Ca    9.0      27 Jan 2025 06:19  Ca    9.2      26 Jan 2025 06:01  Phos  3.8     01-27  Mg     1.9     01-27    TPro  6.3  /  Alb  3.4  /  TBili  0.4  /  DBili  x   /  AST  15  /  ALT  <5[L]  /  AlkPhos  57  01-27  TPro  6.3  /  Alb  3.4  /  TBili  0.5  /  DBili  x   /  AST  20  /  ALT  9[L]  /  AlkPhos  58  01-26    CAPILLARY BLOOD GLUCOSE        PTT - ( 27 Jan 2025 06:21 )  PTT:29.1 sec    Urinalysis Basic - ( 28 Jan 2025 05:12 )    Color: x / Appearance: x / SG: x / pH: x  Gluc: 98 mg/dL / Ketone: x  / Bili: x / Urobili: x   Blood: x / Protein: x / Nitrite: x   Leuk Esterase: x / RBC: x / WBC x   Sq Epi: x / Non Sq Epi: x / Bacteria: x          RADIOLOGY & ADDITIONAL TESTS:    Imaging Personally Reviewed:  [x ] YES  [ ] NO    Consultants involved in case:   Consultant(s) Notes Reviewed:  [ x] YES  [ ] NO:   Care Discussed with Consultants/Other Providers [x ] YES  [ ] NO

## 2025-01-28 NOTE — SWALLOW VFSS/MBS ASSESSMENT ADULT - H & P REVIEW
74-year-old male, Polish speaking, PMHx of CAD s/p PCI and minimally invasive direct CABG (LIMA --> LAD) 10/31/24, ICM, HFrEF, HTN, CKD, and PAD s/p prior L to R fem-fem bypass (>20yrs ago), who initially presented to [facility]- after a mechanical fall and transferred to [facility] for NSTEMI. Pending heart cath. Dx: NSTEMI (non-ST elevation myocardial infarction), Cellulitis Left lower extremity,  Fall from ground level - CT head and x-rays of bilateral lower extremities without acute findings, Peripheral artery disease, stage 3 kidney failure, Hx of BPH, pt with poor health literacy. Per EMR, Drinking without issue, but troubles with eating in setting of dentures still being at home/yes

## 2025-01-28 NOTE — PROVIDER CONTACT NOTE (CRITICAL VALUE NOTIFICATION) - ACTION/TREATMENT ORDERED:
Provider made aware. No interventions at this present time. Will endorse to next shift. Monitoring remains ongoing.
no new interventions @ this time. c/w tele monitoring

## 2025-01-28 NOTE — SWALLOW VFSS/MBS ASSESSMENT ADULT - DIAGNOSTIC IMPRESSIONS
Pt p/w oropharyngeal dysphagia. Oral Phase: premature spillage. Pharyngeal Phase delayed initiation of swallow. Pt demonstrates silent aspiration of soft-bite-sized solids. Aspiration is eliminated w/ use of chin tuck. Pt w/ shallow, trace laryngeal penetration with thin liquids via straw, which is fully ejected. No airway invasion noted w/ puree, minced-moist, moderately thick liquids, mildly thick liquids, thin liquids via cup.     Disorders: tongue-palate seal, delay in trigger of the swallow reflex, reduced hyo-laryngeal excursion, reduced laryngeal closure, reduced pharyngeal contractility, reduced supraglottic sensation, reduced subglottic sensation. Pt is a 75yo Polish speaking M, pmhx of CAD s/p PCI, CBG, HFrEF, s/p fall and NSTEMI, reporting hx of dysphagia to solids >1 year. Pt p/w oropharyngeal dysphagia. Oral Phase: premature spillage, which increased in amount and depth with more advanced solids. Pharyngeal Phase delayed initiation of swallow. Pt demonstrates silent aspiration of soft-bite-sized solids. Aspiration with soft-bite-sized and easy-to-chew solids is eliminated w/ use of chin tuck. Pt w/ shallow, trace laryngeal penetration with thin liquids via straw, which is fully ejected. No airway invasion noted w/ puree, minced-moist, moderately thick liquids, mildly thick liquids, thin liquids via cup in head neutral. Pt is cognitively intact, and after discussion about MBS results and strategies, he would like to remain on current diet and practice chin tuck strategy with SLP prior to possible upgrade.    Disorders: tongue-palate seal, delay in trigger of the swallow reflex, reduced laryngeal closure, reduced supraglottic sensation, reduced subglottic sensation.

## 2025-01-28 NOTE — PROGRESS NOTE ADULT - PROBLEM SELECTOR PLAN 1
Presenting as a transfer from [place] for NSTEMI. Patient self-discontinued all medications for 3-4 weeks prior to admission, stating that he had finished the course of medications. Chest pain on presentation similar to prior episodes of chest pain.  - TTE with decreased EF 40-45%  - s/p Aspirin and plavix loading at VS    Plan:  - heparin ggt -> Eliquis 10 mg BID loading dose  - Cardiology consulted, recs appreciated  - [facility] on 01/24  - d/c aspirin 81 mg qd  - c/w clopidogrel 75 mg qd  - c/w Imdur 240 mg qd   - c/w carvedilol 25 mg BID  - c/w ranolazine qd  - c/w spironolactone 25 mg qd Presenting as a transfer from [place] for NSTEMI. Patient self-discontinued all medications for 3-4 weeks prior to admission, stating that he had finished the course of medications. Chest pain on presentation similar to prior episodes of chest pain.  - TTE with decreased EF 40-45%  - s/p Aspirin and plavix loading at VS    Plan:  - heparin ggt -> Eliquis 10 mg BID loading dose  - Cardiology consulted, recs appreciated  - [facility] on 01/24  - d/c aspirin 81 mg qd  - c/w clopidogrel 75 mg qd  - c/w Imdur 240 mg qd   - c/w carvedilol 12.5 mg BID  - c/w ranolazine qd  - c/w spironolactone 25 mg qd Presenting as a transfer from [place] for NSTEMI. Patient self-discontinued all medications for 3-4 weeks prior to admission, stating that he had finished the course of medications. Chest pain on presentation similar to prior episodes of chest pain.  - TTE with decreased EF 40-45%  - s/p Aspirin and plavix loading at VS    Plan:  - heparin ggt -> Eliquis 10 mg BID loading dose  - Cardiology consulted, recs appreciated  - [facility] on 01/24  - d/c aspirin 81 mg qd  - c/w clopidogrel 75 mg qd  - c/w Imdur 240 mg qd   - c/w carvedilol 12.5 mg BID  - c/w ranolazine qd -> having a difficult time with swallowing pill  - c/w spironolactone 25 mg qd

## 2025-01-28 NOTE — SWALLOW VFSS/MBS ASSESSMENT ADULT - ADDITIONAL RECOMMENDATIONS
It patient willing to perform chin tuck with soft-bite-sized or easy-to-chew solids consistently; pt can be upgraded at bedside. Pt reports he is unable to chew w/o dentures at this time.     Swallow: 1. Pt will tolerate recommended diet with no overt, clinical s/s of aspiration. Patient has expressed concerns about efficacy of chin tuck strategy given long-standing history of dysphagia. Although cognitively intact, pt may benefit from further education and followup and is willing to practice with clinician. If patient willing to perform chin tuck with soft-bite-sized or easy-to-chew solids consistently; pt can be upgraded at bedside. Pt reports he is unable to chew w/o dentures at this time, however, when they are procured, may consider upgrade to regular solids with chin tuck.     Swallow: 1. Pt will tolerate recommended diet with no overt, clinical s/s of aspiration.    Would recommend further dysphagia treatment while in-house and upon discharge.   Would recommend repeat objective swallow testing in ~1 month to assess need for further use of chin tuck strategy with solids.

## 2025-01-28 NOTE — SWALLOW VFSS/MBS ASSESSMENT ADULT - SLP GENERAL OBSERVATIONS
Pt received in radiology suite, secure in JESSICA chair, on room air, AOx4. Polish speaking;  implemented ([id number])

## 2025-01-28 NOTE — PROGRESS NOTE ADULT - PROBLEM SELECTOR PLAN 6
Patient was prescribed tamsulosin and finasteride at home. Patient was not taking these in the last 4 weeks.    Plan:  - continue home tamsulosin  - continue home finasteride  - bladder scans q8h Patient with likely stage 3 CKD. EGFR has varied between 40s-50.    Plan:  - CTM  - PCP follow up outpatient

## 2025-01-28 NOTE — SWALLOW VFSS/MBS ASSESSMENT ADULT - ADDITIONAL INFORMATION
+calcification of laryngeal structures  +non-obstructive anterior cervical osteophytes at ~C5-6  +intermittent visualization of non-obstructive finger-like protrusion at posterior esophagus ~C5-6; radiologist did not comment on whether pt had CP bar vs other.

## 2025-01-28 NOTE — PROGRESS NOTE ADULT - ASSESSMENT
74-year-old male, Polish speaking, PMHx of CAD s/p PCI and minimally invasive direct CABG (LIMA --> LAD) 10/31/24, ICM, HFrEF, HTN, CKD, and PAD s/p prior L to R fem-fem bypass (>20yrs ago), who initially presented to [facility]- after a mechanical fall and transferred to [facility] for NSTEMI. Pending heart cath.    Wound Consult requested to assist w/ management of BLE wound  BLE PVD w/ stasis dermatitis  LLE incision    BLE wounds- medihoney dressing  LLE incision as per CTS  BLE elevation & Compression  Consider ALLYN/PVR- no palpable pulses - wounds and heel pain    BLE Duplex no DVT, patent grafts    LLE Xray no disloc, fx, OM, calcif noted. (+)sts of calf  Abx per Medicine/ ID  Moisturize intact skin w/ SWEEN cream BID  Nutrition Consult for optimization         encourage high quality protein, savita/ prosource, MVI & Vit C to promote wound healing  Hyperglycemia -  ADA diet and FS w/ ISS  Continue turning and positioning w/ offloading assistive devices as per protocol  Buttocks/ Sacrum Zenaida BID and prn soiling        Continue w/ attends under pads and Pericare as per protocol  Waffle Cushion to chair when oob to chair  Continue w/ low air loss pressure redistribution bed surface   PCare as per medicine, will follow w/ you  Upon discharge f/u as outpatient at [facility] [address] [place] [address] [phone]  Seen w/ RN and D/w team & RN  Thank you for this consult  Samira Lo PA-C CWS [id number]  Nights/ Weekends/ Holidays please call:  General Surgery Consult pager [phone]) for emergencies  Wound PT for multilayer leg wrapping or VAC issues (x [phone])   I spent 35minutes face to face w/ this pt of which more than 50% of the time was spent counseling & coordinating care of this pt.  74-year-old male, Polish speaking, PMHx of CAD s/p PCI and minimally invasive direct CABG (LIMA --> LAD) 10/31/24, ICM, HFrEF, HTN, CKD, and PAD s/p prior L to R fem-fem bypass (>20yrs ago), who initially presented to [facility]- after a mechanical fall and transferred to [facility] for NSTEMI. Pending heart cath.    Wound Consult requested to assist w/ management of:   BLE wound  BLE PVD w/ stasis dermatitis  LLE incision    BLE wounds- medihoney dressing  LLE incision as per CTS  BLE elevation & Compression  Consider ALLYN/PVR- no palpable pulses - wounds and heel pain    BLE Duplex no DVT, patent grafts    LLE Xray no disloc, fx, OM, calcif noted. (+)sts of calf  Abx per Medicine/ ID  Moisturize intact skin w/ SWEEN cream BID  Nutrition Consult for optimization         encourage high quality protein, savita/ prosource, MVI & Vit C to promote wound healing  Hyperglycemia -  ADA diet and FS w/ ISS  Continue turning and positioning w/ offloading assistive devices as per protocol  Buttocks/ Sacrum Zenaida BID and prn soiling        Continue w/ attends under pads and Pericare as per protocol  Waffle Cushion to chair when oob to chair  Continue w/ low air loss pressure redistribution bed surface   PCare as per medicine, will follow w/ you  Upon discharge f/u as outpatient at Wound Center [address] [place] [address] [phone]  Seen w/attending and  RN and D/w team & RN  Thank you for this consult  Samira Lo PA-C CWS [id number]  Nights/ Weekends/ Holidays please call:  General Surgery Consult pager ([phone]) for emergencies  Wound PT for multilayer leg wrapping or VAC issues (x [phone])

## 2025-01-28 NOTE — SWALLOW VFSS/MBS ASSESSMENT ADULT - RECOMMENDED FEEDING/EATING TECHNIQUES
Crush meds. No mixed consistencies (e.g., cereal or fruit cups)/allow for swallow between intakes/crush medication (when feasible)/oral hygiene/position upright (90 degrees)

## 2025-01-28 NOTE — SWALLOW VFSS/MBS ASSESSMENT ADULT - LARYNGEAL PENETRATION DURING THE SWALLOW - SILENT
with straw only; there was transient shallow laryngeal penetration from over laryngeal surface of epiglottis and over arytenoids with complete retrieval/Trace

## 2025-01-28 NOTE — PROGRESS NOTE ADULT - NS ATTEND AMEND GEN_ALL_CORE FT
Pt seen and examined with ACP.  Assessment and plan reviewed and discussed.  Agree with above.    Status of wounds and treatment recommendations d/w  pt.  All questions answered.   Pt expressed understanding.      I spent 35  minutes face to face w/ this pt of which more than 50% of the time was spent counseling & coordinating care of this pt.

## 2025-01-28 NOTE — PROGRESS NOTE ADULT - SUBJECTIVE AND OBJECTIVE BOX
[facility]-- WOUND TEAM -- FOLLOW UP NOTE  --------------------------------------------------------------------------------    24 hour events/subjective:      afebrile  awaiting MBS fro diet rec  mostly continent  tolerating dressing changes w/o odor or excess drainage     c/o lt heel pain- no wound  encouraged offloading and mobility        ROS: General/ SKIN/ MSK/ Vasc see HPI  all other systems negative      ALLERGIES & MEDICATIONS  --------------------------------------------------------------------------------    No Known Allergies      STANDING INPATIENT MEDICATIONS  apixaban 10 milliGRAM(s) Oral every 12 hours  ascorbic acid 500 milliGRAM(s) Oral daily  carvedilol 12.5 milliGRAM(s) Oral every 12 hours  ceFAZolin   IVPB 1000 milliGRAM(s) IV Intermittent every 8 hours  chlorhexidine 2% Cloths 1 Application(s) Topical daily  clopidogrel Tablet 75 milliGRAM(s) Oral daily  dapagliflozin 10 milliGRAM(s) Oral every 24 hours  doxazosin 4 milliGRAM(s) Oral at bedtime  finasteride 5 milliGRAM(s) Oral daily  isosorbide   mononitrate ER Tablet (IMDUR) 240 milliGRAM(s) Oral daily  lidocaine   4% Patch 1 Patch Transdermal every 24 hours  multivitamin 1 Tablet(s) Oral daily  polyethylene glycol 3350 17 Gram(s) Oral daily  ranolazine 1000 milliGRAM(s) Oral two times a day  rosuvastatin 40 milliGRAM(s) Oral at bedtime  senna 2 Tablet(s) Oral at bedtime  spironolactone 25 milliGRAM(s) Oral every 24 hours      PRN INPATIENT MEDICATION  acetaminophen Tablet 650 milliGRAM(s) Oral every 6 hours PRN        VITALS/PHYSICAL EXAM  --------------------------------------------------------------------------------  T(C): 36.8 (01-28-25 @ 08:54), Max: 37.1 (01-27-25 @ 20:17)  HR: 75 (01-28-25 @ 11:14) (75 - 90)  BP: 97/56 (01-28-25 @ 11:14) (95/51 - 119/64)  RR: 18 (01-28-25 @ 09:55) (17 - 18)  SpO2: 95% (01-28-25 @ 09:55) (94% - 95%)  Wt(kg): --      NAD, A&Ox3, WD/ WN/ WG  Versa Care P500 bed  HEENT:  NC/AT EOMI, sclera clear, mucosa moist, throat clear, trachea midline, neck supple  Respiratory: nonlabored w/ equal chest rise  Gastrointestinal: soft NT/ND   Neurology:  strength & sensation grossly intact  Psych: calm/ appropriate  Musculoskeletal:  FROM, no deformities/ contractures  Vascular: BLE equally warm,  no cyanosis, clubbing, edema nor acute ischemia        BLE hemosiderin staining/ varicose veins        no palpable pulses        Lt thigh incision c/d/i w/ sutures as per CTS     Rt shin dried black adherent eschar     LLE 2 eschar/ scabs easily lifted w/ healed skin beneath         3rd eschar mechanically debrided w/ partial thickness skin loss beneath     Lt heel tender but no open skin/ skin color changes       no blistering or drainage  No odor, erythema, increased warmth, tenderness, induration, fluctuance, nor crepitus  Skin:  moist w/ good turgor      LABS/ CULTURES/ RADIOLOGY:              11.7   4.27  >-----------<  137      [01-28-25 @ 05:12]              35.5     135  |  100  |  26  ----------------------------<  98      [01-28-25 @ 05:12]  4.1   |  21  |  1.62        Ca     9.2     [01-28-25 @ 05:12]      Mg     1.9     [01-27-25 @ 06:19]      Phos  3.8     [01-27-25 @ 06:19]    TPro  6.3  /  Alb  3.4  /  TBili  0.4  /  DBili  x   /  AST  15  /  ALT  <5  /  AlkPhos  57  [01-27-25 @ 06:19]      PTT: 29.1       [01-27-25 @ 06:21]      A1C with Estimated Average Glucose Result: 5.4 % (01-25-25 @ 04:26)  A1C with Estimated Average Glucose Result: 5.8 % (10-27-24 @ 14:07)      Culture - Blood (collected 01-26-25 @ 22:26)  Source: .Blood BLOOD  Preliminary Report (01-28-25 @ 03:02):    No growth at 24 hours    Culture - Blood (collected 01-26-25 @ 22:26)  Source: .Blood BLOOD  Preliminary Report (01-28-25 @ 03:02):    No growth at 24 hours   [facility]-- WOUND TEAM -- FOLLOW UP NOTE  --------------------------------------------------------------------------------    24 hour events/subjective:      afebrile  awaiting MBS fro diet rec  mostly continent  tolerating dressing changes w/o odor or excess drainage     c/o lt heel pain- no wound  encouraged offloading and mobility        ROS: General/ SKIN/ MSK/ Vasc see HPI  all other systems negative      ALLERGIES & MEDICATIONS  --------------------------------------------------------------------------------    No Known Allergies      STANDING INPATIENT MEDICATIONS  apixaban 10 milliGRAM(s) Oral every 12 hours  ascorbic acid 500 milliGRAM(s) Oral daily  carvedilol 12.5 milliGRAM(s) Oral every 12 hours  ceFAZolin   IVPB 1000 milliGRAM(s) IV Intermittent every 8 hours  chlorhexidine 2% Cloths 1 Application(s) Topical daily  clopidogrel Tablet 75 milliGRAM(s) Oral daily  dapagliflozin 10 milliGRAM(s) Oral every 24 hours  doxazosin 4 milliGRAM(s) Oral at bedtime  finasteride 5 milliGRAM(s) Oral daily  isosorbide   mononitrate ER Tablet (IMDUR) 240 milliGRAM(s) Oral daily  lidocaine   4% Patch 1 Patch Transdermal every 24 hours  multivitamin 1 Tablet(s) Oral daily  polyethylene glycol 3350 17 Gram(s) Oral daily  ranolazine 1000 milliGRAM(s) Oral two times a day  rosuvastatin 40 milliGRAM(s) Oral at bedtime  senna 2 Tablet(s) Oral at bedtime  spironolactone 25 milliGRAM(s) Oral every 24 hours      PRN INPATIENT MEDICATION  acetaminophen Tablet 650 milliGRAM(s) Oral every 6 hours PRN        VITALS/PHYSICAL EXAM  --------------------------------------------------------------------------------  T(C): 36.8 (01-28-25 @ 08:54), Max: 37.1 (01-27-25 @ 20:17)  HR: 75 (01-28-25 @ 11:14) (75 - 90)  BP: 97/56 (01-28-25 @ 11:14) (95/51 - 119/64)  RR: 18 (01-28-25 @ 09:55) (17 - 18)  SpO2: 95% (01-28-25 @ 09:55) (94% - 95%)  Wt(kg): --      NAD, A&Ox3, WD/ WN/ WG  Versa Care P500 bed  HEENT:  NC/AT EOMI, sclera clear, mucosa moist, throat clear, trachea midline, neck supple  Respiratory: nonlabored w/ equal chest rise  Gastrointestinal: soft NT/ND   Neurology:  strength & sensation grossly intact  Psych: calm/ appropriate  Musculoskeletal:  FROM, no deformities/ contractures  Vascular: BLE equally warm,  no cyanosis, clubbing, edema nor acute ischemia        BLE hemosiderin staining/ varicose veins        BLE no palpable DP/PT pulses        Lt thigh incision c/d/i w/ sutures as per CTS     Rt shin dried black adherent eschar     LLE 2 eschar/ scabs easily lifted w/ healed skin beneath         3rd eschar mechanically debrided w/ partial thickness skin loss beneath     Lt heel tender but no open skin/ skin color changes       no blistering or drainage  No odor, erythema, increased warmth, tenderness, induration, fluctuance, nor crepitus  Skin:  moist w/ good turgor      LABS/ CULTURES/ RADIOLOGY:              11.7   4.27  >-----------<  137      [01-28-25 @ 05:12]              35.5     135  |  100  |  26  ----------------------------<  98      [01-28-25 @ 05:12]  4.1   |  21  |  1.62        Ca     9.2     [01-28-25 @ 05:12]      Mg     1.9     [01-27-25 @ 06:19]      Phos  3.8     [01-27-25 @ 06:19]    TPro  6.3  /  Alb  3.4  /  TBili  0.4  /  DBili  x   /  AST  15  /  ALT  <5  /  AlkPhos  57  [01-27-25 @ 06:19]      PTT: 29.1       [01-27-25 @ 06:21]      A1C with Estimated Average Glucose Result: 5.4 % (01-25-25 @ 04:26)  A1C with Estimated Average Glucose Result: 5.8 % (10-27-24 @ 14:07)      Culture - Blood (collected 01-26-25 @ 22:26)  Source: .Blood BLOOD  Preliminary Report (01-28-25 @ 03:02):    No growth at 24 hours    Culture - Blood (collected 01-26-25 @ 22:26)  Source: .Blood BLOOD  Preliminary Report (01-28-25 @ 03:02):    No growth at 24 hours

## 2025-01-28 NOTE — PROVIDER CONTACT NOTE (CRITICAL VALUE NOTIFICATION) - BACKGROUND
Patient admitted for NSTEMI, cellulitis, CKD, PAD. PMH of BPH, CAD
Patient last trop from 1/24/25 was 248, trops currently 214. Patient admitted for NSTEMI, currently on Po apixiban

## 2025-01-28 NOTE — PROGRESS NOTE ADULT - PROBLEM SELECTOR PLAN 5
Patient with likely stage 3 CKD. EGFR has varied between 40s-50.    Plan:  - CTM  - PCP follow up outpatient History of femoral-femoral bypass over 20 years ago and recent in 2024.    Plan:  - medications as above

## 2025-01-28 NOTE — PROGRESS NOTE ADULT - PROBLEM SELECTOR PLAN 4
History of femoral-femoral bypass over 20 years ago and recent in 2024.    Plan:  - medications as above Patient falling s/p feeling legs were shaky and unbalanced in setting of chronic PAD and poor ambulation at baseline. Extensive cardiac history is also a contributing factor and subsequent increased chest pain while in transit to the hospital.   - CT head and x-rays of bilateral lower extremities without acute findings    Plan:  - NSTEMI workup/treatment as above  - PT consulted, recs appreciated  - risk precautions

## 2025-01-28 NOTE — PROVIDER CONTACT NOTE (CRITICAL VALUE NOTIFICATION) - ASSESSMENT
Patient remains aox4.vss. Patient now not complaining of chest pain
pt a/ox4 able to make needs known. pt c/o 0500 8/10 generalized chest pain. s/p EKG, trops sent and nitro SL given. pt now 0615 denies chest pain, VSS as seen in flowsheet. denies SOB, palpitations, N/V.

## 2025-01-28 NOTE — PROGRESS NOTE ADULT - PROBLEM SELECTOR PLAN 8
- DVT Prophylaxis: Eliquis  - Diet: soft and bite sized  - PT consulted, recs appreciated - MIGUEL  - Dispo: pending cardiac workup and MIGUEL placement Patient with poor health literacy. Self-discontinued all home medications after finishing the bottles. No social support.    Plan:  - social work consult, recs appreciated

## 2025-01-28 NOTE — CONSULT NOTE ADULT - ASSESSMENT
73 y/o male admitted with NSTEMI, on plavix and eliquis with gross hematuria    -UA, UCx  -check post void residual  -    will discuss with attending, full recs to follow 75 y/o male admitted with NSTEMI, on plavix and eliquis with gross hematuria    -UA, UCx, urine for cytology  -check post void residual  -start flomax 0.4mg qhs  -full urologic workup can be done as outpatient (CT urogram, cystoscopy)    will discuss with attending, full recs to follow 75 y/o male admitted with NSTEMI, on plavix and eliquis with gross hematuria, with spontaneous resolution. Hematuria likely 2/2 anticoagulation however pt will need outpatient workup for malignancy given hx smoking and age.     -UA, UCx, urine for cytology  -check post void residual  -start flomax 0.4mg qhs  -full urologic workup can be done as outpatient (CT urogram, cystoscopy)    Discussed w/ Dr. Stan Armendariz for Urology at [place]    [address],   [place] [address] [phone]

## 2025-01-28 NOTE — SWALLOW VFSS/MBS ASSESSMENT ADULT - PHARYNGEAL PHASE COMMENTS
repeat reflexive swallow clears pharyngeal residue pharyngeal residue cleared with reflexive swallow

## 2025-01-28 NOTE — SWALLOW VFSS/MBS ASSESSMENT ADULT - ROSENBEK'S PENETRATION ASPIRATION SCALE
(1) no aspiration, contrast does not enter airway Straw = PAS 2 (contrast enters airway; remains above vocal cords; no residue remains (penetration)). Cup = PAS 1 (no aspiration; contrast does not enter airway) (8) contrast passes glottis, visible subglottic residue remains, absent patient response (aspiration)

## 2025-01-28 NOTE — PROGRESS NOTE ADULT - PROBLEM SELECTOR PLAN 2
Left lower extremity with erythema and increased heat in setting of increased infection risk with chronic PAD. No leukocytosis or fever.     Plan:  - Ancef 1 g q8h (01/24 - 01/28)  - wound care consulted, recs appreciated - noted to have hematuria after ramirez removal. patient states also prior to admission.   - urology consulted, appreciate recs  - monitor H/H  - likely plan for outpatient follow up

## 2025-01-28 NOTE — SWALLOW VFSS/MBS ASSESSMENT ADULT - ORAL PHASE COMMENTS
premature spillage of material up to trace in valleculae vallecular aggregation to valleculae up to maximum amount  min oral cavity residue, cleared with repeat reflexive swallow passive spillage up to max in valleculae  min oral cavity residue; piece-meal swallows clears material premature spillage up to maximum to valleculae  trace oral cavity residue, cleared with reflexive swallow passive premature spillage of material up to maximum amount in valleculae and minimum to pyriform sinuses.   Laryngeal penetration occurs PRIOR to the swallow from material over the aryepiglottic folds and over arytenoids entering the laryngeal vestibule.  minimum oral cavity residue; piece-meal swallow clears material passive spillage of material to valleculae (max amount)  trace oral cavity residue, cleared w/ reflexive swallow

## 2025-01-28 NOTE — SWALLOW VFSS/MBS ASSESSMENT ADULT - COMMENTS
IMAGING:  CXR - No fracture. Rather mild right upper lobe infiltrate at this time. Clips in both groin areas again noted. No fracture or change from November 5, 2024.    *Patient is new to this service. No prior SLP exams in [facility] or MBS in PACS. ON THIS ADMISSION: 1/24 - Per EMR, Patient says pill is stuck, trying to drink water and bring up the pill. 1/27 initial bedside swallow exam with recommendations for minced-moist solids and thin liquids. Pt reporting fear of swallowing and globus sensation for over a year w/ solids. No improvement with or without use of dentures.

## 2025-01-29 LAB
ALBUMIN SERPL ELPH-MCNC: 3.9 G/DL — SIGNIFICANT CHANGE UP (ref 3.3–5)
ALP SERPL-CCNC: 76 U/L — SIGNIFICANT CHANGE UP (ref 40–120)
ALT FLD-CCNC: 6 U/L — LOW (ref 10–45)
ANION GAP SERPL CALC-SCNC: 14 MMOL/L — SIGNIFICANT CHANGE UP (ref 5–17)
APTT BLD: 31.1 SEC — SIGNIFICANT CHANGE UP (ref 24.5–35.6)
AST SERPL-CCNC: 29 U/L — SIGNIFICANT CHANGE UP (ref 10–40)
BILIRUB SERPL-MCNC: 0.3 MG/DL — SIGNIFICANT CHANGE UP (ref 0.2–1.2)
BUN SERPL-MCNC: 29 MG/DL — HIGH (ref 7–23)
CALCIUM SERPL-MCNC: 9.3 MG/DL — SIGNIFICANT CHANGE UP (ref 8.4–10.5)
CHLORIDE SERPL-SCNC: 100 MMOL/L — SIGNIFICANT CHANGE UP (ref 96–108)
CK SERPL-CCNC: 75 U/L — SIGNIFICANT CHANGE UP (ref 30–200)
CO2 SERPL-SCNC: 19 MMOL/L — LOW (ref 22–31)
CREAT SERPL-MCNC: 1.62 MG/DL — HIGH (ref 0.5–1.3)
EGFR: 44 ML/MIN/1.73M2 — LOW
GLUCOSE SERPL-MCNC: 103 MG/DL — HIGH (ref 70–99)
HCT VFR BLD CALC: 37.3 % — LOW (ref 39–50)
HGB BLD-MCNC: 13 G/DL — SIGNIFICANT CHANGE UP (ref 13–17)
INR BLD: 1.61 RATIO — HIGH (ref 0.85–1.16)
MAGNESIUM SERPL-MCNC: 2 MG/DL — SIGNIFICANT CHANGE UP (ref 1.6–2.6)
MCHC RBC-ENTMCNC: 32.3 PG — SIGNIFICANT CHANGE UP (ref 27–34)
MCHC RBC-ENTMCNC: 34.9 G/DL — SIGNIFICANT CHANGE UP (ref 32–36)
MCV RBC AUTO: 92.6 FL — SIGNIFICANT CHANGE UP (ref 80–100)
NRBC # BLD: 0 /100 WBCS — SIGNIFICANT CHANGE UP (ref 0–0)
NRBC BLD-RTO: 0 /100 WBCS — SIGNIFICANT CHANGE UP (ref 0–0)
PHOSPHATE SERPL-MCNC: 2.9 MG/DL — SIGNIFICANT CHANGE UP (ref 2.5–4.5)
PLATELET # BLD AUTO: 149 K/UL — LOW (ref 150–400)
POTASSIUM SERPL-MCNC: 4.1 MMOL/L — SIGNIFICANT CHANGE UP (ref 3.5–5.3)
POTASSIUM SERPL-SCNC: 4.1 MMOL/L — SIGNIFICANT CHANGE UP (ref 3.5–5.3)
PROT SERPL-MCNC: 7 G/DL — SIGNIFICANT CHANGE UP (ref 6–8.3)
PROTHROM AB SERPL-ACNC: 18.3 SEC — HIGH (ref 9.9–13.4)
RBC # BLD: 4.03 M/UL — LOW (ref 4.2–5.8)
RBC # FLD: 12.9 % — SIGNIFICANT CHANGE UP (ref 10.3–14.5)
SODIUM SERPL-SCNC: 133 MMOL/L — LOW (ref 135–145)
WBC # BLD: 4.65 K/UL — SIGNIFICANT CHANGE UP (ref 3.8–10.5)
WBC # FLD AUTO: 4.65 K/UL — SIGNIFICANT CHANGE UP (ref 3.8–10.5)

## 2025-01-29 PROCEDURE — 99232 SBSQ HOSP IP/OBS MODERATE 35: CPT

## 2025-01-29 PROCEDURE — 99232 SBSQ HOSP IP/OBS MODERATE 35: CPT | Mod: GC

## 2025-01-29 PROCEDURE — 93010 ELECTROCARDIOGRAM REPORT: CPT

## 2025-01-29 RX ORDER — HYDROMORPHONE HYDROCHLORIDE 4 MG/ML
0.5 INJECTION, SOLUTION INTRAMUSCULAR; INTRAVENOUS; SUBCUTANEOUS ONCE
Refills: 0 | Status: DISCONTINUED | OUTPATIENT
Start: 2025-01-29 | End: 2025-01-29

## 2025-01-29 RX ORDER — METOPROLOL SUCCINATE 25 MG
50 TABLET, EXTENDED RELEASE 24 HR ORAL DAILY
Refills: 0 | Status: DISCONTINUED | OUTPATIENT
Start: 2025-01-30 | End: 2025-01-31

## 2025-01-29 RX ORDER — OXYCODONE HYDROCHLORIDE 30 MG/1
2.5 TABLET ORAL ONCE
Refills: 0 | Status: DISCONTINUED | OUTPATIENT
Start: 2025-01-29 | End: 2025-01-29

## 2025-01-29 RX ORDER — MORPHINE SULFATE 60 MG/1
1 TABLET, FILM COATED, EXTENDED RELEASE ORAL ONCE
Refills: 0 | Status: DISCONTINUED | OUTPATIENT
Start: 2025-01-29 | End: 2025-01-29

## 2025-01-29 RX ORDER — ACETAMINOPHEN, DIPHENHYDRAMINE HCL, PHENYLEPHRINE HCL 325; 25; 5 MG/1; MG/1; MG/1
3 TABLET ORAL ONCE
Refills: 0 | Status: COMPLETED | OUTPATIENT
Start: 2025-01-29 | End: 2025-01-29

## 2025-01-29 RX ORDER — MAGNESIUM, ALUMINUM HYDROXIDE 200-225/5
30 SUSPENSION, ORAL (FINAL DOSE FORM) ORAL EVERY 6 HOURS
Refills: 0 | Status: DISCONTINUED | OUTPATIENT
Start: 2025-01-29 | End: 2025-01-31

## 2025-01-29 RX ADMIN — APIXABAN 10 MILLIGRAM(S): 5 TABLET, FILM COATED ORAL at 06:19

## 2025-01-29 RX ADMIN — POLYETHYLENE GLYCOL 3350 17 GRAM(S): 17 POWDER, FOR SOLUTION ORAL at 12:16

## 2025-01-29 RX ADMIN — Medication 12.5 MILLIGRAM(S): at 08:09

## 2025-01-29 RX ADMIN — Medication 1 TABLET(S): at 12:16

## 2025-01-29 RX ADMIN — ANTISEPTIC SURGICAL SCRUB 1 APPLICATION(S): 0.04 SOLUTION TOPICAL at 12:17

## 2025-01-29 RX ADMIN — OXYCODONE HYDROCHLORIDE 2.5 MILLIGRAM(S): 30 TABLET ORAL at 03:29

## 2025-01-29 RX ADMIN — MORPHINE SULFATE 1 MILLIGRAM(S): 60 TABLET, FILM COATED, EXTENDED RELEASE ORAL at 01:03

## 2025-01-29 RX ADMIN — MORPHINE SULFATE 1 MILLIGRAM(S): 60 TABLET, FILM COATED, EXTENDED RELEASE ORAL at 01:23

## 2025-01-29 RX ADMIN — Medication 5 MILLIGRAM(S): at 12:17

## 2025-01-29 RX ADMIN — APIXABAN 10 MILLIGRAM(S): 5 TABLET, FILM COATED ORAL at 17:31

## 2025-01-29 RX ADMIN — ACETAMINOPHEN 1000 MILLIGRAM(S): 160 SUSPENSION ORAL at 00:20

## 2025-01-29 RX ADMIN — Medication 25 MILLIGRAM(S): at 12:17

## 2025-01-29 RX ADMIN — OXYCODONE HYDROCHLORIDE 2.5 MILLIGRAM(S): 30 TABLET ORAL at 04:28

## 2025-01-29 RX ADMIN — Medication 30 MILLILITER(S): at 01:17

## 2025-01-29 RX ADMIN — NITROGLYCERIN 0.4 MILLIGRAM(S): 0.4 TABLET SUBLINGUAL at 02:06

## 2025-01-29 RX ADMIN — ACETAMINOPHEN, DIPHENHYDRAMINE HCL, PHENYLEPHRINE HCL 3 MILLIGRAM(S): 325; 25; 5 TABLET ORAL at 01:18

## 2025-01-29 RX ADMIN — ACETAMINOPHEN 650 MILLIGRAM(S): 160 SUSPENSION ORAL at 22:32

## 2025-01-29 RX ADMIN — ROSUVASTATIN CALCIUM 40 MILLIGRAM(S): 10 TABLET, FILM COATED ORAL at 22:32

## 2025-01-29 RX ADMIN — Medication 120 MILLIGRAM(S): at 22:31

## 2025-01-29 RX ADMIN — Medication 75 MILLIGRAM(S): at 12:17

## 2025-01-29 RX ADMIN — DAPAGLIFLOZIN 10 MILLIGRAM(S): 5 TABLET, FILM COATED ORAL at 17:31

## 2025-01-29 RX ADMIN — HYDROMORPHONE HYDROCHLORIDE 0.5 MILLIGRAM(S): 4 INJECTION, SOLUTION INTRAMUSCULAR; INTRAVENOUS; SUBCUTANEOUS at 05:00

## 2025-01-29 RX ADMIN — Medication 4 MILLIGRAM(S): at 22:31

## 2025-01-29 RX ADMIN — HYDROMORPHONE HYDROCHLORIDE 0.5 MILLIGRAM(S): 4 INJECTION, SOLUTION INTRAMUSCULAR; INTRAVENOUS; SUBCUTANEOUS at 04:29

## 2025-01-29 RX ADMIN — Medication 500 MILLIGRAM(S): at 12:16

## 2025-01-29 NOTE — CHART NOTE - NSCHARTNOTEFT_GEN_A_CORE
74-year-old male, Polish speaking, PMHx of CAD s/p PCI and minimally invasive direct CABG (LIMA --> LAD) 10/31/24, ICM, HFrEF, HTN, CKD, and PAD s/p prior L to R fem-fem bypass (>20yrs ago), who initially presented to [facility] after a mechanical fall and transferred to [facility] for NSTEMI. Pending heart cath. Dx: NSTEMI (non-ST elevation myocardial infarction), Cellulitis Left lower extremity,  Fall from ground level - CT head and x-rays of bilateral lower extremities without acute findings, Peripheral artery disease, stage 3 kidney failure, Hx of BPH, pt with poor health literacy. Per EMR, Drinking without issue, but troubles with eating in setting of dentures still being at home.    Swallow hx: Per EMR, Patient says pill is stuck, trying to drink water and bring up the pill. 1/27 initial bedside swallow exam with recommendations for minced-moist solids and thin liquids. Pt reporting fear of swallowing and globus sensation for over a year w/ solids. No improvement with or without use of dentures. MBS 1/28/25 revealing syed-pharyngeal dysphagia with aspiration of soft and bite-sized textures which was eliminated w/ use of chin-tuck. Rx for minced-moist and thin liquids via cup sips w/ service to follow to practice use of chin-tuck for possible diet upgrade.     Today: Chart reviewed. Patient seen for diet monitor and dysphagia tx s/p MBS. Pt A&Ox3-4, on room air, semi-supine in bed. No  needed as clinician English-Polish speaking and provided interpretation throughout. Pt s/p MBS with rx for minced-moist and thin liquids via cup sips with service to follow to practice chin-tuck compensatory strategy for possible texture upgrade. Pt agreeable to trials of baseline diet and tolerated 4x trials of minced-moist textures and 4x trials of thin liquids. No overt s/s of laryngeal penetration/aspiration or changes to vocal quality noted. Patient declined therapeutic trials of soft and bite sized/easy to chew textures w/ use of compensatory strategy. Clinician reviewed results from MBS and explained that chin-tuck strategy successfully protected his airway. Despite review of results, pt requesting to remain on baseline diet of minced-moist/puree and thin liquids and declining all other textures as he does not want to experience the sensation of choking ever again. Patient with intact comprehension as evidenced by teach-back and discourse. Clinician reviewed safe swallow strategies and aspiration risks/precautions. Patient left NAD, purposeful proactive rounding completed; 5P's addressed.     Impressions: Pt with a known syed-pharyngeal dysphagia and requesting to remain on his baseline diet of minced-moist textures and thin liquids. Pt declining further dysphagia therapy for possible diet upgrade 2/2 fear of choking. Service to sign off at this time. Reconsult this service should patient be amenable to dysphagia tx or exhibit overt s/s of diet intolerance.     Recommendations:  1) Minced-moist and thin liquids via cup sips.   2) Maintain aspiration precautions  3) Stringent oral hygiene  4) Monitor for s/s aspiration/laryngeal penetration. If noted:  D/C p.o. intake, provide non-oral nutrition/hydration/meds, and contact this service @ [phone]     Candice Mojica MA CCC-SLP;Teams

## 2025-01-29 NOTE — PROGRESS NOTE ADULT - PROBLEM SELECTOR PLAN 2
- noted to have hematuria after ramirez removal. patient states also prior to admission.   - urology consulted, appreciate recs  - monitor H/H  - likely plan for outpatient follow up

## 2025-01-29 NOTE — PROGRESS NOTE ADULT - PROBLEM SELECTOR PLAN 1
Presenting as a transfer from [place] for NSTEMI. Patient self-discontinued all medications for 3-4 weeks prior to admission, stating that he had finished the course of medications. Chest pain on presentation similar to prior episodes of chest pain.  - TTE with decreased EF 40-45%  - s/p Aspirin and plavix loading at VS    Plan:  - heparin ggt -> Eliquis 10 mg BID loading dose  - Cardiology consulted, recs appreciated  - [facility] on 01/24  - d/c aspirin 81 mg qd  - c/w clopidogrel 75 mg qd  - c/w Imdur 240 mg qd   - c/w carvedilol 12.5 mg BID  - c/w ranolazine qd -> having a difficult time with swallowing pill  - c/w spironolactone 25 mg qd Presenting as a transfer from [place] for NSTEMI. Patient self-discontinued all medications for 3-4 weeks prior to admission, stating that he had finished the course of medications. Chest pain on presentation similar to prior episodes of chest pain.  - TTE with decreased EF 40-45%  - s/p Aspirin and plavix loading at     Plan:  - heparin ggt -> Eliquis 10 mg BID loading dose  - Cardiology consulted, recs appreciated  - [facility] on 01/24, no stent due to anatomy  - c/w clopidogrel 75 mg qd  - decrease Imdur 120 mg qd   - change to metoprolol 50 mg qd  - c/w ranolazine qd -> having a difficult time with swallowing pill -> d/c  - c/w spironolactone 25 mg qd

## 2025-01-29 NOTE — PROGRESS NOTE ADULT - SUBJECTIVE AND OBJECTIVE BOX
Interval history:    The patient has been complaining of general pain including chest, legs, prostate, back and 'bone". He admits the nitro SL helps with the pain as well has morphine. No further episodes of hematuria. BP on the soft side reason why medications are occasionally being held and he is unable to swallow Ranexa. Otherwise, no other event.     Physical examination:    GENERAL:  In no acute distress except for generalized pain. AOx3  HEAD: Atraumatic, Normocephalic.  NECK: Supple, No JVD.  CHEST/LUNG: no crackles, rales or wheezing  HEART: regular rate and rhythm, +systolic murmur on LSB, + chest wall tenderness  ABDOMEN: Soft, nontender  EXTREMITIES: +L-LE dressing.     Signs Last 24 Hrs  Vital Signs Last 24 Hrs  T(C): 36.7 (29 Jan 2025 11:07), Max: 36.7 (28 Jan 2025 21:04)  T(F): 98.1 (29 Jan 2025 11:07), Max: 98.1 (28 Jan 2025 21:04)  HR: 69 (29 Jan 2025 11:07) (66 - 83)  BP: 97/62 (29 Jan 2025 11:07) (92/54 - 154/79)  BP(mean): --  RR: 18 (29 Jan 2025 11:07) (18 - 19)  SpO2: 97% (29 Jan 2025 11:07) (96% - 99%)    Parameters below as of 29 Jan 2025 11:07  Patient On (Oxygen Delivery Method): room air      Pertinent labs and images reviewed personally by me.     CBC:            13.0   4.65  )-----------( 149      ( 01-29-25 @ 04:49 )             37.3         Chem:         ( 01-29-25 @ 04:49 )    133[L]  |  100  |  29[H]  ----------------------------<  103[H]  4.1   |  19[L]  |  1.62[H]        Liver Functions: ( 01-29-25 @ 04:49 )  Alb: 3.9 g/dL / Pro: 7.0 g/dL / ALK PHOS: 76 U/L / ALT: 6 U/L / AST: 29 U/L / GGT: x            Cath 1/24/2025  Diagnostic Findings:     Coronary Angiography   The coronary circulation is right dominant.      LM   Left main artery: There is a 50 % stenosis.      LAD   Proximal left anterior descending: There is a 70 % stenosis. First  diagonal: There is a 99 % stenosis.    CX   Circumflex: There is a 100 % stenosis.      RCA   Right coronary artery: There is a 100 % stenosis.      TTE 1/24/2025     1. Left ventricular cavity is normal in size. Left ventricular wall thickness is normal. Left ventricular systolic function is mildly to moderately decreased with an ejection fraction visually estimated at 40 to 45 %. Regional wall motion abnormalities present.   2. Multiple segmental abnormalities exist. See findings.   3. Normal right ventricular cavity size, with normal wall thickness, and normal right ventricular systolic function.   4. No pericardial effusion seen.   5. Left ventricular global longitudinal strain is -13.0 % is abnormal (> -16%). Images were acquired on a sCoolTV ultrasound system and processed on the ultrasound machine with a heart rate of 80 bpm and a blood pressure of 123/63 mmHg.   6. No prior echocardiogram is available for comparison.   7. There is no evidence of a left ventricular thrombus.

## 2025-01-29 NOTE — PROGRESS NOTE ADULT - PROBLEM SELECTOR PLAN 4
Patient falling s/p feeling legs were shaky and unbalanced in setting of chronic PAD and poor ambulation at baseline. Extensive cardiac history is also a contributing factor and subsequent increased chest pain while in transit to the hospital.   - CT head and x-rays of bilateral lower extremities without acute findings    Plan:  - NSTEMI workup/treatment as above  - PT consulted, recs appreciated  - risk precautions Patient falling s/p feeling legs were shaky and unbalanced in setting of chronic PAD and poor ambulation at baseline. Extensive cardiac history is also a contributing factor and subsequent increased chest pain while in transit to the hospital.   - CT head and x-rays of bilateral lower extremities without acute findings    Plan:  - NSTEMI workup/treatment as above  - PT consulted, recs appreciated -> MIGUEL  - risk precautions

## 2025-01-29 NOTE — PROGRESS NOTE ADULT - PROBLEM SELECTOR PLAN 9
Patient Transferred to: [facility]  Handoff Report Given to: *RN - DVT Prophylaxis: Eliquis  - Diet: soft and bite sized  - PT consulted, recs appreciated - MIGUEL  - Dispo: pending cardiac workup and MIGUEL placement - DVT Prophylaxis: Eliquis  - Diet: soft and bite sized - this is baseline diet and patient would not like to try other maneuvers to assist with swallowing due to concern for choking  - PT consulted, recs appreciated - MIGUEL  - Dispo: pending MIGUEL placement

## 2025-01-29 NOTE — PROGRESS NOTE ADULT - PROBLEM SELECTOR PLAN 5
History of femoral-femoral bypass over 20 years ago and recent in 2024.    Plan:  - medications as above History of femoral-femoral bypass over 20 years ago.    Plan:  - medications as above

## 2025-01-29 NOTE — PROGRESS NOTE ADULT - SUBJECTIVE AND OBJECTIVE BOX
ODALYS HAGER  74y  MRN: [id number]    Patient is a 74y old  Male who presents with a chief complaint of GLF and NSTEMI (28 Jan 2025 13:03)      Interval/Overnight Events:     Subjective: Pt seen and examined at bedside.     MEDICATIONS  (STANDING):  apixaban 10 milliGRAM(s) Oral every 12 hours  ascorbic acid 500 milliGRAM(s) Oral daily  carvedilol 12.5 milliGRAM(s) Oral every 12 hours  chlorhexidine 2% Cloths 1 Application(s) Topical daily  clopidogrel Tablet 75 milliGRAM(s) Oral daily  dapagliflozin 10 milliGRAM(s) Oral every 24 hours  doxazosin 4 milliGRAM(s) Oral at bedtime  finasteride 5 milliGRAM(s) Oral daily  isosorbide   mononitrate ER Tablet (IMDUR) 240 milliGRAM(s) Oral daily  lidocaine   4% Patch 1 Patch Transdermal every 24 hours  multivitamin 1 Tablet(s) Oral daily  polyethylene glycol 3350 17 Gram(s) Oral daily  ranolazine 1000 milliGRAM(s) Oral two times a day  rosuvastatin 40 milliGRAM(s) Oral at bedtime  senna 2 Tablet(s) Oral at bedtime  spironolactone 25 milliGRAM(s) Oral every 24 hours    MEDICATIONS  (PRN):  acetaminophen     Tablet .. 650 milliGRAM(s) Oral every 6 hours PRN Temp greater or equal to 38C (100.4F), Mild Pain (1 - 3)  aluminum hydroxide/magnesium hydroxide/simethicone Suspension 30 milliLiter(s) Oral every 6 hours PRN Dyspepsia      Objective:    Vitals: Vital Signs Last 24 Hrs  T(C): 36.3 (01-29-25 @ 03:25), Max: 36.8 (01-28-25 @ 08:54)  T(F): 97.4 (01-29-25 @ 03:25), Max: 98.3 (01-28-25 @ 08:54)  HR: 79 (01-29-25 @ 06:22) (66 - 83)  BP: 104/61 (01-29-25 @ 06:22) (92/54 - 154/79)  BP(mean): --  RR: 18 (01-29-25 @ 04:24) (18 - 19)  SpO2: 99% (01-29-25 @ 04:24) (95% - 99%)                I&O's Summary    28 Jan 2025 07:01  -  29 Jan 2025 07:00  --------------------------------------------------------  IN: 720 mL / OUT: 1400 mL / NET: -680 mL        PHYSICAL EXAM:  GENERAL: NAD  HEAD:  Atraumatic, Normocephalic  EYES: EOMI, conjunctiva and sclera clear  CHEST/LUNG: Clear to auscultation bilaterally; No rales, rhonchi, wheezing, or rubs  HEART: Regular rate and rhythm; No murmurs, rubs, or gallops  ABDOMEN: Soft, Nontender, Nondistended;   SKIN: No rashes or lesions  NERVOUS SYSTEM:  Alert & Oriented X3, no focal deficits    LABS:                        13.0   4.65  )-----------( 149      ( 29 Jan 2025 04:49 )             37.3                         11.7   4.27  )-----------( 137      ( 28 Jan 2025 05:12 )             35.5                         11.2   5.46  )-----------( 142      ( 27 Jan 2025 06:20 )             33.4     01-29    133[L]  |  100  |  29[H]  ----------------------------<  103[H]  4.1   |  19[L]  |  1.62[H]  01-28    137  |  102  |  29[H]  ----------------------------<  118[H]  4.4   |  19[L]  |  1.56[H]  01-28    135  |  100  |  26[H]  ----------------------------<  98  4.1   |  21[L]  |  1.62[H]    Ca    9.3      29 Jan 2025 04:49  Ca    9.2      28 Jan 2025 20:13  Ca    9.2      28 Jan 2025 05:12  Phos  2.9     01-29  Mg     2.0     01-29    TPro  7.0  /  Alb  3.9  /  TBili  0.3  /  DBili  x   /  AST  29  /  ALT  6[L]  /  AlkPhos  76  01-29  TPro  6.3  /  Alb  3.4  /  TBili  0.4  /  DBili  x   /  AST  15  /  ALT  <5[L]  /  AlkPhos  57  01-27    CAPILLARY BLOOD GLUCOSE        PT/INR - ( 29 Jan 2025 04:49 )   PT: 18.3 sec;   INR: 1.61 ratio         PTT - ( 29 Jan 2025 04:49 )  PTT:31.1 sec    Urinalysis Basic - ( 29 Jan 2025 04:49 )    Color: x / Appearance: x / SG: x / pH: x  Gluc: 103 mg/dL / Ketone: x  / Bili: x / Urobili: x   Blood: x / Protein: x / Nitrite: x   Leuk Esterase: x / RBC: x / WBC x   Sq Epi: x / Non Sq Epi: x / Bacteria: x          RADIOLOGY & ADDITIONAL TESTS:    Imaging Personally Reviewed:  [x ] YES  [ ] NO    Consultants involved in case:   Consultant(s) Notes Reviewed:  [ x] YES  [ ] NO:   Care Discussed with Consultants/Other Providers [x ] YES  [ ] NO           ODALYS HAGER  74y  MRN: [id number]    Patient is a 74y old  Male who presents with a chief complaint of GLF and NSTEMI (28 Jan 2025 13:03)  : [id number]    Interval/Overnight Events: 10/10 chest pain overnight, given nitro dilaudid, oxycodone. Repeat EKG similar and with downtrending troponins.    Subjective: Pt seen and examined at bedside. Endorsing continued chest pain, relieved with nitroglycerin. Still feeling pain all over body. Eating/drinking okay. No bowel movement.    MEDICATIONS  (STANDING):  apixaban 10 milliGRAM(s) Oral every 12 hours  ascorbic acid 500 milliGRAM(s) Oral daily  carvedilol 12.5 milliGRAM(s) Oral every 12 hours  chlorhexidine 2% Cloths 1 Application(s) Topical daily  clopidogrel Tablet 75 milliGRAM(s) Oral daily  dapagliflozin 10 milliGRAM(s) Oral every 24 hours  doxazosin 4 milliGRAM(s) Oral at bedtime  finasteride 5 milliGRAM(s) Oral daily  isosorbide   mononitrate ER Tablet (IMDUR) 240 milliGRAM(s) Oral daily  lidocaine   4% Patch 1 Patch Transdermal every 24 hours  multivitamin 1 Tablet(s) Oral daily  polyethylene glycol 3350 17 Gram(s) Oral daily  ranolazine 1000 milliGRAM(s) Oral two times a day  rosuvastatin 40 milliGRAM(s) Oral at bedtime  senna 2 Tablet(s) Oral at bedtime  spironolactone 25 milliGRAM(s) Oral every 24 hours    MEDICATIONS  (PRN):  acetaminophen     Tablet .. 650 milliGRAM(s) Oral every 6 hours PRN Temp greater or equal to 38C (100.4F), Mild Pain (1 - 3)  aluminum hydroxide/magnesium hydroxide/simethicone Suspension 30 milliLiter(s) Oral every 6 hours PRN Dyspepsia      Objective:    Vitals: Vital Signs Last 24 Hrs  T(C): 36.3 (01-29-25 @ 03:25), Max: 36.8 (01-28-25 @ 08:54)  T(F): 97.4 (01-29-25 @ 03:25), Max: 98.3 (01-28-25 @ 08:54)  HR: 79 (01-29-25 @ 06:22) (66 - 83)  BP: 104/61 (01-29-25 @ 06:22) (92/54 - 154/79)  BP(mean): --  RR: 18 (01-29-25 @ 04:24) (18 - 19)  SpO2: 99% (01-29-25 @ 04:24) (95% - 99%)                I&O's Summary    28 Jan 2025 07:01  -  29 Jan 2025 07:00  --------------------------------------------------------  IN: 720 mL / OUT: 1400 mL / NET: -680 mL        PHYSICAL EXAM:  GENERAL: NAD  HEAD:  Atraumatic, Normocephalic  EYES: EOMI, conjunctiva and sclera clear  CHEST/LUNG: Clear to auscultation bilaterally; No rales, rhonchi, wheezing, or rubs  HEART: Regular rate and rhythm; No murmurs, rubs, or gallops  ABDOMEN: Soft, tender to palpation, Nondistended;   SKIN: No rashes or lesions  EXTREMITIES: tender to palpation diffusely  NERVOUS SYSTEM:  Alert & Oriented X3, no focal deficits    LABS:                        13.0   4.65  )-----------( 149      ( 29 Jan 2025 04:49 )             37.3                         11.7   4.27  )-----------( 137      ( 28 Jan 2025 05:12 )             35.5                         11.2   5.46  )-----------( 142      ( 27 Jan 2025 06:20 )             33.4     01-29    133[L]  |  100  |  29[H]  ----------------------------<  103[H]  4.1   |  19[L]  |  1.62[H]  01-28    137  |  102  |  29[H]  ----------------------------<  118[H]  4.4   |  19[L]  |  1.56[H]  01-28    135  |  100  |  26[H]  ----------------------------<  98  4.1   |  21[L]  |  1.62[H]    Ca    9.3      29 Jan 2025 04:49  Ca    9.2      28 Jan 2025 20:13  Ca    9.2      28 Jan 2025 05:12  Phos  2.9     01-29  Mg     2.0     01-29    TPro  7.0  /  Alb  3.9  /  TBili  0.3  /  DBili  x   /  AST  29  /  ALT  6[L]  /  AlkPhos  76  01-29  TPro  6.3  /  Alb  3.4  /  TBili  0.4  /  DBili  x   /  AST  15  /  ALT  <5[L]  /  AlkPhos  57  01-27    CAPILLARY BLOOD GLUCOSE        PT/INR - ( 29 Jan 2025 04:49 )   PT: 18.3 sec;   INR: 1.61 ratio         PTT - ( 29 Jan 2025 04:49 )  PTT:31.1 sec    Urinalysis Basic - ( 29 Jan 2025 04:49 )    Color: x / Appearance: x / SG: x / pH: x  Gluc: 103 mg/dL / Ketone: x  / Bili: x / Urobili: x   Blood: x / Protein: x / Nitrite: x   Leuk Esterase: x / RBC: x / WBC x   Sq Epi: x / Non Sq Epi: x / Bacteria: x          RADIOLOGY & ADDITIONAL TESTS:    Imaging Personally Reviewed:  [x ] YES  [ ] NO    Consultants involved in case:   Consultant(s) Notes Reviewed:  [ x] YES  [ ] NO:   Care Discussed with Consultants/Other Providers [x ] YES  [ ] NO

## 2025-01-29 NOTE — PROGRESS NOTE ADULT - ASSESSMENT
Mrs. Sanabria is a 74 years old M, Polish speaking, who initially presented to [facility] after a mechanical fall. While en route to [facility] via ambulance he reported L-sided/substernal chest pain, he states similar to when he was in the hospital for his CABG. He has PMH of CAD s/p PCI and minimally invasive direct CABG (LIMA - LAD) 10/31/24, ICM, HFrEF, HTN, CKD, BPH and PAD s/p prior L to R fem-fem bypass (>20yrs ago), history of provoked DVT in 11/2024. He admitted not taking his medications for about a week or more since has no help at home and sometimes is not able to obtain meds from pharmacy. He was transferred to [facility] for NSTEMI given elevated cardiac enzymes, CTH negative.     S/p LHC on 1/24/2025 showing patent LIMA to LAD and  of LCX and RCA.  Ischemia likely from medium sized D1 however, because of the angle of takeoff from the LAD- PCI of the diagonal would be quite difficult. TTE LVEF 40-45% with RWMA. No LV thrombus.     He is overall stable except for on/off anginal and general body pain. In view of borderline low BP, he has not been taking imdur and does not seem to swallow the Ranexa.    Antianginal therapy: Decrease Imdur to 120 mg QD XL and change coreg to Metoprolol succinate 50 mg QD. Re-arrange the schedule at different time to prevent hypotension.  If unable to tolerate Ranexa, may discontinue   Nitro SL PRN for chest pain.   Will consider low dose amlodipine once BP is more stable.  Continue spironolactone  Holding ACE/ARB/ARNi due to FELIPA  Continue with plavix and Eliquis (recent DVT).  High intensity statin   Continue hemodynamic and telemetry monitoring  Monitor renal function and K  Discharge planning to rehabilitation      Jae Davis MD  Attending Cardiologist     Non-invasive Cardiology/Advanced Cardiac Imaging  [facility]

## 2025-01-29 NOTE — PROVIDER CONTACT NOTE (OTHER) - ACTION/TREATMENT ORDERED:
IV removed, ice packs applied. new iv line placed in left arm. provider notified. will endorse to day RN

## 2025-01-29 NOTE — PROGRESS NOTE ADULT - ASSESSMENT
Elton Sanabria is a 74-year-old male, Polish speaking, PMHx of CAD s/p PCI and minimally invasive direct CABG (LIMA --> LAD) 10/31/24, ICM, HFrEF, HTN, CKD, and PAD s/p prior L to R fem-fem bypass (>20yrs ago), who initially presented to [facility] after a mechanical fall and transferred to [facility] for NSTEMI. s/p LHC, no stent due to anatomy. Elton Sanabria is a 74-year-old male, Polish speaking, PMHx of CAD s/p PCI and minimally invasive direct CABG (LIMA --> LAD) 10/31/24, ICM, HFrEF, HTN, CKD, and PAD s/p prior L to R fem-fem bypass (>20yrs ago), who initially presented to [facility] after a mechanical fall and transferred to [facility] for NSTEMI. s/p LHC, no stent due to anatomy. Pending MIGUEL placement and chest pain control.

## 2025-01-29 NOTE — CHART NOTE - NSCHARTNOTEFT_GEN_A_CORE
Upon start of shift ~7:40pm 1/28/25, patient endorsing 10/10 CP and diffuse leg pains. 2x dose of 0.4mg nitroglycerin given with resolution of sx. EKG performed. Troponin repeated- 139, steadily downtrending. Patient with repeat chest pain around 2300, 9/10 and 10/10 leg pain with soft BPs- 90s-100s SBP. 1g Ofirmev given. Patient with chest pain again around midnight. EKG repeated with no significant changes from previous. BP rechecked. 1mg IVP morphine given. Reporting chest pain again ~0200 1/29/25, additional dose of 0.4mg nitroglycerin given. Cardiology was notified. No interventions at time. CK added to AM labs for concerns of statin-induced myopathy. Patient endorsing persistent pain ~0330- oxycodone 2.5mg PO given. Patient continuing to endorse persistent pain- dilaudid 0.5mg IVP given with resolution of sx.

## 2025-01-30 LAB
ALBUMIN SERPL ELPH-MCNC: 3.6 G/DL — SIGNIFICANT CHANGE UP (ref 3.3–5)
ALP SERPL-CCNC: 65 U/L — SIGNIFICANT CHANGE UP (ref 40–120)
ALT FLD-CCNC: 7 U/L — LOW (ref 10–45)
ANION GAP SERPL CALC-SCNC: 14 MMOL/L — SIGNIFICANT CHANGE UP (ref 5–17)
APPEARANCE UR: CLEAR — SIGNIFICANT CHANGE UP
APTT BLD: 32.2 SEC — SIGNIFICANT CHANGE UP (ref 24.5–35.6)
AST SERPL-CCNC: 27 U/L — SIGNIFICANT CHANGE UP (ref 10–40)
BACTERIA # UR AUTO: NEGATIVE /HPF — SIGNIFICANT CHANGE UP
BILIRUB SERPL-MCNC: 0.3 MG/DL — SIGNIFICANT CHANGE UP (ref 0.2–1.2)
BILIRUB UR-MCNC: NEGATIVE — SIGNIFICANT CHANGE UP
BUN SERPL-MCNC: 28 MG/DL — HIGH (ref 7–23)
CALCIUM SERPL-MCNC: 9 MG/DL — SIGNIFICANT CHANGE UP (ref 8.4–10.5)
CAST: 0 /LPF — SIGNIFICANT CHANGE UP (ref 0–4)
CHLORIDE SERPL-SCNC: 101 MMOL/L — SIGNIFICANT CHANGE UP (ref 96–108)
CO2 SERPL-SCNC: 20 MMOL/L — LOW (ref 22–31)
COLOR SPEC: YELLOW — SIGNIFICANT CHANGE UP
CREAT SERPL-MCNC: 1.51 MG/DL — HIGH (ref 0.5–1.3)
DIFF PNL FLD: ABNORMAL
EGFR: 48 ML/MIN/1.73M2 — LOW
GLUCOSE SERPL-MCNC: 104 MG/DL — HIGH (ref 70–99)
GLUCOSE UR QL: >=1000 MG/DL
HCT VFR BLD CALC: 36.7 % — LOW (ref 39–50)
HGB BLD-MCNC: 12.3 G/DL — LOW (ref 13–17)
INR BLD: 1.56 RATIO — HIGH (ref 0.85–1.16)
KETONES UR-MCNC: NEGATIVE MG/DL — SIGNIFICANT CHANGE UP
LEUKOCYTE ESTERASE UR-ACNC: NEGATIVE — SIGNIFICANT CHANGE UP
MAGNESIUM SERPL-MCNC: 2 MG/DL — SIGNIFICANT CHANGE UP (ref 1.6–2.6)
MCHC RBC-ENTMCNC: 31.7 PG — SIGNIFICANT CHANGE UP (ref 27–34)
MCHC RBC-ENTMCNC: 33.5 G/DL — SIGNIFICANT CHANGE UP (ref 32–36)
MCV RBC AUTO: 94.6 FL — SIGNIFICANT CHANGE UP (ref 80–100)
NITRITE UR-MCNC: NEGATIVE — SIGNIFICANT CHANGE UP
NRBC # BLD: 0 /100 WBCS — SIGNIFICANT CHANGE UP (ref 0–0)
NRBC BLD-RTO: 0 /100 WBCS — SIGNIFICANT CHANGE UP (ref 0–0)
PH UR: 5.5 — SIGNIFICANT CHANGE UP (ref 5–8)
PHOSPHATE SERPL-MCNC: 3.9 MG/DL — SIGNIFICANT CHANGE UP (ref 2.5–4.5)
PLATELET # BLD AUTO: 136 K/UL — LOW (ref 150–400)
POTASSIUM SERPL-MCNC: 4.3 MMOL/L — SIGNIFICANT CHANGE UP (ref 3.5–5.3)
POTASSIUM SERPL-SCNC: 4.3 MMOL/L — SIGNIFICANT CHANGE UP (ref 3.5–5.3)
PROT SERPL-MCNC: 6.8 G/DL — SIGNIFICANT CHANGE UP (ref 6–8.3)
PROT UR-MCNC: 30 MG/DL
PROTHROM AB SERPL-ACNC: 17.9 SEC — HIGH (ref 9.9–13.4)
RBC # BLD: 3.88 M/UL — LOW (ref 4.2–5.8)
RBC # FLD: 13 % — SIGNIFICANT CHANGE UP (ref 10.3–14.5)
RBC CASTS # UR COMP ASSIST: 75 /HPF — HIGH (ref 0–4)
SODIUM SERPL-SCNC: 135 MMOL/L — SIGNIFICANT CHANGE UP (ref 135–145)
SP GR SPEC: 1.03 — SIGNIFICANT CHANGE UP (ref 1–1.03)
SQUAMOUS # UR AUTO: 2 /HPF — SIGNIFICANT CHANGE UP (ref 0–5)
UROBILINOGEN FLD QL: 0.2 MG/DL — SIGNIFICANT CHANGE UP (ref 0.2–1)
WBC # BLD: 3.77 K/UL — LOW (ref 3.8–10.5)
WBC # FLD AUTO: 3.77 K/UL — LOW (ref 3.8–10.5)
WBC UR QL: 5 /HPF — SIGNIFICANT CHANGE UP (ref 0–5)

## 2025-01-30 PROCEDURE — 99232 SBSQ HOSP IP/OBS MODERATE 35: CPT | Mod: GC

## 2025-01-30 RX ADMIN — Medication 5 MILLIGRAM(S): at 12:30

## 2025-01-30 RX ADMIN — DAPAGLIFLOZIN 10 MILLIGRAM(S): 5 TABLET, FILM COATED ORAL at 17:21

## 2025-01-30 RX ADMIN — APIXABAN 10 MILLIGRAM(S): 5 TABLET, FILM COATED ORAL at 06:18

## 2025-01-30 RX ADMIN — ROSUVASTATIN CALCIUM 40 MILLIGRAM(S): 10 TABLET, FILM COATED ORAL at 22:57

## 2025-01-30 RX ADMIN — APIXABAN 10 MILLIGRAM(S): 5 TABLET, FILM COATED ORAL at 17:21

## 2025-01-30 RX ADMIN — Medication 4 MILLIGRAM(S): at 22:57

## 2025-01-30 RX ADMIN — Medication 500 MILLIGRAM(S): at 12:33

## 2025-01-30 RX ADMIN — Medication 75 MILLIGRAM(S): at 12:29

## 2025-01-30 RX ADMIN — POLYETHYLENE GLYCOL 3350 17 GRAM(S): 17 POWDER, FOR SOLUTION ORAL at 12:34

## 2025-01-30 RX ADMIN — Medication 120 MILLIGRAM(S): at 12:30

## 2025-01-30 RX ADMIN — Medication 1 TABLET(S): at 12:29

## 2025-01-30 RX ADMIN — Medication 25 MILLIGRAM(S): at 12:30

## 2025-01-30 RX ADMIN — ANTISEPTIC SURGICAL SCRUB 1 APPLICATION(S): 0.04 SOLUTION TOPICAL at 12:33

## 2025-01-30 NOTE — PROGRESS NOTE ADULT - SUBJECTIVE AND OBJECTIVE BOX
ODALYS HAGER  74y  MRN: [id number]    Patient is a 74y old  Male who presents with a chief complaint of GLF and NSTEMI (29 Jan 2025 12:53)      Interval/Overnight Events: no events ON.     Subjective: Pt seen and examined at bedside.     MEDICATIONS  (STANDING):  apixaban 10 milliGRAM(s) Oral every 12 hours  ascorbic acid 500 milliGRAM(s) Oral daily  chlorhexidine 2% Cloths 1 Application(s) Topical daily  clopidogrel Tablet 75 milliGRAM(s) Oral daily  dapagliflozin 10 milliGRAM(s) Oral every 24 hours  doxazosin 4 milliGRAM(s) Oral at bedtime  finasteride 5 milliGRAM(s) Oral daily  isosorbide   mononitrate ER Tablet (IMDUR) 120 milliGRAM(s) Oral daily  lidocaine   4% Patch 1 Patch Transdermal every 24 hours  metoprolol succinate ER 50 milliGRAM(s) Oral daily  multivitamin 1 Tablet(s) Oral daily  polyethylene glycol 3350 17 Gram(s) Oral daily  rosuvastatin 40 milliGRAM(s) Oral at bedtime  senna 2 Tablet(s) Oral at bedtime  spironolactone 25 milliGRAM(s) Oral every 24 hours    MEDICATIONS  (PRN):  acetaminophen     Tablet .. 650 milliGRAM(s) Oral every 6 hours PRN Temp greater or equal to 38C (100.4F), Mild Pain (1 - 3)  aluminum hydroxide/magnesium hydroxide/simethicone Suspension 30 milliLiter(s) Oral every 6 hours PRN Dyspepsia      Objective:    Vitals: Vital Signs Last 24 Hrs  T(C): 37.1 (01-30-25 @ 04:00), Max: 37.1 (01-29-25 @ 16:34)  T(F): 98.7 (01-30-25 @ 04:00), Max: 98.8 (01-29-25 @ 16:34)  HR: 74 (01-30-25 @ 04:00) (69 - 84)  BP: 106/62 (01-30-25 @ 04:00) (97/62 - 133/86)  BP(mean): --  RR: 18 (01-30-25 @ 04:00) (18 - 18)  SpO2: 95% (01-30-25 @ 04:00) (95% - 98%)                I&O's Summary    29 Jan 2025 07:01  -  30 Jan 2025 07:00  --------------------------------------------------------  IN: 340 mL / OUT: 1200 mL / NET: -860 mL        PHYSICAL EXAM:  GENERAL: NAD  HEAD:  Atraumatic, Normocephalic  EYES: EOMI, conjunctiva and sclera clear  CHEST/LUNG: Clear to auscultation bilaterally; No rales, rhonchi, wheezing, or rubs  HEART: Regular rate and rhythm; No murmurs, rubs, or gallops  ABDOMEN: Soft, Nontender, Nondistended;   SKIN: No rashes or lesions  NERVOUS SYSTEM:  Alert & Oriented X3, no focal deficits    LABS:                        12.3   3.77  )-----------( 136      ( 30 Jan 2025 05:02 )             36.7                         13.0   4.65  )-----------( 149      ( 29 Jan 2025 04:49 )             37.3                         11.7   4.27  )-----------( 137      ( 28 Jan 2025 05:12 )             35.5     01-30    135  |  101  |  28[H]  ----------------------------<  104[H]  4.3   |  20[L]  |  1.51[H]  01-29    133[L]  |  100  |  29[H]  ----------------------------<  103[H]  4.1   |  19[L]  |  1.62[H]  01-28    137  |  102  |  29[H]  ----------------------------<  118[H]  4.4   |  19[L]  |  1.56[H]    Ca    9.0      30 Jan 2025 05:02  Ca    9.3      29 Jan 2025 04:49  Ca    9.2      28 Jan 2025 20:13  Phos  3.9     01-30  Mg     2.0     01-30    TPro  6.8  /  Alb  3.6  /  TBili  0.3  /  DBili  x   /  AST  27  /  ALT  7[L]  /  AlkPhos  65  01-30  TPro  7.0  /  Alb  3.9  /  TBili  0.3  /  DBili  x   /  AST  29  /  ALT  6[L]  /  AlkPhos  76  01-29    CAPILLARY BLOOD GLUCOSE        PT/INR - ( 30 Jan 2025 05:02 )   PT: 17.9 sec;   INR: 1.56 ratio         PTT - ( 30 Jan 2025 05:02 )  PTT:32.2 sec    Urinalysis Basic - ( 30 Jan 2025 05:02 )    Color: x / Appearance: x / SG: x / pH: x  Gluc: 104 mg/dL / Ketone: x  / Bili: x / Urobili: x   Blood: x / Protein: x / Nitrite: x   Leuk Esterase: x / RBC: x / WBC x   Sq Epi: x / Non Sq Epi: x / Bacteria: x          RADIOLOGY & ADDITIONAL TESTS:    Imaging Personally Reviewed:  [x ] YES  [ ] NO    Consultants involved in case:   Consultant(s) Notes Reviewed:  [ x] YES  [ ] NO:   Care Discussed with Consultants/Other Providers [x ] YES  [ ] NO           ODALYS HAGER  74y  MRN: [id number]    Patient is a 74y old  Male who presents with a chief complaint of GLF and NSTEMI (29 Jan 2025 12:53)    : [id number]    Interval/Overnight Events: no events ON.     Subjective: Pt seen and examined at bedside. Endorsing right chest pain 4-5/10, improved from previous night. Endorsing all over pain, improved from yesterday. Also endorsing pain with urination and increased frequency. Had a bowel movement.    MEDICATIONS  (STANDING):  apixaban 10 milliGRAM(s) Oral every 12 hours  ascorbic acid 500 milliGRAM(s) Oral daily  chlorhexidine 2% Cloths 1 Application(s) Topical daily  clopidogrel Tablet 75 milliGRAM(s) Oral daily  dapagliflozin 10 milliGRAM(s) Oral every 24 hours  doxazosin 4 milliGRAM(s) Oral at bedtime  finasteride 5 milliGRAM(s) Oral daily  isosorbide   mononitrate ER Tablet (IMDUR) 120 milliGRAM(s) Oral daily  lidocaine   4% Patch 1 Patch Transdermal every 24 hours  metoprolol succinate ER 50 milliGRAM(s) Oral daily  multivitamin 1 Tablet(s) Oral daily  polyethylene glycol 3350 17 Gram(s) Oral daily  rosuvastatin 40 milliGRAM(s) Oral at bedtime  senna 2 Tablet(s) Oral at bedtime  spironolactone 25 milliGRAM(s) Oral every 24 hours    MEDICATIONS  (PRN):  acetaminophen     Tablet .. 650 milliGRAM(s) Oral every 6 hours PRN Temp greater or equal to 38C (100.4F), Mild Pain (1 - 3)  aluminum hydroxide/magnesium hydroxide/simethicone Suspension 30 milliLiter(s) Oral every 6 hours PRN Dyspepsia      Objective:    Vitals: Vital Signs Last 24 Hrs  T(C): 37.1 (01-30-25 @ 04:00), Max: 37.1 (01-29-25 @ 16:34)  T(F): 98.7 (01-30-25 @ 04:00), Max: 98.8 (01-29-25 @ 16:34)  HR: 74 (01-30-25 @ 04:00) (69 - 84)  BP: 106/62 (01-30-25 @ 04:00) (97/62 - 133/86)  BP(mean): --  RR: 18 (01-30-25 @ 04:00) (18 - 18)  SpO2: 95% (01-30-25 @ 04:00) (95% - 98%)                I&O's Summary    29 Jan 2025 07:01  -  30 Jan 2025 07:00  --------------------------------------------------------  IN: 340 mL / OUT: 1200 mL / NET: -860 mL        PHYSICAL EXAM:  GENERAL: NAD  HEAD:  Atraumatic, Normocephalic  EYES: EOMI, conjunctiva and sclera clear  CHEST/LUNG: Clear to auscultation bilaterally; No rales, rhonchi, wheezing, or rubs  HEART: Regular rate and rhythm; No murmurs, rubs, or gallops  ABDOMEN: Soft, mildly tender to palpation, Nondistended;   SKIN: Chronic stasis changes on bilateral lower extremities  NERVOUS SYSTEM:  Alert & Oriented X3, no focal deficits    LABS:                        12.3   3.77  )-----------( 136      ( 30 Jan 2025 05:02 )             36.7                         13.0   4.65  )-----------( 149      ( 29 Jan 2025 04:49 )             37.3                         11.7   4.27  )-----------( 137      ( 28 Jan 2025 05:12 )             35.5     01-30    135  |  101  |  28[H]  ----------------------------<  104[H]  4.3   |  20[L]  |  1.51[H]  01-29    133[L]  |  100  |  29[H]  ----------------------------<  103[H]  4.1   |  19[L]  |  1.62[H]  01-28    137  |  102  |  29[H]  ----------------------------<  118[H]  4.4   |  19[L]  |  1.56[H]    Ca    9.0      30 Jan 2025 05:02  Ca    9.3      29 Jan 2025 04:49  Ca    9.2      28 Jan 2025 20:13  Phos  3.9     01-30  Mg     2.0     01-30    TPro  6.8  /  Alb  3.6  /  TBili  0.3  /  DBili  x   /  AST  27  /  ALT  7[L]  /  AlkPhos  65  01-30  TPro  7.0  /  Alb  3.9  /  TBili  0.3  /  DBili  x   /  AST  29  /  ALT  6[L]  /  AlkPhos  76  01-29    CAPILLARY BLOOD GLUCOSE        PT/INR - ( 30 Jan 2025 05:02 )   PT: 17.9 sec;   INR: 1.56 ratio         PTT - ( 30 Jan 2025 05:02 )  PTT:32.2 sec    Urinalysis Basic - ( 30 Jan 2025 05:02 )    Color: x / Appearance: x / SG: x / pH: x  Gluc: 104 mg/dL / Ketone: x  / Bili: x / Urobili: x   Blood: x / Protein: x / Nitrite: x   Leuk Esterase: x / RBC: x / WBC x   Sq Epi: x / Non Sq Epi: x / Bacteria: x          RADIOLOGY & ADDITIONAL TESTS:    Imaging Personally Reviewed:  [x ] YES  [ ] NO    Consultants involved in case:   Consultant(s) Notes Reviewed:  [ x] YES  [ ] NO:   Care Discussed with Consultants/Other Providers [x ] YES  [ ] NO

## 2025-01-30 NOTE — PROGRESS NOTE ADULT - PROBLEM SELECTOR PLAN 7
Patient was prescribed tamsulosin and finasteride at home. Patient was not taking these in the last 4 weeks.    Plan:  - continue home tamsulosin  - continue home finasteride  - bladder scans q8h Patient was prescribed tamsulosin and finasteride at home. Patient was not taking these in the last 4 weeks.    Plan:  - continue home tamsulosin  - continue home finasteride

## 2025-01-30 NOTE — PROGRESS NOTE ADULT - PROBLEM SELECTOR PLAN 4
Patient falling s/p feeling legs were shaky and unbalanced in setting of chronic PAD and poor ambulation at baseline. Extensive cardiac history is also a contributing factor and subsequent increased chest pain while in transit to the hospital.   - CT head and x-rays of bilateral lower extremities without acute findings    Plan:  - NSTEMI workup/treatment as above  - PT consulted, recs appreciated -> MIGUEL  - risk precautions

## 2025-01-30 NOTE — PROGRESS NOTE ADULT - PROBLEM SELECTOR PLAN 2
- noted to have hematuria after ramirez removal. patient states also prior to admission.   - urology consulted, appreciate recs  - monitor H/H  - likely plan for outpatient follow up - noted to have hematuria after ramirez removal. patient states also prior to admission.     Plan:  - urology consulted, appreciate recs  - monitor H/H  - UA w/microscopy  - full urology workup outpatient

## 2025-01-30 NOTE — PROGRESS NOTE ADULT - PROBLEM SELECTOR PLAN 1
Presenting as a transfer from [place] for NSTEMI. Patient self-discontinued all medications for 3-4 weeks prior to admission, stating that he had finished the course of medications. Chest pain on presentation similar to prior episodes of chest pain.  - TTE with decreased EF 40-45%  - s/p Aspirin and plavix loading at     Plan:  - heparin ggt -> Eliquis 10 mg BID loading dose  - Cardiology consulted, recs appreciated  - [facility] on 01/24, no stent due to anatomy  - c/w clopidogrel 75 mg qd  - decrease Imdur 120 mg qd   - change to metoprolol 50 mg qd  - c/w ranolazine qd -> having a difficult time with swallowing pill -> d/c  - c/w spironolactone 25 mg qd Presenting as a transfer from [place] for NSTEMI. Patient self-discontinued all medications for 3-4 weeks prior to admission, stating that he had finished the course of medications. Chest pain on presentation similar to prior episodes of chest pain.  - TTE with decreased EF 40-45%  - s/p Aspirin and plavix loading at [facility]    Plan:  - heparin ggt -> Eliquis 10 mg BID loading dose  - Cardiology consulted, recs appreciated  - [facility] on 01/24, no stent due to anatomy  - c/w clopidogrel 75 mg qd  - c/w Imdur 120 mg qd   - c/w metoprolol 50 mg qd  - c/w spironolactone 25 mg qd

## 2025-01-30 NOTE — PROGRESS NOTE ADULT - PROBLEM SELECTOR PLAN 9
- DVT Prophylaxis: Eliquis  - Diet: soft and bite sized - this is baseline diet and patient would not like to try other maneuvers to assist with swallowing due to concern for choking  - PT consulted, recs appreciated - MIGUEL  - Dispo: pending MIGUEL placement

## 2025-01-30 NOTE — PROGRESS NOTE ADULT - PROBLEM SELECTOR PLAN 5
History of femoral-femoral bypass over 20 years ago.    Plan:  - medications as above History of femoral-femoral bypass over 20 years ago.    Plan:  - medications as above  - vascular consult in setting of poor follow up and sutures still present History of femoral-femoral bypass over 20 years ago and recent in 2024.    Plan:  - medications as above  - vascular consult in setting of poor follow up and sutures still present

## 2025-01-31 ENCOUNTER — TRANSCRIPTION ENCOUNTER (OUTPATIENT)
Age: 75
End: 2025-01-31

## 2025-01-31 VITALS
SYSTOLIC BLOOD PRESSURE: 121 MMHG | HEART RATE: 70 BPM | RESPIRATION RATE: 18 BRPM | OXYGEN SATURATION: 98 % | DIASTOLIC BLOOD PRESSURE: 67 MMHG | TEMPERATURE: 98 F

## 2025-01-31 LAB
ALBUMIN SERPL ELPH-MCNC: 3.7 G/DL — SIGNIFICANT CHANGE UP (ref 3.3–5)
ALP SERPL-CCNC: 61 U/L — SIGNIFICANT CHANGE UP (ref 40–120)
ALT FLD-CCNC: 9 U/L — LOW (ref 10–45)
ANION GAP SERPL CALC-SCNC: 15 MMOL/L — SIGNIFICANT CHANGE UP (ref 5–17)
AST SERPL-CCNC: 25 U/L — SIGNIFICANT CHANGE UP (ref 10–40)
BILIRUB SERPL-MCNC: 0.3 MG/DL — SIGNIFICANT CHANGE UP (ref 0.2–1.2)
BUN SERPL-MCNC: 29 MG/DL — HIGH (ref 7–23)
CALCIUM SERPL-MCNC: 8.9 MG/DL — SIGNIFICANT CHANGE UP (ref 8.4–10.5)
CHLORIDE SERPL-SCNC: 102 MMOL/L — SIGNIFICANT CHANGE UP (ref 96–108)
CO2 SERPL-SCNC: 19 MMOL/L — LOW (ref 22–31)
CREAT SERPL-MCNC: 1.55 MG/DL — HIGH (ref 0.5–1.3)
EGFR: 46 ML/MIN/1.73M2 — LOW
GLUCOSE SERPL-MCNC: 114 MG/DL — HIGH (ref 70–99)
HCT VFR BLD CALC: 34.4 % — LOW (ref 39–50)
HGB BLD-MCNC: 11.4 G/DL — LOW (ref 13–17)
MAGNESIUM SERPL-MCNC: 2 MG/DL — SIGNIFICANT CHANGE UP (ref 1.6–2.6)
MCHC RBC-ENTMCNC: 30.7 PG — SIGNIFICANT CHANGE UP (ref 27–34)
MCHC RBC-ENTMCNC: 33.1 G/DL — SIGNIFICANT CHANGE UP (ref 32–36)
MCV RBC AUTO: 92.7 FL — SIGNIFICANT CHANGE UP (ref 80–100)
NRBC # BLD: 0 /100 WBCS — SIGNIFICANT CHANGE UP (ref 0–0)
NRBC BLD-RTO: 0 /100 WBCS — SIGNIFICANT CHANGE UP (ref 0–0)
PHOSPHATE SERPL-MCNC: 3.6 MG/DL — SIGNIFICANT CHANGE UP (ref 2.5–4.5)
PLATELET # BLD AUTO: 149 K/UL — LOW (ref 150–400)
POTASSIUM SERPL-MCNC: 4.2 MMOL/L — SIGNIFICANT CHANGE UP (ref 3.5–5.3)
POTASSIUM SERPL-SCNC: 4.2 MMOL/L — SIGNIFICANT CHANGE UP (ref 3.5–5.3)
PROT SERPL-MCNC: 6.8 G/DL — SIGNIFICANT CHANGE UP (ref 6–8.3)
RBC # BLD: 3.71 M/UL — LOW (ref 4.2–5.8)
RBC # FLD: 13 % — SIGNIFICANT CHANGE UP (ref 10.3–14.5)
SODIUM SERPL-SCNC: 136 MMOL/L — SIGNIFICANT CHANGE UP (ref 135–145)
WBC # BLD: 3.85 K/UL — SIGNIFICANT CHANGE UP (ref 3.8–10.5)
WBC # FLD AUTO: 3.85 K/UL — SIGNIFICANT CHANGE UP (ref 3.8–10.5)

## 2025-01-31 PROCEDURE — 73552 X-RAY EXAM OF FEMUR 2/>: CPT

## 2025-01-31 PROCEDURE — 36415 COLL VENOUS BLD VENIPUNCTURE: CPT

## 2025-01-31 PROCEDURE — 97110 THERAPEUTIC EXERCISES: CPT

## 2025-01-31 PROCEDURE — 85610 PROTHROMBIN TIME: CPT

## 2025-01-31 PROCEDURE — 99232 SBSQ HOSP IP/OBS MODERATE 35: CPT

## 2025-01-31 PROCEDURE — 85025 COMPLETE CBC W/AUTO DIFF WBC: CPT

## 2025-01-31 PROCEDURE — 84484 ASSAY OF TROPONIN QUANT: CPT

## 2025-01-31 PROCEDURE — 85730 THROMBOPLASTIN TIME PARTIAL: CPT

## 2025-01-31 PROCEDURE — 83880 ASSAY OF NATRIURETIC PEPTIDE: CPT

## 2025-01-31 PROCEDURE — 92611 MOTION FLUOROSCOPY/SWALLOW: CPT

## 2025-01-31 PROCEDURE — 97116 GAIT TRAINING THERAPY: CPT

## 2025-01-31 PROCEDURE — 92610 EVALUATE SWALLOWING FUNCTION: CPT

## 2025-01-31 PROCEDURE — 85027 COMPLETE CBC AUTOMATED: CPT

## 2025-01-31 PROCEDURE — 83605 ASSAY OF LACTIC ACID: CPT

## 2025-01-31 PROCEDURE — C1887: CPT

## 2025-01-31 PROCEDURE — 73590 X-RAY EXAM OF LOWER LEG: CPT

## 2025-01-31 PROCEDURE — 81001 URINALYSIS AUTO W/SCOPE: CPT

## 2025-01-31 PROCEDURE — 83735 ASSAY OF MAGNESIUM: CPT

## 2025-01-31 PROCEDURE — 80053 COMPREHEN METABOLIC PANEL: CPT

## 2025-01-31 PROCEDURE — C1769: CPT

## 2025-01-31 PROCEDURE — C1894: CPT

## 2025-01-31 PROCEDURE — 93356 MYOCRD STRAIN IMG SPCKL TRCK: CPT

## 2025-01-31 PROCEDURE — C8929: CPT

## 2025-01-31 PROCEDURE — 92526 ORAL FUNCTION THERAPY: CPT

## 2025-01-31 PROCEDURE — 87040 BLOOD CULTURE FOR BACTERIA: CPT

## 2025-01-31 PROCEDURE — 93005 ELECTROCARDIOGRAM TRACING: CPT

## 2025-01-31 PROCEDURE — 97162 PT EVAL MOD COMPLEX 30 MIN: CPT

## 2025-01-31 PROCEDURE — 99239 HOSP IP/OBS DSCHRG MGMT >30: CPT | Mod: GC

## 2025-01-31 PROCEDURE — 93459 L HRT ART/GRFT ANGIO: CPT

## 2025-01-31 PROCEDURE — 74230 X-RAY XM SWLNG FUNCJ C+: CPT

## 2025-01-31 PROCEDURE — 84100 ASSAY OF PHOSPHORUS: CPT

## 2025-01-31 PROCEDURE — 80048 BASIC METABOLIC PNL TOTAL CA: CPT

## 2025-01-31 PROCEDURE — 83036 HEMOGLOBIN GLYCOSYLATED A1C: CPT

## 2025-01-31 PROCEDURE — 82550 ASSAY OF CK (CPK): CPT

## 2025-01-31 PROCEDURE — 99291 CRITICAL CARE FIRST HOUR: CPT

## 2025-01-31 PROCEDURE — 80061 LIPID PANEL: CPT

## 2025-01-31 PROCEDURE — 82553 CREATINE MB FRACTION: CPT

## 2025-01-31 RX ORDER — POLYETHYLENE GLYCOL 3350 17 G/17G
17 POWDER, FOR SOLUTION ORAL
Qty: 0 | Refills: 0 | DISCHARGE
Start: 2025-01-31

## 2025-01-31 RX ORDER — METOPROLOL SUCCINATE 25 MG
25 TABLET, EXTENDED RELEASE 24 HR ORAL
Refills: 0 | Status: DISCONTINUED | OUTPATIENT
Start: 2025-01-31 | End: 2025-01-31

## 2025-01-31 RX ORDER — APIXABAN 5 MG/1
2 TABLET, FILM COATED ORAL
Qty: 0 | Refills: 0 | DISCHARGE
Start: 2025-01-31

## 2025-01-31 RX ORDER — NITROGLYCERIN 0.4 MG/1
1 TABLET SUBLINGUAL
Qty: 2 | Refills: 0
Start: 2025-01-31 | End: 2025-03-01

## 2025-01-31 RX ORDER — DOXAZOSIN MESYLATE 4 MG
1 TABLET ORAL
Qty: 0 | Refills: 0 | DISCHARGE
Start: 2025-01-31

## 2025-01-31 RX ORDER — METOPROLOL SUCCINATE 25 MG
50 TABLET, EXTENDED RELEASE 24 HR ORAL DAILY
Refills: 0 | Status: DISCONTINUED | OUTPATIENT
Start: 2025-01-31 | End: 2025-01-31

## 2025-01-31 RX ORDER — ROSUVASTATIN CALCIUM 10 MG/1
1 TABLET, FILM COATED ORAL
Qty: 0 | Refills: 0 | DISCHARGE
Start: 2025-01-31

## 2025-01-31 RX ORDER — METOPROLOL SUCCINATE 25 MG
1 TABLET, EXTENDED RELEASE 24 HR ORAL
Qty: 0 | Refills: 0 | DISCHARGE
Start: 2025-01-31

## 2025-01-31 RX ORDER — OXYCODONE HYDROCHLORIDE 30 MG/1
2.5 TABLET ORAL ONCE
Refills: 0 | Status: DISCONTINUED | OUTPATIENT
Start: 2025-01-31 | End: 2025-01-31

## 2025-01-31 RX ADMIN — OXYCODONE HYDROCHLORIDE 2.5 MILLIGRAM(S): 30 TABLET ORAL at 11:51

## 2025-01-31 RX ADMIN — ACETAMINOPHEN 650 MILLIGRAM(S): 160 SUSPENSION ORAL at 09:06

## 2025-01-31 RX ADMIN — ACETAMINOPHEN 650 MILLIGRAM(S): 160 SUSPENSION ORAL at 15:55

## 2025-01-31 RX ADMIN — Medication 75 MILLIGRAM(S): at 11:14

## 2025-01-31 RX ADMIN — Medication 500 MILLIGRAM(S): at 11:14

## 2025-01-31 RX ADMIN — Medication 25 MILLIGRAM(S): at 11:52

## 2025-01-31 RX ADMIN — OXYCODONE HYDROCHLORIDE 2.5 MILLIGRAM(S): 30 TABLET ORAL at 12:51

## 2025-01-31 RX ADMIN — APIXABAN 10 MILLIGRAM(S): 5 TABLET, FILM COATED ORAL at 17:39

## 2025-01-31 RX ADMIN — APIXABAN 10 MILLIGRAM(S): 5 TABLET, FILM COATED ORAL at 05:58

## 2025-01-31 RX ADMIN — ANTISEPTIC SURGICAL SCRUB 1 APPLICATION(S): 0.04 SOLUTION TOPICAL at 11:14

## 2025-01-31 RX ADMIN — Medication 5 MILLIGRAM(S): at 11:14

## 2025-01-31 RX ADMIN — Medication 25 MILLIGRAM(S): at 11:51

## 2025-01-31 RX ADMIN — Medication 1 TABLET(S): at 11:14

## 2025-01-31 RX ADMIN — ACETAMINOPHEN 650 MILLIGRAM(S): 160 SUSPENSION ORAL at 16:49

## 2025-01-31 RX ADMIN — LIDOCAINE HYDROCHLORIDE 1 PATCH: 30 CREAM TOPICAL at 13:18

## 2025-01-31 RX ADMIN — DAPAGLIFLOZIN 10 MILLIGRAM(S): 5 TABLET, FILM COATED ORAL at 15:55

## 2025-01-31 RX ADMIN — POLYETHYLENE GLYCOL 3350 17 GRAM(S): 17 POWDER, FOR SOLUTION ORAL at 11:51

## 2025-01-31 RX ADMIN — ACETAMINOPHEN 650 MILLIGRAM(S): 160 SUSPENSION ORAL at 10:06

## 2025-01-31 NOTE — PROVIDER CONTACT NOTE (OTHER) - DATE AND TIME:
24-Jan-2025 21:52
25-Jan-2025 00:14
31-Jan-2025 11:19
26-Jan-2025 17:35
26-Jan-2025 23:43
28-Jan-2025 19:44
26-Jan-2025 21:17
27-Jan-2025 02:28
28-Jan-2025 23:47
24-Jan-2025 19:06
27-Jan-2025 08:21
28-Jan-2025 04:50
25-Jan-2025 23:30

## 2025-01-31 NOTE — PROGRESS NOTE ADULT - PROBLEM SELECTOR PLAN 1
Presenting as a transfer from [place] for NSTEMI. Patient self-discontinued all medications for 3-4 weeks prior to admission, stating that he had finished the course of medications. Chest pain on presentation similar to prior episodes of chest pain.  - TTE with decreased EF 40-45%  - s/p Aspirin and plavix loading at [facility]    Plan:  - heparin ggt -> Eliquis 10 mg BID loading dose  - Cardiology consulted, recs appreciated  - [facility] on 01/24, no stent due to anatomy  - c/w clopidogrel 75 mg qd  - c/w Imdur 120 mg qd   - c/w metoprolol 50 mg qd  - c/w spironolactone 25 mg qd

## 2025-01-31 NOTE — PROGRESS NOTE ADULT - ASSESSMENT
Elton Sanabria is a 74-year-old male, Polish speaking, PMHx of CAD s/p PCI and minimally invasive direct CABG (LIMA --> LAD) 10/31/24, ICM, HFrEF, HTN, CKD, and PAD s/p prior L to R fem-fem bypass (>20yrs ago), who initially presented to [facility] after a mechanical fall and transferred to [facility] for NSTEMI. s/p LHC, no stent due to anatomy. Pending MIGUEL placement and chest pain control.

## 2025-01-31 NOTE — PROGRESS NOTE ADULT - PROBLEM SELECTOR PLAN 7
Patient was prescribed tamsulosin and finasteride at home. Patient was not taking these in the last 4 weeks.    Plan:  - continue home tamsulosin  - continue home finasteride Patient was prescribed tamsulosin and finasteride at home. Patient was not taking these in the last 4 weeks.    Plan:  - continue home doxazosin  - continue home finasteride

## 2025-01-31 NOTE — PROVIDER CONTACT NOTE (OTHER) - RECOMMENDATIONS
MD made aware
Notify provider.
T1 NF Keo Baez  notified
Notify provider
T1 NF Keo Baez notified & to bedside
Notify provider.
Notify provider
T1 NF Keo Baez notified. senior resident to bedside
Notify T1 [place]
Notify T1 Nathan Ramos
Notify provider.
Notify provider.
Team came at he bedside and assessed the patient

## 2025-01-31 NOTE — DISCHARGE NOTE NURSING/CASE MANAGEMENT/SOCIAL WORK - NSTRANSFEREYEGLASSESPAIRS_GEN_A_NUR
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 without restrictions  Shay GRACE paperwork was filled out to this effect and originals were returned to the patient  Patient Instructions   See PCP at least once yearly for check-up and annual age appropriate screenings  Chief Complaint     Chief Complaint   Patient presents with    Annual Exam     Pt presents for learners permit physical            History of Present Illness       21year old female presents for  licensing physical  Pt reports they have never driven in the state of [place] or any other state, pt does have a [place] ID card, used this to confirm identity  Pt reports she is healthy and denies history of neurologic, neuropsychiatric, and cardiac disease  Pt denies further history of hypertension, uncontrolled epilepsy, uncontrolled diabetes, lapses in consciousness, cognitive impairments, alcohol abuse, and illicit drug use  Pt reports they have all appendages and denies use of prosthetic devices  No other concerns or complaints today  Review of Systems   Review of Systems   Musculoskeletal: Negative for arthralgias, gait problem, joint swelling and myalgias  Neurological: Negative for dizziness, seizures, syncope, weakness, numbness and headaches  Current Medications     No current outpatient medications on file      Current Allergies     Allergies as of 2021    (Not on File)            The following portions of the patient's history were reviewed and updated as appropriate: allergies, current medications, past family history, past medical history, past social history, past surgical history and problem list      History reviewed  No pertinent past medical history  Past Surgical History:   Procedure Laterality Date    REDUCTION MAMMAPLASTY         History reviewed  No pertinent family history  Medications have been verified  Objective   /76   Pulse 88   Temp 97 8 °F (36 6 °C)   Resp 20   Ht 5' 6" (1 676 m)   Wt 88 9 kg (196 lb)   LMP 05/17/2021 (Exact Date)   SpO2 100%   BMI 31 64 kg/m²   Patient's last menstrual period was 05/17/2021 (exact date)  Physical Exam     Physical Exam  Vitals signs and nursing note reviewed  Constitutional:       General: She is awake  She is not in acute distress  Appearance: Normal appearance  She is well-developed and well-groomed  She is not ill-appearing, toxic-appearing or diaphoretic  HENT:      Head: Normocephalic and atraumatic  Right Ear: Hearing, tympanic membrane, ear canal and external ear normal       Left Ear: Hearing, tympanic membrane, ear canal and external ear normal       Nose: Nose normal       Mouth/Throat:      Lips: Pink  No lesions  Mouth: Mucous membranes are moist  No injury, lacerations, oral lesions or angioedema  Dentition: Normal dentition  Does not have dentures  No dental tenderness, gingival swelling, dental caries, dental abscesses or gum lesions  Tongue: No lesions  Tongue does not deviate from midline  Palate: No mass and lesions  Pharynx: No pharyngeal swelling, oropharyngeal exudate, posterior oropharyngeal erythema or uvula swelling  Tonsils: No tonsillar exudate or tonsillar abscesses  Eyes:      General: Lids are normal  Vision grossly intact  Gaze aligned appropriately  No visual field deficit or scleral icterus  Right eye: No foreign body, discharge or hordeolum  Left eye: No foreign body, discharge or hordeolum  Extraocular Movements: Extraocular movements intact        Conjunctiva/sclera: Conjunctivae normal       Pupils: Pupils are equal, round, and reactive to light  Comments: Left eye vision:  Right eye vision:  Both eye vision:    Neck:      Musculoskeletal: Normal range of motion and neck supple  Thyroid: No thyroid mass or thyromegaly  Vascular: No carotid bruit  Cardiovascular:      Rate and Rhythm: Normal rate and regular rhythm  Pulses:           Carotid pulses are 2+ on the right side and 2+ on the left side  Radial pulses are 2+ on the right side and 2+ on the left side  Posterior tibial pulses are 2+ on the right side and 2+ on the left side  Heart sounds: S1 normal and S2 normal  Heart sounds not distant  No murmur  No friction rub  No gallop  Pulmonary:      Effort: Pulmonary effort is normal       Breath sounds: Normal breath sounds and air entry  No stridor, decreased air movement or transmitted upper airway sounds  No decreased breath sounds, wheezing, rhonchi or rales  Abdominal:      General: Abdomen is flat  Bowel sounds are normal       Palpations: Abdomen is soft  Tenderness: There is no abdominal tenderness  Musculoskeletal:      Right lower leg: No edema  Left lower leg: No edema  Right foot: Normal range of motion  Left foot: Normal range of motion  Comments: Pt has full ROM about all UE and LE joints  Strength is 5/5 BUE and BLE   Lymphadenopathy:      Cervical: No cervical adenopathy  Skin:     General: Skin is warm and dry  Neurological:      General: No focal deficit present  Mental Status: She is alert and oriented to person, place, and time  Cranial Nerves: Cranial nerves are intact  Sensory: Sensation is intact  Motor: Motor function is intact  Coordination: Coordination is intact  Gait: Gait is intact  Deep Tendon Reflexes: Reflexes are normal and symmetric     Psychiatric:         Attention and Perception: Attention and perception normal          Mood and Affect: Mood and affect normal          Speech: Speech normal          Behavior: Behavior normal  Behavior is cooperative  Thought Content:  Thought content normal          Judgment: Judgment normal  1 pair

## 2025-01-31 NOTE — PROGRESS NOTE ADULT - PROBLEM SELECTOR PLAN 5
History of femoral-femoral bypass over 20 years ago and recent in 2024.    Plan:  - medications as above  - vascular consult in setting of poor follow up and sutures still present History of femoral-femoral bypass over 20 years ago and recent in 2024.    Plan:  - medications as above  - vascular surgery recommends following up with surgeon outpatient

## 2025-01-31 NOTE — PROGRESS NOTE ADULT - SUBJECTIVE AND OBJECTIVE BOX
ODALYS HAGER  74y  MRN: [id number]    Patient is a 74y old  Male who presents with a chief complaint of GLF and NSTEMI (30 Jan 2025 07:29)      Interval/Overnight Events: no events ON.     Subjective: Pt seen and examined at bedside.     MEDICATIONS  (STANDING):  apixaban 10 milliGRAM(s) Oral every 12 hours  ascorbic acid 500 milliGRAM(s) Oral daily  chlorhexidine 2% Cloths 1 Application(s) Topical daily  clopidogrel Tablet 75 milliGRAM(s) Oral daily  dapagliflozin 10 milliGRAM(s) Oral every 24 hours  doxazosin 4 milliGRAM(s) Oral at bedtime  finasteride 5 milliGRAM(s) Oral daily  isosorbide   mononitrate ER Tablet (IMDUR) 120 milliGRAM(s) Oral daily  lidocaine   4% Patch 1 Patch Transdermal every 24 hours  metoprolol succinate ER 50 milliGRAM(s) Oral daily  multivitamin 1 Tablet(s) Oral daily  polyethylene glycol 3350 17 Gram(s) Oral daily  rosuvastatin 40 milliGRAM(s) Oral at bedtime  senna 2 Tablet(s) Oral at bedtime  spironolactone 25 milliGRAM(s) Oral every 24 hours    MEDICATIONS  (PRN):  acetaminophen     Tablet .. 650 milliGRAM(s) Oral every 6 hours PRN Temp greater or equal to 38C (100.4F), Mild Pain (1 - 3)  aluminum hydroxide/magnesium hydroxide/simethicone Suspension 30 milliLiter(s) Oral every 6 hours PRN Dyspepsia      Objective:    Vitals: Vital Signs Last 24 Hrs  T(C): 36.7 (01-31-25 @ 04:00), Max: 37.4 (01-30-25 @ 20:00)  T(F): 98.1 (01-31-25 @ 04:00), Max: 99.3 (01-30-25 @ 20:00)  HR: 73 (01-31-25 @ 04:00) (72 - 75)  BP: 98/49 (01-31-25 @ 04:00) (95/55 - 105/65)  BP(mean): --  RR: 17 (01-31-25 @ 04:00) (17 - 18)  SpO2: 94% (01-31-25 @ 04:00) (94% - 97%)                I&O's Summary    30 Jan 2025 07:01  -  31 Jan 2025 07:00  --------------------------------------------------------  IN: 480 mL / OUT: 400 mL / NET: 80 mL        PHYSICAL EXAM:  GENERAL: NAD  HEAD:  Atraumatic, Normocephalic  EYES: EOMI, conjunctiva and sclera clear  CHEST/LUNG: Clear to auscultation bilaterally; No rales, rhonchi, wheezing, or rubs  HEART: Regular rate and rhythm; No murmurs, rubs, or gallops  ABDOMEN: Soft, Nontender, Nondistended;   SKIN: No rashes or lesions  NERVOUS SYSTEM:  Alert & Oriented X3, no focal deficits    LABS:                        11.4   3.85  )-----------( 149      ( 31 Jan 2025 05:26 )             34.4                         12.3   3.77  )-----------( 136      ( 30 Jan 2025 05:02 )             36.7                         13.0   4.65  )-----------( 149      ( 29 Jan 2025 04:49 )             37.3     01-31    136  |  102  |  29[H]  ----------------------------<  114[H]  4.2   |  19[L]  |  1.55[H]  01-30    135  |  101  |  28[H]  ----------------------------<  104[H]  4.3   |  20[L]  |  1.51[H]  01-29    133[L]  |  100  |  29[H]  ----------------------------<  103[H]  4.1   |  19[L]  |  1.62[H]    Ca    8.9      31 Jan 2025 05:26  Ca    9.0      30 Jan 2025 05:02  Ca    9.3      29 Jan 2025 04:49  Phos  3.6     01-31  Mg     2.0     01-31    TPro  6.8  /  Alb  3.7  /  TBili  0.3  /  DBili  x   /  AST  25  /  ALT  9[L]  /  AlkPhos  61  01-31  TPro  6.8  /  Alb  3.6  /  TBili  0.3  /  DBili  x   /  AST  27  /  ALT  7[L]  /  AlkPhos  65  01-30  TPro  7.0  /  Alb  3.9  /  TBili  0.3  /  DBili  x   /  AST  29  /  ALT  6[L]  /  AlkPhos  76  01-29    CAPILLARY BLOOD GLUCOSE        PT/INR - ( 30 Jan 2025 05:02 )   PT: 17.9 sec;   INR: 1.56 ratio         PTT - ( 30 Jan 2025 05:02 )  PTT:32.2 sec    Urinalysis Basic - ( 31 Jan 2025 05:26 )    Color: x / Appearance: x / SG: x / pH: x  Gluc: 114 mg/dL / Ketone: x  / Bili: x / Urobili: x   Blood: x / Protein: x / Nitrite: x   Leuk Esterase: x / RBC: x / WBC x   Sq Epi: x / Non Sq Epi: x / Bacteria: x          RADIOLOGY & ADDITIONAL TESTS:    Imaging Personally Reviewed:  [x ] YES  [ ] NO    Consultants involved in case:   Consultant(s) Notes Reviewed:  [ x] YES  [ ] NO:   Care Discussed with Consultants/Other Providers [x ] YES  [ ] NO           ODALYS HAGER  74y  MRN: [id number]    Patient is a 74y old  Male who presents with a chief complaint of GLF and NSTEMI (30 Jan 2025 07:29)      Interval/Overnight Events: no events ON.     Subjective: Pt seen and examined at bedside. Endorsing improved chest pain today, 3/10. Still endorsing lower extremity pain and generalized pain. No other new concerns this AM.     MEDICATIONS  (STANDING):  apixaban 10 milliGRAM(s) Oral every 12 hours  ascorbic acid 500 milliGRAM(s) Oral daily  chlorhexidine 2% Cloths 1 Application(s) Topical daily  clopidogrel Tablet 75 milliGRAM(s) Oral daily  dapagliflozin 10 milliGRAM(s) Oral every 24 hours  doxazosin 4 milliGRAM(s) Oral at bedtime  finasteride 5 milliGRAM(s) Oral daily  isosorbide   mononitrate ER Tablet (IMDUR) 120 milliGRAM(s) Oral daily  lidocaine   4% Patch 1 Patch Transdermal every 24 hours  metoprolol succinate ER 50 milliGRAM(s) Oral daily  multivitamin 1 Tablet(s) Oral daily  polyethylene glycol 3350 17 Gram(s) Oral daily  rosuvastatin 40 milliGRAM(s) Oral at bedtime  senna 2 Tablet(s) Oral at bedtime  spironolactone 25 milliGRAM(s) Oral every 24 hours    MEDICATIONS  (PRN):  acetaminophen     Tablet .. 650 milliGRAM(s) Oral every 6 hours PRN Temp greater or equal to 38C (100.4F), Mild Pain (1 - 3)  aluminum hydroxide/magnesium hydroxide/simethicone Suspension 30 milliLiter(s) Oral every 6 hours PRN Dyspepsia      Objective:    Vitals: Vital Signs Last 24 Hrs  T(C): 36.7 (01-31-25 @ 04:00), Max: 37.4 (01-30-25 @ 20:00)  T(F): 98.1 (01-31-25 @ 04:00), Max: 99.3 (01-30-25 @ 20:00)  HR: 73 (01-31-25 @ 04:00) (72 - 75)  BP: 98/49 (01-31-25 @ 04:00) (95/55 - 105/65)  BP(mean): --  RR: 17 (01-31-25 @ 04:00) (17 - 18)  SpO2: 94% (01-31-25 @ 04:00) (94% - 97%)                I&O's Summary    30 Jan 2025 07:01  -  31 Jan 2025 07:00  --------------------------------------------------------  IN: 480 mL / OUT: 400 mL / NET: 80 mL        PHYSICAL EXAM:  GENERAL: NAD  HEAD:  Atraumatic, Normocephalic  EYES: EOMI, conjunctiva and sclera clear  CHEST/LUNG: Clear to auscultation bilaterally; No rales, rhonchi, wheezing, or rubs  HEART: Regular rate and rhythm; No murmurs, rubs, or gallops  ABDOMEN: Soft, mildly tender to palpation, Nondistended;   SKIN: Chronic stasis changes on bilateral lower extremities  NERVOUS SYSTEM:  Alert & Oriented X3, no focal deficits    LABS:                        11.4   3.85  )-----------( 149      ( 31 Jan 2025 05:26 )             34.4                         12.3   3.77  )-----------( 136      ( 30 Jan 2025 05:02 )             36.7                         13.0   4.65  )-----------( 149      ( 29 Jan 2025 04:49 )             37.3     01-31    136  |  102  |  29[H]  ----------------------------<  114[H]  4.2   |  19[L]  |  1.55[H]  01-30    135  |  101  |  28[H]  ----------------------------<  104[H]  4.3   |  20[L]  |  1.51[H]  01-29    133[L]  |  100  |  29[H]  ----------------------------<  103[H]  4.1   |  19[L]  |  1.62[H]    Ca    8.9      31 Jan 2025 05:26  Ca    9.0      30 Jan 2025 05:02  Ca    9.3      29 Jan 2025 04:49  Phos  3.6     01-31  Mg     2.0     01-31    TPro  6.8  /  Alb  3.7  /  TBili  0.3  /  DBili  x   /  AST  25  /  ALT  9[L]  /  AlkPhos  61  01-31  TPro  6.8  /  Alb  3.6  /  TBili  0.3  /  DBili  x   /  AST  27  /  ALT  7[L]  /  AlkPhos  65  01-30  TPro  7.0  /  Alb  3.9  /  TBili  0.3  /  DBili  x   /  AST  29  /  ALT  6[L]  /  AlkPhos  76  01-29    CAPILLARY BLOOD GLUCOSE        PT/INR - ( 30 Jan 2025 05:02 )   PT: 17.9 sec;   INR: 1.56 ratio         PTT - ( 30 Jan 2025 05:02 )  PTT:32.2 sec    Urinalysis Basic - ( 31 Jan 2025 05:26 )    Color: x / Appearance: x / SG: x / pH: x  Gluc: 114 mg/dL / Ketone: x  / Bili: x / Urobili: x   Blood: x / Protein: x / Nitrite: x   Leuk Esterase: x / RBC: x / WBC x   Sq Epi: x / Non Sq Epi: x / Bacteria: x          RADIOLOGY & ADDITIONAL TESTS:    Imaging Personally Reviewed:  [x ] YES  [ ] NO    Consultants involved in case:   Consultant(s) Notes Reviewed:  [ x] YES  [ ] NO:   Care Discussed with Consultants/Other Providers [x ] YES  [ ] NO

## 2025-01-31 NOTE — PROVIDER CONTACT NOTE (OTHER) - SITUATION
c/o 10/10 chest pain
Patient c/o chest pain, non radiating. Patient also c/o left leg pain. Hematuria noted in patient's Hoffman.
Patient felt warm to touch, had an oral temperature of 99.8. Asked provider if we can do a rectal temperature.
Patient has a blood pressure of 92/39
pt c/o 8/10 generalized chest pain
Patient started throwing up after taking Ranexa as per the primary RN
Patient BP soft 99/50
Patient complaining of pain in his left leg and anterior chest. Patient states the chest pain is non radiating.
patient transfer from [facility] s/p cath. Arrived with suspected dti on sacrum and b/l heels
Patient has a Hoffman due to urinary retention. RN noticed bloody urine.
Patient c/o 7/10 right sided constant chest pain.
dark red colour in Hoffman drain, this present since yesterday night ,
pt right AC iv catheter site with redness, warmth, swelling.

## 2025-01-31 NOTE — PROGRESS NOTE ADULT - ASSESSMENT
Mrs. Sanabria is a 74 years old M, Polish speaking, who initially presented to [facility] after a mechanical fall. While en route to [facility] via ambulance he reported L-sided/substernal chest pain, he states similar to when he was in the hospital for his CABG. He has PMH of CAD s/p PCI and minimally invasive direct CABG (LIMA - LAD) 10/31/24, ICM, HFrEF, HTN, CKD, BPH and PAD s/p prior L to R fem-fem bypass (>20yrs ago), history of provoked DVT in 11/2024. He admitted not taking his medications for about a week or more since has no help at home and sometimes is not able to obtain meds from pharmacy. He was transferred to [facility] for NSTEMI given elevated cardiac enzymes, CTH negative.     S/p LHC on 1/24/2025 showing patent LIMA to LAD and  of LCX and RCA.  Ischemia likely from medium sized D1 however, because of the angle of takeoff from the LAD- PCI of the diagonal would be quite difficult. TTE LVEF 40-45% with RWMA. No LV thrombus.     He is overall stable except for on/off chest pain, mostly tenderness and generalized body pain. In view of borderline low BP, imdur  was lowered and does not seem to swallow the Ranexa.    Antianginal therapy: Continue Imdur to 120 mg QD XL and Metoprolol succinate 50 mg QD.   INitro SL PRN for chest pain.   Continue spironolactone  Holding ACE/ARB/ARNi due to FELIPA  Continue with plavix and Eliquis (recent DVT).  High intensity statin   Pain control.   Check CPK  Continue hemodynamic and telemetry monitoring  Monitor renal function and K  Discharge planning to rehabilitation      Jae Davis MD  Attending Cardiologist     Non-invasive Cardiology/Advanced Cardiac Imaging  [facility]        Mrs. Sanabria is a 74 years old M, Polish speaking, who initially presented to [facility] after a mechanical fall. While en route to [facility] via ambulance he reported L-sided/substernal chest pain, he states similar to when he was in the hospital for his CABG. He has PMH of CAD s/p PCI and minimally invasive direct CABG (LIMA - LAD) 10/31/24, ICM, HFrEF, HTN, CKD, BPH and PAD s/p prior L to R fem-fem bypass (>20yrs ago), history of provoked DVT in 11/2024. He admitted not taking his medications for about a week or more since has no help at home and sometimes is not able to obtain meds from pharmacy. He was transferred to [facility] for NSTEMI given elevated cardiac enzymes, CTH negative.     S/p LHC on 1/24/2025 showing patent LIMA to LAD and  of LCX and RCA.  Ischemia likely from medium sized D1 however, because of the angle of takeoff from the LAD- PCI of the diagonal would be quite difficult. TTE LVEF 40-45% with RWMA. No LV thrombus.     He is overall stable except for on/off chest pain, mostly tenderness and generalized body pain. In view of borderline low BP, imdur  was lowered and does not seem to swallow the Ranexa.    CAD/Antianginal therapy: Continue Imdur to 120 mg QD XL and Metoprolol succinate 50 mg QD. Increase as tolerates  Nitro SL PRN for chest pain.   Continue spironolactone  Holding ACE/ARB/ARNi due to FELIPA  Continue with plavix and Eliquis (recent DVT).  High intensity statin   Pain control.   Check CPK  Continue hemodynamic and telemetry monitoring  Monitor renal function and K  Discharge planning to rehabilitation      Jae Davis MD  Attending Cardiologist     Non-invasive Cardiology/Advanced Cardiac Imaging  [facility]

## 2025-01-31 NOTE — PROGRESS NOTE ADULT - PROBLEM SELECTOR PLAN 2
- noted to have hematuria after ramirez removal. patient states also prior to admission.     Plan:  - urology consulted, appreciate recs  - monitor H/H  - UA w/microscopy  - full urology workup outpatient

## 2025-01-31 NOTE — PROGRESS NOTE ADULT - PROVIDER SPECIALTY LIST ADULT
Cardiology
Wound Care
Cardiology
Cardiology
Internal Medicine

## 2025-01-31 NOTE — PROGRESS NOTE ADULT - SUBJECTIVE AND OBJECTIVE BOX
Interval history:    Overall stable. No events overnight. Remains with generalized pain including chest tenderness but improved.      Physical examination:    GENERAL:  In no acute distress except for generalized pain. AOx3  HEAD: Atraumatic, Normocephalic.  NECK: Supple, No JVD.  CHEST/LUNG: no crackles, rales or wheezing  HEART: regular rate and rhythm, +systolic murmur on LSB, + chest wall tenderness  ABDOMEN: Soft, nontender  EXTREMITIES: +L-LE dressing.     Signs Last 24 Hrs  Vital Signs Last 24 Hrs  T(C): 36.9 (31 Jan 2025 11:09), Max: 37.4 (30 Jan 2025 20:00)  T(F): 98.5 (31 Jan 2025 11:09), Max: 99.3 (30 Jan 2025 20:00)  HR: 69 (31 Jan 2025 11:30) (67 - 75)  BP: 110/58 (31 Jan 2025 11:30) (95/55 - 113/54)  BP(mean): --  RR: 18 (31 Jan 2025 11:30) (17 - 18)  SpO2: 98% (31 Jan 2025 11:30) (94% - 98%)    Parameters below as of 31 Jan 2025 11:30  Patient On (Oxygen Delivery Method): room air        Pertinent labs and images reviewed personally by me.     CBC:            11.4   3.85  )-----------( 149      ( 01-31-25 @ 05:26 )             34.4       Chem:         ( 01-31-25 @ 05:26 )    136  |  102  |  29[H]  ----------------------------<  114[H]  4.2   |  19[L]  |  1.55[H]      Liver Functions: ( 01-31-25 @ 05:26 )  Alb: 3.7 g/dL / Pro: 6.8 g/dL / ALK PHOS: 61 U/L / ALT: 9 U/L / AST: 25 U/L / GGT: x           Cath 1/24/2025  Diagnostic Findings:     Coronary Angiography   The coronary circulation is right dominant.      LM   Left main artery: There is a 50 % stenosis.      LAD   Proximal left anterior descending: There is a 70 % stenosis. First  diagonal: There is a 99 % stenosis.    CX   Circumflex: There is a 100 % stenosis.      RCA   Right coronary artery: There is a 100 % stenosis.      TTE 1/24/2025     1. Left ventricular cavity is normal in size. Left ventricular wall thickness is normal. Left ventricular systolic function is mildly to moderately decreased with an ejection fraction visually estimated at 40 to 45 %. Regional wall motion abnormalities present.   2. Multiple segmental abnormalities exist. See findings.   3. Normal right ventricular cavity size, with normal wall thickness, and normal right ventricular systolic function.   4. No pericardial effusion seen.   5. Left ventricular global longitudinal strain is -13.0 % is abnormal (> -16%). Images were acquired on a [employer] ultrasound system and processed on the ultrasound machine with a heart rate of 80 bpm and a blood pressure of 123/63 mmHg.   6. No prior echocardiogram is available for comparison.   7. There is no evidence of a left ventricular thrombus.

## 2025-01-31 NOTE — PROVIDER CONTACT NOTE (OTHER) - NAME OF MD/NP/PA/DO NOTIFIED:
T1 NF Keo Baez
Chinedu Cortez MD
Radha Hinojosa
T1 Nathan Ramos
Radha Hinojosa
Radha Hinojosa
Tony Pillai
Radha Hinojosa
Alissa Groves MD
Radha Hinojosa
T1 [place]
T1 NF Keo Baez
T1 NF Keo Baez

## 2025-01-31 NOTE — PROVIDER CONTACT NOTE (OTHER) - ASSESSMENT
Patient says pill is stuck, trying to drink water and bring up the pill. Patient not short of breath, able to talk.
Patient remains aox4. Patient c/o pain on left heel, RN applied liquid protective barrier cream and elevated heel.
Patient remains aox4. Patient denies SOB. Vitals remain stable. RN administered PO Tylenol.
Patient A&Ox4, pain in his left leg and anterior chest. Patient states the chest pain is non radiating. VSS
Patient remains aox4. Patient also remains asymptomatic. Patient has a Hoffman that is draining blood tinged urine. HGB remains stable at 10.8.
pt a/ox4 able to make needs known. c/o 10/10 non-radiating chest pain. pt moaning and holding chest, unable to describe sensation of pain. pt denies SOB, palpitations, tingling/numbness. /79, sinus rhythm on tele HR 80s, afebrile, satting 99% on RA.
Patient A&Ox4. Patient c/o chest pain, non radiating. Patient also c/o left leg pain. Hematuria noted in patient's Hoffman. VSS
Patient remains aox4. Pt denies chills, headache, fatigue, chest pain, SOB. Patient does endorse penis pain; PO Tylenol administered; partial relief noted. rectal temp 100.3.
pt receiving iv tylenol , RN to bedside when pt called d/t complaints of pain. iv site with redness, warmth, swelling. pt right AC iv catheter site removed. new iv tylenol administered with new IV placed
Patient remains aox4, and hemodynamically stable. Patient endorses pain at penis, same pain since he has been experiencing since Hoffman placement. RN administered PO Tylenol which provided pt with relief.
Patient remains aox4. Asymptomatic. Cath side remains clean, dry, and intact. Palpable pulses noted. No hematoma observed.
pt a/ox4 able to make needs known. pt c/o 8/10 generalized chest pain. denies SOB, palpitations, N/V. pt states he is unable to describe cp. VSS as seen in flowsheet.
vitals noted, denies any pain in abdomen or penis no bleeding noted at the insertion site.

## 2025-01-31 NOTE — PROGRESS NOTE ADULT - TIME BILLING
- Ordering, reviewing, and interpreting labs, testing, and imaging.  - Independently obtaining a review of systems and performing a physical exam  - Reviewing prior hospitalization and where necessary, outpatient records.  - Counselling and educating patient and family regarding interpretation of aforementioned items and plan of care.
Time-based billing (NON-critical care).     The necessity of the time spent during the encounter on this date of service was due to:     - Ordering, reviewing, and interpreting labs, testing, and imaging.  - Independently obtaining a review of systems and performing a physical exam  - Reviewing prior hospitalization and where necessary, outpatient records.  - Counselling and educating patient and family regarding interpretation of aforementioned items and plan of care.
- Ordering, reviewing, and interpreting labs, testing, and imaging.  - Independently obtaining a review of systems and performing a physical exam  - Reviewing prior hospitalization and where necessary, outpatient records.  - Counselling and educating patient and family regarding interpretation of aforementioned items and plan of care.
The submitted E/M billing level for this visit reflects the total time spent on the day reviewing documentation in EMR, face-to-face time spent with the patient, non-face-to-face review of medical records and relevant information, review of laboratory results and any relevant imaging. Patient was counseled on their diagnostic tests and treatment plan.
- Ordering, reviewing, and interpreting labs, testing, and imaging.  - Independently obtaining a review of systems and performing a physical exam  - Reviewing prior hospitalization and where necessary, outpatient records.  - Counselling and educating patient and family regarding interpretation of aforementioned items and plan of care.

## 2025-01-31 NOTE — PROGRESS NOTE ADULT - REASON FOR ADMISSION
GLF and NSTEMI

## 2025-01-31 NOTE — PROVIDER CONTACT NOTE (OTHER) - REASON
Patient has a rectal temperature of 100.3
Pt has a blood pressure of 92/39
Patient started throwing up after taking Ranexa as per the primary RN
Patient's BP 99/50
Patient c/o chest pain, left leg pain, hematuria
Patient c/o pain in his leg and chest
pt right AC iv catheter site with redness, warmth, swelling.
Patient has bloody urine
pt c/o 8/10 generalized chest pain
c/o 10/10 chest pain
dark red colour in ramirez drain
patient c/o right sided chest pain
Patient admitted with suspected DTI on sacrum and b/l heels

## 2025-01-31 NOTE — DISCHARGE NOTE NURSING/CASE MANAGEMENT/SOCIAL WORK - NSDCFUADDAPPT_GEN_ALL_CORE_FT
APPTS ARE READY TO BE MADE: [X] YES    Best Family or Patient Contact (if needed):    Additional Information about above appointments (if needed):    1: Cardiology within 1 week  2: Primary care within 1 week  3: Urology within 1 month  4: Vascular surgery within 1 week    Other comments or requests:     Patient is being discharged to rehab. Caregiver will arrange follow up.

## 2025-01-31 NOTE — PROGRESS NOTE ADULT - ATTENDING COMMENTS
74-year-old male, Polish speaking, PMHx of CAD s/p PCI and minimally invasive direct CABG (LIMA --> LAD) 10/31/24, ICM, HFrEF, HTN, CKD, and PAD s/p prior L to R fem-fem bypass (>20yrs ago), who initially presented to [facility] after a mechanical fall and transferred to [facility] for NSTEMI.    #NSTEMI  - patient with chest pain and elevated troponin on arrival  - hx of severe multivessel cardiac disease  - having intermittent chest pain, but improved compared to arrival  - cardiology following, appreciate recs  - s/p C 1/24 with 99% occlusion of Diag, recommended for medical management  - continue statin  - continue plavix  - stop heparin gtt and transition to eliquis with loading dose (hx of DVT) given patient was off eliquis at home    #Cellulitis  - erythema, warmth and pain to touch on left lower extremity concerning for cellulitis  - start ancef for 5 days, can transition to PO if dc pending  - hx of wounds and skin changes, likely source.  wound care team consulted.    #PAD  - continue home medications    #HFrEF  - repeat TTE, prior tte with EF 35%  - continue b-blocker  - continue spironolactone  - continue SGLT2i  - will not start at this time ACEi/ARB/ARNI in setting of renal function    Rest of plan as above. DC planning.
74-year-old male, Polish speaking, PMHx of CAD s/p PCI and minimally invasive direct CABG (LIMA --> LAD) 10/31/24, ICM, HFrEF, HTN, CKD, and PAD s/p prior L to R fem-fem bypass (>20yrs ago), who initially presented to [facility]- after a mechanical fall and transferred to [facility] for NSTEMI.    #NSTEMI  - patient with chest pain and elevated troponin on arrival  - hx of severe multivessel cardiac disease  - having intermittent chest pain, but improved compared to arrival- seems more MSK related as it is reproducible with palpation  - cardiology following, appreciate recs  - s/p C 1/24 with 99% occlusion of Diag, recommended for medical management  - continue statin  - continue plavix  - continue eliquis (loading dose for 7 days given dvt)    #Cellulitis  - erythema, warmth and pain to touch on left lower extremity concerning for cellulitis  - start ancef for 5 days, can transition to PO if dc pending  - hx of wounds and skin changes, likely source.  wound care team consulted.    #PAD  - continue home medications    #HFrEF  - tte with EF 40-45%  - continue b-blocker  - continue spironolactone  - continue SGLT2i  - will not start at this time ACEi/ARB/ARNI in setting of renal function    Rest of plan as above. DC planning.
74-year-old male, Polish speaking, PMHx of CAD s/p PCI and minimally invasive direct CABG (LIMA --> LAD) 10/31/24, ICM, HFrEF, HTN, CKD, and PAD s/p prior L to R fem-fem bypass (>20yrs ago), who initially presented to [facility] after a mechanical fall and transferred to [facility] for NSTEMI.    #NSTEMI  - patient with chest pain and elevated troponin on arrival  - hx of severe multivessel cardiac disease  - having intermittent chest pain, but improved compared to arrival  - cardiology following, appreciate recs  - s/p C 1/24 with 99% occlusion of Diag, recommended for medical management  - continue statin  - continue plavix  - stop heparin gtt and transition to eliquis with loading dose (hx of DVT) given patient was off eliquis at home    #Cellulitis  - erythema, warmth and pain to touch on left lower extremity concerning for cellulitis  - start ancef for 5 days, can transition to PO if dc pending  - hx of wounds and skin changes, likely source.  wound care team consulted.    #PAD  - continue home medications    #HFrEF  - repeat TTE, prior tte with EF 35%  - continue b-blocker  - continue spironolactone  - continue SGLT2i  - will not start at this time ACEi/ARB/ARNI in setting of renal function    Rest of plan as above. DC planning.
74-year-old male, Polish speaking, PMHx of CAD s/p PCI and minimally invasive direct CABG (LIMA --> LAD) 10/31/24, ICM, HFrEF, HTN, CKD, and PAD s/p prior L to R fem-fem bypass (>20yrs ago), who initially presented to [facility]- after a mechanical fall and transferred to [facility] for NSTEMI.    #NSTEMI  - patient with chest pain and elevated troponin on arrival  - hx of severe multivessel cardiac disease  - having intermittent chest pain, but improved compared to arrival- seems more MSK related as it is reproducible with palpation  - severe chest pain 1/28 night, improved after medication changes for anti-anginals  - cardiology following, appreciate recs  - s/p LHC 1/24 with 99% occlusion of Diag, recommended for medical management  - continue statin (changed atorva to rosuvastatin given patient having myalgias, CK normal)  - continue plavix  - continue eliquis (loading dose for 7 days given dvt)    #angina  - continue metoprolol instead of coreg to give room for BP  - continue imdur at reduced dose and hold parameters   - re-arrange schedule of meds to avoid hypotension  - PRN sublingual nitro for acute chest pain  - troponin downtrending    #Cellulitis  - erythema, warmth and pain to touch on left lower extremity concerning for cellulitis  - start ancef for 5 days (end 1/28)  - hx of wounds and skin changes, likely source.  wound care team consulted.    #PAD  - continue home medications    #HFrEF  - tte with EF 40-45%  - continue b-blocker  - continue spironolactone  - continue SGLT2i  - will not start at this time ACEi/ARB/ARNI in setting of renal function    #Hematuria  - urology consulted  - monitor H/H, likely will need further outpatient w/u    Rest of plan as above. DC planning. DC 1/31.
74-year-old male, Polish speaking, PMHx of CAD s/p PCI and minimally invasive direct CABG (LIMA --> LAD) 10/31/24, ICM, HFrEF, HTN, CKD, and PAD s/p prior L to R fem-fem bypass (>20yrs ago), who initially presented to [facility] after a mechanical fall and transferred to [facility] for NSTEMI.    #NSTEMI  - patient with chest pain and elevated troponin on arrival  - hx of severe multivessel cardiac disease  - having intermittent chest pain, but improved compared to arrival- seems more MSK related as it is reproducible with palpation  - severe chest pain 1/28 night  - cardiology following, appreciate recs  - s/p LHC 1/24 with 99% occlusion of Diag, recommended for medical management  - continue statin (changed atorva to rosuvastatin given patient having myalgias, CK normal)  - continue plavix  - continue eliquis (loading dose for 7 days given dvt)    #angina  - switch coreg to metoprolol to give room for BP  - reduced imdur dose and hold parameters (patient had been missing doses due to low SBPs)  - re-arrange schedule of meds to avoid hypotension  - PRN sublingual nitro for acute chest pain  - troponin downtrending    #Cellulitis  - erythema, warmth and pain to touch on left lower extremity concerning for cellulitis  - start ancef for 5 days (end 1/28)  - hx of wounds and skin changes, likely source.  wound care team consulted.    #PAD  - continue home medications    #HFrEF  - tte with EF 40-45%  - continue b-blocker  - continue spironolactone  - continue SGLT2i  - will not start at this time ACEi/ARB/ARNI in setting of renal function    #Hematuria  - urology consulted  - monitor H/H, likely will need further outpatient w/u    Rest of plan as above. DC planning.
74-year-old male, Polish speaking, PMHx of CAD s/p PCI and minimally invasive direct CABG (LIMA --> LAD) 10/31/24, ICM, HFrEF, HTN, CKD, and PAD s/p prior L to R fem-fem bypass (>20yrs ago), who initially presented to [facility] after a mechanical fall and transferred to [facility] for NSTEMI.    #NSTEMI  - patient with chest pain and elevated troponin on arrival  - hx of severe multivessel cardiac disease  - having intermittent chest pain, but improved compared to arrival- seems more MSK related as it is reproducible with palpation  - severe chest pain 1/28 night  - cardiology following, appreciate recs  - s/p LHC 1/24 with 99% occlusion of Diag, recommended for medical management  - continue statin (changed atorva to rosuvastatin given patient having myalgias, CK normal)  - continue plavix  - continue eliquis (loading dose for 7 days given dvt)    #angina  - continue metoprolol instead of coreg to give room for BP  - continue imdur at reduced dose and hold parameters   - re-arrange schedule of meds to avoid hypotension  - PRN sublingual nitro for acute chest pain  - troponin downtrending    #Cellulitis  - erythema, warmth and pain to touch on left lower extremity concerning for cellulitis  - start ancef for 5 days (end 1/28)  - hx of wounds and skin changes, likely source.  wound care team consulted.    #PAD  - continue home medications    #HFrEF  - tte with EF 40-45%  - continue b-blocker  - continue spironolactone  - continue SGLT2i  - will not start at this time ACEi/ARB/ARNI in setting of renal function    #Hematuria  - urology consulted  - monitor H/H, likely will need further outpatient w/u    Rest of plan as above. DC planning.
74-year-old male, Polish speaking, PMHx of CAD s/p PCI and minimally invasive direct CABG (LIMA --> LAD) 10/31/24, ICM, HFrEF, HTN, CKD, and PAD s/p prior L to R fem-fem bypass (>20yrs ago), who initially presented to [facility]- after a mechanical fall and transferred to [facility] for NSTEMI.    #NSTEMI  - patient with chest pain and elevated troponin on arrival  - hx of severe multivessel cardiac disease  - having intermittent chest pain, but improved compared to arrival- seems more MSK related as it is reproducible with palpation  - cardiology following, appreciate recs  - s/p C 1/24 with 99% occlusion of Diag, recommended for medical management  - continue statin (changed atorva to rosuvastatin given patient having myalgias, CK normal)  - continue plavix  - continue eliquis (loading dose for 7 days given dvt)    #Cellulitis  - erythema, warmth and pain to touch on left lower extremity concerning for cellulitis  - start ancef for 5 days (end 1/28)  - hx of wounds and skin changes, likely source.  wound care team consulted.    #PAD  - continue home medications    #HFrEF  - tte with EF 40-45%  - continue b-blocker  - continue spironolactone  - continue SGLT2i  - will not start at this time ACEi/ARB/ARNI in setting of renal function    #Hematuria  - urology consulted  - monitor H/H, likely will need further outpatient w/u    Rest of plan as above. DC planning.

## 2025-01-31 NOTE — DISCHARGE NOTE NURSING/CASE MANAGEMENT/SOCIAL WORK - FINANCIAL ASSISTANCE
[facility] provides services at a reduced cost to those who are determined to be eligible through [facility]’s financial assistance program. Information regarding [facility]’s financial assistance program can be found by going to [email or web address] or by calling [phone].

## 2025-01-31 NOTE — PROVIDER CONTACT NOTE (OTHER) - BACKGROUND
Patient admitted for NSTEMI currently on PO Eliquis.
Patient admitted for NSTEMI, cellulitis, CKD, PAD. PMH of BPH, CAD.
Patient admitted for NSTEMI, cellulitis, CKD, PAD. PMH of BPH, CAD.
Patient admitted for NSTEMI. Urology placed Hoffman catheter on 1/24 due to BPH. Pt s/p heparin gtt, currently on oral anti-coagulant.
Patient arrived from [facility] for NSTEMI evaluation. S/p heparin gtt, currently on oral anticoagulant. Uro placed Hoffman for retention on 1/24. Currently draining blood tinged urine. HGB remains stable.
transferred from [place] for patient s/p cath, medical management
Pt admitted for NSTEMI, pt is on Eliquis
Dx: NSTEMI. s/p [facility] 1/24 99% occlusion, no stent due to anatomy.- med management  Hx: HFrEF, HTN, CKD
Patient s/p left radial diagnostic cath. Currently on heparin gtt running at 8 ml/hr for NSTEMI
Dx: NSTEMI. s/p [facility] 1/24 99% occlusion, no stent due to anatomy.- med management
Patient admitted for NSTEMI, hematuria, fall, left leg cellulitis.
Patient admitted for NSTEMI currently on PO Eliquis.
Patient admitted fro NSTEMI s/p left radial cath

## 2025-01-31 NOTE — DISCHARGE NOTE NURSING/CASE MANAGEMENT/SOCIAL WORK - PATIENT PORTAL LINK FT
You can access the FollowMyHealth Patient Portal offered by [facility] by registering at the following website: [email or web address]. By joining [facility]’s FollowMyHealth portal, you will also be able to view your health information using other applications (apps) compatible with our system.

## 2025-02-01 LAB
CULTURE RESULTS: SIGNIFICANT CHANGE UP
CULTURE RESULTS: SIGNIFICANT CHANGE UP
SPECIMEN SOURCE: SIGNIFICANT CHANGE UP
SPECIMEN SOURCE: SIGNIFICANT CHANGE UP

## 2025-02-09 ENCOUNTER — INPATIENT (INPATIENT)
Facility: HOSPITAL | Age: 75
LOS: 3 days | Discharge: NURSING, REHAB OR LONG-TERM CARE | DRG: 313 | End: 2025-02-13
Attending: STUDENT IN AN ORGANIZED HEALTH CARE EDUCATION/TRAINING PROGRAM | Admitting: STUDENT IN AN ORGANIZED HEALTH CARE EDUCATION/TRAINING PROGRAM
Payer: COMMERCIAL

## 2025-02-09 VITALS
WEIGHT: 149.91 LBS | SYSTOLIC BLOOD PRESSURE: 134 MMHG | HEART RATE: 88 BPM | TEMPERATURE: 98 F | RESPIRATION RATE: 20 BRPM | DIASTOLIC BLOOD PRESSURE: 66 MMHG | OXYGEN SATURATION: 97 % | HEIGHT: 67 IN

## 2025-02-09 DIAGNOSIS — Z29.9 ENCOUNTER FOR PROPHYLACTIC MEASURES, UNSPECIFIED: ICD-10-CM

## 2025-02-09 DIAGNOSIS — R07.9 CHEST PAIN, UNSPECIFIED: ICD-10-CM

## 2025-02-09 DIAGNOSIS — Z95.828 PRESENCE OF OTHER VASCULAR IMPLANTS AND GRAFTS: Chronic | ICD-10-CM

## 2025-02-09 DIAGNOSIS — N18.9 CHRONIC KIDNEY DISEASE, UNSPECIFIED: ICD-10-CM

## 2025-02-09 DIAGNOSIS — I10 ESSENTIAL (PRIMARY) HYPERTENSION: ICD-10-CM

## 2025-02-09 DIAGNOSIS — E78.5 HYPERLIPIDEMIA, UNSPECIFIED: ICD-10-CM

## 2025-02-09 LAB
ADD ON TEST-SPECIMEN IN LAB: SIGNIFICANT CHANGE UP
ALBUMIN SERPL ELPH-MCNC: 4 G/DL — SIGNIFICANT CHANGE UP (ref 3.3–5)
ALP SERPL-CCNC: 70 U/L — SIGNIFICANT CHANGE UP (ref 40–120)
ALT FLD-CCNC: 15 U/L — SIGNIFICANT CHANGE UP (ref 10–45)
ANION GAP SERPL CALC-SCNC: 14 MMOL/L — SIGNIFICANT CHANGE UP (ref 5–17)
APPEARANCE UR: ABNORMAL
APTT BLD: 25.6 SEC — SIGNIFICANT CHANGE UP (ref 24.5–35.6)
AST SERPL-CCNC: 34 U/L — SIGNIFICANT CHANGE UP (ref 10–40)
BACTERIA # UR AUTO: NEGATIVE /HPF — SIGNIFICANT CHANGE UP
BASOPHILS # BLD AUTO: 0.01 K/UL — SIGNIFICANT CHANGE UP (ref 0–0.2)
BASOPHILS NFR BLD AUTO: 0.1 % — SIGNIFICANT CHANGE UP (ref 0–2)
BILIRUB SERPL-MCNC: 0.4 MG/DL — SIGNIFICANT CHANGE UP (ref 0.2–1.2)
BILIRUB UR-MCNC: NEGATIVE — SIGNIFICANT CHANGE UP
BUN SERPL-MCNC: 26 MG/DL — HIGH (ref 7–23)
CALCIUM SERPL-MCNC: 10 MG/DL — SIGNIFICANT CHANGE UP (ref 8.4–10.5)
CAST: 2 /LPF — SIGNIFICANT CHANGE UP (ref 0–4)
CHLORIDE SERPL-SCNC: 102 MMOL/L — SIGNIFICANT CHANGE UP (ref 96–108)
CO2 SERPL-SCNC: 20 MMOL/L — LOW (ref 22–31)
COLOR SPEC: YELLOW — SIGNIFICANT CHANGE UP
CREAT SERPL-MCNC: 1.61 MG/DL — HIGH (ref 0.5–1.3)
DIFF PNL FLD: ABNORMAL
EGFR: 44 ML/MIN/1.73M2 — LOW
EOSINOPHIL # BLD AUTO: 0.11 K/UL — SIGNIFICANT CHANGE UP (ref 0–0.5)
EOSINOPHIL NFR BLD AUTO: 1.4 % — SIGNIFICANT CHANGE UP (ref 0–6)
FLUAV AG NPH QL: SIGNIFICANT CHANGE UP
FLUBV AG NPH QL: SIGNIFICANT CHANGE UP
GAS PNL BLDV: SIGNIFICANT CHANGE UP
GAS PNL BLDV: SIGNIFICANT CHANGE UP
GLUCOSE SERPL-MCNC: 130 MG/DL — HIGH (ref 70–99)
GLUCOSE UR QL: 500 MG/DL
HCT VFR BLD CALC: 36.1 % — LOW (ref 39–50)
HGB BLD-MCNC: 11.9 G/DL — LOW (ref 13–17)
IMM GRANULOCYTES NFR BLD AUTO: 0.6 % — SIGNIFICANT CHANGE UP (ref 0–0.9)
INR BLD: 1.5 RATIO — HIGH (ref 0.85–1.16)
KETONES UR-MCNC: NEGATIVE MG/DL — SIGNIFICANT CHANGE UP
LEUKOCYTE ESTERASE UR-ACNC: ABNORMAL
LIDOCAIN IGE QN: 81 U/L — HIGH (ref 7–60)
LYMPHOCYTES # BLD AUTO: 1.4 K/UL — SIGNIFICANT CHANGE UP (ref 1–3.3)
LYMPHOCYTES # BLD AUTO: 17.5 % — SIGNIFICANT CHANGE UP (ref 13–44)
MAGNESIUM SERPL-MCNC: 2.2 MG/DL — SIGNIFICANT CHANGE UP (ref 1.6–2.6)
MCHC RBC-ENTMCNC: 31.1 PG — SIGNIFICANT CHANGE UP (ref 27–34)
MCHC RBC-ENTMCNC: 33 G/DL — SIGNIFICANT CHANGE UP (ref 32–36)
MCV RBC AUTO: 94.3 FL — SIGNIFICANT CHANGE UP (ref 80–100)
MONOCYTES # BLD AUTO: 0.93 K/UL — HIGH (ref 0–0.9)
MONOCYTES NFR BLD AUTO: 11.7 % — SIGNIFICANT CHANGE UP (ref 2–14)
NEUTROPHILS # BLD AUTO: 5.48 K/UL — SIGNIFICANT CHANGE UP (ref 1.8–7.4)
NEUTROPHILS NFR BLD AUTO: 68.7 % — SIGNIFICANT CHANGE UP (ref 43–77)
NITRITE UR-MCNC: NEGATIVE — SIGNIFICANT CHANGE UP
NRBC # BLD: 0 /100 WBCS — SIGNIFICANT CHANGE UP (ref 0–0)
NRBC BLD-RTO: 0 /100 WBCS — SIGNIFICANT CHANGE UP (ref 0–0)
PH UR: 6 — SIGNIFICANT CHANGE UP (ref 5–8)
PLATELET # BLD AUTO: 266 K/UL — SIGNIFICANT CHANGE UP (ref 150–400)
POTASSIUM SERPL-MCNC: 5.3 MMOL/L — SIGNIFICANT CHANGE UP (ref 3.5–5.3)
POTASSIUM SERPL-SCNC: 5.3 MMOL/L — SIGNIFICANT CHANGE UP (ref 3.5–5.3)
PROT SERPL-MCNC: 8.1 G/DL — SIGNIFICANT CHANGE UP (ref 6–8.3)
PROT UR-MCNC: SIGNIFICANT CHANGE UP MG/DL
PROTHROM AB SERPL-ACNC: 17.2 SEC — HIGH (ref 9.9–13.4)
RBC # BLD: 3.83 M/UL — LOW (ref 4.2–5.8)
RBC # FLD: 12.8 % — SIGNIFICANT CHANGE UP (ref 10.3–14.5)
RBC CASTS # UR COMP ASSIST: 1 /HPF — SIGNIFICANT CHANGE UP (ref 0–4)
REVIEW: SIGNIFICANT CHANGE UP
RSV RNA NPH QL NAA+NON-PROBE: SIGNIFICANT CHANGE UP
SARS-COV-2 RNA SPEC QL NAA+PROBE: SIGNIFICANT CHANGE UP
SODIUM SERPL-SCNC: 136 MMOL/L — SIGNIFICANT CHANGE UP (ref 135–145)
SP GR SPEC: 1.02 — SIGNIFICANT CHANGE UP (ref 1–1.03)
SQUAMOUS # UR AUTO: 1 /HPF — SIGNIFICANT CHANGE UP (ref 0–5)
TROPONIN T, HIGH SENSITIVITY RESULT: 35 NG/L — SIGNIFICANT CHANGE UP (ref 0–51)
TROPONIN T, HIGH SENSITIVITY RESULT: 48 NG/L — SIGNIFICANT CHANGE UP (ref 0–51)
TROPONIN T, HIGH SENSITIVITY RESULT: 48 NG/L — SIGNIFICANT CHANGE UP (ref 0–51)
UROBILINOGEN FLD QL: 0.2 MG/DL — SIGNIFICANT CHANGE UP (ref 0.2–1)
WBC # BLD: 7.98 K/UL — SIGNIFICANT CHANGE UP (ref 3.8–10.5)
WBC # FLD AUTO: 7.98 K/UL — SIGNIFICANT CHANGE UP (ref 3.8–10.5)
WBC UR QL: 268 /HPF — HIGH (ref 0–5)

## 2025-02-09 PROCEDURE — 75635 CT ANGIO ABDOMINAL ARTERIES: CPT | Mod: 26

## 2025-02-09 PROCEDURE — 71045 X-RAY EXAM CHEST 1 VIEW: CPT | Mod: 26

## 2025-02-09 PROCEDURE — 74177 CT ABD & PELVIS W/CONTRAST: CPT | Mod: 26

## 2025-02-09 PROCEDURE — 99223 1ST HOSP IP/OBS HIGH 75: CPT | Mod: 24

## 2025-02-09 PROCEDURE — 99223 1ST HOSP IP/OBS HIGH 75: CPT

## 2025-02-09 PROCEDURE — 93971 EXTREMITY STUDY: CPT | Mod: 26,LT

## 2025-02-09 PROCEDURE — 99285 EMERGENCY DEPT VISIT HI MDM: CPT

## 2025-02-09 RX ORDER — VANCOMYCIN HYDROCHLORIDE 50 MG/ML
1000 KIT ORAL ONCE
Refills: 0 | Status: COMPLETED | OUTPATIENT
Start: 2025-02-09 | End: 2025-02-09

## 2025-02-09 RX ORDER — CEFAZOLIN SODIUM IN 0.9 % NACL 2 G/10 ML
SYRINGE (ML) INTRAVENOUS
Refills: 0 | Status: DISCONTINUED | OUTPATIENT
Start: 2025-02-09 | End: 2025-02-09

## 2025-02-09 RX ORDER — CEFAZOLIN SODIUM IN 0.9 % NACL 2 G/10 ML
1000 SYRINGE (ML) INTRAVENOUS EVERY 8 HOURS
Refills: 0 | Status: DISCONTINUED | OUTPATIENT
Start: 2025-02-09 | End: 2025-02-09

## 2025-02-09 RX ORDER — ACETAMINOPHEN 160 MG/5ML
1000 SUSPENSION ORAL ONCE
Refills: 0 | Status: COMPLETED | OUTPATIENT
Start: 2025-02-09 | End: 2025-02-09

## 2025-02-09 RX ORDER — BACTERIOSTATIC SODIUM CHLORIDE 0.9 %
1000 VIAL (ML) INJECTION ONCE
Refills: 0 | Status: COMPLETED | OUTPATIENT
Start: 2025-02-09 | End: 2025-02-09

## 2025-02-09 RX ORDER — CEFAZOLIN SODIUM IN 0.9 % NACL 2 G/10 ML
1000 SYRINGE (ML) INTRAVENOUS ONCE
Refills: 0 | Status: COMPLETED | OUTPATIENT
Start: 2025-02-09 | End: 2025-02-09

## 2025-02-09 RX ORDER — PIPERACILLIN SODIUM AND TAZOBACTAM SODIUM 2; 250 G/50ML; MG/50ML
3.38 INJECTION, POWDER, FOR SOLUTION INTRAVENOUS ONCE
Refills: 0 | Status: COMPLETED | OUTPATIENT
Start: 2025-02-09 | End: 2025-02-09

## 2025-02-09 RX ADMIN — ACETAMINOPHEN 400 MILLIGRAM(S): 160 SUSPENSION ORAL at 18:07

## 2025-02-09 RX ADMIN — ACETAMINOPHEN 400 MILLIGRAM(S): 160 SUSPENSION ORAL at 10:30

## 2025-02-09 RX ADMIN — VANCOMYCIN HYDROCHLORIDE 250 MILLIGRAM(S): KIT at 16:11

## 2025-02-09 RX ADMIN — ACETAMINOPHEN 1000 MILLIGRAM(S): 160 SUSPENSION ORAL at 11:46

## 2025-02-09 RX ADMIN — Medication 1000 MILLIGRAM(S): at 12:11

## 2025-02-09 RX ADMIN — PIPERACILLIN SODIUM AND TAZOBACTAM SODIUM 200 GRAM(S): 2; 250 INJECTION, POWDER, FOR SOLUTION INTRAVENOUS at 14:51

## 2025-02-09 RX ADMIN — Medication 1000 MILLILITER(S): at 13:11

## 2025-02-09 NOTE — H&P ADULT - NSHPLABSRESULTS_GEN_ALL_CORE
11.9   7.98  )-----------( 266      ( 2025 10:28 )             36.1           136  |  102  |  26[H]  ----------------------------<  130[H]  5.3   |  20[L]  |  1.61[H]    Ca    10.0      2025 10:28  Mg     2.2         TPro  8.1  /  Alb  4.0  /  TBili  0.4  /  DBili  x   /  AST  34  /  ALT  15  /  AlkPhos  70                Urinalysis Basic - ( 2025 10:29 )    Color: Yellow / Appearance: Cloudy / S.016 / pH: x  Gluc: x / Ketone: Negative mg/dL  / Bili: Negative / Urobili: 0.2 mg/dL   Blood: x / Protein: Trace mg/dL / Nitrite: Negative   Leuk Esterase: Large / RBC: 1 /HPF /  /HPF   Sq Epi: x / Non Sq Epi: 1 /HPF / Bacteria: Negative /HPF        PT/INR - ( 2025 10:28 )   PT: 17.2 sec;   INR: 1.50 ratio         PTT - ( 2025 10:28 )  PTT:25.6 sec          CAPILLARY BLOOD GLUCOSE

## 2025-02-09 NOTE — H&P ADULT - PROBLEM SELECTOR PLAN 4
TTE 1/24/25 EF 40-45%  -not in exacerbation   -c/w lopressor 25 bid (could not swallow or crush toprol)  -c/w aldactone 25 qd  -hold Farxiga while admitted, may resume at discharge   -currently not on acei/arb.

## 2025-02-09 NOTE — H&P ADULT - PROBLEM SELECTOR PLAN 3
-c/w rosuva for now. consider discussing with cards/vasc re: rosuva dosing given 40mg is very high dose, given pt's myalgia, but also significant CAD/PAD.

## 2025-02-09 NOTE — ED ADULT NURSE NOTE - NSFALLHARMRISKINTERV_ED_ALL_ED

## 2025-02-09 NOTE — ED ADULT NURSE REASSESSMENT NOTE - NS ED NURSE REASSESS COMMENT FT1
Pt turned, changed and repositioned for comfort. Updated on plan of care, awaiting vascular consult.

## 2025-02-09 NOTE — ED ADULT NURSE NOTE - ED STAT RN HANDOFF DETAILS
1430 Report given to receiving change of shift LISSET HINDS  patient is in no acute distress. Patient vital signs stable, plan of care explained.

## 2025-02-09 NOTE — ED PROVIDER NOTE - OBJECTIVE STATEMENT
74-year-old male with past medical history of CAD status post PCI, CABG, ICM, HFrEF, hypertension, CKD, PAD s/p prior left to right femorofemoral bypass greater than 20 years ago, presenting to the emergency with chest pain.  Patient states that starting at around 8 AM, he has been having chest pain described the pain unable to characterize.  Reports associated shortness of breath.  Denies nausea, vomiting, diaphoresis.  Patient was diagnosed with an NSTEMI and presented to [place] on January 24.  Status post left heart cath on January 24, showing LIMA to LAD and  of LCX and RCA. 74-year-old male with past medical history of CAD status post PCI, CABG, ICM, HFrEF, hypertension, CKD, PAD s/p prior left to right femorofemoral bypass greater than 20 years ago, presenting to the emergency with chest pain.  Patient states that starting at around 8 AM, he has been having chest pain described the pain unable to characterize.  Reports associated shortness of breath.  Denies nausea, vomiting, diaphoresis.  Patient was diagnosed with an NSTEMI and presented to [place] on January 24.  Status post left heart cath on January 24, showing LIMA to LAD and  of LCX and RCA.  Patient received nitro x3 prior to arrival with some improvement in symptoms. 74-year-old male with past medical history of CAD status post PCI, CABG, ICM, HFrEF, hypertension, CKD, PAD s/p prior left to right femorofemoral bypass greater than 20 years ago, presenting to the emergency with chest pain.  Patient states that starting at around 8 AM, he has been having chest pain described the pain unable to characterize.  Reports associated shortness of breath.  Denies nausea, vomiting, diaphoresis.  Patient was diagnosed with an NSTEMI and presented to [place] on January 24.  Status post left heart cath on January 24, showing LIMA to LAD and  of LCX and RCA.  Patient received nitro x3 prior to arrival with some improvement in symptoms.    [place]  [id number]

## 2025-02-09 NOTE — H&P ADULT - NSICDXPASTMEDICALHX_GEN_ALL_CORE_FT
PAST MEDICAL HISTORY:  BPH (benign prostatic hyperplasia)     CAD (coronary artery disease)     Chronic kidney disease, unspecified CKD stage     HLD (hyperlipidemia)     HTN (hypertension)     PAD (peripheral artery disease)

## 2025-02-09 NOTE — ED ADULT NURSE NOTE - MUSCLE PAIN OR WEAKNESS
Physical Therapy Discharge Summary    Reason for therapy discharge:    Discharged to home with home therapy.    Progress towards therapy goal(s). See goals on Care Plan in [facility] electronic health record for goal details.  Goals partially met.  Barriers to achieving goals:   discharge from facility.    Therapy recommendation(s):    Continued therapy is recommended.  Rationale/Recommendations:  to improve safety with IND mobility and progress functional strength.       no

## 2025-02-09 NOTE — ED ADULT NURSE REASSESSMENT NOTE - NS ED NURSE REASSESS COMMENT FT1
Report received from JOURDAN Mary in [facility]. Pt A&Ox3, as per pt, has had chest pain since arrival with no change. Pt maintained on cardiac monitor, NSR.  Pt updated on plan of care, to receive antibiotics. Bed locked and in lowest position, call bell within reach.

## 2025-02-09 NOTE — ED PROVIDER NOTE - PROGRESS NOTE DETAILS
Candelario PGY2: received patient at [facility].  73 yo M, hx of CAD, cagb, recent cath 2 weeks ago, nov 2024 L fem/fem bypass, here for CP, but c/o pain all over.  workup remarkable for UTI, and cellulitis.  vascular consulted given somewhat recent surgery and patient with pain in same leg.  CTa reviewed by vascular, states findings appear chronic and do no recommend acute surgical intervention at this time.

## 2025-02-09 NOTE — ED ADULT NURSE NOTE - OBJECTIVE STATEMENT
75yo M aaox4, presents to ED from [facility] via EMS , as per pt today around 0800 during breakfast sudden onset a b/l upper, anterior chest pain and difficulty breathing , worsening for mild to severe, NH staff given x3 nitro and sent in to ED for evaluation, at this time pt still c/o chest pain, abdominal pain ( LBM yesterday) Pt denies, SOB, HA, vision changes, n/v/d, fevers chills, weakness, URI symptoms Safety and comfort measures initiated- bed placed in lowest position and side rails raised. Pt oriented to call bell system. 73yo M aaox4, h/o CABG, CAD , HTN, CKD, presents to ED from NH via EMS , as per pt today around 0800 during breakfast sudden onset a b/l upper, anterior chest pain and difficulty breathing , worsening for mild to severe, NH staff given x3 nitro and sent in to ED for evaluation, at this time pt still c/o chest pain, abdominal pain ( LBM yesterday) Pt denies, SOB, HA, vision changes, n/v/d, fevers chills, weakness, URI symptoms, on examination:  L LLE swelling, redness, pain to touch,  a surgical site note on L thigh ( no s/sx of infection) Safety and comfort measures initiated- bed placed in lowest position and side rails raised. Pt oriented to call bell system.

## 2025-02-09 NOTE — H&P ADULT - PROBLEM SELECTOR PLAN 1
-pt reports pain started on 2/9 morning and has been constant, cannot really describe the pain. nonreproducible on exam.   -trop flat. and ekg w/o acute ischemic changes.   -on previous admission, LHC showed significant stenosis but unable to PCI d/t difficult anatomy, and rec medical mgnt. Pt unable to tolerate renexa d/t pill big too big and cannot be crushed.   -c/w bb, plavix, eliquis. Not on acei/arb, previously d/t renal function, but appears pt's scr been stable around 1.5 for few months. consider starting it, although pt also previously noted w/ hypotension as well.

## 2025-02-09 NOTE — CONSULT NOTE ADULT - SUBJECTIVE AND OBJECTIVE BOX
VASCULAR SURGERY CONSULT NOTE  --------------------------------------------------------------------------------------------  Patient is a 74y old  Male who presents with a chief complaint of LLE    HPI: HPI:  74-year-old male with past medical history of CAD status post PCI, CABG, ICM, HFrEF, hypertension, CKD, PAD s/p prior left to right femorofemoral bypass greater than 20 years ago, presenting to the emergency with chest pain.  Patient states that starting at around 8 AM, he has been having chest pain described the pain unable to characterize.  Reports associated shortness of breath.  Denies nausea, vomiting, diaphoresis.  Patient was diagnosed with an NSTEMI and presented to [place] on .  Status post left heart cath on , showing LIMA to LAD and  of LCX and RCA.  Patient received nitro x3 prior to arrival with some improvement in symptoms. Vascular surgery is consulted for LLE edema.    ROS: 10-system review is otherwise negative except HPI above.      PAST MEDICAL & SURGICAL HISTORY:  CAD (coronary artery disease)      BPH (benign prostatic hyperplasia)      HTN (hypertension)      HLD (hyperlipidemia)      PAD (peripheral artery disease)      S/P femoral-femoral bypass surgery        FAMILY HISTORY:      SOCIAL HISTORY:      ALLERGIES: No Known Allergies      HOME MEDICATIONS:     CURRENT MEDICATIONS  MEDICATIONS (STANDING): ceFAZolin   IVPB 1000 milliGRAM(s) IV Intermittent every 8 hours  ceFAZolin   IVPB        MEDICATIONS (PRN):  --------------------------------------------------------------------------------------------    Vitals:   T(C): 36.6 (25 @ 10:15), Max: 36.6 (02-09-25 @ 10:15)  HR: 64 (25 @ 11:45) (64 - 88)  BP: 100/49 (25 @ 12:24) (100/49 - 139/60)  RR: 18 (25 @ 12:24) (18 - 20)  SpO2: 99% (25 @ 12:24) (96% - 99%)  CAPILLARY BLOOD GLUCOSE        CAPILLARY BLOOD GLUCOSE          Height (cm): 170.2 (:48)  Weight (kg): 68 (:48)  BMI (kg/m2): 23.5 (:)  BSA (m2): 1.79 (:)    PHYSICAL EXAM:   General: NAD, Lying in bed comfortably  Neuro: A+Ox3  HEENT: NC/AT, EOMI  Neck: Soft, supple  Cardio: RRR  Resp: Good effort,  GI/Abd: Soft, NT/ND, no rebound/guarding  Vascular: Left lower extremity 2+ pitting edema, non tender. Dopplerable PT/AT/DP bilaterally. Medical LE surgical incision with nylon stitches in place. Non-tender, mild erythema and induration. No expression of pus.   Musculoskeletal: All 4 extremities moving spontaneously, no limitations.  --------------------------------------------------------------------------------------------    LABS  CBC (02-09 @ 10:28)                              11.9[L]                         7.98    )----------------(  266        68.7  % Neutrophils, 17.5  % Lymphocytes, ANC: 5.48                                36.1[L]    BMP ( @ 10:28)             136     |  102     |  26[H] 		Ca++ --      Ca 10.0               ---------------------------------( 130[H]		Mg 2.2                5.3     |  20[L]   |  1.61[H]			Ph --        LFTs ( 10:28)      TPro 8.1 / Alb 4.0 / TBili 0.4 / DBili -- / AST 34 / ALT 15 / AlkPhos 70    Coags ( 10:28)  aPTT 25.6 / INR 1.50[H] / PT 17.2[H]        VBG (02-09 @ 10:27)     7.40 / 43 / 23[L] / 27 / 1.5 / 32.7[L]%     Lactate: 2.4[H]    --------------------------------------------------------------------------------------------    MICROBIOLOGY  Urinalysis ( 10:29):     Color: Yellow / Appearance: Cloudy[!] / S.016 / pH: 6.0 / Gluc: 500[!] / Ketones: Negative / Bili: Negative / Urobili: 0.2 / Protein :Trace / Nitrites: Negative / Leuk.Est: Large[!] / RBC: 1 / WBC: 268[H] / Sq Epi:  / Non Sq Epi: 1 / Bacteria Negative         --------------------------------------------------------------------------------------------

## 2025-02-09 NOTE — H&P ADULT - NSHPPHYSICALEXAM_GEN_ALL_CORE
Vital Signs Last 24 Hrs  T(C): 36.3 (10 Feb 2025 00:37), Max: 36.6 (09 Feb 2025 10:15)  T(F): 97.4 (10 Feb 2025 00:37), Max: 97.8 (09 Feb 2025 10:15)  HR: 65 (10 Feb 2025 00:37) (57 - 88)  BP: 122/58 (10 Feb 2025 00:37) (75/43 - 139/60)  BP(mean): 84 (10 Feb 2025 00:37) (58 - 84)  RR: 18 (10 Feb 2025 00:37) (17 - 20)  SpO2: 98% (10 Feb 2025 00:37) (96% - 99%)    Parameters below as of 10 Feb 2025 00:37  Patient On (Oxygen Delivery Method): room air        CONSTITUTIONAL: Well-groomed, in no apparent distress  EYES: No conjunctival or scleral injection, non-icteric  ENMT: No external nasal lesions; MMM  RESPIRATORY: Breathing comfortably; lungs CTA without wheeze/rhonchi/rales  CARDIOVASCULAR: +S1S2, RRR; BLE with pitting edema (sock marks) to the ankles.   GASTROINTESTINAL: No tenderness, +BS throughout, no rebound/guarding  NEUROLOGIC: No gross focal neurological deficits, AAOX3  PSYCHIATRIC: mood and affect appropriate; appropriate insight and judgment  MSK: left lower leg with multiple excoriation marks and healing wounds. No active open wounds. Some erythema round excoriation marks but not warmer to touch compared to right leg, unclear if infected. left upper leg medially with incision wound from previous procedure, healing.

## 2025-02-09 NOTE — H&P ADULT - PROBLEM SELECTOR PLAN 2
-likely d/t PAD. Of note, pt reports his leg pain worsened after the surgery.   -on plavix and eliquis (h/o DVT)  -will start gabapentin 100mg tid. consider multi-modal pain control

## 2025-02-09 NOTE — CONSULT NOTE ADULT - ASSESSMENT
A 74 year old male with complex PMHx recent  s/p LLE angiogram with EIA and FELICITY stents and s/p LLE fem to AK-pop bypass 11/14/24. by Dr. Valerio at outside facility presents here with chest pain. Vascular surgery consulted for lower extremity edema.    Recommendations:  - CTA   - Duple to rule out DVT  - Recommend admission to medicine    Vascular Surgery  [id number] A 74 year old male with complex PMHx recent  s/p LLE angiogram with EIA and FELICITY stents and s/p LLE fem to AK-pop bypass 11/14/24. by Dr. Valerio at outside facility presents here with chest pain. Vascular surgery consulted for lower extremity edema.    Recommendations:  - CTA  Reviewed   -DVT ruled out   - Continue AC   - Fu with his vascular surgeon Dr. Valerio   - Vascular surgery to sign off at this time   - Recommend admission to medicine    Vascular Surgery  [id number]

## 2025-02-09 NOTE — ED PROVIDER NOTE - CLINICAL SUMMARY MEDICAL DECISION MAKING FREE TEXT BOX
74-year-old male with past medical history of CAD status post PCI, CABG, ICM, HFrEF, hypertension, CKD, PAD s/p prior left to right femorofemoral bypass greater than 20 years ago, presenting to the emergency with chest pain. Vitals notable for mild hypoxia to 90 to 93% on room air.  Physical exam with heart regular rate and rhythm, lungs closely clear to auscultation, abdomen diffuse tenderness.  Concern for ACS, pneumonia, intra-abdominal pathology including pancreatitis, diverticulitis.  Will get labs including CBC, CMP, coags, troponin, BNP, VBG, lipase, mag, UA/UC.  Will get chest x-ray and CT ab pelvis.
Screening colonoscopy will prevent most but not all colon cancers
Fiber supplementation as tolerated
MiraLAX OTC as needed to keep stools soft
Colonoscopy for family members as per guidelines.  Consult with treating physician

## 2025-02-09 NOTE — H&P ADULT - HISTORY OF PRESENT ILLNESS
74M PMHx cad s/p PCI and CABG (Nov '24), ICM, HFrEF, HTN, CKD, PAD s/p recent LLE fem-AK-pop bypass. Was admitted 1/24-31 for mechanical fall and NSTEMI, underwent LHC, no PCI d/t difficult anatomy, decided on medical mgnt. Pt was unable to swallow ranolazine d/t pill size (cannot be crushed). Also had LLE cellulitis, completed ancef x5d.     Presented today w/ CP, with SOB, but also c/o pain all over.     In the ED, was hypoxic to low 90s% on RA. Otherwise stable vitals.     CT a/p showing LLL nodular opacities, likely chronic outlet obstruction of bladder w/ enlarged prostate.  Left leg duplex indeterminate for DVT (reviewed by vasc, neg for dvt per them).   CTA aorta w/ runoff showing patent bypasses on the lower extremities; incidental finding of severe stenosis of SMA, occluded [facility]ELINA.     CBC stable compared to 1/31, stable anemia, wbc and plt wnl.   Coat w/ INR 1.5  CMP w/ SCr 1.66 (baseline 1.5-6), lft wnl.   Trop 48, 48    Vasc consult for pain on the left leg i/s/o recent intervention, no DVT and CTA patent bypass, no surgical intervention recommended.    Received ancef, zosyn and vanc for cellulitis of the LLE.  74M PMHx cad s/p PCI and CABG (Nov '24), ICM, HFrEF, HTN, CKD, PAD s/p recent LLE fem-AK-pop bypass. Was admitted 1/24-31 for mechanical fall and NSTEMI, underwent LHC, no PCI d/t difficult anatomy, decided on medical mgnt. Pt was unable to swallow ranolazine d/t pill size (cannot be crushed). Also had LLE cellulitis, completed ancef x5d.     Presented today w/ CP, with SOB, but also c/o pain all over.     In the ED, was hypoxic to low 90s% on RA. Otherwise stable vitals.     CT a/p showing LLL nodular opacities, likely chronic outlet obstruction of bladder w/ enlarged prostate.  Left leg duplex indeterminate for DVT (reviewed by vasc, neg for dvt per them).   CTA aorta w/ runoff showing patent bypasses on the lower extremities; incidental finding of severe stenosis of SMA, occluded [facility]IGUELINA.     CBC stable compared to 1/31, stable anemia, wbc and plt wnl.   Coat w/ INR 1.5  CMP w/ SCr 1.66 (baseline 1.5-6), lft wnl.   Trop 48, 48  UA w/ cloudy, large LE, neg NI, trace blood, 268 wbc.     Vasc consult for pain on the left leg i/s/o recent intervention, no DVT and CTA patent bypass, no surgical intervention recommended.    Received ancef, zosyn and vanc for cellulitis of the LLE.  74M PMHx cad s/p PCI and CABG (Nov '24), ICM, HFrEF, HTN, CKD, PAD s/p recent LLE fem-AK-pop bypass, h/o DVT on eliquis. Was admitted 1/24-31 for mechanical fall and NSTEMI, underwent LHC, no PCI d/t difficult anatomy, decided on medical mgnt. Pt was unable to swallow ranolazine d/t pill size (cannot be crushed). Also had LLE cellulitis, completed ancef x5d.     Presented today w/ CP, with SOB, but also c/o chronic pain of BLE, L > R and worst on the heels.     In the ED, VSS    CT a/p showing LLL nodular opacities, likely chronic outlet obstruction of bladder w/ enlarged prostate.  Left leg duplex indeterminate for DVT (reviewed by vasc, neg for dvt per them).   CTA aorta w/ runoff showing patent bypasses on the lower extremities; incidental finding of severe stenosis of SMA, occluded MIGUELINA.     CBC stable compared to 1/31, stable anemia, wbc and plt wnl.   Coat w/ INR 1.5  CMP w/ SCr 1.66 (baseline 1.5-6), lft wnl.   Trop 48, 48  UA w/ cloudy, large LE, neg NI, trace blood, 268 wbc.     Vasc consult for pain on the left leg i/s/o recent intervention, no DVT and CTA patent bypass, no surgical intervention recommended.     Received ancef, zosyn and vanc for ?cellulitis of the LLE.

## 2025-02-09 NOTE — ED PROVIDER NOTE - ATTENDING CONTRIBUTION TO CARE
74-M w/ signficiant cardiac and PVD w/ PAD s/p L to R fem/fem bypass w/ L leg w/ sutures in the place, has erythema and pain the LLE, pt w/ cp, reports has had this pain for weeks, he developed b/l upper ant cp w/ sob was at NH received 3 nitro and sent to the ED, pt w/ persistent cp and abd pain, last bm yesterday, no sob, no h/a no arm/leg weakness/numbness he has LLE swelling and redness w/ surgical sutures in place in the L thigh w/ no purulent drainage, pt nontoxic appearing but appears chronically ill, pt w/ ctab, soft abdomen w/ erthema of the LLE w/ no crepitus, c/f cellulitis vs vte vs post surgical infection, plan for labs antibiotics vascular consult and admission for further management of cp,given recent cabg, and iv antibiotics care signed out at 3 PM to incoming attending.

## 2025-02-10 DIAGNOSIS — M79.604 PAIN IN RIGHT LEG: ICD-10-CM

## 2025-02-10 LAB
ALBUMIN SERPL ELPH-MCNC: 3.5 G/DL — SIGNIFICANT CHANGE UP (ref 3.3–5)
ALP SERPL-CCNC: 60 U/L — SIGNIFICANT CHANGE UP (ref 40–120)
ALT FLD-CCNC: 11 U/L — SIGNIFICANT CHANGE UP (ref 10–45)
ANION GAP SERPL CALC-SCNC: 14 MMOL/L — SIGNIFICANT CHANGE UP (ref 5–17)
AST SERPL-CCNC: 16 U/L — SIGNIFICANT CHANGE UP (ref 10–40)
BILIRUB SERPL-MCNC: 0.4 MG/DL — SIGNIFICANT CHANGE UP (ref 0.2–1.2)
BUN SERPL-MCNC: 18 MG/DL — SIGNIFICANT CHANGE UP (ref 7–23)
CALCIUM SERPL-MCNC: 9.7 MG/DL — SIGNIFICANT CHANGE UP (ref 8.4–10.5)
CHLORIDE SERPL-SCNC: 106 MMOL/L — SIGNIFICANT CHANGE UP (ref 96–108)
CO2 SERPL-SCNC: 18 MMOL/L — LOW (ref 22–31)
CREAT SERPL-MCNC: 1.41 MG/DL — HIGH (ref 0.5–1.3)
EGFR: 52 ML/MIN/1.73M2 — LOW
GLUCOSE SERPL-MCNC: 81 MG/DL — SIGNIFICANT CHANGE UP (ref 70–99)
HCT VFR BLD CALC: 32.4 % — LOW (ref 39–50)
HGB BLD-MCNC: 10.9 G/DL — LOW (ref 13–17)
MCHC RBC-ENTMCNC: 30.9 PG — SIGNIFICANT CHANGE UP (ref 27–34)
MCHC RBC-ENTMCNC: 33.6 G/DL — SIGNIFICANT CHANGE UP (ref 32–36)
MCV RBC AUTO: 91.8 FL — SIGNIFICANT CHANGE UP (ref 80–100)
NRBC # BLD: 0 /100 WBCS — SIGNIFICANT CHANGE UP (ref 0–0)
NRBC BLD-RTO: 0 /100 WBCS — SIGNIFICANT CHANGE UP (ref 0–0)
PLATELET # BLD AUTO: 250 K/UL — SIGNIFICANT CHANGE UP (ref 150–400)
POTASSIUM SERPL-MCNC: 4.2 MMOL/L — SIGNIFICANT CHANGE UP (ref 3.5–5.3)
POTASSIUM SERPL-SCNC: 4.2 MMOL/L — SIGNIFICANT CHANGE UP (ref 3.5–5.3)
PROT SERPL-MCNC: 7.1 G/DL — SIGNIFICANT CHANGE UP (ref 6–8.3)
RBC # BLD: 3.53 M/UL — LOW (ref 4.2–5.8)
RBC # FLD: 12.6 % — SIGNIFICANT CHANGE UP (ref 10.3–14.5)
SODIUM SERPL-SCNC: 138 MMOL/L — SIGNIFICANT CHANGE UP (ref 135–145)
WBC # BLD: 7.52 K/UL — SIGNIFICANT CHANGE UP (ref 3.8–10.5)
WBC # FLD AUTO: 7.52 K/UL — SIGNIFICANT CHANGE UP (ref 3.8–10.5)

## 2025-02-10 PROCEDURE — 99232 SBSQ HOSP IP/OBS MODERATE 35: CPT | Mod: GC

## 2025-02-10 RX ORDER — ONDANSETRON 4 MG/1
4 TABLET, ORALLY DISINTEGRATING ORAL ONCE
Refills: 0 | Status: COMPLETED | OUTPATIENT
Start: 2025-02-10 | End: 2025-02-10

## 2025-02-10 RX ORDER — ACETAMINOPHEN 160 MG/5ML
1000 SUSPENSION ORAL ONCE
Refills: 0 | Status: COMPLETED | OUTPATIENT
Start: 2025-02-10 | End: 2025-02-10

## 2025-02-10 RX ORDER — GABAPENTIN 800 MG/1
100 TABLET ORAL THREE TIMES A DAY
Refills: 0 | Status: DISCONTINUED | OUTPATIENT
Start: 2025-02-10 | End: 2025-02-13

## 2025-02-10 RX ORDER — POLYETHYLENE GLYCOL 3350 17 G/17G
17 POWDER, FOR SOLUTION ORAL DAILY
Refills: 0 | Status: DISCONTINUED | OUTPATIENT
Start: 2025-02-10 | End: 2025-02-13

## 2025-02-10 RX ORDER — BACTERIOSTATIC SODIUM CHLORIDE 0.9 %
500 VIAL (ML) INJECTION ONCE
Refills: 0 | Status: COMPLETED | OUTPATIENT
Start: 2025-02-10 | End: 2025-02-10

## 2025-02-10 RX ORDER — APIXABAN 5 MG/1
5 TABLET, FILM COATED ORAL EVERY 12 HOURS
Refills: 0 | Status: DISCONTINUED | OUTPATIENT
Start: 2025-02-10 | End: 2025-02-13

## 2025-02-10 RX ORDER — METOPROLOL SUCCINATE 25 MG
25 TABLET, EXTENDED RELEASE 24 HR ORAL
Refills: 0 | Status: DISCONTINUED | OUTPATIENT
Start: 2025-02-10 | End: 2025-02-13

## 2025-02-10 RX ORDER — PANTOPRAZOLE 20 MG/1
40 TABLET, DELAYED RELEASE ORAL
Refills: 0 | Status: DISCONTINUED | OUTPATIENT
Start: 2025-02-10 | End: 2025-02-13

## 2025-02-10 RX ORDER — SENNOSIDES 8.6 MG
2 TABLET ORAL AT BEDTIME
Refills: 0 | Status: DISCONTINUED | OUTPATIENT
Start: 2025-02-10 | End: 2025-02-13

## 2025-02-10 RX ORDER — MECOBAL/LEVOMEFOLAT CA/B6 PHOS 2-3-35 MG
1 TABLET ORAL DAILY
Refills: 0 | Status: DISCONTINUED | OUTPATIENT
Start: 2025-02-10 | End: 2025-02-13

## 2025-02-10 RX ORDER — DOXAZOSIN MESYLATE 4 MG
4 TABLET ORAL AT BEDTIME
Refills: 0 | Status: DISCONTINUED | OUTPATIENT
Start: 2025-02-10 | End: 2025-02-13

## 2025-02-10 RX ORDER — ROSUVASTATIN CALCIUM 10 MG/1
40 TABLET, FILM COATED ORAL AT BEDTIME
Refills: 0 | Status: DISCONTINUED | OUTPATIENT
Start: 2025-02-10 | End: 2025-02-10

## 2025-02-10 RX ADMIN — POLYETHYLENE GLYCOL 3350 17 GRAM(S): 17 POWDER, FOR SOLUTION ORAL at 11:56

## 2025-02-10 RX ADMIN — Medication 2 TABLET(S): at 21:35

## 2025-02-10 RX ADMIN — ACETAMINOPHEN 400 MILLIGRAM(S): 160 SUSPENSION ORAL at 07:34

## 2025-02-10 RX ADMIN — APIXABAN 5 MILLIGRAM(S): 5 TABLET, FILM COATED ORAL at 06:26

## 2025-02-10 RX ADMIN — GABAPENTIN 100 MILLIGRAM(S): 800 TABLET ORAL at 21:35

## 2025-02-10 RX ADMIN — ACETAMINOPHEN 1000 MILLIGRAM(S): 160 SUSPENSION ORAL at 08:45

## 2025-02-10 RX ADMIN — Medication 4 MILLIGRAM(S): at 21:35

## 2025-02-10 RX ADMIN — Medication 5 MILLIGRAM(S): at 11:56

## 2025-02-10 RX ADMIN — APIXABAN 5 MILLIGRAM(S): 5 TABLET, FILM COATED ORAL at 17:06

## 2025-02-10 RX ADMIN — GABAPENTIN 100 MILLIGRAM(S): 800 TABLET ORAL at 04:55

## 2025-02-10 RX ADMIN — ONDANSETRON 4 MILLIGRAM(S): 4 TABLET, ORALLY DISINTEGRATING ORAL at 13:08

## 2025-02-10 RX ADMIN — PANTOPRAZOLE 40 MILLIGRAM(S): 20 TABLET, DELAYED RELEASE ORAL at 07:34

## 2025-02-10 RX ADMIN — Medication 1 TABLET(S): at 11:56

## 2025-02-10 RX ADMIN — Medication 75 MILLIGRAM(S): at 11:56

## 2025-02-10 RX ADMIN — Medication 25 MILLIGRAM(S): at 06:26

## 2025-02-10 RX ADMIN — Medication 25 MILLIGRAM(S): at 21:36

## 2025-02-10 RX ADMIN — Medication 120 MILLIGRAM(S): at 11:56

## 2025-02-10 RX ADMIN — Medication 500 MILLILITER(S): at 17:16

## 2025-02-10 NOTE — PATIENT PROFILE ADULT - NSPRESCRALCFREQ_GEN_A_NUR
06/16/23      Raul Perez  [address] [facility] [address]         Your Primary Care Provider has ordered a colonoscopy for you and we would like to schedule that procedure. Your health is very important to us.    Please call Open Access Scheduling Patient Line [phone] at your earliest convenience.     If you have any questions or concerns, we would be more than happy to discuss them with you. We look forward to assisting you with your health care needs.      Sincerely,  Open Access Scheduling Patient Line [phone]  Surgery Scheduler for Open Access Colonoscopy Scheduling  [facility] Pre-Admit Department               
Never

## 2025-02-10 NOTE — PATIENT PROFILE ADULT - FALL HARM RISK - HARM RISK INTERVENTIONS

## 2025-02-10 NOTE — PROGRESS NOTE ADULT - ATTENDING COMMENTS
Patient is a 74 year old male, with PMH of CAD s/p PCI and CABG (Nov '24), ICM, HFrEF, HTN, CKD, PAD s/p recent LLE fem-AK-pop bypass, h/o DVT on eliquis, recently admitted 1/24-31 for mechanical fall and NSTEMI, underwent LHC, no PCI d/t difficult anatomy, decided on medical management. Pt was unable to swallow ranolazine d/t pill size (cannot be crushed). Also had LLE cellulitis, completed ancef x5d. Presented back to ED due to chest pain and leg pain.     - Troponin neg x3; low concern for cardiac etiology; pain reproducible to palpation; unsure if myalgias due to statin; had symptoms on atorvastatin on prior admission and changed to rosuvastatin; will discontinue statin and monitor; can consider zetia or other alternative   - vascular following   - PT eval; had come from [facility]     Rest as above. Discussed with HS.

## 2025-02-10 NOTE — PROGRESS NOTE ADULT - PROBLEM SELECTOR PLAN 7
VTE ppx: home eliquis    GI ppx: ppi at home VTE ppx: home eliquis    GI ppx: ppi at home  PT consulted, recs appreciated  Dispo: admitted from [facility], likely to return to [facility]

## 2025-02-10 NOTE — PROGRESS NOTE ADULT - PROBLEM SELECTOR PLAN 1
-pt reports pain started on 2/9 morning and has been constant, cannot really describe the pain. nonreproducible on exam.   -trop flat. and ekg w/o acute ischemic changes.   -on previous admission, LHC showed significant stenosis but unable to PCI d/t difficult anatomy, and rec medical mgnt. Pt unable to tolerate renexa d/t pill big too big and cannot be crushed.   -c/w bb, plavix, eliquis. Not on acei/arb, previously d/t renal function, but appears pt's scr been stable around 1.5 for few months. consider starting it, although pt also previously noted w/ hypotension as well. -pt reports pain started on 2/9 morning and has been constant, cannot really describe the pain. Differential with all over tenderness to palpation concerning for myalgias in setting of initiation of statin.   -trop flat. and ekg w/o acute ischemic changes.   -on previous admission, LHC showed significant stenosis but unable to PCI d/t difficult anatomy, and rec medical mgnt. Pt unable to tolerate renexa d/t pill big too big and cannot be crushed.   -c/w bb, plavix, eliquis. Not on acei/arb, previously d/t renal function, but appears pt's scr been stable around 1.5 for few months. consider starting it, although pt also previously noted w/ hypotension as well.    Plan:  - d/c rosuvastatin, will continue alternative options if pain decreases  - nitroglycerin prn for chest pain if bp is stable

## 2025-02-10 NOTE — PATIENT PROFILE ADULT - HOME ACCESSIBILITY CONCERNS
S: Nurses note reviewed and agreed with.  Patient presents c/o a thickened left great toenail for years. Patient describes nail as mildly to moderately painful, and relates pain worsens with shoes. No issues with other nails or sores on the feet.  He denies any infections, fever, or chills. Allergic hx is documented and reviewed. Diabetic status is documented and reviewed. Medications documented and reviewed.     O: Nails were examined and show evidence of mycotic infection in that they are thickened, are lytic, are yellow-brown discolored, are brittle, show subungual debris and emit odor consistent with onychomycosis. Nails affected are left 1. No infections.  Gait is heel to toe bilateral. Integument displays no open lesions or ulcers bilateral. Pedal pulses are palpable bilateral. Neither extremity shows evidence of cyanosis or inflammation. Both extremities exhibit muscle strength and tone to be WNL for patient's age and health status.  Sensation and ROM are WNL in each foot.   Patient appears well developed, well nourished, and is orientated X 3.    A: Dermatophytosis of the nail with pain    P: Reduced nail length and thickness using a nipper and laurie. There were no cuts and all pain resolved upon completion.  Discussed importance of good hygiene and shoe moisture reduction. Discussed oral vs. topical medications vs laser, along with cost, side effects, and efficacy. Patient rejected Rx due to cost or side effects.  Patient therefore instructed to RTC every 3 months for follow-up. Patient to contact me immediately with any problems before that.   none

## 2025-02-10 NOTE — PROGRESS NOTE ADULT - PROBLEM SELECTOR PLAN 3
-c/w rosuva for now. consider discussing with cards/vasc re: rosuva dosing given 40mg is very high dose, given pt's myalgia, but also significant CAD/PAD. - holding rosuvastatin, will add alternative agents if this is source of pain

## 2025-02-10 NOTE — PROGRESS NOTE ADULT - SUBJECTIVE AND OBJECTIVE BOX
ODALYS HAGER  74y  MRN: [id number]    Patient is a 74y old  Male who presents with a chief complaint of     Interval/Overnight Events: no events ON.     Subjective: Pt seen and examined at bedside.     MEDICATIONS  (STANDING):  acetaminophen   IVPB .. 1000 milliGRAM(s) IV Intermittent once  apixaban 5 milliGRAM(s) Oral every 12 hours  clopidogrel Tablet 75 milliGRAM(s) Oral daily  doxazosin 4 milliGRAM(s) Oral at bedtime  finasteride 5 milliGRAM(s) Oral daily  gabapentin 100 milliGRAM(s) Oral three times a day  isosorbide   mononitrate ER Tablet (IMDUR) 120 milliGRAM(s) Oral daily  metoprolol tartrate 25 milliGRAM(s) Oral two times a day  multivitamin 1 Tablet(s) Oral daily  pantoprazole    Tablet 40 milliGRAM(s) Oral before breakfast  polyethylene glycol 3350 17 Gram(s) Oral daily  rosuvastatin 40 milliGRAM(s) Oral at bedtime  senna 2 Tablet(s) Oral at bedtime  spironolactone 25 milliGRAM(s) Oral every 24 hours    MEDICATIONS  (PRN):      Objective:    Vitals: Vital Signs Last 24 Hrs  T(C): 36.4 (02-10-25 @ 05:27), Max: 36.6 (25 @ 10:15)  T(F): 97.5 (02-10-25 @ 05:27), Max: 97.8 (25 @ 10:15)  HR: 70 (02-10-25 @ 05:27) (57 - 88)  BP: 140/62 (02-10-25 @ 05:27) (75/43 - 140/62)  BP(mean): 84 (02-10-25 @ 00:37) (58 - 84)  RR: 18 (02-10-25 @ 05:27) (17 - 20)  SpO2: 98% (02-10-25 @ 05:27) (96% - 99%)                I&O's Summary      PHYSICAL EXAM:  GENERAL: NAD  HEAD:  Atraumatic, Normocephalic  EYES: EOMI, conjunctiva and sclera clear  CHEST/LUNG: Clear to auscultation bilaterally; No rales, rhonchi, wheezing, or rubs  HEART: Regular rate and rhythm; No murmurs, rubs, or gallops  ABDOMEN: Soft, Nontender, Nondistended;   SKIN: No rashes or lesions  NERVOUS SYSTEM:  Alert & Oriented X3, no focal deficits    LABS:                        11.9   7.98  )-----------( 266      ( 2025 10:28 )             36.1         136  |  102  |  26[H]  ----------------------------<  130[H]  5.3   |  20[L]  |  1.61[H]    Ca    10.0      2025 10:28  Mg     2.2         TPro  8.1  /  Alb  4.0  /  TBili  0.4  /  DBili  x   /  AST  34  /  ALT  15  /  AlkPhos  70      CAPILLARY BLOOD GLUCOSE        PT/INR - ( 2025 10:28 )   PT: 17.2 sec;   INR: 1.50 ratio         PTT - ( 2025 10:28 )  PTT:25.6 sec    Urinalysis Basic - ( 2025 10:29 )    Color: Yellow / Appearance: Cloudy / S.016 / pH: x  Gluc: x / Ketone: Negative mg/dL  / Bili: Negative / Urobili: 0.2 mg/dL   Blood: x / Protein: Trace mg/dL / Nitrite: Negative   Leuk Esterase: Large / RBC: 1 /HPF /  /HPF   Sq Epi: x / Non Sq Epi: 1 /HPF / Bacteria: Negative /HPF          RADIOLOGY & ADDITIONAL TESTS:    Imaging Personally Reviewed:  [x ] YES  [ ] NO    Consultants involved in case:   Consultant(s) Notes Reviewed:  [ x] YES  [ ] NO:   Care Discussed with Consultants/Other Providers [x ] YES  [ ] NO           ODALYS HAGER  74y  MRN: [id number]    Patient is a 74y old  Male who presents with a chief complaint of chest pain    : Polish [id number]    Interval/Overnight Events: no events ON.     Subjective: Pt seen and examined at bedside. Endorsing all over pain. Also endorsing a slight cough. No other concerns.     MEDICATIONS  (STANDING):  acetaminophen   IVPB .. 1000 milliGRAM(s) IV Intermittent once  apixaban 5 milliGRAM(s) Oral every 12 hours  clopidogrel Tablet 75 milliGRAM(s) Oral daily  doxazosin 4 milliGRAM(s) Oral at bedtime  finasteride 5 milliGRAM(s) Oral daily  gabapentin 100 milliGRAM(s) Oral three times a day  isosorbide   mononitrate ER Tablet (IMDUR) 120 milliGRAM(s) Oral daily  metoprolol tartrate 25 milliGRAM(s) Oral two times a day  multivitamin 1 Tablet(s) Oral daily  pantoprazole    Tablet 40 milliGRAM(s) Oral before breakfast  polyethylene glycol 3350 17 Gram(s) Oral daily  rosuvastatin 40 milliGRAM(s) Oral at bedtime  senna 2 Tablet(s) Oral at bedtime  spironolactone 25 milliGRAM(s) Oral every 24 hours    MEDICATIONS  (PRN):      Objective:    Vitals: Vital Signs Last 24 Hrs  T(C): 36.4 (02-10-25 @ 05:27), Max: 36.6 (25 @ 10:15)  T(F): 97.5 (02-10-25 @ 05:27), Max: 97.8 (25 @ 10:15)  HR: 70 (02-10-25 @ 05:27) (57 - 88)  BP: 140/62 (02-10-25 @ 05:27) (75/43 - 140/62)  BP(mean): 84 (02-10-25 @ 00:37) (58 - 84)  RR: 18 (02-10-25 @ 05:27) (17 - 20)  SpO2: 98% (02-10-25 @ 05:27) (96% - 99%)                I&O's Summary      PHYSICAL EXAM:  GENERAL: NAD  HEAD:  Atraumatic, Normocephalic  EYES: EOMI, conjunctiva and sclera clear  CHEST/LUNG: Clear to auscultation bilaterally; No rales, rhonchi, wheezing, or rubs  HEART: Regular rate and rhythm; No murmurs, rubs, or gallops. Tender to palpation along entire chest wall, reproducible pain.   ABDOMEN: Soft, tender to palpation, non-distended  EXTREMITIES: chronic stasis changes with flaking skin along bilateral lower extremities. Tenderness to palpation along entire bilateral lower extremities.  SKIN: No rashes or lesions  NERVOUS SYSTEM:  Alert & Oriented X3, no focal deficits.     LABS:                        11.9   7.98  )-----------( 266      ( 2025 10:28 )             36.1         136  |  102  |  26[H]  ----------------------------<  130[H]  5.3   |  20[L]  |  1.61[H]    Ca    10.0      2025 10:28  Mg     2.2         TPro  8.1  /  Alb  4.0  /  TBili  0.4  /  DBili  x   /  AST  34  /  ALT  15  /  AlkPhos  70  02-09    CAPILLARY BLOOD GLUCOSE        PT/INR - ( 2025 10:28 )   PT: 17.2 sec;   INR: 1.50 ratio         PTT - ( 2025 10:28 )  PTT:25.6 sec    Urinalysis Basic - ( 2025 10:29 )    Color: Yellow / Appearance: Cloudy / S.016 / pH: x  Gluc: x / Ketone: Negative mg/dL  / Bili: Negative / Urobili: 0.2 mg/dL   Blood: x / Protein: Trace mg/dL / Nitrite: Negative   Leuk Esterase: Large / RBC: 1 /HPF /  /HPF   Sq Epi: x / Non Sq Epi: 1 /HPF / Bacteria: Negative /HPF          RADIOLOGY & ADDITIONAL TESTS:    Imaging Personally Reviewed:  [x ] YES  [ ] NO    Consultants involved in case:   Consultant(s) Notes Reviewed:  [ x] YES  [ ] NO:   Care Discussed with Consultants/Other Providers [x ] YES  [ ] NO

## 2025-02-10 NOTE — PATIENT PROFILE ADULT - FUNCTIONAL ASSESSMENT - BASIC MOBILITY 6.
2-calculated by average/Not able to assess (calculate score using [facility] averaging method)

## 2025-02-11 LAB
-  AMPICILLIN: SIGNIFICANT CHANGE UP
-  CIPROFLOXACIN: SIGNIFICANT CHANGE UP
-  LEVOFLOXACIN: SIGNIFICANT CHANGE UP
-  NITROFURANTOIN: SIGNIFICANT CHANGE UP
-  TETRACYCLINE: SIGNIFICANT CHANGE UP
-  VANCOMYCIN: SIGNIFICANT CHANGE UP
ALBUMIN SERPL ELPH-MCNC: 3.5 G/DL — SIGNIFICANT CHANGE UP (ref 3.3–5)
ALP SERPL-CCNC: 57 U/L — SIGNIFICANT CHANGE UP (ref 40–120)
ALT FLD-CCNC: 5 U/L — LOW (ref 10–45)
ANION GAP SERPL CALC-SCNC: 13 MMOL/L — SIGNIFICANT CHANGE UP (ref 5–17)
AST SERPL-CCNC: 13 U/L — SIGNIFICANT CHANGE UP (ref 10–40)
BASOPHILS # BLD AUTO: 0.02 K/UL — SIGNIFICANT CHANGE UP (ref 0–0.2)
BASOPHILS NFR BLD AUTO: 0.4 % — SIGNIFICANT CHANGE UP (ref 0–2)
BILIRUB SERPL-MCNC: 0.3 MG/DL — SIGNIFICANT CHANGE UP (ref 0.2–1.2)
BUN SERPL-MCNC: 22 MG/DL — SIGNIFICANT CHANGE UP (ref 7–23)
CALCIUM SERPL-MCNC: 9.5 MG/DL — SIGNIFICANT CHANGE UP (ref 8.4–10.5)
CHLORIDE SERPL-SCNC: 108 MMOL/L — SIGNIFICANT CHANGE UP (ref 96–108)
CK SERPL-CCNC: 32 U/L — SIGNIFICANT CHANGE UP (ref 30–200)
CO2 SERPL-SCNC: 20 MMOL/L — LOW (ref 22–31)
CREAT SERPL-MCNC: 1.57 MG/DL — HIGH (ref 0.5–1.3)
CULTURE RESULTS: ABNORMAL
EGFR: 45 ML/MIN/1.73M2 — LOW
EOSINOPHIL # BLD AUTO: 0.12 K/UL — SIGNIFICANT CHANGE UP (ref 0–0.5)
EOSINOPHIL NFR BLD AUTO: 2.5 % — SIGNIFICANT CHANGE UP (ref 0–6)
GLUCOSE SERPL-MCNC: 89 MG/DL — SIGNIFICANT CHANGE UP (ref 70–99)
HCT VFR BLD CALC: 33.8 % — LOW (ref 39–50)
HGB BLD-MCNC: 11 G/DL — LOW (ref 13–17)
IMM GRANULOCYTES NFR BLD AUTO: 0.8 % — SIGNIFICANT CHANGE UP (ref 0–0.9)
LYMPHOCYTES # BLD AUTO: 1.19 K/UL — SIGNIFICANT CHANGE UP (ref 1–3.3)
LYMPHOCYTES # BLD AUTO: 25.2 % — SIGNIFICANT CHANGE UP (ref 13–44)
MAGNESIUM SERPL-MCNC: 2.1 MG/DL — SIGNIFICANT CHANGE UP (ref 1.6–2.6)
MCHC RBC-ENTMCNC: 30.2 PG — SIGNIFICANT CHANGE UP (ref 27–34)
MCHC RBC-ENTMCNC: 32.5 G/DL — SIGNIFICANT CHANGE UP (ref 32–36)
MCV RBC AUTO: 92.9 FL — SIGNIFICANT CHANGE UP (ref 80–100)
METHOD TYPE: SIGNIFICANT CHANGE UP
MONOCYTES # BLD AUTO: 0.53 K/UL — SIGNIFICANT CHANGE UP (ref 0–0.9)
MONOCYTES NFR BLD AUTO: 11.2 % — SIGNIFICANT CHANGE UP (ref 2–14)
NEUTROPHILS # BLD AUTO: 2.82 K/UL — SIGNIFICANT CHANGE UP (ref 1.8–7.4)
NEUTROPHILS NFR BLD AUTO: 59.9 % — SIGNIFICANT CHANGE UP (ref 43–77)
NRBC # BLD: 0 /100 WBCS — SIGNIFICANT CHANGE UP (ref 0–0)
NRBC BLD-RTO: 0 /100 WBCS — SIGNIFICANT CHANGE UP (ref 0–0)
ORGANISM # SPEC MICROSCOPIC CNT: ABNORMAL
ORGANISM # SPEC MICROSCOPIC CNT: ABNORMAL
PHOSPHATE SERPL-MCNC: 3.4 MG/DL — SIGNIFICANT CHANGE UP (ref 2.5–4.5)
PLATELET # BLD AUTO: 243 K/UL — SIGNIFICANT CHANGE UP (ref 150–400)
POTASSIUM SERPL-MCNC: 4.6 MMOL/L — SIGNIFICANT CHANGE UP (ref 3.5–5.3)
POTASSIUM SERPL-SCNC: 4.6 MMOL/L — SIGNIFICANT CHANGE UP (ref 3.5–5.3)
PROT SERPL-MCNC: 6.7 G/DL — SIGNIFICANT CHANGE UP (ref 6–8.3)
RBC # BLD: 3.64 M/UL — LOW (ref 4.2–5.8)
RBC # FLD: 12.3 % — SIGNIFICANT CHANGE UP (ref 10.3–14.5)
SODIUM SERPL-SCNC: 141 MMOL/L — SIGNIFICANT CHANGE UP (ref 135–145)
SPECIMEN SOURCE: SIGNIFICANT CHANGE UP
WBC # BLD: 4.72 K/UL — SIGNIFICANT CHANGE UP (ref 3.8–10.5)
WBC # FLD AUTO: 4.72 K/UL — SIGNIFICANT CHANGE UP (ref 3.8–10.5)

## 2025-02-11 PROCEDURE — 99232 SBSQ HOSP IP/OBS MODERATE 35: CPT | Mod: GC

## 2025-02-11 RX ORDER — EZETIMIBE 10 MG
10 TABLET ORAL DAILY
Refills: 0 | Status: DISCONTINUED | OUTPATIENT
Start: 2025-02-11 | End: 2025-02-13

## 2025-02-11 RX ADMIN — Medication 120 MILLIGRAM(S): at 11:41

## 2025-02-11 RX ADMIN — Medication 4 MILLIGRAM(S): at 21:28

## 2025-02-11 RX ADMIN — APIXABAN 5 MILLIGRAM(S): 5 TABLET, FILM COATED ORAL at 17:35

## 2025-02-11 RX ADMIN — Medication 25 MILLIGRAM(S): at 17:35

## 2025-02-11 RX ADMIN — Medication 25 MILLIGRAM(S): at 21:28

## 2025-02-11 RX ADMIN — GABAPENTIN 100 MILLIGRAM(S): 800 TABLET ORAL at 05:17

## 2025-02-11 RX ADMIN — Medication 75 MILLIGRAM(S): at 11:41

## 2025-02-11 RX ADMIN — GABAPENTIN 100 MILLIGRAM(S): 800 TABLET ORAL at 14:11

## 2025-02-11 RX ADMIN — PANTOPRAZOLE 40 MILLIGRAM(S): 20 TABLET, DELAYED RELEASE ORAL at 05:17

## 2025-02-11 RX ADMIN — POLYETHYLENE GLYCOL 3350 17 GRAM(S): 17 POWDER, FOR SOLUTION ORAL at 11:40

## 2025-02-11 RX ADMIN — Medication 2 TABLET(S): at 21:29

## 2025-02-11 RX ADMIN — GABAPENTIN 100 MILLIGRAM(S): 800 TABLET ORAL at 21:28

## 2025-02-11 RX ADMIN — Medication 25 MILLIGRAM(S): at 05:28

## 2025-02-11 RX ADMIN — Medication 1 TABLET(S): at 11:41

## 2025-02-11 RX ADMIN — Medication 5 MILLIGRAM(S): at 11:41

## 2025-02-11 RX ADMIN — Medication 100 MILLIGRAM(S): at 22:30

## 2025-02-11 RX ADMIN — APIXABAN 5 MILLIGRAM(S): 5 TABLET, FILM COATED ORAL at 05:18

## 2025-02-11 NOTE — PHYSICAL THERAPY INITIAL EVALUATION ADULT - ADDITIONAL COMMENTS
pt states lives alone in private house. no stairs to negotiate. pt independent with mobility and ambulates with rolling walker. pt previously at [facility] prior to hospitalization

## 2025-02-11 NOTE — PROGRESS NOTE ADULT - SUBJECTIVE AND OBJECTIVE BOX
ODALYS HAGER  74y  MRN: [id number]    Patient is a 74y old  Male who presents with a chief complaint of Chest pain (10 Feb 2025 07:32)      Interval/Overnight Events: no events ON.     Subjective: Pt seen and examined at bedside.     MEDICATIONS  (STANDING):  apixaban 5 milliGRAM(s) Oral every 12 hours  clopidogrel Tablet 75 milliGRAM(s) Oral daily  doxazosin 4 milliGRAM(s) Oral at bedtime  finasteride 5 milliGRAM(s) Oral daily  gabapentin 100 milliGRAM(s) Oral three times a day  isosorbide   mononitrate ER Tablet (IMDUR) 120 milliGRAM(s) Oral daily  metoprolol tartrate 25 milliGRAM(s) Oral two times a day  multivitamin 1 Tablet(s) Oral daily  pantoprazole    Tablet 40 milliGRAM(s) Oral before breakfast  polyethylene glycol 3350 17 Gram(s) Oral daily  senna 2 Tablet(s) Oral at bedtime  spironolactone 25 milliGRAM(s) Oral every 24 hours    MEDICATIONS  (PRN):      Objective:    Vitals: Vital Signs Last 24 Hrs  T(C): 36.6 (02-11-25 @ 04:00), Max: 36.7 (02-10-25 @ 19:39)  T(F): 97.9 (02-11-25 @ 04:00), Max: 98 (02-10-25 @ 19:39)  HR: 65 (02-11-25 @ 05:25) (60 - 77)  BP: 109/50 (02-11-25 @ 05:25) (97/50 - 124/59)  BP(mean): --  RR: 16 (02-11-25 @ 04:00) (16 - 18)  SpO2: 97% (02-11-25 @ 04:00) (95% - 99%)                I&O's Summary    10 Feb 2025 07:01  -  11 Feb 2025 07:00  --------------------------------------------------------  IN: 250 mL / OUT: 200 mL / NET: 50 mL        PHYSICAL EXAM:  GENERAL: NAD  HEAD:  Atraumatic, Normocephalic  EYES: EOMI, conjunctiva and sclera clear  CHEST/LUNG: Clear to auscultation bilaterally; No rales, rhonchi, wheezing, or rubs  HEART: Regular rate and rhythm; No murmurs, rubs, or gallops  ABDOMEN: Soft, Nontender, Nondistended;   SKIN: No rashes or lesions  NERVOUS SYSTEM:  Alert & Oriented X3, no focal deficits    LABS:                        10.9   7.52  )-----------( 250      ( 10 Feb 2025 07:00 )             32.4                         11.9   7.98  )-----------( 266      ( 09 Feb 2025 10:28 )             36.1     02-10    138  |  106  |  18  ----------------------------<  81  4.2   |  18[L]  |  1.41[H]  02-09    136  |  102  |  26[H]  ----------------------------<  130[H]  5.3   |  20[L]  |  1.61[H]    Ca    9.7      10 Feb 2025 07:00  Ca    10.0      09 Feb 2025 10:28  Mg     2.2     02-09    TPro  7.1  /  Alb  3.5  /  TBili  0.4  /  DBili  x   /  AST  16  /  ALT  11  /  AlkPhos  60  02-10  TPro  8.1  /  Alb  4.0  /  TBili  0.4  /  DBili  x   /  AST  34  /  ALT  15  /  AlkPhos  70  02-09    CAPILLARY BLOOD GLUCOSE        PT/INR - ( 09 Feb 2025 10:28 )   PT: 17.2 sec;   INR: 1.50 ratio         PTT - ( 09 Feb 2025 10:28 )  PTT:25.6 sec    Urinalysis Basic - ( 10 Feb 2025 07:00 )    Color: x / Appearance: x / SG: x / pH: x  Gluc: 81 mg/dL / Ketone: x  / Bili: x / Urobili: x   Blood: x / Protein: x / Nitrite: x   Leuk Esterase: x / RBC: x / WBC x   Sq Epi: x / Non Sq Epi: x / Bacteria: x          RADIOLOGY & ADDITIONAL TESTS:    Imaging Personally Reviewed:  [x ] YES  [ ] NO    Consultants involved in case:   Consultant(s) Notes Reviewed:  [ x] YES  [ ] NO:   Care Discussed with Consultants/Other Providers [x ] YES  [ ] NO           ODALYS HAGER  74y  MRN: [id number]    Patient is a 74y old  Male who presents with a chief complaint of Chest pain (10 Feb 2025 07:32)    Polish  [id number]  Interval/Overnight Events: no events ON.     Subjective: Pt seen and examined at bedside. Endorses mildly improved all over pain. Feels a little cough and maybe a little trouble breathing. Urinating without issue. Endorses good appetite.    MEDICATIONS  (STANDING):  apixaban 5 milliGRAM(s) Oral every 12 hours  clopidogrel Tablet 75 milliGRAM(s) Oral daily  doxazosin 4 milliGRAM(s) Oral at bedtime  finasteride 5 milliGRAM(s) Oral daily  gabapentin 100 milliGRAM(s) Oral three times a day  isosorbide   mononitrate ER Tablet (IMDUR) 120 milliGRAM(s) Oral daily  metoprolol tartrate 25 milliGRAM(s) Oral two times a day  multivitamin 1 Tablet(s) Oral daily  pantoprazole    Tablet 40 milliGRAM(s) Oral before breakfast  polyethylene glycol 3350 17 Gram(s) Oral daily  senna 2 Tablet(s) Oral at bedtime  spironolactone 25 milliGRAM(s) Oral every 24 hours    MEDICATIONS  (PRN):      Objective:    Vitals: Vital Signs Last 24 Hrs  T(C): 36.6 (02-11-25 @ 04:00), Max: 36.7 (02-10-25 @ 19:39)  T(F): 97.9 (02-11-25 @ 04:00), Max: 98 (02-10-25 @ 19:39)  HR: 65 (02-11-25 @ 05:25) (60 - 77)  BP: 109/50 (02-11-25 @ 05:25) (97/50 - 124/59)  BP(mean): --  RR: 16 (02-11-25 @ 04:00) (16 - 18)  SpO2: 97% (02-11-25 @ 04:00) (95% - 99%)                I&O's Summary    10 Feb 2025 07:01  -  11 Feb 2025 07:00  --------------------------------------------------------  IN: 250 mL / OUT: 200 mL / NET: 50 mL        PHYSICAL EXAM:  GENERAL: NAD  HEAD:  Atraumatic, Normocephalic  EYES: EOMI, conjunctiva and sclera clear  CHEST/LUNG: Clear to auscultation bilaterally; No rales, rhonchi, wheezing, or rubs  HEART: Regular rate and rhythm; No murmurs, rubs, or gallops. Diffuse tenderness to palpation across chest wall.  ABDOMEN: Soft, tender to palpation across all 4 quadrants, Nondistended;   SKIN: chronic stasis changes on bilateral lower extremities  NERVOUS SYSTEM:  Alert & Oriented X3, no focal deficits    LABS:                        10.9   7.52  )-----------( 250      ( 10 Feb 2025 07:00 )             32.4                         11.9   7.98  )-----------( 266      ( 09 Feb 2025 10:28 )             36.1     02-10    138  |  106  |  18  ----------------------------<  81  4.2   |  18[L]  |  1.41[H]  02-09    136  |  102  |  26[H]  ----------------------------<  130[H]  5.3   |  20[L]  |  1.61[H]    Ca    9.7      10 Feb 2025 07:00  Ca    10.0      09 Feb 2025 10:28  Mg     2.2     02-09    TPro  7.1  /  Alb  3.5  /  TBili  0.4  /  DBili  x   /  AST  16  /  ALT  11  /  AlkPhos  60  02-10  TPro  8.1  /  Alb  4.0  /  TBili  0.4  /  DBili  x   /  AST  34  /  ALT  15  /  AlkPhos  70  02-09    CAPILLARY BLOOD GLUCOSE        PT/INR - ( 09 Feb 2025 10:28 )   PT: 17.2 sec;   INR: 1.50 ratio         PTT - ( 09 Feb 2025 10:28 )  PTT:25.6 sec    Urinalysis Basic - ( 10 Feb 2025 07:00 )    Color: x / Appearance: x / SG: x / pH: x  Gluc: 81 mg/dL / Ketone: x  / Bili: x / Urobili: x   Blood: x / Protein: x / Nitrite: x   Leuk Esterase: x / RBC: x / WBC x   Sq Epi: x / Non Sq Epi: x / Bacteria: x          RADIOLOGY & ADDITIONAL TESTS:    Imaging Personally Reviewed:  [x ] YES  [ ] NO    Consultants involved in case:   Consultant(s) Notes Reviewed:  [ x] YES  [ ] NO:   Care Discussed with Consultants/Other Providers [x ] YES  [ ] NO

## 2025-02-11 NOTE — PROGRESS NOTE ADULT - PROBLEM SELECTOR PLAN 7
VTE ppx: home eliquis    GI ppx: ppi at home  PT consulted, recs appreciated  Dispo: admitted from [facility], likely to return to [facility]

## 2025-02-11 NOTE — PROGRESS NOTE ADULT - PROBLEM SELECTOR PLAN 1
-pt reports pain started on 2/9 morning and has been constant, cannot really describe the pain. Differential with all over tenderness to palpation concerning for myalgias in setting of initiation of statin.   -trop flat. and ekg w/o acute ischemic changes.   -on previous admission, LHC showed significant stenosis but unable to PCI d/t difficult anatomy, and rec medical mgnt. Pt unable to tolerate renexa d/t pill big too big and cannot be crushed.   -c/w bb, plavix, eliquis. Not on acei/arb, previously d/t renal function, but appears pt's scr been stable around 1.5 for few months. consider starting it, although pt also previously noted w/ hypotension as well.    Plan:  - d/c rosuvastatin, will continue alternative options if pain decreases  - nitroglycerin prn for chest pain if bp is stable -pt reports pain started on 2/9 morning and has been constant, cannot really describe the pain. Differential with all over tenderness to palpation concerning for myalgias in setting of initiation of statin.   -trop flat. and ekg w/o acute ischemic changes.   -on previous admission, LHC showed significant stenosis but unable to PCI d/t difficult anatomy, and rec medical mgnt. Pt unable to tolerate renexa d/t pill big too big and cannot be crushed.   -c/w bb, plavix, eliquis. Not on acei/arb, previously d/t renal function, but appears pt's scr been stable around 1.5 for few months. consider starting it, although pt also previously noted w/ hypotension as well.  - Improving    Plan:  - d/c rosuvastatin, will continue alternative options if pain decreases  - nitroglycerin prn for chest pain if bp is stable -pt reports pain started on 2/9 morning and has been constant, cannot really describe the pain. Differential with all over tenderness to palpation concerning for myalgias in setting of initiation of statin.   -trop flat. and ekg w/o acute ischemic changes.   -on previous admission, LHC showed significant stenosis but unable to PCI d/t difficult anatomy, and rec medical mgnt. Pt unable to tolerate renexa d/t pill big too big and cannot be crushed.   -c/w bb, plavix, eliquis. Not on acei/arb, previously d/t renal function, but appears pt's scr been stable around 1.5 for few months. consider starting it, although pt also previously noted w/ hypotension as well.  - Improving    Plan:  - d/c rosuvastatin, will continue alternative options if pain decreases  - nitroglycerin prn for chest pain if bp is stable  -cardiology consult

## 2025-02-11 NOTE — PROGRESS NOTE ADULT - ATTENDING COMMENTS
Patient is a 74 year old male, with PMH of CAD s/p PCI and CABG (Nov '24), ICM, HFrEF, HTN, CKD, PAD s/p recent LLE fem-AK-pop bypass, h/o DVT on eliquis, recently admitted 1/24-31 for mechanical fall and NSTEMI, underwent LHC, no PCI d/t difficult anatomy, decided on medical management. Pt was unable to swallow ranolazine d/t pill size (cannot be crushed). Also had LLE cellulitis, completed ancef x5d. Presented back to ED due to chest pain and leg pain.     - Troponin neg x3; low concern for cardiac etiology; pain reproducible to palpation; unsure if myalgias due to statin; had symptoms on atorvastatin on prior admission and changed to rosuvastatin; will discontinue statin and monitor; can consider zetia or other alternative; pain seems to be overall improving this morning per patient, may not be statin induced since statin only held for one day?; cardio eval to see if ok to continue to hold statin or place on another medication due to extensive cardiac history   - vascular following   - PT eval; had come from [facility]     DISPO: potential DC planning soon pending cardio recs and PT eval     Rest as above. Discussed with HS. Patient is a 74 year old male, with PMH of CAD s/p PCI and CABG (Nov '24), ICM, HFrEF, HTN, CKD, PAD s/p recent LLE fem-AK-pop bypass, h/o DVT on eliquis, recently admitted 1/24-31 for mechanical fall and NSTEMI, underwent LHC, no PCI d/t difficult anatomy, decided on medical management. Pt was unable to swallow ranolazine d/t pill size (cannot be crushed). Also had LLE cellulitis, completed ancef x5d. Presented back to ED due to chest pain and leg pain.     - Troponin neg x3; low concern for cardiac etiology; pain reproducible to palpation; unsure if myalgias due to statin; had symptoms on atorvastatin on prior admission and changed to rosuvastatin; will discontinue statin and monitor; can consider zetia or other alternative; pain seems to be overall improving this morning per patient, may not be statin induced since statin only held for one day?; cardio eval to see if ok to continue to hold statin or place on another medication due to extensive cardiac history   - vascular following   - PT eval; had come from [facility]     DISPO: potential DC planning soon pending cardio recs and PT eval     Rest as above. Discussed with HS

## 2025-02-11 NOTE — PHYSICAL THERAPY INITIAL EVALUATION ADULT - NSPTDISCHREC_GEN_A_CORE
if pt to go home, will require home PT and assistance for ALL functional mobility/activities, pt owns rolling walker/[facility]

## 2025-02-11 NOTE — PROGRESS NOTE ADULT - ASSESSMENT
74M admitted for cp and leg pain.  Elton Sanabria is a 74-year-old male, Polish speaking, PMHx of CAD s/p PCI and minimally invasive direct CABG (LIMA --> LAD) 10/31/24, ICM, HFrEF, HTN, CKD, and PAD s/p prior L to R fem-fem bypass (>20yrs ago and another 2024), who had a recent hospitalization (01/2025) at [facility] for NSTEMI following a mechanical falls/p LHC, no stent due to anatomy. Represented from [facility] for all over chest pain and lower extremity pain.

## 2025-02-11 NOTE — CONSULT NOTE ADULT - SUBJECTIVE AND OBJECTIVE BOX
Coronary Angiography  1/2025  The coronary circulation is right dominant.      LM   Left main artery: There is a 50 % stenosis.      LAD   Proximal left anterior descending: There is a 70 % stenosis. First  diagonal: There is a 99 % stenosis.    CX   Circumflex: There is a 100 % stenosis.      RCA   Right coronary artery: There is a 100 % stenosis.      Graft Angiography   LIMA graft to Mid left anterior descending: Angiography shows no  disease.    Patent LIMA to LAD and occluded LCX and RCA. Ischemia likely from medium sized D1 however, because of the angle of takeoff from the LAD- PCI of the diagonal would be quite difficult. Maximize antianginal therapy        TTE 1/24/2025     1. Left ventricular cavity is normal in size. Left ventricular wall thickness is normal. Left ventricular systolic function is mildly to moderately decreased with an ejection fraction visually estimated at 40 to 45 %. Regional wall motion abnormalities present.   2. Multiple segmental abnormalities exist. See findings.   3. Normal right ventricular cavity size, with normal wall thickness, and normal right ventricular systolic function.   4. No pericardial effusion seen.   5. Left ventricular global longitudinal strain is -13.0 % is abnormal (> -16%). Images were acquired on a [employer] ultrasound system and processed on the ultrasound machine with a heart rate of 80 bpm and a blood pressure of 123/63 mmHg.   6. No prior echocardiogram is available for comparison.   7. There is no evidence of a left ventricular thrombus.            Assessment and Plan:     Mrs. Sanabria is a 74 years old M, Polish speaking PMH of CAD s/p PCI and minimally invasive direct CABG (LIMA - LAD) 10/31/24, ICM, HFrEF, HTN, CKD, BPH and PAD s/p prior L to R fem-fem bypass (>20yrs ago), history of provoked DVT in 11/2024 recent admission to [facility] for NSTEMI following a mechanical falls/p [facility] presents from [facility] for all over chest pain and lower extremity pain.        Problem/Plan - 1:  ·  Problem: Chest pain.   ·  Plan: Reports constant pain x 24-48 hours, cannot really describe the pain.  Similar to pain during recent admission with mostly tenderness and generalized body pain  - Despite over 24 hours of chest with flat trop and nonischemic EKG making ischemic chest pain less likely   - Pain more likely MSK in origin and not angina   - Recent cath with patent LIMA to LAD and occluded LCX and RCA. Disease in medium sized D1 however, because of the angle of takeoff from the LAD- PCI of the diagonal would be quite difficult.    - Continue anti anginals - Imdur 120mg daily, Metoprolol 25mg BID, Plavix 75mg daily  - No further inpt cardiac work up     Problem/Plan - 2:  ·  Problem: CAD  ·  Plan: Recent cath with patent LIMA to LAD and occluded LCX and RCA. Disease in medium sized D1  - Continue with Plavix  - Recent , goal <70  - Given recurrent diffuse body pain despite trial of 2 diff statin agents (Atorvastatin followed by Rosuvastatin) pt would be a candidate for PCSK9 inhibitor like Repatha  ---- Will need to follow up as OP to initiate Repatha  ---- For now can initiate Ezetimibe 10mg daily     Problem/Plan - 3:  ·  Problem: Chronic systolic HF  ·  Plan: TTE with  40 to 45 %.  - GDMT - c/w Metoprolol 25mg BID  - Imdur 120mg daily  - Will add Hydral 10mg TID if BP tolerates  - Resume Farxiga 10mg daily prior to discharge  - Aldactone 25mg daily      Problem/Plan - 4:  ·  Problem: Phx of DVT  ·  Plan: On Eliquis     Problem/Plan - 5:  ·  Problem: HLD (hyperlipidemia).   ·  Plan: Given recurrent diffuse body pain despite trial of 2 diff statin agents (Atorvastatin followed by Rosuvastatin) pt would be a candidate for PCSK9 inhibitor like Repatha  ---- Will need to follow up as OP to initiate Repatha  ---- For now can initiate Ezetimibe 10mg daily             Differential diagnosis and plan of care discussed with patient after the evaluation. Counseling on diet, nutritional counseling, weight management, exercise and medication compliance was done.   Advanced care planning/advanced directives discussed with patient/family. DNR status including forceful chest compressions to attempt to restart the heart, ventilator support/artificial breathing, electric shock, artificial nutrition, health care proxy, Molst form all discussed with pt. Pt wishes to consider. Sixteen minutes spent on discussing advanced directives.            Delio Kaufman DO [facility]  Cardiovascular Medicine  [address] [place] [address], [address]  Office [phone]  Available via call/text on Microsoft Teams   Coronary Angiography  1/2025  The coronary circulation is right dominant.      LM   Left main artery: There is a 50 % stenosis.      LAD   Proximal left anterior descending: There is a 70 % stenosis. First  diagonal: There is a 99 % stenosis.    CX   Circumflex: There is a 100 % stenosis.      RCA   Right coronary artery: There is a 100 % stenosis.      Graft Angiography   LIMA graft to Mid left anterior descending: Angiography shows no  disease.    Patent LIMA to LAD and occluded LCX and RCA. Ischemia likely from medium sized D1 however, because of the angle of takeoff from the LAD- PCI of the diagonal would be quite difficult. Maximize antianginal therapy        TTE 1/24/2025     1. Left ventricular cavity is normal in size. Left ventricular wall thickness is normal. Left ventricular systolic function is mildly to moderately decreased with an ejection fraction visually estimated at 40 to 45 %. Regional wall motion abnormalities present.   2. Multiple segmental abnormalities exist. See findings.   3. Normal right ventricular cavity size, with normal wall thickness, and normal right ventricular systolic function.   4. No pericardial effusion seen.   5. Left ventricular global longitudinal strain is -13.0 % is abnormal (> -16%). Images were acquired on a [employer] ultrasound system and processed on the ultrasound machine with a heart rate of 80 bpm and a blood pressure of 123/63 mmHg.   6. No prior echocardiogram is available for comparison.   7. There is no evidence of a left ventricular thrombus.            Assessment and Plan:     Mrs. Sanabria is a 74 years old M, Polish speaking PMH of CAD s/p PCI and minimally invasive direct CABG (LIMA - LAD) 10/31/24, ICM, HFrEF, HTN, CKD, BPH and PAD s/p prior L to R fem-fem bypass (>20yrs ago), history of provoked DVT in 11/2024 recent admission to [facility] for NSTEMI following a mechanical falls/p [facility] presents from [facility] for all over chest pain and lower extremity pain.        Problem/Plan - 1:  ·  Problem: Chest pain.   ·  Plan: Reports constant pain x 24-48 hours, cannot really describe the pain.  Similar to pain during recent admission with mostly tenderness and generalized body pain  - Despite over 24 hours of chest with flat trop and nonischemic EKG making ischemic chest pain less likely   - Pain more likely MSK in origin and not angina   - Recent cath with patent LIMA to LAD and occluded LCX and RCA. Disease in medium sized D1 however, because of the angle of takeoff from the LAD- PCI of the diagonal would be quite difficult.    - Continue anti anginals - Imdur 120mg daily, Metoprolol 25mg BID, Plavix 75mg daily  - No further inpt cardiac work up     Problem/Plan - 2:  ·  Problem: CAD  ·  Plan: Recent cath with patent LIMA to LAD and occluded LCX and RCA. Disease in medium sized D1  - Continue with Plavix  - Recent , goal <70  - Given recurrent diffuse body pain despite trial of 2 diff statin agents (Atorvastatin followed by Rosuvastatin) pt would be a candidate for PCSK9 inhibitor like Repatha  ---- Will need to follow up as OP to initiate Repatha  ---- For now can initiate Ezetimibe 10mg daily     Problem/Plan - 3:  ·  Problem: Chronic systolic HF  ·  Plan: TTE with  40 to 45 %.  - GDMT - c/w Metoprolol 25mg BID  - Imdur 120mg daily  - Will add Hydral 10mg TID if BP tolerates  - Resume Farxiga 10mg daily prior to discharge  - Aldactone 25mg daily      Problem/Plan - 4:  ·  Problem: Phx of DVT  ·  Plan: On Eliquis     Problem/Plan - 5:  ·  Problem: HLD (hyperlipidemia).   ·  Plan: Given recurrent diffuse body pain despite trial of 2 diff statin agents (Atorvastatin followed by Rosuvastatin) pt would be a candidate for PCSK9 inhibitor like Repatha  ---- Will need to follow up as OP to initiate Repatha  ---- For now can initiate Ezetimibe 10mg daily             Differential diagnosis and plan of care discussed with patient after the evaluation. Counseling on diet, nutritional counseling, weight management, exercise and medication compliance was done.   Advanced care planning/advanced directives discussed with patient/family. DNR status including forceful chest compressions to attempt to restart the heart, ventilator support/artificial breathing, electric shock, artificial nutrition, health care proxy, Molst form all discussed with pt. Pt wishes to consider. Sixteen minutes spent on discussing advanced directives.            Delio Kaufman DO [facility]  Cardiovascular Medicine  [address] [place] [address], [address]  Office [phone]  Available via call/text on Microsoft Teams DATE OF SERVICE: 02-11-25      CHIEF COMPLAINT:Patient is a 74y old  Male who presents with a chief complaint of Chest pain and myalgias (12 Feb 2025 09:02)      HISTORY OF PRESENT ILLNESS:HPI:  74M PMHx cad s/p PCI and CABG (Nov '24), ICM, HFrEF, HTN, CKD, PAD s/p recent LLE fem-AK-pop bypass, h/o DVT on eliquis. Was admitted 1/24-31 for mechanical fall and NSTEMI, underwent LHC, no PCI d/t difficult anatomy, decided on medical mgnt. Pt was unable to swallow ranolazine d/t pill size (cannot be crushed). Also had LLE cellulitis, completed ancef x5d.     Presented today w/ CP, with SOB, but also c/o chronic pain of BLE, L > R and worst on the heels.     In the ED, VSS    CT a/p showing LLL nodular opacities, likely chronic outlet obstruction of bladder w/ enlarged prostate.  Left leg duplex indeterminate for DVT (reviewed by vasc, neg for dvt per them).   CTA aorta w/ runoff showing patent bypasses on the lower extremities; incidental finding of severe stenosis of SMA, occluded MIGUELINA.     CBC stable compared to 1/31, stable anemia, wbc and plt wnl.   Coat w/ INR 1.5  CMP w/ SCr 1.66 (baseline 1.5-6), lft wnl.   Trop 48, 48  UA w/ cloudy, large LE, neg NI, trace blood, 268 wbc.     Vasc consult for pain on the left leg i/s/o recent intervention, no DVT and CTA patent bypass, no surgical intervention recommended.     Received ancef, zosyn and vanc for ?cellulitis of the LLE.  (09 Feb 2025 23:54)      PAST MEDICAL & SURGICAL HISTORY:  CAD (coronary artery disease)      BPH (benign prostatic hyperplasia)      HTN (hypertension)      HLD (hyperlipidemia)      PAD (peripheral artery disease)      Chronic kidney disease, unspecified CKD stage      S/P femoral-femoral bypass surgery              MEDICATIONS:  apixaban 5 milliGRAM(s) Oral every 12 hours  clopidogrel Tablet 75 milliGRAM(s) Oral daily  doxazosin 4 milliGRAM(s) Oral at bedtime  isosorbide   mononitrate ER Tablet (IMDUR) 120 milliGRAM(s) Oral daily  metoprolol tartrate 25 milliGRAM(s) Oral two times a day  nitroglycerin     SubLingual 0.4 milliGRAM(s) SubLingual every 5 minutes PRN  spironolactone 25 milliGRAM(s) Oral every 24 hours      benzonatate 100 milliGRAM(s) Oral three times a day    acetaminophen     Tablet .. 650 milliGRAM(s) Oral every 6 hours  gabapentin 100 milliGRAM(s) Oral three times a day    pantoprazole    Tablet 40 milliGRAM(s) Oral before breakfast  polyethylene glycol 3350 17 Gram(s) Oral daily  senna 2 Tablet(s) Oral at bedtime    ezetimibe 10 milliGRAM(s) Oral daily  finasteride 5 milliGRAM(s) Oral daily    lidocaine   4% Patch 1 Patch Transdermal every 24 hours  multivitamin 1 Tablet(s) Oral daily      FAMILY HISTORY:      Non-contributory    SOCIAL HISTORY:    [ ] not current smoker  Allergies    No Known Allergies    Intolerances    	    REVIEW OF SYSTEMS:  CONSTITUTIONAL: No fever  EYES: No eye pain, visual disturbances, or discharge  ENMT:  No difficulty hearing, tinnitus  NECK: No pain or stiffness  RESPIRATORY: No cough, wheezing,  CARDIOVASCULAR: + chest pain, no palpitations, passing out, dizziness, or leg swelling  GASTROINTESTINAL:  No nausea, vomiting, diarrhea or constipation. No melena.  GENITOURINARY: No dysuria, hematuria  NEUROLOGICAL: No stroke like symptoms  SKIN: No burning or lesions   ENDOCRINE: No heat or cold intolerance  MUSCULOSKELETAL: No joint pain or swelling  PSYCHIATRIC: No  anxiety, mood swings  HEME/LYMPH: No bleeding gums  ALLERGY AND IMMUNOLOGIC: No hives or eczema	    All other ROS negative    PHYSICAL EXAM:  T(C): 36.7 (02-12-25 @ 20:20), Max: 36.8 (02-11-25 @ 23:50)  HR: 74 (02-12-25 @ 20:58) (63 - 74)  BP: 139/59 (02-12-25 @ 20:58) (100/55 - 139/59)  RR: 18 (02-12-25 @ 20:20) (18 - 18)  SpO2: 97% (02-12-25 @ 20:20) (97% - 98%)  Wt(kg): --  I&O's Summary    11 Feb 2025 07:01  -  12 Feb 2025 07:00  --------------------------------------------------------  IN: 540 mL / OUT: 1300 mL / NET: -760 mL    12 Feb 2025 07:01  -  12 Feb 2025 21:57  --------------------------------------------------------  IN: 240 mL / OUT: 500 mL / NET: -260 mL        Appearance: Normal	  HEENT:   Normal oral mucosa, EOMI	  Cardiovascular:  S1 S2, No JVD,    Respiratory: Lungs clear to auscultation	  Psychiatry: Alert  Gastrointestinal:  Soft, Non-tender, + BS	  Skin: No rashes   Neurologic: Non-focal  Extremities:  No edema  Vascular: Peripheral pulses palpable    	    	  	  CARDIAC MARKERS:  Labs personally reviewed by me                                  11.3   4.84  )-----------( 203      ( 12 Feb 2025 07:05 )             34.9     02-12    139  |  106  |  20  ----------------------------<  112[H]  4.4   |  20[L]  |  1.51[H]    Ca    9.8      12 Feb 2025 07:07  Phos  3.4     02-12  Mg     1.9     02-12    TPro  7.1  /  Alb  3.9  /  TBili  0.3  /  DBili  x   /  AST  11  /  ALT  7[L]  /  AlkPhos  62  02-12          EKG: Personally reviewed by me - NSR with unchanged lateral wall <1mm STD  Radiology: Personally reviewed by me - CXR Mild persistent opacity in the right upper lobe is decreased in   appearance from 1/24/2025. Lungs are otherwise clear.      Available via call/text on Microsoft Teams  Coronary Angiography  1/2025  The coronary circulation is right dominant.      LM   Left main artery: There is a 50 % stenosis.      LAD   Proximal left anterior descending: There is a 70 % stenosis. First  diagonal: There is a 99 % stenosis.    CX   Circumflex: There is a 100 % stenosis.      RCA   Right coronary artery: There is a 100 % stenosis.      Graft Angiography   LIMA graft to Mid left anterior descending: Angiography shows no  disease.    Patent LIMA to LAD and occluded LCX and RCA. Ischemia likely from medium sized D1 however, because of the angle of takeoff from the LAD- PCI of the diagonal would be quite difficult. Maximize antianginal therapy        TTE 1/24/2025     1. Left ventricular cavity is normal in size. Left ventricular wall thickness is normal. Left ventricular systolic function is mildly to moderately decreased with an ejection fraction visually estimated at 40 to 45 %. Regional wall motion abnormalities present.   2. Multiple segmental abnormalities exist. See findings.   3. Normal right ventricular cavity size, with normal wall thickness, and normal right ventricular systolic function.   4. No pericardial effusion seen.   5. Left ventricular global longitudinal strain is -13.0 % is abnormal (> -16%). Images were acquired on a [employer] ultrasound system and processed on the ultrasound machine with a heart rate of 80 bpm and a blood pressure of 123/63 mmHg.   6. No prior echocardiogram is available for comparison.   7. There is no evidence of a left ventricular thrombus.            Assessment and Plan:     Mrs. Sanabria is a 74 years old M, Polish speaking PMH of CAD s/p PCI and minimally invasive direct CABG (LIMA - LAD) 10/31/24, ICM, HFrEF, HTN, CKD, BPH and PAD s/p prior L to R fem-fem bypass (>20yrs ago), history of provoked DVT in 11/2024 recent admission to [facility] for NSTEMI following a mechanical falls/p LHC presents from [facility] for all over chest pain and lower extremity pain.        Problem/Plan - 1:  ·  Problem: Chest pain.   ·  Plan: Reports constant pain x 24-48 hours, cannot really describe the pain.  Similar to pain during recent admission with mostly tenderness and generalized body pain  - Despite over 24 hours of chest with flat trop and nonischemic EKG making ischemic chest pain less likely   - Pain more likely MSK in origin and not angina   - Recent cath with patent LIMA to LAD and occluded LCX and RCA. Disease in medium sized D1 however, because of the angle of takeoff from the LAD- PCI of the diagonal would be quite difficult.    - Continue anti anginals - Imdur 120mg daily, Metoprolol 25mg BID, Plavix 75mg daily  - No further inpt cardiac work up     Problem/Plan - 2:  ·  Problem: CAD  ·  Plan: Recent cath with patent LIMA to LAD and occluded LCX and RCA. Disease in medium sized D1  - Continue with Plavix  - Recent , goal <70  - Given recurrent diffuse body pain despite trial of 2 diff statin agents (Atorvastatin followed by Rosuvastatin) pt would be a candidate for PCSK9 inhibitor like Repatha  ---- Will need to follow up as OP to initiate Repatha  ---- For now can initiate Ezetimibe 10mg daily     Problem/Plan - 3:  ·  Problem: Chronic systolic HF  ·  Plan: TTE with  40 to 45 %.  - GDMT - c/w Metoprolol 25mg BID  - Imdur 120mg daily  - Will add Hydral 10mg TID if BP tolerates  - Resume Farxiga 10mg daily prior to discharge  - Aldactone 25mg daily      Problem/Plan - 4:  ·  Problem: Phx of DVT  ·  Plan: On Eliquis     Problem/Plan - 5:  ·  Problem: HLD (hyperlipidemia).   ·  Plan: Given recurrent diffuse body pain despite trial of 2 diff statin agents (Atorvastatin followed by Rosuvastatin) pt would be a candidate for PCSK9 inhibitor like Repatha  ---- Will need to follow up as OP to initiate Repatha  ---- For now can initiate Ezetimibe 10mg daily             Differential diagnosis and plan of care discussed with patient after the evaluation. Counseling on diet, nutritional counseling, weight management, exercise and medication compliance was done.   Advanced care planning/advanced directives discussed with patient/family. DNR status including forceful chest compressions to attempt to restart the heart, ventilator support/artificial breathing, electric shock, artificial nutrition, health care proxy, Molst form all discussed with pt. Pt wishes to consider. Sixteen minutes spent on discussing advanced directives.            Delio Kaufman DO [facility]  Cardiovascular Medicine  [address], [address]  Office [phone]  Available via call/text on [employer] Teams

## 2025-02-11 NOTE — PHYSICAL THERAPY INITIAL EVALUATION ADULT - PERTINENT HX OF CURRENT PROBLEM, REHAB EVAL
74M PMHx cad s/p PCI and CABG (Nov '24), ICM, HFrEF, HTN, CKD, PAD s/p recent LLE fem-AK-pop bypass, h/o DVT on eliquis. Was admitted 1/24-31 for mechanical fall and NSTEMI, underwent LHC, no PCI d/t difficult anatomy, decided on medical mgnt. Pt was unable to swallow ranolazine d/t pill size (cannot be crushed). Also had LLE cellulitis, completed ancef x5d. Presented today w/ CP, with SOB, but also c/o chronic pain of BLE, L > R and worst on the heels. 74M PMHx cad s/p PCI and CABG (Nov '24), ICM, HFrEF, HTN, CKD, PAD s/p recent LLE fem-AK-pop bypass, h/o DVT on eliquis. Was admitted 1/24-31 for mechanical fall and NSTEMI, underwent LHC, no PCI d/t difficult anatomy, decided on medical mgnt. Pt was unable to swallow ranolazine d/t pill size (cannot be crushed). Also had LLE cellulitis, completed ancef x5d. Presented today w/ CP, with SOB, but also c/o chronic pain of BLE, L > R and worst on the heels. CT angio abd:Occluded right iliac arteries. Patent femoral-femoral bypass graft. Patent right deep femoral artery. Occluded right SFA with reconstitution of popliteal artery from collaterals. Three-vessel runoff in the right leg.Patent left common and external iliac arteries. Patent left common femoral to popliteal bypass graft. Patent popliteal artery. Three-vessel runoff in the left leg. 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 Left LE doppler:Indeterminate age nonocclusive thrombus within the left femoral vein. CT abd/pelvis:Branching nodular opacities in the left lower lobe likely representing pneumonia.Mild circumferential bladder wall thickening and an enlarged prostate gland, correlate for chronic outlet obstruction.

## 2025-02-12 ENCOUNTER — TRANSCRIPTION ENCOUNTER (OUTPATIENT)
Age: 75
End: 2025-02-12

## 2025-02-12 LAB
ALBUMIN SERPL ELPH-MCNC: 3.9 G/DL — SIGNIFICANT CHANGE UP (ref 3.3–5)
ALP SERPL-CCNC: 62 U/L — SIGNIFICANT CHANGE UP (ref 40–120)
ALT FLD-CCNC: 7 U/L — LOW (ref 10–45)
ANION GAP SERPL CALC-SCNC: 13 MMOL/L — SIGNIFICANT CHANGE UP (ref 5–17)
AST SERPL-CCNC: 11 U/L — SIGNIFICANT CHANGE UP (ref 10–40)
BASOPHILS # BLD AUTO: 0 K/UL — SIGNIFICANT CHANGE UP (ref 0–0.2)
BASOPHILS NFR BLD AUTO: 0 % — SIGNIFICANT CHANGE UP (ref 0–2)
BILIRUB SERPL-MCNC: 0.3 MG/DL — SIGNIFICANT CHANGE UP (ref 0.2–1.2)
BUN SERPL-MCNC: 20 MG/DL — SIGNIFICANT CHANGE UP (ref 7–23)
BURR CELLS BLD QL SMEAR: SLIGHT — SIGNIFICANT CHANGE UP
CALCIUM SERPL-MCNC: 9.8 MG/DL — SIGNIFICANT CHANGE UP (ref 8.4–10.5)
CHLORIDE SERPL-SCNC: 106 MMOL/L — SIGNIFICANT CHANGE UP (ref 96–108)
CK MB CFR SERPL CALC: 1.6 NG/ML — SIGNIFICANT CHANGE UP (ref 0–6.7)
CK SERPL-CCNC: 34 U/L — SIGNIFICANT CHANGE UP (ref 30–200)
CO2 SERPL-SCNC: 20 MMOL/L — LOW (ref 22–31)
CREAT SERPL-MCNC: 1.51 MG/DL — HIGH (ref 0.5–1.3)
EGFR: 48 ML/MIN/1.73M2 — LOW
ELLIPTOCYTES BLD QL SMEAR: SLIGHT — SIGNIFICANT CHANGE UP
EOSINOPHIL # BLD AUTO: 0.17 K/UL — SIGNIFICANT CHANGE UP (ref 0–0.5)
EOSINOPHIL NFR BLD AUTO: 3.5 % — SIGNIFICANT CHANGE UP (ref 0–6)
GLUCOSE SERPL-MCNC: 112 MG/DL — HIGH (ref 70–99)
HCT VFR BLD CALC: 34.9 % — LOW (ref 39–50)
HGB BLD-MCNC: 11.3 G/DL — LOW (ref 13–17)
LYMPHOCYTES # BLD AUTO: 1.46 K/UL — SIGNIFICANT CHANGE UP (ref 1–3.3)
LYMPHOCYTES # BLD AUTO: 30.1 % — SIGNIFICANT CHANGE UP (ref 13–44)
MAGNESIUM SERPL-MCNC: 1.9 MG/DL — SIGNIFICANT CHANGE UP (ref 1.6–2.6)
MANUAL SMEAR VERIFICATION: SIGNIFICANT CHANGE UP
MCHC RBC-ENTMCNC: 30.3 PG — SIGNIFICANT CHANGE UP (ref 27–34)
MCHC RBC-ENTMCNC: 32.4 G/DL — SIGNIFICANT CHANGE UP (ref 32–36)
MCV RBC AUTO: 93.6 FL — SIGNIFICANT CHANGE UP (ref 80–100)
MONOCYTES # BLD AUTO: 0.47 K/UL — SIGNIFICANT CHANGE UP (ref 0–0.9)
MONOCYTES NFR BLD AUTO: 9.7 % — SIGNIFICANT CHANGE UP (ref 2–14)
NEUTROPHILS # BLD AUTO: 2.74 K/UL — SIGNIFICANT CHANGE UP (ref 1.8–7.4)
NEUTROPHILS NFR BLD AUTO: 55.8 % — SIGNIFICANT CHANGE UP (ref 43–77)
NEUTS BAND # BLD: 0.9 % — SIGNIFICANT CHANGE UP (ref 0–8)
NEUTS BAND NFR BLD: 0.9 % — SIGNIFICANT CHANGE UP (ref 0–8)
PHOSPHATE SERPL-MCNC: 3.4 MG/DL — SIGNIFICANT CHANGE UP (ref 2.5–4.5)
PLAT MORPH BLD: NORMAL — SIGNIFICANT CHANGE UP
PLATELET # BLD AUTO: 203 K/UL — SIGNIFICANT CHANGE UP (ref 150–400)
POIKILOCYTOSIS BLD QL AUTO: SLIGHT — SIGNIFICANT CHANGE UP
POTASSIUM SERPL-MCNC: 4.4 MMOL/L — SIGNIFICANT CHANGE UP (ref 3.5–5.3)
POTASSIUM SERPL-SCNC: 4.4 MMOL/L — SIGNIFICANT CHANGE UP (ref 3.5–5.3)
PROT SERPL-MCNC: 7.1 G/DL — SIGNIFICANT CHANGE UP (ref 6–8.3)
RBC # BLD: 3.73 M/UL — LOW (ref 4.2–5.8)
RBC # FLD: 12.5 % — SIGNIFICANT CHANGE UP (ref 10.3–14.5)
RBC BLD AUTO: ABNORMAL
SODIUM SERPL-SCNC: 139 MMOL/L — SIGNIFICANT CHANGE UP (ref 135–145)
TROPONIN T, HIGH SENSITIVITY RESULT: 34 NG/L — SIGNIFICANT CHANGE UP (ref 0–51)
WBC # BLD: 4.84 K/UL — SIGNIFICANT CHANGE UP (ref 3.8–10.5)
WBC # FLD AUTO: 4.84 K/UL — SIGNIFICANT CHANGE UP (ref 3.8–10.5)

## 2025-02-12 PROCEDURE — 99232 SBSQ HOSP IP/OBS MODERATE 35: CPT | Mod: GC

## 2025-02-12 PROCEDURE — 93010 ELECTROCARDIOGRAM REPORT: CPT

## 2025-02-12 RX ORDER — EZETIMIBE 10 MG
1 TABLET ORAL
Qty: 0 | Refills: 0 | DISCHARGE
Start: 2025-02-12

## 2025-02-12 RX ORDER — HYDRALAZINE HCL 100 MG
10 TABLET ORAL THREE TIMES A DAY
Refills: 0 | Status: DISCONTINUED | OUTPATIENT
Start: 2025-02-12 | End: 2025-02-12

## 2025-02-12 RX ORDER — LIDOCAINE HYDROCHLORIDE 30 MG/G
1 CREAM TOPICAL
Qty: 0 | Refills: 0 | DISCHARGE
Start: 2025-02-12

## 2025-02-12 RX ORDER — NITROGLYCERIN 0.4 MG/1
0.4 TABLET SUBLINGUAL
Refills: 0 | Status: DISCONTINUED | OUTPATIENT
Start: 2025-02-12 | End: 2025-02-13

## 2025-02-12 RX ORDER — LIDOCAINE HYDROCHLORIDE 30 MG/G
1 CREAM TOPICAL EVERY 24 HOURS
Refills: 0 | Status: DISCONTINUED | OUTPATIENT
Start: 2025-02-12 | End: 2025-02-12

## 2025-02-12 RX ORDER — ACETAMINOPHEN 160 MG/5ML
2 SUSPENSION ORAL
Qty: 0 | Refills: 0 | DISCHARGE
Start: 2025-02-12

## 2025-02-12 RX ORDER — GABAPENTIN 800 MG/1
1 TABLET ORAL
Qty: 0 | Refills: 0 | DISCHARGE
Start: 2025-02-12

## 2025-02-12 RX ORDER — ACETAMINOPHEN 160 MG/5ML
650 SUSPENSION ORAL EVERY 6 HOURS
Refills: 0 | Status: DISCONTINUED | OUTPATIENT
Start: 2025-02-12 | End: 2025-02-13

## 2025-02-12 RX ORDER — LIDOCAINE HYDROCHLORIDE 30 MG/G
1 CREAM TOPICAL EVERY 24 HOURS
Refills: 0 | Status: DISCONTINUED | OUTPATIENT
Start: 2025-02-12 | End: 2025-02-13

## 2025-02-12 RX ORDER — ACETAMINOPHEN 160 MG/5ML
1000 SUSPENSION ORAL ONCE
Refills: 0 | Status: COMPLETED | OUTPATIENT
Start: 2025-02-12 | End: 2025-02-12

## 2025-02-12 RX ADMIN — ACETAMINOPHEN 1000 MILLIGRAM(S): 160 SUSPENSION ORAL at 03:23

## 2025-02-12 RX ADMIN — GABAPENTIN 100 MILLIGRAM(S): 800 TABLET ORAL at 05:27

## 2025-02-12 RX ADMIN — Medication 2 TABLET(S): at 21:00

## 2025-02-12 RX ADMIN — APIXABAN 5 MILLIGRAM(S): 5 TABLET, FILM COATED ORAL at 18:00

## 2025-02-12 RX ADMIN — ACETAMINOPHEN 400 MILLIGRAM(S): 160 SUSPENSION ORAL at 02:12

## 2025-02-12 RX ADMIN — Medication 200 MILLIGRAM(S): at 05:36

## 2025-02-12 RX ADMIN — ACETAMINOPHEN 650 MILLIGRAM(S): 160 SUSPENSION ORAL at 18:00

## 2025-02-12 RX ADMIN — Medication 1 TABLET(S): at 13:02

## 2025-02-12 RX ADMIN — Medication 25 MILLIGRAM(S): at 05:27

## 2025-02-12 RX ADMIN — GABAPENTIN 100 MILLIGRAM(S): 800 TABLET ORAL at 13:01

## 2025-02-12 RX ADMIN — ACETAMINOPHEN 650 MILLIGRAM(S): 160 SUSPENSION ORAL at 23:04

## 2025-02-12 RX ADMIN — Medication 75 MILLIGRAM(S): at 13:02

## 2025-02-12 RX ADMIN — ACETAMINOPHEN 650 MILLIGRAM(S): 160 SUSPENSION ORAL at 19:00

## 2025-02-12 RX ADMIN — Medication 10 MILLIGRAM(S): at 13:02

## 2025-02-12 RX ADMIN — Medication 4 MILLIGRAM(S): at 21:01

## 2025-02-12 RX ADMIN — Medication 25 MILLIGRAM(S): at 21:00

## 2025-02-12 RX ADMIN — ACETAMINOPHEN 650 MILLIGRAM(S): 160 SUSPENSION ORAL at 14:00

## 2025-02-12 RX ADMIN — APIXABAN 5 MILLIGRAM(S): 5 TABLET, FILM COATED ORAL at 05:27

## 2025-02-12 RX ADMIN — PANTOPRAZOLE 40 MILLIGRAM(S): 20 TABLET, DELAYED RELEASE ORAL at 05:27

## 2025-02-12 RX ADMIN — ACETAMINOPHEN 650 MILLIGRAM(S): 160 SUSPENSION ORAL at 13:02

## 2025-02-12 RX ADMIN — Medication 5 MILLIGRAM(S): at 13:02

## 2025-02-12 RX ADMIN — Medication 100 MILLIGRAM(S): at 21:00

## 2025-02-12 RX ADMIN — Medication 100 MILLIGRAM(S): at 13:31

## 2025-02-12 RX ADMIN — GABAPENTIN 100 MILLIGRAM(S): 800 TABLET ORAL at 21:01

## 2025-02-12 NOTE — PROGRESS NOTE ADULT - PROBLEM SELECTOR PLAN 1
-pt reports pain started on 2/9 morning and has been constant, cannot really describe the pain. Differential with all over tenderness to palpation concerning for myalgias in setting of initiation of statin.   -trop flat. and ekg w/o acute ischemic changes.   -on previous admission, LHC showed significant stenosis but unable to PCI d/t difficult anatomy, and rec medical mgnt. Pt unable to tolerate renexa d/t pill big too big and cannot be crushed.   -c/w bb, plavix, eliquis. Not on acei/arb, previously d/t renal function, but appears pt's scr been stable around 1.5 for few months. consider starting it, although pt also previously noted w/ hypotension as well.  - Improving    Plan:  - d/c rosuvastatin, will continue alternative options if pain decreases  - nitroglycerin prn for chest pain if bp is stable  -cardiology consult -pt reports pain started on 2/9 morning and has been constant, cannot really describe the pain. Differential with all over tenderness to palpation concerning for myalgias in setting of initiation of statin.   -trop flat. and ekg w/o acute ischemic changes.   -on previous admission, LHC showed significant stenosis but unable to PCI d/t difficult anatomy, and rec medical mgnt. Pt unable to tolerate renexa d/t pill big too big and cannot be crushed.   -c/w bb, plavix, eliquis. Not on acei/arb, previously d/t renal function, but appears pt's scr been stable around 1.5 for few months. consider starting it, although pt also previously noted w/ hypotension as well.  - Improving    Plan:  - d/c rosuvastatin, started ezetimibe  - pain: tylenol scheduled and lidocaine patch prn   - nitroglycerin prn for chest pain if bp is stable  -cardiology consult

## 2025-02-12 NOTE — DISCHARGE NOTE NURSING/CASE MANAGEMENT/SOCIAL WORK - NSTRANSFERBELONGINGSDISPO_GEN_A_NUR
Relevant Problems No relevant active problems Anesthetic History No history of anesthetic complications Review of Systems / Medical History Patient summary reviewed and pertinent labs reviewed Pulmonary Within defined limits Neuro/Psych Within defined limits Cardiovascular Hypertension Dysrhythmias : atrial flutter CAD, PAD and cardiac stents Exercise tolerance: <4 METS Comments: Cardiac stent 10/13/20 Left ventricle: Systolic function was mildly reduced. Ejection fraction 
was estimated in the range of 45 % to 50 %. There were no regional wall 
motion abnormalities. Wall thickness was mildly to moderately increased. 
   
GI/Hepatic/Renal 
  
GERD Renal disease: dialysis and ESRD Endo/Other Diabetes Morbid obesity and anemia Other Findings Physical Exam 
 
Airway Mallampati: II 
TM Distance: 4 - 6 cm Neck ROM: normal range of motion, short neck Mouth opening: Normal 
 
 Cardiovascular Regular rate and rhythm,  S1 and S2 normal,  no murmur, click, rub, or gallop Dental 
No notable dental hx Pulmonary Breath sounds clear to auscultation Abdominal 
GI exam deferred Other Findings Anesthetic Plan ASA: 3 Anesthesia type: general and total IV anesthesia Monitoring Plan: BIS Induction: Intravenous Anesthetic plan and risks discussed with: Patient
with patient

## 2025-02-12 NOTE — DISCHARGE NOTE PROVIDER - NSDCCPCAREPLAN_GEN_ALL_CORE_FT
PRINCIPAL DISCHARGE DIAGNOSIS  Diagnosis: Chest pain  Assessment and Plan of Treatment:      PRINCIPAL DISCHARGE DIAGNOSIS  Diagnosis: Chest pain  Assessment and Plan of Treatment: When you presented to the hospital, you were having pain all over your body and in your chest.  The cardiology team saw you while you were admitted. We assessed your heart, but do not think that your heart was the main source of your pain.  - Please follow up with your vascular surgeon for removal of sutures and follow up within 1 week  - Please follow up with your cardiologist for continued adjustment of medications within 1 week  - Please follow up with urology to address the blood in your urine within 1 month  - Please follow up with your primary care doctor within 1 week      SECONDARY DISCHARGE DIAGNOSES  Diagnosis: Myalgia  Assessment and Plan of Treatment: It is possible that one of the medications that you started to protect your heart, a statin, had side effects causing muscle aches all over your body. We stopped this medication and gave you an alternative medication to help your heart, ezetizimbe. It might take a few days to weeks for this to complete resolve.  Please return to the hospital if you have increasing chest pain, trouble breathing, stop urinating, stop having bowel movements, or feel increasingly unwell.     PRINCIPAL DISCHARGE DIAGNOSIS  Diagnosis: Chest pain  Assessment and Plan of Treatment: When you presented to the hospital, you were having pain all over your body and in your chest.  The cardiology team saw you while you were admitted. We assessed your heart, but do not think that your heart was the main source of your pain at the time you were here.  - Please follow up with your vascular surgeon for removal of sutures and follow up within 1 week  - Please follow up with your cardiologist for continued adjustment of medications within 1 week  - Please follow up with urology to address the blood in your urine within 1 month  - Please follow up with your primary care doctor within 1 week      SECONDARY DISCHARGE DIAGNOSES  Diagnosis: Myalgia  Assessment and Plan of Treatment: It is possible that one of the medications that you started to protect your heart, a statin, had side effects causing muscle aches all over your body. We stopped this medication and gave you an alternative medication to help your heart, ezetizimbe. It might take a few days to weeks for this to complete resolve.  Please follow up with your heart doctor outpatient to be considered for an alternative lipid lowering therapy called "Repatha"/ "PCSk9i" as lowering your chlesterol is very important.   Please return to the hospital if you have increasing chest pain, trouble breathing, stop urinating, stop having bowel movements, or feel increasingly unwell.

## 2025-02-12 NOTE — DISCHARGE NOTE PROVIDER - NSDCMRMEDTOKEN_GEN_ALL_CORE_FT
apixaban 5 mg oral tablet: 1 tab(s) orally every 12 hours Start taking 5 mg apixaban twice daily starting February 2nd.  dapagliflozin 10 mg oral tablet: 1 tab(s) orally every 24 hours  doxazosin 4 mg oral tablet: 1 tab(s) orally once a day (at bedtime)  finasteride 5 mg oral tablet: 1 tab(s) orally once a day  isosorbide mononitrate 120 mg oral tablet, extended release: 1 tab(s) orally once a day  metoprolol tartrate 25 mg oral tablet: 1 tab(s) orally 2 times a day  Multiple Vitamins oral tablet: 1 tab(s) orally once a day  nitroglycerin 0.4 mg sublingual tablet: 1 tab(s) sublingually every 5 minutes as needed for  chest pain may administer up to 2 tablets in a 15-minute period  pantoprazole 40 mg oral delayed release tablet: 1 tab(s) orally once a day  Plavix 75 mg oral tablet: 1 tab(s) orally once a day  polyethylene glycol 3350 oral powder for reconstitution: 17 gram(s) orally once a day  rosuvastatin 40 mg oral tablet: 1 tab(s) orally once a day (at bedtime)  senna leaf extract oral tablet: 2 tab(s) orally once a day (at bedtime)  spironolactone 25 mg oral tablet: 1 tab(s) orally every 24 hours   acetaminophen 325 mg oral tablet: 2 tab(s) orally every 6 hours  apixaban 5 mg oral tablet: 1 tab(s) orally every 12 hours Start taking 5 mg apixaban twice daily starting February 2nd.  benzonatate 100 mg oral capsule: 1 cap(s) orally 3 times a day  dapagliflozin 10 mg oral tablet: 1 tab(s) orally every 24 hours  doxazosin 4 mg oral tablet: 1 tab(s) orally once a day (at bedtime)  ezetimibe 10 mg oral tablet: 1 tab(s) orally once a day  finasteride 5 mg oral tablet: 1 tab(s) orally once a day  gabapentin 100 mg oral capsule: 1 cap(s) orally 3 times a day  isosorbide mononitrate 120 mg oral tablet, extended release: 1 tab(s) orally once a day  lidocaine 4% topical film: Apply topically to affected area once a day  metoprolol tartrate 25 mg oral tablet: 1 tab(s) orally 2 times a day  Multiple Vitamins oral tablet: 1 tab(s) orally once a day  nitroglycerin 0.4 mg sublingual tablet: 1 tab(s) sublingually every 5 minutes as needed for  chest pain may administer up to 2 tablets in a 15-minute period  pantoprazole 40 mg oral delayed release tablet: 1 tab(s) orally once a day  Plavix 75 mg oral tablet: 1 tab(s) orally once a day  polyethylene glycol 3350 oral powder for reconstitution: 17 gram(s) orally once a day  senna leaf extract oral tablet: 2 tab(s) orally once a day (at bedtime)  spironolactone 25 mg oral tablet: 1 tab(s) orally every 24 hours   apixaban 5 mg oral tablet: 1 tab(s) orally every 12 hours Start taking 5 mg apixaban twice daily starting February 2nd.  benzonatate 100 mg oral capsule: 1 cap(s) orally 3 times a day  dapagliflozin 10 mg oral tablet: 1 tab(s) orally every 24 hours  doxazosin 4 mg oral tablet: 1 tab(s) orally once a day (at bedtime)  ezetimibe 10 mg oral tablet: 1 tab(s) orally once a day  finasteride 5 mg oral tablet: 1 tab(s) orally once a day  gabapentin 100 mg oral capsule: 1 cap(s) orally 3 times a day  isosorbide mononitrate 120 mg oral tablet, extended release: 1 tab(s) orally once a day  lidocaine 4% topical film: Apply topically to affected area once a day  metoprolol tartrate 25 mg oral tablet: 1 tab(s) orally 2 times a day  Multiple Vitamins oral tablet: 1 tab(s) orally once a day  nitroglycerin 0.4 mg sublingual tablet: 1 tab(s) sublingually every 5 minutes as needed for  chest pain may administer up to 2 tablets in a 15-minute period  pantoprazole 40 mg oral delayed release tablet: 1 tab(s) orally once a day  Plavix 75 mg oral tablet: 1 tab(s) orally once a day  polyethylene glycol 3350 oral powder for reconstitution: 17 gram(s) orally once a day  senna leaf extract oral tablet: 2 tab(s) orally once a day (at bedtime)  spironolactone 25 mg oral tablet: 1 tab(s) orally every 24 hours  Tylenol Extra Strength 500 mg oral tablet: 1 tab(s) orally every 4 hours as needed for  pain PRN FOR PAIN

## 2025-02-12 NOTE — DISCHARGE NOTE PROVIDER - NSFOLLOWUPCLINICS_GEN_ALL_ED_FT
[facility]  Internal Medicine  [address] [place] [address]  Phone: [phone]  Fax: [phone]

## 2025-02-12 NOTE — PROGRESS NOTE ADULT - ATTENDING COMMENTS
Patient is a 74 year old male, with PMH of CAD s/p PCI and CABG (Nov '24), ICM, HFrEF, HTN, CKD, PAD s/p recent LLE fem-AK-pop bypass, h/o DVT on eliquis, recently admitted 1/24-31 for mechanical fall and NSTEMI, underwent LHC, no PCI d/t difficult anatomy, decided on medical management. Pt was unable to swallow ranolazine d/t pill size (cannot be crushed). Also had LLE cellulitis, completed ancef x5d. Presented back to ED due to chest pain and leg pain.     - Troponin neg x3; low concern for cardiac etiology; pain reproducible to palpation; unsure if myalgias due to statin; had symptoms on atorvastatin on prior admission and changed to rosuvastatin; will discontinue statin and monitor; started on zetia per cardio recs; outpatient follow up to see if able to get repatha; pain overall improving but still present; change tylenol to standing as not sure if patient aware to ask for medication; add on lidocaine patch    - vascular following; outpatient follow up    - PT eval: [facility]    DISPO: DC planning to [facility] today  40 mins spent on DC planning      Rest as above. Discussed with HS

## 2025-02-12 NOTE — PROGRESS NOTE ADULT - SUBJECTIVE AND OBJECTIVE BOX
ODALYS HAGER  74y  MRN: [id number]    Patient is a 74y old  Male who presents with a chief complaint of Chest pain (11 Feb 2025 17:55)      Interval/Overnight Events: no events ON.     Subjective: Pt seen and examined at bedside.     MEDICATIONS  (STANDING):  apixaban 5 milliGRAM(s) Oral every 12 hours  clopidogrel Tablet 75 milliGRAM(s) Oral daily  doxazosin 4 milliGRAM(s) Oral at bedtime  ezetimibe 10 milliGRAM(s) Oral daily  finasteride 5 milliGRAM(s) Oral daily  gabapentin 100 milliGRAM(s) Oral three times a day  isosorbide   mononitrate ER Tablet (IMDUR) 120 milliGRAM(s) Oral daily  metoprolol tartrate 25 milliGRAM(s) Oral two times a day  multivitamin 1 Tablet(s) Oral daily  pantoprazole    Tablet 40 milliGRAM(s) Oral before breakfast  polyethylene glycol 3350 17 Gram(s) Oral daily  senna 2 Tablet(s) Oral at bedtime  spironolactone 25 milliGRAM(s) Oral every 24 hours    MEDICATIONS  (PRN):      Objective:    Vitals: Vital Signs Last 24 Hrs  T(C): 36.7 (02-12-25 @ 04:13), Max: 36.8 (02-11-25 @ 23:50)  T(F): 98.1 (02-12-25 @ 04:13), Max: 98.3 (02-11-25 @ 23:50)  HR: 63 (02-12-25 @ 04:13) (63 - 69)  BP: 109/55 (02-12-25 @ 04:13) (95/55 - 117/65)  BP(mean): --  RR: 18 (02-12-25 @ 04:13) (18 - 18)  SpO2: 98% (02-12-25 @ 04:13) (96% - 98%)                I&O's Summary    11 Feb 2025 07:01  -  12 Feb 2025 07:00  --------------------------------------------------------  IN: 540 mL / OUT: 1300 mL / NET: -760 mL        PHYSICAL EXAM:  GENERAL: NAD  HEAD:  Atraumatic, Normocephalic  EYES: EOMI, conjunctiva and sclera clear  CHEST/LUNG: Clear to auscultation bilaterally; No rales, rhonchi, wheezing, or rubs  HEART: Regular rate and rhythm; No murmurs, rubs, or gallops  ABDOMEN: Soft, Nontender, Nondistended;   SKIN: No rashes or lesions  NERVOUS SYSTEM:  Alert & Oriented X3, no focal deficits    LABS:                        11.0   4.72  )-----------( 243      ( 11 Feb 2025 07:17 )             33.8                         10.9   7.52  )-----------( 250      ( 10 Feb 2025 07:00 )             32.4                         11.9   7.98  )-----------( 266      ( 09 Feb 2025 10:28 )             36.1     02-11    141  |  108  |  22  ----------------------------<  89  4.6   |  20[L]  |  1.57[H]  02-10    138  |  106  |  18  ----------------------------<  81  4.2   |  18[L]  |  1.41[H]  02-09    136  |  102  |  26[H]  ----------------------------<  130[H]  5.3   |  20[L]  |  1.61[H]    Ca    9.5      11 Feb 2025 07:19  Ca    9.7      10 Feb 2025 07:00  Ca    10.0      09 Feb 2025 10:28  Phos  3.4     02-11  Mg     2.1     02-11    TPro  6.7  /  Alb  3.5  /  TBili  0.3  /  DBili  x   /  AST  13  /  ALT  5[L]  /  AlkPhos  57  02-11  TPro  7.1  /  Alb  3.5  /  TBili  0.4  /  DBili  x   /  AST  16  /  ALT  11  /  AlkPhos  60  02-10  TPro  8.1  /  Alb  4.0  /  TBili  0.4  /  DBili  x   /  AST  34  /  ALT  15  /  AlkPhos  70  02-09    CAPILLARY BLOOD GLUCOSE            Urinalysis Basic - ( 11 Feb 2025 07:19 )    Color: x / Appearance: x / SG: x / pH: x  Gluc: 89 mg/dL / Ketone: x  / Bili: x / Urobili: x   Blood: x / Protein: x / Nitrite: x   Leuk Esterase: x / RBC: x / WBC x   Sq Epi: x / Non Sq Epi: x / Bacteria: x          RADIOLOGY & ADDITIONAL TESTS:    Imaging Personally Reviewed:  [x ] YES  [ ] NO    Consultants involved in case:   Consultant(s) Notes Reviewed:  [ x] YES  [ ] NO:   Care Discussed with Consultants/Other Providers [x ] YES  [ ] NO           ODALYS HAGER  74y  MRN: [id number]    Patient is a 74y old  Male who presents with a chief complaint of Chest pain (11 Feb 2025 17:55)      Interval/Overnight Events: no events ON.     Subjective: Pt seen and examined at bedside. Endorses similar all-over body pain. Endorses cough. No new concerns.     MEDICATIONS  (STANDING):  apixaban 5 milliGRAM(s) Oral every 12 hours  clopidogrel Tablet 75 milliGRAM(s) Oral daily  doxazosin 4 milliGRAM(s) Oral at bedtime  ezetimibe 10 milliGRAM(s) Oral daily  finasteride 5 milliGRAM(s) Oral daily  gabapentin 100 milliGRAM(s) Oral three times a day  isosorbide   mononitrate ER Tablet (IMDUR) 120 milliGRAM(s) Oral daily  metoprolol tartrate 25 milliGRAM(s) Oral two times a day  multivitamin 1 Tablet(s) Oral daily  pantoprazole    Tablet 40 milliGRAM(s) Oral before breakfast  polyethylene glycol 3350 17 Gram(s) Oral daily  senna 2 Tablet(s) Oral at bedtime  spironolactone 25 milliGRAM(s) Oral every 24 hours    MEDICATIONS  (PRN):      Objective:    Vitals: Vital Signs Last 24 Hrs  T(C): 36.7 (02-12-25 @ 04:13), Max: 36.8 (02-11-25 @ 23:50)  T(F): 98.1 (02-12-25 @ 04:13), Max: 98.3 (02-11-25 @ 23:50)  HR: 63 (02-12-25 @ 04:13) (63 - 69)  BP: 109/55 (02-12-25 @ 04:13) (95/55 - 117/65)  BP(mean): --  RR: 18 (02-12-25 @ 04:13) (18 - 18)  SpO2: 98% (02-12-25 @ 04:13) (96% - 98%)                I&O's Summary    11 Feb 2025 07:01  -  12 Feb 2025 07:00  --------------------------------------------------------  IN: 540 mL / OUT: 1300 mL / NET: -760 mL        PHYSICAL EXAM:  GENERAL: NAD  HEAD:  Atraumatic, Normocephalic  EYES: EOMI, conjunctiva and sclera clear  CHEST/LUNG: Clear to auscultation bilaterally; No rales, rhonchi, wheezing, or rubs  HEART: Regular rate and rhythm; No murmurs, rubs, or gallops. Diffuse tenderness to palpation across chest wall.  ABDOMEN: Soft, tender to palpation across all 4 quadrants, Nondistended;   SKIN: chronic stasis changes on bilateral lower extremities  NERVOUS SYSTEM:  Alert & Oriented X3, no focal deficits    LABS:                        11.0   4.72  )-----------( 243      ( 11 Feb 2025 07:17 )             33.8                         10.9   7.52  )-----------( 250      ( 10 Feb 2025 07:00 )             32.4                         11.9   7.98  )-----------( 266      ( 09 Feb 2025 10:28 )             36.1     02-11    141  |  108  |  22  ----------------------------<  89  4.6   |  20[L]  |  1.57[H]  02-10    138  |  106  |  18  ----------------------------<  81  4.2   |  18[L]  |  1.41[H]  02-09    136  |  102  |  26[H]  ----------------------------<  130[H]  5.3   |  20[L]  |  1.61[H]    Ca    9.5      11 Feb 2025 07:19  Ca    9.7      10 Feb 2025 07:00  Ca    10.0      09 Feb 2025 10:28  Phos  3.4     02-11  Mg     2.1     02-11    TPro  6.7  /  Alb  3.5  /  TBili  0.3  /  DBili  x   /  AST  13  /  ALT  5[L]  /  AlkPhos  57  02-11  TPro  7.1  /  Alb  3.5  /  TBili  0.4  /  DBili  x   /  AST  16  /  ALT  11  /  AlkPhos  60  02-10  TPro  8.1  /  Alb  4.0  /  TBili  0.4  /  DBili  x   /  AST  34  /  ALT  15  /  AlkPhos  70  02-09    CAPILLARY BLOOD GLUCOSE            Urinalysis Basic - ( 11 Feb 2025 07:19 )    Color: x / Appearance: x / SG: x / pH: x  Gluc: 89 mg/dL / Ketone: x  / Bili: x / Urobili: x   Blood: x / Protein: x / Nitrite: x   Leuk Esterase: x / RBC: x / WBC x   Sq Epi: x / Non Sq Epi: x / Bacteria: x          RADIOLOGY & ADDITIONAL TESTS:    Imaging Personally Reviewed:  [x ] YES  [ ] NO    Consultants involved in case:   Consultant(s) Notes Reviewed:  [ x] YES  [ ] NO:   Care Discussed with Consultants/Other Providers [x ] YES  [ ] NO

## 2025-02-12 NOTE — DISCHARGE NOTE NURSING/CASE MANAGEMENT/SOCIAL WORK - NSDCPEFALRISK_GEN_ALL_CORE
For information on Fall & Injury Prevention, visit: [email or web address] OR  [email or web address] OR  https://www.cdc.gov/steadi/patient.html

## 2025-02-12 NOTE — DISCHARGE NOTE PROVIDER - HOSPITAL COURSE
HPI:  74M PMHx cad s/p PCI and CABG (Nov '24), ICM, HFrEF, HTN, CKD, PAD s/p recent LLE fem-AK-pop bypass, h/o DVT on eliquis. Was admitted 1/24-31 for mechanical fall and NSTEMI, underwent LHC, no PCI d/t difficult anatomy, decided on medical mgnt. Pt was unable to swallow ranolazine d/t pill size (cannot be crushed). Also had LLE cellulitis, completed ancef x5d.     Presented today w/ CP, with SOB, but also c/o chronic pain of BLE, L > R and worst on the heels.     In the ED, VSS    CT a/p showing LLL nodular opacities, likely chronic outlet obstruction of bladder w/ enlarged prostate.  Left leg duplex indeterminate for DVT (reviewed by vasc, neg for dvt per them).   CTA aorta w/ runoff showing patent bypasses on the lower extremities; incidental finding of severe stenosis of SMA, occluded [facility].     CBC stable compared to 1/31, stable anemia, wbc and plt wnl.   Coat w/ INR 1.5  CMP w/ SCr 1.66 (baseline 1.5-6), lft wnl.   Trop 48, 48  UA w/ cloudy, large LE, neg NI, trace blood, 268 wbc.     Vasc consult for pain on the left leg i/s/o recent intervention, no DVT and CTA patent bypass, no surgical intervention recommended.     Received ancef, zosyn and vanc for ?cellulitis of the LLE.  (09 Feb 2025 23:54)    Hospital Course:  Patient presented with all-over body and chest pain, tender to palpation. EKGs without acute ischemia and troponins stable around 40-50. Cardiology was consulted. There was concern for myalgias secondary to starting new statins and less concern for acute ACS. Patient had previously tried atorvastatin and rosuvastatin was stopped on admission. Ezetimibe was started with plan for Repatha outpatient. Pain was treated with Tylenol and gabapentin. Physical therapy was consulted, and recommended to return to [facility].     Important Medication Changes and Reason:  - start Ezetimibe 10 mg qd  - start hydralazine 10 mg TID  - stop rosuvastatin    Active or Pending Issues Requiring Follow-up:  - Titration of GDMT    Advanced Directives:   [X] Full code  [ ] DNR  [ ] Hospice    Discharge Diagnoses:  - Myalgias       HPI:  74M PMHx cad s/p PCI and CABG (Nov '24), ICM, HFrEF, HTN, CKD, PAD s/p recent LLE fem-AK-pop bypass, h/o DVT on eliquis. Was admitted 1/24-31 for mechanical fall and NSTEMI, underwent LHC, no PCI d/t difficult anatomy, decided on medical mgnt. Pt was unable to swallow ranolazine d/t pill size (cannot be crushed). Also had LLE cellulitis, completed ancef x5d.     Presented today w/ CP, with SOB, but also c/o chronic pain of BLE, L > R and worst on the heels.     In the ED, VSS    CT a/p showing LLL nodular opacities, likely chronic outlet obstruction of bladder w/ enlarged prostate.  Left leg duplex indeterminate for DVT (reviewed by vasc, neg for dvt per them).   CTA aorta w/ runoff showing patent bypasses on the lower extremities; incidental finding of severe stenosis of SMA, occluded MIGUELINA.     CBC stable compared to 1/31, stable anemia, wbc and plt wnl.   Coat w/ INR 1.5  CMP w/ SCr 1.66 (baseline 1.5-6), lft wnl.   Trop 48, 48  UA w/ cloudy, large LE, neg NI, trace blood, 268 wbc.     Vasc consult for pain on the left leg i/s/o recent intervention, no DVT and CTA patent bypass, no surgical intervention recommended.     Received ancef, zosyn and vanc for ?cellulitis of the LLE.  (09 Feb 2025 23:54)    Hospital Course:  Patient presented with all-over body and chest pain, tender to palpation. EKGs without acute ischemia and troponins stable around 40-50. Cardiology was consulted. There was concern for myalgias secondary to starting new statins and less concern for acute ACS. Patient had previously tried atorvastatin and rosuvastatin was stopped on admission. Ezetimibe was started with plan for Repatha outpatient. Vascular surgery was also consulted for concern with lower extremity edema and leg pain in the setting of a previous bypass procedure. There was no inpatient interventions required. Pain was treated with Tylenol and gabapentin. Physical therapy was consulted, and recommended to return to [facility].     Important Medication Changes and Reason:  - start Ezetimibe 10 mg qd  - start hydralazine 10 mg TID  - stop rosuvastatin    Active or Pending Issues Requiring Follow-up:  - Titration of GDMT  - start Repatha outpatient    - Please follow up with your vascular surgeon for removal of sutures and follow up within 1 week  - Please follow up with your cardiologist for continued adjustment of medications within 1 week  - Please follow up with urology to address the blood in your urine within 1 month  - Please follow up with your primary care doctor within 1 week      Advanced Directives:   [X] Full code  [ ] DNR  [ ] Hospice    Discharge Diagnoses:  - Myalgia       HPI:  74M PMHx cad s/p PCI and CABG (Nov '24), ICM, HFrEF, HTN, CKD, PAD s/p recent LLE fem-AK-pop bypass, h/o DVT on eliquis. Was admitted 1/24-31 for mechanical fall and NSTEMI, underwent LHC, no PCI d/t difficult anatomy, decided on medical mgnt. Pt was unable to swallow ranolazine d/t pill size (cannot be crushed). Also had LLE cellulitis, completed ancef x5d.     Presented today w/ CP, with SOB, but also c/o chronic pain of BLE, L > R and worst on the heels.     In the ED, VSS    CT a/p showing LLL nodular opacities, likely chronic outlet obstruction of bladder w/ enlarged prostate.  Left leg duplex indeterminate for DVT (reviewed by vasc, neg for dvt per them).   CTA aorta w/ runoff showing patent bypasses on the lower extremities; incidental finding of severe stenosis of SMA, occluded MIGUELINA.     CBC stable compared to 1/31, stable anemia, wbc and plt wnl.   Coat w/ INR 1.5  CMP w/ SCr 1.66 (baseline 1.5-6), lft wnl.   Trop 48, 48  UA w/ cloudy, large LE, neg NI, trace blood, 268 wbc.     Vasc consult for pain on the left leg i/s/o recent intervention, no DVT and CTA patent bypass, no surgical intervention recommended.     Received ancef, zosyn and vanc for ?cellulitis of the LLE.  (09 Feb 2025 23:54)    Hospital Course:  Patient presented with all-over body and chest pain, tender to palpation. EKGs without acute ischemia and troponins stable around 40-50. Cardiology was consulted with lower concern for acute ACS. There was concern for possibly myalgias secondary to starting new statins and less concern for acute ACS versus costochondritis given the pain was reproducible on exam. Patient had previously tried atorvastatin and rosuvastatin was stopped on admission. Ezetimibe was started with plan for Repatha outpatient. Vascular surgery was also consulted for concern with lower extremity edema and leg pain in the setting of a previous bypass procedure. There was no inpatient interventions required. Pain was treated with Tylenol and gabapentin. Physical therapy was consulted, and recommended to return to [facility].     Important Medication Changes and Reason:  - start Ezetimibe 10 mg qd  - stop rosuvastatin    Active or Pending Issues Requiring Follow-up:  - Titration of GDMT  - start Repatha outpatient    - Please follow up with your vascular surgeon for removal of sutures and follow up within 1 week  - Please follow up with your cardiologist for continued adjustment of medications within 1 week  - Please follow up with urology to address the blood in your urine within 1 month  - Please follow up with your primary care doctor within 1 week      Advanced Directives:   [X] Full code  [ ] DNR  [ ] Hospice    Discharge Diagnoses:  - Myalgia vs costochondritis  - chest pain, likely not ACS

## 2025-02-12 NOTE — PROGRESS NOTE ADULT - SUBJECTIVE AND OBJECTIVE BOX
DATE OF SERVICE: 02-12-25 @ 08:17    Patient is a 74y old  Male who presents with a chief complaint of Chest pain (12 Feb 2025 07:26)      INTERVAL HISTORY: no events    REVIEW OF SYSTEMS:  CONSTITUTIONAL: No weakness  EYES/ENT: No visual changes;  No throat pain   NECK: No pain or stiffness  RESPIRATORY: No cough, wheezing; No shortness of breath  CARDIOVASCULAR: No chest pain or palpitations  GASTROINTESTINAL: No abdominal  pain. No nausea, vomiting, or hematemesis  GENITOURINARY: No dysuria, frequency or hematuria  NEUROLOGICAL: No stroke like symptoms  SKIN: No rashes    TELEMETRY Personally reviewed:  	  MEDICATIONS:  doxazosin 4 milliGRAM(s) Oral at bedtime  hydrALAZINE 10 milliGRAM(s) Oral three times a day  isosorbide   mononitrate ER Tablet (IMDUR) 120 milliGRAM(s) Oral daily  metoprolol tartrate 25 milliGRAM(s) Oral two times a day  spironolactone 25 milliGRAM(s) Oral every 24 hours        PHYSICAL EXAM:  T(C): 36.7 (02-12-25 @ 04:13), Max: 36.8 (02-11-25 @ 23:50)  HR: 63 (02-12-25 @ 04:13) (63 - 69)  BP: 109/55 (02-12-25 @ 04:13) (95/55 - 117/65)  RR: 18 (02-12-25 @ 04:13) (18 - 18)  SpO2: 98% (02-12-25 @ 04:13) (96% - 98%)  Wt(kg): --  I&O's Summary    11 Feb 2025 07:01  -  12 Feb 2025 07:00  --------------------------------------------------------  IN: 540 mL / OUT: 1300 mL / NET: -760 mL          Appearance: In no distress	  HEENT:    PERRL, EOMI	  Cardiovascular:  S1 S2, No JVD  Respiratory: Lungs clear to auscultation	  Gastrointestinal:  Soft, Non-tender, + BS	  Vascularature:  No edema of LE  Psychiatric: Appropriate affect   Neuro: no acute focal deficits                               11.3   4.84  )-----------( 203      ( 12 Feb 2025 07:05 )             34.9     02-12    139  |  106  |  20  ----------------------------<  112[H]  4.4   |  20[L]  |  1.51[H]    Ca    9.8      12 Feb 2025 07:07  Phos  3.4     02-12  Mg     1.9     02-12    TPro  7.1  /  Alb  3.9  /  TBili  0.3  /  DBili  x   /  AST  11  /  ALT  7[L]  /  AlkPhos  62  02-12        Labs personally reviewed      ASSESSMENT/PLAN: 	      Mrs. Sanabria is a 74 years old M, Polish speaking PMH of CAD s/p PCI and minimally invasive direct CABG (LIMA - LAD) 10/31/24, ICM, HFrEF, HTN, CKD, BPH and PAD s/p prior L to R fem-fem bypass (>20yrs ago), history of provoked DVT in 11/2024 recent admission to [facility] for NSTEMI following a mechanical falls/p LHC presents from [facility] for all over chest pain and lower extremity pain.        Problem/Plan - 1:  ·  Problem: Chest pain.   ·  Plan: Reports constant pain x 24-48 hours, cannot really describe the pain.  Similar to pain during recent admission with mostly tenderness and generalized body pain  - Despite over 24 hours of chest with flat trop and nonischemic EKG making ischemic chest pain less likely   - Pain more likely MSK in origin and not angina   - Recent cath with patent LIMA to LAD and occluded LCX and RCA. Disease in medium sized D1 however, because of the angle of takeoff from the LAD- PCI of the diagonal would be quite difficult.    - Continue anti anginals - Imdur 120mg daily, Metoprolol 25mg BID, Plavix 75mg daily  - No further inpt cardiac work up     Problem/Plan - 2:  ·  Problem: CAD  ·  Plan: Recent cath with patent LIMA to LAD and occluded LCX and RCA. Disease in medium sized D1  - Continue with Plavix  - Recent , goal <70  - Given recurrent diffuse body pain despite trial of 2 diff statin agents (Atorvastatin followed by Rosuvastatin) pt would be a candidate for PCSK9 inhibitor like Repatha  ---- Will need to follow up as OP to initiate Repatha  ---- For now can initiate Ezetimibe 10mg daily     Problem/Plan - 3:  ·  Problem: Chronic systolic HF  ·  Plan: TTE with  40 to 45 %.  - GDMT - c/w Metoprolol 25mg BID  - Imdur 120mg daily  - Will add Hydral 10mg TID if BP tolerates  - Resume Farxiga 10mg daily prior to discharge  - Aldactone 25mg daily      Problem/Plan - 4:  ·  Problem: Phx of DVT  ·  Plan: On Eliquis     Problem/Plan - 5:  ·  Problem: HLD (hyperlipidemia).   ·  Plan: Given recurrent diffuse body pain despite trial of 2 diff statin agents (Atorvastatin followed by Rosuvastatin) pt would be a candidate for PCSK9 inhibitor like Repatha  ---- Will need to follow up as OP to initiate Repatha  ---- For now can initiate Ezetimibe 10mg daily            STEPH Mejia DO [facility]  Cardiovascular Medicine  [address] [place] [address], [facility] [address]  Available through call or text on [employer] TE[employer]  Office: [phone]     DATE OF SERVICE: 02-12-25 @ 08:17    Patient is a 74y old  Male who presents with a chief complaint of Chest pain (12 Feb 2025 07:26)      INTERVAL HISTORY: no events    REVIEW OF SYSTEMS:  CONSTITUTIONAL: No weakness  EYES/ENT: No visual changes;  No throat pain   NECK: No pain or stiffness  RESPIRATORY: No cough, wheezing; No shortness of breath  CARDIOVASCULAR: No chest pain or palpitations  GASTROINTESTINAL: No abdominal  pain. No nausea, vomiting, or hematemesis  GENITOURINARY: No dysuria, frequency or hematuria  NEUROLOGICAL: No stroke like symptoms  SKIN: No rashes    TELEMETRY Personally reviewed: SR 60 - 70, no events  	  MEDICATIONS:  doxazosin 4 milliGRAM(s) Oral at bedtime  hydrALAZINE 10 milliGRAM(s) Oral three times a day  isosorbide   mononitrate ER Tablet (IMDUR) 120 milliGRAM(s) Oral daily  metoprolol tartrate 25 milliGRAM(s) Oral two times a day  spironolactone 25 milliGRAM(s) Oral every 24 hours        PHYSICAL EXAM:  T(C): 36.7 (02-12-25 @ 04:13), Max: 36.8 (02-11-25 @ 23:50)  HR: 63 (02-12-25 @ 04:13) (63 - 69)  BP: 109/55 (02-12-25 @ 04:13) (95/55 - 117/65)  RR: 18 (02-12-25 @ 04:13) (18 - 18)  SpO2: 98% (02-12-25 @ 04:13) (96% - 98%)  Wt(kg): --  I&O's Summary    11 Feb 2025 07:01  -  12 Feb 2025 07:00  --------------------------------------------------------  IN: 540 mL / OUT: 1300 mL / NET: -760 mL          Appearance: In no distress	  HEENT:    PERRL, EOMI	  Cardiovascular:  S1 S2, No JVD  Respiratory: Lungs clear to auscultation	  Gastrointestinal:  Soft, Non-tender, + BS	  Vascularature:  No edema of LE  Psychiatric: Appropriate affect   Neuro: no acute focal deficits                               11.3   4.84  )-----------( 203      ( 12 Feb 2025 07:05 )             34.9     02-12    139  |  106  |  20  ----------------------------<  112[H]  4.4   |  20[L]  |  1.51[H]    Ca    9.8      12 Feb 2025 07:07  Phos  3.4     02-12  Mg     1.9     02-12    TPro  7.1  /  Alb  3.9  /  TBili  0.3  /  DBili  x   /  AST  11  /  ALT  7[L]  /  AlkPhos  62  02-12        Labs personally reviewed      ASSESSMENT/PLAN: 	      Mrs. Sanabria is a 74 years old M, Polish speaking PMH of CAD s/p PCI and minimally invasive direct CABG (LIMA - LAD) 10/31/24, ICM, HFrEF, HTN, CKD, BPH and PAD s/p prior L to R fem-fem bypass (>20yrs ago), history of provoked DVT in 11/2024 recent admission to [facility] for NSTEMI following a mechanical falls/p LHC presents from [facility] for all over chest pain and lower extremity pain.        Problem/Plan - 1:  ·  Problem: Chest pain.   ·  Plan: Reports constant pain x 24-48 hours, cannot really describe the pain.  Similar to pain during recent admission with mostly tenderness and generalized body pain  - Despite over 24 hours of chest with flat trop and nonischemic EKG making ischemic chest pain less likely   - Pain more likely MSK in origin and not angina   - Recent cath with patent LIMA to LAD and occluded LCX and RCA. Disease in medium sized D1 however, because of the angle of takeoff from the LAD- PCI of the diagonal would be quite difficult.    - Continue anti anginals - Imdur 120mg daily, Metoprolol 25mg BID, Plavix 75mg daily  - No further inpt cardiac work up     Problem/Plan - 2:  ·  Problem: CAD  ·  Plan: Recent cath with patent LIMA to LAD and occluded LCX and RCA. Disease in medium sized D1  - Continue with Plavix  - Recent , goal <70  - Given recurrent diffuse body pain despite trial of 2 diff statin agents (Atorvastatin followed by Rosuvastatin) pt would be a candidate for PCSK9 inhibitor like Repatha  ---- Will need to follow up as OP to initiate Repatha  ---- For now can initiate Ezetimibe 10mg daily     Problem/Plan - 3:  ·  Problem: Chronic systolic HF  ·  Plan: TTE with  40 to 45 %.  - GDMT - c/w Metoprolol 25mg BID  - Imdur 120mg daily  - Will add Hydral 10mg TID if BP tolerates  - Resume Farxiga 10mg daily prior to discharge  - Aldactone 25mg daily      Problem/Plan - 4:  ·  Problem: Phx of DVT  ·  Plan: On Eliquis     Problem/Plan - 5:  ·  Problem: HLD (hyperlipidemia).   ·  Plan: Given recurrent diffuse body pain despite trial of 2 diff statin agents (Atorvastatin followed by Rosuvastatin) pt would be a candidate for PCSK9 inhibitor like Repatha  ---- Will need to follow up as OP to initiate Repatha  ---- For now can initiate Ezetimibe 10mg daily            STEPH Mejia DO [facility]  Cardiovascular Medicine  [address] [place] [address], [address]  Available through call or text on [employer] TEAMs  Office: [phone]     DATE OF SERVICE: 02-12-25 @ 08:17    Patient is a 74y old  Male who presents with a chief complaint of Chest pain (12 Feb 2025 07:26)      INTERVAL HISTORY: no events, complaining of full body pain     REVIEW OF SYSTEMS:  CONSTITUTIONAL: No weakness  EYES/ENT: No visual changes;  No throat pain   NECK: No pain or stiffness  RESPIRATORY: No cough, wheezing; No shortness of breath  CARDIOVASCULAR: + chest pain (unchanged), no palpitations, + reproducible pain upon palpation of chest  GASTROINTESTINAL: No abdominal  pain. No nausea, vomiting, or hematemesis  GENITOURINARY: No dysuria, frequency or hematuria  NEUROLOGICAL: No stroke like symptoms  SKIN: No rashes    TELEMETRY Personally reviewed: SR 60 - 70, no events  	  MEDICATIONS:  doxazosin 4 milliGRAM(s) Oral at bedtime  hydrALAZINE 10 milliGRAM(s) Oral three times a day  isosorbide   mononitrate ER Tablet (IMDUR) 120 milliGRAM(s) Oral daily  metoprolol tartrate 25 milliGRAM(s) Oral two times a day  spironolactone 25 milliGRAM(s) Oral every 24 hours        PHYSICAL EXAM:  T(C): 36.7 (02-12-25 @ 04:13), Max: 36.8 (02-11-25 @ 23:50)  HR: 63 (02-12-25 @ 04:13) (63 - 69)  BP: 109/55 (02-12-25 @ 04:13) (95/55 - 117/65)  RR: 18 (02-12-25 @ 04:13) (18 - 18)  SpO2: 98% (02-12-25 @ 04:13) (96% - 98%)  Wt(kg): --  I&O's Summary    11 Feb 2025 07:01  -  12 Feb 2025 07:00  --------------------------------------------------------  IN: 540 mL / OUT: 1300 mL / NET: -760 mL          Appearance: In no distress	  HEENT:    PERRL, EOMI	  Cardiovascular:  S1 S2, No JVD  Respiratory: Lungs clear to auscultation	  Gastrointestinal:  Soft, Non-tender, + BS	  Vascularature:  No edema of LE  Psychiatric: Appropriate affect   Neuro: no acute focal deficits                               11.3   4.84  )-----------( 203      ( 12 Feb 2025 07:05 )             34.9     02-12    139  |  106  |  20  ----------------------------<  112[H]  4.4   |  20[L]  |  1.51[H]    Ca    9.8      12 Feb 2025 07:07  Phos  3.4     02-12  Mg     1.9     02-12    TPro  7.1  /  Alb  3.9  /  TBili  0.3  /  DBili  x   /  AST  11  /  ALT  7[L]  /  AlkPhos  62  02-12        Labs personally reviewed      ASSESSMENT/PLAN: 	      Mrs. Sanabria is a 74 years old M, Polish speaking PMH of CAD s/p PCI and minimally invasive direct CABG (LIMA - LAD) 10/31/24, ICM, HFrEF, HTN, CKD, BPH and PAD s/p prior L to R fem-fem bypass (>20yrs ago), history of provoked DVT in 11/2024 recent admission to [facility] for NSTEMI following a mechanical falls/p [facility] presents from [facility] for all over chest pain and lower extremity pain.        Problem/Plan - 1:  ·  Problem: Chest pain.   ·  Plan: Reports constant pain x 24-48 hours, cannot really describe the pain.  Similar to pain during recent admission with mostly tenderness and generalized body pain  - Despite over 24 hours of chest with flat trop and nonischemic EKG making ischemic chest pain less likely   - Pain more likely MSK in origin and not angina   - Recent cath with patent LIMA to LAD and occluded LCX and RCA. Disease in medium sized D1 however, because of the angle of takeoff from the LAD- PCI of the diagonal would be quite difficult.    - Continue anti anginals - Imdur 120mg daily, Metoprolol 25mg BID, Plavix 75mg daily  - No further inpt cardiac work up     Problem/Plan - 2:  ·  Problem: CAD  ·  Plan: Recent cath with patent LIMA to LAD and occluded LCX and RCA. Disease in medium sized D1  - Continue with Plavix  - Recent , goal <70  - Given recurrent diffuse body pain despite trial of 2 diff statin agents (Atorvastatin followed by Rosuvastatin) pt would be a candidate for PCSK9 inhibitor like Repatha  ---- Will need to follow up as OP to initiate Repatha  ---- For now can initiate Ezetimibe 10mg daily     Problem/Plan - 3:  ·  Problem: Chronic systolic HF  ·  Plan: TTE with  40 to 45 %.  - GDMT - c/w Metoprolol 25mg BID  - Imdur 120mg daily  - Will add Hydral 10mg TID if BP tolerates  - Resume Farxiga 10mg daily prior to discharge  - Aldactone 25mg daily      Problem/Plan - 4:  ·  Problem: Phx of DVT  ·  Plan: On Eliquis     Problem/Plan - 5:  ·  Problem: HLD (hyperlipidemia).   ·  Plan: Given recurrent diffuse body pain despite trial of 2 diff statin agents (Atorvastatin followed by Rosuvastatin) pt would be a candidate for PCSK9 inhibitor like Repatha  ---- Will need to follow up as OP to initiate Repatha  ---- For now can initiate Ezetimibe 10mg daily            STEPH Mejia DO [facility]  Cardiovascular Medicine  [address] [facility], [address]  Available through call or text on [employer] TEAMs  Office: [phone]     DATE OF SERVICE: 02-12-25 @ 08:17    Patient is a 74y old  Male who presents with a chief complaint of Chest pain (12 Feb 2025 07:26)      INTERVAL HISTORY: no events, complaining of full body pain     REVIEW OF SYSTEMS:  CONSTITUTIONAL: No weakness  EYES/ENT: No visual changes;  No throat pain   NECK: No pain or stiffness  RESPIRATORY: No cough, wheezing; No shortness of breath  CARDIOVASCULAR: + chest pain (unchanged), no palpitations, + reproducible pain upon palpation of chest  GASTROINTESTINAL: No abdominal  pain. No nausea, vomiting, or hematemesis  GENITOURINARY: No dysuria, frequency or hematuria  NEUROLOGICAL: No stroke like symptoms  SKIN: No rashes    TELEMETRY Personally reviewed: SR 60 - 70, no events  	  MEDICATIONS:  doxazosin 4 milliGRAM(s) Oral at bedtime  hydrALAZINE 10 milliGRAM(s) Oral three times a day  isosorbide   mononitrate ER Tablet (IMDUR) 120 milliGRAM(s) Oral daily  metoprolol tartrate 25 milliGRAM(s) Oral two times a day  spironolactone 25 milliGRAM(s) Oral every 24 hours        PHYSICAL EXAM:  T(C): 36.7 (02-12-25 @ 04:13), Max: 36.8 (02-11-25 @ 23:50)  HR: 63 (02-12-25 @ 04:13) (63 - 69)  BP: 109/55 (02-12-25 @ 04:13) (95/55 - 117/65)  RR: 18 (02-12-25 @ 04:13) (18 - 18)  SpO2: 98% (02-12-25 @ 04:13) (96% - 98%)  Wt(kg): --  I&O's Summary    11 Feb 2025 07:01  -  12 Feb 2025 07:00  --------------------------------------------------------  IN: 540 mL / OUT: 1300 mL / NET: -760 mL          Appearance: In no distress	  HEENT:    PERRL, EOMI	  Cardiovascular:  S1 S2, No JVD  Respiratory: Lungs clear to auscultation	  Gastrointestinal:  Soft, Non-tender, + BS	  Vascularature:  No edema of LE  Psychiatric: Appropriate affect   Neuro: no acute focal deficits                               11.3   4.84  )-----------( 203      ( 12 Feb 2025 07:05 )             34.9     02-12    139  |  106  |  20  ----------------------------<  112[H]  4.4   |  20[L]  |  1.51[H]    Ca    9.8      12 Feb 2025 07:07  Phos  3.4     02-12  Mg     1.9     02-12    TPro  7.1  /  Alb  3.9  /  TBili  0.3  /  DBili  x   /  AST  11  /  ALT  7[L]  /  AlkPhos  62  02-12        Labs personally reviewed      ASSESSMENT/PLAN: 	      Mrs. Sanabria is a 74 years old M, Polish speaking PMH of CAD s/p PCI and minimally invasive direct CABG (LIMA - LAD) 10/31/24, ICM, HFrEF, HTN, CKD, BPH and PAD s/p prior L to R fem-fem bypass (>20yrs ago), history of provoked DVT in 11/2024 recent admission to [facility] for NSTEMI following a mechanical falls/p [facility] presents from [facility] for all over chest pain and lower extremity pain.        Problem/Plan - 1:  ·  Problem: Chest pain.   ·  Plan: Reports constant pain x 24-48 hours, cannot really describe the pain.  Similar to pain during recent admission with mostly tenderness over the right chest and generalized body pain  - Despite over 24 hours of chest with flat trop and nonischemic EKG making ischemic chest pain less likely   - Pain more likely MSK in origin and not angina   - Recent cath with patent LIMA to LAD and occluded LCX and RCA. Disease in medium sized D1 however, because of the angle of takeoff from the LAD- PCI of the diagonal would be quite difficult.    - Continue anti anginals - Imdur 120mg daily, Metoprolol 25mg BID, Plavix 75mg daily  - No further inpt cardiac work up     Problem/Plan - 2:  ·  Problem: CAD  ·  Plan: Recent cath with patent LIMA to LAD and occluded LCX and RCA. Disease in medium sized D1  - Continue with Plavix  - Recent , goal <70  - Given recurrent diffuse body pain despite trial of 2 diff statin agents (Atorvastatin followed by Rosuvastatin) pt would be a candidate for PCSK9 inhibitor like Repatha  ---- Will need to follow up as OP to initiate Repatha  ---- For now can initiate Ezetimibe 10mg daily     Problem/Plan - 3:  ·  Problem: Chronic systolic HF  ·  Plan: TTE with  40 to 45 %.  - GDMT - c/w Metoprolol 25mg BID  - Imdur 120mg daily  - Will add Hydral 10mg TID if BP tolerates  - Resume Farxiga 10mg daily prior to discharge  - Aldactone 25mg daily      Problem/Plan - 4:  ·  Problem: Phx of DVT  ·  Plan: On Eliquis     Problem/Plan - 5:  ·  Problem: HLD (hyperlipidemia).   ·  Plan: Given recurrent diffuse body pain despite trial of 2 diff statin agents (Atorvastatin followed by Rosuvastatin) pt would be a candidate for PCSK9 inhibitor like Repatha  ---- Will need to follow up as OP to initiate Repatha  ---- For now can initiate Ezetimibe 10mg daily            STEPH Mejia DO [facility]  Cardiovascular Medicine  [address] [place] [address], [address]  Available through call or text on [employer] TE[employer]  Office: [phone]

## 2025-02-12 NOTE — CHART NOTE - NSCHARTNOTEFT_GEN_A_CORE
Patient with increased chest pain. VSS. Evaluated at bedside, patient non-diaphoretic, groaning slightly in bed. Cardiovascular exam with regular rate and rhythm, patient well perfused and non-cyanotic. Lungs clear to auscultation. EKG obtained and reviewed with cardiology. EKG similar in appearance to previous EKGs. Patient given nitroglycerin and cardiac enzymes obtained. Patient with increased chest pain. VSS. Evaluated at bedside, patient non-diaphoretic, groaning slightly in bed. Cardiovascular exam with regular rate and rhythm, patient well perfused and non-cyanotic. Lungs clear to auscultation. EKG obtained and reviewed with cardiology. EKG similar in appearance to previous EKGs. Lower concern for cardiac etiology of pain. Patient given nitroglycerin and cardiac enzymes obtained. Patient with increased chest pain. VSS. Evaluated at bedside, patient non-diaphoretic, groaning slightly in bed. Cardiovascular exam with regular rate and rhythm, patient well perfused and non-cyanotic. Lungs clear to auscultation. EKG obtained and reviewed with cardiology. EKG similar in appearance to previous EKGs. Lower concern for acute ischemic event. Patient given nitroglycerin and cardiac enzymes obtained. Patient with increased chest pain. VSS. Evaluated at bedside, patient non-diaphoretic, groaning slightly in bed. Cardiovascular exam with regular rate and rhythm, patient well perfused and non-cyanotic. Lungs clear to auscultation. EKG obtained and reviewed with cardiology. EKG similar in appearance to previous EKGs. Lower concern for acute ischemic event. Patient ordered for nitroglycerin and cardiac enzymes obtained. Troponin 34 (from 48 previous), creatine kinase 34, CKMB 1.6. Patient refused nitroglycerin and patient's pain returned to baseline.

## 2025-02-12 NOTE — DISCHARGE NOTE PROVIDER - CARE PROVIDER_API CALL
Delio Kaufman  Cardiovascular Disease  [address] [facility], [address]  [place] [address]  Phone: [phone]  Fax: [phone]  Follow Up Time: 1 week    Partha Valerio  Vascular Surgery  [address]  [place] [address]  Phone: [phone]  Fax: [phone]  Follow Up Time: 1 week    Alley Pierce  Urology  [address] [place] [id number]  Phone: [phone]  Fax: [phone]  Follow Up Time: 1 month

## 2025-02-12 NOTE — DISCHARGE NOTE PROVIDER - ATTENDING DISCHARGE PHYSICAL EXAMINATION:
CONSTITUTIONAL: NAD, well-developed   EYES: PERRLA; conjunctiva and sclera clear  ENMT: Moist oral mucosa, no pharyngeal injection or exudates   NECK: Supple   RESPIRATORY: Normal respiratory effort; lungs are clear to auscultation bilaterally  CARDIOVASCULAR: Regular rate and rhythm, normal S1 and S2   ABDOMEN: Nontender to palpation, normoactive bowel sounds   MUSCULOSKELETAL: no clubbing or cyanosis of digits; no joint swelling or tenderness to palpation  PSYCH: A+O to person, place, and time; affect appropriate  NEUROLOGY: no gross sensory deficits   SKIN: No rashes

## 2025-02-12 NOTE — DISCHARGE NOTE PROVIDER - NSDCFUADDAPPT_GEN_ALL_CORE_FT
APPTS ARE READY TO BE MADE: [X] YES    Best Family or Patient Contact (if needed):    Additional Information about above appointments (if needed):    1: Primary Care within 1 week  2: Cardiology within 1 week  3: Vascular surgery within 1 week  4: Urology within 1 month    Other comments or requests:    APPTS ARE READY TO BE MADE: [X] YES    Best Family or Patient Contact (if needed):    Additional Information about above appointments (if needed):    1: Primary Care within 1 week  2: Cardiology within 1 week  3: Vascular surgery within 1 week  4: Urology within 1 month    Other comments or requests:     Patient is being discharged to [facility]. Caregiver will arrange follow up.

## 2025-02-12 NOTE — DISCHARGE NOTE NURSING/CASE MANAGEMENT/SOCIAL WORK - PATIENT PORTAL LINK FT
You can access the FollowMyHealth Patient Portal offered by [facility] by registering at the following website: [email or web address]. By joining [employer]’s FollowMyHealth portal, you will also be able to view your health information using other applications (apps) compatible with our system.

## 2025-02-12 NOTE — PROGRESS NOTE ADULT - ASSESSMENT
Elton Sanabria is a 74-year-old male, Polish speaking, PMHx of CAD s/p PCI and minimally invasive direct CABG (LIMA --> LAD) 10/31/24, ICM, HFrEF, HTN, CKD, and PAD s/p prior L to R fem-fem bypass (>20yrs ago and another bypass in 2024), who had a recent hospitalization (01/2025) at [facility] for NSTEMI following a mechanical falls/p LHC, no stent due to anatomy. Represented from [facility] for all over chest pain and lower extremity pain.

## 2025-02-12 NOTE — DISCHARGE NOTE NURSING/CASE MANAGEMENT/SOCIAL WORK - FINANCIAL ASSISTANCE
[facility] provides services at a reduced cost to those who are determined to be eligible through [facility]’s financial assistance program. Information regarding [facility]’s financial assistance program can be found by going to [email or web address] or by calling [phone].

## 2025-02-12 NOTE — DISCHARGE NOTE PROVIDER - NSDCCPTREATMENT_GEN_ALL_CORE_FT
PRINCIPAL PROCEDURE  Procedure: Ultrasound  Findings and Treatment: FINDINGS:  CENTRAL ARTERIAL SYSTEM:  Severe calcific atherosclerotic disease of the abdominal aorta. No   abdominal aortic aneurysm. Mild stenosis of the celiac artery. Moderate   to severestenosis of proximal superior mesenteric artery. Patent right   renal artery. Mild to moderate stenosis of proximal left renal artery.   Inferior mesenteric artery occluded.  RIGHT LOWER EXTREMITY: Occluded common iliac, external iliac and internal  iliac arteries. Patent femora  < end of copied text >  l-femoral bypass graft. Patent deep femoral   artery. Occluded superficial femoral artery. Reconstitution of popliteal   artery from collaterals. Patent popliteal artery. Patent anterior tibial   artery posterior tibialartery and peroneal artery.  LEFT LOWER EXTREMITY:  Patent right common iliac artery stent. Occluded internal iliac artery.   Patent external iliac artery including the stent in the proximal segment.   Origin of the femoral artery is occluded with opacification from   collaterals. Negative superficial femoral artery occluded. Bypass graft   from common femoral to popliteal artery is patent. Popliteal artery   grossly patent. Anterior tibial, posterior tibial and hernia arteries are   patent.  ADDITIONAL FINDINGS: Small pulmonary consolidation in the left lower   lobe. Cholelithiasis. Enlarged prostate.  IMPRESSION:  Occluded right iliac arteries. Patent femoral-femoral bypass graft.   Patent right deep femoral artery. Occluded rightSFA with reconstitution   of popliteal artery from collaterals. Three-vessel runoff in the right   leg.  Patent left common and external iliac arteries. Patent left common   femoral to popliteal bypass graft. Patent popliteal artery. Three-vessel   runoff in the left leg.  Severe stenosis of superior mesenteric artery. Occluded inferior   mesenteric artery.< from: CT Angio Abd Aorta w/run-off w/ IV Cont (02.09.25 @ 19:10) >

## 2025-02-12 NOTE — DISCHARGE NOTE PROVIDER - CARE PROVIDERS DIRECT ADDRESSES
[email or web address]_Unknown

## 2025-02-12 NOTE — DISCHARGE NOTE PROVIDER - PROVIDER TOKENS
PROVIDER:[TOKEN:[[id number]],FOLLOWUP:[1 week]],PROVIDER:[TOKEN:[[id number]],FOLLOWUP:[1 week]],PROVIDER:[TOKEN:[[id number]],FOLLOWUP:[1 month]]

## 2025-02-12 NOTE — PROVIDER CONTACT NOTE (OTHER) - SITUATION
Pt was scheduled to receive metoprolol in the morning, but blood pressure was on the lower side. Provider was contacted to determine if metoprolol should be administered
Pt complaining of increased chest pain

## 2025-02-12 NOTE — PROGRESS NOTE ADULT - PROBLEM SELECTOR PLAN 7
VTE ppx: home eliquis    GI ppx: ppi at home  PT consulted, recs appreciated  Dispo: admitted from [facility], likely to return to [facility] VTE ppx: home eliquis    GI ppx: ppi at home  PT consulted, recs appreciated  Dispo: admitted from [facility], return to [facility] 02/12

## 2025-02-12 NOTE — DISCHARGE NOTE NURSING/CASE MANAGEMENT/SOCIAL WORK - NSDCFUADDAPPT_GEN_ALL_CORE_FT
APPTS ARE READY TO BE MADE: [X] YES    Best Family or Patient Contact (if needed):    Additional Information about above appointments (if needed):    1: Primary Care within 1 week  2: Cardiology within 1 week  3: Vascular surgery within 1 week  4: Urology within 1 month    Other comments or requests:

## 2025-02-13 VITALS — HEART RATE: 61 BPM | SYSTOLIC BLOOD PRESSURE: 123 MMHG | DIASTOLIC BLOOD PRESSURE: 56 MMHG

## 2025-02-13 LAB
ALBUMIN SERPL ELPH-MCNC: 3.5 G/DL — SIGNIFICANT CHANGE UP (ref 3.3–5)
ALP SERPL-CCNC: 64 U/L — SIGNIFICANT CHANGE UP (ref 40–120)
ALT FLD-CCNC: 11 U/L — SIGNIFICANT CHANGE UP (ref 10–45)
ANION GAP SERPL CALC-SCNC: 12 MMOL/L — SIGNIFICANT CHANGE UP (ref 5–17)
AST SERPL-CCNC: 15 U/L — SIGNIFICANT CHANGE UP (ref 10–40)
BASOPHILS # BLD AUTO: 0.02 K/UL — SIGNIFICANT CHANGE UP (ref 0–0.2)
BASOPHILS NFR BLD AUTO: 0.5 % — SIGNIFICANT CHANGE UP (ref 0–2)
BILIRUB SERPL-MCNC: 0.2 MG/DL — SIGNIFICANT CHANGE UP (ref 0.2–1.2)
BUN SERPL-MCNC: 19 MG/DL — SIGNIFICANT CHANGE UP (ref 7–23)
CALCIUM SERPL-MCNC: 9.6 MG/DL — SIGNIFICANT CHANGE UP (ref 8.4–10.5)
CHLORIDE SERPL-SCNC: 107 MMOL/L — SIGNIFICANT CHANGE UP (ref 96–108)
CO2 SERPL-SCNC: 22 MMOL/L — SIGNIFICANT CHANGE UP (ref 22–31)
CREAT SERPL-MCNC: 1.44 MG/DL — HIGH (ref 0.5–1.3)
EGFR: 50 ML/MIN/1.73M2 — LOW
EOSINOPHIL # BLD AUTO: 0.15 K/UL — SIGNIFICANT CHANGE UP (ref 0–0.5)
EOSINOPHIL NFR BLD AUTO: 3.4 % — SIGNIFICANT CHANGE UP (ref 0–6)
GLUCOSE SERPL-MCNC: 112 MG/DL — HIGH (ref 70–99)
HCT VFR BLD CALC: 37.5 % — LOW (ref 39–50)
HGB BLD-MCNC: 12.2 G/DL — LOW (ref 13–17)
IMM GRANULOCYTES NFR BLD AUTO: 0.5 % — SIGNIFICANT CHANGE UP (ref 0–0.9)
LYMPHOCYTES # BLD AUTO: 1.15 K/UL — SIGNIFICANT CHANGE UP (ref 1–3.3)
LYMPHOCYTES # BLD AUTO: 26.2 % — SIGNIFICANT CHANGE UP (ref 13–44)
MAGNESIUM SERPL-MCNC: 1.9 MG/DL — SIGNIFICANT CHANGE UP (ref 1.6–2.6)
MCHC RBC-ENTMCNC: 31.2 PG — SIGNIFICANT CHANGE UP (ref 27–34)
MCHC RBC-ENTMCNC: 32.5 G/DL — SIGNIFICANT CHANGE UP (ref 32–36)
MCV RBC AUTO: 95.9 FL — SIGNIFICANT CHANGE UP (ref 80–100)
MONOCYTES # BLD AUTO: 0.46 K/UL — SIGNIFICANT CHANGE UP (ref 0–0.9)
MONOCYTES NFR BLD AUTO: 10.5 % — SIGNIFICANT CHANGE UP (ref 2–14)
NEUTROPHILS # BLD AUTO: 2.59 K/UL — SIGNIFICANT CHANGE UP (ref 1.8–7.4)
NEUTROPHILS NFR BLD AUTO: 58.9 % — SIGNIFICANT CHANGE UP (ref 43–77)
NRBC BLD AUTO-RTO: 0 /100 WBCS — SIGNIFICANT CHANGE UP (ref 0–0)
PHOSPHATE SERPL-MCNC: 3.4 MG/DL — SIGNIFICANT CHANGE UP (ref 2.5–4.5)
PLATELET # BLD AUTO: 218 K/UL — SIGNIFICANT CHANGE UP (ref 150–400)
POTASSIUM SERPL-MCNC: 4.3 MMOL/L — SIGNIFICANT CHANGE UP (ref 3.5–5.3)
POTASSIUM SERPL-SCNC: 4.3 MMOL/L — SIGNIFICANT CHANGE UP (ref 3.5–5.3)
PROT SERPL-MCNC: 7.1 G/DL — SIGNIFICANT CHANGE UP (ref 6–8.3)
RBC # BLD: 3.91 M/UL — LOW (ref 4.2–5.8)
RBC # FLD: 12.3 % — SIGNIFICANT CHANGE UP (ref 10.3–14.5)
SODIUM SERPL-SCNC: 141 MMOL/L — SIGNIFICANT CHANGE UP (ref 135–145)
WBC # BLD: 4.39 K/UL — SIGNIFICANT CHANGE UP (ref 3.8–10.5)
WBC # FLD AUTO: 4.39 K/UL — SIGNIFICANT CHANGE UP (ref 3.8–10.5)

## 2025-02-13 PROCEDURE — 93005 ELECTROCARDIOGRAM TRACING: CPT

## 2025-02-13 PROCEDURE — 99285 EMERGENCY DEPT VISIT HI MDM: CPT

## 2025-02-13 PROCEDURE — 97162 PT EVAL MOD COMPLEX 30 MIN: CPT

## 2025-02-13 PROCEDURE — 85025 COMPLETE CBC W/AUTO DIFF WBC: CPT

## 2025-02-13 PROCEDURE — 82330 ASSAY OF CALCIUM: CPT

## 2025-02-13 PROCEDURE — 80053 COMPREHEN METABOLIC PANEL: CPT

## 2025-02-13 PROCEDURE — 83605 ASSAY OF LACTIC ACID: CPT

## 2025-02-13 PROCEDURE — 84295 ASSAY OF SERUM SODIUM: CPT

## 2025-02-13 PROCEDURE — 87086 URINE CULTURE/COLONY COUNT: CPT

## 2025-02-13 PROCEDURE — 82435 ASSAY OF BLOOD CHLORIDE: CPT

## 2025-02-13 PROCEDURE — 81001 URINALYSIS AUTO W/SCOPE: CPT

## 2025-02-13 PROCEDURE — 36415 COLL VENOUS BLD VENIPUNCTURE: CPT

## 2025-02-13 PROCEDURE — 85610 PROTHROMBIN TIME: CPT

## 2025-02-13 PROCEDURE — 82947 ASSAY GLUCOSE BLOOD QUANT: CPT

## 2025-02-13 PROCEDURE — 99239 HOSP IP/OBS DSCHRG MGMT >30: CPT | Mod: GC

## 2025-02-13 PROCEDURE — 75635 CT ANGIO ABDOMINAL ARTERIES: CPT | Mod: MC

## 2025-02-13 PROCEDURE — 84100 ASSAY OF PHOSPHORUS: CPT

## 2025-02-13 PROCEDURE — 85730 THROMBOPLASTIN TIME PARTIAL: CPT

## 2025-02-13 PROCEDURE — 83735 ASSAY OF MAGNESIUM: CPT

## 2025-02-13 PROCEDURE — 84484 ASSAY OF TROPONIN QUANT: CPT

## 2025-02-13 PROCEDURE — 96374 THER/PROPH/DIAG INJ IV PUSH: CPT

## 2025-02-13 PROCEDURE — 87077 CULTURE AEROBIC IDENTIFY: CPT

## 2025-02-13 PROCEDURE — 85014 HEMATOCRIT: CPT

## 2025-02-13 PROCEDURE — 85018 HEMOGLOBIN: CPT

## 2025-02-13 PROCEDURE — 85027 COMPLETE CBC AUTOMATED: CPT

## 2025-02-13 PROCEDURE — 87637 SARSCOV2&INF A&B&RSV AMP PRB: CPT

## 2025-02-13 PROCEDURE — 93971 EXTREMITY STUDY: CPT

## 2025-02-13 PROCEDURE — 71045 X-RAY EXAM CHEST 1 VIEW: CPT

## 2025-02-13 PROCEDURE — 82803 BLOOD GASES ANY COMBINATION: CPT

## 2025-02-13 PROCEDURE — 82553 CREATINE MB FRACTION: CPT

## 2025-02-13 PROCEDURE — 84132 ASSAY OF SERUM POTASSIUM: CPT

## 2025-02-13 PROCEDURE — 96376 TX/PRO/DX INJ SAME DRUG ADON: CPT

## 2025-02-13 PROCEDURE — 74177 CT ABD & PELVIS W/CONTRAST: CPT | Mod: MC

## 2025-02-13 PROCEDURE — 83880 ASSAY OF NATRIURETIC PEPTIDE: CPT

## 2025-02-13 PROCEDURE — 96375 TX/PRO/DX INJ NEW DRUG ADDON: CPT

## 2025-02-13 PROCEDURE — 83690 ASSAY OF LIPASE: CPT

## 2025-02-13 PROCEDURE — 87186 SC STD MICRODIL/AGAR DIL: CPT

## 2025-02-13 PROCEDURE — 87040 BLOOD CULTURE FOR BACTERIA: CPT

## 2025-02-13 PROCEDURE — 82550 ASSAY OF CK (CPK): CPT

## 2025-02-13 RX ORDER — ACETAMINOPHEN 160 MG/5ML
500 SUSPENSION ORAL EVERY 4 HOURS
Refills: 0 | Status: DISCONTINUED | OUTPATIENT
Start: 2025-02-13 | End: 2025-02-13

## 2025-02-13 RX ORDER — ACETAMINOPHEN 160 MG/5ML
1 SUSPENSION ORAL
Qty: 180 | Refills: 0
Start: 2025-02-13 | End: 2025-03-14

## 2025-02-13 RX ADMIN — APIXABAN 5 MILLIGRAM(S): 5 TABLET, FILM COATED ORAL at 04:58

## 2025-02-13 RX ADMIN — Medication 100 MILLIGRAM(S): at 04:57

## 2025-02-13 RX ADMIN — ACETAMINOPHEN 650 MILLIGRAM(S): 160 SUSPENSION ORAL at 04:57

## 2025-02-13 RX ADMIN — Medication 1 TABLET(S): at 11:11

## 2025-02-13 RX ADMIN — Medication 5 MILLIGRAM(S): at 11:09

## 2025-02-13 RX ADMIN — ACETAMINOPHEN 650 MILLIGRAM(S): 160 SUSPENSION ORAL at 11:12

## 2025-02-13 RX ADMIN — PANTOPRAZOLE 40 MILLIGRAM(S): 20 TABLET, DELAYED RELEASE ORAL at 04:58

## 2025-02-13 RX ADMIN — Medication 10 MILLIGRAM(S): at 11:10

## 2025-02-13 RX ADMIN — Medication 75 MILLIGRAM(S): at 11:09

## 2025-02-13 RX ADMIN — Medication 120 MILLIGRAM(S): at 11:10

## 2025-02-13 RX ADMIN — GABAPENTIN 100 MILLIGRAM(S): 800 TABLET ORAL at 04:57

## 2025-02-13 RX ADMIN — Medication 25 MILLIGRAM(S): at 05:02

## 2025-02-13 NOTE — PROGRESS NOTE ADULT - SUBJECTIVE AND OBJECTIVE BOX
ODALYS HAGER  74y  MRN: [id number]    Patient is a 74y old  Male who presents with a chief complaint of Chest pain and myalgias (12 Feb 2025 09:02)      Interval/Overnight Events: no events ON.     Subjective: Pt seen and examined at bedside.     MEDICATIONS  (STANDING):  acetaminophen     Tablet .. 650 milliGRAM(s) Oral every 6 hours  apixaban 5 milliGRAM(s) Oral every 12 hours  benzonatate 100 milliGRAM(s) Oral three times a day  clopidogrel Tablet 75 milliGRAM(s) Oral daily  doxazosin 4 milliGRAM(s) Oral at bedtime  ezetimibe 10 milliGRAM(s) Oral daily  finasteride 5 milliGRAM(s) Oral daily  gabapentin 100 milliGRAM(s) Oral three times a day  isosorbide   mononitrate ER Tablet (IMDUR) 120 milliGRAM(s) Oral daily  lidocaine   4% Patch 1 Patch Transdermal every 24 hours  metoprolol tartrate 25 milliGRAM(s) Oral two times a day  multivitamin 1 Tablet(s) Oral daily  pantoprazole    Tablet 40 milliGRAM(s) Oral before breakfast  polyethylene glycol 3350 17 Gram(s) Oral daily  senna 2 Tablet(s) Oral at bedtime  spironolactone 25 milliGRAM(s) Oral every 24 hours    MEDICATIONS  (PRN):  nitroglycerin     SubLingual 0.4 milliGRAM(s) SubLingual every 5 minutes PRN Chest Pain      Objective:    Vitals: Vital Signs Last 24 Hrs  T(C): 36.7 (02-13-25 @ 00:22), Max: 36.7 (02-12-25 @ 14:02)  T(F): 98 (02-13-25 @ 00:22), Max: 98.1 (02-12-25 @ 20:20)  HR: 61 (02-13-25 @ 04:59) (60 - 74)  BP: 123/56 (02-13-25 @ 04:59) (100/55 - 139/59)  BP(mean): --  RR: 18 (02-13-25 @ 00:22) (18 - 18)  SpO2: 97% (02-13-25 @ 00:22) (97% - 98%)                I&O's Summary    12 Feb 2025 07:01  -  13 Feb 2025 07:00  --------------------------------------------------------  IN: 340 mL / OUT: 2100 mL / NET: -1760 mL        PHYSICAL EXAM:  GENERAL: NAD  HEAD:  Atraumatic, Normocephalic  EYES: EOMI, conjunctiva and sclera clear  CHEST/LUNG: Clear to auscultation bilaterally; No rales, rhonchi, wheezing, or rubs  HEART: Regular rate and rhythm; No murmurs, rubs, or gallops  ABDOMEN: Soft, Nontender, Nondistended;   SKIN: No rashes or lesions  NERVOUS SYSTEM:  Alert & Oriented X3, no focal deficits    LABS:                        12.2   4.39  )-----------( 218      ( 13 Feb 2025 06:58 )             37.5                         11.3   4.84  )-----------( 203      ( 12 Feb 2025 07:05 )             34.9                         11.0   4.72  )-----------( 243      ( 11 Feb 2025 07:17 )             33.8     02-12    139  |  106  |  20  ----------------------------<  112[H]  4.4   |  20[L]  |  1.51[H]  02-11    141  |  108  |  22  ----------------------------<  89  4.6   |  20[L]  |  1.57[H]    Ca    9.8      12 Feb 2025 07:07  Ca    9.5      11 Feb 2025 07:19  Phos  3.4     02-12  Mg     1.9     02-12    TPro  7.1  /  Alb  3.9  /  TBili  0.3  /  DBili  x   /  AST  11  /  ALT  7[L]  /  AlkPhos  62  02-12  TPro  6.7  /  Alb  3.5  /  TBili  0.3  /  DBili  x   /  AST  13  /  ALT  5[L]  /  AlkPhos  57  02-11    CAPILLARY BLOOD GLUCOSE            Urinalysis Basic - ( 12 Feb 2025 07:07 )    Color: x / Appearance: x / SG: x / pH: x  Gluc: 112 mg/dL / Ketone: x  / Bili: x / Urobili: x   Blood: x / Protein: x / Nitrite: x   Leuk Esterase: x / RBC: x / WBC x   Sq Epi: x / Non Sq Epi: x / Bacteria: x          RADIOLOGY & ADDITIONAL TESTS:    Imaging Personally Reviewed:  [x ] YES  [ ] NO    Consultants involved in case:   Consultant(s) Notes Reviewed:  [ x] YES  [ ] NO:   Care Discussed with Consultants/Other Providers [x ] YES  [ ] NO           ODALYS HAGER  74y  MRN: [id number]    Patient is a 74y old  Male who presents with a chief complaint of Chest pain and myalgias (12 Feb 2025 09:02)      Interval/Overnight Events: no events ON. Had an episode yesterday of worsening chest pain. EKG similar in appearance to previous, trops consistent with previous.    Subjective: Pt seen and examined at bedside. Endorses continued chest wall and extremity pain, 3/10. Has had one or two episodes of worsening pain - but resolved with tylenol. Coughing causing increased chest pain.    MEDICATIONS  (STANDING):  acetaminophen     Tablet .. 650 milliGRAM(s) Oral every 6 hours  apixaban 5 milliGRAM(s) Oral every 12 hours  benzonatate 100 milliGRAM(s) Oral three times a day  clopidogrel Tablet 75 milliGRAM(s) Oral daily  doxazosin 4 milliGRAM(s) Oral at bedtime  ezetimibe 10 milliGRAM(s) Oral daily  finasteride 5 milliGRAM(s) Oral daily  gabapentin 100 milliGRAM(s) Oral three times a day  isosorbide   mononitrate ER Tablet (IMDUR) 120 milliGRAM(s) Oral daily  lidocaine   4% Patch 1 Patch Transdermal every 24 hours  metoprolol tartrate 25 milliGRAM(s) Oral two times a day  multivitamin 1 Tablet(s) Oral daily  pantoprazole    Tablet 40 milliGRAM(s) Oral before breakfast  polyethylene glycol 3350 17 Gram(s) Oral daily  senna 2 Tablet(s) Oral at bedtime  spironolactone 25 milliGRAM(s) Oral every 24 hours    MEDICATIONS  (PRN):  nitroglycerin     SubLingual 0.4 milliGRAM(s) SubLingual every 5 minutes PRN Chest Pain      Objective:    Vitals: Vital Signs Last 24 Hrs  T(C): 36.7 (02-13-25 @ 00:22), Max: 36.7 (02-12-25 @ 14:02)  T(F): 98 (02-13-25 @ 00:22), Max: 98.1 (02-12-25 @ 20:20)  HR: 61 (02-13-25 @ 04:59) (60 - 74)  BP: 123/56 (02-13-25 @ 04:59) (100/55 - 139/59)  BP(mean): --  RR: 18 (02-13-25 @ 00:22) (18 - 18)  SpO2: 97% (02-13-25 @ 00:22) (97% - 98%)                I&O's Summary    12 Feb 2025 07:01  -  13 Feb 2025 07:00  --------------------------------------------------------  IN: 340 mL / OUT: 2100 mL / NET: -1760 mL        PHYSICAL EXAM:  GENERAL: NAD  HEAD:  Atraumatic, Normocephalic  EYES: EOMI, conjunctiva and sclera clear  CHEST/LUNG: Clear to auscultation bilaterally; No rales, rhonchi, wheezing, or rubs  HEART: Regular rate and rhythm; No murmurs, rubs, or gallops. Diffuse tenderness to palpation across chest wall. Crossing arms in front of body reproduces the pain.  ABDOMEN: Soft, tender to palpation across all 4 quadrants, Nondistended;   SKIN: chronic stasis changes on bilateral lower extremities. Tender to palpation.  NERVOUS SYSTEM:  Alert & Oriented X3, no focal deficits      LABS:                        12.2   4.39  )-----------( 218      ( 13 Feb 2025 06:58 )             37.5                         11.3   4.84  )-----------( 203      ( 12 Feb 2025 07:05 )             34.9                         11.0   4.72  )-----------( 243      ( 11 Feb 2025 07:17 )             33.8     02-12    139  |  106  |  20  ----------------------------<  112[H]  4.4   |  20[L]  |  1.51[H]  02-11    141  |  108  |  22  ----------------------------<  89  4.6   |  20[L]  |  1.57[H]    Ca    9.8      12 Feb 2025 07:07  Ca    9.5      11 Feb 2025 07:19  Phos  3.4     02-12  Mg     1.9     02-12    TPro  7.1  /  Alb  3.9  /  TBili  0.3  /  DBili  x   /  AST  11  /  ALT  7[L]  /  AlkPhos  62  02-12  TPro  6.7  /  Alb  3.5  /  TBili  0.3  /  DBili  x   /  AST  13  /  ALT  5[L]  /  AlkPhos  57  02-11    CAPILLARY BLOOD GLUCOSE            Urinalysis Basic - ( 12 Feb 2025 07:07 )    Color: x / Appearance: x / SG: x / pH: x  Gluc: 112 mg/dL / Ketone: x  / Bili: x / Urobili: x   Blood: x / Protein: x / Nitrite: x   Leuk Esterase: x / RBC: x / WBC x   Sq Epi: x / Non Sq Epi: x / Bacteria: x          RADIOLOGY & ADDITIONAL TESTS:    Imaging Personally Reviewed:  [x ] YES  [ ] NO    Consultants involved in case:   Consultant(s) Notes Reviewed:  [ x] YES  [ ] NO:   Care Discussed with Consultants/Other Providers [x ] YES  [ ] NO

## 2025-02-13 NOTE — PROGRESS NOTE ADULT - PROBLEM SELECTOR PLAN 3
- holding rosuvastatin, will add alternative agents if this is source of pain - ezetimibe   - Repatha outpatient

## 2025-02-13 NOTE — PROGRESS NOTE ADULT - ASSESSMENT
Elton Sanabria is a 74-year-old male, Polish speaking, PMHx of CAD s/p PCI and minimally invasive direct CABG (LIMA --> LAD) 10/31/24, ICM, HFrEF, HTN, CKD, and PAD s/p prior L to R fem-fem bypass (>20yrs ago and another bypass in 2024), who had a recent hospitalization (01/2025) at [facility] for NSTEMI following a mechanical falls/p LHC, no stent due to anatomy. Represented from [facility] for all over chest pain and lower extremity pain.

## 2025-02-13 NOTE — PROGRESS NOTE ADULT - SUBJECTIVE AND OBJECTIVE BOX
DATE OF SERVICE: 02-13-25      Patient is a 74y old  Male who presents with a chief complaint of Chest pain (13 Feb 2025 07:32)      INTERVAL HISTORY: feels ok    	  MEDICATIONS:  doxazosin 4 milliGRAM(s) Oral at bedtime  isosorbide   mononitrate ER Tablet (IMDUR) 120 milliGRAM(s) Oral daily  metoprolol tartrate 25 milliGRAM(s) Oral two times a day  nitroglycerin     SubLingual 0.4 milliGRAM(s) SubLingual every 5 minutes PRN  spironolactone 25 milliGRAM(s) Oral every 24 hours        PHYSICAL EXAM:  T(C): 36.7 (02-13-25 @ 00:22), Max: 36.7 (02-13-25 @ 00:22)  HR: 61 (02-13-25 @ 04:59) (60 - 61)  BP: 123/56 (02-13-25 @ 04:59) (109/67 - 123/56)  RR: 18 (02-13-25 @ 00:22) (18 - 18)  SpO2: 97% (02-13-25 @ 00:22) (97% - 97%)  Wt(kg): --  I&O's Summary    12 Feb 2025 07:01  -  13 Feb 2025 07:00  --------------------------------------------------------  IN: 340 mL / OUT: 2100 mL / NET: -1760 mL          Appearance: In no distress	  HEENT:    PERRL, EOMI	  Cardiovascular:  S1 S2, No JVD  Respiratory: Lungs clear to auscultation	  Gastrointestinal:  Soft, Non-tender, + BS	  Vascularature:  No edema of LE  Psychiatric: Appropriate affect   Neuro: no acute focal deficits                               12.2   4.39  )-----------( 218      ( 13 Feb 2025 06:58 )             37.5     02-13    141  |  107  |  19  ----------------------------<  112[H]  4.3   |  22  |  1.44[H]    Ca    9.6      13 Feb 2025 06:59  Phos  3.4     02-13  Mg     1.9     02-13    TPro  7.1  /  Alb  3.5  /  TBili  0.2  /  DBili  x   /  AST  15  /  ALT  11  /  AlkPhos  64  02-13        Labs personally reviewed    ASSESSMENT/PLAN: 	      Mrs. Sanabria is a 74 years old M, Polish speaking PMH of CAD s/p PCI and minimally invasive direct CABG (LIMA - LAD) 10/31/24, ICM, HFrEF, HTN, CKD, BPH and PAD s/p prior L to R fem-fem bypass (>20yrs ago), history of provoked DVT in 11/2024 recent admission to [facility] for NSTEMI following a mechanical falls/p [facility] presents from [facility] for all over chest pain and lower extremity pain.        Problem/Plan - 1:  ·  Problem: Chest pain.   ·  Plan: Reports constant pain x 24-48 hours, cannot really describe the pain.  Similar to pain during recent admission with mostly tenderness over the right chest and generalized body pain  - Despite over 24 hours of chest with flat trop and nonischemic EKG making ischemic chest pain less likely   - Pain more likely MSK in origin and not angina   - Recent cath with patent LIMA to LAD and occluded LCX and RCA. Disease in medium sized D1 however, because of the angle of takeoff from the LAD- PCI of the diagonal would be quite difficult.    - Continue anti anginals - Imdur 120mg daily, Metoprolol 25mg BID, Plavix 75mg daily  - No further inpt cardiac work up     Problem/Plan - 2:  ·  Problem: CAD  ·  Plan: Recent cath with patent LIMA to LAD and occluded LCX and RCA. Disease in medium sized D1  - Continue with Plavix  - Recent , goal <70  - Given recurrent diffuse body pain despite trial of 2 diff statin agents (Atorvastatin followed by Rosuvastatin) pt would be a candidate for PCSK9 inhibitor like Repatha  ---- Will need to follow up as OP to initiate Repatha  ---- For now can initiate Ezetimibe 10mg daily     Problem/Plan - 3:  ·  Problem: Chronic systolic HF  ·  Plan: TTE with  40 to 45 %.  - GDMT - c/w Metoprolol 25mg BID  - Imdur 120mg daily  - Will add Hydral 10mg TID if BP tolerates  - Resume Farxiga 10mg daily prior to discharge  - Aldactone 25mg daily      Problem/Plan - 4:  ·  Problem: Phx of DVT  ·  Plan: On Eliquis     Problem/Plan - 5:  ·  Problem: HLD (hyperlipidemia).   ·  Plan: Given recurrent diffuse body pain despite trial of 2 diff statin agents (Atorvastatin followed by Rosuvastatin) pt would be a candidate for PCSK9 inhibitor like Repatha  ---- Will need to follow up as OP to initiate Repatha  ---- For now can initiate Ezetimibe 10mg daily          Delio Kaufman DO [facility]  Cardiovascular Medicine  [address] [place] [address], [address]  Office: [phone]  Available via Text/call on Microsoft Teams

## 2025-02-13 NOTE — PROGRESS NOTE ADULT - PROBLEM SELECTOR PLAN 1
-pt reports pain started on 2/9 morning and has been constant, cannot really describe the pain. Differential with all over tenderness to palpation concerning for myalgias in setting of initiation of statin.   -trop flat. and ekg w/o acute ischemic changes.   -on previous admission, LHC showed significant stenosis but unable to PCI d/t difficult anatomy, and rec medical mgnt. Pt unable to tolerate renexa d/t pill big too big and cannot be crushed.   -c/w bb, plavix, eliquis. Not on acei/arb, previously d/t renal function, but appears pt's scr been stable around 1.5 for few months. consider starting it, although pt also previously noted w/ hypotension as well.  - Improving    Plan:  - d/c rosuvastatin, started ezetimibe  - pain: tylenol scheduled and lidocaine patch prn   - nitroglycerin prn for chest pain if bp is stable  -cardiology consult

## 2025-02-13 NOTE — PROGRESS NOTE ADULT - PROBLEM SELECTOR PLAN 7
VTE ppx: home eliquis    GI ppx: ppi at home  PT consulted, recs appreciated  Dispo: admitted from [facility], return to [facility] 02/12 VTE ppx: home eliquis    GI ppx: ppi at home  PT consulted, recs appreciated  Dispo: admitted from [facility], return to [facility] 02/13

## 2025-02-13 NOTE — PROGRESS NOTE ADULT - ATTENDING COMMENTS
Patient is a 74 year old male, with PMH of CAD s/p PCI and CABG (Nov '24), ICM, HFrEF, HTN, CKD, PAD s/p recent LLE fem-AK-pop bypass, h/o DVT on eliquis, recently admitted 1/24-31 for mechanical fall and NSTEMI, underwent LHC, no PCI d/t difficult anatomy, decided on medical management. Pt was unable to swallow ranolazine d/t pill size (cannot be crushed). Also had LLE cellulitis, completed ancef x5d. Presented back to ED due to chest pain and leg pain.     - Troponin neg x3; low concern for cardiac etiology; pain reproducible to palpation; unsure if myalgias due to statin; had symptoms on atorvastatin on prior admission and changed to rosuvastatin; will discontinue statin and monitor; started on zetia per cardio recs; outpatient follow up to see if able to get repatha; pain overall improving but still present; had worsening pain yesterday afternoon but trop neg and no concern for cardiac issue per cardiology; likely all MSK related; pain overall improved with tylenol; will continue and change to 500mg q4; add on lidocaine patch    - vascular following; outpatient follow up    - PT eval: [facility]    DISPO: DC planning to [facility] today  40 mins spent on DC planning      Rest as above. Discussed with HS Patient is a 74 year old male, with PMH of CAD s/p PCI and CABG (Nov '24), ICM, HFrEF, HTN, CKD, PAD s/p recent LLE fem-AK-pop bypass, h/o DVT on eliquis, recently admitted 1/24-31 for mechanical fall and NSTEMI, underwent LHC, no PCI d/t difficult anatomy, decided on medical management. Pt was unable to swallow ranolazine d/t pill size (cannot be crushed). Also had LLE cellulitis, completed ancef x5d. Presented back to ED due to chest pain and leg pain.     - Troponin neg x3; low concern for cardiac etiology; pain reproducible to palpation; unsure if myalgias due to statin; had symptoms on atorvastatin on prior admission and changed to rosuvastatin; will discontinue statin and monitor; started on zetia per cardio recs; outpatient follow up to see if able to get repatha; pain overall improving but still present; had worsening pain yesterday afternoon but trop neg and no concern for cardiac issue per cardiology; likely all MSK related; pain overall improved with tylenol; will continue and change to 500mg q4; add on lidocaine patch    - vascular following; outpatient follow up    - PT eval: [facility]    DISPO: DC planning to [facility] today  40 mins spent on DC planning.    Rest as above. Discussed with HS

## 2025-02-20 PROBLEM — N18.9 CHRONIC KIDNEY DISEASE, UNSPECIFIED: Chronic | Status: ACTIVE | Noted: 2025-02-09

## 2025-03-06 ENCOUNTER — NON-APPOINTMENT (OUTPATIENT)
Age: 75
End: 2025-03-06

## 2025-03-06 ENCOUNTER — APPOINTMENT (OUTPATIENT)
Dept: VASCULAR SURGERY | Facility: CLINIC | Age: 75
End: 2025-03-06
Payer: MEDICARE

## 2025-03-06 VITALS
HEART RATE: 71 BPM | HEIGHT: 64 IN | WEIGHT: 133 LBS | SYSTOLIC BLOOD PRESSURE: 125 MMHG | BODY MASS INDEX: 22.71 KG/M2 | DIASTOLIC BLOOD PRESSURE: 87 MMHG

## 2025-03-06 DIAGNOSIS — I99.8 OTHER DISORDER OF CIRCULATORY SYSTEM: ICD-10-CM

## 2025-03-06 PROCEDURE — 99214 OFFICE O/P EST MOD 30 MIN: CPT | Mod: 25

## 2025-03-06 PROCEDURE — 93926 LOWER EXTREMITY STUDY: CPT | Mod: LT

## 2025-04-14 NOTE — ED ADULT NURSE NOTE - SEPSIS REFERENCE DATA CRITERIA 2
Called & spoke with patient, relayed 's message below. Patient verbalized understanding and had no further questions or concerns.   Abnormal Lactate: > 2

## 2025-06-05 ENCOUNTER — APPOINTMENT (OUTPATIENT)
Dept: VASCULAR SURGERY | Facility: CLINIC | Age: 75
End: 2025-06-05

## 2025-07-11 NOTE — PROGRESS NOTE ADULT - PROBLEM SELECTOR PLAN 7
[place] Falls Screening   Patient screens Positive for [place] fall assessment screening if any of the following criteria are met:   Presented to the ED because of falls Syncope; Seizure; Loss of consciousness Clinician judgement    Age >70 y.o     Altered Mental Status (AMS); Intoxication with alcohol or substance Acute or chronic confusion; Disorientated; Impaired judgement; Poor safety awareness; Inability to follow instruction    Impaired Mobility Ambulates or transfers with assistive devices; Unable to ambulate or transfer; Acute or chronic injuries impairing on mobility    Nurse Judgement Unstable vital signs; Orthostatic hypotension; Medications (Sedatives/Narcotics/Diuretics etc); Weakness; Neurological/Sensory deficits; Dizziness/Vertigo; Bowel/Bladder incontinence; Diarrhea; Urinary frequency    Screening Results: Positive   If positive screening, select interventions implemented: Falls Bundle initiated  Environmental Checks Carried Out  Standard Precautions in Place      Patient with poor health literacy. Self-discontinued all home medications after finishing the bottles. No social support.    Plan:  - social work consult, recs appreciated

## 2025-07-13 NOTE — PROGRESS NOTE ADULT - PROBLEM/PLAN-3
DISPLAY PLAN FREE TEXT
DISPLAY PLAN FREE TEXT
Name band;
DISPLAY PLAN FREE TEXT

## (undated) DEVICE — SUT VICRYL 1 36" CTX UNDYED

## (undated) DEVICE — XI SHEARS HARMONIC CVD 8MM

## (undated) DEVICE — CATH CV TRAY INSR ST UNIV

## (undated) DEVICE — SUT VICRYL 0 27" CT-3

## (undated) DEVICE — INFLATOR ENCORE 26

## (undated) DEVICE — POSITIONER FOAM EGG CRATE ULNAR 2PCS (PINK)

## (undated) DEVICE — DRSG ALLEVYN LIFE 6 X 6 (PINK)

## (undated) DEVICE — TORQUE DEVICE FOR GUIDEWIRE 0.0100.038"

## (undated) DEVICE — SUT ETHIBOND 0 18" TIES

## (undated) DEVICE — SUT VICRYL 2-0 27" CT-1 UNDYED

## (undated) DEVICE — SUT NUROLON 1 18" OS-8 (POP-OFF)

## (undated) DEVICE — SUT MONOCRYL 4-0 27" PS-2 UNDYED

## (undated) DEVICE — GLV 7.5 PROTEXIS (WHITE)

## (undated) DEVICE — DRAPE TOWEL WHITE 18" X 26"

## (undated) DEVICE — STABILIZER W STABLESOFT II LS

## (undated) DEVICE — PACK PROCEDURE HARVEST SMARTPREP APC-60

## (undated) DEVICE — LUBRICANT INST ELECTROLUBE Z SOLUTION

## (undated) DEVICE — XI DRAPE COLUMN

## (undated) DEVICE — ELCTR ZOLL DEFIBRILLATOR PAD NO REPLACEMENT

## (undated) DEVICE — XI SEAL UNIVERSIAL 5-12MM

## (undated) DEVICE — SUMP INTRACARDIAC/PERICARDIAL 20FR 1/4" ADULT

## (undated) DEVICE — CHEST DRAIN PLEUR-EVAC DRY/WET ADULT-PEDS SINGLE (QUICK)

## (undated) DEVICE — SUT PROLENE 8-0 24" BV175-6

## (undated) DEVICE — Device

## (undated) DEVICE — DRSG DERMABOND 0.7ML

## (undated) DEVICE — MEDTRONIC URCHIN EVO HEART POSITIONER & CANISTER TUBING SET

## (undated) DEVICE — SUT SILK 2-0 12-18"

## (undated) DEVICE — DRAIN RESERVOIR FOR JACKSON PRATT 100CC CARDINAL

## (undated) DEVICE — SUCTION CATH ARGYLE WHISTLE TIP 14FR STRAIGHT PACKED

## (undated) DEVICE — PACK VASCULAR MINOR

## (undated) DEVICE — SUT MONOCRYL 4-0 18" PS-2

## (undated) DEVICE — DRAPE 1/2 SHEET 40X57"

## (undated) DEVICE — CLIPPER BLADE GENERAL USE

## (undated) DEVICE — SUT PROLENE 7-0 24" BV175-6

## (undated) DEVICE — DRAPE IOBAN 13" X 13"

## (undated) DEVICE — SUT PROLENE 6-0 30" RB-2

## (undated) DEVICE — MARKING PEN W RULER

## (undated) DEVICE — SUT PROLENE 7-0 18" BV

## (undated) DEVICE — DRSG BIOPATCH DISK W CHG 1" W 4.0MM HOLE

## (undated) DEVICE — TUBING STRYKER PNEUMOCLEAR HIGH FLOW HEATED

## (undated) DEVICE — DRAPE IOBAN 23" X 23"

## (undated) DEVICE — SUT SILK 4-0 18" TIES

## (undated) DEVICE — D HELP - CLEARVIEW CLEARIFY SYSTEM

## (undated) DEVICE — ELCTR STRYKER NEPTUNE SMOKE EVACUATION PENCIL (GREEN)

## (undated) DEVICE — DRSG RESTRAINT LIMB WRAP AROUND

## (undated) DEVICE — WARMING BLANKET UPPER ADULT

## (undated) DEVICE — SUCTION CATH AIRLIFE CONTROL VALVE TRIFLO 14FR

## (undated) DEVICE — PACK ANGIOGRAM LNX SURGICOUNT

## (undated) DEVICE — SYR LUER LOK 30CC

## (undated) DEVICE — ELCTR BOVIE TIP BLADE INSULATED 6.5" EDGE

## (undated) DEVICE — XI DRAPE ARM

## (undated) DEVICE — FOLEY TRAY 16FR 5CC LF LUBRISIL ADVANCE TEMP CLOSED

## (undated) DEVICE — ELCTR BOVIE TIP BLADE INSULATED 4" EDGE

## (undated) DEVICE — NDL HYPO SAFE 22G X 1.5" (BLACK)

## (undated) DEVICE — CATH TRIOX OXIMETRY 8F 3 LUMENS

## (undated) DEVICE — SUT SILK 2-0 18" SH (POP-OFF)

## (undated) DEVICE — GOWN XXL

## (undated) DEVICE — SOL ANTI FOG

## (undated) DEVICE — ELCTR BOVIE TIP NEEDLE INSULATED 6.5" EDGE

## (undated) DEVICE — STABILIZER TISSUE OCTOPUS EVOLUTION

## (undated) DEVICE — DRSG TRACH DRAINAGE 4X4

## (undated) DEVICE — CATH NG SALEM SUMP 16FR

## (undated) DEVICE — DRAPE PROBE COVER 5" X 96"

## (undated) DEVICE — ELCTR GROUNDING PAD ADULT COVIDIEN

## (undated) DEVICE — PACK PROC CV DRAPE

## (undated) DEVICE — SUT PROLENE 5-0 36" RB-1

## (undated) DEVICE — PACK OPEN HEART LNX

## (undated) DEVICE — XI TIP COVER

## (undated) DEVICE — DRAPE IOBAN 33" X 23"

## (undated) DEVICE — BLOWER MISTER AXIUS WITH IV SET

## (undated) DEVICE — SYNOVIS VASCULAR PROBE 1.5MM 15CM

## (undated) DEVICE — CATH IV SAFE BC 24G X 0.75" (YELLOW)

## (undated) DEVICE — ULTRASOUND GEL 0.25L

## (undated) DEVICE — PREP SCRUB BRUSH W CHG 4%

## (undated) DEVICE — SUT VICRYL 0 27" CT

## (undated) DEVICE — SUT VICRYL 2-0 27" CT-1

## (undated) DEVICE — DRSG MEPILEX 10 X 25CM (4 X 10") AG

## (undated) DEVICE — DVC ASCOPE 4 SNGL USE SLIM

## (undated) DEVICE — DRAPE SOL WARMING 66X44IN

## (undated) DEVICE — ELCTR STRYKER EXTENSION SUCTION TIP 125MM

## (undated) DEVICE — CANISTER SPECIMEN CONVERTOR PLASTIC